# Patient Record
Sex: MALE | Race: WHITE | Employment: OTHER | URBAN - NONMETROPOLITAN AREA
[De-identification: names, ages, dates, MRNs, and addresses within clinical notes are randomized per-mention and may not be internally consistent; named-entity substitution may affect disease eponyms.]

---

## 2022-02-21 LAB
AVERAGE GLUCOSE: 114
HBA1C MFR BLD: 5.6 %

## 2022-05-02 ENCOUNTER — HOSPITAL ENCOUNTER (OUTPATIENT)
Age: 72
Setting detail: SPECIMEN
Discharge: HOME | End: 2022-05-02
Payer: MEDICARE

## 2022-05-02 LAB
-: ABNORMAL
ABSOLUTE EOS #: 0.03 K/UL (ref 0–0.44)
ABSOLUTE IMMATURE GRANULOCYTE: <0.03 K/UL (ref 0–0.3)
ABSOLUTE LYMPH #: 1.06 K/UL (ref 1.1–3.7)
ABSOLUTE MONO #: 0.42 K/UL (ref 0.1–1.2)
ANION GAP SERPL CALCULATED.3IONS-SCNC: 13 MMOL/L (ref 9–17)
BACTERIA: ABNORMAL
BASOPHILS # BLD: 1 % (ref 0–2)
BASOPHILS ABSOLUTE: 0.04 K/UL (ref 0–0.2)
BILIRUBIN URINE: ABNORMAL
BUN BLDV-MCNC: 23 MG/DL (ref 8–23)
BUN/CREAT BLD: 17 (ref 9–20)
CALCIUM SERPL-MCNC: 9.8 MG/DL (ref 8.6–10.4)
CHLORIDE BLD-SCNC: 97 MMOL/L (ref 98–107)
CO2: 24 MMOL/L (ref 20–31)
COLOR: YELLOW
CREAT SERPL-MCNC: 1.39 MG/DL (ref 0.7–1.2)
CREATININE URINE: 246 MG/DL (ref 39–259)
EOSINOPHILS RELATIVE PERCENT: 1 % (ref 1–4)
EPITHELIAL CELLS UA: ABNORMAL /HPF (ref 0–5)
GFR AFRICAN AMERICAN: >60 ML/MIN
GFR NON-AFRICAN AMERICAN: 50 ML/MIN
GFR SERPL CREATININE-BSD FRML MDRD: ABNORMAL ML/MIN/{1.73_M2}
GFR SERPL CREATININE-BSD FRML MDRD: ABNORMAL ML/MIN/{1.73_M2}
GLUCOSE BLD-MCNC: 94 MG/DL (ref 70–99)
GLUCOSE URINE: NEGATIVE
HCT VFR BLD CALC: 43.8 % (ref 40.7–50.3)
HEMOGLOBIN: 14.7 G/DL (ref 13–17)
IMMATURE GRANULOCYTES: 0 %
KETONES, URINE: ABNORMAL
LEUKOCYTE ESTERASE, URINE: NEGATIVE
LYMPHOCYTES # BLD: 21 % (ref 24–43)
MAGNESIUM: 1.3 MG/DL (ref 1.6–2.6)
MCH RBC QN AUTO: 34 PG (ref 25.2–33.5)
MCHC RBC AUTO-ENTMCNC: 33.6 G/DL (ref 28.4–34.8)
MCV RBC AUTO: 101.4 FL (ref 82.6–102.9)
MONOCYTES # BLD: 8 % (ref 3–12)
MUCUS: ABNORMAL
NITRITE, URINE: NEGATIVE
NRBC AUTOMATED: 0 PER 100 WBC
PDW BLD-RTO: 14.9 % (ref 11.8–14.4)
PH UA: 6 (ref 5–9)
PHOSPHORUS: 4.3 MG/DL (ref 2.5–4.5)
PLATELET # BLD: 113 K/UL (ref 138–453)
PMV BLD AUTO: 12.2 FL (ref 8.1–13.5)
POTASSIUM SERPL-SCNC: 5.1 MMOL/L (ref 3.7–5.3)
PROTEIN UA: ABNORMAL
PTH INTACT: 63.82 PG/ML (ref 15–65)
RBC # BLD: 4.32 M/UL (ref 4.21–5.77)
RBC UA: ABNORMAL /HPF (ref 0–2)
SEG NEUTROPHILS: 69 % (ref 36–65)
SEGMENTED NEUTROPHILS ABSOLUTE COUNT: 3.48 K/UL (ref 1.5–8.1)
SODIUM BLD-SCNC: 134 MMOL/L (ref 135–144)
SPECIFIC GRAVITY UA: 1.02 (ref 1.01–1.02)
TOTAL PROTEIN, URINE: 33 MG/DL
TURBIDITY: CLEAR
URIC ACID: 6.3 MG/DL (ref 3.4–7)
URINE HGB: NEGATIVE
URINE TOTAL PROTEIN CREATININE RATIO: 0.13 (ref 0–0.2)
UROBILINOGEN, URINE: NORMAL
WBC # BLD: 5.1 K/UL (ref 3.5–11.3)
WBC UA: ABNORMAL /HPF (ref 0–5)

## 2022-05-02 PROCEDURE — 83735 ASSAY OF MAGNESIUM: CPT

## 2022-05-02 PROCEDURE — 84550 ASSAY OF BLOOD/URIC ACID: CPT

## 2022-05-02 PROCEDURE — 83970 ASSAY OF PARATHORMONE: CPT

## 2022-05-02 PROCEDURE — 85025 COMPLETE CBC W/AUTO DIFF WBC: CPT

## 2022-05-02 PROCEDURE — 82570 ASSAY OF URINE CREATININE: CPT

## 2022-05-02 PROCEDURE — 84100 ASSAY OF PHOSPHORUS: CPT

## 2022-05-02 PROCEDURE — 80048 BASIC METABOLIC PNL TOTAL CA: CPT

## 2022-05-02 PROCEDURE — 81001 URINALYSIS AUTO W/SCOPE: CPT

## 2022-05-02 PROCEDURE — 84156 ASSAY OF PROTEIN URINE: CPT

## 2022-05-11 ENCOUNTER — HOSPITAL ENCOUNTER (OUTPATIENT)
Age: 72
Setting detail: SPECIMEN
Discharge: HOME | End: 2022-05-11
Payer: MEDICARE

## 2022-05-11 LAB
ANION GAP SERPL CALCULATED.3IONS-SCNC: 11 MMOL/L (ref 9–17)
AST SERPL-CCNC: 31 U/L
BILIRUB SERPL-MCNC: 0.91 MG/DL (ref 0.3–1.2)
BUN BLDV-MCNC: 22 MG/DL (ref 8–23)
BUN/CREAT BLD: 18 (ref 9–20)
CALCIUM SERPL-MCNC: 9.3 MG/DL (ref 8.6–10.4)
CHLORIDE BLD-SCNC: 93 MMOL/L (ref 98–107)
CO2: 23 MMOL/L (ref 20–31)
CREAT SERPL-MCNC: 1.21 MG/DL (ref 0.7–1.2)
GFR AFRICAN AMERICAN: >60 ML/MIN
GFR NON-AFRICAN AMERICAN: 59 ML/MIN
GFR SERPL CREATININE-BSD FRML MDRD: ABNORMAL ML/MIN/{1.73_M2}
GFR SERPL CREATININE-BSD FRML MDRD: ABNORMAL ML/MIN/{1.73_M2}
GLUCOSE BLD-MCNC: 78 MG/DL (ref 70–99)
HCT VFR BLD CALC: 41.6 % (ref 40.7–50.3)
HEMOGLOBIN: 14.3 G/DL (ref 13–17)
MAGNESIUM: 1.4 MG/DL (ref 1.6–2.6)
POTASSIUM SERPL-SCNC: 4.6 MMOL/L (ref 3.7–5.3)
SODIUM BLD-SCNC: 127 MMOL/L (ref 135–144)
THYROXINE, FREE: 1.57 NG/DL (ref 0.93–1.7)
TSH SERPL DL<=0.05 MIU/L-ACNC: 4.52 UIU/ML (ref 0.3–5)

## 2022-05-11 PROCEDURE — 82247 BILIRUBIN TOTAL: CPT

## 2022-05-11 PROCEDURE — 80048 BASIC METABOLIC PNL TOTAL CA: CPT

## 2022-05-11 PROCEDURE — 84439 ASSAY OF FREE THYROXINE: CPT

## 2022-05-11 PROCEDURE — 85014 HEMATOCRIT: CPT

## 2022-05-11 PROCEDURE — 84443 ASSAY THYROID STIM HORMONE: CPT

## 2022-05-11 PROCEDURE — 84450 TRANSFERASE (AST) (SGOT): CPT

## 2022-05-11 PROCEDURE — 85018 HEMOGLOBIN: CPT

## 2022-05-11 PROCEDURE — 83735 ASSAY OF MAGNESIUM: CPT

## 2022-05-18 ENCOUNTER — APPOINTMENT (OUTPATIENT)
Dept: GENERAL RADIOLOGY | Age: 72
DRG: 286 | End: 2022-05-18
Payer: MEDICARE

## 2022-05-18 ENCOUNTER — HOSPITAL ENCOUNTER (INPATIENT)
Age: 72
LOS: 6 days | Discharge: HOME | DRG: 286 | End: 2022-05-24
Attending: EMERGENCY MEDICINE | Admitting: INTERNAL MEDICINE
Payer: MEDICARE

## 2022-05-18 DIAGNOSIS — R06.02 SHORTNESS OF BREATH: ICD-10-CM

## 2022-05-18 DIAGNOSIS — I48.91 ATRIAL FIBRILLATION WITH RVR (HCC): Primary | ICD-10-CM

## 2022-05-18 DIAGNOSIS — I50.31 ACUTE DIASTOLIC CONGESTIVE HEART FAILURE (HCC): ICD-10-CM

## 2022-05-18 DIAGNOSIS — I25.5 ISCHEMIC CARDIOMYOPATHY: ICD-10-CM

## 2022-05-18 LAB
ABSOLUTE EOS #: <0.03 K/UL (ref 0–0.44)
ABSOLUTE IMMATURE GRANULOCYTE: <0.03 K/UL (ref 0–0.3)
ABSOLUTE LYMPH #: 0.78 K/UL (ref 1.1–3.7)
ABSOLUTE MONO #: 0.36 K/UL (ref 0.1–1.2)
ALBUMIN SERPL-MCNC: 4.1 G/DL (ref 3.5–5.2)
ALBUMIN/GLOBULIN RATIO: 2 (ref 1–2.5)
ALP BLD-CCNC: 99 U/L (ref 40–129)
ALT SERPL-CCNC: 20 U/L (ref 5–41)
ANION GAP SERPL CALCULATED.3IONS-SCNC: 15 MMOL/L (ref 9–17)
AST SERPL-CCNC: 31 U/L
BASOPHILS # BLD: 1 % (ref 0–2)
BASOPHILS ABSOLUTE: <0.03 K/UL (ref 0–0.2)
BILIRUB SERPL-MCNC: 1.66 MG/DL (ref 0.3–1.2)
BILIRUBIN DIRECT: 0.55 MG/DL
BILIRUBIN, INDIRECT: 1.11 MG/DL (ref 0–1)
BUN BLDV-MCNC: 20 MG/DL (ref 8–23)
BUN/CREAT BLD: 19 (ref 9–20)
CALCIUM SERPL-MCNC: 9.3 MG/DL (ref 8.6–10.4)
CHLORIDE BLD-SCNC: 88 MMOL/L (ref 98–107)
CO2: 21 MMOL/L (ref 20–31)
CREAT SERPL-MCNC: 1.06 MG/DL (ref 0.7–1.2)
EOSINOPHILS RELATIVE PERCENT: 1 % (ref 1–4)
GFR AFRICAN AMERICAN: >60 ML/MIN
GFR NON-AFRICAN AMERICAN: >60 ML/MIN
GFR SERPL CREATININE-BSD FRML MDRD: ABNORMAL ML/MIN/{1.73_M2}
GFR SERPL CREATININE-BSD FRML MDRD: ABNORMAL ML/MIN/{1.73_M2}
GLUCOSE BLD-MCNC: 85 MG/DL (ref 70–99)
HCT VFR BLD CALC: 42.2 % (ref 40.7–50.3)
HEMOGLOBIN: 14.5 G/DL (ref 13–17)
IMMATURE GRANULOCYTES: 0 %
INR BLD: 1.9
LACTIC ACID: 1.9 MMOL/L (ref 0.5–2.2)
LYMPHOCYTES # BLD: 18 % (ref 24–43)
MCH RBC QN AUTO: 34.4 PG (ref 25.2–33.5)
MCHC RBC AUTO-ENTMCNC: 34.4 G/DL (ref 28.4–34.8)
MCV RBC AUTO: 100 FL (ref 82.6–102.9)
MONOCYTES # BLD: 8 % (ref 3–12)
NRBC AUTOMATED: 0 PER 100 WBC
PARTIAL THROMBOPLASTIN TIME: 34.5 SEC (ref 26.8–34.8)
PDW BLD-RTO: 14.1 % (ref 11.8–14.4)
PLATELET # BLD: ABNORMAL K/UL (ref 138–453)
PLATELET, FLUORESCENCE: 113 K/UL (ref 138–453)
PLATELET, IMMATURE FRACTION: 11.7 % (ref 1.1–10.3)
POTASSIUM SERPL-SCNC: 4.7 MMOL/L (ref 3.7–5.3)
PRO-BNP: ABNORMAL PG/ML
PROTHROMBIN TIME: 21.2 SEC (ref 11.5–14.2)
RBC # BLD: 4.22 M/UL (ref 4.21–5.77)
SEG NEUTROPHILS: 73 % (ref 36–65)
SEGMENTED NEUTROPHILS ABSOLUTE COUNT: 3.15 K/UL (ref 1.5–8.1)
SODIUM BLD-SCNC: 124 MMOL/L (ref 135–144)
TOTAL PROTEIN: 6.2 G/DL (ref 6.4–8.3)
TROPONIN, HIGH SENSITIVITY: 27 NG/L (ref 0–22)
WBC # BLD: 4.3 K/UL (ref 3.5–11.3)

## 2022-05-18 PROCEDURE — 2580000003 HC RX 258: Performed by: INTERNAL MEDICINE

## 2022-05-18 PROCEDURE — 6360000002 HC RX W HCPCS

## 2022-05-18 PROCEDURE — 94760 N-INVAS EAR/PLS OXIMETRY 1: CPT

## 2022-05-18 PROCEDURE — 71045 X-RAY EXAM CHEST 1 VIEW: CPT

## 2022-05-18 PROCEDURE — 85025 COMPLETE CBC W/AUTO DIFF WBC: CPT

## 2022-05-18 PROCEDURE — 85730 THROMBOPLASTIN TIME PARTIAL: CPT

## 2022-05-18 PROCEDURE — 36415 COLL VENOUS BLD VENIPUNCTURE: CPT

## 2022-05-18 PROCEDURE — 80048 BASIC METABOLIC PNL TOTAL CA: CPT

## 2022-05-18 PROCEDURE — 85610 PROTHROMBIN TIME: CPT

## 2022-05-18 PROCEDURE — 99285 EMERGENCY DEPT VISIT HI MDM: CPT

## 2022-05-18 PROCEDURE — 93005 ELECTROCARDIOGRAM TRACING: CPT | Performed by: EMERGENCY MEDICINE

## 2022-05-18 PROCEDURE — 2500000003 HC RX 250 WO HCPCS

## 2022-05-18 PROCEDURE — 83605 ASSAY OF LACTIC ACID: CPT

## 2022-05-18 PROCEDURE — 6370000000 HC RX 637 (ALT 250 FOR IP): Performed by: INTERNAL MEDICINE

## 2022-05-18 PROCEDURE — 80076 HEPATIC FUNCTION PANEL: CPT

## 2022-05-18 PROCEDURE — 84484 ASSAY OF TROPONIN QUANT: CPT

## 2022-05-18 PROCEDURE — 83880 ASSAY OF NATRIURETIC PEPTIDE: CPT

## 2022-05-18 PROCEDURE — 2500000003 HC RX 250 WO HCPCS: Performed by: EMERGENCY MEDICINE

## 2022-05-18 PROCEDURE — 96375 TX/PRO/DX INJ NEW DRUG ADDON: CPT

## 2022-05-18 PROCEDURE — 6360000002 HC RX W HCPCS: Performed by: EMERGENCY MEDICINE

## 2022-05-18 PROCEDURE — 1200000000 HC SEMI PRIVATE

## 2022-05-18 PROCEDURE — 87040 BLOOD CULTURE FOR BACTERIA: CPT

## 2022-05-18 PROCEDURE — 96374 THER/PROPH/DIAG INJ IV PUSH: CPT

## 2022-05-18 RX ORDER — SODIUM CHLORIDE 9 MG/ML
INJECTION, SOLUTION INTRAVENOUS PRN
Status: DISCONTINUED | OUTPATIENT
Start: 2022-05-18 | End: 2022-05-20 | Stop reason: SDUPTHER

## 2022-05-18 RX ORDER — ESCITALOPRAM OXALATE 20 MG/1
5 TABLET ORAL NIGHTLY
COMMUNITY
Start: 2022-03-16 | End: 2022-10-18 | Stop reason: ALTCHOICE

## 2022-05-18 RX ORDER — POLYETHYLENE GLYCOL 3350 17 G/17G
17 POWDER, FOR SOLUTION ORAL DAILY PRN
Status: DISCONTINUED | OUTPATIENT
Start: 2022-05-18 | End: 2022-05-24 | Stop reason: HOSPADM

## 2022-05-18 RX ORDER — FUROSEMIDE 10 MG/ML
40 INJECTION INTRAMUSCULAR; INTRAVENOUS 2 TIMES DAILY
Status: DISCONTINUED | OUTPATIENT
Start: 2022-05-18 | End: 2022-05-23

## 2022-05-18 RX ORDER — METOPROLOL SUCCINATE 25 MG/1
25 TABLET, EXTENDED RELEASE ORAL DAILY
Status: DISCONTINUED | OUTPATIENT
Start: 2022-05-18 | End: 2022-05-24 | Stop reason: HOSPADM

## 2022-05-18 RX ORDER — MELATONIN 10 MG
10 CAPSULE ORAL NIGHTLY
Status: ON HOLD | COMMUNITY
End: 2022-06-11 | Stop reason: HOSPADM

## 2022-05-18 RX ORDER — METOPROLOL SUCCINATE 25 MG/1
25 TABLET, EXTENDED RELEASE ORAL DAILY
Status: ON HOLD | COMMUNITY
Start: 2022-04-26 | End: 2022-06-11 | Stop reason: HOSPADM

## 2022-05-18 RX ORDER — OMEPRAZOLE 20 MG/1
20 CAPSULE, DELAYED RELEASE ORAL DAILY
Status: ON HOLD | COMMUNITY
Start: 2022-03-30 | End: 2022-06-11 | Stop reason: HOSPADM

## 2022-05-18 RX ORDER — ACETAMINOPHEN 650 MG/1
650 SUPPOSITORY RECTAL EVERY 6 HOURS PRN
Status: DISCONTINUED | OUTPATIENT
Start: 2022-05-18 | End: 2022-05-24 | Stop reason: HOSPADM

## 2022-05-18 RX ORDER — LANOLIN ALCOHOL/MO/W.PET/CERES
400 CREAM (GRAM) TOPICAL 2 TIMES DAILY
Status: ON HOLD | COMMUNITY
Start: 2022-05-04 | End: 2022-05-24 | Stop reason: HOSPADM

## 2022-05-18 RX ORDER — ATORVASTATIN CALCIUM 40 MG/1
80 TABLET, FILM COATED ORAL NIGHTLY
Status: DISCONTINUED | OUTPATIENT
Start: 2022-05-18 | End: 2022-05-24 | Stop reason: HOSPADM

## 2022-05-18 RX ORDER — OMEPRAZOLE 20 MG/1
20 CAPSULE, DELAYED RELEASE ORAL DAILY
Status: DISCONTINUED | OUTPATIENT
Start: 2022-05-18 | End: 2022-05-19 | Stop reason: CLARIF

## 2022-05-18 RX ORDER — ONDANSETRON 2 MG/ML
4 INJECTION INTRAMUSCULAR; INTRAVENOUS EVERY 6 HOURS PRN
Status: DISCONTINUED | OUTPATIENT
Start: 2022-05-18 | End: 2022-05-24 | Stop reason: HOSPADM

## 2022-05-18 RX ORDER — SWAB
2 SWAB, NON-MEDICATED MISCELLANEOUS DAILY
COMMUNITY

## 2022-05-18 RX ORDER — CLOPIDOGREL BISULFATE 75 MG/1
75 TABLET ORAL DAILY
COMMUNITY
Start: 2022-03-16 | End: 2022-10-18 | Stop reason: SDUPTHER

## 2022-05-18 RX ORDER — ROPINIROLE 0.5 MG/1
0.5 TABLET, FILM COATED ORAL NIGHTLY
COMMUNITY
Start: 2022-04-11 | End: 2022-09-08 | Stop reason: SDUPTHER

## 2022-05-18 RX ORDER — ESCITALOPRAM OXALATE 5 MG/1
20 TABLET ORAL NIGHTLY
Status: DISCONTINUED | OUTPATIENT
Start: 2022-05-18 | End: 2022-05-24 | Stop reason: HOSPADM

## 2022-05-18 RX ORDER — ACETAMINOPHEN 325 MG/1
650 TABLET ORAL EVERY 6 HOURS PRN
Status: DISCONTINUED | OUTPATIENT
Start: 2022-05-18 | End: 2022-05-24 | Stop reason: HOSPADM

## 2022-05-18 RX ORDER — SODIUM CHLORIDE 0.9 % (FLUSH) 0.9 %
5-40 SYRINGE (ML) INJECTION EVERY 12 HOURS SCHEDULED
Status: DISCONTINUED | OUTPATIENT
Start: 2022-05-18 | End: 2022-05-20 | Stop reason: SDUPTHER

## 2022-05-18 RX ORDER — ATORVASTATIN CALCIUM 80 MG/1
80 TABLET, FILM COATED ORAL DAILY
COMMUNITY
Start: 2022-03-16

## 2022-05-18 RX ORDER — CHOLECALCIFEROL (VITAMIN D3) 125 MCG
10 CAPSULE ORAL NIGHTLY
Status: DISCONTINUED | OUTPATIENT
Start: 2022-05-18 | End: 2022-05-24 | Stop reason: HOSPADM

## 2022-05-18 RX ORDER — DILTIAZEM HYDROCHLORIDE 5 MG/ML
10 INJECTION INTRAVENOUS ONCE
Status: COMPLETED | OUTPATIENT
Start: 2022-05-18 | End: 2022-05-18

## 2022-05-18 RX ORDER — CLOPIDOGREL BISULFATE 75 MG/1
75 TABLET ORAL DAILY
Status: DISCONTINUED | OUTPATIENT
Start: 2022-05-18 | End: 2022-05-19

## 2022-05-18 RX ORDER — AMIODARONE HYDROCHLORIDE 200 MG/1
200 TABLET ORAL 2 TIMES DAILY
Status: DISCONTINUED | OUTPATIENT
Start: 2022-05-18 | End: 2022-05-24 | Stop reason: HOSPADM

## 2022-05-18 RX ORDER — SPIRONOLACTONE 25 MG/1
12.5 TABLET ORAL DAILY
Status: ON HOLD | COMMUNITY
Start: 2022-03-16 | End: 2022-05-24 | Stop reason: HOSPADM

## 2022-05-18 RX ORDER — AMIODARONE HYDROCHLORIDE 200 MG/1
200 TABLET ORAL 2 TIMES DAILY
COMMUNITY
Start: 2022-04-26 | End: 2022-09-12

## 2022-05-18 RX ORDER — ONDANSETRON 4 MG/1
4 TABLET, ORALLY DISINTEGRATING ORAL EVERY 8 HOURS PRN
Status: DISCONTINUED | OUTPATIENT
Start: 2022-05-18 | End: 2022-05-24 | Stop reason: HOSPADM

## 2022-05-18 RX ORDER — SODIUM CHLORIDE 0.9 % (FLUSH) 0.9 %
10 SYRINGE (ML) INJECTION PRN
Status: DISCONTINUED | OUTPATIENT
Start: 2022-05-18 | End: 2022-05-24 | Stop reason: HOSPADM

## 2022-05-18 RX ORDER — FUROSEMIDE 10 MG/ML
60 INJECTION INTRAMUSCULAR; INTRAVENOUS ONCE
Status: COMPLETED | OUTPATIENT
Start: 2022-05-18 | End: 2022-05-18

## 2022-05-18 RX ORDER — LANOLIN ALCOHOL/MO/W.PET/CERES
400 CREAM (GRAM) TOPICAL 2 TIMES DAILY
Status: DISCONTINUED | OUTPATIENT
Start: 2022-05-18 | End: 2022-05-24 | Stop reason: HOSPADM

## 2022-05-18 RX ORDER — BUSPIRONE HYDROCHLORIDE 5 MG/1
15 TABLET ORAL 2 TIMES DAILY
Status: DISCONTINUED | OUTPATIENT
Start: 2022-05-18 | End: 2022-05-24 | Stop reason: HOSPADM

## 2022-05-18 RX ORDER — BUSPIRONE HYDROCHLORIDE 10 MG/1
15 TABLET ORAL 2 TIMES DAILY
COMMUNITY
Start: 2022-05-10 | End: 2022-08-22 | Stop reason: ALTCHOICE

## 2022-05-18 RX ORDER — ROPINIROLE 0.25 MG/1
0.5 TABLET, FILM COATED ORAL NIGHTLY
Status: DISCONTINUED | OUTPATIENT
Start: 2022-05-18 | End: 2022-05-24 | Stop reason: HOSPADM

## 2022-05-18 RX ADMIN — ROPINIROLE HYDROCHLORIDE 0.5 MG: 0.25 TABLET, FILM COATED ORAL at 21:30

## 2022-05-18 RX ADMIN — APIXABAN 5 MG: 5 TABLET, FILM COATED ORAL at 21:30

## 2022-05-18 RX ADMIN — BUSPIRONE HYDROCHLORIDE 15 MG: 5 TABLET ORAL at 21:30

## 2022-05-18 RX ADMIN — AMIODARONE HYDROCHLORIDE 200 MG: 200 TABLET ORAL at 21:30

## 2022-05-18 RX ADMIN — Medication 400 MG: at 21:30

## 2022-05-18 RX ADMIN — ATORVASTATIN CALCIUM 80 MG: 40 TABLET, FILM COATED ORAL at 21:31

## 2022-05-18 RX ADMIN — SACUBITRIL AND VALSARTAN 1 TABLET: 24; 26 TABLET, FILM COATED ORAL at 21:30

## 2022-05-18 RX ADMIN — DILTIAZEM HYDROCHLORIDE 10 MG: 5 INJECTION INTRAVENOUS at 16:55

## 2022-05-18 RX ADMIN — FUROSEMIDE 60 MG: 10 INJECTION, SOLUTION INTRAMUSCULAR; INTRAVENOUS at 17:56

## 2022-05-18 RX ADMIN — Medication 10 MG: at 21:30

## 2022-05-18 RX ADMIN — ESCITALOPRAM 20 MG: 5 TABLET, FILM COATED ORAL at 21:30

## 2022-05-18 RX ADMIN — SODIUM CHLORIDE, PRESERVATIVE FREE 10 ML: 5 INJECTION INTRAVENOUS at 21:30

## 2022-05-18 ASSESSMENT — PAIN DESCRIPTION - LOCATION: LOCATION: LEG

## 2022-05-18 ASSESSMENT — PAIN SCALES - GENERAL
PAINLEVEL_OUTOF10: 0
PAINLEVEL_OUTOF10: 0
PAINLEVEL_OUTOF10: 5

## 2022-05-18 ASSESSMENT — PAIN - FUNCTIONAL ASSESSMENT: PAIN_FUNCTIONAL_ASSESSMENT: 0-10

## 2022-05-18 ASSESSMENT — PAIN DESCRIPTION - FREQUENCY: FREQUENCY: CONTINUOUS

## 2022-05-18 ASSESSMENT — PAIN DESCRIPTION - DESCRIPTORS: DESCRIPTORS: SORE

## 2022-05-18 ASSESSMENT — PAIN DESCRIPTION - PAIN TYPE: TYPE: ACUTE PAIN;CHRONIC PAIN

## 2022-05-18 ASSESSMENT — PAIN DESCRIPTION - ORIENTATION: ORIENTATION: LEFT;RIGHT

## 2022-05-18 NOTE — ED PROVIDER NOTES
HPI:  5/18/22, Time: 4:32 PM EDT         Joshua Jesus is a 70 y.o. male presenting to the ED for weakness and shortness of breath, beginning 2 weeks ago. The complaint has been persistent, moderate in severity, and worsened by light exertion. No fever no chills no productive cough. He recently had his spironolactone decreased in half he has had no chest pain and no angina. He was scheduled to have an elective cardioversion and [place] which was canceled and could not be rescheduled till June he takes Eliquis and Plavix and has had increased bruising. ROS:   Pertinent positives and negatives are stated within HPI, all other systems reviewed and are negative.  --------------------------------------------- PAST HISTORY ---------------------------------------------  Past Medical History:  has a past medical history of Acute kidney injury ([facility] [address] Atrial fibrillation ([facility] [address] CHF (congestive heart failure) ([facility] [address] Coronary artery disease, Hyperlipidemia, and Hypertension. Past Surgical History:  has a past surgical history that includes Rotator cuff repair (Right) and Carotid stent placement (Bilateral). Social History:  reports that he has been smoking cigarettes. He has been smoking about 1.00 pack per day. He has never used smokeless tobacco.    Family History: family history is not on file. The patients home medications have been reviewed. Allergies: Patient has no known allergies.     ---------------------------------------------------PHYSICAL EXAM--------------------------------------     Constitutional/General: Alert and oriented x3, well appearing, non toxic in NAD  Head: Normocephalic and atraumatic  Eyes: PERRL, EOMI  Mouth: Oropharynx clear, handling secretions, no trismus  Neck: Supple, full ROM, non tender to palpation in the midline, no stridor, no crepitus, no meningeal signs  Pulmonary: Lungs show diminished breath sounds bilaterally with Rales throughout no accessory muscle use no respiratory distress no wheezing no rhonchi  Cardiovascular: Tachycardic irregular rate and rhythm. No murmurs, gallops, or rubs. 2+ distal pulses  Chest: no chest wall tenderness  Abdomen: Soft. Non tender. Non distended. +BS. No rebound, guarding, or rigidity. No pulsatile masses appreciated. Musculoskeletal: Moves all extremities x 4. Warm and well perfused, no clubbing, cyanosis, or edema. Capillary refill <3 seconds  Skin: warm and dry. There are bruises on both hands and on both of the lower extremities. Neurologic: GCS 15, CN 2-12 grossly intact, no focal deficits, symmetric strength 5/5 in the upper and lower extremities bilaterally  Psych: Normal Affect    -------------------------------------------------- RESULTS -------------------------------------------------  I have personally reviewed all laboratory and imaging results for this patient. Results are listed below.      LABS:  Results for orders placed or performed during the hospital encounter of 80/75/98   Basic Metabolic Panel   Result Value Ref Range    Glucose 85 70 - 99 mg/dL    BUN 20 8 - 23 mg/dL    CREATININE 1.06 0.70 - 1.20 mg/dL    Bun/Cre Ratio 19 9 - 20    Calcium 9.3 8.6 - 10.4 mg/dL    Sodium 124 (L) 135 - 144 mmol/L    Potassium 4.7 3.7 - 5.3 mmol/L    Chloride 88 (L) 98 - 107 mmol/L    CO2 21 20 - 31 mmol/L    Anion Gap 15 9 - 17 mmol/L    GFR Non-African American >60 >60 mL/min    GFR African American >60 >60 mL/min    GFR Comment          GFR Staging         Brain Natriuretic Peptide   Result Value Ref Range    Pro-BNP 11,715 (H) <300 pg/mL   CBC with Auto Differential   Result Value Ref Range    WBC 4.3 3.5 - 11.3 k/uL    RBC 4.22 4.21 - 5.77 m/uL    Hemoglobin 14.5 13.0 - 17.0 g/dL    Hematocrit 42.2 40.7 - 50.3 %    .0 82.6 - 102.9 fL    MCH 34.4 (H) 25.2 - 33.5 pg    MCHC 34.4 28.4 - 34.8 g/dL    RDW 14.1 11.8 - 14.4 %    Platelets See Reflexed IPF Result 138 - 453 k/uL    NRBC Automated 0.0 0.0 per 100 WBC    Seg Neutrophils 73 (H) 36 - 65 %    Lymphocytes 18 (L) 24 - 43 %    Monocytes 8 3 - 12 %    Eosinophils % 1 1 - 4 %    Basophils 1 0 - 2 %    Immature Granulocytes 0 0 %    Segs Absolute 3.15 1.50 - 8.10 k/uL    Absolute Lymph # 0.78 (L) 1.10 - 3.70 k/uL    Absolute Mono # 0.36 0.10 - 1.20 k/uL    Absolute Eos # <0.03 0.00 - 0.44 k/uL    Basophils Absolute <0.03 0.00 - 0.20 k/uL    Absolute Immature Granulocyte <0.03 0.00 - 0.30 k/uL   Troponin   Result Value Ref Range    Troponin, High Sensitivity 27 (H) 0 - 22 ng/L   Hepatic Function Panel   Result Value Ref Range    Albumin 4.1 3.5 - 5.2 g/dL    Alkaline Phosphatase 99 40 - 129 U/L    ALT 20 5 - 41 U/L    AST 31 <40 U/L    Total Bilirubin 1.66 (H) 0.3 - 1.2 mg/dL    Bilirubin, Direct 0.55 (H) <0.31 mg/dL    Bilirubin, Indirect 1.11 (H) 0.00 - 1.00 mg/dL    Total Protein 6.2 (L) 6.4 - 8.3 g/dL    Albumin/Globulin Ratio 2.0 1.0 - 2.5   Lactic Acid   Result Value Ref Range    Lactic Acid 1.9 0.5 - 2.2 mmol/L   Protime-INR   Result Value Ref Range    Protime 21.2 (H) 11.5 - 14.2 sec    INR 1.9    APTT   Result Value Ref Range    PTT 34.5 26.8 - 34.8 sec   Immature Platelet Fraction   Result Value Ref Range    Platelet, Immature Fraction 11.7 (H) 1.1 - 10.3 %    Platelet, Fluorescence 113 (L) 138 - 453 k/uL   EKG 12 Lead   Result Value Ref Range    Ventricular Rate 117 BPM    Atrial Rate 105 BPM    QRS Duration 90 ms    Q-T Interval 300 ms    QTc Calculation (Bazett) 418 ms    R Axis 64 degrees    T Axis -141 degrees       RADIOLOGY:  Interpreted by Radiologist.  XR CHEST PORTABLE   Final Result   Trace bilateral pleural effusions with mild bibasilar airspace disease which   could represent atelectasis or pneumonia in the appropriate clinical setting.                EKG Interpretation  Interpreted by emergency department physician    Rhythm: atrial fibrillation - controlled  Rate: normal  Axis: normal  Conduction: normal  ST Segments: no acute change  T Waves: no acute change    Clinical Impression: atrial fibrillation (chronic)  Comparison to prior EKG: stable as compared to patient's most recent EKG      ------------------------- NURSING NOTES AND VITALS REVIEWED ---------------------------   The nursing notes within the ED encounter and vital signs as below have been reviewed by myself. BP (!) 118/91   Pulse 97   Temp 96.6 °F (35.9 °C) (Tympanic)   Resp 20   Wt 208 lb (94.3 kg)   SpO2 96%   BMI 33.57 kg/m²   Oxygen Saturation Interpretation: Normal    The patients available past medical records and past encounters were reviewed. ------------------------------ ED COURSE/MEDICAL DECISION MAKING----------------------  Medications   dilTIAZem injection 10 mg (10 mg IntraVENous Given 5/18/22 1655)   furosemide (LASIX) injection 60 mg (60 mg IntraVENous Given 5/18/22 1756)             Medical Decision Making:    EG shows A. fib with RVR on the cardiac monitor his rate was 128-150 I have ordered a Cardizem bolus intravenously. We will check lab work and get a chest x-ray  Rate is controlled with Cardizem and is down to 85 he was given Lasix 60 mg IV  Re-Evaluations:             Re-evaluation. Patients symptoms are improving      Consultations:             's was discussed with the hospitalist who will admit the patient          This patient's ED course included: re-evaluation prior to disposition, IV medications, cardiac monitoring, continuous pulse oximetry and a personal history and physicial eaxmination    This patient has remained hemodynamically stable, improved and been closely monitored during their ED course. Counseling: The emergency provider has spoken with the patient and discussed todays results, in addition to providing specific details for the plan of care and counseling regarding the diagnosis and prognosis.   Questions are answered at this time and they are agreeable with the plan.       --------------------------------- IMPRESSION AND

## 2022-05-19 ENCOUNTER — APPOINTMENT (OUTPATIENT)
Dept: NON INVASIVE DIAGNOSTICS | Age: 72
DRG: 286 | End: 2022-05-19
Payer: MEDICARE

## 2022-05-19 ENCOUNTER — APPOINTMENT (OUTPATIENT)
Dept: GENERAL RADIOLOGY | Age: 72
DRG: 286 | End: 2022-05-19
Payer: MEDICARE

## 2022-05-19 ENCOUNTER — APPOINTMENT (OUTPATIENT)
Dept: CT IMAGING | Age: 72
DRG: 286 | End: 2022-05-19
Payer: MEDICARE

## 2022-05-19 PROBLEM — K74.60 CIRRHOSIS OF LIVER WITH ASCITES (HCC): Status: ACTIVE | Noted: 2022-05-19

## 2022-05-19 PROBLEM — R18.8 CIRRHOSIS OF LIVER WITH ASCITES (HCC): Status: ACTIVE | Noted: 2022-05-19

## 2022-05-19 LAB
ABSOLUTE EOS #: 0.03 K/UL (ref 0–0.44)
ABSOLUTE IMMATURE GRANULOCYTE: 0 K/UL (ref 0–0.3)
ABSOLUTE LYMPH #: 0.54 K/UL (ref 1.1–3.7)
ABSOLUTE MONO #: 0.24 K/UL (ref 0.1–1.2)
ALBUMIN SERPL-MCNC: 3.2 G/DL (ref 3.5–5.2)
ALBUMIN/GLOBULIN RATIO: 1.7 (ref 1–2.5)
ALP BLD-CCNC: 82 U/L (ref 40–129)
ALT SERPL-CCNC: 17 U/L (ref 5–41)
ANION GAP SERPL CALCULATED.3IONS-SCNC: 9 MMOL/L (ref 9–17)
AST SERPL-CCNC: 25 U/L
BASOPHILS # BLD: 0 % (ref 0–2)
BASOPHILS ABSOLUTE: 0 K/UL (ref 0–0.2)
BILIRUB SERPL-MCNC: 1.53 MG/DL (ref 0.3–1.2)
BUN BLDV-MCNC: 20 MG/DL (ref 8–23)
BUN/CREAT BLD: 17 (ref 9–20)
CALCIUM SERPL-MCNC: 8.6 MG/DL (ref 8.6–10.4)
CHLORIDE BLD-SCNC: 89 MMOL/L (ref 98–107)
CO2: 25 MMOL/L (ref 20–31)
CREAT SERPL-MCNC: 1.18 MG/DL (ref 0.7–1.2)
EOSINOPHILS RELATIVE PERCENT: 1 % (ref 1–4)
GFR AFRICAN AMERICAN: >60 ML/MIN
GFR NON-AFRICAN AMERICAN: >60 ML/MIN
GFR SERPL CREATININE-BSD FRML MDRD: ABNORMAL ML/MIN/{1.73_M2}
GFR SERPL CREATININE-BSD FRML MDRD: ABNORMAL ML/MIN/{1.73_M2}
GLUCOSE BLD-MCNC: 83 MG/DL (ref 70–99)
HAV IGM SER IA-ACNC: NONREACTIVE
HCT VFR BLD CALC: 35.8 % (ref 40.7–50.3)
HEMOGLOBIN: 12.4 G/DL (ref 13–17)
HEPATITIS B CORE IGM ANTIBODY: NONREACTIVE
HEPATITIS B SURFACE ANTIGEN: NONREACTIVE
HEPATITIS C ANTIBODY: NONREACTIVE
IMMATURE GRANULOCYTES: 0 %
LV EF: 18 %
LVEF MODALITY: NORMAL
LYMPHOCYTES # BLD: 20 % (ref 24–43)
MAGNESIUM: 1.4 MG/DL (ref 1.6–2.6)
MCH RBC QN AUTO: 34 PG (ref 25.2–33.5)
MCHC RBC AUTO-ENTMCNC: 34.6 G/DL (ref 28.4–34.8)
MCV RBC AUTO: 98.1 FL (ref 82.6–102.9)
MONOCYTES # BLD: 9 % (ref 3–12)
MORPHOLOGY: ABNORMAL
NRBC AUTOMATED: 0 PER 100 WBC
PDW BLD-RTO: 13.7 % (ref 11.8–14.4)
PLATELET # BLD: ABNORMAL K/UL (ref 138–453)
PLATELET, FLUORESCENCE: 79 K/UL (ref 138–453)
PLATELET, IMMATURE FRACTION: 9.2 % (ref 1.1–10.3)
POTASSIUM SERPL-SCNC: 4 MMOL/L (ref 3.7–5.3)
RBC # BLD: 3.65 M/UL (ref 4.21–5.77)
SEG NEUTROPHILS: 70 % (ref 36–65)
SEGMENTED NEUTROPHILS ABSOLUTE COUNT: 1.89 K/UL (ref 1.5–8.1)
SODIUM BLD-SCNC: 123 MMOL/L (ref 135–144)
TOTAL PROTEIN: 5.1 G/DL (ref 6.4–8.3)
WBC # BLD: 2.7 K/UL (ref 3.5–11.3)

## 2022-05-19 PROCEDURE — 85025 COMPLETE CBC W/AUTO DIFF WBC: CPT

## 2022-05-19 PROCEDURE — 71046 X-RAY EXAM CHEST 2 VIEWS: CPT

## 2022-05-19 PROCEDURE — 6370000000 HC RX 637 (ALT 250 FOR IP): Performed by: NURSE PRACTITIONER

## 2022-05-19 PROCEDURE — 94761 N-INVAS EAR/PLS OXIMETRY MLT: CPT

## 2022-05-19 PROCEDURE — 83735 ASSAY OF MAGNESIUM: CPT

## 2022-05-19 PROCEDURE — 1200000000 HC SEMI PRIVATE

## 2022-05-19 PROCEDURE — 71250 CT THORAX DX C-: CPT

## 2022-05-19 PROCEDURE — 99222 1ST HOSP IP/OBS MODERATE 55: CPT | Performed by: FAMILY MEDICINE

## 2022-05-19 PROCEDURE — 80074 ACUTE HEPATITIS PANEL: CPT

## 2022-05-19 PROCEDURE — 93306 TTE W/DOPPLER COMPLETE: CPT

## 2022-05-19 PROCEDURE — 2580000003 HC RX 258: Performed by: INTERNAL MEDICINE

## 2022-05-19 PROCEDURE — 6370000000 HC RX 637 (ALT 250 FOR IP): Performed by: INTERNAL MEDICINE

## 2022-05-19 PROCEDURE — 80053 COMPREHEN METABOLIC PANEL: CPT

## 2022-05-19 PROCEDURE — 36415 COLL VENOUS BLD VENIPUNCTURE: CPT

## 2022-05-19 PROCEDURE — 85055 RETICULATED PLATELET ASSAY: CPT

## 2022-05-19 PROCEDURE — 6360000002 HC RX W HCPCS: Performed by: INTERNAL MEDICINE

## 2022-05-19 RX ORDER — CLOPIDOGREL BISULFATE 75 MG/1
75 TABLET ORAL EVERY OTHER DAY
Status: DISCONTINUED | OUTPATIENT
Start: 2022-05-19 | End: 2022-05-24 | Stop reason: HOSPADM

## 2022-05-19 RX ORDER — PANTOPRAZOLE SODIUM 40 MG/1
40 TABLET, DELAYED RELEASE ORAL
Status: DISCONTINUED | OUTPATIENT
Start: 2022-05-19 | End: 2022-05-24 | Stop reason: HOSPADM

## 2022-05-19 RX ADMIN — CLOPIDOGREL BISULFATE 75 MG: 75 TABLET ORAL at 10:10

## 2022-05-19 RX ADMIN — FUROSEMIDE 40 MG: 10 INJECTION, SOLUTION INTRAMUSCULAR; INTRAVENOUS at 10:10

## 2022-05-19 RX ADMIN — AMIODARONE HYDROCHLORIDE 200 MG: 200 TABLET ORAL at 20:47

## 2022-05-19 RX ADMIN — ATORVASTATIN CALCIUM 80 MG: 40 TABLET, FILM COATED ORAL at 20:47

## 2022-05-19 RX ADMIN — Medication 10 MG: at 20:47

## 2022-05-19 RX ADMIN — Medication 400 MG: at 20:47

## 2022-05-19 RX ADMIN — PANTOPRAZOLE SODIUM 40 MG: 40 TABLET, DELAYED RELEASE ORAL at 07:31

## 2022-05-19 RX ADMIN — Medication 400 MG: at 10:10

## 2022-05-19 RX ADMIN — SACUBITRIL AND VALSARTAN 1 TABLET: 24; 26 TABLET, FILM COATED ORAL at 10:10

## 2022-05-19 RX ADMIN — BUSPIRONE HYDROCHLORIDE 15 MG: 5 TABLET ORAL at 10:10

## 2022-05-19 RX ADMIN — APIXABAN 5 MG: 5 TABLET, FILM COATED ORAL at 10:09

## 2022-05-19 RX ADMIN — SACUBITRIL AND VALSARTAN 1 TABLET: 24; 26 TABLET, FILM COATED ORAL at 20:47

## 2022-05-19 RX ADMIN — AMIODARONE HYDROCHLORIDE 200 MG: 200 TABLET ORAL at 10:10

## 2022-05-19 RX ADMIN — ESCITALOPRAM 20 MG: 5 TABLET, FILM COATED ORAL at 20:46

## 2022-05-19 RX ADMIN — SODIUM CHLORIDE, PRESERVATIVE FREE 10 ML: 5 INJECTION INTRAVENOUS at 20:45

## 2022-05-19 RX ADMIN — FUROSEMIDE 40 MG: 10 INJECTION, SOLUTION INTRAMUSCULAR; INTRAVENOUS at 17:26

## 2022-05-19 RX ADMIN — METOPROLOL SUCCINATE 25 MG: 25 TABLET, EXTENDED RELEASE ORAL at 10:10

## 2022-05-19 RX ADMIN — SODIUM CHLORIDE, PRESERVATIVE FREE 10 ML: 5 INJECTION INTRAVENOUS at 10:10

## 2022-05-19 RX ADMIN — ROPINIROLE HYDROCHLORIDE 0.5 MG: 0.25 TABLET, FILM COATED ORAL at 20:47

## 2022-05-19 RX ADMIN — BUSPIRONE HYDROCHLORIDE 15 MG: 5 TABLET ORAL at 20:47

## 2022-05-19 ASSESSMENT — PAIN SCALES - GENERAL: PAINLEVEL_OUTOF10: 0

## 2022-05-19 NOTE — PROGRESS NOTES
Pt setting off bed alarm at this time. When writer entered the room pt was at the end of the bed and shut the bed alarm off himself. Writer again educated pt that he needs to use the call light and that he cannot shut the bed alarm off for his safety.

## 2022-05-19 NOTE — PROGRESS NOTES
Pt has set off the bed alarm multiple times since shift change. Writer educated pt on the need to use the call light. Will continue to monitor.

## 2022-05-19 NOTE — PLAN OF CARE
Problem: Pain  Goal: Verbalizes/displays adequate comfort level or baseline comfort level  Flowsheets (Taken 5/19/2022 0122)  Verbalizes/displays adequate comfort level or baseline comfort level: Assess pain using appropriate pain scale     Problem: Safety - Adult  Goal: Free from fall injury  Note: ID band correct and in place. Bed locked and in lowest position. Call light within reach. Patient free from injury. Will continue to monitor.

## 2022-05-19 NOTE — PROGRESS NOTES
SW met with pt to complete assessment. Pt is alert and oriented and pleasant. Pt is a 70year old male admitted for atrial fib with RVR. Pt lives alone in his home in [place]. Pt reports that he has a cane, walker, shower chair and grab bars to use at home. Pt was not using any community services at home. Pt reports that he was driving himself up until February and now his family has been assisting him in getting to appointments and running errands. Pt is a full code and follows with Crow Connor CNP as PCP. Pt is uncertain if he has advance directives but states that his sister Rudolph Johnson makes all his medical decisions for him and manages his medications. Pt reports that his medications are affordable. Pt plans to return home at discharge. Pt identifies no discharge needs or concerns at this time. SW will follow and remain available.  Nicole WATKINS LSW 5/19/2022

## 2022-05-19 NOTE — H&P
History and Physical    Patient:  Gaston Thomas  MRN: [id number]    Chief Complaint: Shortness of breath    History Obtained From:  patient, electronic medical record    PCP: [facility]    History of Present Illness: The patient is a 70 y.o. male who presented to the emergency room with complaints of weakness and shortness of breath. Onset of symptoms 2 weeks. He stated that his shortness of breath worsened with light exertion. He denied fever or chills. He denied productive cough. He reported recently that his spironolactone was decreased in half. He denied chest pain but admitted to palpitations. He reported that he was scheduled for an elective cardioversion in [place] however it was canceled and was not able to be scheduled until June. He reports that he takes Eliquis and Plavix and has noticed increase in bruising. During his evaluation patient was found to be hyponatremic with sodium level of 124. Hemoglobin was 14.5 however his platelet count was 40.2. Hepatic function showed elevated total bilirubin, direct bilirubin and indirect bilirubin. Patient denied abdominal pain. Chest x-ray showed trace bilateral pleural effusions with mild bibasilar airspace disease. EKG showed atrial fibrillation with no ST or T wave changes. Patient's initial heart rate ranged between 128-150 and was given a Cardizem bolus IV which reduced his heart rate to 85. Patient was given Lasix 60 mg IV. Past Medical History:        Diagnosis Date    Acute kidney injury ([address]     Atrial fibrillation ([address]     CHF (congestive heart failure) (HCC)     Coronary artery disease     Hyperlipidemia     Hypertension     Hyponatremia     Thrombocytopenia (HCC)        Past Surgical History:        Procedure Laterality Date    CAROTID STENT PLACEMENT Bilateral     ROTATOR CUFF REPAIR Right        Medications Prior to Admission:    Prior to Admission medications    Medication Sig Start Date End Date Taking? Authorizing Provider   amiodarone (CORDARONE) 200 MG tablet Take 200 mg by mouth 2 times daily 4/26/22  Yes Historical Provider, MD   apixaban (ELIQUIS) 5 MG TABS tablet Take 5 mg by mouth 2 times daily 3/16/22  Yes Historical Provider, MD   sacubitril-valsartan (ENTRESTO) 24-26 MG per tablet Take 1 tablet by mouth 2 times daily 3/16/22  Yes Historical Provider, MD   busPIRone (BUSPAR) 10 MG tablet Take 15 mg by mouth 2 times daily  5/10/22  Yes Historical Provider, MD   magnesium oxide (MAG-OX) 400 (240 Mg) MG tablet Take 400 mg by mouth 2 times daily 5/4/22  Yes Historical Provider, MD   clopidogrel (PLAVIX) 75 MG tablet Take 75 mg by mouth daily 3/16/22  Yes Historical Provider, MD   spironolactone (ALDACTONE) 25 MG tablet Take 12.5 mg by mouth daily  3/16/22  Yes Historical Provider, MD   omeprazole (PRILOSEC) 20 MG delayed release capsule Take 20 mg by mouth daily 3/30/22  Yes Historical Provider, MD   metoprolol succinate (TOPROL XL) 25 MG extended release tablet Take 25 mg by mouth daily 4/26/22  Yes Historical Provider, MD   escitalopram (LEXAPRO) 20 MG tablet Take 20 mg by mouth nightly 3/16/22  Yes Historical Provider, MD   atorvastatin (LIPITOR) 80 MG tablet Take 80 mg by mouth daily 3/16/22  Yes Historical Provider, MD   rOPINIRole (REQUIP) 0.5 MG tablet Take 0.5 mg by mouth nightly 4/11/22 7/10/22 Yes Historical Provider, MD   melatonin 10 MG CAPS capsule Take 10 mg by mouth nightly   Yes Historical Provider, MD   Omega-3 Fatty Acids (FISH OIL CONCENTRATE) 1000 MG CAPS Take 2 caplets by mouth daily   Yes Historical Provider, MD       Allergies:  Patient has no known allergies. Social History:   TOBACCO:   reports that he has been smoking cigarettes. He has been smoking about 1.00 pack per day. He has never used smokeless tobacco.  ETOH:   reports previous alcohol use. Family History:   History reviewed. No pertinent family history. Allergies:  Patient has no known allergies.     Medications Prior to Admission:    Prior to Admission medications    Medication Sig Start Date End Date Taking? Authorizing Provider   amiodarone (CORDARONE) 200 MG tablet Take 200 mg by mouth 2 times daily 4/26/22  Yes Historical Provider, MD   apixaban (ELIQUIS) 5 MG TABS tablet Take 5 mg by mouth 2 times daily 3/16/22  Yes Historical Provider, MD   sacubitril-valsartan (ENTRESTO) 24-26 MG per tablet Take 1 tablet by mouth 2 times daily 3/16/22  Yes Historical Provider, MD   busPIRone (BUSPAR) 10 MG tablet Take 15 mg by mouth 2 times daily  5/10/22  Yes Historical Provider, MD   magnesium oxide (MAG-OX) 400 (240 Mg) MG tablet Take 400 mg by mouth 2 times daily 5/4/22  Yes Historical Provider, MD   clopidogrel (PLAVIX) 75 MG tablet Take 75 mg by mouth daily 3/16/22  Yes Historical Provider, MD   spironolactone (ALDACTONE) 25 MG tablet Take 12.5 mg by mouth daily  3/16/22  Yes Historical Provider, MD   omeprazole (PRILOSEC) 20 MG delayed release capsule Take 20 mg by mouth daily 3/30/22  Yes Historical Provider, MD   metoprolol succinate (TOPROL XL) 25 MG extended release tablet Take 25 mg by mouth daily 4/26/22  Yes Historical Provider, MD   escitalopram (LEXAPRO) 20 MG tablet Take 20 mg by mouth nightly 3/16/22  Yes Historical Provider, MD   atorvastatin (LIPITOR) 80 MG tablet Take 80 mg by mouth daily 3/16/22  Yes Historical Provider, MD   rOPINIRole (REQUIP) 0.5 MG tablet Take 0.5 mg by mouth nightly 4/11/22 7/10/22 Yes Historical Provider, MD   melatonin 10 MG CAPS capsule Take 10 mg by mouth nightly   Yes Historical Provider, MD   Omega-3 Fatty Acids (FISH OIL CONCENTRATE) 1000 MG CAPS Take 2 caplets by mouth daily   Yes Historical Provider, MD       Review of Systems:  Constitutional:negative  for fevers, and negative for chills.   Eyes: negative for visual disturbance   ENT: negative for sore throat, negative nasal congestion, and negative for earache  Respiratory: positive for shortness of breath, positive for cough, and negative for wheezing  Cardiovascular: negative for chest pain, positive for palpitations, and negative for syncope  Gastrointestinal: negative for abdominal pain, negative for nausea,negative for vomiting, negative for diarrhea, negative for constipation, and negative for hematochezia or melena  Genitourinary: negative for dysuria, negative for urinary urgency, negative for urinary frequency, and negative for hematuria  Skin: negative for skin rash, and negative for skin lesions positive ecchymosis  Neurological: negative for unilateral weakness, numbness or tingling. Physical Exam:    Vitals:   Temp: 97.5 °F (36.4 °C)  BP: 114/70  Resp: 18  Pulse: 70  SpO2: 94 %  24HR INTAKE/OUTPUT:      Intake/Output Summary (Last 24 hours) at 5/19/2022 1102  Last data filed at 5/19/2022 0908  Gross per 24 hour   Intake 420 ml   Output 2050 ml   Net -1630 ml       Weight    Body mass index is 31.81 kg/m². Exam:  GEN:    Awake, alert and oriented x3. EYES:  EOMI, pupils equal   NECK: Supple. No lymphadenopathy. No carotid bruit  CVS:    irregularly irregular, no audible murmur  PULM:  CTA, no wheezes, rales or rhonchi, no acute respiratory distress  ABD:    Bowels sounds normal.  Abdomen is soft. No distention. no tenderness to palpation. EXT:   2+ edema bilaterally . No calf tenderness. NEURO: Moves all extremities. Motor and sensory are grossly intact  SKIN:  No rashes. No skin lesions.   Significant ecchymosis to Syd LE and UE.    -----------------------------------------------------------------  Diagnostic Data:     DATA:    CBC:   Lab Results   Component Value Date    WBC 2.7 (L) 05/19/2022    RBC 3.65 (L) 05/19/2022    HGB 12.4 (L) 05/19/2022    HCT 35.8 (L) 05/19/2022    MCV 98.1 05/19/2022    PLT See Reflexed IPF Result 05/19/2022        CMP:   Lab Results   Component Value Date    GLUCOSE 83 05/19/2022    BUN 20 05/19/2022    CREATININE 1.18 05/19/2022     (L) 05/19/2022    K 4.0 05/19/2022 CALCIUM 8.6 05/19/2022    CL 89 (L) 05/19/2022    CO2 25 05/19/2022    PROT 5.1 (L) 05/19/2022    LABALBU 3.2 (L) 05/19/2022    BILITOT 1.53 (H) 05/19/2022    ALKPHOS 82 05/19/2022    ALT 17 05/19/2022    AST 25 05/19/2022       UA:   Lab Results   Component Value Date    COLORU Yellow 05/02/2022    SPECGRAV 1.025 (H) 05/02/2022    WBCUA 0 TO 2 05/02/2022    RBCUA 0 TO 2 05/02/2022    EPITHUA 0 TO 2 05/02/2022    LEUKOCYTESUR NEGATIVE 05/02/2022    GLUCOSEU NEGATIVE 05/02/2022    KETUA TRACE (A) 05/02/2022    PROTEINU TRACE (A) 05/02/2022    HGBUR NEGATIVE 05/02/2022    BACTERIA 1+ (A) 05/02/2022       Lactic Acid:   Lab Results   Component Value Date    LACTA 1.9 05/18/2022       D-Dimer:  No results found for: DDIMER    PT/INR:  Lab Results   Component Value Date    PROTIME 21.2 05/18/2022    INR 1.9 05/18/2022       High Sensitivity Troponin:  Recent Labs     05/18/22  1635   TROPHS 27*       ABGs:   No results found for: PHART, PH, ORD3TKD, PCO2, PO2ART, PO2, BQM9JYH, HCO3, BEART, BE, THGBART, THB, DSY4GFC, B5SDNQWG, O2SAT, FIO2        XR CHEST (2 VW)   Final Result   Cardiomegaly with mild pulmonary vascular congestion bilateral pleural   effusions which may represent CHF. Basilar atelectasis and or infiltrate   cannot be excluded. XR CHEST PORTABLE   Final Result   Trace bilateral pleural effusions with mild bibasilar airspace disease which   could represent atelectasis or pneumonia in the appropriate clinical setting. CT CHEST ABDOMEN PELVIS WO CONTRAST    (Results Pending)         EKG reviewed    Assessment:    Principal Problem:    Atrial fibrillation with RVR (HCC)  Active Problems:    Acute on chronic diastolic CHF (congestive heart failure), NYHA class 3 (HCC)    Coronary artery disease    Hypertension    Thrombocytopenia (HCC)    Hyponatremia    Hyperlipidemia  Resolved Problems:    * No resolved hospital problems.  *      Patient Active Problem List    Diagnosis Date Noted    Acute on chronic diastolic CHF (congestive heart failure), NYHA class 3 (HCC)     Coronary artery disease     Hypertension     Thrombocytopenia (HCC)     Hyponatremia     Hyperlipidemia     Atrial fibrillation with RVR ([facility] [address] 05/18/2022       Plan:     · This patient requires inpatient admission because of atrial fibrillation with RVR  · Factors affecting the medical complexity of this patient include acute on chronic diastolic CHF, CAD, hypertension, hyponatremia, thrombocytopenia  · Estimated length of stay is 3 days  · Atrial Fibrillation with RVR  · Appreciate cardiology  · Continue Eliquis  · Continue amiodarone  · Continue Toprol-XL  · Acute on chronic diastolic CHF stage III  · Appreciate cardiology  · IV Lasix  · Continue Entresto, Toprol-XL  · CAD  · Change Plavix to every other day due to thrombocytopenia  · Defer Eliquis to cardiology  · Hypertension  · Continue Toprol-XL  · Hyponatremia  · 1800 mL fluid restriction  · Trend labs  · Thrombocytopenia  · Uncertain etiology  · Elevated bilirubin  · Hepatitis series  · Change Plavix to every other day  · Caution with Eliquis  · CT chest abdomen and pelvis- assess pulmonary due to CHF versus neoplastic process involving liver or lung  · DVT prophylaxis: already on chronic anticoagulation  · Peptic ulcer prophylaxis: Pepcid  · High risk medications: none  · Social Service and Case Management consults for DC planning  · Dietician consult initiated    CORE MEASURES  DVT prophylaxis: already on chronic anticoagulation  Decubitus ulcer present on admission: No  CODE STATUS: FULL CODE  Nutrition Status: fair   Physical therapy: No   Old Charts reviewed: Yes  EKG Reviewed:  Yes  Advance Directive Addressed: Yes    SEVEN Garcia CNP, SEVEN, NP-C  5/19/2022, 11:02 AM

## 2022-05-19 NOTE — PROGRESS NOTES
[facility] Pt was able to ambulate from cart to bed. Pt unsteady. Vitals and assessment completed at this time. Pt educated on plan of care and calling out when needing to get out of bed. Bed alarm on at this time. Pt denies any pain or discomfort. Call light is within reach. Will continue to monitor.

## 2022-05-19 NOTE — PROGRESS NOTES
Comprehensive Nutrition Assessment    Type and Reason for Visit:  Initial    Nutrition Recommendations/Plan:   1. Continue current diet. 2. Attached low sodium diet information to d/c instructions. Malnutrition Assessment:  Malnutrition Status:  Insufficient data (05/19/22 2434)    Context:  Acute Illness     Findings of the 6 clinical characteristics of malnutrition:  Energy Intake:  Unable to assess  Weight Loss:  No significant weight loss     Body Fat Loss:  No significant body fat loss     Muscle Mass Loss:  No significant muscle mass loss    Fluid Accumulation:  Moderate to Severe Extremities (+ 2 RLE, + 2 pitting LLE)   Strength:  Not Performed    Nutrition Assessment:    Intended weight loss r/t cardiac dysfunction aeb 6.3% weight loss since admission, expected fluid losses with diuresis, Dx CHF. Pt is asleep at this time. Attached low sodium diet information to d/c instructions. Nutrition Related Findings:    appears well nourished Wound Type: None       Current Nutrition Intake & Therapies:    Average Meal Intake: Unable to assess  Average Supplements Intake: None Ordered  ADULT DIET; Regular; Low Sodium (2 gm); 1800 ml    Anthropometric Measures:  Height: 5' 7\" (170.2 cm)  Ideal Body Weight (IBW): 148 lbs (67 kg)    Admission Body Weight: 209 lb 5 oz (94.9 kg)  Current Body Weight: 203 lb 1.6 oz (92.1 kg), 137.2 % IBW. Weight Source: Bed Scale  Current BMI (kg/m2): 31.8  Usual Body Weight: 181 lb (82.1 kg)  % Weight Change (Calculated): 12.2  Weight Adjustment For: No Adjustment                 BMI Categories: Obese Class 1 (BMI 30.0-34. 9)    Estimated Daily Nutrient Needs:  Energy Requirements Based On: Kcal/kg  Weight Used for Energy Requirements: Current  Energy (kcal/day): 1613-9203 (15-18/kg)  Weight Used for Protein Requirements: Ideal  Protein (g/day): 81-94g (1.2-1.4g/kg)  Method Used for Fluid Requirements: ml/Kg  Fluid (ml/day): 1840 ml (20/kg)    Nutrition Diagnosis:   · Other (Comment) (Predicted weight loss) related to cardiac dysfunction as evidenced by other (comment) (2 gm sodium diet, CHF, diuresis)      Nutrition Interventions:   Food and/or Nutrient Delivery: Continue Current Diet  Nutrition Education/Counseling: Education needed  Coordination of Nutrition Care: Continue to monitor while inpatient       Goals:     Goals: PO intake 75% or greater     Recent Labs     05/18/22  1635 05/19/22  0520   * 123*   K 4.7 4.0   CL 88* 89*   CO2 21 25   BUN 20 20   CREATININE 1.06 1.18   GLUCOSE 85 83   ALT 20 17   ALKPHOS 99 82   GFR                            Lab Results   Component Value Date    LABALBU 3.2 05/19/2022      Nutrition Monitoring and Evaluation:   Behavioral-Environmental Outcomes: None Identified  Food/Nutrient Intake Outcomes: Food and Nutrient Intake  Physical Signs/Symptoms Outcomes: Biochemical Data,Weight,Fluid Status or Edema    Discharge Planning:    Continue current diet     Zane Morton RD, LD  Contact: [phone]

## 2022-05-20 PROBLEM — N18.9 CKD (CHRONIC KIDNEY DISEASE): Status: ACTIVE | Noted: 2022-05-20

## 2022-05-20 PROBLEM — R18.8 ASCITES: Status: ACTIVE | Noted: 2022-05-20

## 2022-05-20 PROBLEM — E83.42 HYPOMAGNESEMIA: Status: ACTIVE | Noted: 2022-05-20

## 2022-05-20 LAB
ABSOLUTE EOS #: 0.03 K/UL (ref 0–0.44)
ABSOLUTE IMMATURE GRANULOCYTE: 0 K/UL (ref 0–0.3)
ABSOLUTE LYMPH #: 0.64 K/UL (ref 1.1–3.7)
ABSOLUTE MONO #: 0.31 K/UL (ref 0.1–1.2)
ALBUMIN SERPL-MCNC: 3.5 G/DL (ref 3.5–5.2)
ALBUMIN/GLOBULIN RATIO: 1.8 (ref 1–2.5)
ALP BLD-CCNC: 84 U/L (ref 40–129)
ALT SERPL-CCNC: 18 U/L (ref 5–41)
ANION GAP SERPL CALCULATED.3IONS-SCNC: 5 MMOL/L (ref 9–17)
ANION GAP SERPL CALCULATED.3IONS-SCNC: 8 MMOL/L (ref 9–17)
AST SERPL-CCNC: 27 U/L
BASOPHILS # BLD: 0 % (ref 0–2)
BASOPHILS ABSOLUTE: 0 K/UL (ref 0–0.2)
BILIRUB SERPL-MCNC: 1.22 MG/DL (ref 0.3–1.2)
BUN BLDV-MCNC: 16 MG/DL (ref 8–23)
BUN BLDV-MCNC: 20 MG/DL (ref 8–23)
BUN/CREAT BLD: 16 (ref 9–20)
BUN/CREAT BLD: 18 (ref 9–20)
CALCIUM SERPL-MCNC: 8.7 MG/DL (ref 8.6–10.4)
CALCIUM SERPL-MCNC: 8.8 MG/DL (ref 8.6–10.4)
CHLORIDE BLD-SCNC: 92 MMOL/L (ref 98–107)
CHLORIDE BLD-SCNC: 94 MMOL/L (ref 98–107)
CO2: 29 MMOL/L (ref 20–31)
CO2: 31 MMOL/L (ref 20–31)
CREAT SERPL-MCNC: 1.02 MG/DL (ref 0.7–1.2)
CREAT SERPL-MCNC: 1.14 MG/DL (ref 0.7–1.2)
EKG ATRIAL RATE: 105 BPM
EKG Q-T INTERVAL: 300 MS
EKG QRS DURATION: 90 MS
EKG QTC CALCULATION (BAZETT): 418 MS
EKG R AXIS: 64 DEGREES
EKG T AXIS: -141 DEGREES
EKG VENTRICULAR RATE: 117 BPM
EOSINOPHILS RELATIVE PERCENT: 1 % (ref 1–4)
GFR AFRICAN AMERICAN: >60 ML/MIN
GFR AFRICAN AMERICAN: >60 ML/MIN
GFR NON-AFRICAN AMERICAN: >60 ML/MIN
GFR NON-AFRICAN AMERICAN: >60 ML/MIN
GFR SERPL CREATININE-BSD FRML MDRD: ABNORMAL ML/MIN/{1.73_M2}
GLUCOSE BLD-MCNC: 129 MG/DL (ref 70–99)
GLUCOSE BLD-MCNC: 89 MG/DL (ref 70–99)
HCT VFR BLD CALC: 36.2 % (ref 40.7–50.3)
HCT VFR BLD CALC: 38.1 % (ref 40.7–50.3)
HEMOGLOBIN: 12.6 G/DL (ref 13–17)
HEMOGLOBIN: 13.2 G/DL (ref 13–17)
IMMATURE GRANULOCYTES: 0 %
LYMPHOCYTES # BLD: 23 % (ref 24–43)
MAGNESIUM: 1.3 MG/DL (ref 1.6–2.6)
MCH RBC QN AUTO: 34.3 PG (ref 25.2–33.5)
MCH RBC QN AUTO: 34.3 PG (ref 25.2–33.5)
MCHC RBC AUTO-ENTMCNC: 34.6 G/DL (ref 28.4–34.8)
MCHC RBC AUTO-ENTMCNC: 34.8 G/DL (ref 28.4–34.8)
MCV RBC AUTO: 98.6 FL (ref 82.6–102.9)
MCV RBC AUTO: 99 FL (ref 82.6–102.9)
MONOCYTES # BLD: 11 % (ref 3–12)
MORPHOLOGY: NORMAL
NRBC AUTOMATED: 0 PER 100 WBC
NRBC AUTOMATED: 0 PER 100 WBC
PDW BLD-RTO: 13.9 % (ref 11.8–14.4)
PDW BLD-RTO: 14.3 % (ref 11.8–14.4)
PLATELET # BLD: ABNORMAL K/UL (ref 138–453)
PLATELET # BLD: ABNORMAL K/UL (ref 138–453)
PLATELET, FLUORESCENCE: 81 K/UL (ref 138–453)
PLATELET, FLUORESCENCE: 87 K/UL (ref 138–453)
PLATELET, IMMATURE FRACTION: 8.1 % (ref 1.1–10.3)
PLATELET, IMMATURE FRACTION: 8.7 % (ref 1.1–10.3)
POTASSIUM SERPL-SCNC: 3.9 MMOL/L (ref 3.7–5.3)
POTASSIUM SERPL-SCNC: 3.9 MMOL/L (ref 3.7–5.3)
RBC # BLD: 3.67 M/UL (ref 4.21–5.77)
RBC # BLD: 3.85 M/UL (ref 4.21–5.77)
SEG NEUTROPHILS: 65 % (ref 36–65)
SEGMENTED NEUTROPHILS ABSOLUTE COUNT: 1.82 K/UL (ref 1.5–8.1)
SODIUM BLD-SCNC: 128 MMOL/L (ref 135–144)
SODIUM BLD-SCNC: 131 MMOL/L (ref 135–144)
TOTAL PROTEIN: 5.4 G/DL (ref 6.4–8.3)
WBC # BLD: 2.8 K/UL (ref 3.5–11.3)
WBC # BLD: 2.8 K/UL (ref 3.5–11.3)

## 2022-05-20 PROCEDURE — 80053 COMPREHEN METABOLIC PANEL: CPT

## 2022-05-20 PROCEDURE — 6360000004 HC RX CONTRAST MEDICATION: Performed by: INTERNAL MEDICINE

## 2022-05-20 PROCEDURE — 2709999900 HC NON-CHARGEABLE SUPPLY

## 2022-05-20 PROCEDURE — 85025 COMPLETE CBC W/AUTO DIFF WBC: CPT

## 2022-05-20 PROCEDURE — 36415 COLL VENOUS BLD VENIPUNCTURE: CPT

## 2022-05-20 PROCEDURE — B2111ZZ FLUOROSCOPY OF MULTIPLE CORONARY ARTERIES USING LOW OSMOLAR CONTRAST: ICD-10-PCS | Performed by: FAMILY MEDICINE

## 2022-05-20 PROCEDURE — 93458 L HRT ARTERY/VENTRICLE ANGIO: CPT

## 2022-05-20 PROCEDURE — C1894 INTRO/SHEATH, NON-LASER: HCPCS

## 2022-05-20 PROCEDURE — 99232 SBSQ HOSP IP/OBS MODERATE 35: CPT | Performed by: PHYSICIAN ASSISTANT

## 2022-05-20 PROCEDURE — 83735 ASSAY OF MAGNESIUM: CPT

## 2022-05-20 PROCEDURE — 94761 N-INVAS EAR/PLS OXIMETRY MLT: CPT

## 2022-05-20 PROCEDURE — 93458 L HRT ARTERY/VENTRICLE ANGIO: CPT | Performed by: FAMILY MEDICINE

## 2022-05-20 PROCEDURE — 2580000003 HC RX 258: Performed by: FAMILY MEDICINE

## 2022-05-20 PROCEDURE — 6370000000 HC RX 637 (ALT 250 FOR IP): Performed by: INTERNAL MEDICINE

## 2022-05-20 PROCEDURE — 2580000003 HC RX 258: Performed by: INTERNAL MEDICINE

## 2022-05-20 PROCEDURE — C1887 CATHETER, GUIDING: HCPCS

## 2022-05-20 PROCEDURE — 93010 ELECTROCARDIOGRAM REPORT: CPT | Performed by: FAMILY MEDICINE

## 2022-05-20 PROCEDURE — 85055 RETICULATED PLATELET ASSAY: CPT

## 2022-05-20 PROCEDURE — 6360000002 HC RX W HCPCS: Performed by: INTERNAL MEDICINE

## 2022-05-20 PROCEDURE — C1769 GUIDE WIRE: HCPCS

## 2022-05-20 PROCEDURE — 80048 BASIC METABOLIC PNL TOTAL CA: CPT

## 2022-05-20 PROCEDURE — 85027 COMPLETE CBC AUTOMATED: CPT

## 2022-05-20 PROCEDURE — 6360000002 HC RX W HCPCS: Performed by: NURSE PRACTITIONER

## 2022-05-20 PROCEDURE — 1200000000 HC SEMI PRIVATE

## 2022-05-20 RX ORDER — SODIUM CHLORIDE 9 MG/ML
INJECTION, SOLUTION INTRAVENOUS PRN
Status: DISCONTINUED | OUTPATIENT
Start: 2022-05-20 | End: 2022-05-24 | Stop reason: HOSPADM

## 2022-05-20 RX ORDER — VERAPAMIL HYDROCHLORIDE 2.5 MG/ML
INJECTION, SOLUTION INTRAVENOUS
Status: DISPENSED
Start: 2022-05-20 | End: 2022-05-20

## 2022-05-20 RX ORDER — SODIUM CHLORIDE 0.9 % (FLUSH) 0.9 %
5-40 SYRINGE (ML) INJECTION EVERY 12 HOURS SCHEDULED
Status: DISCONTINUED | OUTPATIENT
Start: 2022-05-20 | End: 2022-05-24 | Stop reason: HOSPADM

## 2022-05-20 RX ORDER — ACETAMINOPHEN 325 MG/1
650 TABLET ORAL EVERY 4 HOURS PRN
Status: DISCONTINUED | OUTPATIENT
Start: 2022-05-20 | End: 2022-05-20 | Stop reason: SDUPTHER

## 2022-05-20 RX ORDER — SODIUM CHLORIDE 0.9 % (FLUSH) 0.9 %
5-40 SYRINGE (ML) INJECTION EVERY 12 HOURS SCHEDULED
Status: DISCONTINUED | OUTPATIENT
Start: 2022-05-20 | End: 2022-05-20 | Stop reason: SDUPTHER

## 2022-05-20 RX ORDER — SODIUM CHLORIDE 0.9 % (FLUSH) 0.9 %
5-40 SYRINGE (ML) INJECTION PRN
Status: DISCONTINUED | OUTPATIENT
Start: 2022-05-20 | End: 2022-05-20 | Stop reason: SDUPTHER

## 2022-05-20 RX ORDER — FENTANYL CITRATE 50 UG/ML
INJECTION, SOLUTION INTRAMUSCULAR; INTRAVENOUS
Status: DISPENSED
Start: 2022-05-20 | End: 2022-05-20

## 2022-05-20 RX ORDER — DIPHENHYDRAMINE HCL 25 MG
50 CAPSULE ORAL ONCE
Status: DISCONTINUED | OUTPATIENT
Start: 2022-05-20 | End: 2022-05-24 | Stop reason: HOSPADM

## 2022-05-20 RX ORDER — MAGNESIUM SULFATE IN WATER 40 MG/ML
2000 INJECTION, SOLUTION INTRAVENOUS ONCE
Status: COMPLETED | OUTPATIENT
Start: 2022-05-20 | End: 2022-05-20

## 2022-05-20 RX ORDER — NITROGLYCERIN 0.4 MG/1
0.4 TABLET SUBLINGUAL EVERY 5 MIN PRN
Status: DISCONTINUED | OUTPATIENT
Start: 2022-05-20 | End: 2022-05-24 | Stop reason: HOSPADM

## 2022-05-20 RX ORDER — SODIUM CHLORIDE 0.9 % (FLUSH) 0.9 %
5-40 SYRINGE (ML) INJECTION PRN
Status: DISCONTINUED | OUTPATIENT
Start: 2022-05-20 | End: 2022-05-24 | Stop reason: HOSPADM

## 2022-05-20 RX ORDER — SODIUM CHLORIDE 9 MG/ML
INJECTION, SOLUTION INTRAVENOUS PRN
Status: DISCONTINUED | OUTPATIENT
Start: 2022-05-20 | End: 2022-05-20 | Stop reason: SDUPTHER

## 2022-05-20 RX ORDER — SODIUM CHLORIDE 9 MG/ML
INJECTION, SOLUTION INTRAVENOUS CONTINUOUS
Status: DISCONTINUED | OUTPATIENT
Start: 2022-05-20 | End: 2022-05-24

## 2022-05-20 RX ADMIN — BUSPIRONE HYDROCHLORIDE 15 MG: 5 TABLET ORAL at 21:01

## 2022-05-20 RX ADMIN — FUROSEMIDE 40 MG: 10 INJECTION, SOLUTION INTRAMUSCULAR; INTRAVENOUS at 19:13

## 2022-05-20 RX ADMIN — APIXABAN 5 MG: 5 TABLET, FILM COATED ORAL at 21:02

## 2022-05-20 RX ADMIN — ATORVASTATIN CALCIUM 80 MG: 40 TABLET, FILM COATED ORAL at 21:01

## 2022-05-20 RX ADMIN — SODIUM CHLORIDE, PRESERVATIVE FREE 10 ML: 5 INJECTION INTRAVENOUS at 21:02

## 2022-05-20 RX ADMIN — FUROSEMIDE 40 MG: 10 INJECTION, SOLUTION INTRAMUSCULAR; INTRAVENOUS at 08:16

## 2022-05-20 RX ADMIN — Medication 10 MG: at 21:01

## 2022-05-20 RX ADMIN — MAGNESIUM SULFATE HEPTAHYDRATE 2000 MG: 40 INJECTION, SOLUTION INTRAVENOUS at 08:09

## 2022-05-20 RX ADMIN — BUSPIRONE HYDROCHLORIDE 15 MG: 5 TABLET ORAL at 08:11

## 2022-05-20 RX ADMIN — AMIODARONE HYDROCHLORIDE 200 MG: 200 TABLET ORAL at 21:01

## 2022-05-20 RX ADMIN — ROPINIROLE HYDROCHLORIDE 0.5 MG: 0.25 TABLET, FILM COATED ORAL at 21:01

## 2022-05-20 RX ADMIN — Medication 400 MG: at 21:02

## 2022-05-20 RX ADMIN — Medication 400 MG: at 08:11

## 2022-05-20 RX ADMIN — PANTOPRAZOLE SODIUM 40 MG: 40 TABLET, DELAYED RELEASE ORAL at 08:11

## 2022-05-20 RX ADMIN — AMIODARONE HYDROCHLORIDE 200 MG: 200 TABLET ORAL at 08:11

## 2022-05-20 RX ADMIN — IOPAMIDOL 60 ML: 755 INJECTION, SOLUTION INTRAVENOUS at 13:24

## 2022-05-20 RX ADMIN — METOPROLOL SUCCINATE 25 MG: 25 TABLET, EXTENDED RELEASE ORAL at 08:11

## 2022-05-20 RX ADMIN — SACUBITRIL AND VALSARTAN 1 TABLET: 24; 26 TABLET, FILM COATED ORAL at 21:01

## 2022-05-20 RX ADMIN — SODIUM CHLORIDE, PRESERVATIVE FREE 10 ML: 5 INJECTION INTRAVENOUS at 08:15

## 2022-05-20 RX ADMIN — ESCITALOPRAM 20 MG: 5 TABLET, FILM COATED ORAL at 21:01

## 2022-05-20 RX ADMIN — SACUBITRIL AND VALSARTAN 1 TABLET: 24; 26 TABLET, FILM COATED ORAL at 08:11

## 2022-05-20 ASSESSMENT — PAIN DESCRIPTION - LOCATION: LOCATION: WRIST

## 2022-05-20 ASSESSMENT — PAIN SCALES - GENERAL
PAINLEVEL_OUTOF10: 0

## 2022-05-20 ASSESSMENT — PAIN DESCRIPTION - ORIENTATION: ORIENTATION: RIGHT

## 2022-05-20 ASSESSMENT — PAIN DESCRIPTION - DESCRIPTORS: DESCRIPTORS: ACHING

## 2022-05-20 NOTE — PROGRESS NOTES
Writer entered patients room, patient attempting to get up unassisted at this time. Writer redirected patient and educated him on risk of fall and education on call light. Patient urinated and laid back down. Vitals and assessment as charted. Bed alarm on, bed locked, call light within reach, and writer will continue to monitor this shift.

## 2022-05-20 NOTE — CONSULTS
Patient: Jonathon Mayers  :   Date of Admission: 2022  Primary Care Physician: Layton Hawk  Today's Date: 2022    REASON FOR CONSULTATION: Shortness of Breath (sob ongoing for weeks, hx of afib is scheduled for cardioversion, family reports increased bruising)      HPI: Mr. Maxine Peacock is a 70 y.o. male who was admitted to the hospital with a history of progressive increased shortness of breath over the past two weeks. He does follow up with Cardiology in [facility] and was planning on having a cardioversion done in  to put him back into normal sinus rhythm due to his history of new onset atrial fibrillation. He reports a history of a heart attack at age 46 and needed stents placed at that time but denies any cardiac cath since that time. He also has carotid artery stenosis and abdominal aortic aneurysm. He did report having surgery on one side of his neck, however he is not sure which side or how long ago it was. He has had hypertension and hyperlipidemia for years as well. He says that he has been seriously thinking about switching his cardiac care here to [place] since he lives here. He is a current every day smoker and he smoked for about 55 years and smokes about a pack a day. Family history consists of a lot of people in his family with significant cardiac history, however he wanted us to talk to his sister about the family history as she knows the details. Mr. Maxine Peacock presented to the ER yesterday for worsening shortness of breath over the past 2 weeks. He states today he is feeling better and breathing better. He denied any chest pain, pressure or tightness. He can feel his heart racing and skipping beats at times. No lightheaded/ dizziness. He can get some nausea and pain in his stomach at times. He denied any recent weight gain or weight loss.  He also denied any abdominal pain, bleeding problems, problems with his medications or any other concerns at this time. Bleeding Risks: Mr. Halima Pearl denies any current or recent bleeding problems including a history of a GI bleed, ulcers, recent or upcoming surgeries, blood in his stool or black tarry stools or blood in his urine. Past Medical History:   Diagnosis Date    [facility] ([facility]     Atrial fibrillation ([facility]     CHF (congestive heart failure) (HCC)     Cirrhosis of liver with ascites ([facility] 5/19/2022    Coronary artery disease     Hyperlipidemia     Hypertension     Hyponatremia     Thrombocytopenia (HCC)        CURRENT ALLERGIES: Patient has no known allergies. REVIEW OF SYSTEMS: 14 systems were reviewed. Pertinent positives and negatives as above, all else negative.      Past Surgical History:   Procedure Laterality Date    CAROTID STENT PLACEMENT Bilateral     ROTATOR CUFF REPAIR Right     Social History:  Social History     Tobacco Use    Smoking status: Current Every Day Smoker     Packs/day: 1.00     Types: Cigarettes    Smokeless tobacco: Never Used   Substance Use Topics    Alcohol use: Not Currently    Drug use: Not on file        CURRENT MEDICATIONS:  Outpatient Medications Marked as Taking for the 5/18/22 encounter [facility] Encounter)   Medication Sig Dispense Refill    amiodarone (CORDARONE) 200 MG tablet Take 200 mg by mouth 2 times daily      apixaban (ELIQUIS) 5 MG TABS tablet Take 5 mg by mouth 2 times daily      sacubitril-valsartan (ENTRESTO) 24-26 MG per tablet Take 1 tablet by mouth 2 times daily      busPIRone (BUSPAR) 10 MG tablet Take 15 mg by mouth 2 times daily       magnesium oxide (MAG-OX) 400 (240 Mg) MG tablet Take 400 mg by mouth 2 times daily      clopidogrel (PLAVIX) 75 MG tablet Take 75 mg by mouth daily      spironolactone (ALDACTONE) 25 MG tablet Take 12.5 mg by mouth daily       omeprazole (PRILOSEC) 20 MG delayed release capsule Take 20 mg by mouth daily      metoprolol succinate (TOPROL XL) 25 MG extended release tablet Take 25 mg by mouth daily      escitalopram (LEXAPRO) 20 MG tablet Take 20 mg by mouth nightly      atorvastatin (LIPITOR) 80 MG tablet Take 80 mg by mouth daily      rOPINIRole (REQUIP) 0.5 MG tablet Take 0.5 mg by mouth nightly      melatonin 10 MG CAPS capsule Take 10 mg by mouth nightly      Omega-3 Fatty Acids (FISH OIL CONCENTRATE) 1000 MG CAPS Take 2 caplets by mouth daily       clopidogrel (PLAVIX) tablet 75 mg, Every Other Day  pantoprazole (PROTONIX) tablet 40 mg, QAM AC  amiodarone (CORDARONE) tablet 200 mg, BID  [Held by provider] apixaban (ELIQUIS) tablet 5 mg, BID  atorvastatin (LIPITOR) tablet 80 mg, Nightly  busPIRone (BUSPAR) tablet 15 mg, BID  escitalopram (LEXAPRO) tablet 20 mg, Nightly  magnesium oxide (MAG-OX) tablet 400 mg, BID  melatonin tablet 10 mg, Nightly  metoprolol succinate (TOPROL XL) extended release tablet 25 mg, Daily  rOPINIRole (REQUIP) tablet 0.5 mg, Nightly  sacubitril-valsartan (ENTRESTO) 24-26 MG per tablet 1 tablet, BID  sodium chloride flush 0.9 % injection 5-40 mL, 2 times per day  sodium chloride flush 0.9 % injection 10 mL, PRN  0.9 % sodium chloride infusion, PRN  ondansetron (ZOFRAN-ODT) disintegrating tablet 4 mg, Q8H PRN   Or  ondansetron (ZOFRAN) injection 4 mg, Q6H PRN  polyethylene glycol (GLYCOLAX) packet 17 g, Daily PRN  acetaminophen (TYLENOL) tablet 650 mg, Q6H PRN   Or  acetaminophen (TYLENOL) suppository 650 mg, Q6H PRN  furosemide (LASIX) injection 40 mg, BID     FAMILY HISTORY: Reviewed but non-contributory      PHYSICAL EXAM:   /72   Pulse 89   Temp 98.3 °F (36.8 °C) (Temporal)   Resp 16   Ht 5' 7\" (1.702 m)   Wt 203 lb 1.6 oz (92.1 kg)   SpO2 93%   BMI 31.81 kg/m²  Body mass index is 31.81 kg/m². Constitutional: He is oriented to person, place, and time. He appears well-developed and well-nourished. In no acute distress. HEENT: Normocephalic and atraumatic. No JVD present. Carotid bruit is not present. No mass and no thyromegaly present. No lymphadenopathy present. Cardiovascular: Normal rate, irregularly irregular rhythm, normal heart sounds. Exam reveals no gallop and no friction rubs. No murmur was heard. Pulmonary/Chest: Effort normal and breath sounds normal. No respiratory distress. He has no rhonchi or rales. Expiatory wheeze heard bilaterally. Decrease lung sounds on left side. Abdominal: Soft, non-tender. Bowel sounds and aorta are normal. He exhibits no organomegaly, mass or bruit. Extremities: 1+ up to the knees bilaterally. No cyanosis or clubbing. 2+ radial and carotid pulses. Distal extremity pulses: 2+ bilaterally. . Compression stockings in place. Neurological: He is alert and oriented to person, place, and time. No evidence of gross cranial nerve deficit. Coordination appeared normal.   Skin: Skin is warm and dry. There is no rash or diaphoresis. Psychiatric: He has a normal mood and affect. His speech is normal and behavior is normal.      MOST RECENT LABS ON RECORD:   Lab Results   Component Value Date    WBC 2.7 (L) 05/19/2022    HGB 12.4 (L) 05/19/2022    HCT 35.8 (L) 05/19/2022    PLT See Reflexed IPF Result 05/19/2022    ALT 17 05/19/2022    AST 25 05/19/2022     (L) 05/19/2022    K 4.0 05/19/2022    CL 89 (L) 05/19/2022    CREATININE 1.18 05/19/2022    BUN 20 05/19/2022    CO2 25 05/19/2022    TSH 4.52 05/11/2022    INR 1.9 05/18/2022      ASSESSMENT:  Patient Active Problem List    Diagnosis Date Noted    [facility]ites [facility] 05/19/2022    Acute on chronic diastolic CHF (congestive heart failure), NYHA class 3 (HCC)     Coronary artery disease     Hypertension     Thrombocytopenia (HCC)     Hyponatremia     Hyperlipidemia     Atrial fibrillation with RVR ([facility] 05/18/2022      PLAN:   Acute on chronic combined heart failure: New York Heart Association Class: IV (Severe limitations, symptoms even at rest)   Beta Blocker: Continue Metoprolol succinate (Toprol XL) 25 mg daily.     ACE Inibitor/ARB: Continue sacubitril/valsartan (Entresto) 24/26 mg twice daily.  Diuretics: Continue furosemide (Lasix) 40 mg 2 times daily.  Heart failure counseling: I told them to start wearing lower extremity compression stockings and I advised them to try and keep their legs up whenever possible and to limit salt in their diet.  Echo showed EF of about 20% with wall motion abnormalities. · Paroxysmal Atrial Fibrillation: Rhythm Control Symptomatic   Beta Blocker: Continue Metoprolol succinate (Toprol XL) 25 mg daily.  Anti-Arrhythmic: amiodarone (Pacerone) 200 mg BID. Monitoring: Since they will being maintained on Amiodarone, I told them that we will need to closely monitor them for potential side effects. These include monitoring LFTs and TSH at least every 6 months as well as chest x-rays, pulmonary function tests, and eye exams at least yearly. [id number] Score for Atrial Fibrillation Stroke Risk   Risk   Factors  Component Value   C CHF Yes 1   H HTN Yes 1   A2 Age >= 76 No,  (75 y.o.) 0   D DM No 0   S2 Prior Stroke/TIA No 0   V Vascular Disease No 0   A Age 74-69 Yes,  (75 y.o.) 1   Sc Sex male 0    [id number]  Score  3   Score last updated 6/70/77 3:54 AM EDT  Click here for a link to the UpToDate guideline \"Atrial Fibrillation: Anticoagulation therapy to prevent embolization    Disclaimer: Risk Score calculation is dependent on accuracy of patient problem list and past encounter diagnosis.  Stroke Risk: CHADS2-VASc Score: 3/9 (3.2% stroke risk)   Anticoagulation: Continue Apixaban (Eliquis) 5 mg every 12 hours. Because of his atrial fibrillation, I also would also recommend that he continue with anticoagulation to decrease his risk of stoke but also reminded him to watch for signs of blood in his stool or black tarry stools and stop the medication immediately if this develops as this could be life threatening.        Atherosclerotic Heart Disease with history of stents at 46 years old but I suspect new coronary artery disease based on severely reduced EF.  Antiplatelet Agent: Continue clopidogrel (Plavix): Plavix 75 mg daily.  Beta Blocker: Continue Metoprolol succinate (Toprol XL) 25 mg daily.  Cholesterol Reduction Therapy: Continue Atorvastatin (Lipitor) 80 mg daily.  Additional counseling: I advised them to call our office or go to the emergency room if they developed worsening or persistent chest pain or increased shortness of breath as this could be life threatening.  For these reasons, I recommended that the patient consider undergoing a cardiac catheterization with coronary angiography to assess their coronary anatomy and facilitate better treatment recommendations. I discussed the risks, benefits, and alternatives to the procedure including the risk of bleeding, contrast induced allergy and/or kidney damage, arrythmia, stroke, heart attack, death, or the need for further procedures. I also discussed the fact that although treatment with simple medical management is a potential treatment option in place of cardiac catheterization, I expressed my opinion that cardiac catheterization in order to define their coronary anatomy and rule out severe 3 vessel or left main coronary artery disease would significant help guide the most appropriate treatment strategy ranging from no treatment, to medications, stents, to even coronary bypass surgery. The patient verbalized understanding of the risks benefits and alternatives and stated that they would like to undergo the procedure. We will plan to schedule the procedure as soon as tomorrow but may delay based on her low platelet count. · Carotid artery stenosis with history of surgical correction   · Cholesterol Reduction Therapy: Continue Atorvastatin (Lipitor) 80 mg daily. Antiplatelet Agent: Continue clopidogrel (Plavix): Plavix 75 mg daily. Once again, thank you for allowing me to participate in this patients care. Please do not hesitate to contact me if I could be of any further assistance. Sincerely,  Claudia Zarco MD, MS, F.A.C.C. [facility] Cardiology Specialist    [address] [place] [address] [place], [place], [address] [place] [address]  Phone: [phone], Fax: [phone]     I believe that the risk of significant morbidity and mortality related to the patient's current medical conditions are: intermediate-high. The documentation recorded by the scribe, accurately and completely reflects the services I personally performed and the decisions made by me.  Fady Wasserman PA-C May 19, 2022

## 2022-05-20 NOTE — PLAN OF CARE
RN  Assessed pt and brought him to cath lab. RN and Rosa Zaman RN received verbal consent from pt's sister Milderd Man. Pulse checked all present. Pt offers no complaints of pain or SOB.  Multiple bruises all over pt's arms and chest wall bilateral.

## 2022-05-20 NOTE — PROGRESS NOTES
Patient was sitting up in bed alert and oriented x4. Vitals and assessment are complete at this time. Writer walked patient through the medications that would be administered tonight and encouraged patient to ask questions about the medications and therapies. Patient denies questions. Call light is within reach and bed alarm set. Patient has the urinal close if needed and knows to use the call light and told writer he would for sure be using the call light tonight. Patient denies needs at this time. Will continue to monitor and assess.

## 2022-05-20 NOTE — PROGRESS NOTES
I, Galileo Dunaway am scribing for and in the presence of Amadeo Alvarado PA-C. Patient: Jonathon Mayers  :   Date of Admission: 2022  Primary Care Physician: Layton Hawk  Today's Date: 2022    REASON FOR CONSULTATION: Shortness of Breath (sob ongoing for weeks, hx of afib is scheduled for cardioversion, family reports increased bruising)      HPI: Mr. Maxine Peacock is a 70 y.o. male who was admitted to the hospital with a history of progressive increased shortness of breath over the past two weeks. He does follow up with Cardiology in [facility] and was planning on having a cardioversion done in  to put him back into normal sinus rhythm due to his history of new onset atrial fibrillation. He reports a history of a heart attack at age 46 and needed stents placed at that time but denies any cardiac cath since that time. He also has carotid artery stenosis and abdominal aortic aneurysm. He did report having surgery on one side of his neck, however he is not sure which side or how long ago it was. He has had hypertension and hyperlipidemia for years as well. He says that he has been seriously thinking about switching his cardiac care here to [place] since he lives here. He is a current every day smoker and he smoked for about 55 years and smokes about a pack a day. Family history consists of a lot of people in his family with significant cardiac history, however he wanted us to talk to his sister about the family history as she knows the details. Mr. Maxine Peacock presented to the ER yesterday for worsening shortness of breath over the past 2 weeks. He states today he is feeling better and breathing better. He denied any chest pain, pressure or tightness. He can feel his heart racing and skipping beats at times. No lightheaded/ dizziness. He can get some nausea and pain in his stomach at times. He denied any recent weight gain or weight loss.  He also denied any abdominal pain, bleeding problems, problems with his medications or any other concerns at this time. Echo done on 5/18/2022- EF 15-20% The left ventricular cavity size is within normal limits and the left ventricular wall thickness is within normal limits. The left atrium is severely dilated (>40) with a left atrial volume index of 54 ml/m2. Normal mitral valve structure with mild to moderate mitral regurgitation. Mild to moderate pulmonary hypertension with an estimated right ventricular systolic pressure of 40 mmHg. Mild to moderate tricuspid regurgitation. Normal pulmonic valve structure with mild to moderate pulmonic Regurgitation. Large bilateral pleural effusion. Evidence of moderate diastolic dysfunction is seen. IVC Increased diameter and impaired or no inspiratory variation indicating elevated RA filling pressure (i.e. CVP) . No previous studies were available for comparison. Mr. Helga Wright states he is sleepy today denies any pain. He denies any chest pain or pressure. Denies lightheaded/dizziness. He does have shortness of breath. He is down 11 pounds. Bleeding Risks: Mr. Helga Wright denies any current or recent bleeding problems including a history of a GI bleed, ulcers, recent or upcoming surgeries, blood in his stool or black tarry stools or blood in his urine.     -[id number]  Mag 1.3    Past Medical History:   Diagnosis Date    Acute kidney injury ([address]     Ascites 5/20/2022    Atrial fibrillation ([address]     CHF (congestive heart failure) (HCC)     Cirrhosis of liver with ascites ([address] 5/19/2022    CKD (chronic kidney disease) 5/20/2022    Coronary artery disease     Hyperlipidemia     Hypertension     Hypomagnesemia 5/20/2022    Hyponatremia     Thrombocytopenia (HCC)        CURRENT ALLERGIES: Patient has no known allergies. REVIEW OF SYSTEMS: 14 systems were reviewed. Pertinent positives and negatives as above, all else negative.      Past Surgical History:   Procedure Laterality Date  CAROTID STENT PLACEMENT Bilateral     ROTATOR CUFF REPAIR Right     Social History:  Social History     Tobacco Use    Smoking status: Current Every Day Smoker     Packs/day: 1.00     Types: Cigarettes    Smokeless tobacco: Never Used   Substance Use Topics    Alcohol use: Yes     Alcohol/week: 2.0 standard drinks     Types: 2 Shots of liquor per week     Comment: Drinks about 2 [employer] with mix nightly.   Reported history of heavy alcohol use for years with both beer and liquior several years ago    Drug use: Not on file        CURRENT MEDICATIONS:  Outpatient Medications Marked as Taking for the 5/18/22 encounter [facility] Encounter)   Medication Sig Dispense Refill    amiodarone (CORDARONE) 200 MG tablet Take 200 mg by mouth 2 times daily      apixaban (ELIQUIS) 5 MG TABS tablet Take 5 mg by mouth 2 times daily      sacubitril-valsartan (ENTRESTO) 24-26 MG per tablet Take 1 tablet by mouth 2 times daily      busPIRone (BUSPAR) 10 MG tablet Take 15 mg by mouth 2 times daily       magnesium oxide (MAG-OX) 400 (240 Mg) MG tablet Take 400 mg by mouth 2 times daily      clopidogrel (PLAVIX) 75 MG tablet Take 75 mg by mouth daily      spironolactone (ALDACTONE) 25 MG tablet Take 12.5 mg by mouth daily       omeprazole (PRILOSEC) 20 MG delayed release capsule Take 20 mg by mouth daily      metoprolol succinate (TOPROL XL) 25 MG extended release tablet Take 25 mg by mouth daily      escitalopram (LEXAPRO) 20 MG tablet Take 20 mg by mouth nightly      atorvastatin (LIPITOR) 80 MG tablet Take 80 mg by mouth daily      rOPINIRole (REQUIP) 0.5 MG tablet Take 0.5 mg by mouth nightly      melatonin 10 MG CAPS capsule Take 10 mg by mouth nightly      Omega-3 Fatty Acids (FISH OIL CONCENTRATE) 1000 MG CAPS Take 2 caplets by mouth daily       verapamil (ISOPTIN) 2.5 MG/ML injection,   fentaNYL (SUBLIMAZE) 100 MCG/2ML injection,   clopidogrel (PLAVIX) tablet 75 mg, Every Other Day  pantoprazole (PROTONIX) tablet 40 mg, QAM AC  amiodarone (CORDARONE) tablet 200 mg, BID  [Held by provider] apixaban (ELIQUIS) tablet 5 mg, BID  atorvastatin (LIPITOR) tablet 80 mg, Nightly  busPIRone (BUSPAR) tablet 15 mg, BID  escitalopram (LEXAPRO) tablet 20 mg, Nightly  magnesium oxide (MAG-OX) tablet 400 mg, BID  melatonin tablet 10 mg, Nightly  metoprolol succinate (TOPROL XL) extended release tablet 25 mg, Daily  rOPINIRole (REQUIP) tablet 0.5 mg, Nightly  sacubitril-valsartan (ENTRESTO) 24-26 MG per tablet 1 tablet, BID  sodium chloride flush 0.9 % injection 5-40 mL, 2 times per day  sodium chloride flush 0.9 % injection 10 mL, PRN  0.9 % sodium chloride infusion, PRN  ondansetron (ZOFRAN-ODT) disintegrating tablet 4 mg, Q8H PRN   Or  ondansetron (ZOFRAN) injection 4 mg, Q6H PRN  polyethylene glycol (GLYCOLAX) packet 17 g, Daily PRN  acetaminophen (TYLENOL) tablet 650 mg, Q6H PRN   Or  acetaminophen (TYLENOL) suppository 650 mg, Q6H PRN  furosemide (LASIX) injection 40 mg, BID     FAMILY HISTORY: Reviewed but non-contributory      PHYSICAL EXAM:   BP 95/76   Pulse 78   Temp 97.8 °F (36.6 °C) (Temporal)   Resp 18   Ht 5' 7\" (1.702 m)   Wt 198 lb 6.4 oz (90 kg)   SpO2 94%   BMI 31.07 kg/m²  Body mass index is 31.07 kg/m². Constitutional: He is oriented to person, place, and time. He appears well-developed and well-nourished. In no acute distress. HEENT: Normocephalic and atraumatic. No JVD present. Carotid bruit is not present. No mass and no thyromegaly present. No lymphadenopathy present. Cardiovascular: Normal rate, irregularly irregular rhythm, normal heart sounds. Exam reveals no gallop and no friction rubs. No murmur was heard. Pulmonary/Chest: Effort normal and breath sounds normal. No respiratory distress. He has no rhonchi or rales. Expiatory wheeze heard bilaterally. Decrease lung sounds on left side. Abdominal: Soft, non-tender. Bowel sounds and aorta are normal. He exhibits no organomegaly, mass or bruit. Extremities: 1+ up to the knees bilaterally. No cyanosis or clubbing. 2+ radial and carotid pulses. Distal extremity pulses: 2+ bilaterally. Compression stockings in place. Neurological: He is alert and oriented to person, place, and time. No evidence of gross cranial nerve deficit. Coordination appeared normal.   Skin: Skin is warm and dry. There is no rash or diaphoresis. Psychiatric: He has a normal mood and affect. His speech is normal and behavior is normal.      MOST RECENT LABS ON RECORD:   Lab Results   Component Value Date    WBC 2.8 (L) 05/20/2022    HGB 12.6 (L) 05/20/2022    HCT 36.2 (L) 05/20/2022    PLT See Reflexed IPF Result 05/20/2022    ALT 18 05/20/2022    AST 27 05/20/2022     (L) 05/20/2022    K 3.9 05/20/2022    CL 94 (L) 05/20/2022    CREATININE 1.14 05/20/2022    BUN 20 05/20/2022    CO2 29 05/20/2022    TSH 4.52 05/11/2022    INR 1.9 05/18/2022      ASSESSMENT:  Patient Active Problem List    Diagnosis Date Noted    Hypomagnesemia 05/20/2022    Ascites 05/20/2022    CKD (chronic kidney disease) 05/20/2022    Cirrhosis of liver with ascites ([facility] 05/19/2022    Acute on chronic combined systolic and diastolic CHF, NYHA class 4 (HCC)     Coronary artery disease     Hypertension     Thrombocytopenia (HCC)     Hyponatremia     Hyperlipidemia     Atrial fibrillation with RVR ([facility] 05/18/2022      PLAN:   Acute on chronic combined heart failure: New York Heart Association Class: IV (Severe limitations, symptoms even at rest)   Beta Blocker: Continue Metoprolol succinate (Toprol XL) 25 mg daily.  ACE Inibitor/ARB: Continue sacubitril/valsartan (Entresto) 24/26 mg twice daily.  Diuretics: Continue furosemide (Lasix) 40 mg IV 2 times daily.  Heart failure counseling: I told them to continue wearing lower extremity compression stockings and I advised them to try and keep their legs up whenever possible and to limit salt in their diet.    Echo showed EF of about 20% with wall motion abnormalities. · Paroxysmal Atrial Fibrillation: Rhythm Control Symptomatic   Beta Blocker: Continue Metoprolol succinate (Toprol XL) 25 mg daily.  Anti-Arrhythmic: amiodarone (Pacerone) 200 mg BID. Monitoring: Since they will being maintained on Amiodarone, I told them that we will need to closely monitor them for potential side effects. These include monitoring LFTs and TSH at least every 6 months as well as chest x-rays, pulmonary function tests, and eye exams at least yearly. [id number] Score for Atrial Fibrillation Stroke Risk   Risk   Factors  Component Value   C CHF Yes 1   H HTN Yes 1   A2 Age >= 76 No,  (75 y.o.) 0   D DM No 0   S2 Prior Stroke/TIA No 0   V Vascular Disease No 0   A Age 74-69 Yes,  (75 y.o.) 1   Sc Sex male 0    [id number]  Score  3   Score last updated 9/98/29 4:17 AM EDT  Click here for a link to the UpToDate guideline \"Atrial Fibrillation: Anticoagulation therapy to prevent embolization    Disclaimer: Risk Score calculation is dependent on accuracy of patient problem list and past encounter diagnosis.  Stroke Risk: CHADS2-VASc Score: 3/9 (3.2% stroke risk)   Anticoagulation: Continue Apixaban (Eliquis) 5 mg every 12 hours. Because of his atrial fibrillation, I also would also recommend that he continue with anticoagulation to decrease his risk of stoke but also reminded him to watch for signs of blood in his stool or black tarry stools and stop the medication immediately if this develops as this could be life threatening.  Atherosclerotic Heart Disease with history of stents at 46years old but I suspect new coronary artery disease based on severely reduced EF.  Antiplatelet Agent: Continue clopidogrel (Plavix): Plavix 75 mg daily.  Beta Blocker: Continue Metoprolol succinate (Toprol XL) 25 mg daily.  Cholesterol Reduction Therapy: Continue Atorvastatin (Lipitor) 80 mg daily.      Additional counseling: I advised them to call our office or go to the emergency room if they developed worsening or persistent chest pain or increased shortness of breath as this could be life threatening.  For these reasons, I recommended that the patient consider undergoing a cardiac catheterization with coronary angiography to assess their coronary anatomy and facilitate better treatment recommendations. I discussed the risks, benefits, and alternatives to the procedure including the risk of bleeding, contrast induced allergy and/or kidney damage, arrythmia, stroke, heart attack, death, or the need for further procedures. I also discussed the fact that although treatment with simple medical management is a potential treatment option in place of cardiac catheterization, I expressed my opinion that cardiac catheterization in order to define their coronary anatomy and rule out severe 3 vessel or left main coronary artery disease would significant help guide the most appropriate treatment strategy ranging from no treatment, to medications, stents, to even coronary bypass surgery. The patient verbalized understanding of the risks benefits and alternatives and stated that they would like to undergo the procedure.  Pending his heart catheterization results he will be considered for a life vest at discharge. · Carotid artery stenosis with history of surgical correction   · Cholesterol Reduction Therapy: Continue Atorvastatin (Lipitor) 80 mg daily. Antiplatelet Agent: Continue clopidogrel (Plavix): Plavix 75 mg daily. Once again, thank you for allowing me to participate in this patients care. Please do not hesitate to contact me if I could be of any further assistance.      Sincerely,  Juanjose Corcoran PA-C  [facility] Cardiology Specialist    [address], [address]  Phone: [phone], Fax: [phone]     I believe that the risk of significant morbidity and mortality related to the patient's current medical conditions are: intermediate-high. The documentation recorded by the scribe, accurately and completely reflects the services I personally performed and the decisions made by me.  Ellie Conte PA-C              May 20, 2022

## 2022-05-20 NOTE — PROGRESS NOTES
Attempted to talk with patient concerning DNR-CCA order from 2/2022. Pt is unsure at this time, and becomes confused with questions concerning it. No DPOAHC on file at this time.     [address] [place] Nurse Coordinator  5/20/2022 3:42 PM

## 2022-05-20 NOTE — PLAN OF CARE
Problem: Pain  Goal: Verbalizes/displays adequate comfort level or baseline comfort level  Outcome: Progressing  Note: Patient denies pain at this time. Will continue to monitor this shift.

## 2022-05-20 NOTE — PROGRESS NOTES
Verbal consent for left heart cath obtained over phone from patient's sister, Crow Johnson. Patient also agreeable for procedure being completed today. Significant bruising noted on patient's arms and trunk. Right arm is noted to be bruised (dark purple/red) from hand to elbow around entire arm. Pulses are strong. Patient denies pain.

## 2022-05-20 NOTE — PLAN OF CARE
Problem: Pain  Goal: Verbalizes/displays adequate comfort level or baseline comfort level  Outcome: Progressing  Note: Patient denies pain at this time. Will continue to monitor this shift. Problem: Safety - Adult  Goal: Free from fall injury  5/19/2022 2237 by Mahad Woodson RN  Note: Bed alarm on/Bed locked, call light within reach, side rails up, gripper socks on, will continue to monitor this shift.   5/19/2022 2237 by Mahad Woodson RN  Outcome: Progressing

## 2022-05-20 NOTE — PROGRESS NOTES
Patient in bed resting comfortably at this time. Vitals and assessment as charted. Writer assisted patient to the bathroom and emptied urinal at this time. Bed alarm on, call light within reach, patient denies any other needs at this time, writer also educated patient to call for help. Will continue to monitor at this time.

## 2022-05-20 NOTE — OP NOTE
-  Coronary Angiography Brief Post Operative Note:    Severe single vessel coronary artery disease involving a mid 100% stenosis in the right coronary artery. Moderate to severe single vessel disease involving a 60% mid LAD stenosis. Normal left ventricular end diastolic pressure. Proceed with a trial of guideline directed maximal medical management. However, if the patients symptoms persist, reconsidering angioplasty and/or stenting at that time may be reasonable.      Electronically signed by Leslie Acevedo MD on 5/20/2022 at 1:19 PM

## 2022-05-20 NOTE — PLAN OF CARE
Pt continues to ooze from around IV site, tip stop dressing applied to insertion site, IV site dressed with bulky dressings

## 2022-05-20 NOTE — PROGRESS NOTES
Progress Note    SUBJECTIVE:    Patient seen for f/u of Atrial fibrillation with RVR ([address] He resting in bed alert and oriented in no distress. No complaints this morning I did call and speak with his sister, Deliliah Councilman, per his request and Dr. Suresh Baeza. She voiced her understanding to his test results, treatments and plan going forward. She would like to establish new PCP, Cardiology and will keep Dr. Jaimie Ha. ROS:   Constitutional: negative  for fevers, and negative for chills. Respiratory: positive for shortness of breath, negative for cough, and negative for wheezing  Cardiovascular: negative for chest pain, and negative for palpitations  Gastrointestinal: negative for abdominal pain, negative for nausea,negative for vomiting, negative for diarrhea, and negative for constipation     All other systems were reviewed with the patient and are negative unless otherwise stated in HPI      OBJECTIVE:      Vitals:   Vitals:    05/19/22 2320   BP: 95/76   Pulse: 78   Resp: 18   Temp: 97.8 °F (36.6 °C)   SpO2: 94%     Weight: 198 lb 6.4 oz (90 kg)   Height: 5' 7\" (170.2 cm)     Weight  Wt Readings from Last 3 Encounters:   05/20/22 198 lb 6.4 oz (90 kg)   03/02/22 191 lb (86.6 kg)     Body mass index is 31.07 kg/m². 24HR INTAKE/OUTPUT:      Intake/Output Summary (Last 24 hours) at 5/20/2022 1012  Last data filed at 5/20/2022 0956  Gross per 24 hour   Intake 630 ml   Output 3125 ml   Net -2495 ml     -----------------------------------------------------------------  Exam:    GEN:    Awake, alert and oriented x3. EYES:  EOMI, pupils equal   NECK: Supple. No lymphadenopathy. No carotid bruit  CVS:    irregularly irregular, no audible murmur  PULM:  CTA, no wheezes, rales or rhonchi, no acute respiratory distress  ABD:    Bowels sounds normal.  Abdomen is soft. No distention. no tenderness to palpation. EXT:   1-2+ edema bilaterally . No calf tenderness. NEURO: Moves all extremities.   Motor and sensory are grossly intact  SKIN:  No rashes. No skin lesions.    -----------------------------------------------------------------    Diagnostic Data:      Complete Blood Count:   Recent Labs     05/18/22 1635 05/19/22  0520 05/20/22  0555   WBC 4.3 2.7* 2.8*   RBC 4.22 3.65* 3.67*   HGB 14.5 12.4* 12.6*   HCT 42.2 35.8* 36.2*   .0 98.1 98.6   MCH 34.4* 34.0* 34.3*   MCHC 34.4 34.6 34.8   RDW 14.1 13.7 13.9   PLT See Reflexed IPF Result See Reflexed IPF Result See Reflexed IPF Result        Last 3 Blood Glucose:   Recent Labs     05/18/22 1635 05/19/22  0520 05/20/22  0555   GLUCOSE 85 83 89        Comprehensive Metabolic Profile:   Recent Labs     05/18/22 1635 05/19/22  0520 05/20/22  0555   * 123* 131*   K 4.7 4.0 3.9   CL 88* 89* 94*   CO2 21 25 29   BUN 20 20 20   CREATININE 1.06 1.18 1.14   GLUCOSE 85 83 89   CALCIUM 9.3 8.6 8.8   PROT 6.2* 5.1* 5.4*   LABALBU 4.1 3.2* 3.5   BILITOT 1.66* 1.53* 1.22*   ALKPHOS 99 82 84   AST 31 25 27   ALT 20 17 18        Urinalysis:   Lab Results   Component Value Date    NITRU NEGATIVE 05/02/2022    COLORU Yellow 05/02/2022    PHUR 6.0 05/02/2022    WBCUA 0 TO 2 05/02/2022    RBCUA 0 TO 2 05/02/2022    MUCUS 2+ 05/02/2022    BACTERIA 1+ 05/02/2022    SPECGRAV 1.025 05/02/2022    LEUKOCYTESUR NEGATIVE 05/02/2022    UROBILINOGEN Normal 05/02/2022    BILIRUBINUR SMALL 05/02/2022    GLUCOSEU NEGATIVE 05/02/2022    KETUA TRACE 05/02/2022       HgBA1c:  No results found for: LABA1C    Lactic Acid:   Lab Results   Component Value Date    LACTA 1.9 05/18/2022        Troponin: No results for input(s): TROPONINI in the last 72 hours. CRP:  No results for input(s): CRP in the last 72 hours. Radiology/Imaging:  CT CHEST ABDOMEN PELVIS WO CONTRAST   Final Result   1. Moderate bilateral pleural effusions with gravity dependent lower lobe   atelectasis. No acute pulmonary infiltrate. 2. Cardiomegaly with coronary vascular calcification.   Moderately severe   aortoiliac atherosclerotic disease. Fusiform aneurysm of the infrarenal   abdominal aorta at 3.0 cm with imaging follow-up recommended every 3 years. 3. Hepatic cirrhosis. Moderate ascites with third spacing including   edematous changes throughout the abdomen and anasarca, possibly related to   portal hypertension or cardiogenic. 4. No acute bowel pathology. Colonic diverticulosis. Previous appendectomy. 5. Healing fractures of the left lateral 7th through 10th ribs. Spondylolysis with spondylolisthesis at L5-S1. Multilevel lumbar   degenerative disc disease. RECOMMENDATIONS:   3 cm infrarenal abdominal aortic aneurysm. Recommend follow-up every 3 years. Reference: J Am Kristyn Radiol 5222;[id number]. XR CHEST (2 VW)   Final Result   Cardiomegaly with mild pulmonary vascular congestion bilateral pleural   effusions which may represent CHF. Basilar atelectasis and or infiltrate   cannot be excluded. XR CHEST PORTABLE   Final Result   Trace bilateral pleural effusions with mild bibasilar airspace disease which   could represent atelectasis or pneumonia in the appropriate clinical setting. ASSESSMENT / PLAN:  Atrial fibrillation with RVR (HCC)  · Continue current therapy   ? Appreciate cardiology  ? Continue Eliquis  ? Continue amiodarone  ? Continue Toprol-XL  · Acute on chronic diastolic CHF stage III  ? Appreciate cardiology  ? IV Lasix  ? Continue Entresto, Toprol-XL  ? Teds  ? SCDs  ? Fluid restriction  · CAD  ? Continue Plavix to every other day due to thrombocytopenia  ? Defer Eliquis to cardiology  · Hypertension  ? Continue Toprol-XL  · Hyponatremia  ? 1800 mL fluid restriction  ? Trend labs  · Thrombocytopenia  ? Uncertain etiology  ? Elevated bilirubin  § Hepatitis series  ? Change Plavix to every other day  ? Caution with Eliquis  ?  CT chest abdomen and pelvis- assess pulmonary due to CHF versus neoplastic process involving liver or lung  · Hepatic cirrhosis   · Causes: Alcohol abuse, CHF with severe cardiomyopathy  · Currently drinks to [employer] with mix nightly. History of heavy drinking beer and liquor for years.   · Hypomagnesemia  · Magnesium 2 g IV today  · Trend labs  · CKD, Stage 3  · Labs stable  · Follow with Dr. Mega Hawkins  · Nutrition status:   · obesity, non-morbid  · Dietician consult initiated  · Hospital Prophylaxis:   · DVT: Eliquis   · Stress Ulcer: PPI   · High risk medications: none   · Disposition:    · Discharge plan is home   · Follow up with Dr Kang Dumont  · Follow up with Dr. Araceli Loving, APRN - CNP , APRN, NP-C  Hospitalist Medicine        5/20/2022, 10:12 AM

## 2022-05-21 LAB
ABSOLUTE EOS #: 0.05 K/UL (ref 0–0.44)
ABSOLUTE IMMATURE GRANULOCYTE: 0 K/UL (ref 0–0.3)
ABSOLUTE LYMPH #: 0.78 K/UL (ref 1.1–3.7)
ABSOLUTE MONO #: 0.24 K/UL (ref 0.1–1.2)
ALBUMIN SERPL-MCNC: 3.4 G/DL (ref 3.5–5.2)
ALBUMIN/GLOBULIN RATIO: 1.8 (ref 1–2.5)
ALP BLD-CCNC: 87 U/L (ref 40–129)
ALT SERPL-CCNC: 20 U/L (ref 5–41)
ANION GAP SERPL CALCULATED.3IONS-SCNC: 7 MMOL/L (ref 9–17)
AST SERPL-CCNC: 28 U/L
BASOPHILS # BLD: 1 % (ref 0–2)
BASOPHILS ABSOLUTE: 0.03 K/UL (ref 0–0.2)
BILIRUB SERPL-MCNC: 0.98 MG/DL (ref 0.3–1.2)
BUN BLDV-MCNC: 16 MG/DL (ref 8–23)
BUN/CREAT BLD: 14 (ref 9–20)
CALCIUM SERPL-MCNC: 8.8 MG/DL (ref 8.6–10.4)
CHLORIDE BLD-SCNC: 95 MMOL/L (ref 98–107)
CO2: 31 MMOL/L (ref 20–31)
CREAT SERPL-MCNC: 1.15 MG/DL (ref 0.7–1.2)
EOSINOPHILS RELATIVE PERCENT: 2 % (ref 1–4)
GFR AFRICAN AMERICAN: >60 ML/MIN
GFR NON-AFRICAN AMERICAN: >60 ML/MIN
GFR SERPL CREATININE-BSD FRML MDRD: ABNORMAL ML/MIN/{1.73_M2}
GFR SERPL CREATININE-BSD FRML MDRD: ABNORMAL ML/MIN/{1.73_M2}
GLUCOSE BLD-MCNC: 87 MG/DL (ref 70–99)
HCT VFR BLD CALC: 36.7 % (ref 40.7–50.3)
HEMOGLOBIN: 12.7 G/DL (ref 13–17)
IMMATURE GRANULOCYTES: 0 %
LYMPHOCYTES # BLD: 29 % (ref 24–43)
MAGNESIUM: 1.6 MG/DL (ref 1.6–2.6)
MCH RBC QN AUTO: 34.2 PG (ref 25.2–33.5)
MCHC RBC AUTO-ENTMCNC: 34.6 G/DL (ref 28.4–34.8)
MCV RBC AUTO: 98.9 FL (ref 82.6–102.9)
MONOCYTES # BLD: 9 % (ref 3–12)
MORPHOLOGY: ABNORMAL
NRBC AUTOMATED: 0 PER 100 WBC
PDW BLD-RTO: 14.5 % (ref 11.8–14.4)
PLATELET # BLD: ABNORMAL K/UL (ref 138–453)
PLATELET, FLUORESCENCE: 83 K/UL (ref 138–453)
PLATELET, IMMATURE FRACTION: 8.7 % (ref 1.1–10.3)
POTASSIUM SERPL-SCNC: 3.8 MMOL/L (ref 3.7–5.3)
RBC # BLD: 3.71 M/UL (ref 4.21–5.77)
SEG NEUTROPHILS: 59 % (ref 36–65)
SEGMENTED NEUTROPHILS ABSOLUTE COUNT: 1.6 K/UL (ref 1.5–8.1)
SODIUM BLD-SCNC: 133 MMOL/L (ref 135–144)
TOTAL PROTEIN: 5.3 G/DL (ref 6.4–8.3)
WBC # BLD: 2.7 K/UL (ref 3.5–11.3)

## 2022-05-21 PROCEDURE — 6360000002 HC RX W HCPCS: Performed by: INTERNAL MEDICINE

## 2022-05-21 PROCEDURE — 85025 COMPLETE CBC W/AUTO DIFF WBC: CPT

## 2022-05-21 PROCEDURE — 80053 COMPREHEN METABOLIC PANEL: CPT

## 2022-05-21 PROCEDURE — 2580000003 HC RX 258: Performed by: FAMILY MEDICINE

## 2022-05-21 PROCEDURE — 85055 RETICULATED PLATELET ASSAY: CPT

## 2022-05-21 PROCEDURE — 36415 COLL VENOUS BLD VENIPUNCTURE: CPT

## 2022-05-21 PROCEDURE — 6370000000 HC RX 637 (ALT 250 FOR IP): Performed by: INTERNAL MEDICINE

## 2022-05-21 PROCEDURE — 1200000000 HC SEMI PRIVATE

## 2022-05-21 PROCEDURE — 6370000000 HC RX 637 (ALT 250 FOR IP): Performed by: NURSE PRACTITIONER

## 2022-05-21 PROCEDURE — 83735 ASSAY OF MAGNESIUM: CPT

## 2022-05-21 PROCEDURE — 94761 N-INVAS EAR/PLS OXIMETRY MLT: CPT

## 2022-05-21 RX ADMIN — FUROSEMIDE 40 MG: 10 INJECTION, SOLUTION INTRAMUSCULAR; INTRAVENOUS at 08:23

## 2022-05-21 RX ADMIN — METOPROLOL SUCCINATE 25 MG: 25 TABLET, EXTENDED RELEASE ORAL at 08:21

## 2022-05-21 RX ADMIN — ATORVASTATIN CALCIUM 80 MG: 40 TABLET, FILM COATED ORAL at 21:54

## 2022-05-21 RX ADMIN — Medication 400 MG: at 08:22

## 2022-05-21 RX ADMIN — ROPINIROLE HYDROCHLORIDE 0.5 MG: 0.25 TABLET, FILM COATED ORAL at 21:54

## 2022-05-21 RX ADMIN — SACUBITRIL AND VALSARTAN 1 TABLET: 24; 26 TABLET, FILM COATED ORAL at 08:21

## 2022-05-21 RX ADMIN — SACUBITRIL AND VALSARTAN 1 TABLET: 24; 26 TABLET, FILM COATED ORAL at 21:54

## 2022-05-21 RX ADMIN — FUROSEMIDE 40 MG: 10 INJECTION, SOLUTION INTRAMUSCULAR; INTRAVENOUS at 17:29

## 2022-05-21 RX ADMIN — CLOPIDOGREL BISULFATE 75 MG: 75 TABLET ORAL at 08:22

## 2022-05-21 RX ADMIN — SODIUM CHLORIDE, PRESERVATIVE FREE 10 ML: 5 INJECTION INTRAVENOUS at 21:55

## 2022-05-21 RX ADMIN — Medication 10 MG: at 21:54

## 2022-05-21 RX ADMIN — SODIUM CHLORIDE, PRESERVATIVE FREE 10 ML: 5 INJECTION INTRAVENOUS at 08:23

## 2022-05-21 RX ADMIN — PANTOPRAZOLE SODIUM 40 MG: 40 TABLET, DELAYED RELEASE ORAL at 07:16

## 2022-05-21 RX ADMIN — ESCITALOPRAM 20 MG: 5 TABLET, FILM COATED ORAL at 21:55

## 2022-05-21 RX ADMIN — APIXABAN 5 MG: 5 TABLET, FILM COATED ORAL at 21:55

## 2022-05-21 RX ADMIN — BUSPIRONE HYDROCHLORIDE 15 MG: 5 TABLET ORAL at 08:21

## 2022-05-21 RX ADMIN — BUSPIRONE HYDROCHLORIDE 15 MG: 5 TABLET ORAL at 21:54

## 2022-05-21 RX ADMIN — Medication 400 MG: at 21:54

## 2022-05-21 RX ADMIN — AMIODARONE HYDROCHLORIDE 200 MG: 200 TABLET ORAL at 08:21

## 2022-05-21 RX ADMIN — AMIODARONE HYDROCHLORIDE 200 MG: 200 TABLET ORAL at 21:54

## 2022-05-21 RX ADMIN — APIXABAN 5 MG: 5 TABLET, FILM COATED ORAL at 08:21

## 2022-05-21 ASSESSMENT — PAIN SCALES - GENERAL: PAINLEVEL_OUTOF10: 0

## 2022-05-21 NOTE — PROGRESS NOTES
extremities. Motor and sensory are grossly intact  SKIN:  No rashes. No skin lesions.    -----------------------------------------------------------------  DATA:  Complete Blood Count:   Recent Labs     05/20/22  0555 05/20/22  1552 05/21/22  0550   WBC 2.8* 2.8* 2.7*   RBC 3.67* 3.85* 3.71*   HGB 12.6* 13.2 12.7*   HCT 36.2* 38.1* 36.7*   MCV 98.6 99.0 98.9   MCH 34.3* 34.3* 34.2*   MCHC 34.8 34.6 34.6   RDW 13.9 14.3 14.5*   PLT 81* 87* 83*        Last 3 Blood Glucose:   Recent Labs     05/18/22  1635 05/19/22  0520 05/20/22  0555 05/20/22  1552 05/21/22  0550   GLUCOSE 85 83 89 129* 87        Comprehensive Metabolic Profile:   Recent Labs     05/19/22  0520 05/19/22  0520 05/20/22  0555 05/20/22  1552 05/21/22  0550   *   < > 131* 128* 133*   K 4.0   < > 3.9 3.9 3.8   CL 89*   < > 94* 92* 95*   CO2 25   < > 29 31 31   BUN 20   < > 20 16 16   CREATININE 1.18   < > 1.14 1.02 1.15   GLUCOSE 83   < > 89 129* 87   CALCIUM 8.6   < > 8.8 8.7 8.8   PROT 5.1*  --  5.4*  --  5.3*   LABALBU 3.2*  --  3.5  --  3.4*   BILITOT 1.53*  --  1.22*  --  0.98   ALKPHOS 82  --  84  --  87   AST 25  --  27  --  28   ALT 17  --  18  --  20    < > = values in this interval not displayed. Urinalysis:   Lab Results   Component Value Date    NITRU NEGATIVE 05/02/2022    COLORU Yellow 05/02/2022    PHUR 6.0 05/02/2022    WBCUA 0 TO 2 05/02/2022    RBCUA 0 TO 2 05/02/2022    MUCUS 2+ 05/02/2022    BACTERIA 1+ 05/02/2022    SPECGRAV 1.025 05/02/2022    LEUKOCYTESUR NEGATIVE 05/02/2022    UROBILINOGEN Normal 05/02/2022    BILIRUBINUR SMALL 05/02/2022    GLUCOSEU NEGATIVE 05/02/2022    KETUA TRACE 05/02/2022     Lactic Acid:   Lab Results   Component Value Date    LACTA 1.9 05/18/2022        High Sensitivity Troponin:  Recent Labs     05/18/22  1635   TROPHS 27*       Microbiology:  Blood cultures 5/18/22: negative x 3 days     Ref.  Range 5/19/2022 13:17   Hep A IgM Latest Ref Range: NONREACTIVE  NONREACTIVE   Hepatitis B Surface Ag Latest Ref Range: NONREACTIVE  NONREACTIVE   Hepatitis C Ab Latest Ref Range: NONREACTIVE  NONREACTIVE   Hep B Core Ab, IgM Latest Ref Range: NONREACTIVE  NONREACTIVE           Radiology/Imaging:  CT CHEST ABDOMEN PELVIS WO CONTRAST   Final Result   1. Moderate bilateral pleural effusions with gravity dependent lower lobe   atelectasis. No acute pulmonary infiltrate. 2. Cardiomegaly with coronary vascular calcification. Moderately severe   aortoiliac atherosclerotic disease. Fusiform aneurysm of the infrarenal   abdominal aorta at 3.0 cm with imaging follow-up recommended every 3 years. 3. Hepatic cirrhosis. Moderate ascites with third spacing including   edematous changes throughout the abdomen and anasarca, possibly related to   portal hypertension or cardiogenic. 4. No acute bowel pathology. Colonic diverticulosis. Previous appendectomy. 5. Healing fractures of the left lateral 7th through 10th ribs. Spondylolysis with spondylolisthesis at L5-S1. Multilevel lumbar   degenerative disc disease. RECOMMENDATIONS:   3 cm infrarenal abdominal aortic aneurysm. Recommend follow-up every 3 years. Reference: J Am Kristyn Radiol 2338;05:157-651. XR CHEST (2 VW)   Final Result   Cardiomegaly with mild pulmonary vascular congestion bilateral pleural   effusions which may represent CHF. Basilar atelectasis and or infiltrate   cannot be excluded. XR CHEST PORTABLE   Final Result   Trace bilateral pleural effusions with mild bibasilar airspace disease which   could represent atelectasis or pneumonia in the appropriate clinical setting. Coronary Angiography Brief Post Operative Note:     Severe single vessel coronary artery disease involving a mid 100% stenosis in the right coronary artery. Moderate to severe single vessel disease involving a 60% mid LAD stenosis. Normal left ventricular end diastolic pressure.  Proceed with a trial of guideline directed maximal medical management. However, if the patients symptoms persist, reconsidering angioplasty and/or stenting at that time may be reasonable.      Electronically signed by Ronan Watson MD on 5/20/2022 at 1:19 PM      ASSESSMENT / PLAN:  · Atrial fibrillation with RVR ([address]  ? Continue current therapy   ? Appreciate cardiology  ? Continue Eliquis  ? Continue amiodarone  ? Continue Toprol-XL  · Acute on chronic diastolic CHF stage III  ? Appreciate cardiology  ? IV Lasix  ? Continue Entresto, Toprol-XL  ? Teds  ? SCDs  ? Fluid restriction  · CAD  ? Continue Plavix to every other day due to thrombocytopenia  ? Defer Eliquis to cardiology  · Hypertension  ? Continue Toprol-XL  · Hyponatremia  ? 1800 mL fluid restriction  ? Trend labs  ·  Pancytopenia / thrombocytopenia  ? Uncertain etiology  ? Hepatitis series = negative  ? Change Plavix to every other day  ? Caution with Eliquis  ? CT chest abdomen and pelvis = hepatic cirrhosis, moderate ascites, no acute bowel pathology  · Hepatic cirrhosis   ? Causes: Alcohol abuse, CHF with severe cardiomyopathy  ? History of heavy drinking beer and liquor for years  ? No signs or symptoms of EtOH withdrawal  · Hypomagnesemia  ? Replace per protocol  ? Trend labs  · CKD, Stage 3  ? Labs stable  ?  Follows with Dr. Romero Oppenheim  · Nutrition status:   · obesity, non-morbid  · Dietician consult initiated  · Hospital Prophylaxis:   · DVT: Eliquis   · Stress Ulcer: H2 Blocker   · High risk medications: none   · Disposition:    · Discharge plan is pending    Noemi Shone, MD , M.D.  5/21/2022  11:02 AM

## 2022-05-21 NOTE — PROGRESS NOTES
Dr. Spencer Danielle notified that pt. Is hypotensive at this time. Per Dr. Spencer Danielle if pressure is still low tonight hold IV lasix.

## 2022-05-22 LAB
ABSOLUTE EOS #: 0.04 K/UL (ref 0–0.44)
ABSOLUTE IMMATURE GRANULOCYTE: <0.03 K/UL (ref 0–0.3)
ABSOLUTE LYMPH #: 0.92 K/UL (ref 1.1–3.7)
ABSOLUTE MONO #: 0.24 K/UL (ref 0.1–1.2)
ALBUMIN SERPL-MCNC: 3.6 G/DL (ref 3.5–5.2)
ALBUMIN/GLOBULIN RATIO: 1.9 (ref 1–2.5)
ALP BLD-CCNC: 80 U/L (ref 40–129)
ALT SERPL-CCNC: 20 U/L (ref 5–41)
ANION GAP SERPL CALCULATED.3IONS-SCNC: 4 MMOL/L (ref 9–17)
AST SERPL-CCNC: 25 U/L
BASOPHILS # BLD: 1 % (ref 0–2)
BASOPHILS ABSOLUTE: 0.03 K/UL (ref 0–0.2)
BILIRUB SERPL-MCNC: 0.96 MG/DL (ref 0.3–1.2)
BUN BLDV-MCNC: 16 MG/DL (ref 8–23)
BUN/CREAT BLD: 14 (ref 9–20)
CALCIUM SERPL-MCNC: 9.2 MG/DL (ref 8.6–10.4)
CHLORIDE BLD-SCNC: 95 MMOL/L (ref 98–107)
CO2: 35 MMOL/L (ref 20–31)
CREAT SERPL-MCNC: 1.15 MG/DL (ref 0.7–1.2)
EOSINOPHILS RELATIVE PERCENT: 1 % (ref 1–4)
GFR AFRICAN AMERICAN: >60 ML/MIN
GFR NON-AFRICAN AMERICAN: >60 ML/MIN
GFR SERPL CREATININE-BSD FRML MDRD: ABNORMAL ML/MIN/{1.73_M2}
GFR SERPL CREATININE-BSD FRML MDRD: ABNORMAL ML/MIN/{1.73_M2}
GLUCOSE BLD-MCNC: 87 MG/DL (ref 70–99)
HCT VFR BLD CALC: 37.6 % (ref 40.7–50.3)
HEMOGLOBIN: 12.8 G/DL (ref 13–17)
IMMATURE GRANULOCYTES: 0 %
LYMPHOCYTES # BLD: 32 % (ref 24–43)
MAGNESIUM: 1.4 MG/DL (ref 1.6–2.6)
MCH RBC QN AUTO: 34.1 PG (ref 25.2–33.5)
MCHC RBC AUTO-ENTMCNC: 34 G/DL (ref 28.4–34.8)
MCV RBC AUTO: 100.3 FL (ref 82.6–102.9)
MONOCYTES # BLD: 8 % (ref 3–12)
NRBC AUTOMATED: 0 PER 100 WBC
PDW BLD-RTO: 14.6 % (ref 11.8–14.4)
PLATELET # BLD: ABNORMAL K/UL (ref 138–453)
PLATELET, FLUORESCENCE: 82 K/UL (ref 138–453)
PLATELET, IMMATURE FRACTION: 6.5 % (ref 1.1–10.3)
POTASSIUM SERPL-SCNC: 3.4 MMOL/L (ref 3.7–5.3)
RBC # BLD: 3.75 M/UL (ref 4.21–5.77)
SEG NEUTROPHILS: 58 % (ref 36–65)
SEGMENTED NEUTROPHILS ABSOLUTE COUNT: 1.68 K/UL (ref 1.5–8.1)
SODIUM BLD-SCNC: 134 MMOL/L (ref 135–144)
TOTAL PROTEIN: 5.5 G/DL (ref 6.4–8.3)
WBC # BLD: 2.9 K/UL (ref 3.5–11.3)

## 2022-05-22 PROCEDURE — 94761 N-INVAS EAR/PLS OXIMETRY MLT: CPT

## 2022-05-22 PROCEDURE — 2580000003 HC RX 258: Performed by: FAMILY MEDICINE

## 2022-05-22 PROCEDURE — 80053 COMPREHEN METABOLIC PANEL: CPT

## 2022-05-22 PROCEDURE — 85055 RETICULATED PLATELET ASSAY: CPT

## 2022-05-22 PROCEDURE — 6360000002 HC RX W HCPCS: Performed by: INTERNAL MEDICINE

## 2022-05-22 PROCEDURE — 36415 COLL VENOUS BLD VENIPUNCTURE: CPT

## 2022-05-22 PROCEDURE — 85025 COMPLETE CBC W/AUTO DIFF WBC: CPT

## 2022-05-22 PROCEDURE — 83735 ASSAY OF MAGNESIUM: CPT

## 2022-05-22 PROCEDURE — 6370000000 HC RX 637 (ALT 250 FOR IP): Performed by: INTERNAL MEDICINE

## 2022-05-22 PROCEDURE — 1200000000 HC SEMI PRIVATE

## 2022-05-22 RX ADMIN — BUSPIRONE HYDROCHLORIDE 15 MG: 5 TABLET ORAL at 20:19

## 2022-05-22 RX ADMIN — Medication 10 MG: at 20:20

## 2022-05-22 RX ADMIN — Medication 400 MG: at 08:49

## 2022-05-22 RX ADMIN — SACUBITRIL AND VALSARTAN 1 TABLET: 24; 26 TABLET, FILM COATED ORAL at 20:20

## 2022-05-22 RX ADMIN — Medication 400 MG: at 20:20

## 2022-05-22 RX ADMIN — AMIODARONE HYDROCHLORIDE 200 MG: 200 TABLET ORAL at 20:20

## 2022-05-22 RX ADMIN — ESCITALOPRAM 20 MG: 5 TABLET, FILM COATED ORAL at 20:20

## 2022-05-22 RX ADMIN — FUROSEMIDE 40 MG: 10 INJECTION, SOLUTION INTRAMUSCULAR; INTRAVENOUS at 08:52

## 2022-05-22 RX ADMIN — FUROSEMIDE 40 MG: 10 INJECTION, SOLUTION INTRAMUSCULAR; INTRAVENOUS at 17:12

## 2022-05-22 RX ADMIN — AMIODARONE HYDROCHLORIDE 200 MG: 200 TABLET ORAL at 08:50

## 2022-05-22 RX ADMIN — APIXABAN 5 MG: 5 TABLET, FILM COATED ORAL at 20:20

## 2022-05-22 RX ADMIN — PANTOPRAZOLE SODIUM 40 MG: 40 TABLET, DELAYED RELEASE ORAL at 07:04

## 2022-05-22 RX ADMIN — ROPINIROLE HYDROCHLORIDE 0.5 MG: 0.25 TABLET, FILM COATED ORAL at 20:20

## 2022-05-22 RX ADMIN — BUSPIRONE HYDROCHLORIDE 15 MG: 5 TABLET ORAL at 08:49

## 2022-05-22 RX ADMIN — SODIUM CHLORIDE, PRESERVATIVE FREE 10 ML: 5 INJECTION INTRAVENOUS at 08:52

## 2022-05-22 RX ADMIN — APIXABAN 5 MG: 5 TABLET, FILM COATED ORAL at 08:50

## 2022-05-22 RX ADMIN — SODIUM CHLORIDE, PRESERVATIVE FREE 10 ML: 5 INJECTION INTRAVENOUS at 20:20

## 2022-05-22 RX ADMIN — METOPROLOL SUCCINATE 25 MG: 25 TABLET, EXTENDED RELEASE ORAL at 08:50

## 2022-05-22 RX ADMIN — ATORVASTATIN CALCIUM 80 MG: 40 TABLET, FILM COATED ORAL at 20:20

## 2022-05-22 RX ADMIN — SACUBITRIL AND VALSARTAN 1 TABLET: 24; 26 TABLET, FILM COATED ORAL at 08:50

## 2022-05-22 NOTE — PROGRESS NOTES
Ian Winslow M.D. Internal Medicine Progress Note    Patient: Radha Mao  Date of Admission: 5/18/2022  4:04 PM  Hospital Day # 4  Date of Evaluation: 5/22/2022      SUBJECTIVE:    Patient seen for f/u of Atrial fibrillation with RVR ([address] He underwent heart cath 5/20/22 which showed 100% stenosis in the right coronary artery and 60% mid LAD stenosis. He states he feels better again today. He denies chest pain or palpitations. He denies any SOB today. He states his LE swelling is doing much better. ROS:   Constitutional: negative  for fevers, and negative for chills. Respiratory: negative for shortness of breath, negative for cough, and negative for wheezing  Cardiovascular: negative for chest pain, and negative for palpitations  Gastrointestinal: negative for abdominal pain, negative for nausea,negative for vomiting, negative for diarrhea, and negative for constipation     All other systems were reviewed with the patient and are negative unless otherwise stated in HPI    -----------------------------------------------------------------  OBJECTIVE:  Vitals:   Temp: 97.2 °F (36.2 °C)  BP: (!) 108/94  Resp: 16  Pulse: 71  SpO2: 90 % on room air    Weight  Wt Readings from Last 3 Encounters:   05/22/22 194 lb (88 kg)   03/02/22 191 lb (86.6 kg)     Body mass index is 30.38 kg/m². 24HR INTAKE/OUTPUT:      Intake/Output Summary (Last 24 hours) at 5/22/2022 1203  Last data filed at 5/22/2022 0852  Gross per 24 hour   Intake 10 ml   Output 1750 ml   Net -1740 ml       Exam:  GEN:    Awake, alert and oriented x3. EYES:  EOMI, pupils equal   NECK: Supple. No lymphadenopathy. No carotid bruit  CVS:    irregularly irregular, no audible murmur  PULM:  CTA, no wheezes, rales or rhonchi, no acute respiratory distress  ABD:    Bowels sounds normal.  Abdomen is soft. No distention. no tenderness to palpation. EXT:   trace edema bilaterally . No calf tenderness. NEURO: Moves all extremities. Motor and sensory are grossly intact  SKIN:  No rashes. No skin lesions.    -----------------------------------------------------------------  DATA:  Complete Blood Count:   Recent Labs     05/20/22  0555 05/20/22  1552 05/21/22  0550   WBC 2.8* 2.8* 2.7*   RBC 3.67* 3.85* 3.71*   HGB 12.6* 13.2 12.7*   HCT 36.2* 38.1* 36.7*   MCV 98.6 99.0 98.9   MCH 34.3* 34.3* 34.2*   MCHC 34.8 34.6 34.6   RDW 13.9 14.3 14.5*   PLT 81* 87* 83*        Last 3 Blood Glucose:   Recent Labs     05/20/22  0555 05/20/22  1552 05/21/22  0550 05/22/22  0600   GLUCOSE 89 129* 87 87        Comprehensive Metabolic Profile:   Recent Labs     05/20/22  0555 05/20/22  0555 05/20/22  1552 05/21/22  0550 05/22/22  0600   *   < > 128* 133* 134*   K 3.9   < > 3.9 3.8 3.4*   CL 94*   < > 92* 95* 95*   CO2 29   < > 31 31 35*   BUN 20   < > 16 16 16   CREATININE 1.14   < > 1.02 1.15 1.15   GLUCOSE 89   < > 129* 87 87   CALCIUM 8.8   < > 8.7 8.8 9.2   PROT 5.4*  --   --  5.3* 5.5*   LABALBU 3.5  --   --  3.4* 3.6   BILITOT 1.22*  --   --  0.98 0.96   ALKPHOS 84  --   --  87 80   AST 27  --   --  28 25   ALT 18  --   --  20 20    < > = values in this interval not displayed. Urinalysis:   Lab Results   Component Value Date    NITRU NEGATIVE 05/02/2022    COLORU Yellow 05/02/2022    PHUR 6.0 05/02/2022    WBCUA 0 TO 2 05/02/2022    RBCUA 0 TO 2 05/02/2022    MUCUS 2+ 05/02/2022    BACTERIA 1+ 05/02/2022    SPECGRAV 1.025 05/02/2022    LEUKOCYTESUR NEGATIVE 05/02/2022    UROBILINOGEN Normal 05/02/2022    BILIRUBINUR SMALL 05/02/2022    GLUCOSEU NEGATIVE 05/02/2022    KETUA TRACE 05/02/2022     Lactic Acid:   Lab Results   Component Value Date    LACTA 1.9 05/18/2022        Microbiology:  Blood cultures 5/18/22: negative x 3 days     Ref.  Range 5/19/2022 13:17   Hep A IgM Latest Ref Range: NONREACTIVE  NONREACTIVE   Hepatitis B Surface Ag Latest Ref Range: NONREACTIVE  NONREACTIVE   Hepatitis C Ab Latest Ref Range: NONREACTIVE  NONREACTIVE Hep B Core Ab, IgM Latest Ref Range: NONREACTIVE  NONREACTIVE           Radiology/Imaging:  CT CHEST ABDOMEN PELVIS WO CONTRAST   Final Result   1. Moderate bilateral pleural effusions with gravity dependent lower lobe   atelectasis. No acute pulmonary infiltrate. 2. Cardiomegaly with coronary vascular calcification. Moderately severe   aortoiliac atherosclerotic disease. Fusiform aneurysm of the infrarenal   abdominal aorta at 3.0 cm with imaging follow-up recommended every 3 years. 3. Hepatic cirrhosis. Moderate ascites with third spacing including   edematous changes throughout the abdomen and anasarca, possibly related to   portal hypertension or cardiogenic. 4. No acute bowel pathology. Colonic diverticulosis. Previous appendectomy. 5. Healing fractures of the left lateral 7th through 10th ribs. Spondylolysis with spondylolisthesis at L5-S1. Multilevel lumbar   degenerative disc disease. RECOMMENDATIONS:   3 cm infrarenal abdominal aortic aneurysm. Recommend follow-up every 3 years. Reference: J Am Kristyn Radiol 3714;35:[id number]. XR CHEST (2 VW)   Final Result   Cardiomegaly with mild pulmonary vascular congestion bilateral pleural   effusions which may represent CHF. Basilar atelectasis and or infiltrate   cannot be excluded. XR CHEST PORTABLE   Final Result   Trace bilateral pleural effusions with mild bibasilar airspace disease which   could represent atelectasis or pneumonia in the appropriate clinical setting. Coronary Angiography Brief Post Operative Note:     Severe single vessel coronary artery disease involving a mid 100% stenosis in the right coronary artery. Moderate to severe single vessel disease involving a 60% mid LAD stenosis. Normal left ventricular end diastolic pressure. Proceed with a trial of guideline directed maximal medical management.  However, if the patients symptoms persist, reconsidering angioplasty and/or stenting at that time may be reasonable.      Electronically signed by Srikanth Shepherd MD on 5/20/2022 at 1:19 PM      ASSESSMENT / PLAN:  · Atrial fibrillation with RVR ([address]  ? Continue current therapy   ? Appreciate cardiology  ? Continue Eliquis  ? Continue amiodarone  ? Continue Toprol-XL  · Acute on chronic diastolic CHF stage III  ? Appreciate cardiology  ? IV Lasix  ? Continue Entresto, Toprol-XL  ? Teds  ? SCDs  ? Fluid restriction  · CAD  ? Continue Plavix to every other day due to thrombocytopenia  ? Defer Eliquis to cardiology  · Hypertension  ? Continue Toprol-XL  · Hyponatremia  ? 1800 mL fluid restriction  ? Trend labs  ·  Pancytopenia / thrombocytopenia  ? Uncertain etiology  ? Hepatitis series = negative  ? Change Plavix to every other day  ? Caution with Eliquis  ? CT chest abdomen and pelvis = hepatic cirrhosis, moderate ascites, no acute bowel pathology  · Hepatic cirrhosis   ? Causes: Alcohol abuse, CHF with severe cardiomyopathy  ? History of heavy drinking beer and liquor for years  ? No signs or symptoms of EtOH withdrawal  · Hypomagnesemia  ? Replace per protocol  ? Trend labs  · CKD, Stage 3  ? Labs stable  ?  Follows with Dr. Maria De Jesus Ruggiero  · Nutrition status:   · obesity, non-morbid  · Dietician consult initiated  · Hospital Prophylaxis:   · DVT: Eliquis   · Stress Ulcer: H2 Blocker   · High risk medications: none   · Disposition:    · Discharge plan is pending    Star Mason MD, M.D.  5/22/2022  12:03 PM

## 2022-05-22 NOTE — PROGRESS NOTES
Vitals and assessment are complete at this time. Writer walked patient through the medications that would be administered tonight and encouraged patient to ask questions about the medications and therapies. Patient denies questions. Writer emptied patient's urinal at this time. Call light is within reach and bed alarm set. Patient denies needs at this time. Will continue to monitor and assess.

## 2022-05-22 NOTE — PROGRESS NOTES
Patient is resting in bed watching television. Vitals and assessment are complete at this time. Writer walked patient through the medications that would be administered tonight and encouraged patient to ask questions about the medications and therapies. Patient denies questions. Blood pressure is slightly lower but patient denies symptoms. Call light is within reach and bed alarm set. Patient denies needs at this time. Will continue to monitor and assess.

## 2022-05-22 NOTE — PROGRESS NOTES
Pt. Is resting at time of assessment. Pt. Denies any pain. Pt. Vitals are assessed, vitals are WDL. Pt. Has call light in reach and denies any further needs at this time.

## 2022-05-22 NOTE — PLAN OF CARE
Problem: Discharge Planning  Goal: Discharge to home or other facility with appropriate resources  Outcome: Progressing     Problem: Pain  Goal: Verbalizes/displays adequate comfort level or baseline comfort level  Outcome: Progressing     Problem: Safety - Adult  Goal: Free from fall injury  Outcome: Progressing     Problem: Nutrition Deficit:  Goal: Optimize nutritional status  Outcome: Progressing     Problem: Chronic Conditions and Co-morbidities  Goal: Patient's chronic conditions and co-morbidity symptoms are monitored and maintained or improved  Outcome: Progressing

## 2022-05-22 NOTE — PROGRESS NOTES
Vitals and reassessment complete at this time. Patient's blood pressure is stable and within normal limits. Patient denies needs and is resting in bed. Call light and bedside table remain within reach. Will continue to monitor and assess.

## 2022-05-23 LAB
ABSOLUTE EOS #: 0.05 K/UL (ref 0–0.44)
ABSOLUTE IMMATURE GRANULOCYTE: 0 K/UL (ref 0–0.3)
ABSOLUTE LYMPH #: 0.86 K/UL (ref 1.1–3.7)
ABSOLUTE MONO #: 0.26 K/UL (ref 0.1–1.2)
ALBUMIN SERPL-MCNC: 3.5 G/DL (ref 3.5–5.2)
ALBUMIN/GLOBULIN RATIO: 1.9 (ref 1–2.5)
ALP BLD-CCNC: 76 U/L (ref 40–129)
ALT SERPL-CCNC: 18 U/L (ref 5–41)
ANION GAP SERPL CALCULATED.3IONS-SCNC: 7 MMOL/L (ref 9–17)
AST SERPL-CCNC: 23 U/L
BASOPHILS # BLD: 1 % (ref 0–2)
BASOPHILS ABSOLUTE: 0.03 K/UL (ref 0–0.2)
BILIRUB SERPL-MCNC: 0.9 MG/DL (ref 0.3–1.2)
BUN BLDV-MCNC: 17 MG/DL (ref 8–23)
BUN/CREAT BLD: 16 (ref 9–20)
CALCIUM SERPL-MCNC: 8.8 MG/DL (ref 8.6–10.4)
CHLORIDE BLD-SCNC: 95 MMOL/L (ref 98–107)
CO2: 35 MMOL/L (ref 20–31)
CREAT SERPL-MCNC: 1.07 MG/DL (ref 0.7–1.2)
CULTURE: NORMAL
CULTURE: NORMAL
EOSINOPHILS RELATIVE PERCENT: 2 % (ref 1–4)
GFR AFRICAN AMERICAN: >60 ML/MIN
GFR NON-AFRICAN AMERICAN: >60 ML/MIN
GFR SERPL CREATININE-BSD FRML MDRD: ABNORMAL ML/MIN/{1.73_M2}
GFR SERPL CREATININE-BSD FRML MDRD: ABNORMAL ML/MIN/{1.73_M2}
GLUCOSE BLD-MCNC: 83 MG/DL (ref 70–99)
HCT VFR BLD CALC: 37.5 % (ref 40.7–50.3)
HEMOGLOBIN: 12.5 G/DL (ref 13–17)
IMMATURE GRANULOCYTES: 0 %
LYMPHOCYTES # BLD: 33 % (ref 24–43)
Lab: NORMAL
Lab: NORMAL
MAGNESIUM: 1.2 MG/DL (ref 1.6–2.6)
MAGNESIUM: 1.7 MG/DL (ref 1.6–2.6)
MCH RBC QN AUTO: 33.6 PG (ref 25.2–33.5)
MCHC RBC AUTO-ENTMCNC: 33.3 G/DL (ref 28.4–34.8)
MCV RBC AUTO: 100.8 FL (ref 82.6–102.9)
MONOCYTES # BLD: 10 % (ref 3–12)
MORPHOLOGY: ABNORMAL
NRBC AUTOMATED: 0 PER 100 WBC
PDW BLD-RTO: 14.7 % (ref 11.8–14.4)
PLATELET # BLD: ABNORMAL K/UL (ref 138–453)
PLATELET, FLUORESCENCE: 81 K/UL (ref 138–453)
PLATELET, IMMATURE FRACTION: 7.2 % (ref 1.1–10.3)
POTASSIUM SERPL-SCNC: 3.4 MMOL/L (ref 3.7–5.3)
RBC # BLD: 3.72 M/UL (ref 4.21–5.77)
SEG NEUTROPHILS: 54 % (ref 36–65)
SEGMENTED NEUTROPHILS ABSOLUTE COUNT: 1.4 K/UL (ref 1.5–8.1)
SODIUM BLD-SCNC: 137 MMOL/L (ref 135–144)
SPECIMEN DESCRIPTION: NORMAL
SPECIMEN DESCRIPTION: NORMAL
TOTAL PROTEIN: 5.3 G/DL (ref 6.4–8.3)
WBC # BLD: 2.6 K/UL (ref 3.5–11.3)

## 2022-05-23 PROCEDURE — 1200000000 HC SEMI PRIVATE

## 2022-05-23 PROCEDURE — 6360000002 HC RX W HCPCS: Performed by: NURSE PRACTITIONER

## 2022-05-23 PROCEDURE — 6370000000 HC RX 637 (ALT 250 FOR IP): Performed by: INTERNAL MEDICINE

## 2022-05-23 PROCEDURE — 2580000003 HC RX 258: Performed by: FAMILY MEDICINE

## 2022-05-23 PROCEDURE — 80053 COMPREHEN METABOLIC PANEL: CPT

## 2022-05-23 PROCEDURE — 6370000000 HC RX 637 (ALT 250 FOR IP): Performed by: NURSE PRACTITIONER

## 2022-05-23 PROCEDURE — 85025 COMPLETE CBC W/AUTO DIFF WBC: CPT

## 2022-05-23 PROCEDURE — 83735 ASSAY OF MAGNESIUM: CPT

## 2022-05-23 PROCEDURE — 97161 PT EVAL LOW COMPLEX 20 MIN: CPT

## 2022-05-23 PROCEDURE — 94761 N-INVAS EAR/PLS OXIMETRY MLT: CPT

## 2022-05-23 PROCEDURE — 97165 OT EVAL LOW COMPLEX 30 MIN: CPT

## 2022-05-23 PROCEDURE — 85055 RETICULATED PLATELET ASSAY: CPT

## 2022-05-23 PROCEDURE — 99232 SBSQ HOSP IP/OBS MODERATE 35: CPT | Performed by: FAMILY MEDICINE

## 2022-05-23 PROCEDURE — 36415 COLL VENOUS BLD VENIPUNCTURE: CPT

## 2022-05-23 RX ORDER — BUMETANIDE 1 MG/1
2 TABLET ORAL DAILY
Status: DISCONTINUED | OUTPATIENT
Start: 2022-05-24 | End: 2022-05-24 | Stop reason: HOSPADM

## 2022-05-23 RX ORDER — MAGNESIUM SULFATE IN WATER 40 MG/ML
2000 INJECTION, SOLUTION INTRAVENOUS ONCE
Status: COMPLETED | OUTPATIENT
Start: 2022-05-23 | End: 2022-05-23

## 2022-05-23 RX ORDER — POTASSIUM CHLORIDE 20 MEQ/1
20 TABLET, EXTENDED RELEASE ORAL 2 TIMES DAILY WITH MEALS
Status: DISCONTINUED | OUTPATIENT
Start: 2022-05-23 | End: 2022-05-24 | Stop reason: HOSPADM

## 2022-05-23 RX ADMIN — SODIUM CHLORIDE, PRESERVATIVE FREE 10 ML: 5 INJECTION INTRAVENOUS at 21:53

## 2022-05-23 RX ADMIN — POTASSIUM CHLORIDE 20 MEQ: 1500 TABLET, EXTENDED RELEASE ORAL at 07:49

## 2022-05-23 RX ADMIN — ATORVASTATIN CALCIUM 80 MG: 40 TABLET, FILM COATED ORAL at 21:49

## 2022-05-23 RX ADMIN — POTASSIUM CHLORIDE 20 MEQ: 1500 TABLET, EXTENDED RELEASE ORAL at 17:02

## 2022-05-23 RX ADMIN — Medication 400 MG: at 09:40

## 2022-05-23 RX ADMIN — AMIODARONE HYDROCHLORIDE 200 MG: 200 TABLET ORAL at 21:49

## 2022-05-23 RX ADMIN — BUSPIRONE HYDROCHLORIDE 15 MG: 5 TABLET ORAL at 21:57

## 2022-05-23 RX ADMIN — MAGNESIUM SULFATE HEPTAHYDRATE 2000 MG: 40 INJECTION, SOLUTION INTRAVENOUS at 07:41

## 2022-05-23 RX ADMIN — ROPINIROLE HYDROCHLORIDE 0.5 MG: 0.25 TABLET, FILM COATED ORAL at 21:50

## 2022-05-23 RX ADMIN — PANTOPRAZOLE SODIUM 40 MG: 40 TABLET, DELAYED RELEASE ORAL at 07:50

## 2022-05-23 RX ADMIN — Medication 400 MG: at 21:49

## 2022-05-23 RX ADMIN — BUSPIRONE HYDROCHLORIDE 15 MG: 5 TABLET ORAL at 09:40

## 2022-05-23 RX ADMIN — APIXABAN 5 MG: 5 TABLET, FILM COATED ORAL at 21:49

## 2022-05-23 RX ADMIN — ESCITALOPRAM 20 MG: 5 TABLET, FILM COATED ORAL at 21:50

## 2022-05-23 RX ADMIN — Medication 10 MG: at 21:49

## 2022-05-23 RX ADMIN — APIXABAN 5 MG: 5 TABLET, FILM COATED ORAL at 09:40

## 2022-05-23 RX ADMIN — METOPROLOL SUCCINATE 25 MG: 25 TABLET, EXTENDED RELEASE ORAL at 09:28

## 2022-05-23 RX ADMIN — CLOPIDOGREL BISULFATE 75 MG: 75 TABLET ORAL at 07:50

## 2022-05-23 RX ADMIN — AMIODARONE HYDROCHLORIDE 200 MG: 200 TABLET ORAL at 09:40

## 2022-05-23 RX ADMIN — SODIUM CHLORIDE, PRESERVATIVE FREE 10 ML: 5 INJECTION INTRAVENOUS at 09:40

## 2022-05-23 ASSESSMENT — PAIN SCALES - GENERAL: PAINLEVEL_OUTOF10: 0

## 2022-05-23 NOTE — PROGRESS NOTES
Occupational Therapy  Facility/Department: [facility] MED SURG  Occupational Therapy Initial Assessment    Name: Felicita Last  :   MRN: [id number]  Date of Service: 2022    Discharge Recommendations:  Continue to assess pending progress,Home with Home health [address] with OT       Patient Diagnosis(es): The primary encounter diagnosis was Atrial fibrillation with RVR ([address] Diagnoses of Shortness of breath and Acute diastolic congestive heart failure ([address] were also pertinent to this visit. Past Medical History:  has a past medical history of Acute kidney injury ([address] Ascites, Atrial fibrillation ([address] CHF (congestive heart failure) ([address] Cirrhosis of liver with ascites ([address] CKD (chronic kidney disease), Coronary artery disease, Hyperlipidemia, Hypertension, Hypomagnesemia, Hyponatremia, and Thrombocytopenia ([address] Past Surgical History:  has a past surgical history that includes Rotator cuff repair (Right) and Carotid stent placement (Bilateral). Treatment Diagnosis: Generalized weakness      Assessment   Performance deficits / Impairments: Decreased functional mobility ; Decreased endurance;Decreased ADL status; Decreased balance;Decreased strength;Decreased high-level IADLs  Assessment: Pt is a 69 yo male with a-fib with RVR. Pt presents with decreased endurance and balance, affecting his ability to safely perform ADL tasks. Pt would benefit from skilled OT services to address these deficits and return to PLOF.   Treatment Diagnosis: Generalized weakness  Prognosis: Good  Decision Making: Low Complexity  REQUIRES OT FOLLOW-UP: Yes  Activity Tolerance  Activity Tolerance: Patient Tolerated treatment well        Plan   Plan  Times per Week: 7  Times per Day: Daily  Current Treatment Recommendations: Strengthening,Balance training,Functional mobility training,Safety education & training,Self-Care / ADL,Patient/Caregiver education & training,Endurance training,Neuromuscular re-education     Subjective   General  Chart Reviewed: Yes  Patient assessed for rehabilitation services?: Yes  Family / Caregiver Present: No  Referring Practitioner: SEVEN Garcia CNP  Diagnosis: A-fib with RVR  Subjective  Subjective: Pt rec'd sitting EOB, pleasant and cooperative for OT. Pt is a 71 yo male with a-fib. General Comment  Comments: Pt denies pain this date. Pt's primary complaint this date is decrease endurance and balance. Pt also reports having difficulty with LB dressing. Social/Functional History  Social/Functional History  Lives With: Alone  Type of Home: Mobile home  Home Layout: One level  Home Access: Level entry  Bathroom Shower/Tub: Walk-in shower,Shower chair with back  Bathroom Toilet: Standard  Bathroom Equipment: Grab bars in shower,Grab bars around toilet  Bathroom Accessibility: Accessible  Home Equipment: Cane,Walker, rolling  Has the patient had two or more falls in the past year or any fall with injury in the past year?: Yes  ADL Assistance: Needs assistance  Bath: Minimal assistance  Dressing: Minimal assistance  Ambulation Assistance: Independent  Transfer Assistance: Independent  Active : Yes     Objective   Pulse: 95  Heart Rate Source: Monitor; Apical  BP: (!) 126/113  BP Location: Right upper arm  Patient Position: Semi fowlers  MAP (Calculated): 117.33  Resp: 16  SpO2: 91 %  O2 Device: None (Room air)  Vision Exceptions: Wears glasses for reading  Hearing: Within functional limits    Safety Devices  Type of Devices: All fall risk precautions in place; Left in chair;Call light within reach  Transfer Training  Transfer Training: Yes  Overall Level of Assistance: Contact-guard assistance (Pt tolerated functional mobility in hallway x200 feet with no AD and CGA. Pt reports being limited d/t fatigue.)  AROM: Within functional limits  Strength:  Within functional limits  ADL  Feeding: Independent  Grooming: Modified independent   UE Bathing: Stand by assistance  LE Bathing: Stand by assistance  UE Dressing: Setup  LE Dressing: Modified independent   LE Dressing Skilled Clinical Factors: Pt able to reach down to doff/rina B socks at initial evaluation with increased time, however patient reports that LB dressing can be challenging at times. Pt would benefit from education for a/e for LB dressing. Toileting: Stand by assistance  Transfers  Stand Step Transfers: Contact guard assistance  Sit to stand: Stand by assistance;Supervision  Stand to sit: Supervision;Stand by assistance  Cognition  Overall Cognitive Status: WFL  Education Given To: Patient  Education Provided: Role of Therapy;Plan of Care  Education Method: Verbal  Barriers to Learning: None  Education Outcome: Verbalized understanding    AM-PAC Score  AM-PAC Inpatient Daily Activity Raw Score: 22 (05/23/22 1601)  AM-PAC Inpatient ADL T-Scale Score : 47.1 (05/23/22 1601)  ADL Inpatient CMS 0-100% Score: 25.8 (05/23/22 1601)  ADL Inpatient CMS G-Code Modifier : [place] (05/23/22 1601)    Goals  Short Term Goals  Time Frame for Short term goals: 20 visits  Short Term Goal 1: Patient will tolerate 15 mins of BUE ther ex/act to increase overall strength/activity tolerance for functional tasks. Short Term Goal 2: Patient to engage in 5' of dynamic standing task during functional task of choice s LOB to improve safety, balance and indep c ADLs. Short Term Goal 3: Patient to complete ADL routine c Mod I c AE/DME and implementation of EC/WS techniques as needed to ensure safe return home. Short Term Goal 4: Patient to be educated on d/c folder, AE/DME and EC/WS techniques to ensure safe and indep return home.        Therapy Time   Individual Concurrent Group Co-treatment   Time In 7259         Time Out 1532         Minutes 17         Timed Code Treatment Minutes: 0 Minutes       Kim Dona Ana, OTD, OTR/L

## 2022-05-23 NOTE — PROGRESS NOTES
Writer rechecked patient's blood pressure and it was within normal limits. Patient reports a restless night that he feels was due to sleeping too much throughout the previous day. Patient's weight is down 2lbs. Patient denies further needs. Call light and bedside table remain within reach. Will continue to monitor and assess.

## 2022-05-23 NOTE — PROGRESS NOTES
Physician Progress Note      PATIENT:               Pb Stout  [facility] #:                  [id number]  :                       1950  ADMIT DATE:       2022 4:04 PM  [address] [place] DATE:  RESPONDING  PROVIDER #:        Lizeth Deleon MD          QUERY TEXT:    Pt admitted with acute on chronic diastolic CHF. Noted documentation of acute   on chronic combined systolic and diastolic CHF in  Cardiology consult   note. If possible, please document in progress notes and discharge summary:    The medical record reflects the following:  Risk Factors: 70 y.o. male with PMH of CHF, CAD, HTN, A-fib, and   hyperlipidemia presented for evaluation of progressively worsening SOB. Clinical Indicators: Cardio consult : Acute on chronic combined heart   failure: New York Heart Association Class: IV (Severe limitations, symptoms   even at rest) -   Echo showed EF of about 20% with wall motion abnormalities. 2D Echo : Global left ventricular systolic function appears severely   reduced with an estimated ejection fraction of 15-20%. The left ventricular   cavity size is within normal limits and the left ventricular wall thickness is   within normal limits. The left atrium is severely dilated (>40) with a left   atrial volume index of  54 ml/m2. Pro-BNP 11,715 (). Treatment: Lasix 60 mg IV x 1 dose, then 40 mg IV twice daily  Options provided:  -- Acute on chronic combined systolic and diastolic CHF confirmed present on   admission  -- Defer to Cardiology consultant documentation regarding acute on chronic   combined systolic and diastolic CHF  -- Other - I will add my own diagnosis  -- Disagree - Not applicable / Not valid  -- Disagree - Clinically unable to determine / Unknown  -- Refer to Clinical Documentation Reviewer    PROVIDER RESPONSE TEXT:    The diagnosis of acute on chronic combined systolic and diastolic CHF was   confirmed as present on admission.     Query created by: Waleska Catherine on 5/20/2022 9:12 AM      Electronically signed by:  Ruth Hernandez MD 5/23/2022 10:04 AM

## 2022-05-23 NOTE — PROGRESS NOTES
Charles Gray am scribing for and in the presence of Naima Suarez MD, MS, F.A.C.C..      Patient: Cleo Portillo  :   Date of Admission: 2022  Primary Care Physician: Yasemin Somers  Today's Date: 2022    REASON FOR CONSULTATION: Shortness of Breath (sob ongoing for weeks, hx of afib is scheduled for cardioversion, family reports increased bruising)      HPI: Mr. Sohail Aguirre is a 70 y.o. male who was admitted to the hospital with a history of progressive increased shortness of breath over the past two weeks. He does follow up with Cardiology in [facility] and was planning on having a cardioversion done in  to put him back into normal sinus rhythm due to his history of new onset atrial fibrillation. He reports a history of a heart attack at age 46 and needed stents placed at that time but denies any cardiac cath since that time. He also has carotid artery stenosis and abdominal aortic aneurysm. He did report having surgery on one side of his neck, however he is not sure which side or how long ago it was. He has had hypertension and hyperlipidemia for years as well. He is a current every day smoker and he smoked for about 55 years and smokes about a pack a day. Family history consists of a lot of people in his family with significant cardiac history, however he wanted us to talk to his sister about the family history as she knows the details. Mr. Sohail Aguirre is doing okay today with no chest pain or pressure. He is down 17 lbs since admission. He denied any abdominal pain, bleeding problems, problems with his medications or any other concerns at this time. Bleeding Risks: Mr. Sohail Aguirre denies any current or recent bleeding problems including a history of a GI bleed, ulcers, recent or upcoming surgeries, blood in his stool or black tarry stools or blood in his urine.      Past Medical History:   Diagnosis Date    Acute kidney injury ([address]     Ascites 2022  Atrial fibrillation (HCC)     CHF (congestive heart failure) (HCC)     Cirrhosis of liver with ascites ([facility] 5/19/2022    CKD (chronic kidney disease) 5/20/2022    Coronary artery disease     Hyperlipidemia     Hypertension     Hypomagnesemia 5/20/2022    Hyponatremia     Thrombocytopenia (HCC)        CURRENT ALLERGIES: Patient has no known allergies. REVIEW OF SYSTEMS: 14 systems were reviewed. Pertinent positives and negatives as above, all else negative. Past Surgical History:   Procedure Laterality Date    CAROTID STENT PLACEMENT Bilateral     ROTATOR CUFF REPAIR Right     Social History:  Social History     Tobacco Use    Smoking status: Current Every Day Smoker     Packs/day: 1.00     Types: Cigarettes    Smokeless tobacco: Never Used   Substance Use Topics    Alcohol use: Yes     Alcohol/week: 2.0 standard drinks     Types: 2 Shots of liquor per week     Comment: Drinks about 2 Kesslers with mix nightly.   Reported history of heavy alcohol use for years with both beer and liquior several years ago    Drug use: Not on file        CURRENT MEDICATIONS:  Outpatient Medications Marked as Taking for the 5/18/22 encounter [facility] Encounter)   Medication Sig Dispense Refill    amiodarone (CORDARONE) 200 MG tablet Take 200 mg by mouth 2 times daily      apixaban (ELIQUIS) 5 MG TABS tablet Take 5 mg by mouth 2 times daily      sacubitril-valsartan (ENTRESTO) 24-26 MG per tablet Take 1 tablet by mouth 2 times daily      busPIRone (BUSPAR) 10 MG tablet Take 15 mg by mouth 2 times daily       magnesium oxide (MAG-OX) 400 (240 Mg) MG tablet Take 400 mg by mouth 2 times daily      clopidogrel (PLAVIX) 75 MG tablet Take 75 mg by mouth daily      spironolactone (ALDACTONE) 25 MG tablet Take 12.5 mg by mouth daily       omeprazole (PRILOSEC) 20 MG delayed release capsule Take 20 mg by mouth daily      metoprolol succinate (TOPROL XL) 25 MG extended release tablet Take 25 mg by mouth daily      escitalopram (LEXAPRO) 20 MG tablet Take 20 mg by mouth nightly      atorvastatin (LIPITOR) 80 MG tablet Take 80 mg by mouth daily      rOPINIRole (REQUIP) 0.5 MG tablet Take 0.5 mg by mouth nightly      melatonin 10 MG CAPS capsule Take 10 mg by mouth nightly      Omega-3 Fatty Acids (FISH OIL CONCENTRATE) 1000 MG CAPS Take 2 caplets by mouth daily       potassium chloride (KLOR-CON M) extended release tablet 20 mEq, BID WC  sodium chloride flush 0.9 % injection 5-40 mL, 2 times per day  sodium chloride flush 0.9 % injection 5-40 mL, PRN  0.9 % sodium chloride infusion, PRN  diphenhydrAMINE (BENADRYL) capsule 50 mg, Once  nitroGLYCERIN (NITROSTAT) SL tablet 0.4 mg, Q5 Min PRN  0.9 % sodium chloride infusion, Continuous  clopidogrel (PLAVIX) tablet 75 mg, Every Other Day  pantoprazole (PROTONIX) tablet 40 mg, QAM AC  amiodarone (CORDARONE) tablet 200 mg, BID  apixaban (ELIQUIS) tablet 5 mg, BID  atorvastatin (LIPITOR) tablet 80 mg, Nightly  busPIRone (BUSPAR) tablet 15 mg, BID  escitalopram (LEXAPRO) tablet 20 mg, Nightly  magnesium oxide (MAG-OX) tablet 400 mg, BID  melatonin tablet 10 mg, Nightly  metoprolol succinate (TOPROL XL) extended release tablet 25 mg, Daily  rOPINIRole (REQUIP) tablet 0.5 mg, Nightly  sacubitril-valsartan (ENTRESTO) 24-26 MG per tablet 1 tablet, BID  sodium chloride flush 0.9 % injection 10 mL, PRN  ondansetron (ZOFRAN-ODT) disintegrating tablet 4 mg, Q8H PRN   Or  ondansetron (ZOFRAN) injection 4 mg, Q6H PRN  polyethylene glycol (GLYCOLAX) packet 17 g, Daily PRN  acetaminophen (TYLENOL) tablet 650 mg, Q6H PRN   Or  acetaminophen (TYLENOL) suppository 650 mg, Q6H PRN  furosemide (LASIX) injection 40 mg, BID     FAMILY HISTORY: Reviewed but non-contributory      PHYSICAL EXAM:   BP (!) 126/113   Pulse 95   Temp 97.8 °F (36.6 °C) (Temporal)   Resp 16   Ht 5' 7\" (1.702 m)   Wt 192 lb (87.1 kg)   SpO2 91%   BMI 30.07 kg/m²  Body mass index is 30.07 kg/m².     Constitutional: He is oriented to person, place, and time. He appears well-developed and well-nourished. In no acute distress. HEENT: Normocephalic and atraumatic. No JVD present. Carotid bruit is not present. No mass and no thyromegaly present. No lymphadenopathy present. Cardiovascular: Normal rate, irregularly irregular rhythm, normal heart sounds. Exam reveals no gallop and no friction rubs. No murmur was heard. Pulmonary/Chest: Effort normal and breath sounds normal. No respiratory distress. He has no rhonchi or rales. Abdominal: Soft, non-tender. Bowel sounds and aorta are normal. He exhibits no organomegaly, mass or bruit. Extremities: Trace-1+ 1/2 up to the knees left worse than right. No cyanosis or clubbing. 2+ radial and carotid pulses. Distal extremity pulses: 2+ bilaterally. . Compression stockings in place. Neurological: He is alert and oriented to person, place, and time. No evidence of gross cranial nerve deficit. Coordination appeared normal.   Skin: Skin is warm and dry. There is no rash or diaphoresis. Psychiatric: He has a normal mood and affect.  His speech is normal and behavior is normal.      MOST RECENT LABS ON RECORD:   Lab Results   Component Value Date    WBC 2.6 (L) 05/23/2022    HGB 12.5 (L) 05/23/2022    HCT 37.5 (L) 05/23/2022    PLT See Reflexed IPF Result 05/23/2022    ALT 18 05/23/2022    AST 23 05/23/2022     05/23/2022    K 3.4 (L) 05/23/2022    CL 95 (L) 05/23/2022    CREATININE 1.07 05/23/2022    BUN 17 05/23/2022    CO2 35 (H) 05/23/2022    TSH 4.52 05/11/2022    INR 1.9 05/18/2022      ASSESSMENT:  Patient Active Problem List    Diagnosis Date Noted    History of acute inferior wall MI     Ischemic cardiomyopathy     Hypomagnesemia 05/20/2022    Ascites 05/20/2022    CKD (chronic kidney disease) 05/20/2022    Cirrhosis of liver with ascites ([facility] 05/19/2022    Acute on chronic combined systolic and diastolic CHF, NYHA class 4 ([facility]     Coronary artery disease     Hypertension     Thrombocytopenia (HCC)     Hyponatremia     Hyperlipidemia     Atrial fibrillation with RVR ([facility] 05/18/2022      PLAN:   Acute on chronic combined heart failure: New York Heart Association Class: IV (Severe limitations, symptoms even at rest)   Beta Blocker: Continue Metoprolol succinate (Toprol XL) 25 mg daily.  ACE Inibitor/ARB: Continue sacubitril/valsartan (Entresto) 24/26 mg twice daily.  Diuretics: STOP furosemide (Lasix) and START bumetinide (Bumex) 2 mg every morning starting tomorrow. I also discussed the potential side effects of this medication including lightheadedness and dizziness and instructed them to stop the medication of this occurs and call our office if this occurs.  Heart failure counseling: I told them to continue wearing lower extremity compression stockings and I advised them to try and keep their legs up whenever possible and to limit salt in their diet.  Echo showed EF of about 20% with wall motion abnormalities. · Hypomagnesemia: unclear etiology but possibly related to lasix less likely due to poor nutrition  · Discussed with Alex Jackson and will start Mag 600 bid. · Recheck tomorrow or at least 2-3 days. · Paroxysmal Atrial Fibrillation: Rhythm Control Symptomatic   Beta Blocker: Continue Metoprolol succinate (Toprol XL) 25 mg daily.  Anti-Arrhythmic: amiodarone (Pacerone) 200 mg BID. Monitoring: Since they will being maintained on Amiodarone, I told them that we will need to closely monitor them for potential side effects. These include monitoring LFTs and TSH at least every 6 months as well as chest x-rays, pulmonary function tests, and eye exams at least yearly.   [id number] Score for Atrial Fibrillation Stroke Risk   Risk   Factors  Component Value   C CHF Yes 1   H HTN Yes 1   A2 Age >= 75 No,  (75 y.o.) 0   D DM No 0   S2 Prior Stroke/TIA No 0   V Vascular Disease No 0   A Age 74-69 Yes,  (75 y.o.) 1   Sc Sex male 0    [id number]  Score 3   Score last updated 1/63/93 2:89 AM EDT  Click here for a link to the UpToDate guideline \"Atrial Fibrillation: Anticoagulation therapy to prevent embolization    Disclaimer: Risk Score calculation is dependent on accuracy of patient problem list and past encounter diagnosis.  Stroke Risk: CHADS2-VASc Score: 3/9 (3.2% stroke risk)   Anticoagulation: Continue Apixaban (Eliquis) 5 mg every 12 hours. Because of his atrial fibrillation, I also would also recommend that he continue with anticoagulation to decrease his risk of stoke but also reminded him to watch for signs of blood in his stool or black tarry stools and stop the medication immediately if this develops as this could be life threatening.  Atherosclerotic Heart Disease with history of stents at 46years old but I suspect new coronary artery disease based on severely reduced EF. Cardiac cath on 5/1/2022 Severe single vessel coronary artery disease involving a mid 100% stenosis in the right coronary artery. Moderate to severe single vessel disease involving a 60% mid LAD stenosis   Antiplatelet Agent: Continue clopidogrel (Plavix): Plavix 75 mg daily.  Beta Blocker: Continue Metoprolol succinate (Toprol XL) 25 mg daily.  Cholesterol Reduction Therapy: Continue Atorvastatin (Lipitor) 80 mg daily.  Additional counseling: I advised them to call our office or go to the emergency room if they developed worsening or persistent chest pain or increased shortness of breath as this could be life threatening. · Carotid artery stenosis with history of surgical correction   · Cholesterol Reduction Therapy: Continue Atorvastatin (Lipitor) 80 mg daily. Antiplatelet Agent: Continue clopidogrel (Plavix): Plavix 75 mg daily. Once again, thank you for allowing me to participate in this patients care. Please do not hesitate to contact me if I could be of any further assistance. Sincerely,  Shania Phan MD, MS, F.A.C.C.   [facility]

## 2022-05-23 NOTE — PROGRESS NOTES
Patient is resting in bed. Vitals and reassessment complete. Patient is hypotensive but systolic remains >68. Patient denies pain or needs. Due to sleeping throughout the day patient has had a more difficult falling asleep tonight. He denies needs from writer and remains in bed. Call light and bedside table remain within reach. Will continue to monitor and assess.

## 2022-05-23 NOTE — PROGRESS NOTES
Physical Therapy  Facility/Department: [facility] MED SURG  Physical Therapy Initial Assessment    Name: Ashley Trejo  : [id number]  MRN: [id number]  Date of Service: 2022    Discharge Recommendations:  Continue to assess pending progress,Home with Home health PT,Subacute/Skilled Nursing Facility   PT Equipment Recommendations  Equipment Needed: No      Patient Diagnosis(es): The primary encounter diagnosis was Atrial fibrillation with RVR ([address] Diagnoses of Shortness of breath and Acute diastolic congestive heart failure ([address] were also pertinent to this visit. Past Medical History:  has a past medical history of Acute kidney injury ([address] Ascites, Atrial fibrillation ([address] CHF (congestive heart failure) ([address] Cirrhosis of liver with ascites ([address] CKD (chronic kidney disease), Coronary artery disease, Hyperlipidemia, Hypertension, Hypomagnesemia, Hyponatremia, and Thrombocytopenia ([address] Past Surgical History:  has a past surgical history that includes Rotator cuff repair (Right) and Carotid stent placement (Bilateral). Assessment   Body Structures, Functions, Activity Limitations Requiring Skilled Therapeutic Intervention: Decreased functional mobility ; Decreased ADL status; Decreased tolerance to work activity; Decreased strength;Decreased endurance;Decreased high-level IADLs;Decreased balance  Assessment: The patient is a 70 y.o. male who was admitted due to SOB. He demonstrates mild LE weakness, a little difficulty with balance with narrow base of support, and reported fatigue with ambulation of about 200'. He would benefit from skilled PT to address his deficits to improve functional mobility.   Treatment Diagnosis: Decreased activity endurance, impaired dynamic standing balance  Therapy Prognosis: Good  Decision Making: Low Complexity  Requires PT Follow-Up: Yes  Activity Tolerance  Activity Tolerance: Patient tolerated evaluation without incident     Plan   Plan  Plan: 2 times a day 7 days a week (1x/day on weekends)  Current Treatment Recommendations: Strengthening,ROM,Functional mobility training,Transfer training,ADL/Self-care training,IADL training,Endurance training,Gait training,Stair training,Neuromuscular re-education,Manual Therapy - Soft Tissue Mobilization,Home exercise program,Safety education & training,Patient/Caregiver education & training,Therapeutic activities  Safety Devices  Type of Devices: All fall risk precautions in place,Left in chair,Call light within reach     Restrictions  Restrictions/Precautions  Restrictions/Precautions: General Precautions     Subjective   General  Chart Reviewed: Yes  Patient assessed for rehabilitation services?: Yes  Family / Caregiver Present: No  Referring Practitioner: SEVEN [place] CNP  Referral Date : 05/23/22  Diagnosis: Shortness of breath, R06.02  Follows Commands: Within Functional Limits  Subjective  Subjective: Patient denies pain at this time. He reports he is feeling better overall.          Social/Functional History  Social/Functional History  Lives With: Alone  Type of Home: Trailer  Home Layout: One level  Home Access: Level entry  Bathroom Shower/Tub: Walk-in shower,Shower chair with back  Bathroom Toilet: Standard  Bathroom Equipment: Grab bars in shower,Grab bars around toilet  Bathroom Accessibility: Accessible  Home Equipment: Cane,Walker, rolling  Has the patient had two or more falls in the past year or any fall with injury in the past year?: Yes  ADL Assistance: Needs assistance  Bath: Minimal assistance  Dressing: Minimal assistance  Ambulation Assistance: Independent  Transfer Assistance: Independent  Active : Yes  Vision/Hearing  Vision Exceptions: Wears glasses for reading  Hearing: Within functional limits    Cognition   Orientation  Overall Orientation Status: Within Functional Limits  Orientation Level: Oriented X4  Cognition  Overall Cognitive Status: WFL     Objective   Pulse: 85  Heart Rate Source: Monitor  BP: 96/81  BP Location: Right upper arm  Patient Position: Sitting  MAP (Calculated): 86  Resp: 16  SpO2: 92 %  O2 Device: None (Room air)    AROM RLE (degrees)  RLE AROM: WFL  AROM LLE (degrees)  LLE AROM : WFL  Strength RLE  Comment: Grossly 4+/5  Strength LLE  Comment: Grossly 4+/5        Transfer Training  Transfer Training: Yes  Overall Level of Assistance: Contact-guard assistance (Pt tolerated functional mobility in hallway x200 feet with no AD and CGA. Pt reports being limited d/t fatigue.)  Bed mobility  Rolling to Left: Independent  Rolling to Right: Independent  Supine to Sit: Independent  Sit to Supine: Independent  Scooting: Independent  Transfers  Sit to Stand: Independent  Stand to sit:  Independent  Ambulation  WB Status: FWB  Ambulation  Surface: level tile  Device: No Device  Assistance: Contact guard assistance  Gait Deviations: Decreased step length  Distance: 200'     Balance  Posture: Fair  Sitting - Static: Good  Sitting - Dynamic: Good  Standing - Static: Good  Standing - Dynamic: Good;-  Tandem Stance R Le  Tandem Stance L Le    AM-PAC Score  AM-PAC Inpatient Mobility without Stair Climbing Raw Score : 17 (22)  AM-PAC Inpatient without Stair Climbing T-Scale Score : 48.47 (22)  Mobility Inpatient CMS 0-100% Score: 32.72 (22)  Mobility Inpatient without Stair CMS G-Code Modifier : CJ (22)    Goals  Short Term Goals  Time Frame for Short term goals: 10 days  Short term goal 1: The patient will ambulate 400' with supervision, without LOB  Short term goal 2: Patient will perform bed mobility and transfers with MI  Short term goal 3: Patient will tolerate 20-30 minutes of therex/act to improve endurance for ADLs  Patient Goals   Patient goals : Improve strength, endurance, and balance    Education  Patient Education  Education Given To: Patient  Education Provided: Role of Therapy;Plan of Care;Home Exercise Program  Education Method: Verbal  Barriers to Learning: None  Education Outcome: Verbalized understanding      Therapy Time   Individual Concurrent Group Co-treatment   Time In 1531         Time Out 1544         Minutes 13         Timed Code Treatment Minutes: [address] PT, DPT

## 2022-05-23 NOTE — PROGRESS NOTES
Progress Note    SUBJECTIVE:    Patient seen for f/u of Atrial fibrillation with RVR ([address] He resting in bed alert and oriented in no distress. No complaints. ROS:   Constitutional: negative  for fevers, and negative for chills. Respiratory: negative for shortness of breath, negative for cough, and negative for wheezing  Cardiovascular: negative for chest pain, and negative for palpitations  Gastrointestinal: negative for abdominal pain, negative for nausea,negative for vomiting, negative for diarrhea, and negative for constipation     All other systems were reviewed with the patient and are negative unless otherwise stated in HPI      OBJECTIVE:      Vitals:   Vitals:    05/23/22 0530   BP: 100/71   Pulse:    Resp:    Temp:    SpO2:      Weight: 192 lb (87.1 kg)   Height: 5' 7\" (170.2 cm)     Weight  Wt Readings from Last 3 Encounters:   05/23/22 192 lb (87.1 kg)   03/02/22 191 lb (86.6 kg)     Body mass index is 30.07 kg/m². 24HR INTAKE/OUTPUT:      Intake/Output Summary (Last 24 hours) at 5/23/2022 0729  Last data filed at 5/22/2022 1700  Gross per 24 hour   Intake 10 ml   Output 600 ml   Net -590 ml     -----------------------------------------------------------------  Exam:    GEN:    Awake, alert and oriented x3. EYES:  EOMI, pupils equal   NECK: Supple. No lymphadenopathy. No carotid bruit  CVS:    irregularly irregular, no audible murmur  PULM:  CTA, no wheezes, rales or rhonchi, no acute respiratory distress  ABD:    Bowels sounds normal.  Abdomen is soft. No distention. no tenderness to palpation. EXT:   1+ edema bilaterally . No calf tenderness. NEURO: Moves all extremities. Motor and sensory are grossly intact  SKIN:  No rashes.   No skin lesions.    -----------------------------------------------------------------    Diagnostic Data:      Complete Blood Count:   Recent Labs     05/21/22  0550 05/22/22  0600 05/23/22  0535   WBC 2.7* 2.9* 2.6*   RBC 3.71* 3.75* 3.72*   HGB 12.7* 12.8* 12.5*   HCT 36.7* 37.6* 37.5*   MCV 98.9 100.3 100.8   MCH 34.2* 34.1* 33.6*   MCHC 34.6 34.0 33.3   RDW 14.5* 14.6* 14.7*   PLT See Reflexed IPF Result See Reflexed IPF Result See Reflexed IPF Result        Last 3 Blood Glucose:   Recent Labs     05/20/22  1552 05/21/22  0550 05/22/22  0600 05/23/22  0535   GLUCOSE 129* 87 87 83        Comprehensive Metabolic Profile:   Recent Labs     05/21/22  0550 05/22/22  0600 05/23/22  0535   * 134* 137   K 3.8 3.4* 3.4*   CL 95* 95* 95*   CO2 31 35* 35*   BUN 16 16 17   CREATININE 1.15 1.15 1.07   GLUCOSE 87 87 83   CALCIUM 8.8 9.2 8.8   PROT 5.3* 5.5* 5.3*   LABALBU 3.4* 3.6 3.5   BILITOT 0.98 0.96 0.90   ALKPHOS 87 80 76   AST 28 25 23   ALT 20 20 18        Urinalysis:   Lab Results   Component Value Date    NITRU NEGATIVE 05/02/2022    COLORU Yellow 05/02/2022    PHUR 6.0 05/02/2022    WBCUA 0 TO 2 05/02/2022    RBCUA 0 TO 2 05/02/2022    MUCUS 2+ 05/02/2022    BACTERIA 1+ 05/02/2022    SPECGRAV 1.025 05/02/2022    LEUKOCYTESUR NEGATIVE 05/02/2022    UROBILINOGEN Normal 05/02/2022    BILIRUBINUR SMALL 05/02/2022    GLUCOSEU NEGATIVE 05/02/2022    KETUA TRACE 05/02/2022       HgBA1c:  No results found for: LABA1C    Lactic Acid:   Lab Results   Component Value Date    LACTA 1.9 05/18/2022        Troponin: No results for input(s): TROPONINI in the last 72 hours. CRP:  No results for input(s): CRP in the last 72 hours. Radiology/Imaging:  CT CHEST ABDOMEN PELVIS WO CONTRAST   Final Result   1. Moderate bilateral pleural effusions with gravity dependent lower lobe   atelectasis. No acute pulmonary infiltrate. 2. Cardiomegaly with coronary vascular calcification. Moderately severe   aortoiliac atherosclerotic disease. Fusiform aneurysm of the infrarenal   abdominal aorta at 3.0 cm with imaging follow-up recommended every 3 years. 3. Hepatic cirrhosis.   Moderate ascites with third spacing including   edematous changes throughout the abdomen and anasarca, possibly related to   portal hypertension or cardiogenic. 4. No acute bowel pathology. Colonic diverticulosis. Previous appendectomy. 5. Healing fractures of the left lateral 7th through 10th ribs. Spondylolysis with spondylolisthesis at L5-S1. Multilevel lumbar   degenerative disc disease. RECOMMENDATIONS:   3 cm infrarenal abdominal aortic aneurysm. Recommend follow-up every 3 years. Reference: J Am Kristyn Radiol [id number]:[id number]. XR CHEST (2 VW)   Final Result   Cardiomegaly with mild pulmonary vascular congestion bilateral pleural   effusions which may represent CHF. Basilar atelectasis and or infiltrate   cannot be excluded. XR CHEST PORTABLE   Final Result   Trace bilateral pleural effusions with mild bibasilar airspace disease which   could represent atelectasis or pneumonia in the appropriate clinical setting. ASSESSMENT / PLAN:  Atrial fibrillation with RVR (HCC)  · Continue current therapy   ? Appreciate cardiology  ? Continue Eliquis  ? Continue amiodarone  ? Continue Toprol-XL  · Acute on chronic diastolic CHF stage III  ? Appreciate cardiology  ? IV Lasix  ? Continue Entresto, Toprol-XL  ? Teds  ? SCDs  ? Fluid restriction  · CAD  ? Continue Plavix to every other day due to thrombocytopenia  ? Defer Eliquis to cardiology  · Hypertension  ? Continue Toprol-XL  · Hyponatremia  ? 1800 mL fluid restriction  ? Trend labs  · Thrombocytopenia  ? Uncertain etiology  ? Elevated bilirubin  § Hepatitis series-negative  ? Continue Plavix to every other day  ? Caution with Eliquis  ? CT chest abdomen and pelvis- assess pulmonary due to CHF versus neoplastic process involving liver or lung  · Hepatic cirrhosis   · Causes: Alcohol abuse, CHF with severe cardiomyopathy  · Currently drinks to [employer] with mix nightly. History of heavy drinking beer and liquor for years.   · Hypomagnesemia  · Magnesium 2 g IV today  · Continue Mag OX  · Trend labs  · CKD, Stage 3  · Labs stable  · Follow with Dr. Katina Mckeon  · Nutrition status:   · obesity, non-morbid  · Dietician consult initiated  · Hospital Prophylaxis:   · DVT: Eliquis   · Stress Ulcer: PPI   · High risk medications: none   · Disposition:    · Discharge plan is home today  · Follow up with Dr Denver Pu  · Follow up with Dr. Sydnee Fraser, APRN - CNP , APRN, NP-C  Hospitalist Medicine        5/23/2022, 7:29 AM

## 2022-05-24 ENCOUNTER — TELEPHONE (OUTPATIENT)
Dept: CARDIOLOGY | Age: 72
End: 2022-05-24

## 2022-05-24 VITALS
RESPIRATION RATE: 18 BRPM | WEIGHT: 195.1 LBS | TEMPERATURE: 96.8 F | SYSTOLIC BLOOD PRESSURE: 104 MMHG | BODY MASS INDEX: 30.62 KG/M2 | DIASTOLIC BLOOD PRESSURE: 83 MMHG | OXYGEN SATURATION: 94 % | HEART RATE: 82 BPM | HEIGHT: 67 IN

## 2022-05-24 DIAGNOSIS — I25.5 ISCHEMIC CARDIOMYOPATHY: Primary | ICD-10-CM

## 2022-05-24 LAB
ABSOLUTE EOS #: 0.04 K/UL (ref 0–0.44)
ABSOLUTE IMMATURE GRANULOCYTE: <0.03 K/UL (ref 0–0.3)
ABSOLUTE LYMPH #: 0.86 K/UL (ref 1.1–3.7)
ABSOLUTE MONO #: 0.24 K/UL (ref 0.1–1.2)
ALBUMIN SERPL-MCNC: 3.6 G/DL (ref 3.5–5.2)
ALBUMIN/GLOBULIN RATIO: 1.8 (ref 1–2.5)
ALP BLD-CCNC: 78 U/L (ref 40–129)
ALT SERPL-CCNC: 19 U/L (ref 5–41)
ANION GAP SERPL CALCULATED.3IONS-SCNC: 4 MMOL/L (ref 9–17)
AST SERPL-CCNC: 23 U/L
BASOPHILS # BLD: 1 % (ref 0–2)
BASOPHILS ABSOLUTE: <0.03 K/UL (ref 0–0.2)
BILIRUB SERPL-MCNC: 0.88 MG/DL (ref 0.3–1.2)
BUN BLDV-MCNC: 22 MG/DL (ref 8–23)
BUN/CREAT BLD: 22 (ref 9–20)
CALCIUM SERPL-MCNC: 9.2 MG/DL (ref 8.6–10.4)
CHLORIDE BLD-SCNC: 96 MMOL/L (ref 98–107)
CO2: 35 MMOL/L (ref 20–31)
CREAT SERPL-MCNC: 1 MG/DL (ref 0.7–1.2)
EOSINOPHILS RELATIVE PERCENT: 1 % (ref 1–4)
GFR AFRICAN AMERICAN: >60 ML/MIN
GFR NON-AFRICAN AMERICAN: >60 ML/MIN
GFR SERPL CREATININE-BSD FRML MDRD: ABNORMAL ML/MIN/{1.73_M2}
GFR SERPL CREATININE-BSD FRML MDRD: ABNORMAL ML/MIN/{1.73_M2}
GLUCOSE BLD-MCNC: 89 MG/DL (ref 70–99)
HCT VFR BLD CALC: 38 % (ref 40.7–50.3)
HEMOGLOBIN: 12.7 G/DL (ref 13–17)
IMMATURE GRANULOCYTES: 0 %
LYMPHOCYTES # BLD: 31 % (ref 24–43)
MAGNESIUM: 1.6 MG/DL (ref 1.6–2.6)
MCH RBC QN AUTO: 34.2 PG (ref 25.2–33.5)
MCHC RBC AUTO-ENTMCNC: 33.4 G/DL (ref 28.4–34.8)
MCV RBC AUTO: 102.4 FL (ref 82.6–102.9)
MONOCYTES # BLD: 9 % (ref 3–12)
NRBC AUTOMATED: 0 PER 100 WBC
PDW BLD-RTO: 14.6 % (ref 11.8–14.4)
PLATELET # BLD: ABNORMAL K/UL (ref 138–453)
PLATELET, FLUORESCENCE: 87 K/UL (ref 138–453)
PLATELET, IMMATURE FRACTION: 7.5 % (ref 1.1–10.3)
POTASSIUM SERPL-SCNC: 3.8 MMOL/L (ref 3.7–5.3)
RBC # BLD: 3.71 M/UL (ref 4.21–5.77)
SEG NEUTROPHILS: 58 % (ref 36–65)
SEGMENTED NEUTROPHILS ABSOLUTE COUNT: 1.64 K/UL (ref 1.5–8.1)
SODIUM BLD-SCNC: 135 MMOL/L (ref 135–144)
TOTAL PROTEIN: 5.6 G/DL (ref 6.4–8.3)
WBC # BLD: 2.8 K/UL (ref 3.5–11.3)

## 2022-05-24 PROCEDURE — 85055 RETICULATED PLATELET ASSAY: CPT

## 2022-05-24 PROCEDURE — 2580000003 HC RX 258: Performed by: FAMILY MEDICINE

## 2022-05-24 PROCEDURE — 6370000000 HC RX 637 (ALT 250 FOR IP): Performed by: INTERNAL MEDICINE

## 2022-05-24 PROCEDURE — 6370000000 HC RX 637 (ALT 250 FOR IP): Performed by: NURSE PRACTITIONER

## 2022-05-24 PROCEDURE — 6370000000 HC RX 637 (ALT 250 FOR IP): Performed by: FAMILY MEDICINE

## 2022-05-24 PROCEDURE — 97535 SELF CARE MNGMENT TRAINING: CPT

## 2022-05-24 PROCEDURE — 97110 THERAPEUTIC EXERCISES: CPT

## 2022-05-24 PROCEDURE — 83735 ASSAY OF MAGNESIUM: CPT

## 2022-05-24 PROCEDURE — 36415 COLL VENOUS BLD VENIPUNCTURE: CPT

## 2022-05-24 PROCEDURE — 94761 N-INVAS EAR/PLS OXIMETRY MLT: CPT

## 2022-05-24 PROCEDURE — 97116 GAIT TRAINING THERAPY: CPT

## 2022-05-24 PROCEDURE — 85025 COMPLETE CBC W/AUTO DIFF WBC: CPT

## 2022-05-24 PROCEDURE — 80053 COMPREHEN METABOLIC PANEL: CPT

## 2022-05-24 RX ORDER — MIDODRINE HYDROCHLORIDE 5 MG/1
5 TABLET ORAL
Status: DISCONTINUED | OUTPATIENT
Start: 2022-05-24 | End: 2022-05-24 | Stop reason: HOSPADM

## 2022-05-24 RX ORDER — MIDODRINE HYDROCHLORIDE 5 MG/1
5 TABLET ORAL
Qty: 90 TABLET | Refills: 3 | Status: ON HOLD | OUTPATIENT
Start: 2022-05-24 | End: 2022-06-11 | Stop reason: HOSPADM

## 2022-05-24 RX ORDER — POTASSIUM CHLORIDE 20 MEQ/1
20 TABLET, EXTENDED RELEASE ORAL 2 TIMES DAILY WITH MEALS
Qty: 60 TABLET | Refills: 3 | Status: ON HOLD | OUTPATIENT
Start: 2022-05-24 | End: 2022-06-11 | Stop reason: HOSPADM

## 2022-05-24 RX ORDER — LANOLIN ALCOHOL/MO/W.PET/CERES
600 CREAM (GRAM) TOPICAL 2 TIMES DAILY
Qty: 90 TABLET | Refills: 2 | Status: SHIPPED | OUTPATIENT
Start: 2022-05-24

## 2022-05-24 RX ORDER — BUMETANIDE 2 MG/1
2 TABLET ORAL DAILY
Qty: 30 TABLET | Refills: 3 | Status: ON HOLD | OUTPATIENT
Start: 2022-05-25 | End: 2022-06-11 | Stop reason: HOSPADM

## 2022-05-24 RX ORDER — MIDODRINE HYDROCHLORIDE 5 MG/1
10 TABLET ORAL
Status: DISCONTINUED | OUTPATIENT
Start: 2022-05-24 | End: 2022-05-24

## 2022-05-24 RX ADMIN — AMIODARONE HYDROCHLORIDE 200 MG: 200 TABLET ORAL at 08:49

## 2022-05-24 RX ADMIN — SODIUM CHLORIDE, PRESERVATIVE FREE 10 ML: 5 INJECTION INTRAVENOUS at 08:47

## 2022-05-24 RX ADMIN — Medication 400 MG: at 08:49

## 2022-05-24 RX ADMIN — BUMETANIDE 2 MG: 1 TABLET ORAL at 08:49

## 2022-05-24 RX ADMIN — POTASSIUM CHLORIDE 20 MEQ: 1500 TABLET, EXTENDED RELEASE ORAL at 07:08

## 2022-05-24 RX ADMIN — APIXABAN 5 MG: 5 TABLET, FILM COATED ORAL at 08:48

## 2022-05-24 RX ADMIN — BUSPIRONE HYDROCHLORIDE 15 MG: 5 TABLET ORAL at 08:47

## 2022-05-24 RX ADMIN — PANTOPRAZOLE SODIUM 40 MG: 40 TABLET, DELAYED RELEASE ORAL at 07:08

## 2022-05-24 RX ADMIN — MIDODRINE HYDROCHLORIDE 5 MG: 5 TABLET ORAL at 13:54

## 2022-05-24 ASSESSMENT — PAIN SCALES - GENERAL: PAINLEVEL_OUTOF10: 0

## 2022-05-24 NOTE — PROGRESS NOTES
Dr. Kang Dumont,    Pt's BP continues to be on lower side. BPs this am are as follows: At 0658: 90/65, HR 92  At 0845: 85/59, HR 82    Pt remains to be A-Fib on monitor    Do you want following medications given:   Toprol XL 25mg  Entresto 24-26mg    Please adviseDAVID RN

## 2022-05-24 NOTE — PROGRESS NOTES
Pt awake in bed, states \"I didn't sleep a wink last night. \" Pt informed RNs in for bedside report at approx 0615 and pt was sound asleep, pt states \"I don't remember, I must have dozed off for a few minutes. \" Pt is A&Ox4, calm and cooperative. Assmt and VS completed as charted, see flow sheet. Pt resting in bed, encouraged to get up to chair for breakfast, pt agreed but would like to wait until close to 0800. Call light within reach, denies pain, denies further needs. Will continue to monitor.

## 2022-05-24 NOTE — PROGRESS NOTES
Discharge instructions given to pt and family. No questions, pt verbalized understanding all instructions. Pt aware of follow up appointments as listed on AVS. Pt d/c'd off unit at this time, via wheelchair, with sister,to home. Belongings in hand. No issues noted.

## 2022-05-24 NOTE — PROGRESS NOTES
Physical Therapy  Facility/Department: [facility] AT THE [facility] MED SURG  Daily Treatment Note  NAME: Yany Clinton  : [birth year]  MRN: [id number]    Date of Service: 2022    Discharge Recommendations:  Continue to assess pending progress,Home with Home health PT,Subacute/Skilled [address] [place] [address]      Patient Diagnosis(es): The primary encounter diagnosis was Atrial fibrillation with RVR ([facility] [address] Diagnoses of Shortness of breath and Acute diastolic congestive heart failure ([facility] [address] were also pertinent to this visit. Assessment   Assessment: Pt amb 300' this session using RW. No dizzy feeling/ LOB noted with amb. Will continue. Activity Tolerance: Patient tolerated treatment well     Plan    Plan  Plan: 2 times a day 7 days a week     Restrictions  Restrictions/Precautions  Restrictions/Precautions: General Precautions     Subjective    Subjective  Subjective: Pt denies current pain. Pt states hes a little anxious d/t not knowing where he will be going. Orientation  Overall Orientation Status: Within Functional Limits     Objective   Vitals     Transfer Training  Overall Level of Assistance: Contact-guard assistance  Gait Training  Gait Training: Yes  Gait  Distance (ft): 300 Feet  Assistive Device: Walker     PT Exercises  Exercise Treatment: standing sink ex completed this session (toe raise, march, squat, hip ABD, Add) 15x ea     Safety Devices  Type of Devices: All fall risk precautions in place; Left in chair;Call light within reach (no alarm present, not needed per nurse as Pt is independent in room.)     Goals  Short Term Goals  Time Frame for Short term goals: 10 days  Short term goal 1: The patient will ambulate 400' with supervision, without LOB  Short term goal 2: Patient will perform bed mobility and transfers with MI  Short term goal 3: Patient will tolerate 20-30 minutes of therex/act to improve endurance for ADLs  Patient Goals   Patient goals : Improve strength, endurance, and balance    Education  Patient Education  Education Given To: Patient  Education Provided: Role of Therapy  Education Outcome: Verbalized understanding    Therapy Time   Individual Concurrent Group Co-treatment   Time In 0938         Time Out 1003         Minutes 25         Timed Code Treatment Minutes: [address], [place]

## 2022-05-24 NOTE — PROGRESS NOTES
Progress Note    SUBJECTIVE:    Patient seen for f/u of Atrial fibrillation with RVR ([address] He resting in bed alert and oriented in no distress. No complaints. Ambulated in the halls and tolerated well without difficulty    ROS:   Constitutional: negative  for fevers, and negative for chills. Respiratory: negative for shortness of breath, negative for cough, and negative for wheezing  Cardiovascular: negative for chest pain, and negative for palpitations  Gastrointestinal: negative for abdominal pain, negative for nausea,negative for vomiting, negative for diarrhea, and negative for constipation     All other systems were reviewed with the patient and are negative unless otherwise stated in HPI      OBJECTIVE:      Vitals:   Vitals:    05/24/22 0845   BP: (!) 85/59   Pulse: 82   Resp: 18   Temp:    SpO2:      Weight: 195 lb 1.6 oz (88.5 kg)   Height: 5' 7\" (170.2 cm)     Weight  Wt Readings from Last 3 Encounters:   05/24/22 195 lb 1.6 oz (88.5 kg)   03/02/22 191 lb (86.6 kg)     Body mass index is 30.56 kg/m². 24HR INTAKE/OUTPUT:    No intake or output data in the 24 hours ending 05/24/22 1207  -----------------------------------------------------------------  Exam:    GEN:    Awake, alert and oriented x3. EYES:  EOMI, pupils equal   NECK: Supple. No lymphadenopathy. No carotid bruit  CVS:    irregularly irregular, no audible murmur  PULM:  CTA, no wheezes, rales or rhonchi, no acute respiratory distress  ABD:    Bowels sounds normal.  Abdomen is soft. No distention. no tenderness to palpation. EXT:   1+ edema bilaterally . No calf tenderness. NEURO: Moves all extremities. Motor and sensory are grossly intact  SKIN:  No rashes.   No skin lesions.    -----------------------------------------------------------------    Diagnostic Data:      Complete Blood Count:   Recent Labs     05/22/22  0600 05/23/22  0535 05/24/22  0625   WBC 2.9* 2.6* 2.8*   RBC 3.75* 3.72* 3.71*   HGB 12.8* 12.5* 12.7*   HCT 37.6* 37.5* 38.0*   .3 100.8 102.4   MCH 34.1* 33.6* 34.2*   MCHC 34.0 33.3 33.4   RDW 14.6* 14.7* 14.6*   PLT See Reflexed IPF Result See Reflexed IPF Result See Reflexed IPF Result        Last 3 Blood Glucose:   Recent Labs     05/22/22  0600 05/23/22  0535 05/24/22  0625   GLUCOSE 87 83 89        Comprehensive Metabolic Profile:   Recent Labs     05/22/22  0600 05/23/22  0535 05/24/22  0625   * 137 135   K 3.4* 3.4* 3.8   CL 95* 95* 96*   CO2 35* 35* 35*   BUN 16 17 22   CREATININE 1.15 1.07 1.00   GLUCOSE 87 83 89   CALCIUM 9.2 8.8 9.2   PROT 5.5* 5.3* 5.6*   LABALBU 3.6 3.5 3.6   BILITOT 0.96 0.90 0.88   ALKPHOS 80 76 78   AST 25 23 23   ALT 20 18 19        Urinalysis:   Lab Results   Component Value Date    NITRU NEGATIVE 05/02/2022    COLORU Yellow 05/02/2022    PHUR 6.0 05/02/2022    WBCUA 0 TO 2 05/02/2022    RBCUA 0 TO 2 05/02/2022    MUCUS 2+ 05/02/2022    BACTERIA 1+ 05/02/2022    SPECGRAV 1.025 05/02/2022    LEUKOCYTESUR NEGATIVE 05/02/2022    UROBILINOGEN Normal 05/02/2022    BILIRUBINUR SMALL 05/02/2022    GLUCOSEU NEGATIVE 05/02/2022    KETUA TRACE 05/02/2022       HgBA1c:  No results found for: LABA1C    Lactic Acid:   Lab Results   Component Value Date    LACTA 1.9 05/18/2022        Troponin: No results for input(s): TROPONINI in the last 72 hours. CRP:  No results for input(s): CRP in the last 72 hours. Radiology/Imaging:  CT CHEST ABDOMEN PELVIS WO CONTRAST   Final Result   1. Moderate bilateral pleural effusions with gravity dependent lower lobe   atelectasis. No acute pulmonary infiltrate. 2. Cardiomegaly with coronary vascular calcification. Moderately severe   aortoiliac atherosclerotic disease. Fusiform aneurysm of the infrarenal   abdominal aorta at 3.0 cm with imaging follow-up recommended every 3 years. 3. Hepatic cirrhosis.   Moderate ascites with third spacing including   edematous changes throughout the abdomen and anasarca, possibly related to   portal hypertension or cardiogenic. 4. No acute bowel pathology. Colonic diverticulosis. Previous appendectomy. 5. Healing fractures of the left lateral 7th through 10th ribs. Spondylolysis with spondylolisthesis at L5-S1. Multilevel lumbar   degenerative disc disease. RECOMMENDATIONS:   3 cm infrarenal abdominal aortic aneurysm. Recommend follow-up every 3 years. Reference: J Am Kristyn Radiol 2408;23:[id number]. XR CHEST (2 VW)   Final Result   Cardiomegaly with mild pulmonary vascular congestion bilateral pleural   effusions which may represent CHF. Basilar atelectasis and or infiltrate   cannot be excluded. XR CHEST PORTABLE   Final Result   Trace bilateral pleural effusions with mild bibasilar airspace disease which   could represent atelectasis or pneumonia in the appropriate clinical setting. ASSESSMENT / PLAN:  Atrial fibrillation with RVR (HCC)  · Continue current therapy   ? Appreciate cardiology  ? Continue Eliquis  ? Continue amiodarone  ? Continue Toprol-XL  · Acute on chronic diastolic CHF stage III  ? Appreciate cardiology  ? IV Lasix  ? Continue Entresto, Toprol-XL  ? Teds  ? SCDs  ? Fluid restriction  ? Start Midodrine due to continued hypotension with medication adjustments  · CAD  ? Continue Plavix to every other day due to thrombocytopenia  ? Defer Eliquis to cardiology  · Hypertension  ? Continue Toprol-XL  · Hyponatremia  ? 1800 mL fluid restriction  ? Trend labs  · Thrombocytopenia  ? Uncertain etiology  ? Elevated bilirubin  § Hepatitis series-negative  ? Continue Plavix to every other day  ? Caution with Eliquis  ? CT chest abdomen and pelvis- assess pulmonary due to CHF versus neoplastic process involving liver or lung  · Hepatic cirrhosis   · Causes: Alcohol abuse, CHF with severe cardiomyopathy  · Currently drinks to [employer] with mix nightly. History of heavy drinking beer and liquor for years.   · Hypomagnesemia  · Magnesium 2 g IV today  · Continue Mag OX  · Trend labs  · CKD, Stage 3  · Labs stable  · Follow with Dr. Garcia Seen  · Nutrition status:   · obesity, non-morbid  · Dietician consult initiated  · Hospital Prophylaxis:   · DVT: Eliquis   · Stress Ulcer: PPI   · High risk medications: none   · Disposition:    · Discharge plan is home today  · Follow up with Dr Sherita Camarena  · Follow up with Dr. Deja Cuellar, APRN - CNP , APRN, NP-C  Hospitalist Medicine        5/24/2022, 12:07 PM

## 2022-05-24 NOTE — PROGRESS NOTES
Occupational Therapy  [facility] MED SURG  Rehabilitation Occupational Therapy Daily Treatment Note    Date: 22  Patient Name: Kassidy Bright       Room: 0315/0315-01  MRN: [id number]  Account: [de-identified]   : 1950  (75 y.o.) Gender: male                    Past Medical History:  has a past medical history of Acute kidney injury ([facility] [address] Ascites, Atrial fibrillation ([facility] [address] CHF (congestive heart failure) ([facility] [address] Cirrhosis of liver with ascites ([facility] [address] CKD (chronic kidney disease), Coronary artery disease, Hyperlipidemia, Hypertension, Hypomagnesemia, Hyponatremia, and Thrombocytopenia ([facility] [address] Past Surgical History:   has a past surgical history that includes Rotator cuff repair (Right) and Carotid stent placement (Bilateral). Restrictions       Subjective  Subjective: Pt stated that he has been up to use the bathroom a lot and had not slept very very well over night. DEnied ADL agreeable to ther ex. Objective               ADL  Toileting  Assistance Level: Modified independent  Toilet Transfers  Technique:  (ambulating)  Equipment: Standard toilet  Assistance Level: Modified independent          Functional Mobility  Device:  (no device)  Activity: To/From bathroom  Assistance Level: Modified independent         Assessment  Assessment  Activity Tolerance: Patient tolerated treatment well       Patient Education  Education  Education Given To: Patient  Education Provided: Role of Therapy; Mobility Training; Safety; Fall Prevention Strategies; Energy Conservation  Education Method: Verbal  Barriers to Learning: None  Education Outcome: Verbalized understanding    Plan       Goals  Short Term Goals  Time Frame for Short term goals: 20 visits  Short Term Goal 1: Patient will tolerate 15 mins of BUE ther ex/act to increase overall strength/activity tolerance for functional tasks.   Short Term Goal 2: Patient to engage in 5' of dynamic standing task during functional task of choice s LOB to improve safety, balance and indep c ADLs. Short Term Goal 3: Patient to complete ADL routine c Mod I c AE/DME and implementation of EC/WS techniques as needed to ensure safe return home. Short Term Goal 4: Patient to be educated on d/c folder, AE/DME and EC/WS techniques to ensure safe and indep return home.     Therapy Time   Individual Concurrent Group Co-treatment   Time In [address]         Time Out 1123         Minutes [address] [place] [address], [address] [place] [address]

## 2022-05-24 NOTE — PROGRESS NOTES
Patient reassessed complete and documented. Patient reports no signs of discomfort or reports of pain. Writer assisted patient to restroom and back to bed. Patient tolerates ambulation well. Bed in lowest position  Call light within reach. Will continue to monitor.

## 2022-05-24 NOTE — PLAN OF CARE
Problem: Discharge Planning  Goal: Discharge to home or other facility with appropriate resources  Outcome: Progressing  Flowsheets (Taken 5/24/2022 0252)  Discharge to home or other facility with appropriate resources:   Identify barriers to discharge with patient and caregiver   Arrange for needed discharge resources and transportation as appropriate   Identify discharge learning needs (meds, wound care, etc)   Arrange for interpreters to assist at discharge as needed   Refer to discharge planning if patient needs post-hospital services based on physician order or complex needs related to functional status, cognitive ability or social support system     Problem: Pain  Goal: Verbalizes/displays adequate comfort level or baseline comfort level  Outcome: Progressing  Flowsheets (Taken 5/24/2022 0252)  Verbalizes/displays adequate comfort level or baseline comfort level:   Encourage patient to monitor pain and request assistance   Assess pain using appropriate pain scale   Administer analgesics based on type and severity of pain and evaluate response   Implement non-pharmacological measures as appropriate and evaluate response   Consider cultural and social influences on pain and pain management   Notify Licensed Independent Practitioner if interventions unsuccessful or patient reports new pain     Problem: Safety - Adult  Goal: Free from fall injury  Outcome: Progressing  Flowsheets (Taken 5/24/2022 0252)  Free From Fall Injury:   Based on caregiver fall risk screen, instruct family/caregiver to ask for assistance with transferring infant if caregiver noted to have fall risk factors   Instruct family/caregiver on patient safety  Note: Call light in reach at all times, frequent checks, bed in lowest position, wheels of bed and chair locked, non skid footwear on, appropriate transfer techniques, personal items within reach, walkways free of obstructions, fall risk armband and sign displayed, Agustin fall risk score per protocol. No falls this shift, will continue to monitor. Needs assessed hourly, pt alert and oriented able to express needs or meet needs independently.        Problem: Nutrition Deficit:  Goal: Optimize nutritional status  Outcome: Progressing  Flowsheets (Taken 5/24/2022 0252)  Nutrient intake appropriate for improving, restoring, or maintaining nutritional needs:   Assess nutritional status and recommend course of action   Recommend appropriate diets, oral nutritional supplements, and vitamin/mineral supplements   Monitor oral intake, labs, and treatment plans   Order, calculate, and assess calorie counts as needed   Provide specific nutrition education to patient or family as appropriate     Problem: Chronic Conditions and Co-morbidities  Goal: Patient's chronic conditions and co-morbidity symptoms are monitored and maintained or improved  Outcome: Progressing  Flowsheets (Taken 5/24/2022 0252)  Care Plan - Patient's Chronic Conditions and Co-Morbidity Symptoms are Monitored and Maintained or Improved:   Monitor and assess patient's chronic conditions and comorbid symptoms for stability, deterioration, or improvement   Collaborate with multidisciplinary team to address chronic and comorbid conditions and prevent exacerbation or deterioration   Update acute care plan with appropriate goals if chronic or comorbid symptoms are exacerbated and prevent overall improvement and discharge

## 2022-05-24 NOTE — DISCHARGE SUMMARY
Discharge Summary    Stewart Shields  :    MRN:  [id number]    Admit date:  2022      Discharge date: 2022     Admitting Physician:  Paul Grey MD    Discharge Diagnoses:    Principal Problem:    Atrial fibrillation with RVR ([facility]  Active Problems:    History of acute inferior wall MI    Ischemic cardiomyopathy    Acute on chronic combined systolic and diastolic CHF, NYHA class 4 (HCC)    Coronary artery disease    Hypertension    Thrombocytopenia (HCC)    Hyponatremia    Hyperlipidemia    Cirrhosis of liver with ascites ([facility]    Hypomagnesemia    Ascites    CKD (chronic kidney disease)  Resolved Problems:    * No resolved hospital problems. [facility] Course: Stewart Shields is a 70 y.o. male admitted with fibrillation with RVR. He presented to the emergency room with complaints of weakness and shortness of breath. Onset of symptoms 2 weeks. He stated that his shortness of breath worsened with light exertion. He denied fever or chills. He denied productive cough. He reported recently that his spironolactone was decreased in half. He denied chest pain but admitted to palpitations. He reported that he was scheduled for an elective cardioversion in [facility] however it was canceled and was not able to be scheduled until . He reports that he takes Eliquis and Plavix and has noticed increase in bruising. During his evaluation patient was found to be hyponatremic with sodium level of 124. Hemoglobin was 14.5 however his platelet count was 41.5. Hepatic function showed elevated total bilirubin, direct bilirubin and indirect bilirubin. Patient denied abdominal pain. Chest x-ray showed trace bilateral pleural effusions with mild bibasilar airspace disease. EKG showed atrial fibrillation with no ST or T wave changes. Patient's initial heart rate ranged between 128-150 and was given a Cardizem bolus IV which reduced his heart rate to 85.   Patient was given Lasix 60 mg IV.  During patient's admission cardiology was consulted labs were trended and medications were adjusted. Patient's electrolytes were replaced however he did struggle with maintaining magnesium and required infusions. Patient currently is on Mag-Ox 400 mg twice daily at home we will increase that to 600 mg twice daily. Patient tolerated IV Lasix however due to renal function that was stopped and patient was started on oral Bumex. Patient will continue with Toprol-XL 25 mg daily, amiodarone 200 mg twice daily, he will cut his Entresto dose in half twice a day. Patient will continue with Eliquis 5 mg twice daily. Patient was started on midodrine 5 mg 3 times daily due to hypotension. Patient does have history of alcoholism and was found to have thrombocytopenia with elevated bilirubin. Hepatitis series was negative Plavix was changed to every other day. CT abdomen and pelvis showed abdominal ascites and pleural effusions. Patient was also found to have hepatic cirrhosis which he stated he was not known to have. Patient admitted to drinking [employer] nightly with a history of heavy drinking of beer and liquor for years. Patient was also found to have severe cardiomyopathy with an EF of 15 to 20% and his left atrium was severely dilated with mild to moderate pulmonary hypertension with mild to moderate tricuspid regurgitation and large bilateral pleural effusions with moderate diastolic dysfunction. Heart cath was also completed during his stay which showed severe single-vessel CAD involving mid 100% stenosis in the right coronary artery. Also showed moderate to severe single-vessel disease involving 60% mid LAD stenosis. He did have normal left ventricular end-diastolic pressures. Conversations took place with both the patient as well as his sister who helps manage his health care. Patient will establish care with Dr. Dave Giron for cardiology and Dr. Steffanie Guerin for primary.   They will continue with  Irene Crawford for nephrology. He will be discharged home today he will follow-up with both Dr. Ale Wesley and Dr. Marta Cole. I will have him get a BMP and a magnesium level in 1 week. Consultants:  Dr. Marta Cole, cardiology    Procedures: heart cath: See above    Complications: none    Discharge Condition: fair    Exam:  GEN:    Awake, alert and oriented x3. EYES:   EOMI, pupils equal   NECK: Supple. No lymphadenopathy. No carotid bruit  CVS:     irregularly irregular, no audible murmur  PULM:  CTA, no wheezes, rales or rhonchi, no acute respiratory distress  ABD:     Bowels sounds normal.  Abdomen is soft. No distention. no tenderness to palpation. EXT:     1+ edema bilaterally . No calf tenderness. NEURO: Moves all extremities. Motor and sensory are grossly intact  SKIN:    No rashes. No skin lesions.       Significant Diagnostic Studies:   Lab Results   Component Value Date    WBC 2.8 (L) 05/24/2022    HGB 12.7 (L) 05/24/2022    PLT See Reflexed IPF Result 05/24/2022       Lab Results   Component Value Date    BUN 22 05/24/2022    CREATININE 1.00 05/24/2022     05/24/2022    K 3.8 05/24/2022    CALCIUM 9.2 05/24/2022    CL 96 (L) 05/24/2022    CO2 35 (H) 05/24/2022    LABGLOM >60 05/24/2022       Lab Results   Component Value Date    WBCUA 0 TO 2 05/02/2022    RBCUA 0 TO 2 05/02/2022    EPITHUA 0 TO 2 05/02/2022    LEUKOCYTESUR NEGATIVE 05/02/2022    SPECGRAV 1.025 (H) 05/02/2022    GLUCOSEU NEGATIVE 05/02/2022    KETUA TRACE (A) 05/02/2022    PROTEINU TRACE (A) 05/02/2022    HGBUR NEGATIVE 05/02/2022    BACTERIA 1+ (A) 05/02/2022       ECHO-2D-M-MODE COMPLETE    Result Date: 5/19/2022  [facility] Transthoracic Echocardiography Report (TTE)  Patient Name Margaret Medeiros   Date of Study               05/19/2022               Merline Doing A   Date of      1950  Gender                      Male  Birth   Age          70 year(s)  Race                           Room Number  0315        Height: 67 inch, 170.18 cm   Corporate ID [id number]    Weight:                     209 pounds, 94.8 kg  #   Patient Acct [de-identified]   BSA:          2.06 m^2      BMI:      32.73  #                                                              kg/m^2   MR #         [id number]      Sonographer                 Elsa Richardson   Accession #  [id number]  Interpreting Physician      Joe [place]   Fellow                   Referring Nurse                           Practitioner   Interpreting             Referring Physician         Sushil Cobos  Fellow  Type of Study   TTE procedure:2D Echocardiogram, M-Mode, Doppler, Color Doppler. Procedure Date Date: 05/19/2022 Start: 08:51 AM Study Location: [facility] Indications:Congestive heart failure. History / Tech. Comments: Dx: CHF, dyspnea, atrial fibrillation Hx: atrial fibrillation, CHF, CAD, HTN, hyperlipidemia Patient Status: Inpatient Height: 67 inches Weight: 209 pounds BSA: 2.06 m^2 BMI: 32.73 kg/m^2 BP: 114/70 mmHg CONCLUSIONS Summary Global left ventricular systolic function appears severely reduced with an estimated ejection fraction of 15-20%. The left ventricular cavity size is within normal limits and the left ventricular wall thickness is within normal limits. The left atrium is severely dilated (>40) with a left atrial volume index of 54 ml/m2. Normal mitral valve structure with mild to moderate mitral regurgitation. Mild to moderate pulmonary hypertension with an estimated right ventricular systolic pressure of 40 mmHg. Mild to moderate tricuspid regurgitation. Normal pulmonic valve structure with mild to moderate pulmonic regurgitation. Large bilateral pleural effusion. Evidence of moderate diastolic dysfunction is seen. IVC Increased diameter and impaired or no inspiratory variation indicating elevated RA filling pressure (i.e. CVP) . No previous studies were available for comparison.  If the patients reduced ejection fraction is a new finding, consider additional ischemic cardiac testing if clinically indicated. Signature ----------------------------------------------------------------------------  Electronically signed by Elsa Richardson(Sonographer) on 05/19/2022 12:05 PM ---------------------------------------------------------------------------- ----------------------------------------------------------------------------  Electronically signed by Tejas Mccoy(Interpreting physician) on  05/19/2022 01:28 PM ---------------------------------------------------------------------------- FINDINGS Left Atrium The left atrium is severely dilated (>40) with a left atrial volume index of 54 ml/m2. Left Ventricle Global left ventricular systolic function appears severely reduced with an estimated ejection fraction of 15-20%. The left ventricular cavity size is within normal limits and the left ventricular wall thickness is within normal limits. Right Atrium The right atrium appears severely dilated. Right Ventricle Right ventricular dilatation with reduced systolic function. Mitral Valve Normal mitral valve structure with mild to moderate mitral regurgitation. Aortic Valve Normal aortic valve structure and function without stenosis or regurgitation. Tricuspid Valve Mild to moderate pulmonary hypertension with an estimated right ventricular systolic pressure of 40 mmHg. Mild to moderate tricuspid regurgitation. Pulmonic Valve Normal pulmonic valve structure with mild to moderate pulmonic regurgitation. Pericardial Effusion No significant pericardial effusion is seen. Pleural Effusion Large bilateral pleural effusion. Miscellaneous Evidence of moderate diastolic dysfunction is seen. Normal aortic root dimension. IVC Increased diameter and impaired or no inspiratory variation indicating elevated RA filling pressure (i.e. CVP) .  M-mode / 2D Measurements & Calculations:   LVIDd:5.53 cm(3.7 - 5.6 cm)     Diastolic CSOHIR:251.5249 ml  LVIDs:5.12 cm(2.2 - 4.0 cm)     Systolic Volume:106.6 ml  IVSd:0.79 cm(0.6 - 1.1 cm)      Aortic Root:3.36 cm(2.0 - 3.7 cm)  LVPWd:0.95 cm(0.6 - 1.1 cm)     LA Dimension: 4.04 cm(1.9 - 4.0 cm)  Fractional Shortenin.41 %    LA volume/Index: 110.7 ml /54m^2  Calculated LVEF (%): 19.88 %    AV Cusp Separation: 1.68 cm   Mitral:                                 Aortic   Valve Area (P1/2-Time): 4.14 cm^2       Peak Velocity: 0.95 m/s  Peak E-Wave: 1.22 m/s                   Mean Velocity: 0.75 m/s                                          Peak Gradient: 3.57 mmHg  Peak Gradient: 5.98 mmHg                Mean Gradient: 2.37 mmHg   P1/2t: 53.09 msec                       Acceleration Time: 77.35 msec                                           AV VTI: 16.59 cm   Tricuspid:                              Pulmonic:   Estimated RVSP: 39.87 mmHg  Peak TR Velocity: 2.49 m/s  Peak TR Gradient: 24.82152 mmHg  Estimated RA Pressure: 15 mmHg                                          Estimated PASP: 39.87 mmHg  Diastology / Tissue Doppler Lateral Wall E' velocity:0.12 m/s Lateral Wall E/E':10.2    XR CHEST (2 VW)    Result Date: 2022  EXAMINATION: TWO XRAY VIEWS OF THE CHEST 2022 8:43 am COMPARISON: May 16, 2022 HISTORY: ORDERING SYSTEM PROVIDED HISTORY: chf TECHNOLOGIST PROVIDED HISTORY: chf FINDINGS: There is no evidence of pneumothorax. There is increased opacity in the left base which may represent atelectasis, infiltrate with suspected small bilateral pleural effusions. Cardiac silhouette is prominent. There is slightly increased central pulmonary vascular congestion when compared to previous. Trachea is midline. Visualized bony thorax shows no acute abnormality. Cardiomegaly with mild pulmonary vascular congestion bilateral pleural effusions which may represent CHF. Basilar atelectasis and or infiltrate cannot be excluded. XR CHEST PORTABLE    Result Date: 2022  EXAMINATION: ONE XRAY VIEW OF THE CHEST 2022 4:22 pm COMPARISON: None. HISTORY: ORDERING SYSTEM PROVIDED HISTORY: dyspnea TECHNOLOGIST PROVIDED HISTORY: dyspnea FINDINGS: No cardiomegaly. No pneumothorax. Trace bilateral pleural effusions with mild bibasilar airspace disease. Trace bilateral pleural effusions with mild bibasilar airspace disease which could represent atelectasis or pneumonia in the appropriate clinical setting. CT CHEST ABDOMEN PELVIS WO CONTRAST    Result Date: 5/19/2022  EXAMINATION: CT OF THE CHEST, ABDOMEN, AND PELVIS WITHOUT CONTRAST 5/19/2022 10:45 am TECHNIQUE: CT of the chest, abdomen and pelvis was performed without the administration of intravenous contrast. Multiplanar reformatted images are provided for review. Automated exposure control, iterative reconstruction, and/or weight based adjustment of the mA/kV was utilized to reduce the radiation dose to as low as reasonably achievable. COMPARISON: None HISTORY: ORDERING SYSTEM PROVIDED HISTORY:  Thrombocytopenia, elevated bili, hyponatremia, CHF. TECHNOLOGIST PROVIDED HISTORY: Thrombocytopenia, elevated bili, hyponatremia, CHF. FINDINGS: Chest: Mediastinum: The ascending aorta is at the upper limits of normal in size at 3.9 cm. The remainder of the thoracic aorta is normal in caliber with diffuse atherosclerotic plaque. Cardiomegaly with coronary vascular calcification and no pericardial effusion. The esophagus is unremarkable. Multiple mediastinal nodes are not enlarged by size criteria. The largest precarinal/right paratracheal node has a short axis of 9 mm. The esophagus is unremarkable. Lungs/pleura: Moderate bilateral pleural effusions which are simple in attenuation. There is gravity dependent lower lobe atelectasis bilaterally. The remainder of the lungs are clear. No infiltrate or significant pulmonary edema. Mild pulmonary emphysema. The central airways are patent. Soft Tissues/Bones: No acute osseous or soft tissue abnormality.   There are healing fractures of the left lateral 7th through 10th ribs. Abdomen/Pelvis: Organs: Nodular hepatic contour consistent with cirrhosis. Evaluation of the parenchyma is limited by the lack of intravenous contrast.  Heterogeneity is noted, most prevalent in the lateral segment of the left lobe of the liver. No acute biliary findings. The spleen is not enlarged. The pancreas and adrenal glands are unremarkable. There is no evidence of obstructive uropathy. Symmetric infiltration of the perinephric fat. GI/Bowel: Colonic diverticulosis with no acute features. Suspected previous appendectomy. No small bowel distension. The stomach and duodenal sweep are unremarkable. Increased attenuation in the proximal stomach may be related to ingested medication or food material. Pelvis: Moderate pelvic ascites. The prostate is not enlarged. Partial distention of the urinary bladder. Peritoneum/Retroperitoneum: Moderate upper abdominal ascites. Edematous changes throughout the abdominal and pelvic fat. Fusiform aneurysm of the infrarenal aorta measuring maximally 3.0 cm in AP and transverse diameter. Advanced calcified plaque throughout the aorta and iliac circulation as well as in the visceral vessels. No pathologic adenopathy. Bones/Soft Tissues: Mild subcutaneous edema throughout the abdominal and pelvic soft tissues. No focal subcutaneous collection. No acute osseous findings. Grade 1 anterolisthesis of L5 on S1 with bilateral pars defect. Multilevel degenerative disc disease in the lumbar spine. 1. Moderate bilateral pleural effusions with gravity dependent lower lobe atelectasis. No acute pulmonary infiltrate. 2. Cardiomegaly with coronary vascular calcification. Moderately severe aortoiliac atherosclerotic disease. Fusiform aneurysm of the infrarenal abdominal aorta at 3.0 cm with imaging follow-up recommended every 3 years. 3. Hepatic cirrhosis.   Moderate ascites with third spacing including edematous changes throughout the abdomen and anasarca, possibly related to portal hypertension or cardiogenic. 4. No acute bowel pathology. Colonic diverticulosis. Previous appendectomy. 5. Healing fractures of the left lateral 7th through 10th ribs. Spondylolysis with spondylolisthesis at L5-S1. Multilevel lumbar degenerative disc disease. RECOMMENDATIONS: 3 cm infrarenal abdominal aortic aneurysm. Recommend follow-up every 3 years. Reference: J Am Kristyn Radiol 6578;[id number].        Assessment and Plan:  Patient Active Problem List    Diagnosis Date Noted    History of acute inferior wall MI     Ischemic cardiomyopathy     Hypomagnesemia 05/20/2022    Ascites 05/20/2022    CKD (chronic kidney disease) 05/20/2022    Cirrhosis of liver with ascites ([facility] 05/19/2022    Acute on chronic combined systolic and diastolic CHF, NYHA class 4 ([facility]     Coronary artery disease     Hypertension     Thrombocytopenia (HCC)     Hyponatremia     Hyperlipidemia     Atrial fibrillation with RVR ([facility] 05/18/2022        Discharge Medications:         Medication List      START taking these medications    bumetanide 2 MG tablet  Commonly known as: BUMEX  Take 1 tablet by mouth daily  Start taking on: May 25, 2022     midodrine 5 MG tablet  Commonly known as: PROAMATINE  Take 1 tablet by mouth 3 times daily (with meals)     potassium chloride 20 MEQ extended release tablet  Commonly known as: KLOR-CON M  Take 1 tablet by mouth 2 times daily (with meals)        CHANGE how you take these medications    magnesium oxide 400 (240 Mg) MG tablet  Commonly known as: MagOx 400  Take 1.5 tablets by mouth 2 times daily  What changed: how much to take        CONTINUE taking these medications    amiodarone 200 MG tablet  Commonly known as: CORDARONE     apixaban 5 MG Tabs tablet  Commonly known as: ELIQUIS     atorvastatin 80 MG tablet  Commonly known as: LIPITOR     busPIRone 10 MG tablet  Commonly known as: BUSPAR     clopidogrel 75 MG tablet  Commonly known as: PLAVIX escitalopram 20 MG tablet  Commonly known as: LEXAPRO     Fish Oil Concentrate 1000 MG Caps     melatonin 10 MG Caps capsule     metoprolol succinate 25 MG extended release tablet  Commonly known as: TOPROL XL     omeprazole 20 MG delayed release capsule  Commonly known as: PRILOSEC     rOPINIRole 0.5 MG tablet  Commonly known as: REQUIP        STOP taking these medications    spironolactone 25 MG tablet  Commonly known as: ALDACTONE        ASK your doctor about these medications    * sacubitril-valsartan 24-26 MG per tablet  Commonly known as: ENTRESTO  Take 0.5 tablets by mouth 2 times daily  Ask about: Which instructions should I use? * sacubitril-valsartan 24-26 MG per tablet  Commonly known as: ENTRESTO  Take 1 tablet by mouth 2 times daily Lot #: [id number]  Exp: July 2024  Ask about: Which instructions should I use? * This list has 2 medication(s) that are the same as other medications prescribed for you. Read the directions carefully, and ask your doctor or other care provider to review them with you. Where to Get Your Medications      These medications were sent to [facility]/pharmacy #[id number]- [place], [place] - [address]    Phone: [phone]   · bumetanide 2 MG tablet  · magnesium oxide 400 (240 Mg) MG tablet  · midodrine 5 MG tablet  · potassium chloride 20 MEQ extended release tablet  · sacubitril-valsartan 24-26 MG per tablet     Information about where to get these medications is not yet available    Ask your nurse or doctor about these medications  · sacubitril-valsartan 24-26 MG per tablet         Patient Instructions:    Activity: activity as tolerated  Diet: cardiac diet  Wound Care: none needed  Other: BMP and magnesium level in 1 week    Disposition:   Discharge to Home    Follow up:  Patient will be followed by Janneth Holguin MD in 1-2 weeks; Dr. Eamon Capone 2 weeks    CORE MEASURES on Discharge (if applicable)  ACE/ARB in CHF: Yes  Statin in MI: NA  ASA in MI: NA  Statin in CVA: NA  Antiplatelet in CVA: NA    Total time spent on discharge services: 45 minutes    Including the following activities:  Evaluation and Management of patient  Discussion with patient and/or surrogate about current care plan  Coordination with Case Management and/or   Coordination of care with Consultants (if applicable)   Coordination of care with Receiving Facility Physician (if applicable)  Completion of DME forms (if applicable)  Preparation of Discharge Summary  Preparation of Medication Reconciliation  Preparation of Discharge Prescriptions    Signed:  SEVEN Swenson - CNP, APRWINIFRED, NP-C  5/24/2022, 2:49 PM

## 2022-05-24 NOTE — PROGRESS NOTES
Physical Therapy  Facility/Department: [facility] AT THE [facility] MED SURG  Daily Treatment Note  NAME: Ben Uribe  : [id number]  MRN: [id number]  Date of Service: 2022  Discharge Recommendations:  Continue to assess pending progress,Home with Home health PT,Subacute/Skilled [address] [place] [address]   Patient Diagnosis(es): The primary encounter diagnosis was Atrial fibrillation with RVR ([facility] Diagnoses of Shortness of breath and Acute diastolic congestive heart failure ([facility] were also pertinent to this visit. Assessment   Assessment: Pt amb 300' this session using RW. No dizzy feeling/ LOB noted with amb. Will continue. Activity Tolerance: Patient tolerated treatment well   Plan    Plan  Plan: 2 times a day 7 days a week  Current Treatment Recommendations: Strengthening;ROM; Functional mobility training;Transfer training;ADL/Self-care training;IADL training; Endurance training;Gait training;Stair training;Neuromuscular re-education;Manual Therapy - Soft Tissue Mobilization;Home exercise program;Safety education & training;Patient/Caregiver education & training; Therapeutic activities   Restrictions  Restrictions/Precautions  Restrictions/Precautions: General Precautions   Subjective    Subjective  Subjective: Pt. in room walking with no AD upon arrival, agreeable to therapy at this time but stated he thought he was going home.   Pain: Denies  Orientation  Overall Orientation Status: Within Functional Limits   Objective   Bed Mobility Training  Bed Mobility Training: No  Transfer Training  Transfer Training: Yes  Overall Level of Assistance: Contact-guard assistance  Sit to Stand: Stand-by assistance  Stand to Sit: Stand-by assistance  Gait Training  Gait Training: Yes  Gait  Overall Level of Assistance: Stand-by assistance  Distance (ft): 300 Feet  Assistive Device: Walker, rolling  PT Exercises  Exercise Treatment: Seated and standing exercises B LE x15  Other Specialty Interventions  Other Treatments/Modalities: commode use  Safety Devices  Type of Devices: All fall risk precautions in place; Left in chair;Call light within reach   Goals  Short Term Goals  Time Frame for Short term goals: 10 days  Short term goal 1: The patient will ambulate 400' with supervision, without LOB  Short term goal 2: Patient will perform bed mobility and transfers with MI  Short term goal 3: Patient will tolerate 20-30 minutes of therex/act to improve endurance for ADLs  Patient Goals   Patient goals : Improve strength, endurance, and balance  Education  Patient Education  Education Given To: Patient  Education Provided: Home Exercise Program  Education Method: Verbal  Barriers to Learning: None  Education Outcome: Verbalized understanding  Therapy Time   Individual Concurrent Group Co-treatment   Time In 1332         Time Out 1400         Minutes [address] [place] [address]

## 2022-05-24 NOTE — DISCHARGE INSTR - DIET
Good nutrition is important when healing from an illness, injury, or surgery. Follow any nutrition recommendations given to you during your hospital stay. If you were given an oral nutrition supplement while in the hospital, continue to take this supplement at home. You can take it with meals, in-between meals, and/or before bedtime. These supplements can be purchased at most local grocery stores, pharmacies, and chain [employer]-stores. If you have any questions about your diet or nutrition, call the hospital and ask for the dietitian.     Follow a low sodium diet

## 2022-05-31 ENCOUNTER — HOSPITAL ENCOUNTER (OUTPATIENT)
Age: 72
Discharge: HOME | End: 2022-05-31
Payer: MEDICARE

## 2022-05-31 DIAGNOSIS — I48.91 ATRIAL FIBRILLATION WITH RVR (HCC): ICD-10-CM

## 2022-05-31 LAB
ANION GAP SERPL CALCULATED.3IONS-SCNC: 9 MMOL/L (ref 9–17)
BUN BLDV-MCNC: 28 MG/DL (ref 8–23)
BUN/CREAT BLD: 18 (ref 9–20)
CALCIUM SERPL-MCNC: 9.7 MG/DL (ref 8.6–10.4)
CHLORIDE BLD-SCNC: 93 MMOL/L (ref 98–107)
CO2: 30 MMOL/L (ref 20–31)
CREAT SERPL-MCNC: 1.57 MG/DL (ref 0.7–1.2)
GFR AFRICAN AMERICAN: 53 ML/MIN
GFR NON-AFRICAN AMERICAN: 44 ML/MIN
GFR SERPL CREATININE-BSD FRML MDRD: ABNORMAL ML/MIN/{1.73_M2}
GFR SERPL CREATININE-BSD FRML MDRD: ABNORMAL ML/MIN/{1.73_M2}
GLUCOSE BLD-MCNC: 85 MG/DL (ref 70–99)
MAGNESIUM: 1.5 MG/DL (ref 1.6–2.6)
POTASSIUM SERPL-SCNC: 4.6 MMOL/L (ref 3.7–5.3)
SODIUM BLD-SCNC: 132 MMOL/L (ref 135–144)

## 2022-05-31 PROCEDURE — 83735 ASSAY OF MAGNESIUM: CPT

## 2022-05-31 PROCEDURE — 80048 BASIC METABOLIC PNL TOTAL CA: CPT

## 2022-05-31 PROCEDURE — 36415 COLL VENOUS BLD VENIPUNCTURE: CPT

## 2022-06-06 ENCOUNTER — OFFICE VISIT (OUTPATIENT)
Dept: PRIMARY CARE CLINIC | Age: 72
DRG: 308 | End: 2022-06-06
Payer: MEDICARE

## 2022-06-06 ENCOUNTER — HOSPITAL ENCOUNTER (INPATIENT)
Age: 72
LOS: 5 days | Discharge: NURSING, REHAB OR LONG-TERM CARE | DRG: 308 | End: 2022-06-11
Attending: STUDENT IN AN ORGANIZED HEALTH CARE EDUCATION/TRAINING PROGRAM | Admitting: STUDENT IN AN ORGANIZED HEALTH CARE EDUCATION/TRAINING PROGRAM
Payer: MEDICARE

## 2022-06-06 ENCOUNTER — APPOINTMENT (OUTPATIENT)
Dept: GENERAL RADIOLOGY | Age: 72
DRG: 308 | End: 2022-06-06
Attending: STUDENT IN AN ORGANIZED HEALTH CARE EDUCATION/TRAINING PROGRAM
Payer: MEDICARE

## 2022-06-06 VITALS
DIASTOLIC BLOOD PRESSURE: 56 MMHG | HEIGHT: 67 IN | HEART RATE: 145 BPM | OXYGEN SATURATION: 95 % | WEIGHT: 185 LBS | BODY MASS INDEX: 29.03 KG/M2 | SYSTOLIC BLOOD PRESSURE: 122 MMHG

## 2022-06-06 DIAGNOSIS — I48.91 ATRIAL FIBRILLATION WITH RVR (HCC): Primary | ICD-10-CM

## 2022-06-06 DIAGNOSIS — K74.60 CIRRHOSIS OF LIVER WITH ASCITES, UNSPECIFIED HEPATIC CIRRHOSIS TYPE (HCC): ICD-10-CM

## 2022-06-06 DIAGNOSIS — I10 HYPERTENSION, UNSPECIFIED TYPE: ICD-10-CM

## 2022-06-06 DIAGNOSIS — I25.5 ISCHEMIC CARDIOMYOPATHY: ICD-10-CM

## 2022-06-06 DIAGNOSIS — E78.5 HYPERLIPIDEMIA, UNSPECIFIED HYPERLIPIDEMIA TYPE: ICD-10-CM

## 2022-06-06 DIAGNOSIS — E83.42 HYPOMAGNESEMIA: ICD-10-CM

## 2022-06-06 DIAGNOSIS — R18.8 CIRRHOSIS OF LIVER WITH ASCITES, UNSPECIFIED HEPATIC CIRRHOSIS TYPE (HCC): ICD-10-CM

## 2022-06-06 DIAGNOSIS — N18.31 STAGE 3A CHRONIC KIDNEY DISEASE (HCC): ICD-10-CM

## 2022-06-06 LAB
-: NORMAL
ABSOLUTE EOS #: 0.03 K/UL (ref 0–0.44)
ABSOLUTE IMMATURE GRANULOCYTE: <0.03 K/UL (ref 0–0.3)
ABSOLUTE LYMPH #: 0.71 K/UL (ref 1.1–3.7)
ABSOLUTE MONO #: 0.27 K/UL (ref 0.1–1.2)
ALBUMIN SERPL-MCNC: 4.5 G/DL (ref 3.5–5.2)
ALBUMIN/GLOBULIN RATIO: 2 (ref 1–2.5)
ALP BLD-CCNC: 112 U/L (ref 40–129)
ALT SERPL-CCNC: 26 U/L (ref 5–41)
ANION GAP SERPL CALCULATED.3IONS-SCNC: 14 MMOL/L (ref 9–17)
AST SERPL-CCNC: 32 U/L
BASOPHILS # BLD: 1 % (ref 0–2)
BASOPHILS ABSOLUTE: <0.03 K/UL (ref 0–0.2)
BILIRUB SERPL-MCNC: 1.35 MG/DL (ref 0.3–1.2)
BILIRUBIN URINE: NEGATIVE
BUN BLDV-MCNC: 28 MG/DL (ref 8–23)
BUN/CREAT BLD: 18 (ref 9–20)
CALCIUM SERPL-MCNC: 9.7 MG/DL (ref 8.6–10.4)
CHLORIDE BLD-SCNC: 89 MMOL/L (ref 98–107)
CO2: 26 MMOL/L (ref 20–31)
COLOR: YELLOW
CREAT SERPL-MCNC: 1.52 MG/DL (ref 0.7–1.2)
EKG ATRIAL RATE: 96 BPM
EKG Q-T INTERVAL: 344 MS
EKG QRS DURATION: 86 MS
EKG QTC CALCULATION (BAZETT): 516 MS
EKG R AXIS: 58 DEGREES
EKG T AXIS: 115 DEGREES
EKG VENTRICULAR RATE: 135 BPM
EOSINOPHILS RELATIVE PERCENT: 1 % (ref 1–4)
EPITHELIAL CELLS UA: NORMAL /HPF (ref 0–5)
GFR AFRICAN AMERICAN: 55 ML/MIN
GFR NON-AFRICAN AMERICAN: 45 ML/MIN
GFR SERPL CREATININE-BSD FRML MDRD: ABNORMAL ML/MIN/{1.73_M2}
GFR SERPL CREATININE-BSD FRML MDRD: ABNORMAL ML/MIN/{1.73_M2}
GLUCOSE BLD-MCNC: 88 MG/DL (ref 70–99)
GLUCOSE URINE: NEGATIVE
HCT VFR BLD CALC: 39.8 % (ref 40.7–50.3)
HEMOGLOBIN: 13.8 G/DL (ref 13–17)
IMMATURE GRANULOCYTES: 0 %
KETONES, URINE: NEGATIVE
LEUKOCYTE ESTERASE, URINE: NEGATIVE
LYMPHOCYTES # BLD: 19 % (ref 24–43)
MAGNESIUM: 1.5 MG/DL (ref 1.6–2.6)
MAGNESIUM: 1.8 MG/DL (ref 1.6–2.6)
MCH RBC QN AUTO: 34.6 PG (ref 25.2–33.5)
MCHC RBC AUTO-ENTMCNC: 34.7 G/DL (ref 28.4–34.8)
MCV RBC AUTO: 99.7 FL (ref 82.6–102.9)
MONOCYTES # BLD: 7 % (ref 3–12)
NITRITE, URINE: NEGATIVE
NRBC AUTOMATED: 0 PER 100 WBC
PARTIAL THROMBOPLASTIN TIME: 33.8 SEC (ref 26.8–34.8)
PDW BLD-RTO: 14.2 % (ref 11.8–14.4)
PH UA: 6 (ref 5–9)
PLATELET # BLD: 113 K/UL (ref 138–453)
PMV BLD AUTO: 11.5 FL (ref 8.1–13.5)
POTASSIUM SERPL-SCNC: 4.3 MMOL/L (ref 3.7–5.3)
PRO-BNP: ABNORMAL PG/ML
PROTEIN UA: NEGATIVE
RBC # BLD: 3.99 M/UL (ref 4.21–5.77)
RBC UA: NORMAL /HPF (ref 0–2)
SEG NEUTROPHILS: 73 % (ref 36–65)
SEGMENTED NEUTROPHILS ABSOLUTE COUNT: 2.77 K/UL (ref 1.5–8.1)
SODIUM BLD-SCNC: 129 MMOL/L (ref 135–144)
SPECIFIC GRAVITY UA: <1.005 (ref 1.01–1.02)
THYROXINE, FREE: 1.73 NG/DL (ref 0.93–1.7)
TOTAL PROTEIN: 6.8 G/DL (ref 6.4–8.3)
TROPONIN, HIGH SENSITIVITY: 28 NG/L (ref 0–22)
TROPONIN, HIGH SENSITIVITY: 33 NG/L (ref 0–22)
TSH SERPL DL<=0.05 MIU/L-ACNC: 3.69 UIU/ML (ref 0.3–5)
TURBIDITY: CLEAR
URINE HGB: ABNORMAL
UROBILINOGEN, URINE: NORMAL
WBC # BLD: 3.8 K/UL (ref 3.5–11.3)
WBC UA: NORMAL /HPF (ref 0–5)

## 2022-06-06 PROCEDURE — 71045 X-RAY EXAM CHEST 1 VIEW: CPT

## 2022-06-06 PROCEDURE — 6370000000 HC RX 637 (ALT 250 FOR IP): Performed by: FAMILY MEDICINE

## 2022-06-06 PROCEDURE — 2580000003 HC RX 258: Performed by: PHYSICIAN ASSISTANT

## 2022-06-06 PROCEDURE — 1123F ACP DISCUSS/DSCN MKR DOCD: CPT | Performed by: STUDENT IN AN ORGANIZED HEALTH CARE EDUCATION/TRAINING PROGRAM

## 2022-06-06 PROCEDURE — 1111F DSCHRG MED/CURRENT MED MERGE: CPT | Performed by: STUDENT IN AN ORGANIZED HEALTH CARE EDUCATION/TRAINING PROGRAM

## 2022-06-06 PROCEDURE — 85730 THROMBOPLASTIN TIME PARTIAL: CPT

## 2022-06-06 PROCEDURE — 3017F COLORECTAL CA SCREEN DOC REV: CPT | Performed by: STUDENT IN AN ORGANIZED HEALTH CARE EDUCATION/TRAINING PROGRAM

## 2022-06-06 PROCEDURE — 80053 COMPREHEN METABOLIC PANEL: CPT

## 2022-06-06 PROCEDURE — 51702 INSERT TEMP BLADDER CATH: CPT

## 2022-06-06 PROCEDURE — 6360000002 HC RX W HCPCS: Performed by: PHYSICIAN ASSISTANT

## 2022-06-06 PROCEDURE — 6370000000 HC RX 637 (ALT 250 FOR IP): Performed by: STUDENT IN AN ORGANIZED HEALTH CARE EDUCATION/TRAINING PROGRAM

## 2022-06-06 PROCEDURE — 6370000000 HC RX 637 (ALT 250 FOR IP): Performed by: PHYSICIAN ASSISTANT

## 2022-06-06 PROCEDURE — G8427 DOCREV CUR MEDS BY ELIG CLIN: HCPCS | Performed by: STUDENT IN AN ORGANIZED HEALTH CARE EDUCATION/TRAINING PROGRAM

## 2022-06-06 PROCEDURE — 85025 COMPLETE CBC W/AUTO DIFF WBC: CPT

## 2022-06-06 PROCEDURE — 93005 ELECTROCARDIOGRAM TRACING: CPT | Performed by: PHYSICIAN ASSISTANT

## 2022-06-06 PROCEDURE — 82746 ASSAY OF FOLIC ACID SERUM: CPT

## 2022-06-06 PROCEDURE — 94761 N-INVAS EAR/PLS OXIMETRY MLT: CPT

## 2022-06-06 PROCEDURE — 83735 ASSAY OF MAGNESIUM: CPT

## 2022-06-06 PROCEDURE — 93010 ELECTROCARDIOGRAM REPORT: CPT | Performed by: FAMILY MEDICINE

## 2022-06-06 PROCEDURE — 84439 ASSAY OF FREE THYROXINE: CPT

## 2022-06-06 PROCEDURE — G8417 CALC BMI ABV UP PARAM F/U: HCPCS | Performed by: STUDENT IN AN ORGANIZED HEALTH CARE EDUCATION/TRAINING PROGRAM

## 2022-06-06 PROCEDURE — 99205 OFFICE O/P NEW HI 60 MIN: CPT | Performed by: STUDENT IN AN ORGANIZED HEALTH CARE EDUCATION/TRAINING PROGRAM

## 2022-06-06 PROCEDURE — 2000000000 HC ICU R&B

## 2022-06-06 PROCEDURE — 93010 ELECTROCARDIOGRAM REPORT: CPT | Performed by: STUDENT IN AN ORGANIZED HEALTH CARE EDUCATION/TRAINING PROGRAM

## 2022-06-06 PROCEDURE — 84484 ASSAY OF TROPONIN QUANT: CPT

## 2022-06-06 PROCEDURE — 81001 URINALYSIS AUTO W/SCOPE: CPT

## 2022-06-06 PROCEDURE — 82607 VITAMIN B-12: CPT

## 2022-06-06 PROCEDURE — 87086 URINE CULTURE/COLONY COUNT: CPT

## 2022-06-06 PROCEDURE — 83880 ASSAY OF NATRIURETIC PEPTIDE: CPT

## 2022-06-06 PROCEDURE — 93005 ELECTROCARDIOGRAM TRACING: CPT | Performed by: STUDENT IN AN ORGANIZED HEALTH CARE EDUCATION/TRAINING PROGRAM

## 2022-06-06 PROCEDURE — 4004F PT TOBACCO SCREEN RCVD TLK: CPT | Performed by: STUDENT IN AN ORGANIZED HEALTH CARE EDUCATION/TRAINING PROGRAM

## 2022-06-06 PROCEDURE — 99223 1ST HOSP IP/OBS HIGH 75: CPT | Performed by: FAMILY MEDICINE

## 2022-06-06 PROCEDURE — 2580000003 HC RX 258: Performed by: FAMILY MEDICINE

## 2022-06-06 PROCEDURE — 84443 ASSAY THYROID STIM HORMONE: CPT

## 2022-06-06 RX ORDER — LORAZEPAM 2 MG/ML
1 INJECTION INTRAMUSCULAR
Status: DISCONTINUED | OUTPATIENT
Start: 2022-06-06 | End: 2022-06-11 | Stop reason: HOSPADM

## 2022-06-06 RX ORDER — LORAZEPAM 2 MG/ML
4 INJECTION INTRAMUSCULAR
Status: DISCONTINUED | OUTPATIENT
Start: 2022-06-06 | End: 2022-06-11 | Stop reason: HOSPADM

## 2022-06-06 RX ORDER — BUMETANIDE 1 MG/1
1 TABLET ORAL
Status: DISCONTINUED | OUTPATIENT
Start: 2022-06-07 | End: 2022-06-11 | Stop reason: HOSPADM

## 2022-06-06 RX ORDER — NICOTINE 21 MG/24HR
1 PATCH, TRANSDERMAL 24 HOURS TRANSDERMAL DAILY
Qty: 42 PATCH | Refills: 0 | Status: CANCELLED | OUTPATIENT
Start: 2022-06-06 | End: 2022-07-18

## 2022-06-06 RX ORDER — POLYETHYLENE GLYCOL 3350 17 G/17G
17 POWDER, FOR SOLUTION ORAL DAILY PRN
Status: DISCONTINUED | OUTPATIENT
Start: 2022-06-06 | End: 2022-06-11 | Stop reason: HOSPADM

## 2022-06-06 RX ORDER — MIDODRINE HYDROCHLORIDE 5 MG/1
5 TABLET ORAL
Status: DISCONTINUED | OUTPATIENT
Start: 2022-06-06 | End: 2022-06-10

## 2022-06-06 RX ORDER — TAMSULOSIN HYDROCHLORIDE 0.4 MG/1
0.4 CAPSULE ORAL DAILY
Status: DISCONTINUED | OUTPATIENT
Start: 2022-06-06 | End: 2022-06-11 | Stop reason: HOSPADM

## 2022-06-06 RX ORDER — BUMETANIDE 1 MG/1
2 TABLET ORAL
Status: DISCONTINUED | OUTPATIENT
Start: 2022-06-08 | End: 2022-06-11 | Stop reason: HOSPADM

## 2022-06-06 RX ORDER — ACETAMINOPHEN 325 MG/1
650 TABLET ORAL EVERY 6 HOURS PRN
Status: DISCONTINUED | OUTPATIENT
Start: 2022-06-06 | End: 2022-06-11 | Stop reason: HOSPADM

## 2022-06-06 RX ORDER — ATORVASTATIN CALCIUM 40 MG/1
80 TABLET, FILM COATED ORAL DAILY
Status: DISCONTINUED | OUTPATIENT
Start: 2022-06-07 | End: 2022-06-11 | Stop reason: HOSPADM

## 2022-06-06 RX ORDER — ATROPA BELLADONNA AND OPIUM 16.2; 6 MG/1; MG/1
60 SUPPOSITORY RECTAL EVERY 6 HOURS PRN
Status: DISCONTINUED | OUTPATIENT
Start: 2022-06-06 | End: 2022-06-11 | Stop reason: HOSPADM

## 2022-06-06 RX ORDER — LORAZEPAM 1 MG/1
2 TABLET ORAL
Status: DISCONTINUED | OUTPATIENT
Start: 2022-06-06 | End: 2022-06-11 | Stop reason: HOSPADM

## 2022-06-06 RX ORDER — SODIUM CHLORIDE 0.9 % (FLUSH) 0.9 %
5-40 SYRINGE (ML) INJECTION EVERY 12 HOURS SCHEDULED
Status: DISCONTINUED | OUTPATIENT
Start: 2022-06-06 | End: 2022-06-11 | Stop reason: HOSPADM

## 2022-06-06 RX ORDER — NICOTINE 21 MG/24HR
1 PATCH, TRANSDERMAL 24 HOURS TRANSDERMAL DAILY
Status: DISCONTINUED | OUTPATIENT
Start: 2022-06-07 | End: 2022-06-11 | Stop reason: HOSPADM

## 2022-06-06 RX ORDER — LORAZEPAM 1 MG/1
4 TABLET ORAL
Status: DISCONTINUED | OUTPATIENT
Start: 2022-06-06 | End: 2022-06-11 | Stop reason: HOSPADM

## 2022-06-06 RX ORDER — LORAZEPAM 2 MG/ML
2 INJECTION INTRAMUSCULAR
Status: DISCONTINUED | OUTPATIENT
Start: 2022-06-06 | End: 2022-06-11 | Stop reason: HOSPADM

## 2022-06-06 RX ORDER — 0.9 % SODIUM CHLORIDE 0.9 %
500 INTRAVENOUS SOLUTION INTRAVENOUS ONCE
Status: COMPLETED | OUTPATIENT
Start: 2022-06-06 | End: 2022-06-06

## 2022-06-06 RX ORDER — PANTOPRAZOLE SODIUM 40 MG/1
40 TABLET, DELAYED RELEASE ORAL
Status: DISCONTINUED | OUTPATIENT
Start: 2022-06-07 | End: 2022-06-11 | Stop reason: HOSPADM

## 2022-06-06 RX ORDER — FAMOTIDINE 20 MG/1
20 TABLET, FILM COATED ORAL 2 TIMES DAILY
Status: DISCONTINUED | OUTPATIENT
Start: 2022-06-06 | End: 2022-06-06

## 2022-06-06 RX ORDER — CHOLECALCIFEROL (VITAMIN D3) 125 MCG
10 CAPSULE ORAL NIGHTLY
Status: DISCONTINUED | OUTPATIENT
Start: 2022-06-06 | End: 2022-06-11 | Stop reason: HOSPADM

## 2022-06-06 RX ORDER — METOPROLOL TARTRATE 50 MG/1
50 TABLET, FILM COATED ORAL 2 TIMES DAILY
Status: DISCONTINUED | OUTPATIENT
Start: 2022-06-06 | End: 2022-06-07

## 2022-06-06 RX ORDER — MAGNESIUM SULFATE IN WATER 40 MG/ML
2000 INJECTION, SOLUTION INTRAVENOUS ONCE
Status: COMPLETED | OUTPATIENT
Start: 2022-06-06 | End: 2022-06-06

## 2022-06-06 RX ORDER — METOPROLOL SUCCINATE 25 MG/1
25 TABLET, EXTENDED RELEASE ORAL DAILY
Status: DISCONTINUED | OUTPATIENT
Start: 2022-06-07 | End: 2022-06-06

## 2022-06-06 RX ORDER — ROPINIROLE 0.25 MG/1
0.5 TABLET, FILM COATED ORAL NIGHTLY
Status: DISCONTINUED | OUTPATIENT
Start: 2022-06-06 | End: 2022-06-11 | Stop reason: HOSPADM

## 2022-06-06 RX ORDER — LANOLIN ALCOHOL/MO/W.PET/CERES
600 CREAM (GRAM) TOPICAL 2 TIMES DAILY
Status: DISCONTINUED | OUTPATIENT
Start: 2022-06-07 | End: 2022-06-11 | Stop reason: HOSPADM

## 2022-06-06 RX ORDER — ONDANSETRON 4 MG/1
4 TABLET, ORALLY DISINTEGRATING ORAL EVERY 8 HOURS PRN
Status: DISCONTINUED | OUTPATIENT
Start: 2022-06-06 | End: 2022-06-11 | Stop reason: HOSPADM

## 2022-06-06 RX ORDER — LORAZEPAM 1 MG/1
3 TABLET ORAL
Status: DISCONTINUED | OUTPATIENT
Start: 2022-06-06 | End: 2022-06-11 | Stop reason: HOSPADM

## 2022-06-06 RX ORDER — GAUZE BANDAGE 2" X 2"
100 BANDAGE TOPICAL DAILY
Status: DISCONTINUED | OUTPATIENT
Start: 2022-06-07 | End: 2022-06-11 | Stop reason: HOSPADM

## 2022-06-06 RX ORDER — POTASSIUM CHLORIDE 20 MEQ/1
20 TABLET, EXTENDED RELEASE ORAL 2 TIMES DAILY WITH MEALS
Status: DISCONTINUED | OUTPATIENT
Start: 2022-06-06 | End: 2022-06-11 | Stop reason: HOSPADM

## 2022-06-06 RX ORDER — MAGNESIUM SULFATE 1 G/100ML
1000 INJECTION INTRAVENOUS PRN
Status: DISCONTINUED | OUTPATIENT
Start: 2022-06-06 | End: 2022-06-11 | Stop reason: HOSPADM

## 2022-06-06 RX ORDER — SODIUM CHLORIDE 0.9 % (FLUSH) 0.9 %
5-40 SYRINGE (ML) INJECTION PRN
Status: DISCONTINUED | OUTPATIENT
Start: 2022-06-06 | End: 2022-06-11 | Stop reason: HOSPADM

## 2022-06-06 RX ORDER — BUMETANIDE 1 MG/1
2 TABLET ORAL DAILY
Status: DISCONTINUED | OUTPATIENT
Start: 2022-06-07 | End: 2022-06-06

## 2022-06-06 RX ORDER — LORAZEPAM 2 MG/ML
3 INJECTION INTRAMUSCULAR
Status: DISCONTINUED | OUTPATIENT
Start: 2022-06-06 | End: 2022-06-11 | Stop reason: HOSPADM

## 2022-06-06 RX ORDER — ACETAMINOPHEN 650 MG/1
650 SUPPOSITORY RECTAL EVERY 6 HOURS PRN
Status: DISCONTINUED | OUTPATIENT
Start: 2022-06-06 | End: 2022-06-11 | Stop reason: HOSPADM

## 2022-06-06 RX ORDER — ESCITALOPRAM OXALATE 5 MG/1
20 TABLET ORAL NIGHTLY
Status: DISCONTINUED | OUTPATIENT
Start: 2022-06-06 | End: 2022-06-11 | Stop reason: HOSPADM

## 2022-06-06 RX ORDER — SODIUM CHLORIDE 9 MG/ML
25 INJECTION, SOLUTION INTRAVENOUS PRN
Status: DISCONTINUED | OUTPATIENT
Start: 2022-06-06 | End: 2022-06-11 | Stop reason: HOSPADM

## 2022-06-06 RX ORDER — ONDANSETRON 2 MG/ML
4 INJECTION INTRAMUSCULAR; INTRAVENOUS EVERY 6 HOURS PRN
Status: DISCONTINUED | OUTPATIENT
Start: 2022-06-06 | End: 2022-06-11 | Stop reason: HOSPADM

## 2022-06-06 RX ORDER — LORAZEPAM 1 MG/1
1 TABLET ORAL
Status: DISCONTINUED | OUTPATIENT
Start: 2022-06-06 | End: 2022-06-11 | Stop reason: HOSPADM

## 2022-06-06 RX ORDER — FAMOTIDINE 20 MG/1
20 TABLET, FILM COATED ORAL DAILY
Status: DISCONTINUED | OUTPATIENT
Start: 2022-06-06 | End: 2022-06-06

## 2022-06-06 RX ORDER — MULTIVITAMIN WITH IRON
1 TABLET ORAL DAILY
Status: DISCONTINUED | OUTPATIENT
Start: 2022-06-07 | End: 2022-06-11 | Stop reason: HOSPADM

## 2022-06-06 RX ORDER — AMIODARONE HYDROCHLORIDE 200 MG/1
100 TABLET ORAL 2 TIMES DAILY
Status: DISCONTINUED | OUTPATIENT
Start: 2022-06-06 | End: 2022-06-08

## 2022-06-06 RX ORDER — BUSPIRONE HYDROCHLORIDE 5 MG/1
15 TABLET ORAL 2 TIMES DAILY
Status: DISCONTINUED | OUTPATIENT
Start: 2022-06-06 | End: 2022-06-11 | Stop reason: HOSPADM

## 2022-06-06 RX ORDER — AMIODARONE HYDROCHLORIDE 200 MG/1
200 TABLET ORAL 2 TIMES DAILY
Status: DISCONTINUED | OUTPATIENT
Start: 2022-06-06 | End: 2022-06-06

## 2022-06-06 RX ORDER — SWAB
2 SWAB, NON-MEDICATED MISCELLANEOUS DAILY
Status: DISCONTINUED | OUTPATIENT
Start: 2022-06-06 | End: 2022-06-06

## 2022-06-06 RX ORDER — CLOPIDOGREL BISULFATE 75 MG/1
75 TABLET ORAL DAILY
Status: DISCONTINUED | OUTPATIENT
Start: 2022-06-07 | End: 2022-06-11 | Stop reason: HOSPADM

## 2022-06-06 RX ADMIN — POTASSIUM CHLORIDE 20 MEQ: 1500 TABLET, EXTENDED RELEASE ORAL at 17:31

## 2022-06-06 RX ADMIN — MUPIROCIN: 20 OINTMENT TOPICAL at 23:03

## 2022-06-06 RX ADMIN — ROPINIROLE HYDROCHLORIDE 0.5 MG: 0.25 TABLET, FILM COATED ORAL at 20:35

## 2022-06-06 RX ADMIN — AMIODARONE HYDROCHLORIDE 100 MG: 200 TABLET ORAL at 20:34

## 2022-06-06 RX ADMIN — APIXABAN 5 MG: 5 TABLET, FILM COATED ORAL at 20:35

## 2022-06-06 RX ADMIN — ATROPA BELLADONNA AND OPIUM 60 MG: 16.2; 6 SUPPOSITORY RECTAL at 23:12

## 2022-06-06 RX ADMIN — Medication 10 MG: at 20:35

## 2022-06-06 RX ADMIN — ESCITALOPRAM 20 MG: 5 TABLET, FILM COATED ORAL at 20:34

## 2022-06-06 RX ADMIN — FAMOTIDINE 20 MG: 20 TABLET ORAL at 15:12

## 2022-06-06 RX ADMIN — METOPROLOL TARTRATE 50 MG: 50 TABLET, FILM COATED ORAL at 20:35

## 2022-06-06 RX ADMIN — SODIUM CHLORIDE, PRESERVATIVE FREE 10 ML: 5 INJECTION INTRAVENOUS at 20:35

## 2022-06-06 RX ADMIN — BUSPIRONE HYDROCHLORIDE 15 MG: 5 TABLET ORAL at 20:35

## 2022-06-06 RX ADMIN — TAMSULOSIN HYDROCHLORIDE 0.4 MG: 0.4 CAPSULE ORAL at 15:34

## 2022-06-06 RX ADMIN — LORAZEPAM 2 MG: 1 TABLET ORAL at 21:13

## 2022-06-06 RX ADMIN — SODIUM CHLORIDE 500 ML: 9 INJECTION, SOLUTION INTRAVENOUS at 20:08

## 2022-06-06 RX ADMIN — MIDODRINE HYDROCHLORIDE 5 MG: 5 TABLET ORAL at 15:12

## 2022-06-06 RX ADMIN — MIDODRINE HYDROCHLORIDE 5 MG: 5 TABLET ORAL at 17:30

## 2022-06-06 RX ADMIN — MAGNESIUM SULFATE HEPTAHYDRATE 2000 MG: 40 INJECTION, SOLUTION INTRAVENOUS at 15:52

## 2022-06-06 ASSESSMENT — ENCOUNTER SYMPTOMS
RHINORRHEA: 0
COUGH: 0
BACK PAIN: 0
ABDOMINAL PAIN: 0
EYE REDNESS: 0
NAUSEA: 0
DIARRHEA: 0
CONSTIPATION: 0
EYE DISCHARGE: 0
BLOOD IN STOOL: 0
WHEEZING: 0
SHORTNESS OF BREATH: 1
CHEST TIGHTNESS: 0
VOMITING: 0
SORE THROAT: 0

## 2022-06-06 ASSESSMENT — PAIN DESCRIPTION - LOCATION
LOCATION: OTHER (COMMENT);PENIS
LOCATION: GROIN;PENIS

## 2022-06-06 ASSESSMENT — PAIN - FUNCTIONAL ASSESSMENT: PAIN_FUNCTIONAL_ASSESSMENT: ACTIVITIES ARE NOT PREVENTED

## 2022-06-06 ASSESSMENT — PATIENT HEALTH QUESTIONNAIRE - PHQ9
DEPRESSION UNABLE TO ASSESS: PT REFUSES
1. LITTLE INTEREST OR PLEASURE IN DOING THINGS: 0
SUM OF ALL RESPONSES TO PHQ QUESTIONS 1-9: 0
2. FEELING DOWN, DEPRESSED OR HOPELESS: 0
SUM OF ALL RESPONSES TO PHQ QUESTIONS 1-9: 0
SUM OF ALL RESPONSES TO PHQ9 QUESTIONS 1 & 2: 0

## 2022-06-06 ASSESSMENT — PAIN DESCRIPTION - PAIN TYPE
TYPE: ACUTE PAIN
TYPE: ACUTE PAIN;OTHER (COMMENT)

## 2022-06-06 ASSESSMENT — PAIN DESCRIPTION - ONSET
ONSET: SUDDEN
ONSET: SUDDEN

## 2022-06-06 ASSESSMENT — PAIN DESCRIPTION - FREQUENCY
FREQUENCY: INTERMITTENT
FREQUENCY: INTERMITTENT

## 2022-06-06 ASSESSMENT — PAIN SCALES - GENERAL
PAINLEVEL_OUTOF10: 0
PAINLEVEL_OUTOF10: 5
PAINLEVEL_OUTOF10: 5

## 2022-06-06 ASSESSMENT — PAIN DESCRIPTION - DESCRIPTORS
DESCRIPTORS: BURNING
DESCRIPTORS: SHARP;BURNING

## 2022-06-06 NOTE — PLAN OF CARE
Problem: Safety - Adult  Goal: Free from fall injury  Outcome: Progressing  Flowsheets (Taken 6/6/2022 1541)  Free From Fall Injury: Instruct family/caregiver on patient safety  Note: Patient remains free of falls while ambulating with RN     Problem: Skin/Tissue Integrity  Goal: Absence of new skin breakdown  Description: 1. Monitor for areas of redness and/or skin breakdown  2. Assess vascular access sites hourly  3. Every 4-6 hours minimum:  Change oxygen saturation probe site  4. Every 4-6 hours:  If on nasal continuous positive airway pressure, respiratory therapy assess nares and determine need for appliance change or resting period. Outcome: Progressing  Note: Will remind patient to continue to adjust positions while lying in bed.  Patient able to turn self     Problem: Pain  Goal: Verbalizes/displays adequate comfort level or baseline comfort level  Outcome: Progressing  Flowsheets (Taken 6/6/2022 1541)  Verbalizes/displays adequate comfort level or baseline comfort level:   Encourage patient to monitor pain and request assistance   Assess pain using appropriate pain scale  Note: Patient is able to communicate his pain scale appropriately

## 2022-06-06 NOTE — CONSULTS
Department of Internal Medicine  Nephrology Attending Consult Note      Reason for Consult:  CKD stage 3 with hyponatremia  Requesting Physician:  Dr. Mira [place]:  Shortness of breath and tachycardia    History Obtained From:  patient, electronic medical record    HISTORY OF PRESENT ILLNESS:  The patient is a 70 y.o. male who presented to his primary care office earlier today for routine follow-up after hospital admission found to be in atrial fibrillation with rapid ventricular response and thus was direct admitted to the ICU. He reports over the past several days post discharge from this hospital that he has been feeling better but he feels like he is not urinating well. He states that he feels the urge to go but is not having good output. In addition he states that he is very short of breath and winded with any ambulation. He is unsure of any weight gain. Reports he is still drinking daily. He denies any nausea vomiting diarrhea. Denies any fevers or chills.   He states he has been on the medications as prescribed to him at discharge from this hospital.  He otherwise has no other complaints at this time today.       Past Medical History:        Diagnosis Date    Acute kidney injury ([address]     Ascites 5/20/2022    Atrial fibrillation ([address]     CHF (congestive heart failure) ([address]     Cirrhosis of liver with ascites ([address] 5/19/2022    CKD (chronic kidney disease) 5/20/2022    Coronary artery disease     Hyperlipidemia     Hypertension     Hypomagnesemia 5/20/2022    Hyponatremia     Thrombocytopenia (HCC)        Past Surgical History:        Procedure Laterality Date    CAROTID STENT PLACEMENT Bilateral     ROTATOR CUFF REPAIR Right        Current Medications:    Current Facility-Administered Medications: sodium chloride flush 0.9 % injection 5-40 mL, 5-40 mL, IntraVENous, 2 times per day  sodium chloride flush 0.9 % injection 5-40 mL, 5-40 mL, IntraVENous, PRN  0.9 % sodium chloride infusion, 25 mL, IntraVENous, PRN  ondansetron (ZOFRAN-ODT) disintegrating tablet 4 mg, 4 mg, Oral, Q8H PRN **OR** ondansetron (ZOFRAN) injection 4 mg, 4 mg, IntraVENous, Q6H PRN  polyethylene glycol (GLYCOLAX) packet 17 g, 17 g, Oral, Daily PRN  acetaminophen (TYLENOL) tablet 650 mg, 650 mg, Oral, Q6H PRN **OR** acetaminophen (TYLENOL) suppository 650 mg, 650 mg, Rectal, Q6H PRN  amiodarone (CORDARONE) tablet 200 mg, 200 mg, Oral, BID  apixaban (ELIQUIS) tablet 5 mg, 5 mg, Oral, BID  [START ON 6/7/2022] atorvastatin (LIPITOR) tablet 80 mg, 80 mg, Oral, Daily  [START ON 6/7/2022] bumetanide (BUMEX) tablet 2 mg, 2 mg, Oral, Daily  busPIRone (BUSPAR) tablet 15 mg, 15 mg, Oral, BID  [START ON 6/7/2022] clopidogrel (PLAVIX) tablet 75 mg, 75 mg, Oral, Daily  escitalopram (LEXAPRO) tablet 20 mg, 20 mg, Oral, Nightly  melatonin tablet 10 mg, 10 mg, Oral, Nightly  [START ON 6/7/2022] metoprolol succinate (TOPROL XL) extended release tablet 25 mg, 25 mg, Oral, Daily  midodrine (PROAMATINE) tablet 5 mg, 5 mg, Oral, TID WC  [START ON 6/7/2022] pantoprazole (PROTONIX) tablet 40 mg, 40 mg, Oral, QAM AC  potassium chloride (KLOR-CON M) extended release tablet 20 mEq, 20 mEq, Oral, BID WC  rOPINIRole (REQUIP) tablet 0.5 mg, 0.5 mg, Oral, Nightly  sacubitril-valsartan (ENTRESTO) 24-26 MG per tablet 0.5 tablet, 0.5 tablet, Oral, BID  famotidine (PEPCID) tablet 20 mg, 20 mg, Oral, Daily  magnesium sulfate 2000 mg in 50 mL IVPB premix, 2,000 mg, IntraVENous, Once  tamsulosin (FLOMAX) capsule 0.4 mg, 0.4 mg, Oral, Daily    Allergies:  Patient has no known allergies. Social History:    TOBACCO:   reports that he has been smoking cigarettes. He has been smoking about 1.00 pack per day. He has never used smokeless tobacco.  ETOH:   reports current alcohol use of about 2.0 standard drinks of alcohol per week. DRUGS:   has no history on file for drug use. Family History:   No family history on file.     REVIEW OF SYSTEMS: CONSTITUTIONAL:  positive for  fevers, chills and sweats  HEENT:  negative for  Normocephalic, EOMI, PERRLA, mucous membrane is dry  RESPIRATORY:  positive for  cough, cough with sputum, dyspnea, wheezing, hemoptysis,   CARDIOVASCULAR:  positive for  chest pain, dyspnea, palpitations, orthopnea, PND  GASTROINTESTINAL:  positive for nausea, vomiting, diarrhea, constipation, jaundice  GENITOURINARY:  positive for frequency, dysuria, nocturia, urinary incontinence and hematuria  HEMATOLOGIC/LYMPHATIC:  positive for lymphadenopathy, petechiae   MUSCULOSKELETAL:  positive for  myalgias, arthralgias, pain, and edema  NEUROLOGICAL:  positive for headaches, dizziness, memory problems, weakness and pain    PHYSICAL EXAM:    Vitals:    CURRENT TEMPERATURE:  Temp: 97 °F (36.1 °C)  CURRENT RESPIRATORY RATE:  Resp: 19  CURRENT PULSE:  Heart Rate: (!) 143  CURRENT BLOOD PRESSURE:  BP: (!) 120/95  8HR BLOOD PRESSURE RANGE:  BP: ()/(56-96)   CURRENT PULSE OXIMETRY:  SpO2: 98 %  24HR INTAKE/OUTPUT:      Intake/Output Summary (Last 24 hours) at 6/6/2022 1700  Last data filed at 6/6/2022 1500  Gross per 24 hour   Intake 140 ml   Output 762 ml   Net -622 ml     VENT SETTINGS:       Additional Respiratory Assessments  Heart Rate: (!) 143  Resp: 19  SpO2: 98 %      Constitutional:  Patient reports no clinical complaints   HEENT:  Normocephalic and no jugular venous distention, with moist mucous membranes  Cardiovascular/Edema:  Rate regular with regular rhythm with S1 and S2  Respiratory:  Lungs clear to auscultation bilaterally  Gastrointestinal:  Soft, none tender to palpation with bowel sounds   Lower extremities:  +1/4 LE edema  Other:      DATA:    CBC with Differential:    Lab Results   Component Value Date    WBC 3.8 06/06/2022    RBC 3.99 06/06/2022    HGB 13.8 06/06/2022    HCT 39.8 06/06/2022     06/06/2022    MCV 99.7 06/06/2022    MCH 34.6 06/06/2022    MCHC 34.7 06/06/2022    RDW 14.2 06/06/2022    LYMPHOPCT 19 06/06/2022    MONOPCT 7 06/06/2022    BASOPCT 1 06/06/2022    MONOSABS 0.27 06/06/2022    LYMPHSABS 0.71 06/06/2022    EOSABS 0.03 06/06/2022    BASOSABS <0.03 06/06/2022     CMP:    Lab Results   Component Value Date     06/06/2022    K 4.3 06/06/2022    CL 89 06/06/2022    CO2 26 06/06/2022    BUN 28 06/06/2022    CREATININE 1.52 06/06/2022    GFRAA 55 06/06/2022    LABGLOM 45 06/06/2022    GLUCOSE 88 06/06/2022    PROT 6.8 06/06/2022    LABALBU 4.5 06/06/2022    CALCIUM 9.7 06/06/2022    BILITOT 1.35 06/06/2022    ALKPHOS 112 06/06/2022    AST 32 06/06/2022    ALT 26 06/06/2022     Magnesium:    Lab Results   Component Value Date    MG 1.5 06/06/2022     Phosphorus:    Lab Results   Component Value Date    PHOS 4.3 05/02/2022     U/A:    Lab Results   Component Value Date    NITRU NEGATIVE 06/06/2022    COLORU Yellow 06/06/2022    PHUR 6.0 06/06/2022    WBCUA 0 TO 2 06/06/2022    RBCUA 2 TO 5 06/06/2022    MUCUS 2+ 05/02/2022    BACTERIA 1+ 05/02/2022    SPECGRAV <1.005 06/06/2022    UROBILINOGEN Normal 06/06/2022    BILIRUBINUR NEGATIVE 06/06/2022    GLUCOSEU NEGATIVE 06/06/2022    KETUA NEGATIVE 06/06/2022     Urine Lytes: No results found for: KASSIDY, KUR, CLUR  HgBA1c:  No results found for: LABA1C  Microalbumen/Creatinine ratio:  No components found for: RUCREAT  TSH:    Lab Results   Component Value Date    TSH 3.69 06/06/2022         IMPRESSION/RECOMMENDATIONS:      1)   MADIHA:   acute kidney injury secondary to pre-renal azotemia with 11lbs weight loss on Bumex 2mg and Entresto medication. Plan to reduce his Bumex medication dosage and re-eval afterwards. 2) Hypotension:  Continue treatment with midodrine 5mg TID and re-eval  Monitor home blood pressure readings on discharge; follow a low sodium diet. 3) Hypovolemia: Medication reconsideration, and plan to reduce his Bumex to 1mg on TTSS and Bumex 2mg MWF  along with fluid restriction to <2L a day.     4) Hypomagnesemia: plan for treatment with magnesium sulphate 2gms IV x1.     5) Hyponatremia: Etiology from his diuresis and elevated vasopressin release. Check urine osmolarity and increase dietary intake of proteins and solutes.

## 2022-06-06 NOTE — PROGRESS NOTES
[address], [address] , [facility], [address]  1950 is a 70 y.o. male, New patient, here for evaluation of the following chief complaint(s):    Follow-Up from Hospital       ASSESSMENT/PLAN:  1. Atrial fibrillation with RVR (HCC)  -     Comprehensive Metabolic Panel with Bilirubin; Future  -     CBC with Auto Differential; Future  -     Magnesium; Future  -     EKG 12 lead  2. Cirrhosis of liver with ascites, unspecified hepatic cirrhosis type ([facility]  3. Hypomagnesemia  4. Stage 3a chronic kidney disease ([facility]  5. Hypertension, unspecified type  6. Hyperlipidemia, unspecified hyperlipidemia type  -     Lipid Panel; Future  7. Ischemic cardiomyopathy     Patient presented in the office today with Afib w/ RVR (based on EKG) and an approximately 11 lb weight loss due to various factors including decreased PO intake since discharge from the hospital. Recent labs were reviewed which were notable for worsening renal function and moderate hypomagnesemia. Given the findings/clinical status today, I discussed close OP f/u vs. Re-admission to the hospital for acute management as there was concern for Afib w/ RVR progression/severe with a possible side effect of his current medications/treatment (e.g. Amiodarone, Entresto). Risks/benefits/alternatives were discussed and both the patient and his caregiver/family member were agreeable to the plan today. Patient was directly admitted for immediate work-up. The case was also discussed with the patient's Cardiologist - Hilda Gonzalez today (I.e. discussed Amiodarone, Bumex, BB use)    There are no discontinued medications. Return in about 1 week (around 6/13/2022) for F/U Meds. Radha Alcazar received counseling on the following healthy behaviors: nutrition, exercise and medication adherence.  I encouraged and discussed lifestyle modifications including diet and exercise and the patient was agreeable to making positive/beneficial changes to both to help improve their overall health. Discussed use, benefit, and side effects of prescribed medications. Barriers to medication compliance addressed. Patient given educational materials: see patient instructions. HM - HM items completed today as per orders. Outstanding HM items though not limited to immunizations were discussed with the patient today, including risks, benefits and alternatives. The patient will discuss these during the next appointment per their preference. If there are any worsening or concerning signs or symptoms, patient will report to the ED and/or contact [phone] for immediate evaluation. Teach back method was used. All patient questions answered. Pt voiced understanding. Subjective    SUBJECTIVE/OBJECTIVE:    HPI   Patient is here to establish care. He is here with his sister which is currently providing him help and support. The patient's hospitalization and records were reviewed closely as well as his recent lab work. The patient has a history of Afib w/ RVR and his rate/rhythm was controlled prior to hospital d/c. The patient/sister notes that when they got home that his heart rate went into the 130s+ and has been at that level for about 2 weeks. This worsens with exertion but the heart rate has never been below 100 even with rest. He states the heart rate is worse with smoking. He has been having ongoing leg swelling of both lower extremities with redness and skin breakdown that is new. He had multiple medications on d/c which he states he has been compliant with. The list was reviewed with the patient and the sister. They also have a print out which was also reviewed closely. The patient has been having some increased confusion and brain fog but he is alert and oriented to person, place and time. He did had poor/decreased PO intake over the past 2 weeks since discharge.  He also continues to smoke 1PPD and also 1-2 glasses of whiskey nightly which is unchanged since prior. He has been monitoring his BP at home and WNL. Patient denies chest pain, chest pressure/discomfort, near-syncope, orthopnea, paroxysmal nocturnal dyspnea, syncope and tachypnea. Cardiac symptoms dyspnea, exertional chest pressure/discomfort, irregular heart beat, lower extremity edema and palpitations. He uses a walker to ambulate at times. He notes that he has been using the restroom very often since the hospital discharge. He has appointments with Cardiology and Nephrology scheduled. Hyperlipidemia:  No new myalgias or GI upset on atorvastatin (Lipitor) 80mg. Medication compliance: compliant all of the time. Patient is  following a low fat, low cholesterol diet. He is not exercising regularly. No results found for: CHOL, TRIG, HDL, LDLCALC, LDLDIRECT  Lab Results   Component Value Date    ALT 26 06/06/2022    AST 32 06/06/2022        The patient was noted to have liver cirrhosis and he does have a history of daily alcohol use. He is willing to cut back on his alcohol use but not currently. He has not been evaluated by GI in the past but is willing to see them in the future for additional management recommendations. History reviewed. No pertinent family history. Health Maintenance   Alcohol/Substance use History - None/Minimal  Smoking History    Tobacco Use      Smoking status: Current Every Day Smoker        Packs/day: 1.00        Types: Cigarettes      Smokeless tobacco: Never Used  Advised smoking cessation. Smoking lowers your ability to fight infections, increasing healing time and significantly increases your risk for heart disease, lung disease and cancer.   1-800-QUIT-NOW ([phone])       Health Maintenance   Topic Date Due    Pneumococcal 65+ years Vaccine (1 - PCV) Never done    Lipids  Never done    Colorectal Cancer Screen  Never done    Shingles vaccine (1 of 2) Never done    Hepatitis A vaccine (1 of 2 - Risk 2-dose series) [birth year]06/2023 (Originally 8/15/1951)    Hepatitis B vaccine (1 of 3 - Risk 3-dose series) 06/06/2023 (Originally 8/15/1969)    DTaP/Tdap/Td vaccine (1 - Tdap) 06/06/2023 (Originally 8/15/1969)    COVID-19 Vaccine (1) 06/06/2032 (Originally 8/15/1955)    Flu vaccine (Season Ended) 09/01/2022    Depression Screen  06/06/2023    AAA screen  Completed    Hepatitis C screen  Completed    Hib vaccine  Aged Out    Meningococcal (ACWY) vaccine  Aged Out      Depression Screening  PHQ Scores 6/6/2022   PHQ2 Score 0   PHQ9 Score 0     Interpretation of Total Score Depression Severity: 1-4 = Minimal depression, 5-9 = Mild depression, 10-14 = Moderate depression, 15-19 = Moderately severe depression, 20-27 = Severe depression      Review of Systems   Constitutional: Negative for activity change, appetite change decrease in appetite, only eats one meal a day and stays hydrated with water - denies following low sodium diet lots of fast food (decreased over the past 1-2 weeks), chills, diaphoresis, fatigue, fever and unexpected weight change. HENT: Negative for sinus pressure, sinus pain, sore throat and trouble swallowing. Respiratory: Negative for cough, admits shortness of breath easily \"making and wheezing. Cardiovascular: Negative for chest pain, palpitations and leg swelling. Gastrointestinal: admits for abdominal pain,admits diarrhea (intermittently), nausea and vomiting. Endocrine: Negative for cold intolerance, polydipsia, polyphagia and polyuria. Genitourinary: Negative for difficulty urinating, flank pain and frequency. Admits frequency in urination   Musculoskeletal: Negative for gait problem and joint swelling. Negative for back pain, neck pain and neck stiffness. Admits swelling of both lower legs with erythema/skin changes and ulcerations. Skin: Negative for color change and wound. Negative for pallor and rash.    Allergic/Immunologic: Negative for environmental allergies and food allergies. Neurological: Negative for light-headedness, numbness and headaches. Psychiatric/Behavioral: admits for sleep disturbance. Negative for confusion and suicidal ideas. Objective    Physical Exam   Constitutional: Patient is oriented to person, place, and time. Patient appears well-developed and well-nourished. No distress. HENT: Head: Normocephalic and atraumatic. Eyes: Pupils are equal, round, and reactive to light. Conjunctivae are normal. Right eye exhibits no discharge. Left eye exhibits no discharge. Cardiovascular: Normal rate, regular rhythm and normal heart sounds. Pulmonary/Chest: Effort normal and breath sounds normal. No respiratory distress. Patient has slight crackles to posterior lung field bilaterally. Abdominal: Soft. Bowel sounds are normal. Patient exhibits some distension in the abdomen. There is no tenderness. Musculoskeletal:  Patient exhibits edema 1+ with chronic erythema and some skin changes (ulcerations/excoriations from scratching) and minimal tenderness to the affected area below the knee. Patient exhibits no deformity. Neurological: Patient is alert and oriented to person, place, and time. Skin: Skin is warm and dry. Patient is not diaphoretic. Psychiatric: Patient's speech is normal and behavior is normal. Thought content normal.   Vitals reviewed.     BP (!) 122/56   Pulse (!) 145   Ht 5' 7\" (1.702 m)   Wt 185 lb (83.9 kg)   SpO2 95%   BMI 28.98 kg/m²   BP Readings from Last 3 Encounters:   06/06/22 91/70   06/06/22 (!) 122/56   05/24/22 104/83     Lab Results   Component Value Date    WBC 3.8 06/06/2022    HGB 13.8 06/06/2022    HCT 39.8 (L) 06/06/2022     (L) 06/06/2022    ALT 26 06/06/2022    AST 32 06/06/2022     (L) 06/06/2022    K 4.3 06/06/2022    CL 89 (L) 06/06/2022    CREATININE 1.52 (H) 06/06/2022    BUN 28 (H) 06/06/2022    CO2 26 06/06/2022    TSH 3.69 06/06/2022    INR 1.9 05/18/2022     Lab Results   Component Value Date CALCIUM 9.7 06/06/2022    PHOS 4.3 05/02/2022     No results found for: LDLCALC, LDLCHOLESTEROL, LDLDIRECT    CURRENT MEDS W/ ASSOC DIAG         Start Date End Date     amiodarone (CORDARONE) 200 MG tablet  04/26/22  --     Associated Diagnoses:  --     apixaban (ELIQUIS) 5 MG TABS tablet  03/16/22  --     Associated Diagnoses:  --     atorvastatin (LIPITOR) 80 MG tablet  03/16/22  --     Associated Diagnoses:  --     bumetanide (BUMEX) 2 MG tablet  05/25/22  --     Take 1 tablet by mouth daily     Associated Diagnoses:  --     busPIRone (BUSPAR) 10 MG tablet  05/10/22  --     Associated Diagnoses:  --     clopidogrel (PLAVIX) 75 MG tablet  03/16/22  --     Associated Diagnoses:  --     escitalopram (LEXAPRO) 20 MG tablet  03/16/22  --     Associated Diagnoses:  --     magnesium oxide (MAGOX 400) 400 (240 Mg) MG tablet  05/24/22  --     Take 1.5 tablets by mouth 2 times daily     Associated Diagnoses:  --     melatonin 10 MG CAPS capsule  --  --     Associated Diagnoses:  --     metoprolol succinate (TOPROL XL) 25 MG extended release tablet  04/26/22  --     Associated Diagnoses:  --     midodrine (PROAMATINE) 5 MG tablet  05/24/22  --     Take 1 tablet by mouth 3 times daily (with meals)     Associated Diagnoses:  --     Omega-3 Fatty Acids (FISH OIL CONCENTRATE) 1000 MG CAPS  --  --     Associated Diagnoses:  --     omeprazole (PRILOSEC) 20 MG delayed release capsule  03/30/22  --     Associated Diagnoses:  --     potassium chloride (KLOR-CON M) 20 MEQ extended release tablet  05/24/22  --     Take 1 tablet by mouth 2 times daily (with meals)     Associated Diagnoses:  --     rOPINIRole (REQUIP) 0.5 MG tablet  04/11/22  07/10/22     Associated Diagnoses:  --     sacubitril-valsartan (ENTRESTO) 24-26 MG per tablet  05/24/22  --     Take 0.5 tablets by mouth 2 times daily Lot #: [id number]  Exp: July 2024     Associated Diagnoses:  Ischemic cardiomyopathy            Please note that this chart was generated using voice recognition Dragon dictation software. Although every effort was made to ensure the accuracy of this automated transcription, some errors in transcription may have occurred.     Electronically signed by Bettie Lara MD on 6/6/2022 at 8:57 PM

## 2022-06-06 NOTE — PROGRESS NOTES
[address]                                               Herbal Suspension    To avoid potential drug interactions with herbal and nutritional supplements, it is the policy of [address] [place] [address] to suspend all orders for these products at the time of admission.     Per hospital policy the following herbal/nutritional supplements were suspended during the hospital stay:    Leandro Taveras    Thank you,  Garry Rollins, PharmD, 6/6/2022 1:34 PM

## 2022-06-06 NOTE — PROGRESS NOTES
[facility]          Department of Pharmacy   Pharmacy Renal Adjustment Note    Bryn Boast is a 70 y.o. male. Pharmacist assessment of renally cleared medications. No results for input(s): CREATININE in the last 72 hours. Estimated Creatinine Clearance: 44 mL/min (A) (based on SCr of 1.57 mg/dL (H)).     Height:   Ht Readings from Last 1 Encounters:   06/06/22 5' 7\" (1.702 m)     Weight:  Wt Readings from Last 1 Encounters:   06/06/22 181 lb (82.1 kg)       The following medication(s) have been adjusted based upon renal function:             Pepcid 20 mg BID changed to pepcid 20 mg daily    Thank you,  Kelin Tena, PharmD, 6/6/2022 1:37 PM

## 2022-06-06 NOTE — H&P
[address] [facility]            HISTORY AND PHYSICAL     Pt Name: Ben Uribe  MRN: [id number]  [place] 1950  Date of evaluation: 6/6/2022  Provider: Zhang Bustamante PA-C    CHIEF COMPLAINT     Tachycardia        HISTORY OF PRESENT ILLNESS      History Obtained From:  patient  PCP: Robert Nguyen MD    History of Present Illness: The patient is a 70 y.o. male who presented to his primary care office earlier today for routine follow-up after hospital admission found to be in atrial fibrillation with rapid ventricular response and thus was direct admitted to the ICU. He reports over the past several days post discharge from this hospital that he has been feeling better but he feels like he is not urinating well. He states that he feels the urge to go but is not having good output. In addition he states that he is very short of breath and winded with any ambulation. He is unsure of any weight gain. Reports he is still drinking daily. He denies any nausea vomiting diarrhea. Denies any fevers or chills. He states he has been on the medications as prescribed to him at discharge from this hospital.  He otherwise has no other complaints at this time today. Past Medical History:        Diagnosis Date    Acute kidney injury ([facility] [address] [facility]     Ascites 5/20/2022    Atrial fibrillation (HCC)     CHF (congestive heart failure) (HCC)     Cirrhosis of liver with ascites ([facility] [address] [facility] 5/19/2022    CKD (chronic kidney disease) 5/20/2022    Coronary artery disease     Hyperlipidemia     Hypertension     Hypomagnesemia 5/20/2022    Hyponatremia     Thrombocytopenia (HCC)        Past Surgical History:        Procedure Laterality Date    CAROTID STENT PLACEMENT Bilateral     ROTATOR CUFF REPAIR Right        Medications Prior to Admission:    Prior to Admission medications    Medication Sig Start Date End Date Taking?  Authorizing Provider   bumetanide (BUMEX) 2 MG tablet Take 1 tablet by mouth daily 5/25/22   Margaret Rush APRN - CNP   potassium chloride (KLOR-CON M) 20 MEQ extended release tablet Take 1 tablet by mouth 2 times daily (with meals) 5/24/22   Margaret Rush APRN - CNP   midodrine (PROAMATINE) 5 MG tablet Take 1 tablet by mouth 3 times daily (with meals) 5/24/22   Margaret Rush APRN - CNP   magnesium oxide (MAGOX 400) 400 (240 Mg) MG tablet Take 1.5 tablets by mouth 2 times daily 5/24/22   Margaret Rush APRN - DILLON   sacubitril-valsartan (ENTRESTO) 24-26 MG per tablet Take 0.5 tablets by mouth 2 times daily Lot #: [id number]  Exp: July 2024 5/24/22   Irene Brenner MD   amiodarone (CORDARONE) 200 MG tablet Take 200 mg by mouth 2 times daily 4/26/22   Historical Provider, MD   apixaban (ELIQUIS) 5 MG TABS tablet Take 5 mg by mouth 2 times daily 3/16/22   Historical Provider, MD   busPIRone (BUSPAR) 10 MG tablet Take 15 mg by mouth 2 times daily  5/10/22   Historical Provider, MD   clopidogrel (PLAVIX) 75 MG tablet Take 75 mg by mouth daily 3/16/22   Historical Provider, MD   omeprazole (PRILOSEC) 20 MG delayed release capsule Take 20 mg by mouth daily 3/30/22   Historical Provider, MD   metoprolol succinate (TOPROL XL) 25 MG extended release tablet Take 25 mg by mouth daily 4/26/22   Historical Provider, MD   escitalopram (LEXAPRO) 20 MG tablet Take 20 mg by mouth nightly 3/16/22   Historical Provider, MD   atorvastatin (LIPITOR) 80 MG tablet Take 80 mg by mouth daily 3/16/22   Historical Provider, MD   rOPINIRole (REQUIP) 0.5 MG tablet Take 0.5 mg by mouth nightly 4/11/22 7/10/22  Historical Provider, MD   melatonin 10 MG CAPS capsule Take 10 mg by mouth nightly    Historical Provider, MD   Omega-3 Fatty Acids (FISH OIL CONCENTRATE) 1000 MG CAPS Take 2 caplets by mouth daily    Historical Provider, MD       Allergies:  Patient has no known allergies. Social History:   TOBACCO:   reports that he has been smoking cigarettes. He has been smoking about 1.00 pack per day.  He has never used smokeless tobacco.  ETOH:   reports current alcohol use of about 2.0 standard drinks of alcohol per week. Family History:   No family history on file. Review of Systems:  Review of Systems   Constitutional: Negative for chills, diaphoresis and fever. HENT: Negative for congestion, ear pain, rhinorrhea and sore throat. Eyes: Negative for discharge, redness and visual disturbance. Respiratory: Positive for shortness of breath. Negative for cough, chest tightness and wheezing. Cardiovascular: Negative for chest pain and palpitations. Gastrointestinal: Negative for abdominal pain, blood in stool, constipation, diarrhea, nausea and vomiting. Endocrine: Negative for polydipsia, polyphagia and polyuria. Genitourinary: Negative for decreased urine volume, difficulty urinating, dysuria, frequency and hematuria. Musculoskeletal: Negative for arthralgias, back pain and myalgias. Skin: Negative for pallor and rash. Allergic/Immunologic: Negative for food allergies and immunocompromised state. Neurological: Negative for dizziness, syncope, weakness and light-headedness. Hematological: Negative for adenopathy. Does not bruise/bleed easily. Psychiatric/Behavioral: Negative for behavioral problems and suicidal ideas. The patient is not nervous/anxious. All other systems were reviewed with the patient and are negative except as stated    Objective:    Vitals:   Temp: 96.8 °F (36 °C)  BP: 91/72  Resp: 13  Heart Rate: (!) 141  SpO2: 98 %  -----------------------------------------------------------------  Exam:  GEN: Well-developed well-nourished no obvious distress  HEENT: Pupils are equal round and reactive to light. Mucous membranes are moist.  Uvula is midline. No oral pharyngeal swelling or edema. NECK: Supple, no tenderness  CVS: Irregularly irregular, tachycardia murmur noted   PULM: Clear to auscultation bilaterally.   No respiratory distress  ABD: Soft and nontender, nondistended  EXT: Moves all 4 extremities with ease. No significant swelling. No tenderness. NEURO: Patient is awake alert and oriented to person place time and situation. No focal neurologic deficit. SKIN: No rashes. No skin lesions.    -----------------------------------------------------------------  Diagnostic Data:   · All diagnostic data was reviewed  Lab Results   Component Value Date    WBC 2.8 (L) 05/24/2022    HGB 12.7 (L) 05/24/2022    .4 05/24/2022    PLT See Reflexed IPF Result 05/24/2022      Lab Results   Component Value Date    GLUCOSE 85 05/31/2022    BUN 28 (H) 05/31/2022    CREATININE 1.57 (H) 05/31/2022     (L) 05/31/2022    K 4.6 05/31/2022    CALCIUM 9.7 05/31/2022    CL 93 (L) 05/31/2022    CO2 30 05/31/2022     Lab Results   Component Value Date    WBCUA 0 TO 2 05/02/2022    RBCUA 0 TO 2 05/02/2022    EPITHUA 0 TO 2 05/02/2022    LEUKOCYTESUR NEGATIVE 05/02/2022    SPECGRAV 1.025 (H) 05/02/2022    GLUCOSEU NEGATIVE 05/02/2022    KETUA TRACE (A) 05/02/2022    PROTEINU TRACE (A) 05/02/2022    HGBUR NEGATIVE 05/02/2022    BACTERIA 1+ (A) 05/02/2022       XR CHEST PORTABLE    (Results Pending)       EKG: Atrial fibrillation with rapid ventricular response    Assessment/ Plan:   80-year-old male with history of atrial fibrillation presented to his primary care's office found to be in atrial fibrillation with rapid ventricular response. He was also noted to be slightly hypotensive and thus was made a direct admit to the ICU. On my examination, patient has rate between the 130s to upper 140s. Blood pressure is 90 systolic. His map is 68. Is found to have significant urinary retention holding over 300. I think in combination with the atrial fibrillation with RVR he does have multiple acute problems occurring at this time.   His rate and rhythm could be secondary to significant hypomagnesia him as he is supposed be taking oral replacement has still had magnesium is well below 2 in the outpatient setting. He was being seen at a follow-up appointment after discharge from this hospital just about 10 days ago. Will admit to the ICU for further their management    1. Atrial fibrillation with rapid ventricular response  -Patient is admitted to ICU placed on telemetry monitoring. We will consult his cardiologist, Dion Duran. In the outpatient setting he has been taking amiodarone 200 mg twice daily as well as Toprol-XL. He has also continued on Eliquis 5 mg twice daily. Likely will need a another loading dose of amiodarone. However will need to be careful controlling his rate given the hypotension. 2.  Combined congestive heart failure  -Patient with exertional dyspnea. He is currently not hypoxic. No significant peripheral edema. Does have some minimal ascites. We will continue his Bumex 2mg every morning. His EF was noted to be 20% with wall motion abnormalities. We have continued him on Entresto as well as Toprol-XL. And again we will consult his cardiologist.    3.  Acute urinary retention  -Patient had reported increased urinary frequency. He was only able to give a very scant amount of urine for us to check here. We BladderScan him and noted he is holding over 300. Montes catheter has been placed. Will monitor ins and outs. We will consult his nephrologist, Suzi Taylor, appreciate his input. 4.  Acute on chronic renal insufficiency  -Again likely worsened due to acute urinary retention. Montes catheter has been placed. We will be monitoring ins and outs. Will check daily labs and consult nephrology. 5.  Hypomagnesia  -Concern at this time that deficiency could be precursor and because of the atrial fibrillation with RVR. We will check his magnesium and then replenish to ensure magnesium greater than 2.     DVT prophylaxis:  Patient on Eliquis    GI prophylaxis:  H2 blocker    Nutrition status:  Good    CODE STATUS:  Full    The patient is a 70 y.o. male admitted for atrial fibrillation with rapid ventricular response. This patient requires an inpatient admission as expected length of stay is greater than 3 midnights. Reviewed old charts and EKG. Consult made to cardiology and nephrology. Advance directive discussed. Discussed all this with the patient who is in agreements.

## 2022-06-06 NOTE — PROGRESS NOTES
Perfect serve sent and [facility] office called for Dr. Irene Crawford. Will be over after clinics.

## 2022-06-06 NOTE — PROGRESS NOTES
Patient continues to be in pain, intermittently, with what seems to be bladder spasms. Dr. Horacio Helton educated patient about th need for the garner and the possible complications if it was taken out prematurely. Writer asked again if he wanted me to contact the doctor and he says he'd rather keep it in. Patient just started on flomax so he will give it a chance to start working.

## 2022-06-06 NOTE — PROGRESS NOTES
Pt sitting up on the edge of the bed with his wife when writer entered the room. Pt is A&O x4. Vitals and assessment as charted. Labs drawn from IV site and sent down to lab. Writer educated patient and wife on the Flomax patient was given. All questions answered. Call light within reach.

## 2022-06-06 NOTE — CONSULTS
Alma Glez am scribing for and in the presence of Ming Nicole MD, MS, F.A.C.C..    Patient: Leda Nicolas  : [id number]  Date of Admission: 2022  Primary Care Physician: Abdoulaye Nolan  Today's Date: 2022    REASON FOR CONSULTATION: atrial fibrillation with RVR    HPI:  Mr. Cedric Leon is a 70 y.o. male who was admitted to the hospital for tachycardia. In the ER he was found to be in atrial fibrillation with RVR leading to this consultation. In the past he reports seeing a Cardiologist in [place] and was planning on having a cardioversion done in  to put him back into normal sinus rhythm due to his history of new onset atrial fibrillation.      He reports a history of a heart attack at age 46 and needed stents placed at that time but denies any cardiac cath since that time. He also has carotid artery stenosis and abdominal aortic aneurysm. He did report having surgery on one side of his neck, however he is not sure which side or how long ago it was. He has had hypertension and hyperlipidemia for years as well.      He is a current every day smoker and he smoked for about 55 years and smokes about a pack a day.      Family history consists of a lot of people in his family with significant cardiac history, however he wanted us to talk to his sister about the family history as she knows the details. Mr. Cedric Leon came to the ED for tachycardia. Since he was last seen in our office he has lost 11 pounds. He does have bladder spasms and now has a cath in that he wants out. He has had no fever or chills. He does have a cough and its productive sometimes. His wife does not think he has been eating and drinking okay the last week. He has no appetite. He doesn't have any nausea or vomiting. He denied any current or recent chest pain, abdominal pain, bleeding problems, problems with his medications or any other concerns at this time.      Past Medical History:   Diagnosis Date    Acute [facility] ([facility] [address]     Ascites 5/20/2022    Atrial fibrillation (HCC)     CHF (congestive heart failure) (HCC)     Cirrhosis of liver with ascites ([facility] [address] 5/19/2022    CKD (chronic kidney disease) 5/20/2022    Coronary artery disease     Hyperlipidemia     Hypertension     Hypomagnesemia 5/20/2022    Hyponatremia     Thrombocytopenia (HCC)        CURRENT ALLERGIES: Patient has no known allergies. REVIEW OF SYSTEMS: 14 systems were reviewed. Pertinent positives and negatives as above, all else negative. Past Surgical History:   Procedure Laterality Date    CAROTID STENT PLACEMENT Bilateral     ROTATOR CUFF REPAIR Right     Social History:  Social History     Tobacco Use    Smoking status: Current Every Day Smoker     Packs/day: 1.00     Types: Cigarettes    Smokeless tobacco: Never Used   Substance Use Topics    Alcohol use: Yes     Alcohol/week: 2.0 standard drinks     Types: 2 Shots of liquor per week     Comment: Drinks about 2 Kesslers with mix nightly. Reported history of heavy alcohol use for years with both beer and liquior several years ago    Drug use: Not on file        CURRENT MEDICATIONS:  No outpatient medications have been marked as taking for the 6/6/22 encounter [facility] Encounter). FAMILY HISTORY: family history is not on file. PHYSICAL EXAM:   BP (!) 120/95   Pulse (!) 143   Temp 97 °F (36.1 °C) (Temporal)   Resp 19   Ht 5' 7\" (1.702 m)   Wt 181 lb (82.1 kg)   SpO2 98%   BMI 28.35 kg/m²  Body mass index is 28.35 kg/m². Constitutional: He is oriented to person, place, and time. He appears well-developed and well-nourished. In no acute distress. HEENT: Normocephalic and atraumatic. No JVD present. Carotid bruit is not present. No mass and no thyromegaly present. No lymphadenopathy present. Cardiovascular: Tachycardic rate, irregularly irregular rhythm, normal heart sounds. Exam reveals no gallop and no friction rubs. No murmur was heard. .   Pulmonary/Chest: Effort normal and breath sounds normal. No respiratory distress. He has no wheezes, rhonchi or rales. Abdominal: Soft, non-tender. Bowel sounds and aorta are normal. He exhibits no organomegaly, mass or bruit. Extremities: Trace. No cyanosis or clubbing. 2+ radial and carotid pulses. Distal extremity pulses: 2+ bilaterally. Neurological: He is alert and oriented to person, place, and time. No evidence of gross cranial nerve deficit. Coordination appeared normal.   Skin: Skin is warm and dry. There is no rash or diaphoresis. Psychiatric: He has a normal mood and affect. His speech is normal and behavior is normal.      MOST RECENT LABS ON RECORD:   Lab Results   Component Value Date    WBC 3.8 06/06/2022    HGB 13.8 06/06/2022    HCT 39.8 (L) 06/06/2022     (L) 06/06/2022    ALT 26 06/06/2022    AST 32 06/06/2022     (L) 06/06/2022    K 4.3 06/06/2022    CL 89 (L) 06/06/2022    CREATININE 1.52 (H) 06/06/2022    BUN 28 (H) 06/06/2022    CO2 26 06/06/2022    TSH 3.69 06/06/2022    INR 1.9 05/18/2022         ASSESSMENT:  Patient Active Problem List    Diagnosis Date Noted    History of acute inferior wall MI     Ischemic cardiomyopathy     Hypomagnesemia 05/20/2022    Ascites 05/20/2022    CKD (chronic kidney disease) 05/20/2022    Cirrhosis of liver with ascites ([facility] [address] 05/19/2022    Acute on chronic combined systolic and diastolic CHF, NYHA class 4 (HCC)     Coronary artery disease     Hypertension     Thrombocytopenia (HCC)     Hyponatremia     Hyperlipidemia     Atrial fibrillation with RVR ([facility] [address] 05/18/2022       PLAN:  · Paroxysmal Atrial Fibrillation: Rate Control Symptomatic and appears to not be working well. May need to convert to rhythm control strategy.  Beta Blocker: INCREASE to Metoprolol tartrate to 50 mg twice daily.  I also discussed the potential side effects of this medication including lightheadedness and dizziness and instructed them to stop the medication of this occurs and call our office if this occurs.  Anti-Arrhythmic: DECREASE amiodarone (Pacerone) 100 mg twice daily. Monitoring: Since they will being maintained on Amiodarone, I told them that we will need to closely monitor them for potential side effects. These include monitoring LFTs and TSH at least every 6 months as well as chest x-rays, pulmonary function tests, and eye exams at least yearly. [id number] Score for Atrial Fibrillation Stroke Risk   Risk   Factors  Component Value   C CHF Yes 1   H HTN Yes 1   A2 Age >= 76 No,  (75 y.o.) 0   D DM No 0   S2 Prior Stroke/TIA No 0   V Vascular Disease No 0   A Age 74-69 Yes,  (75 y.o.) 1   Sc Sex male 0    [id number]  Score  3   Score last updated 6/6/22 4:84 PM EDT    Click here for a link to the UpToDate guideline \"Atrial Fibrillation: Anticoagulation therapy to prevent embolization    Disclaimer: Risk Score calculation is dependent on accuracy of patient problem list and past encounter diagnosis.  Stroke Risk: CHADS2-VASc Score: 3/9 (3.2% stroke risk). Because of his atrial fibrillation, I also would also recommend that he continue with anticoagulation to decrease his risk of stoke but also reminded him to watch for signs of blood in his stool or black tarry stools and stop the medication immediately if this develops as this could be life threatening.  Anticoagulation: Continue Apixaban (Eliquis) 5 mg every 12 hours.  Additional Testing List: None      Chronic combined heart failure: I do believe he may be on the dry side with 11 lb weight loss since hospital discharge   Beta Blocker: INCREASE to Metoprolol tartrate to 50 mg twice daily. I also discussed the potential side effects of this medication including lightheadedness and dizziness and instructed them to stop the medication of this occurs and call our office if this occurs.  ACE Inibitor/ARB: Continue sacubitril/valsartan (Entresto) 24/26 mg twice daily.    Diuretics: DECREASE to bumetinide (Bumex) 1 mg every morning.  Heart failure counseling: I advised them to try and keep their legs up whenever possible and to limit salt in their diet.  Additional Testing List: None    · Possible Medication side effect with Amiodarone causing loss of appetite:     Finally, I would like to have his BMP and magnesium levels repeated daily. Once again, thank you for allowing me to participate in this patients care. Please do not hesitate to contact me could I be of further assistance. Sincerely,  Niranjan Pisano MD, MS, F.A.C.C. [facility] Cardiology Specialist    [address] [place] [address], [address] [place] [address]  Phone: [phone], Fax: [phone]     I believe that the risk of significant morbidity and mortality related to the patient's current medical conditions are: intermediate-high. The documentation recorded by the scribe, accurately and completely reflects the services I personally performed and the decisions made by me. Niranjan Pisano MD, MS, F.A.C.C.  June 6, 2022

## 2022-06-07 PROBLEM — E43 SEVERE MALNUTRITION (HCC): Status: ACTIVE | Noted: 2022-06-07

## 2022-06-07 LAB
ABSOLUTE EOS #: 0.04 K/UL (ref 0–0.44)
ABSOLUTE IMMATURE GRANULOCYTE: 0 K/UL (ref 0–0.3)
ABSOLUTE LYMPH #: 0.35 K/UL (ref 1.1–3.7)
ABSOLUTE MONO #: 0.07 K/UL (ref 0.1–1.2)
ALLEN TEST: ABNORMAL
ANION GAP SERPL CALCULATED.3IONS-SCNC: 12 MMOL/L (ref 9–17)
BASOPHILS # BLD: 0 % (ref 0–2)
BASOPHILS ABSOLUTE: 0 K/UL (ref 0–0.2)
BUN BLDV-MCNC: 29 MG/DL (ref 8–23)
BUN/CREAT BLD: 17 (ref 9–20)
CALCIUM SERPL-MCNC: 9.2 MG/DL (ref 8.6–10.4)
CHLORIDE BLD-SCNC: 95 MMOL/L (ref 98–107)
CO2: 23 MMOL/L (ref 20–31)
CREAT SERPL-MCNC: 1.69 MG/DL (ref 0.7–1.2)
EOSINOPHILS RELATIVE PERCENT: 1 % (ref 1–4)
FIO2: 21
FOLATE: 8.2 NG/ML
GFR AFRICAN AMERICAN: 49 ML/MIN
GFR NON-AFRICAN AMERICAN: 40 ML/MIN
GFR SERPL CREATININE-BSD FRML MDRD: ABNORMAL ML/MIN/{1.73_M2}
GFR SERPL CREATININE-BSD FRML MDRD: ABNORMAL ML/MIN/{1.73_M2}
GLUCOSE BLD-MCNC: 111 MG/DL (ref 70–99)
GLUCOSE BLD-MCNC: 138 MG/DL (ref 74–100)
HCO3 VENOUS: 21 MMOL/L (ref 24–30)
HCT VFR BLD CALC: 34.3 % (ref 40.7–50.3)
HEMOGLOBIN: 11.8 G/DL (ref 13–17)
IMMATURE GRANULOCYTES: 0 %
INR BLD: 1.7
LYMPHOCYTES # BLD: 10 % (ref 24–43)
MAGNESIUM: 1.8 MG/DL (ref 1.6–2.6)
MCH RBC QN AUTO: 34.6 PG (ref 25.2–33.5)
MCHC RBC AUTO-ENTMCNC: 34.4 G/DL (ref 28.4–34.8)
MCV RBC AUTO: 100.6 FL (ref 82.6–102.9)
MONOCYTES # BLD: 2 % (ref 3–12)
MORPHOLOGY: ABNORMAL
NEGATIVE BASE EXCESS, VEN: 3.1 MMOL/L (ref 0–2)
NRBC AUTOMATED: 0 PER 100 WBC
O2 DEVICE/FLOW/%: ABNORMAL
O2 SAT, VEN: 95.2 % (ref 60–85)
PATIENT TEMP: 37
PCO2, VEN: 34.9 (ref 39–55)
PDW BLD-RTO: 14.1 % (ref 11.8–14.4)
PH VENOUS: 7.4 (ref 7.32–7.42)
PLATELET # BLD: ABNORMAL K/UL (ref 138–453)
PLATELET, FLUORESCENCE: 93 K/UL (ref 138–453)
PLATELET, IMMATURE FRACTION: 8.6 % (ref 1.1–10.3)
PO2, VEN: 75.3 (ref 30–50)
POTASSIUM SERPL-SCNC: 4 MMOL/L (ref 3.7–5.3)
PROTHROMBIN TIME: 19.7 SEC (ref 11.5–14.2)
PT. POSITION: ABNORMAL
RBC # BLD: 3.41 M/UL (ref 4.21–5.77)
SAMPLE SITE: ABNORMAL
SEG NEUTROPHILS: 87 % (ref 36–65)
SEGMENTED NEUTROPHILS ABSOLUTE COUNT: 3.04 K/UL (ref 1.5–8.1)
SODIUM BLD-SCNC: 130 MMOL/L (ref 135–144)
VITAMIN B-12: 416 PG/ML (ref 232–1245)
VITAMIN D 25-HYDROXY: 9.8 NG/ML
WBC # BLD: 3.5 K/UL (ref 3.5–11.3)

## 2022-06-07 PROCEDURE — 82947 ASSAY GLUCOSE BLOOD QUANT: CPT

## 2022-06-07 PROCEDURE — 6370000000 HC RX 637 (ALT 250 FOR IP): Performed by: PHYSICIAN ASSISTANT

## 2022-06-07 PROCEDURE — 6360000002 HC RX W HCPCS: Performed by: STUDENT IN AN ORGANIZED HEALTH CARE EDUCATION/TRAINING PROGRAM

## 2022-06-07 PROCEDURE — 99233 SBSQ HOSP IP/OBS HIGH 50: CPT | Performed by: FAMILY MEDICINE

## 2022-06-07 PROCEDURE — 85055 RETICULATED PLATELET ASSAY: CPT

## 2022-06-07 PROCEDURE — 51702 INSERT TEMP BLADDER CATH: CPT

## 2022-06-07 PROCEDURE — 94761 N-INVAS EAR/PLS OXIMETRY MLT: CPT

## 2022-06-07 PROCEDURE — 82306 VITAMIN D 25 HYDROXY: CPT

## 2022-06-07 PROCEDURE — 36415 COLL VENOUS BLD VENIPUNCTURE: CPT

## 2022-06-07 PROCEDURE — 2000000000 HC ICU R&B

## 2022-06-07 PROCEDURE — 2580000003 HC RX 258: Performed by: FAMILY MEDICINE

## 2022-06-07 PROCEDURE — 82805 BLOOD GASES W/O2 SATURATION: CPT

## 2022-06-07 PROCEDURE — 80048 BASIC METABOLIC PNL TOTAL CA: CPT

## 2022-06-07 PROCEDURE — 85025 COMPLETE CBC W/AUTO DIFF WBC: CPT

## 2022-06-07 PROCEDURE — 83735 ASSAY OF MAGNESIUM: CPT

## 2022-06-07 PROCEDURE — 6370000000 HC RX 637 (ALT 250 FOR IP): Performed by: STUDENT IN AN ORGANIZED HEALTH CARE EDUCATION/TRAINING PROGRAM

## 2022-06-07 PROCEDURE — 2580000003 HC RX 258: Performed by: PHYSICIAN ASSISTANT

## 2022-06-07 PROCEDURE — 85610 PROTHROMBIN TIME: CPT

## 2022-06-07 PROCEDURE — 6370000000 HC RX 637 (ALT 250 FOR IP): Performed by: SPECIALIST

## 2022-06-07 PROCEDURE — 6370000000 HC RX 637 (ALT 250 FOR IP): Performed by: FAMILY MEDICINE

## 2022-06-07 RX ORDER — 0.9 % SODIUM CHLORIDE 0.9 %
500 INTRAVENOUS SOLUTION INTRAVENOUS ONCE
Status: COMPLETED | OUTPATIENT
Start: 2022-06-07 | End: 2022-06-07

## 2022-06-07 RX ORDER — METOPROLOL TARTRATE 100 MG/1
100 TABLET ORAL 2 TIMES DAILY
Status: DISCONTINUED | OUTPATIENT
Start: 2022-06-07 | End: 2022-06-10

## 2022-06-07 RX ADMIN — TAMSULOSIN HYDROCHLORIDE 0.4 MG: 0.4 CAPSULE ORAL at 16:46

## 2022-06-07 RX ADMIN — MIDODRINE HYDROCHLORIDE 5 MG: 5 TABLET ORAL at 16:34

## 2022-06-07 RX ADMIN — Medication 10 MG: at 20:07

## 2022-06-07 RX ADMIN — LORAZEPAM 3 MG: 2 INJECTION INTRAMUSCULAR; INTRAVENOUS at 18:29

## 2022-06-07 RX ADMIN — SACUBITRIL AND VALSARTAN 0.5 TABLET: 24; 26 TABLET, FILM COATED ORAL at 20:08

## 2022-06-07 RX ADMIN — ESCITALOPRAM 20 MG: 5 TABLET, FILM COATED ORAL at 20:07

## 2022-06-07 RX ADMIN — MUPIROCIN: 20 OINTMENT TOPICAL at 08:23

## 2022-06-07 RX ADMIN — BUSPIRONE HYDROCHLORIDE 15 MG: 5 TABLET ORAL at 20:08

## 2022-06-07 RX ADMIN — LORAZEPAM 3 MG: 2 INJECTION INTRAMUSCULAR; INTRAVENOUS at 03:18

## 2022-06-07 RX ADMIN — POTASSIUM CHLORIDE 20 MEQ: 1500 TABLET, EXTENDED RELEASE ORAL at 16:34

## 2022-06-07 RX ADMIN — ROPINIROLE HYDROCHLORIDE 0.5 MG: 0.25 TABLET, FILM COATED ORAL at 20:07

## 2022-06-07 RX ADMIN — LORAZEPAM 1 MG: 1 TABLET ORAL at 16:34

## 2022-06-07 RX ADMIN — SODIUM CHLORIDE 500 ML: 9 INJECTION, SOLUTION INTRAVENOUS at 00:22

## 2022-06-07 RX ADMIN — Medication 600 MG: at 20:08

## 2022-06-07 RX ADMIN — APIXABAN 5 MG: 5 TABLET, FILM COATED ORAL at 16:47

## 2022-06-07 RX ADMIN — AMIODARONE HYDROCHLORIDE 100 MG: 200 TABLET ORAL at 20:09

## 2022-06-07 RX ADMIN — SODIUM CHLORIDE, PRESERVATIVE FREE 5 ML: 5 INJECTION INTRAVENOUS at 08:21

## 2022-06-07 RX ADMIN — LORAZEPAM 1 MG: 1 TABLET ORAL at 20:08

## 2022-06-07 RX ADMIN — METOPROLOL TARTRATE 100 MG: 100 TABLET, FILM COATED ORAL at 20:07

## 2022-06-07 RX ADMIN — LORAZEPAM 3 MG: 2 INJECTION INTRAMUSCULAR; INTRAVENOUS at 01:35

## 2022-06-07 RX ADMIN — METOPROLOL TARTRATE 50 MG: 50 TABLET, FILM COATED ORAL at 12:30

## 2022-06-07 RX ADMIN — APIXABAN 5 MG: 5 TABLET, FILM COATED ORAL at 20:07

## 2022-06-07 RX ADMIN — MUPIROCIN: 20 OINTMENT TOPICAL at 20:09

## 2022-06-07 RX ADMIN — BUMETANIDE 1 MG: 1 TABLET ORAL at 16:46

## 2022-06-07 RX ADMIN — SODIUM CHLORIDE, PRESERVATIVE FREE 10 ML: 5 INJECTION INTRAVENOUS at 20:20

## 2022-06-07 ASSESSMENT — PAIN SCALES - GENERAL
PAINLEVEL_OUTOF10: 0
PAINLEVEL_OUTOF10: 0

## 2022-06-07 NOTE — PROGRESS NOTES
Writer at bedside trying to arouse patient awake to take his morning medications. Patient was very hard to arouse and very legarthic. Patient would open his eyes and respond to voice but couldn't answer any orientation questions or stay awake enough to hold a conversation or take his oral medications. At this time Damián Coto was at bedside and witnessed the assessment. New orders inputted. Will continue to monitor patient very closely.

## 2022-06-07 NOTE — CONSULTS
UROLOGY CONSULT  Patient:  Radha Alcazar  MRN: [id number]    Chief Complaint: urinary frequency, c/o inability to empty    HISTORY OF PRESENT ILLNESS:   The patient is a 70 y.o. male who was admitted for A. fib with RVR. Patient also had an admission 5/18/2022 through 5/24/2022 for A. fib with RVR. After admission patient was having urgency/frequecy every 15 minutes. In ICU bladder scan performed for 350ml. Garner catheter was placed. Flomax started by hospitalist team. Overnight patient with bladder spasms. B&O suppository given. Patient seen by nephrology for acute kidney injury. Patient is being followed by cardiology. Patient does have issues with EtOH use and was agitated and was pulling at garner catheter resulting in some hematuria. Patient is currently on the CIWA protocol secondary to alcohol withdrawal.  He is currently an unreliable historian. Patient's wife is at his bedside. She is also not fully aware of his complete medical history. Apparently patient had seen a urologist approximately 10 years ago but is uncertain what this was for. She states that he is no longer living in the same house as her. This has been going on for the last 6 months. Apparently patient's drinking has interfered with his relationship with wife's daughter. Since then he has been living alone in his sister's trailer. Prior to this patient's wife does state that he was getting up at least 4-5 times at night to urinate. She does report that he was having a daily bowel movement.     Past Medical History:    Past Medical History:   Diagnosis Date    Acute kidney injury ([address]     Ascites 5/20/2022    Atrial fibrillation (HCC)     CHF (congestive heart failure) (HCC)     Cirrhosis of liver with ascites ([address] 5/19/2022    CKD (chronic kidney disease) 5/20/2022    Coronary artery disease     Hyperlipidemia     Hypertension     Hypomagnesemia 5/20/2022    Hyponatremia     Thrombocytopenia (HCC)        Past Surgical History:    Past Surgical History:   Procedure Laterality Date    CAROTID STENT PLACEMENT Bilateral     ROTATOR CUFF REPAIR Right         Medications:    Scheduled Meds:   sodium chloride flush  5-40 mL IntraVENous 2 times per day    apixaban  5 mg Oral BID    atorvastatin  80 mg Oral Daily    busPIRone  15 mg Oral BID    clopidogrel  75 mg Oral Daily    escitalopram  20 mg Oral Nightly    melatonin  10 mg Oral Nightly    midodrine  5 mg Oral TID WC    pantoprazole  40 mg Oral QAM AC    potassium chloride  20 mEq Oral BID WC    rOPINIRole  0.5 mg Oral Nightly    sacubitril-valsartan  0.5 tablet Oral BID    tamsulosin  0.4 mg Oral Daily    amiodarone  100 mg Oral BID    metoprolol tartrate  50 mg Oral BID    bumetanide  1 mg Oral Once per day on Sun Tue Thu Sat    [START ON 6/8/2022] bumetanide  2 mg Oral Q MWF    thiamine  100 mg Oral Daily    multivitamin  1 tablet Oral Daily    nicotine  1 patch TransDERmal Daily    magnesium oxide  600 mg Oral BID    mupirocin   Topical BID     Continuous Infusions:   sodium chloride       PRN Meds:.sodium chloride flush, sodium chloride, ondansetron **OR** ondansetron, polyethylene glycol, acetaminophen **OR** acetaminophen, opium-belladonna, LORazepam **OR** LORazepam **OR** LORazepam **OR** LORazepam **OR** LORazepam **OR** LORazepam **OR** LORazepam **OR** LORazepam, nicotine polacrilex, magnesium sulfate    Allergies:    Patient has no known allergies. Social History:    Social History     Socioeconomic History    Marital status:      Spouse name: Not on file    Number of children: Not on file    Years of education: Not on file    Highest education level: Not on file   Occupational History    Not on file   Tobacco Use    Smoking status: Current Every Day Smoker     Packs/day: 1.00     Types: Cigarettes    Smokeless tobacco: Never Used   Substance and Sexual Activity    Alcohol use:  Yes     Alcohol/week: 2.0 standard drinks Types: 2 Shots of liquor per week     Comment: Drinks about 2 Kesslers with mix nightly. Reported history of heavy alcohol use for years with both beer and liquior several years ago    Drug use: Not on file    Sexual activity: Not on file   Other Topics Concern    Not on file   Social History Narrative    Not on file     Social Determinants of Health     Financial Resource Strain:     Difficulty of Paying Living Expenses: Not on file   Food Insecurity:     Worried About Running Out of Food in the Last Year: Not on file    Aaron of Food in the Last Year: Not on file   Transportation Needs:     Lack of Transportation (Medical): Not on file    Lack of Transportation (Non-Medical): Not on file   Physical Activity:     Days of Exercise per Week: Not on file    Minutes of Exercise per Session: Not on file   Stress:     Feeling of Stress : Not on file   Social Connections:     Frequency of Communication with Friends and Family: Not on file    Frequency of Social Gatherings with Friends and Family: Not on file    Attends Druze Services: Not on file    Active Member of [address] [place] [address] or Organizations: Not on file    Attends Club or Organization Meetings: Not on file    Marital Status: Not on file   Intimate Partner Violence:     Fear of Current or Ex-Partner: Not on file    Emotionally Abused: Not on file    Physically Abused: Not on file    Sexually Abused: Not on file   Housing Stability:     Unable to Pay for Housing in the Last Year: Not on file    Number of [place] in the Last Year: Not on file    Unstable Housing in the Last Year: Not on file       Family History:    No family history on file.     REVIEW OF SYSTEMS:  Unable to obtain review of systems from patient secondary to confusion/alcohol withdrawal    PHYSICAL EXAM:  Patient Vitals for the past 24 hrs:   BP Temp Temp src Pulse Resp SpO2 Height Weight   06/07/22 1100 104/76 96.9 °F (36.1 °C) Temporal (!) 107 18 97 % -- --   06/07/22 1030 102/67 -- -- (!) 108 -- -- -- --   06/07/22 1000 89/68 -- -- 100 -- -- -- --   06/07/22 0930 106/72 -- -- (!) 133 -- -- -- --   06/07/22 0900 96/72 -- -- (!) 105 -- -- -- --   06/07/22 0830 97/73 -- -- (!) 132 -- -- -- --   06/07/22 0820 -- -- -- -- -- 100 % -- --   06/07/22 0805 -- -- -- -- -- (!) 86 % -- --   06/07/22 0803 90/67 -- -- (!) 140 -- -- -- --   06/07/22 0754 (!) 122/99 -- -- -- -- -- -- --   06/07/22 0730 (!) 122/99 -- -- (!) 144 -- -- -- --   06/07/22 0703 -- -- -- -- -- -- 5' 7\" (1.702 m) --   06/07/22 0700 94/72 96.8 °F (36 °C) Temporal (!) 123 17 92 % -- --   06/07/22 0630 (!) 93/52 -- -- (!) 130 -- -- -- --   06/07/22 0600 (!) 88/56 -- -- (!) 133 -- -- -- 182 lb 6.4 oz (82.7 kg)   06/07/22 0530 90/65 -- -- (!) 114 -- -- -- --   06/07/22 0500 99/61 -- -- (!) 117 -- -- -- --   06/07/22 0400 84/62 99 °F (37.2 °C) Temporal (!) 122 18 95 % -- --   06/07/22 0300 (!) 165/136 -- -- (!) 118 -- -- -- --   06/07/22 0230 94/69 -- -- (!) 128 16 -- -- --   06/07/22 0200 (!) 73/53 -- -- (!) 123 17 -- -- --   06/07/22 0130 (!) 100/48 -- -- (!) 133 15 -- -- --   06/07/22 0100 (!) 94/52 -- -- (!) 120 24 -- -- --   06/07/22 0030 86/60 -- -- (!) 115 18 -- -- --   06/07/22 0000 (!) 89/59 -- -- (!) 113 27 -- -- --   06/06/22 2330 81/65 -- -- (!) 119 18 -- -- --   06/06/22 2300 84/61 97.7 °F (36.5 °C) Temporal (!) 127 19 93 % -- --   06/06/22 2230 96/74 -- -- (!) 115 20 -- -- --   06/06/22 2200 100/75 -- -- (!) 147 18 -- -- --   06/06/22 2130 90/71 -- -- (!) 122 15 -- -- --   06/06/22 2100 109/73 -- -- (!) 124 25 -- -- --   06/06/22 2030 91/70 -- -- (!) 121 21 -- -- --   06/06/22 2000 (!) 81/49 -- -- (!) 126 17 -- -- --   06/06/22 1930 (!) 79/58 -- -- (!) 122 17 -- -- --   06/06/22 1900 91/63 97.8 °F (36.6 °C) Temporal (!) 137 20 95 % -- --   06/06/22 1830 (!) 90/44 -- -- (!) 143 23 -- -- --   06/06/22 1823 85/65 -- -- (!) 133 22 -- -- --   06/06/22 1800 -- -- -- (!) 134 16 -- -- --   06/06/22 1700 97/83 -- -- Katelyn Woodall 143 18 -- -- --   06/06/22 1600 (!) 120/95 97 °F (36.1 °C) Temporal (!) 143 19 -- -- --   06/06/22 1530 (!) 150/89 -- -- (!) 143 26 -- -- --   06/06/22 1500 (!) 128/96 -- -- (!) 130 19 -- -- --   06/06/22 1400 117/86 -- -- (!) 123 14 -- -- --   06/06/22 1300 91/72 -- -- (!) 141 13 98 % 5' 7\" (1.702 m) 181 lb (82.1 kg)   06/06/22 1251 -- 96.8 °F (36 °C) Temporal -- 17 -- -- --     Constitutional: Patient is restless/somnolent  Neuro: Patient is confused and not oriented/somnolent  Psych: Mood and affect normal.  HEENT: normocephalic, atraumatic  Lungs: Respiratory effort normal, unlabored  Abdomen: Soft, non-tender, moderately distended with mild ascites  : No CVA tenderness bilat. Bladder non-tender and not distended.   Montes in place  Extremities: 1+ edema bilateral lower extremities    LABS:  Recent Labs     06/06/22  1225 06/07/22  0515   WBC 3.8 3.5   HGB 13.8 11.8*   HCT 39.8* 34.3*   MCV 99.7 100.6   * See Reflexed IPF Result     Recent Labs     06/06/22  1225 06/07/22  0515   * 130*   K 4.3 4.0   CL 89* 95*   CO2 26 23   BUN 28* 29*   CREATININE 1.52* 1.69*     No results found for: PSA    Urinalysis:   Recent Labs     06/06/22  1440   COLORU Yellow   PHUR 6.0   WBCUA 0 TO 2   RBCUA 2 TO 5   SPECGRAV <1.005*   LEUKOCYTESUR NEGATIVE   UROBILINOGEN Normal   BILIRUBINUR NEGATIVE      ASSESSMENT AND PLAN:  Impression:    Patient Active Problem List   Diagnosis    Atrial fibrillation with RVR (HCC)    Acute on chronic combined systolic and diastolic CHF, NYHA class 4 (HCC)    Coronary artery disease    Hypertension    Thrombocytopenia (HCC)    Hyponatremia    Hyperlipidemia    Cirrhosis of liver with ascites (HCC)    Hypomagnesemia    Ascites    CKD (chronic kidney disease)    History of acute inferior wall MI    Ischemic cardiomyopathy    Chronic combined systolic (congestive) and diastolic (congestive) heart failure (HCC)    Dehydration, moderate    Medication side effect, initial encounter    Severe malnutrition ([facility]       Plan:   Urine culture    Continue flomax    Maintain garner for now    Likely remove garner when moved to regular room    We will check on him again tomorrow. ------------------------------------------------  Thank you for the consultation.   I have discussed the care of this patient including pertinent history and exam findings, lab and imaging results, assessment, orders and plan as documented above with Ashley Guillermo MD.    Electronically signed by Daniel Mancuso PA-C on 6/7/2022 at 11:24 AM

## 2022-06-07 NOTE — PROGRESS NOTES
[facility]            PROGRESS NOTE     Pt Name: Anderson Good  MRN: [id number]  [place] 1950  Date of evaluation: 6/7/2022  Provider: Starr Sullivan PA-C    SUBJECTIVE   77-year-old male with history of chronic alcohol use, atrial fibrillation, seen during morning rounds in the ICU. He is awake and alert however appears minimally disoriented. He is on CIWA scale. Overnight apparently began to potentially start some withdrawals. He is still in atrial fibrillation. His blood pressure is maintained. Seen by cardiology. Wife reports he was just very distressed overnight. Did not eat. No other acute complaints. Review of Systems:  Review of Systems   Unable to perform ROS: Acuity of condition     All other systems were reviewed with the patient and are negative except as stated    OBJECTIVE     History Obtained From:  spouse, reason patient could not give history:  altered mental status    Vitals:   Temp: 96.8 °F (36 °C)  BP: 96/72  Resp: 17  Heart Rate: (!) 105  SpO2: 100 %  -----------------------------------------------------------------  Exam:  GEN: Well-developed well-nourished no obvious distress  HEENT: Pupils are equal round and reactive to light. Mucous membranes are moist.  Uvula is midline. No oral pharyngeal swelling or edema. NECK: Supple, no tenderness  CVS: Grossly irregular with tachycardia present   PULM: Clear to auscultation bilaterally. No respiratory distress  ABD: Soft and nontender, nondistended  EXT: Moves all 4 extremities with ease. No significant swelling. No tenderness. NEURO: Patient is awake alert and oriented to self not situation  No focal neurologic deficit. SKIN: No rashes.   No skin lesions.    -----------------------------------------------------------------  Diagnostic Data:   Complete Blood Count:   Recent Labs     06/06/22  1225 06/07/22  0515   WBC 3.8 3.5   RBC 3.99* 3.41*   HGB 13.8 11.8*   HCT 39.8* 34.3*   MCV 99.7 100.6 MCH 34.6* 34.6*   MCHC 34.7 34.4   RDW 14.2 14.1   * See Reflexed IPF Result   MPV 11.5  --         Last 3 Blood Glucose:   Recent Labs     06/06/22  1225 06/07/22  0515   GLUCOSE 88 111*        Comprehensive Metabolic Profile:   Recent Labs     06/06/22  1225 06/07/22  0515   * 130*   K 4.3 4.0   CL 89* 95*   CO2 26 23   BUN 28* 29*   CREATININE 1.52* 1.69*   GLUCOSE 88 111*   CALCIUM 9.7 9.2   PROT 6.8  --    LABALBU 4.5  --    BILITOT 1.35*  --    ALKPHOS 112  --    AST 32  --    ALT 26  --         Urinalysis:   Lab Results   Component Value Date    NITRU NEGATIVE 06/06/2022    COLORU Yellow 06/06/2022    PHUR 6.0 06/06/2022    WBCUA 0 TO 2 06/06/2022    RBCUA 2 TO 5 06/06/2022    MUCUS 2+ 05/02/2022    BACTERIA 1+ 05/02/2022    SPECGRAV <1.005 06/06/2022    LEUKOCYTESUR NEGATIVE 06/06/2022    UROBILINOGEN Normal 06/06/2022    BILIRUBINUR NEGATIVE 06/06/2022    GLUCOSEU NEGATIVE 06/06/2022    KETUA NEGATIVE 06/06/2022       HgBA1c:  No results found for: LABA1C    Lactic Acid:   Lab Results   Component Value Date    LACTA 1.9 05/18/2022        High Sensitivity Troponin:  Recent Labs     06/06/22  1225 06/06/22  1845   TROPHS 33* 28*         Radiology/Imaging:  XR CHEST PORTABLE   Final Result   Minimal left basilar atelectasis. Otherwise, clear lungs. Cardiomegaly. Small left pleural effusion. Assessment/ Plan:   22-year-old male with history of atrial fibrillation currently with RVR who is admitted in the ICU seen during rounds. He was very agitated this morning after he woke up. Concerned that he could be starting to develop some alcohol withdrawal symptoms. He is on CIWA protocol. Seen by cardiology, recommendations might be to do cardioversion. They have increase his metoprolol and decreased his amiodarone. Patient was noted to have acute urinary retention and Montes catheter was placed. He has had difficulty with some bladder spasms. Urology had been consulted.     1. Atrial fibrillation with rapid ventricular response  -Patient continues with rates in the 120s to 140s. Blood pressure is stabilized. Cardiology did see him. And we are increasing his metoprolol and decrease his and his amiodarone. There is a question on whether or not he will need cardioversion. 2.  Combined congestive heart failure  -Cardiology is concerned that he could be a little dry. He has had 11 pound weight loss since he was last discharged from the hospital.  However we are going to increase the metoprolol to 50 mg twice a day. He will continue Entresto. Decreasing his Bumex to 1 mg every morning. 3.  Chronic alcohol use  -Patient on CIWA protocol. Did receive Ativan overnight. Appears to be slightly tremulous this morning. Minimally altered when he first woke up. I am going to obtain a blood gas this morning glucose. He has no focal deficits. 4.  Acute on chronic renal insufficiency  -Patient did have an acute urinary retention. Urology was consulted. He has a worsening creatinine today. We have consulted his nephrology. We have decreased his Bumex. 5.  Hypomagnesia  -Patient was given IV magnesium 1 day ago. Magnesium is only 1.8 but it is improved. Will likely continue replacement. Principal Problem:    Atrial fibrillation with RVR (HCC)  Active Problems:    Chronic combined systolic (congestive) and diastolic (congestive) heart failure (HCC)    Dehydration, moderate    Medication side effect, initial encounter    Severe malnutrition ([address]  Resolved Problems:    * No resolved hospital problems.  *      DVT prophylaxis:  Patient on Eliquis    GI prophylaxis:  H2 blocker    Nutrition status:  Fair    CODE STATUS:  Full

## 2022-06-07 NOTE — PROGRESS NOTES
Pt attempting to get up out of bed without help. Bed alarm going off. Wife at bedside when writer entered the room. Pt has garner catheter stretched out and it is pulling, writer has pt lay back down into bed to fix the garner catheter. Writer then assisted pt into the bathroom and then back into the bed. Urine in the garner bag is noted to be red at this time. Will continue to monitor.

## 2022-06-07 NOTE — PROGRESS NOTES
Pt assisted into the bathroom and then back into the bed. Montes catheter still noted to have red urine in it. Vitals and assessment as charted. Pt still having bladder spasms, B&O suppository given at this time. Will continue to monitor. Wife remains at bedside. Call light within reach. Bed alarm on.

## 2022-06-07 NOTE — PROGRESS NOTES
Pt very restless and agitation in bed. Wife remains at bedside. CIWA scale is a 17, Ativan 3mg IV given. Will continue to monitor.

## 2022-06-07 NOTE — PROGRESS NOTES
Writer talked with patient and wife about how much the patient drinks a day. Pt stated he use to be an alcoholic but not anymore. Wife stated he drinks a couple of shots a day. Writer completed CIWA scale and got a score of 13. Ativan 2mg PO given at this time. Seizure pads added to the top side rails but unable to add to the lower rails at this time due to patient sitting on the side of the bed. Wife at bedside to help the patient with anxiety. Will continue to monitor.

## 2022-06-07 NOTE — PROGRESS NOTES
Write at bedside explaining the plan of care with patients wife and sister. Summarized all the events that happened in the past 24 hours. Very receptive to all information given.

## 2022-06-07 NOTE — PROGRESS NOTES
Writer at bedside, second assessment and vitals completed. Patient was able to hold eye contact a little bit longer and stay awake for a longer period of time but is still very drowsy.

## 2022-06-07 NOTE — CONSULTS
Palliative Care Inpatient Consult    NAME:  Gisela Srivastava [address] RECORD NUMBER:  [id number]  AGE: 70 y.o. GENDER: male  : 1950  TODAY'S DATE:  2022    Reason for Consult: chronic disease support, acp    History of Present Illness     The patient is a 70 y.o. Non- / non  male who presents with No chief complaint on file. Referred to Palliative Care by  [] Physician   [x] Nursing  [] Family Request   [] Other:      He was admitted to the hospitalist service for A-fib [facility]) [I48.91]  Atrial fibrillation with RVR ([facility] [address] [I48.91]. The patient has a complicated medical history and has been hospitalized since 2022 12:39 PM.    Active Hospital Problems    Diagnosis Date Noted    Chronic combined systolic (congestive) and diastolic (congestive) heart failure ([address] [I50.42]      Priority: High    Dehydration, moderate [E86.0]      Priority: High    Medication side effect, initial encounter [T50.905A]      Priority: High    Severe malnutrition ([address] [E43] 2022     Priority: Medium    Atrial fibrillation with RVR ([facility] [I48.91] 2022     Priority: Medium       Data         /71   Pulse (!) 127   Temp 96.9 °F (36.1 °C) (Temporal)   Resp 18   Ht 5' 7\" (1.702 m)   Wt 182 lb 6.4 oz (82.7 kg)   SpO2 97%   BMI 28.57 kg/m²     Wt Readings from Last 3 Encounters:   22 182 lb 6.4 oz (82.7 kg)   22 185 lb (83.9 kg)   22 195 lb 1.6 oz (88.5 kg)        Code Status: Full Code     ADVANCED CARE PLANNING:  Patient has capacity for medical decisions: no  Health Care Power of : no  Living Will: no     Personal, Social, and Family History  Marital Status:   Living situation:alone  Psychological Distress: RANDOLPH  Does patient understand diagnosis/treatment? not asked  Does caregiver understand diagnosis/treatment? yes    Assessment        Symptom management/ pain control   Pain Assessment:  Pain is controlled without any medications.   Anxiety: RANDOLPH  Dyspnea:  none  Fatigue:  RANDOLPH  Other:    Palliative Performance Scale:     ___100% Full ambulation; normal activity and work; no evidence of disease; able to do own self care; normal intake; fully conscious  ___90% Full ambulation; normal activity and work; some evidence of disease; able to do own self care; normal intake; fully conscious  ___80% Full ambulation; normal activity with effort; some evidence of disease; able to do own self care; normal or reduced intake; fully conscious  ___70% Ambulation reduced; unable to perform normal job/work; significant disease; able to do own self care; normal or reduced intake; fully conscious  _x__60%  Ambulation reduced; cannot do hobbies/housework; significant disease; occasional assist; intake normal or reduced; fully conscious/some confusion  ___50%  Mainly sit/lie; can't do any work; extensive disease; considerable assist; intake normal or reduced; fully conscious/some confusion  ___40%  Mainly in bed; extensive disease; mainly assist; intake normal or reduced; fully conscious/ some confusion   ___30%  Bed bound; extensive disease; total care; intake reduced; fully conscious/some confusion  ___20%  Bed bound; extensive disease; total care; intake minimal; drowsy/coma  ___10%  Bed bound; extensive disease; total care; mouth care only; drowsy/coma  ___0       Death     Readmission Risk Score: 17%    Plan      Palliative Interaction: Cole Díaz is resting in bed. He is restless but does not awake when name is called. Discussion held with his wife Suzette Rosales. States that Cole Díaz lives at home alone. Suzette Rosales assists with his care but is unable to live with him due to his alcoholism. He has a walker, cane, built in bench in the shower, and grab bars in the bathroom. His sister Raymundo Nath fills a pill planner for him and assist with his medical needs. Suzette Rosales assists with ADL's, transportation and household chores.      Suzette Rosales states that she feels Cole Díaz struggles with anxiety and depression but his PCP is assisting with management of this. Delisa Hagen wife and sister would like him to be discharged to the [facility] for therapy and possibly long term placement. Both feel that he is unable to care for himself at home but believe that he will be reluctant to agree to long term care. Support provided. Discussed with . We also discuss advanced directives. Asha Blanco does not have a living will or [address]. He has a previous DNR--CCA on file. Griselda Canton states that his wishes are to remain a DNR-CCA. Discussed with CNP and order placed at this time. Copies of DNR document provided to spouse and sister. Discussed OP PC support if Asha Blanco is unwilling to agree to long term placement, brochures provided. Education/support to patient  Discharge planning/helping to coordinate care  Managing depression/anxiety  Substance abuse issues  Code status clarified: [facility]  Validating patient/family distress    Principle Problem/Diagnosis:  Atrial fibrillation with RVR (HCC)    Goals of care evaluation   The patient goals of care are improve or maintain function/quality of life, provide comfort care/support/palliation/relieve suffering, preserve independence/autonomy/control and support for family/caregiver   Goals of care discussed with:    [] Patient independently    [] Patient and Family    [x] Family or Healthcare DPOA independently    [] Unable to discuss with patient, family/DPOA not present    Code Status  Full Code     Palliative Care will continue to follow Mr. Angie Robertson care as needed. Thank you for allowing Palliative Care to participate in the care of Mr. Mike London .      Electronically signed by   Maryanne Jones RN  Palliative Care Team  on 6/7/2022 at 12:27 PM    Palliative care office: [phone]

## 2022-06-07 NOTE — ACP (ADVANCE CARE PLANNING)
Advance Care Planning     Advance Care Planning Activator (Inpatient)  Conversation Note      Date of ACP Conversation: 6/7/2022     Conversation Conducted with:  Healthcare Decision Maker: Next of Kin by law (only applies in absence of above) (name) SpouseSarah    ACP Activator: Michelle Bella RN    Health Care Decision Maker:     Current Designated Health Care Decision Maker:     Primary Decision Maker: Charles Gerber - Spouse - [phone]    Secondary Decision Maker: [address] - [phone]    Today we documented Decision Maker(s) consistent with Legal Next of Kin hierarchy. Care Preferences    Ventilation: \"If you were in your present state of health and suddenly became very ill and were unable to breathe on your own, what would your preference be about the use of a ventilator (breathing machine) if it were available to you? \"      Would the patient desire the use of ventilator (breathing machine)?: no    \"If your health worsens and it becomes clear that your chance of recovery is unlikely, what would your preference be about the use of a ventilator (breathing machine) if it were available to you? \"     Would the patient desire the use of ventilator (breathing machine)?: No      Resuscitation  \"CPR works best to restart the heart when there is a sudden event, like a heart attack, in someone who is otherwise healthy. Unfortunately, CPR does not typically restart the heart for people who have serious health conditions or who are very sick. \"    \"In the event your heart stopped as a result of an underlying serious health condition, would you want attempts to be made to restart your heart (answer \"yes\" for attempt to resuscitate) or would you prefer a natural death (answer \"no\" for do not attempt to resuscitate)? \" no       [x] Yes   [] No   Educated Patient / Gwen Proud regarding differences between Advance Directives and portable DNR orders.     Length of ACP Conversation in minutes:      Conversation Outcomes:  [x] ACP discussion completed  [] Existing advance directive reviewed with patient; no changes to patient's previously recorded wishes  [] New Advance Directive completed  [] Portable Do Not Rescitate prepared for Provider review and signature  [] POLST/POST/MOLST/MOST prepared for Provider review and signature      Follow-up plan:    [] Schedule follow-up conversation to continue planning  [] Referred individual to Provider for additional questions/concerns   [] Advised patient/agent/surrogate to review completed ACP document and update if needed with changes in condition, patient preferences or care setting    [] This note routed to one or more involved healthcare providers

## 2022-06-07 NOTE — PROGRESS NOTES
Writer called Dr. Eamon Capone for an update on patients blood pressures running low. Order received.

## 2022-06-07 NOTE — PROGRESS NOTES
Met with Patient, wife and Patient sister this p.m. to discuss discharge planning. Patient is a 70year old , white male, admitted with a diagnosis of Atrial Fibrillation. Patient also noted to have a history of alcohol abuse. He is participating in conversation very minimally as he is restless and moving around in the bed continuously without purpose. Family is requesting that Patient be sent to the [facility] for rehab. Explained that he would need a physical therapy evaluation completed here to see what his needs are. Also discussed possible alcohol rehab placement but family explains they doubt if Patient would be receptive to that type of a placement. Patient lives alone at [facility] in [facility]. Sister states that he rents trailer from a nephew. Patient is retired. He drinks daily and family state that he tells them it is 2 a day but family not sure how much that Patient is really drinking. Patient has a walker, cane, shower chair and grab bars but he himself reports that he \"does not use them. \"  Patient drives himself \"short distances\" according to family but sister states that she provides transportation to all of Patient doctor appointments, etc.  Wife assists with personal care and housekeeping, etc.    PCP is Dr. Karla Gutiérrez. He is new to this PCP. Patient has medical insurance to assist with the cost of his prescription medications. No further financial assistance requested with his medicine at this time. [facility]. Patient has a '[facility]' ordered. Advanced Directive documents are not currently on file. LSW to assist with discharge placement as appropriate and follow for further needs as they present.     Nahed. Avery Escamilla[address]  6/7/2022

## 2022-06-07 NOTE — PROGRESS NOTES
Comprehensive Nutrition Assessment    Type and Reason for Visit:  Initial    Nutrition Recommendations/Plan:   1. Chocolate Ensure tid  2. Resume low sodium in diet   3. Continue B1 and mvi w/minerals     Malnutrition Assessment:  Malnutrition Status:  Severe malnutrition (06/07/22 0715)    Context:  Acute Illness     Findings of the 6 clinical characteristics of malnutrition:  Energy Intake:  50% or less of estimated energy requirements for 5 or more days  Weight Loss:  Greater than 5% over 1 month     Body Fat Loss:  No significant body fat loss     Muscle Mass Loss:  No significant muscle mass loss    Fluid Accumulation:  Mild     Strength:  Not Performed    Nutrition Assessment:    Severe acute malnutrition r/t inadequate nutrient intakes, AEB significant weight declines in last month and very poor PO in last 2 weeks. Wife reports ~200# ubw prior to any weight gains of CHF with last admission. Is on B1 and mvi w/mins r/t ETOH use. Asleep upon visit with spouse providing PO history. Noted mildly low Na+ (may be r/t ETOH), improved Mg++. Will add Chocolate Ensure    Nutrition Related Findings:    no physical malnturition indices noted. trace BLE edema Wound Type: None       Current Nutrition Intake & Therapies:    Average Meal Intake: Unable to assess (no PO records)  Average Supplements Intake: None Ordered  ADULT DIET; Regular; Low Sodium (2 gm)  ADULT ORAL NUTRITION SUPPLEMENT; Breakfast, Lunch, Dinner; Standard High Calorie/High Protein Oral Supplement    Anthropometric Measures:  Height: 5' 7\" (170.2 cm)  Ideal Body Weight (IBW): 148 lbs (67 kg)    Admission Body Weight: 181 lb (82.1 kg)  Current Body Weight: 181 lb (82.1 kg), 122.3 % IBW. Weight Source: Bed Scale  Current BMI (kg/m2): 28.3  Usual Body Weight: 200 lb (90.7 kg) (per spouse)  % Weight Change (Calculated): -9.5  Weight Adjustment For: No Adjustment                 BMI Categories: Overweight (BMI 25.0-29. 9)    Estimated Daily Nutrient Needs:  Energy Requirements Based On: Kcal/kg  Weight Used for Energy Requirements: Current  Energy (kcal/day): 5306-2167 (18-22)  Weight Used for Protein Requirements: Ideal  Protein (g/day): 81-94 (1.2-1.4)  Method Used for Fluid Requirements: 1 ml/kcal  Fluid (ml/day): 1800    Nutrition Diagnosis:   · Severe malnutrition,In context of acute illness or injury related to inadequate protein-energy intake as evidenced by weight loss greater than or equal to 5% in 1 month,poor intake prior to admission,localized or generalized fluid accumulation    Lab Results   Component Value Date     (L) 06/07/2022    K 4.0 06/07/2022    CL 95 (L) 06/07/2022    CO2 23 06/07/2022    BUN 29 (H) 06/07/2022    CREATININE 1.69 (H) 06/07/2022    GLUCOSE 111 (H) 06/07/2022    CALCIUM 9.2 06/07/2022    PROT 6.8 06/06/2022    LABALBU 4.5 06/06/2022    BILITOT 1.35 (H) 06/06/2022    ALKPHOS 112 06/06/2022    AST 32 06/06/2022    ALT 26 06/06/2022    LABGLOM 40 (L) 06/07/2022    GFRAA 49 (L) 06/07/2022     No results found for: LABA1C  No results found for: EAG  No results found for: VITD25    Nutrition Interventions:   Food and/or Nutrient Delivery: Continue Current Diet,Start Oral Nutrition Supplement  Nutrition Education/Counseling: No recommendation at this time  Coordination of Nutrition Care: Continue to monitor while inpatient  Plan of Care discussed with: spouse    Goals:     Goals: Meet at least 75% of estimated needs     Nutrition Monitoring and Evaluation:   Behavioral-Environmental Outcomes: Readiness for Change  Food/Nutrient Intake Outcomes: Food and Nutrient Intake,Supplement Intake  Physical Signs/Symptoms Outcomes: Biochemical Data,Weight,Fluid Status or Edema    Discharge Planning:    Continue Oral Nutrition Supplement     Malgorzata Mancia, [address],   Contact: [phone]

## 2022-06-07 NOTE — PROGRESS NOTES
Morning assessment and vitals take at this time, physical assessment done while patient is resting. Unable to arouse long enough to answer orientation questions.

## 2022-06-07 NOTE — PROGRESS NOTES
Writer at bedside, family saying he was getting a little more restless, CIWA screening done (9), given appropriate PRN dose of ativan

## 2022-06-07 NOTE — PROGRESS NOTES
Physician Progress Note      PATIENT:               Monster Miramontes  CSN #:                  [id number]  :                       1950  ADMIT DATE:       2022 12:39 PM  [address] [place] DATE:  RESPONDING  PROVIDER #:        Christine Tai MD          QUERY TEXT:    Pt admitted with atrial fib with RVR. Pt noted to have admission serum sodium   129    If possible, please document in the progress notes and discharge summary if   you are evaluating and / or treating any of the following: The medical record reflects the following:  Risk Factors: chronic CHF/diuretics, per dietitian: severe malnutrition  Clinical Indicators: admission serum sodium 129 -> 130 on 22  Treatment: NS bolus and infusion, lab monitoring    Binh Zuleta, [facility], [address] [place] [address]   [phone]  . Xenia Ansari Options provided:  -- Hyponatremia  -- Clinically insignificant low serum sodium  -- Other - I will add my own diagnosis  -- Disagree - Not applicable / Not valid  -- Disagree - Clinically unable to determine / Unknown  -- Refer to Clinical Documentation Reviewer    PROVIDER RESPONSE TEXT:    This patient has hyponatremia.     Query created by: Jewel Guidry on 2022 7:58 AM      Electronically signed by:  Christine Tai MD 2022 8:04 AM

## 2022-06-07 NOTE — PROGRESS NOTES
Writer is at bedside. Vital signs taken. Assessment done. Patient is alert, oriented to person, disoriented to time, situation and place. Patient is not complaining of pain at this moment. Patient has 2l oxygen via nasal cannula. Patient is not complaining of shortness of breath and chest pain. Patient has garner in place. Patient is sleepy and restless. Patient's wife is at bedside. Writer provided a blanket and put a diaper and underpad. Patient verbalized that he is going to have a bowel movement, writer checks the diaper and patient is clean. Call light within reach. Bed alarm in place.

## 2022-06-07 NOTE — PLAN OF CARE
Problem: Safety - Adult  Goal: Free from fall injury  6/7/2022 0416 by Judith Mejias RN  Outcome: Progressing  Note: Pt will remain free from injury. Wheels locked on bed and call light within reach. Will continue to monitor. Problem: Pain  Goal: Verbalizes/displays adequate comfort level or baseline comfort level  6/7/2022 0416 by Judith Mejias RN  Outcome: Progressing  Note: Pt is able to verbalize when in pain. Pt denies pain at this time. Will continue to monitor.

## 2022-06-07 NOTE — PROGRESS NOTES
Patient still continuing to have bladder spasms. Writer called [facility]"[employer] Furnish and orders received.

## 2022-06-07 NOTE — PROGRESS NOTES
Atiya Calabrese am scribing for and in the presence of Wendi Le MD, MS, F.A.C.C..    Patient: Flossie Hamman  : [id number]  Date of Admission: 2022  Primary Care Physician: Nadir Smith  Today's Date: 2022    REASON FOR CONSULTATION: atrial fibrillation with RVR    HPI:  Mr. Lay Wilkins is a 70 y.o. male who was admitted to the hospital for tachycardia. In the ER he was found to be in atrial fibrillation with RVR leading to this consultation. In the past he reports seeing a Cardiologist in [place] and was planning on having a cardioversion done in  to put him back into normal sinus rhythm due to his history of new onset atrial fibrillation.      He reports a history of a heart attack at age 46 and needed stents placed at that time but denies any cardiac cath since that time. He also has carotid artery stenosis and abdominal aortic aneurysm. He did report having surgery on one side of his neck, however he is not sure which side or how long ago it was. He has had hypertension and hyperlipidemia for years as well.      He is a current every day smoker and he smoked for about 55 years and smokes about a pack a day.      Family history consists of a lot of people in his family with significant cardiac history, however he wanted us to talk to his sister about the family history as she knows the details. Mr. Lay Wilkins came to the ED for tachycardia. Since he was last seen in our office he has lost 11 pounds. He does have bladder spasms and now has a cath in that he wants out. He has had no fever or chills. He does have a cough and its productive sometimes. His wife does not think he has been eating and drinking okay the last week. He has no appetite. He doesn't have any nausea or vomiting. Echo done on 2022- EF 15-20%    Today he is still very sleepy and restless. He had a garner catheter placed that has been very uncomfortable for him.    He denied any current or recent chest pain, abdominal pain, bleeding problems, problems with his medications or any other concerns at this time. Past Medical History:   Diagnosis Date    Acute kidney injury ([facility]     Ascites 5/20/2022    Atrial fibrillation (HCC)     CHF (congestive heart failure) (HCC)     Cirrhosis of liver with ascites ([facility] 5/19/2022    CKD (chronic kidney disease) 5/20/2022    Coronary artery disease     Hyperlipidemia     Hypertension     Hypomagnesemia 5/20/2022    Hyponatremia     Thrombocytopenia (HCC)        CURRENT ALLERGIES: Patient has no known allergies. REVIEW OF SYSTEMS: 14 systems were reviewed. Pertinent positives and negatives as above, all else negative. Past Surgical History:   Procedure Laterality Date    CAROTID STENT PLACEMENT Bilateral     ROTATOR CUFF REPAIR Right     Social History:  Social History     Tobacco Use    Smoking status: Current Every Day Smoker     Packs/day: 1.00     Types: Cigarettes    Smokeless tobacco: Never Used   Substance Use Topics    Alcohol use: Yes     Alcohol/week: 2.0 standard drinks     Types: 2 Shots of liquor per week     Comment: Drinks about 2 Kesslers with mix nightly. Reported history of heavy alcohol use for years with both beer and liquior several years ago    Drug use: Not on file        CURRENT MEDICATIONS:  No outpatient medications have been marked as taking for the 6/6/22 encounter [facility] Encounter). FAMILY HISTORY: Reviewed but non-contributory     PHYSICAL EXAM:   /85   Pulse (!) 129   Temp 96.9 °F (36.1 °C) (Temporal)   Resp 18   Ht 5' 7\" (1.702 m)   Wt 182 lb 6.4 oz (82.7 kg)   SpO2 97%   BMI 28.57 kg/m²  Body mass index is 28.57 kg/m². Constitutional: He is oriented to person, place, and time. He appears well-developed and well-nourished. In no acute distress. HEENT: Normocephalic and atraumatic. No JVD present. Carotid bruit is not present. No mass and no thyromegaly present.  No lymphadenopathy RVR ([facility] 05/18/2022       PLAN:  · Paroxysmal Atrial Fibrillation: Rate Control Symptomatic and appears to not be working well. May need to convert to rhythm control strategy.  Beta Blocker: INCREASE to Metoprolol tartrate to 100 mg twice daily. I also discussed the potential side effects of this medication including lightheadedness and dizziness and instructed them to stop the medication of this occurs and call our office if this occurs.  Anti-Arrhythmic: Continue amiodarone (Pacerone) 100 mg twice daily. Monitoring: Since they will being maintained on Amiodarone, I told them that we will need to closely monitor them for potential side effects. These include monitoring LFTs and TSH at least every 6 months as well as chest x-rays, pulmonary function tests, and eye exams at least yearly. [id number] Score for Atrial Fibrillation Stroke Risk   Risk   Factors  Component Value   C CHF Yes 1   H HTN Yes 1   A2 Age >= 76 No,  (75 y.o.) 0   D DM No 0   S2 Prior Stroke/TIA No 0   V Vascular Disease No 0   A Age 74-69 Yes,  (75 y.o.) 1   Sc Sex male 0    [id number]  Score  3   Score last updated 6/6/22 9:79 PM EDT    Click here for a link to the UpToDate guideline \"Atrial Fibrillation: Anticoagulation therapy to prevent embolization    Disclaimer: Risk Score calculation is dependent on accuracy of patient problem list and past encounter diagnosis.  Stroke Risk: CHADS2-VASc Score: 3/9 (3.2% stroke risk). Because of his atrial fibrillation, I also would also recommend that he continue with anticoagulation to decrease his risk of stoke but also reminded him to watch for signs of blood in his stool or black tarry stools and stop the medication immediately if this develops as this could be life threatening.  Anticoagulation: Continue Apixaban (Eliquis) 5 mg every 12 hours.    Additional Testing List: None      Chronic combined heart failure: I do believe he may be on the dry side with 11 lb weight loss since hospital discharge   Beta Blocker: INCREASE to Metoprolol tartrate to 100 mg twice daily. I also discussed the potential side effects of this medication including lightheadedness and dizziness and instructed them to stop the medication of this occurs and call our office if this occurs.  ACE Inibitor/ARB: Continue sacubitril/valsartan (Entresto) 24/26 mg twice daily.  Diuretics: Continue bumetinide (Bumex) 1 mg every morning.  Heart failure counseling: I advised them to try and keep their legs up whenever possible and to limit salt in their diet.  Additional Testing List: None    · Possible Medication side effect with Amiodarone causing loss of appetite:     Once again, thank you for allowing me to participate in this patients care. Please do not hesitate to contact me could I be of further assistance. Sincerely,  Viktor Duarte MD, MS, F.A.C.C. [facility] Cardiology Specialist    [address] [place] [address]  Phone: [phone], Fax: [phone]     I believe that the risk of significant morbidity and mortality related to the patient's current medical conditions are: intermediate-high. The documentation recorded by the scribe, accurately and completely reflects the services I personally performed and the decisions made by me. Viktor Duarte MD, MS, F.A.C.C.  June 7, 2022

## 2022-06-08 ENCOUNTER — APPOINTMENT (OUTPATIENT)
Dept: GENERAL RADIOLOGY | Age: 72
DRG: 308 | End: 2022-06-08
Attending: STUDENT IN AN ORGANIZED HEALTH CARE EDUCATION/TRAINING PROGRAM
Payer: MEDICARE

## 2022-06-08 LAB
ABSOLUTE EOS #: 0 K/UL (ref 0–0.44)
ABSOLUTE IMMATURE GRANULOCYTE: 0 K/UL (ref 0–0.3)
ABSOLUTE LYMPH #: 0.68 K/UL (ref 1.1–3.7)
ABSOLUTE MONO #: 0.2 K/UL (ref 0.1–1.2)
ABSOLUTE RETIC #: 0.06 M/UL (ref 0.03–0.08)
ANION GAP SERPL CALCULATED.3IONS-SCNC: 9 MMOL/L (ref 9–17)
BASOPHILS # BLD: 0 % (ref 0–2)
BASOPHILS ABSOLUTE: 0 K/UL (ref 0–0.2)
BUN BLDV-MCNC: 24 MG/DL (ref 8–23)
BUN/CREAT BLD: 20 (ref 9–20)
CALCIUM SERPL-MCNC: 9.4 MG/DL (ref 8.6–10.4)
CHLORIDE BLD-SCNC: 94 MMOL/L (ref 98–107)
CO2: 28 MMOL/L (ref 20–31)
CREAT SERPL-MCNC: 1.19 MG/DL (ref 0.7–1.2)
CULTURE: NO GROWTH
EOSINOPHILS RELATIVE PERCENT: 0 % (ref 1–4)
FERRITIN: 192 NG/ML (ref 30–400)
GFR AFRICAN AMERICAN: >60 ML/MIN
GFR NON-AFRICAN AMERICAN: >60 ML/MIN
GFR SERPL CREATININE-BSD FRML MDRD: ABNORMAL ML/MIN/{1.73_M2}
GFR SERPL CREATININE-BSD FRML MDRD: ABNORMAL ML/MIN/{1.73_M2}
GLUCOSE BLD-MCNC: 90 MG/DL (ref 70–99)
HCT VFR BLD CALC: 35.3 % (ref 40.7–50.3)
HEMOGLOBIN: 11.9 G/DL (ref 13–17)
IMMATURE GRANULOCYTES: 0 %
IMMATURE RETIC FRACT: 18.7 % (ref 2.7–18.3)
IRON SATURATION: 15 % (ref 20–55)
IRON: 42 UG/DL (ref 59–158)
LYMPHOCYTES # BLD: 20 % (ref 24–43)
MAGNESIUM: 1.6 MG/DL (ref 1.6–2.6)
MCH RBC QN AUTO: 34.3 PG (ref 25.2–33.5)
MCHC RBC AUTO-ENTMCNC: 33.7 G/DL (ref 28.4–34.8)
MCV RBC AUTO: 101.7 FL (ref 82.6–102.9)
MONOCYTES # BLD: 6 % (ref 3–12)
MORPHOLOGY: ABNORMAL
NRBC AUTOMATED: 0 PER 100 WBC
PDW BLD-RTO: 14.4 % (ref 11.8–14.4)
PLATELET # BLD: ABNORMAL K/UL (ref 138–453)
PLATELET, FLUORESCENCE: 89 K/UL (ref 138–453)
PLATELET, IMMATURE FRACTION: 8.7 % (ref 1.1–10.3)
POTASSIUM SERPL-SCNC: 3.5 MMOL/L (ref 3.7–5.3)
RBC # BLD: 3.47 M/UL (ref 4.21–5.77)
RETIC %: 1.8 % (ref 0.5–1.9)
RETIC HEMOGLOBIN: 39.8 PG (ref 28.2–35.7)
SEG NEUTROPHILS: 74 % (ref 36–65)
SEGMENTED NEUTROPHILS ABSOLUTE COUNT: 2.52 K/UL (ref 1.5–8.1)
SODIUM BLD-SCNC: 131 MMOL/L (ref 135–144)
SPECIMEN DESCRIPTION: NORMAL
TOTAL IRON BINDING CAPACITY: 272 UG/DL (ref 250–450)
UNSATURATED IRON BINDING CAPACITY: 230 UG/DL (ref 112–347)
WBC # BLD: 3.4 K/UL (ref 3.5–11.3)

## 2022-06-08 PROCEDURE — 80048 BASIC METABOLIC PNL TOTAL CA: CPT

## 2022-06-08 PROCEDURE — 6370000000 HC RX 637 (ALT 250 FOR IP): Performed by: PHYSICIAN ASSISTANT

## 2022-06-08 PROCEDURE — 99232 SBSQ HOSP IP/OBS MODERATE 35: CPT | Performed by: FAMILY MEDICINE

## 2022-06-08 PROCEDURE — 85055 RETICULATED PLATELET ASSAY: CPT

## 2022-06-08 PROCEDURE — 83735 ASSAY OF MAGNESIUM: CPT

## 2022-06-08 PROCEDURE — 85045 AUTOMATED RETICULOCYTE COUNT: CPT

## 2022-06-08 PROCEDURE — 2580000003 HC RX 258: Performed by: PHYSICIAN ASSISTANT

## 2022-06-08 PROCEDURE — 6370000000 HC RX 637 (ALT 250 FOR IP): Performed by: SPECIALIST

## 2022-06-08 PROCEDURE — 6370000000 HC RX 637 (ALT 250 FOR IP): Performed by: NURSE PRACTITIONER

## 2022-06-08 PROCEDURE — 6360000002 HC RX W HCPCS: Performed by: STUDENT IN AN ORGANIZED HEALTH CARE EDUCATION/TRAINING PROGRAM

## 2022-06-08 PROCEDURE — 51702 INSERT TEMP BLADDER CATH: CPT

## 2022-06-08 PROCEDURE — 83550 IRON BINDING TEST: CPT

## 2022-06-08 PROCEDURE — 83540 ASSAY OF IRON: CPT

## 2022-06-08 PROCEDURE — 85025 COMPLETE CBC W/AUTO DIFF WBC: CPT

## 2022-06-08 PROCEDURE — 82728 ASSAY OF FERRITIN: CPT

## 2022-06-08 PROCEDURE — 36415 COLL VENOUS BLD VENIPUNCTURE: CPT

## 2022-06-08 PROCEDURE — 2000000000 HC ICU R&B

## 2022-06-08 PROCEDURE — 71045 X-RAY EXAM CHEST 1 VIEW: CPT

## 2022-06-08 PROCEDURE — 6370000000 HC RX 637 (ALT 250 FOR IP): Performed by: FAMILY MEDICINE

## 2022-06-08 PROCEDURE — 94761 N-INVAS EAR/PLS OXIMETRY MLT: CPT

## 2022-06-08 PROCEDURE — 6370000000 HC RX 637 (ALT 250 FOR IP): Performed by: STUDENT IN AN ORGANIZED HEALTH CARE EDUCATION/TRAINING PROGRAM

## 2022-06-08 PROCEDURE — 2700000000 HC OXYGEN THERAPY PER DAY

## 2022-06-08 RX ORDER — MAGNESIUM SULFATE IN WATER 40 MG/ML
2000 INJECTION, SOLUTION INTRAVENOUS ONCE
Status: COMPLETED | OUTPATIENT
Start: 2022-06-08 | End: 2022-06-08

## 2022-06-08 RX ORDER — FOLIC ACID 1 MG/1
1 TABLET ORAL DAILY
Status: DISCONTINUED | OUTPATIENT
Start: 2022-06-08 | End: 2022-06-11 | Stop reason: HOSPADM

## 2022-06-08 RX ADMIN — BUMETANIDE 2 MG: 1 TABLET ORAL at 08:17

## 2022-06-08 RX ADMIN — METOPROLOL TARTRATE 100 MG: 100 TABLET, FILM COATED ORAL at 20:57

## 2022-06-08 RX ADMIN — TAMSULOSIN HYDROCHLORIDE 0.4 MG: 0.4 CAPSULE ORAL at 08:14

## 2022-06-08 RX ADMIN — FOLIC ACID 1 MG: 1 TABLET ORAL at 20:58

## 2022-06-08 RX ADMIN — BUSPIRONE HYDROCHLORIDE 15 MG: 5 TABLET ORAL at 20:58

## 2022-06-08 RX ADMIN — MUPIROCIN: 20 OINTMENT TOPICAL at 08:26

## 2022-06-08 RX ADMIN — MIDODRINE HYDROCHLORIDE 5 MG: 5 TABLET ORAL at 16:47

## 2022-06-08 RX ADMIN — MAGNESIUM SULFATE HEPTAHYDRATE 2000 MG: 40 INJECTION, SOLUTION INTRAVENOUS at 09:51

## 2022-06-08 RX ADMIN — SACUBITRIL AND VALSARTAN 0.5 TABLET: 24; 26 TABLET, FILM COATED ORAL at 20:57

## 2022-06-08 RX ADMIN — MIDODRINE HYDROCHLORIDE 5 MG: 5 TABLET ORAL at 11:29

## 2022-06-08 RX ADMIN — METOPROLOL TARTRATE 100 MG: 100 TABLET, FILM COATED ORAL at 08:14

## 2022-06-08 RX ADMIN — Medication 600 MG: at 20:58

## 2022-06-08 RX ADMIN — SODIUM CHLORIDE, PRESERVATIVE FREE 10 ML: 5 INJECTION INTRAVENOUS at 20:57

## 2022-06-08 RX ADMIN — Medication 600 MG: at 08:15

## 2022-06-08 RX ADMIN — Medication 10 MG: at 20:58

## 2022-06-08 RX ADMIN — APIXABAN 5 MG: 5 TABLET, FILM COATED ORAL at 08:17

## 2022-06-08 RX ADMIN — APIXABAN 5 MG: 5 TABLET, FILM COATED ORAL at 20:57

## 2022-06-08 RX ADMIN — POTASSIUM CHLORIDE 20 MEQ: 1500 TABLET, EXTENDED RELEASE ORAL at 16:47

## 2022-06-08 RX ADMIN — MIDODRINE HYDROCHLORIDE 5 MG: 5 TABLET ORAL at 08:15

## 2022-06-08 RX ADMIN — ESCITALOPRAM 20 MG: 5 TABLET, FILM COATED ORAL at 20:58

## 2022-06-08 RX ADMIN — SACUBITRIL AND VALSARTAN 0.5 TABLET: 24; 26 TABLET, FILM COATED ORAL at 08:14

## 2022-06-08 RX ADMIN — BUSPIRONE HYDROCHLORIDE 15 MG: 5 TABLET ORAL at 08:15

## 2022-06-08 RX ADMIN — ATORVASTATIN CALCIUM 80 MG: 40 TABLET, FILM COATED ORAL at 08:16

## 2022-06-08 RX ADMIN — Medication 100 MG: at 08:14

## 2022-06-08 RX ADMIN — POTASSIUM CHLORIDE 20 MEQ: 1500 TABLET, EXTENDED RELEASE ORAL at 08:15

## 2022-06-08 RX ADMIN — MUPIROCIN: 20 OINTMENT TOPICAL at 21:02

## 2022-06-08 RX ADMIN — SODIUM CHLORIDE, PRESERVATIVE FREE 10 ML: 5 INJECTION INTRAVENOUS at 08:17

## 2022-06-08 RX ADMIN — ROPINIROLE HYDROCHLORIDE 0.5 MG: 0.25 TABLET, FILM COATED ORAL at 20:58

## 2022-06-08 RX ADMIN — AMIODARONE HYDROCHLORIDE 100 MG: 200 TABLET ORAL at 08:15

## 2022-06-08 RX ADMIN — MULTIVITAMIN TABLET 1 TABLET: TABLET at 08:15

## 2022-06-08 RX ADMIN — CLOPIDOGREL BISULFATE 75 MG: 75 TABLET ORAL at 08:15

## 2022-06-08 RX ADMIN — PANTOPRAZOLE SODIUM 40 MG: 40 TABLET, DELAYED RELEASE ORAL at 07:08

## 2022-06-08 ASSESSMENT — PAIN SCALES - GENERAL
PAINLEVEL_OUTOF10: 0
PAINLEVEL_OUTOF10: 0

## 2022-06-08 NOTE — PROGRESS NOTES
Urology Progress Note    Subjective: Follow-up for urinary retention and BPH    Objective:  Patient's mental status is slowly improving. Kidney function is improving as well. At bedside patient's sister who is his caregiver. She states that at baseline patient urinates very frequently both day and night. She states that he is complained of hesitancy in the past.  Patient does have heavy alcohol use. Per nursing Montes was not draining appropriately, it was repositioned in approximately 1500 mL of clear urine drained. It is now draining appropriately.     REVIEW OF SYSTEMS:  Patient is unreliable historian at this time    PHYSICAL EXAM:  Constitutional: Somnolent  Neuro: Somnolent, oriented to person only  Psych: Somnolent but arousable  HEENT: normocephalic, atraumatic  Lungs: Respiratory effort normal, unlabored  Cardiovascular irregularly irregular rhythm  Abdomen: obese, soft, non-tender, mild distended  : No CVA tenderness   Extremities: Calves are non-tender, edema bilateral feet and ankles    Patient Vitals for the past 24 hrs:   BP Temp Temp src Pulse Resp SpO2 Weight   06/08/22 1200 119/86 -- -- (!) 108 19 95 % --   06/08/22 1100 111/76 (!) 96.4 °F (35.8 °C) Temporal (!) 114 18 92 % --   06/08/22 1000 107/67 -- -- (!) 108 19 93 % --   06/08/22 0900 102/67 -- -- 85 15 95 % --   06/08/22 0800 108/65 -- -- 89 15 95 % --   06/08/22 0700 111/77 -- -- 94 23 92 % --   06/08/22 0650 112/83 96.8 °F (36 °C) Temporal 99 18 95 % --   06/08/22 0600 110/85 -- -- 91 -- 94 % --   06/08/22 0500 -- -- -- -- -- -- 183 lb (83 kg)   06/07/22 1930 106/73 -- -- -- -- -- --   06/07/22 1900 -- 97 °F (36.1 °C) Temporal -- 18 -- --   06/07/22 1700 95/76 -- -- (!) 116 -- -- --   06/07/22 1622 102/85 -- -- (!) 129 -- -- --   06/07/22 1600 99/78 -- -- (!) 113 -- -- --   06/07/22 1500 97/76 97.4 °F (36.3 °C) Temporal (!) 118 16 96 % --   06/07/22 1404 100/70 -- -- (!) 124 -- -- --   06/07/22 1400 89/71 -- -- (!) 125 -- -- -- 06/07/22 1330 99/78 -- -- (!) 135 -- -- --       Intake/Output Summary (Last 24 hours) at 6/8/2022 1306  Last data filed at 6/8/2022 1230  Gross per 24 hour   Intake 210 ml   Output 3040 ml   Net -2830 ml       Recent Labs     06/06/22  1225 06/07/22  0515 06/08/22  0525   WBC 3.8 3.5 3.4*   HGB 13.8 11.8* 11.9*   HCT 39.8* 34.3* 35.3*   MCV 99.7 100.6 101.7   * See Reflexed IPF Result See Reflexed IPF Result     Recent Labs     06/06/22  1225 06/07/22  0515 06/08/22  0525   * 130* 131*   K 4.3 4.0 3.5*   CL 89* 95* 94*   CO2 26 23 28   BUN 28* 29* 24*   CREATININE 1.52* 1.69* 1.19       Recent Labs     06/06/22  1440   COLORU Yellow   PHUR 6.0   WBCUA 0 TO 2   RBCUA 2 TO 5   SPECGRAV <1.005*   LEUKOCYTESUR NEGATIVE   UROBILINOGEN Normal   BILIRUBINUR NEGATIVE       Assessment:    Patient Active Problem List   Diagnosis    Atrial fibrillation with RVR (HCC)    Acute on chronic combined systolic and diastolic CHF, NYHA class 4 (HCC)    Coronary artery disease    Hypertension    Thrombocytopenia (HCC)    Hyponatremia    Hyperlipidemia    Cirrhosis of liver with ascites (HCC)    Hypomagnesemia    Ascites    CKD (chronic kidney disease)    History of acute inferior wall MI    Ischemic cardiomyopathy    Chronic combined systolic (congestive) and diastolic (congestive) heart failure (HCC)    Dehydration, moderate    Medication side effect, initial encounter    Severe malnutrition (HCC)       Plan:   Follow-up urine culture    Maintain Montes catheter    Once patient is transferred to the [facility] unit Montes catheter can be removed then we will order patient to be prompted to void every 2 hours while awake with PVR's    Continue Flomax    Patient will need to follow-up in the office 2 to 3 weeks after discharge.       I have discussed the care of this patient including pertinent history and exam findings, lab and imaging results, assessment, orders and plan as documented above with Brandy Chapman Kasey Hoover MD.    ------------------------------------------------  Shala Diaz PA-C  1:06 PM 6/8/2022

## 2022-06-08 NOTE — PLAN OF CARE
Problem: Discharge Planning  Goal: Discharge to home or other facility with appropriate resources  6/8/2022 0933 by Juan Diego Isabel RN  Outcome: Progressing  Flowsheets (Taken 6/8/2022 6383)  Discharge to home or other facility with appropriate resources: Refer to discharge planning if patient needs post-hospital services based on physician order or complex needs related to functional status, cognitive ability or social support system  6/8/2022 0213 by Ap Bullock RN  Outcome: Progressing     Problem: Safety - Adult  Goal: Free from fall injury  6/8/2022 0933 by Juan Diego Isabel RN  Outcome: Progressing  Flowsheets (Taken 6/6/2022 1541 by Ravinder Winslow RN)  Free From Fall Injury: Instruct family/caregiver on patient safety  6/8/2022 0213 by Ap Bullock RN  Outcome: Progressing  Flowsheets (Taken 6/6/2022 1541 by Ravinder Winslow RN)  Free From Fall Injury: Instruct family/caregiver on patient safety     Problem: Skin/Tissue Integrity  Goal: Absence of new skin breakdown  Description: 1. Monitor for areas of redness and/or skin breakdown  2. Assess vascular access sites hourly  3. Every 4-6 hours minimum:  Change oxygen saturation probe site  4. Every 4-6 hours:  If on nasal continuous positive airway pressure, respiratory therapy assess nares and determine need for appliance change or resting period. 6/8/2022 0933 by Juan Diego Isabel RN  Outcome: Progressing  Note: Patient remains without new area of skin breakdown. Patient turns and repositions self in bed.   6/8/2022 0213 by Ap Bullock RN  Outcome: Progressing     Problem: Pain  Goal: Verbalizes/displays adequate comfort level or baseline comfort level  6/8/2022 0933 by Juan Diego Isabel RN  Outcome: Progressing  Flowsheets (Taken 6/8/2022 0787)  Verbalizes/displays adequate comfort level or baseline comfort level: Encourage patient to monitor pain and request assistance  Note: Patient denied pain during assessment this AM.  Will continue to

## 2022-06-08 NOTE — PROGRESS NOTES
Writer updated Senia Harris NP of patient running low blood pressures. HR controlled. Patient denies lightheadedness or dizziness.

## 2022-06-08 NOTE — DISCHARGE INSTR - COC
Continuity of Care Form    Patient Name: Mala Sagastume   :    MRN:  [id number]    Admit date:  2022  Discharge date:  22      Code Status Order: DNR-CCA   Advance Directives:      Admitting Physician:  Eduardo Dias MD  PCP: Eduardo Dias MD    Discharging Nurse: [facility] Unit/Room#: I305/I305-01  Discharging Unit Phone Number: [phone]    Emergency Contact:   Extended Emergency Contact Information  Primary Emergency Contact: Sahra Alvarado  Home Phone: [phone]  Relation: Healthcare Decision Maker   needed? No  Secondary Emergency Contact: Jorge Smith  Home Phone: [phone]  Relation: Spouse   needed? No    Past Surgical History:  Past Surgical History:   Procedure Laterality Date    CAROTID STENT PLACEMENT Bilateral     ROTATOR CUFF REPAIR Right        Immunization History: There is no immunization history on file for this patient.     Active Problems:  Patient Active Problem List   Diagnosis Code    Atrial fibrillation with RVR (HCC) I48.91    Acute on chronic combined systolic and diastolic CHF, NYHA class 4 (HCC) I50.43    Coronary artery disease I25.10    Hypertension I10    Thrombocytopenia (HCC) D69.6    Hyponatremia E87.1    Hyperlipidemia E78.5    Cirrhosis of liver with ascites (HCC) K74.60, R18.8    Hypomagnesemia E83.42    Ascites R18.8    CKD (chronic kidney disease) N18.9    History of acute inferior wall MI I25.2    Ischemic cardiomyopathy I25.5    Chronic combined systolic (congestive) and diastolic (congestive) heart failure (HCC) I50.42    Dehydration, moderate E86.0    Medication side effect, initial encounter T50.905A    Severe malnutrition (HCC) E43       Isolation/Infection:   Isolation            No Isolation          Patient Infection Status       None to display            Nurse Assessment:  Last Vital Signs: BP (!) 76/58   Pulse (!) 104   Temp 97.1 °F (36.2 °C) (Temporal)   Resp 20   Ht 5' 7\" (1.702 m)   Wt 186 lb 12.8 oz (84.7 kg)   SpO2 92%   BMI 29.26 kg/m²     Last documented pain score (0-10 scale): Pain Level: 0  Last Weight:   Wt Readings from Last 1 Encounters:   06/10/22 186 lb 12.8 oz (84.7 kg)     Mental Status:  oriented, alert, and intermit confusion    IV Access:  - None    Nursing Mobility/ADLs:  Walking   Assisted  Transfer  Assisted  Bathing  Assisted  Dressing  Assisted  Toileting  Assisted  Feeding  Independent  Med Admin  Assisted  Med Delivery   whole    Wound Care Documentation and Therapy:        Elimination:  Continence: Bowel: Yes  Bladder: at times  Urinary Catheter: Removal Date 6/9/22    Colostomy/Ileostomy/Ileal Conduit: No       Date of Last BM: 6/10/22    Intake/Output Summary (Last 24 hours) at 6/10/2022 1308  Last data filed at 6/10/2022 1240  Gross per 24 hour   Intake 560 ml   Output 716 ml   Net -156 ml     I/O last 3 completed shifts: In: 1000 [P.O.:990; I.V.:10]  Out: 1245 [Urine:1245]    Safety Concerns: At Risk for Falls and etoh w/d    Impairments/Disabilities:      Vision    Nutrition Therapy:  Current Nutrition Therapy:   - Oral Diet:  General    Routes of Feeding: Oral  Liquids: No Restrictions  Daily Fluid Restriction: no  Last Modified Barium Swallow with Video (Video Swallowing Test): not done    Treatments at the Time of Hospital Discharge:   Respiratory Treatments: prn nebs  Oxygen Therapy:  is on oxygen at 5 L/min per nasal cannula.   Ventilator:    - No ventilator support    Rehab Therapies: Physical Therapy and Occupational Therapy  Weight Bearing Status/Restrictions: No weight bearing restrictions  Other Medical Equipment (for information only, NOT a DME order):  walker  Other Treatments: none    Patient's personal belongings (please select all that are sent with patient):  Glasses, Dentures partial    RN SIGNATURE:  Electronically signed by Cami Chavarria RN on 6/11/22 at 2:05 PM EDT    CASE MANAGEMENT/SOCIAL WORK SECTION    Inpatient Status Date:

## 2022-06-08 NOTE — PLAN OF CARE
Problem: Safety - Adult  Goal: Free from fall injury  Outcome: Progressing  Flowsheets (Taken 6/6/2022 1541 by Mary Up RN)  Free From Fall Injury: Instruct family/caregiver on patient safety     Problem: Pain  Goal: Verbalizes/displays adequate comfort level or baseline comfort level  Outcome: Progressing  Flowsheets (Taken 6/6/2022 1541 by Mary Up RN)  Verbalizes/displays adequate comfort level or baseline comfort level:   Encourage patient to monitor pain and request assistance   Assess pain using appropriate pain scale

## 2022-06-08 NOTE — PROGRESS NOTES
Patient up to chair with one assist and use of walker. Patient tolerated transfer from bed to chair fairly well. Chair alarm in place. Wife @ patient's side. Will continue to monitor.

## 2022-06-08 NOTE — PROGRESS NOTES
Patient assessed this morning. Patient open eyes when spoken to. Patient is alert to person, disoriented to place, time, and situation. Patient with mild tremor. CIWA scale completed. Patient scored: 5. Patient denies pain. Monitor shows atrial fibrillation. Lungs with wheezing and fine crackles. Wife @ bedside.

## 2022-06-08 NOTE — PROGRESS NOTES
[address] [place] [address]            PROGRESS NOTE     Pt Name: Laureen Medellin  MRN: [id number]  [place] 1950  Date of evaluation: 6/8/2022  Provider: SEVEN Cruz CNP    SUBJECTIVE   27-year-old male with history of chronic alcohol use, atrial fibrillation, seen during morning rounds in the ICU. Pt is confused this morning. He pulled at garner overnight and has hematuria. Last dose of ativan around 2 am       Review of Systems:  Review of Systems   Unable to perform ROS: Acuity of condition   Psychiatric/Behavioral: Positive for confusion. All other systems were reviewed with the patient and are negative except as stated    OBJECTIVE     History Obtained From:  spouse, reason patient could not give history:  altered mental status    Vitals:   Temp: 97.2 °F (36.2 °C)  BP: 95/66  Resp: 19  Heart Rate: 84  SpO2: 95 %  -----------------------------------------------------------------  Exam:  GEN: Well-developed well-nourished no obvious distress  HEENT: Pupils are equal round and reactive to light. Mucous membranes are moist.  Uvula is midline. No oral pharyngeal swelling or edema. NECK: Supple, no tenderness  CVS: Grossly irregular with tachycardia present   PULM: Clear to auscultation bilaterally. No respiratory distress  ABD: Soft and nontender, nondistended  EXT: Moves all 4 extremities with ease. No significant swelling. No tenderness. NEURO: Patient is awake alert and oriented to self not situation  No focal neurologic deficit. Follows commands   SKIN: No rashes.   No skin lesions.    -----------------------------------------------------------------  Diagnostic Data:   Complete Blood Count:   Recent Labs     06/06/22  1225 06/07/22  0515 06/08/22  0525   WBC 3.8 3.5 3.4*   RBC 3.99* 3.41* 3.47*   HGB 13.8 11.8* 11.9*   HCT 39.8* 34.3* 35.3*   MCV 99.7 100.6 101.7   MCH 34.6* 34.6* 34.3*   MCHC 34.7 34.4 33.7   RDW 14.2 14.1 14.4   * See Reflexed IPF Result See Reflexed IPF Result   MPV 11.5  --   --         Last 3 Blood Glucose:   Recent Labs     06/06/22  1225 06/07/22  0515 06/08/22  0525   GLUCOSE 88 111* 90        Comprehensive Metabolic Profile:   Recent Labs     06/06/22  1225 06/07/22  0515 06/08/22  0525   * 130* 131*   K 4.3 4.0 3.5*   CL 89* 95* 94*   CO2 26 23 28   BUN 28* 29* 24*   CREATININE 1.52* 1.69* 1.19   GLUCOSE 88 111* 90   CALCIUM 9.7 9.2 9.4   PROT 6.8  --   --    LABALBU 4.5  --   --    BILITOT 1.35*  --   --    ALKPHOS 112  --   --    AST 32  --   --    ALT 26  --   --         Urinalysis:   Lab Results   Component Value Date    NITRU NEGATIVE 06/06/2022    COLORU Yellow 06/06/2022    PHUR 6.0 06/06/2022    WBCUA 0 TO 2 06/06/2022    RBCUA 2 TO 5 06/06/2022    MUCUS 2+ 05/02/2022    BACTERIA 1+ 05/02/2022    SPECGRAV <1.005 06/06/2022    LEUKOCYTESUR NEGATIVE 06/06/2022    UROBILINOGEN Normal 06/06/2022    BILIRUBINUR NEGATIVE 06/06/2022    GLUCOSEU NEGATIVE 06/06/2022    KETUA NEGATIVE 06/06/2022       HgBA1c:  No results found for: LABA1C    Lactic Acid:   Lab Results   Component Value Date    LACTA 1.9 05/18/2022        High Sensitivity Troponin:  Recent Labs     06/06/22  1225 06/06/22  1845   TROPHS 33* 28*         Radiology/Imaging:  XR CHEST PORTABLE   Preliminary Result   Stable chest x-ray, demonstrating cardiomegaly, small left pleural effusion,   and left basilar atelectasis. XR CHEST PORTABLE   Final Result   Minimal left basilar atelectasis. Otherwise, clear lungs. Cardiomegaly. Small left pleural effusion. Assessment/ Plan:   70-year-old male with history of atrial fibrillation currently with RVR, ETOH abuse, and MADIHA on CKD. Hospital course has been complicated by ETOH withdrawal.   Seen by cardiology, recommendations might be to do cardioversion. They have increase his metoprolol and decreased his amiodarone. Patient was noted to have acute urinary retention and Montes catheter was placed.   He has had difficulty with some bladder spasms. Urology had been consulted. Atrial fibrillation with rapid ventricular response  -Patient continues with rates in the 120s to 140s. - continue metoprolol BID. amio stopped by cardiology due to encephalopathy with poor po intake  - CHADSVASC: 3  - continue Eliquis   - cardiology following     Combined congestive heart failure  -Cardiology is concerned that he could be a little dry. He has had 11 pound weight loss since he was last discharged from the hospital.    - continue BB, continue entresto, bumex  - appreciate cards recs      Chronic alcohol use  -Patient on CIWA protocol. Did receive Ativan overnight  - continue CIWA. Last ativan dose at 2am  - continue thiamine and folic acid, multivitamin     MADIHA   - pre renal azotemia vs obstructive from urinary retention  - garner placed 6/6  - repeat chemistry in am     Hypomagnesia  -Patient was given IV magnesium 1 day ago. Magnesium is only 1.8 but it is improved. Will likely continue replacement. Urinary retention  - garner placed 6/6  - continue flomax  - pt pulled on garner and now with hematuria  - urology following       Principal Problem:    Atrial fibrillation with RVR (HCC)  Active Problems:    Chronic combined systolic (congestive) and diastolic (congestive) heart failure (HCC)    Dehydration, moderate    Medication side effect, initial encounter    Severe malnutrition ([address]  Resolved Problems:    * No resolved hospital problems.  *      DVT prophylaxis:  Patient on Eliquis    GI prophylaxis:  H2 blocker    Nutrition status:  Fair    CODE STATUS:  Full

## 2022-06-08 NOTE — PROGRESS NOTES
Michael Richmond am scribing for and in the presence of Niranjan Pisano MD, MS, F.A.C.C..    Patient: Bryn Boast  : [birth year]  Date of Admission: 2022  Primary Care Physician: Diana De Paz  Today's Date: 2022    REASON FOR CONSULTATION: atrial fibrillation with RVR    HPI:  Mr. Marion Rios is a 70 y.o. male who was admitted to the hospital for tachycardia. In the ER he was found to be in atrial fibrillation with RVR leading to this consultation. In the past he reports seeing a Cardiologist in [place] and was planning on having a cardioversion done in  to put him back into normal sinus rhythm due to his history of new onset atrial fibrillation.      He reports a history of a heart attack at age 46 and needed stents placed at that time but denies any cardiac cath since that time. He also has carotid artery stenosis and abdominal aortic aneurysm. He did report having surgery on one side of his neck, however he is not sure which side or how long ago it was. He has had hypertension and hyperlipidemia for years as well.      He is a current every day smoker and he smoked for about 55 years and smokes about a pack a day.      Family history consists of a lot of people in his family with significant cardiac history, however he wanted us to talk to his sister about the family history as she knows the details. Mr. Marion Rios came to the ED for tachycardia. Since he was last seen in our office he has lost 11 pounds. He does have bladder spasms and now has a cath in that he wants out. He has had no fever or chills. He does have a cough and its productive sometimes. His wife does not think he has been eating and drinking okay the last week. He has no appetite. He doesn't have any nausea or vomiting. Echo done on 2022- EF 15-20%    Today he is still sleepy. He does still have a poor appetite at this time. He has not had any Ativan today.  He denied any current or recent chest pain, abdominal pain, bleeding problems, problems with his medications or any other concerns at this time. Past Medical History:   Diagnosis Date    Acute kidney injury ([facility] [address]     Ascites 5/20/2022    Atrial fibrillation (HCC)     CHF (congestive heart failure) (HCC)     Cirrhosis of liver with ascites ([facility] [address] 5/19/2022    CKD (chronic kidney disease) 5/20/2022    Coronary artery disease     Hyperlipidemia     Hypertension     Hypomagnesemia 5/20/2022    Hyponatremia     Thrombocytopenia (HCC)        CURRENT ALLERGIES: Patient has no known allergies. REVIEW OF SYSTEMS: 14 systems were reviewed. Pertinent positives and negatives as above, all else negative. Past Surgical History:   Procedure Laterality Date    CAROTID STENT PLACEMENT Bilateral     ROTATOR CUFF REPAIR Right     Social History:  Social History     Tobacco Use    Smoking status: Current Every Day Smoker     Packs/day: 1.00     Types: Cigarettes    Smokeless tobacco: Never Used   Substance Use Topics    Alcohol use: Yes     Alcohol/week: 2.0 standard drinks     Types: 2 Shots of liquor per week     Comment: Drinks about 2 Kesslers with mix nightly. Reported history of heavy alcohol use for years with both beer and liquior several years ago    Drug use: Not on file        CURRENT MEDICATIONS:  No outpatient medications have been marked as taking for the 6/6/22 encounter [facility] Encounter). FAMILY HISTORY: Reviewed but non-contributory     PHYSICAL EXAM:   BP (!) 83/59   Pulse 93   Temp 97.2 °F (36.2 °C) (Temporal)   Resp 16   Ht 5' 7\" (1.702 m)   Wt 183 lb (83 kg)   SpO2 95%   BMI 28.66 kg/m²  Body mass index is 28.66 kg/m². Constitutional: He is oriented to person, place, and time. He appears well-developed and well-nourished. In no acute distress. HEENT: Normocephalic and atraumatic. No JVD present. Carotid bruit is not present. No mass and no thyromegaly present. No lymphadenopathy present.   Cardiovascular: Normal rate, irregularly irregular rhythm, normal heart sounds. Exam reveals no gallop and no friction rubs. No murmur was heard. .   Pulmonary/Chest: Effort normal and breath sounds normal. No respiratory distress. He has no wheezes, rhonchi or rales. Abdominal: Soft, non-tender. Bowel sounds and aorta are normal. He exhibits no organomegaly, mass or bruit. Extremities: Trace. No cyanosis or clubbing. 2+ radial and carotid pulses. Distal extremity pulses: 2+ bilaterally. Neurological: He is alert and oriented to person, place, and time. No evidence of gross cranial nerve deficit. Coordination appeared normal.   Skin: Skin is warm and dry. There is no rash or diaphoresis. Psychiatric: He has a normal mood and affect.  His speech is normal and behavior is normal.      MOST RECENT LABS ON RECORD:   Lab Results   Component Value Date    WBC 3.4 (L) 06/08/2022    HGB 11.9 (L) 06/08/2022    HCT 35.3 (L) 06/08/2022    PLT See Reflexed IPF Result 06/08/2022    ALT 26 06/06/2022    AST 32 06/06/2022     (L) 06/08/2022    K 3.5 (L) 06/08/2022    CL 94 (L) 06/08/2022    CREATININE 1.19 06/08/2022    BUN 24 (H) 06/08/2022    CO2 28 06/08/2022    TSH 3.69 06/06/2022    INR 1.7 06/07/2022     ASSESSMENT:  Patient Active Problem List    Diagnosis Date Noted    Chronic combined systolic (congestive) and diastolic (congestive) heart failure (HCC)     Dehydration, moderate     Medication side effect, initial encounter     History of acute inferior wall MI     Ischemic cardiomyopathy     Severe malnutrition ([facility] [address] 06/07/2022    Hypomagnesemia 05/20/2022    Ascites 05/20/2022    CKD (chronic kidney disease) 05/20/2022    Cirrhosis of liver with ascites ([facility] [address] 05/19/2022    Acute on chronic combined systolic and diastolic CHF, NYHA class 4 (HCC)     Coronary artery disease     Hypertension     Thrombocytopenia (HCC)     Hyponatremia     Hyperlipidemia     Atrial fibrillation with RVR ([facility] [address] 05/18/2022 PLAN:  · Paroxysmal Atrial Fibrillation: Rate Control Symptomatic and appears to not be working well. May need to convert to rhythm control strategy.  Beta Blocker: Continue Metoprolol tartrate to 100 mg twice daily. I also discussed the potential side effects of this medication including lightheadedness and dizziness and instructed them to stop the medication of this occurs and call our office if this occurs.  Anti-Arrhythmic: STOP amiodarone (Pacerone) due to him being so sleepy and not eating or drinking well as this cause be causing his . His HR is better today we will control this with the Metoprolol. [id number] Score for Atrial Fibrillation Stroke Risk   Risk   Factors  Component Value   C CHF Yes 1   H HTN Yes 1   A2 Age >= 76 No,  (75 y.o.) 0   D DM No 0   S2 Prior Stroke/TIA No 0   V Vascular Disease No 0   A Age 74-69 Yes,  (75 y.o.) 1   Sc Sex male 0    [id number]  Score  3   Score last updated 6/8/48 5:70 PM EDT  Click here for a link to the UpToDate guideline \"Atrial Fibrillation: Anticoagulation therapy to prevent embolization  Disclaimer: Risk Score calculation is dependent on accuracy of patient problem list and past encounter diagnosis.  Stroke Risk: CHADS2-VASc Score: 3/9 (3.2% stroke risk). Because of his atrial fibrillation, I also would also recommend that he continue with anticoagulation to decrease his risk of stoke but also reminded him to watch for signs of blood in his stool or black tarry stools and stop the medication immediately if this develops as this could be life threatening.  Anticoagulation: Continue Apixaban (Eliquis) 5 mg every 12 hours.  Chronic combined heart failure: I do believe he may be on the dry side with 11 lb weight loss since hospital discharge   Beta Blocker: Continue Metoprolol tartrate to 100 mg twice daily.  I also discussed the potential side effects of this medication including lightheadedness and dizziness and instructed them to stop the medication of this occurs and call our office if this occurs.  ACE Inibitor/ARB: Continue sacubitril/valsartan (Entresto) 24/26 mg twice daily.  Diuretics: Continue bumetinide (Bumex) 1 mg every morning.  Heart failure counseling: I advised them to try and keep their legs up whenever possible and to limit salt in their diet. Once again, thank you for allowing me to participate in this patients care. Please do not hesitate to contact me could I be of further assistance. Sincerely,  Aneudy Kim MD, MS, F.A.C.C. [facility] Specialist    [address] [facility] [place] [place] [address], [address] [facility]  Phone: [phone], Fax: [phone]     I believe that the risk of significant morbidity and mortality related to the patient's current medical conditions are: intermediate-high. The documentation recorded by the scribe, accurately and completely reflects the services I personally performed and the decisions made by me. Aneudy Kim MD, MS, F.A.C.C.  June 8, 2022

## 2022-06-09 LAB
ABSOLUTE EOS #: 0.03 K/UL (ref 0–0.44)
ABSOLUTE IMMATURE GRANULOCYTE: 0 K/UL (ref 0–0.3)
ABSOLUTE LYMPH #: 0.57 K/UL (ref 1.1–3.7)
ABSOLUTE MONO #: 0.24 K/UL (ref 0.1–1.2)
ANION GAP SERPL CALCULATED.3IONS-SCNC: 7 MMOL/L (ref 9–17)
BASOPHILS # BLD: 0 % (ref 0–2)
BASOPHILS ABSOLUTE: 0 K/UL (ref 0–0.2)
BUN BLDV-MCNC: 21 MG/DL (ref 8–23)
BUN/CREAT BLD: 21 (ref 9–20)
CALCIUM SERPL-MCNC: 9.3 MG/DL (ref 8.6–10.4)
CHLORIDE BLD-SCNC: 95 MMOL/L (ref 98–107)
CO2: 32 MMOL/L (ref 20–31)
CREAT SERPL-MCNC: 1 MG/DL (ref 0.7–1.2)
EOSINOPHILS RELATIVE PERCENT: 1 % (ref 1–4)
GFR AFRICAN AMERICAN: >60 ML/MIN
GFR NON-AFRICAN AMERICAN: >60 ML/MIN
GFR SERPL CREATININE-BSD FRML MDRD: ABNORMAL ML/MIN/{1.73_M2}
GFR SERPL CREATININE-BSD FRML MDRD: ABNORMAL ML/MIN/{1.73_M2}
GLUCOSE BLD-MCNC: 94 MG/DL (ref 70–99)
HCT VFR BLD CALC: 36.4 % (ref 40.7–50.3)
HEMOGLOBIN: 12.1 G/DL (ref 13–17)
IMMATURE GRANULOCYTES: 0 %
LYMPHOCYTES # BLD: 19 % (ref 24–43)
MAGNESIUM: 1.6 MG/DL (ref 1.6–2.6)
MCH RBC QN AUTO: 33.7 PG (ref 25.2–33.5)
MCHC RBC AUTO-ENTMCNC: 33.2 G/DL (ref 28.4–34.8)
MCV RBC AUTO: 101.4 FL (ref 82.6–102.9)
MONOCYTES # BLD: 8 % (ref 3–12)
MORPHOLOGY: ABNORMAL
NRBC AUTOMATED: 0 PER 100 WBC
PDW BLD-RTO: 14.3 % (ref 11.8–14.4)
PLATELET # BLD: ABNORMAL K/UL (ref 138–453)
PLATELET, FLUORESCENCE: 91 K/UL (ref 138–453)
PLATELET, IMMATURE FRACTION: 8.5 % (ref 1.1–10.3)
POTASSIUM SERPL-SCNC: 3.4 MMOL/L (ref 3.7–5.3)
RBC # BLD: 3.59 M/UL (ref 4.21–5.77)
SEG NEUTROPHILS: 72 % (ref 36–65)
SEGMENTED NEUTROPHILS ABSOLUTE COUNT: 2.16 K/UL (ref 1.5–8.1)
SODIUM BLD-SCNC: 134 MMOL/L (ref 135–144)
SURGICAL PATHOLOGY REPORT: NORMAL
WBC # BLD: 3 K/UL (ref 3.5–11.3)

## 2022-06-09 PROCEDURE — 83735 ASSAY OF MAGNESIUM: CPT

## 2022-06-09 PROCEDURE — 6370000000 HC RX 637 (ALT 250 FOR IP): Performed by: STUDENT IN AN ORGANIZED HEALTH CARE EDUCATION/TRAINING PROGRAM

## 2022-06-09 PROCEDURE — 2000000000 HC ICU R&B

## 2022-06-09 PROCEDURE — 6370000000 HC RX 637 (ALT 250 FOR IP): Performed by: PHYSICIAN ASSISTANT

## 2022-06-09 PROCEDURE — 2580000003 HC RX 258: Performed by: PHYSICIAN ASSISTANT

## 2022-06-09 PROCEDURE — 85055 RETICULATED PLATELET ASSAY: CPT

## 2022-06-09 PROCEDURE — 99232 SBSQ HOSP IP/OBS MODERATE 35: CPT | Performed by: PHYSICIAN ASSISTANT

## 2022-06-09 PROCEDURE — 6370000000 HC RX 637 (ALT 250 FOR IP): Performed by: FAMILY MEDICINE

## 2022-06-09 PROCEDURE — 2580000003 HC RX 258: Performed by: NURSE PRACTITIONER

## 2022-06-09 PROCEDURE — 97166 OT EVAL MOD COMPLEX 45 MIN: CPT

## 2022-06-09 PROCEDURE — 6370000000 HC RX 637 (ALT 250 FOR IP): Performed by: SPECIALIST

## 2022-06-09 PROCEDURE — 94761 N-INVAS EAR/PLS OXIMETRY MLT: CPT

## 2022-06-09 PROCEDURE — 6360000002 HC RX W HCPCS: Performed by: NURSE PRACTITIONER

## 2022-06-09 PROCEDURE — 6370000000 HC RX 637 (ALT 250 FOR IP): Performed by: NURSE PRACTITIONER

## 2022-06-09 PROCEDURE — 36415 COLL VENOUS BLD VENIPUNCTURE: CPT

## 2022-06-09 PROCEDURE — 80048 BASIC METABOLIC PNL TOTAL CA: CPT

## 2022-06-09 PROCEDURE — 85025 COMPLETE CBC W/AUTO DIFF WBC: CPT

## 2022-06-09 RX ORDER — MAGNESIUM SULFATE IN WATER 40 MG/ML
2000 INJECTION, SOLUTION INTRAVENOUS ONCE
Status: COMPLETED | OUTPATIENT
Start: 2022-06-09 | End: 2022-06-09

## 2022-06-09 RX ORDER — FLUDROCORTISONE ACETATE 0.1 MG/1
0.1 TABLET ORAL DAILY
Status: DISCONTINUED | OUTPATIENT
Start: 2022-06-09 | End: 2022-06-10

## 2022-06-09 RX ORDER — 0.9 % SODIUM CHLORIDE 0.9 %
500 INTRAVENOUS SOLUTION INTRAVENOUS ONCE
Status: COMPLETED | OUTPATIENT
Start: 2022-06-09 | End: 2022-06-09

## 2022-06-09 RX ADMIN — SACUBITRIL AND VALSARTAN 0.5 TABLET: 24; 26 TABLET, FILM COATED ORAL at 08:20

## 2022-06-09 RX ADMIN — BUMETANIDE 1 MG: 1 TABLET ORAL at 08:20

## 2022-06-09 RX ADMIN — ROPINIROLE HYDROCHLORIDE 0.5 MG: 0.25 TABLET, FILM COATED ORAL at 20:21

## 2022-06-09 RX ADMIN — MUPIROCIN: 20 OINTMENT TOPICAL at 08:22

## 2022-06-09 RX ADMIN — MIDODRINE HYDROCHLORIDE 5 MG: 5 TABLET ORAL at 08:20

## 2022-06-09 RX ADMIN — POTASSIUM CHLORIDE 20 MEQ: 1500 TABLET, EXTENDED RELEASE ORAL at 17:02

## 2022-06-09 RX ADMIN — ATORVASTATIN CALCIUM 80 MG: 40 TABLET, FILM COATED ORAL at 08:20

## 2022-06-09 RX ADMIN — Medication 600 MG: at 20:21

## 2022-06-09 RX ADMIN — MULTIVITAMIN TABLET 1 TABLET: TABLET at 08:21

## 2022-06-09 RX ADMIN — METOPROLOL TARTRATE 100 MG: 100 TABLET, FILM COATED ORAL at 08:20

## 2022-06-09 RX ADMIN — SODIUM CHLORIDE, PRESERVATIVE FREE 5 ML: 5 INJECTION INTRAVENOUS at 20:23

## 2022-06-09 RX ADMIN — MUPIROCIN: 20 OINTMENT TOPICAL at 20:46

## 2022-06-09 RX ADMIN — BUSPIRONE HYDROCHLORIDE 15 MG: 5 TABLET ORAL at 20:21

## 2022-06-09 RX ADMIN — SACUBITRIL AND VALSARTAN 0.5 TABLET: 24; 26 TABLET, FILM COATED ORAL at 20:33

## 2022-06-09 RX ADMIN — TAMSULOSIN HYDROCHLORIDE 0.4 MG: 0.4 CAPSULE ORAL at 08:21

## 2022-06-09 RX ADMIN — Medication 100 MG: at 08:21

## 2022-06-09 RX ADMIN — FOLIC ACID 1 MG: 1 TABLET ORAL at 08:20

## 2022-06-09 RX ADMIN — SODIUM CHLORIDE, PRESERVATIVE FREE 10 ML: 5 INJECTION INTRAVENOUS at 08:19

## 2022-06-09 RX ADMIN — APIXABAN 5 MG: 5 TABLET, FILM COATED ORAL at 08:21

## 2022-06-09 RX ADMIN — Medication 600 MG: at 08:20

## 2022-06-09 RX ADMIN — MAGNESIUM SULFATE HEPTAHYDRATE 2000 MG: 40 INJECTION, SOLUTION INTRAVENOUS at 10:08

## 2022-06-09 RX ADMIN — BUSPIRONE HYDROCHLORIDE 15 MG: 5 TABLET ORAL at 08:21

## 2022-06-09 RX ADMIN — POTASSIUM CHLORIDE 20 MEQ: 1500 TABLET, EXTENDED RELEASE ORAL at 08:20

## 2022-06-09 RX ADMIN — METOPROLOL TARTRATE 100 MG: 100 TABLET, FILM COATED ORAL at 20:33

## 2022-06-09 RX ADMIN — PANTOPRAZOLE SODIUM 40 MG: 40 TABLET, DELAYED RELEASE ORAL at 07:32

## 2022-06-09 RX ADMIN — MIDODRINE HYDROCHLORIDE 5 MG: 5 TABLET ORAL at 11:48

## 2022-06-09 RX ADMIN — CLOPIDOGREL BISULFATE 75 MG: 75 TABLET ORAL at 08:21

## 2022-06-09 RX ADMIN — APIXABAN 5 MG: 5 TABLET, FILM COATED ORAL at 20:33

## 2022-06-09 RX ADMIN — Medication 10 MG: at 20:21

## 2022-06-09 RX ADMIN — SODIUM CHLORIDE 500 ML: 9 INJECTION, SOLUTION INTRAVENOUS at 10:52

## 2022-06-09 RX ADMIN — FLUDROCORTISONE ACETATE 0.1 MG: 0.1 TABLET ORAL at 17:42

## 2022-06-09 RX ADMIN — ESCITALOPRAM 20 MG: 5 TABLET, FILM COATED ORAL at 20:21

## 2022-06-09 RX ADMIN — MIDODRINE HYDROCHLORIDE 5 MG: 5 TABLET ORAL at 17:02

## 2022-06-09 ASSESSMENT — ENCOUNTER SYMPTOMS
RESPIRATORY NEGATIVE: 1
GASTROINTESTINAL NEGATIVE: 1
EYES NEGATIVE: 1
ALLERGIC/IMMUNOLOGIC NEGATIVE: 1

## 2022-06-09 ASSESSMENT — PAIN SCALES - GENERAL: PAINLEVEL_OUTOF10: 0

## 2022-06-09 NOTE — PROGRESS NOTES
Pt resting in bed with wife at bedside. Assessment and vital signs as charted. Pt is A & O x 4. Cardiac monitor shows A-fib. Nasal canula on at 2.5L. Montes catheter intact and draining. Pt denies any other needs at this time. Seizure pads on. Call light in reach. Bed alarm on. Will continue to monitor.

## 2022-06-09 NOTE — PROGRESS NOTES
Pt resting in bed with eyes closed. Respirations even and unlabored. No distress noted. Wife sleeping on couch. Assessment and vital signs as charted. Pt denies pain at this time. Pt is A &  O x 4. Cardiac monitor continues to show A-Fib. Nasal canula remains in use at 2.5L. Montes catheter continues to drain. Pt denies any other needs. Bed alarm on. Call light in reach. Seizure pads on. Will continue to monitor.

## 2022-06-09 NOTE — PROGRESS NOTES
Pt resting in bed quietly with eyes closed. Respirations even and unlabored. No distress noted. Assessment and vital signs as charted. Pt denies pain and is A & O x 4. Cardiac monitor continues to show A-Fib. Montes catheter remains intact and draining. Bed alarm on. Call light in reach. Seizure pads on. Will continue to monitor.

## 2022-06-09 NOTE — PLAN OF CARE
Problem: Safety - Adult  Goal: Free from fall injury  6/8/2022 2034 by Richard Zacarias RN  Outcome: Progressing  Flowsheets (Taken 6/6/2022 1541 by Sage Alonso RN)  Free From Fall Injury: Instruct family/caregiver on patient safety  Note: Pt is at high risk for falls. Non skid socks on. Bed in low position and wheels locked. Fall sign posted and bracelet on. Seizure pads on. Bed alarm on. Call light within reach. Will continue to assess. Problem: Pain  Goal: Verbalizes/displays adequate comfort level or baseline comfort level  6/8/2022 2034 by Richard Zacarias RN  Outcome: Progressing  Flowsheets (Taken 6/8/2022 2034)  Verbalizes/displays adequate comfort level or baseline comfort level:   Assess pain using appropriate pain scale   Encourage patient to monitor pain and request assistance  Note: Pt denies pain at this time. Pain meds given as needed & as ordered. Will continue to assess.

## 2022-06-09 NOTE — PROGRESS NOTES
Physical Therapy  Facility/Department: Critical access hospital AT THE [facility]  Physical Therapy Initial Assessment    Name: Lilly Hearn  :   MRN: [id number]  Date of Service: 2022    Discharge Recommendations:  Continue to assess pending progress,Home with Home health PT,Outpatient PT   PT Equipment Recommendations  Equipment Needed: No      Patient Diagnosis(es): There were no encounter diagnoses. Past Medical History:  has a past medical history of Acute kidney injury ([address] Ascites, Atrial fibrillation ([address] CHF (congestive heart failure) ([address] Cirrhosis of liver with ascites ([address] CKD (chronic kidney disease), Coronary artery disease, Hyperlipidemia, Hypertension, Hypomagnesemia, Hyponatremia, and Thrombocytopenia ([address] Past Surgical History:  has a past surgical history that includes Rotator cuff repair (Right) and Carotid stent placement (Bilateral). Assessment   Body Structures, Functions, Activity Limitations Requiring Skilled Therapeutic Intervention: Decreased functional mobility ; Decreased ADL status; Decreased body mechanics; Decreased strength;Decreased cognition;Decreased endurance;Decreased balance;Decreased high-level IADLs  Assessment: Pt is a pleasant 79yo man who presents with A-Fib. Pt presents with [facility] RoM and Strength, but demonstrates limited static and dynamic standing balance. Pt requires multiple cues to complete simple movement tasks, although the pt has minimal difficulty once oriented to the task. Pt is currently SUP/CGA with all transfers and mobility d/t safety and stability concerns. Pt would benefit from skilled therapy to address these functional defecits and help increase pt's functional independence.   Treatment Diagnosis: Generalized weakness, gait abnormalities  Therapy Prognosis: Good  Decision Making: Medium Complexity  Requires PT Follow-Up: Yes  Activity Tolerance  Activity Tolerance: Patient tolerated evaluation without incident     Plan   Plan  Plan: 2 times a day 7 days a week (1x daily on weekends)  Plan weeks: 20 days  Current Treatment Recommendations: Balance training,Functional mobility training,Transfer training,IADL training,Endurance training,Gait training,Stair training,Neuromuscular re-education,Return to work related activity,Home exercise program,Safety education & training,Modalities,Therapeutic activities  Safety Devices  Type of Devices: All fall risk precautions in place,Call light within reach,Chair alarm in place,Left in chair     Restrictions  Restrictions/Precautions  Restrictions/Precautions: Fall Risk,General Precautions  Required Braces or Orthoses?: No     Subjective   General  Chart Reviewed: Yes  Patient assessed for rehabilitation services?: Yes  Referring Practitioner: Tracie  Referral Date : 06/09/22  Diagnosis: A-Fib  Follows Commands: Within Functional Limits  Subjective  Subjective: Pt is very pleasant and agreeable to therapy at this time. Social/Functional History  Social/Functional History  Lives With: Alone  Type of Home: Trailer  Home Layout: One level  Home Access: Ramped entrance  Bathroom Shower/Tub: Walk-in shower,Shower chair with back  Bathroom Toilet: Handicap height  Bathroom Equipment: Grab bars in shower  Home Equipment: [place] mobile, rolling  Has the patient had two or more falls in the past year or any fall with injury in the past year?: Yes  ADL Assistance: Independent  Homemaking Assistance: Needs assistance  Ambulation Assistance: Independent  Transfer Assistance: Independent  Active : Yes  Vision/Hearing  Vision  Vision: Impaired  Vision Exceptions: Wears glasses for reading  Hearing  Hearing: Within functional limits    Cognition   Orientation  Overall Orientation Status: Within Functional Limits  Cognition  Overall Cognitive Status: Exceptions  Arousal/Alertness: Appropriate responses to stimuli  Following Commands: Follows one step commands with increased time; Follows one step commands with repetition  Memory: Decreased recall of recent events;Decreased short term memory;Decreased recall of precautions  Safety Judgement: Decreased awareness of need for assistance  Insights: Decreased awareness of deficits  Initiation: Requires cues for some  Sequencing: Requires cues for some     Objective   Heart Rate: (!) 108  Heart Rate Source: Monitor; Apical  BP: (!) 80/56  BP Location: Left upper arm  BP Method: Automatic  Patient Position: Sitting;Up in chair  MAP (Calculated): 64  Resp: 13  SpO2: (!) 71 %  O2 Device: None (Room air)     Observation/Palpation  Posture: Good        AROM RLE (degrees)  RLE AROM: WFL  AROM LLE (degrees)  LLE AROM : WFL  Strength RLE  Strength RLE: WFL  Strength LLE  Strength LLE: WFL        Balance  Sitting: Impaired  Sitting - Static: Good (unsupported)  Sitting - Dynamic: Occasional  Standing: Impaired  Standing - Static: Occasional  Standing - Dynamic: Occasional  Gait  Overall Level of Assistance: Contact-guard assistance (cueing for safety)  Bed mobility  Supine to Sit: Stand by assistance  Scooting: Stand by assistance  Transfers  Sit to Stand: Stand by assistance  Stand to sit: Stand by assistance  Stand Pivot Transfers: Stand by assistance  Ambulation  WB Status: FWB  Ambulation  Surface: level tile  Device: No Device  Assistance: Stand by assistance;Contact guard assistance  Gait Deviations: Increased AMEE  Distance: 5'  Comments: Pt had difficulty with both static and dynamic balance     Balance  Posture: Good  Sitting - Static: Good  Sitting - Dynamic: Good  Standing - Static: Fair  Standing - Dynamic: Fair  Exercise Treatment: transfers, gait, stanind balance, activity tolerance        AM-PAC Score     AM-PAC Inpatient Mobility without Stair Climbing Raw Score : 14 (06/09/22 1819)  AM-PAC Inpatient without Stair Climbing T-Scale Score : 40.85 (06/09/22 1819)  Mobility Inpatient CMS 0-100% Score: 53.33 (06/09/22 1819)  Mobility Inpatient without Stair CMS G-Code Modifier : CK (06/09/22 1819) Goals  Short Term Goals  Time Frame for Short term goals: 20 days  Short term goal 1: Pt to be able to ambulate 50ft IND to be able to return home safetly. Short term goal 2: Pt to be IND with all transfers to improve functional mobility and increase safety with ADLs  Short term goal 3: Pt to be able to tolerate 20-30min therex/act to improve overall functional capactiy for ADL's. Patient Goals   Patient goals : to return home       Education  Patient Education  Education Given To: Patient; Family  Education Provided: Role of Therapy;Plan of Care  Education Method: Verbal  Barriers to Learning: Cognition  Education Outcome: Verbalized understanding      Therapy Time   Individual Concurrent Group Co-treatment   Time In 1420         Time Out 1440         Minutes 20         Timed Code Treatment Minutes: Jaqueline 64, PT

## 2022-06-09 NOTE — PROGRESS NOTES
Occupational Therapy  Facility/Department: [facility] AT THE [facility]  Occupational Therapy Initial Assessment    Name: Joshua Jesus  : [id number]  MRN: [id number]  Date of Service: 2022    Discharge Recommendations:  Continue to assess pending progress,Subacute/Skilled Nursing Facility          Patient Diagnosis(es): There were no encounter diagnoses. Past Medical History:  has a past medical history of Acute kidney injury ([facility] Ascites, Atrial fibrillation ([facility] CHF (congestive heart failure) ([facility] Cirrhosis of liver with ascites ([facility] CKD (chronic kidney disease), Coronary artery disease, Hyperlipidemia, Hypertension, Hypomagnesemia, Hyponatremia, and Thrombocytopenia ([facility] Past Surgical History:  has a past surgical history that includes Rotator cuff repair (Right) and Carotid stent placement (Bilateral). Treatment Diagnosis: Weakness      Assessment   Performance deficits / Impairments: Decreased functional mobility ; Decreased ADL status; Decreased strength;Decreased safe awareness;Decreased cognition;Decreased balance;Decreased endurance  Assessment: 71 yo M admitted to T ICU for a-fib. Patient presents wtih increased confusion c decreased ability to follow directions and generalized weakness, requiring increased need for assist during ADL. OT to address to ensure safe return home.   Treatment Diagnosis: Weakness  Prognosis: Fair  Decision Making: Medium Complexity  REQUIRES OT FOLLOW-UP: Yes        Plan   Plan  Times per Week: 7  Times per Day: Daily  Current Treatment Recommendations: Strengthening,Balance training,Functional mobility training,Endurance training,Cognitive reorientation,Safety education & training,Patient/Caregiver education & training,Equipment evaluation, education, & procurement,Self-Care / ADL     Restrictions  Restrictions/Precautions  Restrictions/Precautions: Fall Risk,General Precautions    Subjective   Subjective  Subjective: Patient supine in bed upon arrival, agreeable to OT evaluation. General Comment  Comments: Patient c no c/o pain. Social/Functional History  Social/Functional History  Lives With: Alone  Type of Home: Trailer  Home Layout: One level  Home Access: Ramped entrance  Bathroom Shower/Tub: Walk-in shower,Shower chair with back  Bathroom Toilet: Handicap height  Bathroom Equipment: Grab bars in shower  Home Equipment: [place], rolling  Has the patient had two or more falls in the past year or any fall with injury in the past year?: Yes  ADL Assistance: Independent  Homemaking Assistance: Needs assistance  Ambulation Assistance: Independent  Transfer Assistance: Independent  Active : Yes       Objective   Heart Rate: (!) 107  Heart Rate Source: Apical;Monitor  BP: (!) 80/59  BP Location: Left upper arm  BP Method: Automatic  Patient Position: Up in chair  MAP (Calculated): 66  Resp: 19  SpO2: 92 %  O2 Device: None (Room air)  Vision Exceptions: Wears glasses for reading  Hearing: Within functional limits          Safety Devices  Type of Devices: All fall risk precautions in place;Call light within reach; Chair alarm in place; Left in chair  Balance  Sitting: Impaired  Sitting - Static: Good (unsupported)  Sitting - Dynamic: Occasional  Standing: Impaired  Standing - Static: Occasional  Standing - Dynamic: Occasional  Gait  Overall Level of Assistance: Contact-guard assistance (cueing for safety)     AROM: Within functional limits  PROM: Within functional limits  Strength: Generally decreased, functional  Coordination: Generally decreased, functional  Tone: Normal  Sensation: Intact  ADL  Feeding: Independent;Setup  Grooming: Minimal assistance  UE Bathing: Minimal assistance  LE Bathing: Minimal assistance  UE Dressing: Minimal assistance  LE Dressing: Minimal assistance  Toileting: Minimal assistance  Additional Comments: patient c cueing for safety and proper use of ADL needs           Transfers  Stand Step Transfers: Contact guard assistance  Sit to stand: Contact guard assistance  Stand to sit: Contact guard assistance     Cognition  Overall Cognitive Status: Exceptions  Arousal/Alertness: Appropriate responses to stimuli  Following Commands: Follows one step commands with increased time; Follows one step commands with repetition  Memory: Decreased recall of recent events;Decreased short term memory;Decreased recall of precautions  Safety Judgement: Decreased awareness of need for assistance  Insights: Decreased awareness of deficits  Initiation: Requires cues for some  Sequencing: Requires cues for some                  Education Given To: Patient  Education Provided: Role of Therapy;Plan of Care;Transfer Training; Fall Prevention Strategies  Education Method: Verbal  Barriers to Learning: Cognition  Education Outcome: Continued education needed                        AM-PAC Score        AM-[facility] Inpatient Daily Activity Raw Score: 19 (06/09/22 1504)  AM-PAC Inpatient ADL T-Scale Score : 40.22 (06/09/22 1504)  ADL Inpatient CMS 0-100% Score: 42.8 (06/09/22 1504)  ADL Inpatient CMS G-Code Modifier : CK (06/09/22 1504)    Goals  Short Term Goals  Time Frame for Short term goals: 21 visits  Short Term Goal 1: Patient to be educated on d/c folder, AE/DME and EC/WS techniques to ensure safe and indep return home. Short Term Goal 2: Patient to complete ADL routine c Mod I c improved safety, sequecing and completion to ensure safe return home. Short Term Goal 3: Patient will tolerate 15 mins of BUE ther ex/act to increase overall strength/activity tolerance for functional tasks. Short Term Goal 4: Patient to engage in 5' of dynamic standing task during functional task of choice s LOB to improve safety, balance and indep c ADL.        Therapy Time   Individual Concurrent Group Co-treatment   Time In 1435         Time Out 1451         Minutes [address], OTR/L

## 2022-06-09 NOTE — PROGRESS NOTES
I, Marian Holden am scribing for and in the presence of Nathaniel Saunders PA-C. Patient: Cristian Galarza  :   Date of Admission: 2022  Primary Care Physician: Bg Roach  Today's Date: 2022    REASON FOR CONSULTATION: atrial fibrillation with RVR    HPI:  Mr. Zoran Nieves is a 70 y.o. male who was admitted to the hospital for tachycardia. In the ER he was found to be in atrial fibrillation with RVR leading to this consultation. In the past he reports seeing a Cardiologist in [place] and was planning on having a cardioversion done in  to put him back into normal sinus rhythm due to his history of new onset atrial fibrillation.      He reports a history of a heart attack at age 46 and needed stents placed at that time but denies any cardiac cath since that time. He also has carotid artery stenosis and abdominal aortic aneurysm. He did report having surgery on one side of his neck, however he is not sure which side or how long ago it was. He has had hypertension and hyperlipidemia for years as well.      He is a current every day smoker and he smoked for about 55 years and smokes about a pack a day.      Family history consists of a lot of people in his family with significant cardiac history, however he wanted us to talk to his sister about the family history as she knows the details. Mr. Zoran Nieves came to the ED for tachycardia. Since he was last seen in our office he has lost 11 pounds. He does have bladder spasms and now has a cath in that he wants out. He has had no fever or chills. He does have a cough and its productive sometimes. His wife does not think he has been eating and drinking okay the last week. He has no appetite. He doesn't have any nausea or vomiting. Echo done on 2022- EF 15-20%    Mr. Zoran Nieves is feeling better today he reports he is restless. He is eating and drinking well. He was weak when he got up today although not unusual for him.  He was given an IV bolus which helped with his dizziness. Denies shortness of breath. Denies any lightheaded/dizziness after he received fluids. He denied any current or recent chest pain, abdominal pain, bleeding problems, problems with his medications or any other concerns at this time. Past Medical History:   Diagnosis Date    Acute [facility] ([facility]     Ascites 5/20/2022    Atrial fibrillation (HCC)     CHF (congestive heart failure) (HCC)     Cirrhosis of liver with ascites ([facility] 5/19/2022    CKD (chronic kidney disease) 5/20/2022    Coronary artery disease     Hyperlipidemia     Hypertension     Hypomagnesemia 5/20/2022    Hyponatremia     Thrombocytopenia (HCC)        CURRENT ALLERGIES: Patient has no known allergies. REVIEW OF SYSTEMS: 14 systems were reviewed. Pertinent positives and negatives as above, all else negative. Past Surgical History:   Procedure Laterality Date    CAROTID STENT PLACEMENT Bilateral     ROTATOR CUFF REPAIR Right     Social History:  Social History     Tobacco Use    Smoking status: Current Every Day Smoker     Packs/day: 1.00     Types: Cigarettes    Smokeless tobacco: Never Used   Substance Use Topics    Alcohol use: Yes     Alcohol/week: 2.0 standard drinks     Types: 2 Shots of liquor per week     Comment: Drinks about 2 Kesslers with mix nightly. Reported history of heavy alcohol use for years with both beer and liquior several years ago    Drug use: Not on file        CURRENT MEDICATIONS:  No outpatient medications have been marked as taking for the 6/6/22 encounter [facility] HOSPITAL Encounter). FAMILY HISTORY: Reviewed but non-contributory     PHYSICAL EXAM:   BP 82/64   Pulse 90   Temp 97.5 °F (36.4 °C) (Temporal)   Resp 18   Ht 5' 7\" (1.702 m)   Wt 182 lb 12.8 oz (82.9 kg)   SpO2 100%   BMI 28.63 kg/m²  Body mass index is 28.63 kg/m². Constitutional: He is oriented to person, place, and time. He appears well-developed and well-nourished.  In no 4 ([facility]     Coronary artery disease     Hypertension     Thrombocytopenia (HCC)     Hyponatremia     Hyperlipidemia     Atrial fibrillation with RVR ([facility] 05/18/2022       PLAN:  · Paroxysmal Atrial Fibrillation: Rate Control Symptomatic and appears to not be working well. May need to convert to rhythm control strategy.  Beta Blocker: Continue Metoprolol tartrate to 100 mg twice daily. I also discussed the potential side effects of this medication including lightheadedness and dizziness and instructed them to stop the medication of this occurs and call our office if this occurs.  Anti-Arrhythmic: Today the amiodarone (Pacerone) was stopped due to him being so sleepy and not eating or drinking well as this cause be causing his . His HR is better today we will control this with the Metoprolol. [id number] Score for Atrial Fibrillation Stroke Risk   Risk   Factors  Component Value   C CHF Yes 1   H HTN Yes 1   A2 Age >= 76 No,  (75 y.o.) 0   D DM No 0   S2 Prior Stroke/TIA No 0   V Vascular Disease No 0   A Age 74-69 Yes,  (75 y.o.) 1   Sc Sex male 0    [id number]  Score  3   Score last updated 3/4/09 2:00 PM EDT  Click here for a link to the UpToDate guideline \"Atrial Fibrillation: Anticoagulation therapy to prevent embolization  Disclaimer: Risk Score calculation is dependent on accuracy of patient problem list and past encounter diagnosis.  Stroke Risk: CHADS2-VASc Score: 3/9 (3.2% stroke risk). Because of his atrial fibrillation, I also would also recommend that he continue with anticoagulation to decrease his risk of stoke but also reminded him to watch for signs of blood in his stool or black tarry stools and stop the medication immediately if this develops as this could be life threatening.  Anticoagulation: Continue Apixaban (Eliquis) 5 mg every 12 hours.  Chronic combined heart failure: Does not appear to be in active heart failure, -[id number].    Beta Blocker: Continue Metoprolol tartrate to 100 mg twice daily. I also discussed the potential side effects of this medication including lightheadedness and dizziness and instructed them to stop the medication of this occurs and call our office if this occurs.  ACE Inibitor/ARB: Continue sacubitril/valsartan (Entresto) 24/26 mg twice daily.  Diuretics: Continue bumetinide (Bumex) 1 mg every morning.  Heart failure counseling: I advised them to try and keep their legs up whenever possible and to limit salt in their diet. · Lightheadedness/dizziness:   · START Florinef (fludrocortisone) 0.1 mg daily. I explained that this can cause some increased lower extremity edema but usually this is fairly mild. · Continue midodrine 5 mg TID. · Nonpharmacologic counseling: Because of his condition, I reminded him to try and keep himself well-hydrated and to take extra time when moving from laying to sitting, sitting to standing and standing to walking. I also explained to him to help improve his symptoms he should include 3 g sodium diet, 1 or 2 L of sports drinks daily, knee-high compressions stockings. · Additional Testing List: None    Once again, thank you for allowing me to participate in this patients care. Please do not hesitate to contact me could I be of further assistance. Sincerely,  Nathaniel Saunders PA-C  [facility] Cardiology Specialist    [address]  Phone: [phone], Fax: [phone]     I believe that the risk of significant morbidity and mortality related to the patient's current medical conditions are: intermediate-high. The documentation recorded by the scribe, accurately and completely reflects the services I personally performed and the decisions made by me.  Nathaniel Saunders PA-C June 9, 2022

## 2022-06-09 NOTE — PROGRESS NOTES
Spoke with [facility] ICU nurse. Patient has kidney function greatly improved. Patient's mental status improved. Overnight patient was pulling at Montes catheter. Urine culture was negative for UTI. Patient continues to take Flomax. Patient just had a very large bowel movement. Plan:  Remove Montes catheter    Prompt patient to void every 2 hours while awake    Record PVRs after voids    Continue Flomax    If patient develops suprapubic pressure or if PVR is greater than 350ml contact urology    We will call the nurse later on today to see how patient is doing.

## 2022-06-09 NOTE — PROGRESS NOTES
[address], [facility] [address] [facility]    Patient: Cristian Galarza  Date of Admission: 6/6/2022 12:39 PM  Hospital Day # 3  Date of Evaluation: 6/9/2022    I personally evaluated and examined the patient face-to-face in conjunction with the PA/NP and agree with the management and dispostition of the patient. Please see the PA/NP's note for full details. My key findings are:     SUBJECTIVE:    Patient seen for follow up of Atrial fibrillation with RVR ([address] Patient seen and examined at the bed side , no new acute events overnight, no new complains except that he would like to have the urinary garner/catheter removed. He feels better this morning, alert and oriented to person/place/time. Notes from nursing staff and Consults had been reviewed, and the overnight progress had been checked with the nursing staff as well. OBJECTIVE:    Vitals:   Temp: 97.7 °F (36.5 °C)  BP: 105/65  Resp: 20  Heart Rate: (!) 107  SpO2: 94 %     Weight  Wt Readings from Last 3 Encounters:   06/09/22 182 lb 12.8 oz (82.9 kg)   06/06/22 185 lb (83.9 kg)   05/24/22 195 lb 1.6 oz (88.5 kg)     Body mass index is 28.63 kg/m². 24HR INTAKE/OUTPUT:      Intake/Output Summary (Last 24 hours) at 6/9/2022 2032  Last data filed at 6/9/2022 1255  Gross per 24 hour   Intake 800 ml   Output 1095 ml   Net -295 ml     -----------------------------------------------------------------  Exam:  GEN:    Awake, alert and oriented x3. EYES:  EOMI, pupils equal   NECK: Supple. No lymphadenopathy. No carotid bruit  CVS:    irregular rate and rhythm, tachycardia, no audible murmur  PULM:  CTA, no wheezes, rales or rhonchi, no acute respiratory distress  ABD:    Bowels sounds normal.  Abdomen is soft. No distention. no tenderness to palpation. EXT:   no edema bilaterally . No calf tenderness. NEURO: Moves all extremities. Motor and sensory are grossly intact  SKIN:  No rashes.   No skin lesions. DATA:  Complete Blood Count:   Recent Labs     06/07/22  0515 06/08/22  0525 06/09/22  0535   WBC 3.5 3.4* 3.0*   RBC 3.41* 3.47* 3.59*   HGB 11.8* 11.9* 12.1*   HCT 34.3* 35.3* 36.4*   .6 101.7 101.4   RDW 14.1 14.4 14.3   PLT See Reflexed IPF Result See Reflexed IPF Result See Reflexed IPF Result      Recent Labs     06/07/22  0515 06/08/22  0525 06/09/22  0535   SEGS 87* 74* 72*   NEUTROABS 3.04 2.52 2.16   LYMPHOPCT 10* 20* 19*   LYMPHSABS 0.35* 0.68* 0.57*   MONOPCT 2* 6 8   EOSRELPCT 1 0* 1   BASOPCT 0 0 0   IMMGRAN 0 0 0     CMP:   Lab Results   Component Value Date    GLUCOSE 94 06/09/2022    BUN 21 06/09/2022    CREATININE 1.00 06/09/2022     (L) 06/09/2022    K 3.4 (L) 06/09/2022    CALCIUM 9.3 06/09/2022    CL 95 (L) 06/09/2022    CO2 32 (H) 06/09/2022    PROT 6.8 06/06/2022    LABALBU 4.5 06/06/2022    BILITOT 1.35 (H) 06/06/2022    ALKPHOS 112 06/06/2022    ALT 26 06/06/2022    AST 32 06/06/2022     UA:   Lab Results   Component Value Date    COLORU Yellow 06/06/2022    SPECGRAV <1.005 (L) 06/06/2022    WBCUA 0 TO 2 06/06/2022    RBCUA 2 TO 5 06/06/2022    EPITHUA 0 TO 2 06/06/2022    LEUKOCYTESUR NEGATIVE 06/06/2022    GLUCOSEU NEGATIVE 06/06/2022    KETUA NEGATIVE 06/06/2022    PROTEINU NEGATIVE 06/06/2022    HGBUR 2+ (A) 06/06/2022    BACTERIA 1+ (A) 05/02/2022       Lactic Acid:   Lab Results   Component Value Date    LACTA 1.9 05/18/2022        High Sensitivity Troponin:  No results for input(s): TROPHS in the last 72 hours. Radiology/Imaging:  XR CHEST PORTABLE   Final Result   Stable chest x-ray, demonstrating cardiomegaly, small left pleural effusion,   and left basilar atelectasis. XR CHEST PORTABLE   Final Result   Minimal left basilar atelectasis. Otherwise, clear lungs. Cardiomegaly. Small left pleural effusion.                 ASSESSMENT:    Principal Problem:    Atrial fibrillation with RVR (HCC)  Active Problems:    Chronic combined systolic (congestive) and diastolic (congestive) heart failure (HCC)    Dehydration, moderate    Medication side effect, initial encounter    Severe malnutrition ([address]  Resolved Problems:    * No resolved hospital problems. *      PLAN:  · I agree with the plan as outlined in the NP/PA's note  · Disposition:    · Discharge plan is pending    Please note that this chart was generated using voice recognition Dragon dictation software. Although every effort was made to ensure the accuracy of this automated transcription, some errors in transcription may have occurred. Bg Roach MD  6/9/2022 8:32 PM         [address] [place] [address]            PROGRESS NOTE     Pt Name: Cristian Galarza  MRN: [id number]  [place] 1950  Date of evaluation: 6/9/2022  Provider: Darin Favre, APRN - CNP    SUBJECTIVE   77-year-old male with history of chronic alcohol use, atrial fibrillation, seen during morning rounds in the ICU. Pt is confused this morning. He pulled at garner overnight and has hematuria. Last dose of ativan around 2 am       Review of Systems:  Review of Systems   Constitutional: Negative. HENT: Negative. Eyes: Negative. Respiratory: Negative. Cardiovascular: Negative. Gastrointestinal: Negative. Endocrine: Negative. Genitourinary: Positive for hematuria. Musculoskeletal: Negative. Skin: Negative. Allergic/Immunologic: Negative. Neurological: Negative. Psychiatric/Behavioral: Positive for confusion.      All other systems were reviewed with the patient and are negative except as stated    OBJECTIVE     History Obtained From:  spouse, reason patient could not give history:  altered mental status    Vitals:   Temp: 97.3 °F (36.3 °C)  BP: (!) 139/108  Resp: 30  Heart Rate: (!) 105  SpO2: (!) 89 %  -----------------------------------------------------------------  Exam:  GEN: Well-developed well-nourished no obvious distress  HEENT: Pupils are equal round and reactive to light.  Mucous membranes are moist.  Uvula is midline. No oral pharyngeal swelling or edema. NECK: Supple, no tenderness  CVS: Grossly irregular with tachycardia present   PULM: Clear to auscultation bilaterally. No respiratory distress  ABD: Soft and nontender, nondistended  EXT: Moves all 4 extremities with ease. No significant swelling. No tenderness. NEURO: Pt A&O x 3 but with intermittent confusion. CAM positive. No focal neurologic deficit. Follows commands   SKIN: BLE with red/scabbed area- vascular changes   : garner with red urine   -----------------------------------------------------------------  Diagnostic Data:   Complete Blood Count:   Recent Labs     06/06/22  1225 06/06/22  1225 06/07/22  0515 06/08/22  0525 06/09/22  0535   WBC 3.8   < > 3.5 3.4* 3.0*   RBC 3.99*   < > 3.41* 3.47* 3.59*   HGB 13.8   < > 11.8* 11.9* 12.1*   HCT 39.8*   < > 34.3* 35.3* 36.4*   MCV 99.7   < > 100.6 101.7 101.4   MCH 34.6*   < > 34.6* 34.3* 33.7*   MCHC 34.7   < > 34.4 33.7 33.2   RDW 14.2   < > 14.1 14.4 14.3   *   < > See Reflexed IPF Result See Reflexed IPF Result See Reflexed IPF Result   MPV 11.5  --   --   --   --     < > = values in this interval not displayed. Last 3 Blood Glucose:   Recent Labs     06/06/22  1225 06/07/22  0515 06/08/22  0525 06/09/22  0535   GLUCOSE 88 111* 90 94        Comprehensive Metabolic Profile:   Recent Labs     06/06/22  1225 06/06/22  1225 06/07/22  0515 06/08/22  0525 06/09/22  0535   *   < > 130* 131* 134*   K 4.3   < > 4.0 3.5* 3.4*   CL 89*   < > 95* 94* 95*   CO2 26   < > 23 28 32*   BUN 28*   < > 29* 24* 21   CREATININE 1.52*   < > 1.69* 1.19 1.00   GLUCOSE 88   < > 111* 90 94   CALCIUM 9.7   < > 9.2 9.4 9.3   PROT 6.8  --   --   --   --    LABALBU 4.5  --   --   --   --    BILITOT 1.35*  --   --   --   --    ALKPHOS 112  --   --   --   --    AST 32  --   --   --   --    ALT 26  --   --   --   --     < > = values in this interval not displayed. Urinalysis:   Lab Results   Component Value Date    NITRU NEGATIVE 06/06/2022    COLORU Yellow 06/06/2022    PHUR 6.0 06/06/2022    WBCUA 0 TO 2 06/06/2022    RBCUA 2 TO 5 06/06/2022    MUCUS 2+ 05/02/2022    BACTERIA 1+ 05/02/2022    SPECGRAV <1.005 06/06/2022    LEUKOCYTESUR NEGATIVE 06/06/2022    UROBILINOGEN Normal 06/06/2022    BILIRUBINUR NEGATIVE 06/06/2022    GLUCOSEU NEGATIVE 06/06/2022    KETUA NEGATIVE 06/06/2022       HgBA1c:  No results found for: LABA1C    Lactic Acid:   Lab Results   Component Value Date    LACTA 1.9 05/18/2022        High Sensitivity Troponin:  Recent Labs     06/06/22  1225 06/06/22  1845   TROPHS 33* 28*         Radiology/Imaging:  XR CHEST PORTABLE   Final Result   Stable chest x-ray, demonstrating cardiomegaly, small left pleural effusion,   and left basilar atelectasis. XR CHEST PORTABLE   Final Result   Minimal left basilar atelectasis. Otherwise, clear lungs. Cardiomegaly. Small left pleural effusion. Assessment/ Plan:   61-year-old male with history of atrial fibrillation currently with RVR, ETOH abuse, and MADIHA on CKD. Hospital course has been complicated by ETOH withdrawal.   Seen by cardiology, recommendations might be to do cardioversion. They have increase his metoprolol and decreased his amiodarone. Patient was noted to have acute urinary retention and Montes catheter was placed. He has had difficulty with some bladder spasms. Urology had been consulted. Atrial fibrillation with rapid ventricular response  - now with better rate contro 's  -  metoprolol BID. amio stopped by cardiology due to encephalopathy with poor po intake  - CHADSVASC: 3  - continue Eliquis   - cardiology following     Combined congestive heart failure  -Cardiology is concerned that he could be a little dry.   He has had 11 pound weight loss since he was last discharged from the hospital.    - continue BB, continue entresto, bumex  - appreciate cards recs

## 2022-06-09 NOTE — PROGRESS NOTES
Patient tried to get out of bed without assistance, urinated on floor. Patient cleaned and assisted back to bed, bed alarm on. Assessment completed and recorded. VS stable with afib rhythm observed.

## 2022-06-09 NOTE — PLAN OF CARE
Problem: Discharge Planning  Goal: Discharge to home or other facility with appropriate resources  Outcome: Progressing     Problem: Safety - Adult  Goal: Free from fall injury  Outcome: Progressing     Problem: Skin/Tissue Integrity  Goal: Absence of new skin breakdown  Description: 1. Monitor for areas of redness and/or skin breakdown  2. Assess vascular access sites hourly  3. Every 4-6 hours minimum:  Change oxygen saturation probe site  4. Every 4-6 hours:  If on nasal continuous positive airway pressure, respiratory therapy assess nares and determine need for appliance change or resting period.   Outcome: Progressing     Problem: Pain  Goal: Verbalizes/displays adequate comfort level or baseline comfort level  Outcome: Progressing     Problem: Nutrition Deficit:  Goal: Optimize nutritional status  Outcome: Progressing     Problem: Chronic Conditions and Co-morbidities  Goal: Patient's chronic conditions and co-morbidity symptoms are monitored and maintained or improved  Outcome: Progressing

## 2022-06-10 LAB
ABSOLUTE EOS #: 0.03 K/UL (ref 0–0.44)
ABSOLUTE IMMATURE GRANULOCYTE: 0 K/UL (ref 0–0.3)
ABSOLUTE LYMPH #: 0.65 K/UL (ref 1.1–3.7)
ABSOLUTE MONO #: 0.23 K/UL (ref 0.1–1.2)
ANION GAP SERPL CALCULATED.3IONS-SCNC: 7 MMOL/L (ref 9–17)
BASOPHILS # BLD: 0 % (ref 0–2)
BASOPHILS ABSOLUTE: 0 K/UL (ref 0–0.2)
BUN BLDV-MCNC: 21 MG/DL (ref 8–23)
BUN/CREAT BLD: 21 (ref 9–20)
CALCIUM SERPL-MCNC: 9.1 MG/DL (ref 8.6–10.4)
CHLORIDE BLD-SCNC: 98 MMOL/L (ref 98–107)
CO2: 30 MMOL/L (ref 20–31)
CREAT SERPL-MCNC: 0.98 MG/DL (ref 0.7–1.2)
EOSINOPHILS RELATIVE PERCENT: 1 % (ref 1–4)
GFR AFRICAN AMERICAN: >60 ML/MIN
GFR NON-AFRICAN AMERICAN: >60 ML/MIN
GFR SERPL CREATININE-BSD FRML MDRD: ABNORMAL ML/MIN/{1.73_M2}
GFR SERPL CREATININE-BSD FRML MDRD: ABNORMAL ML/MIN/{1.73_M2}
GLUCOSE BLD-MCNC: 102 MG/DL (ref 70–99)
HCT VFR BLD CALC: 35.9 % (ref 40.7–50.3)
HEMOGLOBIN: 12.1 G/DL (ref 13–17)
IMMATURE GRANULOCYTES: 0 %
LYMPHOCYTES # BLD: 26 % (ref 24–43)
MAGNESIUM: 1.7 MG/DL (ref 1.6–2.6)
MCH RBC QN AUTO: 34.5 PG (ref 25.2–33.5)
MCHC RBC AUTO-ENTMCNC: 33.7 G/DL (ref 28.4–34.8)
MCV RBC AUTO: 102.3 FL (ref 82.6–102.9)
MONOCYTES # BLD: 9 % (ref 3–12)
MORPHOLOGY: ABNORMAL
NRBC AUTOMATED: 0 PER 100 WBC
PATHOLOGIST REVIEW: NORMAL
PDW BLD-RTO: 14.6 % (ref 11.8–14.4)
PLATELET # BLD: ABNORMAL K/UL (ref 138–453)
PLATELET, FLUORESCENCE: 92 K/UL (ref 138–453)
PLATELET, IMMATURE FRACTION: 8.5 % (ref 1.1–10.3)
POTASSIUM SERPL-SCNC: 4 MMOL/L (ref 3.7–5.3)
RBC # BLD: 3.51 M/UL (ref 4.21–5.77)
SEG NEUTROPHILS: 64 % (ref 36–65)
SEGMENTED NEUTROPHILS ABSOLUTE COUNT: 1.59 K/UL (ref 1.5–8.1)
SODIUM BLD-SCNC: 135 MMOL/L (ref 135–144)
WBC # BLD: 2.5 K/UL (ref 3.5–11.3)

## 2022-06-10 PROCEDURE — 97535 SELF CARE MNGMENT TRAINING: CPT

## 2022-06-10 PROCEDURE — 2580000003 HC RX 258: Performed by: PHYSICIAN ASSISTANT

## 2022-06-10 PROCEDURE — 97116 GAIT TRAINING THERAPY: CPT

## 2022-06-10 PROCEDURE — 97110 THERAPEUTIC EXERCISES: CPT

## 2022-06-10 PROCEDURE — 6370000000 HC RX 637 (ALT 250 FOR IP): Performed by: PHYSICIAN ASSISTANT

## 2022-06-10 PROCEDURE — 6370000000 HC RX 637 (ALT 250 FOR IP): Performed by: SPECIALIST

## 2022-06-10 PROCEDURE — 6370000000 HC RX 637 (ALT 250 FOR IP): Performed by: STUDENT IN AN ORGANIZED HEALTH CARE EDUCATION/TRAINING PROGRAM

## 2022-06-10 PROCEDURE — 6370000000 HC RX 637 (ALT 250 FOR IP): Performed by: NURSE PRACTITIONER

## 2022-06-10 PROCEDURE — 85055 RETICULATED PLATELET ASSAY: CPT

## 2022-06-10 PROCEDURE — 83735 ASSAY OF MAGNESIUM: CPT

## 2022-06-10 PROCEDURE — 99232 SBSQ HOSP IP/OBS MODERATE 35: CPT | Performed by: FAMILY MEDICINE

## 2022-06-10 PROCEDURE — 2000000000 HC ICU R&B

## 2022-06-10 PROCEDURE — 6370000000 HC RX 637 (ALT 250 FOR IP): Performed by: FAMILY MEDICINE

## 2022-06-10 PROCEDURE — 80048 BASIC METABOLIC PNL TOTAL CA: CPT

## 2022-06-10 PROCEDURE — 85025 COMPLETE CBC W/AUTO DIFF WBC: CPT

## 2022-06-10 PROCEDURE — 36415 COLL VENOUS BLD VENIPUNCTURE: CPT

## 2022-06-10 PROCEDURE — 51798 US URINE CAPACITY MEASURE: CPT

## 2022-06-10 PROCEDURE — 94761 N-INVAS EAR/PLS OXIMETRY MLT: CPT

## 2022-06-10 RX ORDER — FLUDROCORTISONE ACETATE 0.1 MG/1
0.1 TABLET ORAL ONCE
Status: COMPLETED | OUTPATIENT
Start: 2022-06-10 | End: 2022-06-10

## 2022-06-10 RX ORDER — FLUDROCORTISONE ACETATE 0.1 MG/1
0.2 TABLET ORAL DAILY
Status: DISCONTINUED | OUTPATIENT
Start: 2022-06-11 | End: 2022-06-11 | Stop reason: HOSPADM

## 2022-06-10 RX ORDER — MIDODRINE HYDROCHLORIDE 5 MG/1
10 TABLET ORAL
Status: DISCONTINUED | OUTPATIENT
Start: 2022-06-10 | End: 2022-06-11 | Stop reason: HOSPADM

## 2022-06-10 RX ADMIN — POTASSIUM CHLORIDE 20 MEQ: 1500 TABLET, EXTENDED RELEASE ORAL at 17:13

## 2022-06-10 RX ADMIN — MIDODRINE HYDROCHLORIDE 10 MG: 5 TABLET ORAL at 17:13

## 2022-06-10 RX ADMIN — BUSPIRONE HYDROCHLORIDE 15 MG: 5 TABLET ORAL at 20:40

## 2022-06-10 RX ADMIN — CLOPIDOGREL BISULFATE 75 MG: 75 TABLET ORAL at 08:12

## 2022-06-10 RX ADMIN — SACUBITRIL AND VALSARTAN 0.5 TABLET: 24; 26 TABLET, FILM COATED ORAL at 20:38

## 2022-06-10 RX ADMIN — APIXABAN 5 MG: 5 TABLET, FILM COATED ORAL at 20:40

## 2022-06-10 RX ADMIN — BUMETANIDE 2 MG: 1 TABLET ORAL at 08:12

## 2022-06-10 RX ADMIN — PANTOPRAZOLE SODIUM 40 MG: 40 TABLET, DELAYED RELEASE ORAL at 07:30

## 2022-06-10 RX ADMIN — SODIUM CHLORIDE, PRESERVATIVE FREE 5 ML: 5 INJECTION INTRAVENOUS at 20:41

## 2022-06-10 RX ADMIN — ATORVASTATIN CALCIUM 80 MG: 40 TABLET, FILM COATED ORAL at 08:12

## 2022-06-10 RX ADMIN — MUPIROCIN: 20 OINTMENT TOPICAL at 21:37

## 2022-06-10 RX ADMIN — BUSPIRONE HYDROCHLORIDE 15 MG: 5 TABLET ORAL at 08:12

## 2022-06-10 RX ADMIN — MULTIVITAMIN TABLET 1 TABLET: TABLET at 08:13

## 2022-06-10 RX ADMIN — ESCITALOPRAM 20 MG: 5 TABLET, FILM COATED ORAL at 20:41

## 2022-06-10 RX ADMIN — Medication 10 MG: at 20:41

## 2022-06-10 RX ADMIN — FOLIC ACID 1 MG: 1 TABLET ORAL at 08:12

## 2022-06-10 RX ADMIN — Medication 600 MG: at 20:40

## 2022-06-10 RX ADMIN — MIDODRINE HYDROCHLORIDE 10 MG: 5 TABLET ORAL at 11:10

## 2022-06-10 RX ADMIN — METOPROLOL TARTRATE 75 MG: 50 TABLET, FILM COATED ORAL at 08:13

## 2022-06-10 RX ADMIN — APIXABAN 5 MG: 5 TABLET, FILM COATED ORAL at 08:13

## 2022-06-10 RX ADMIN — Medication 600 MG: at 08:13

## 2022-06-10 RX ADMIN — FLUDROCORTISONE ACETATE 0.1 MG: 0.1 TABLET ORAL at 12:47

## 2022-06-10 RX ADMIN — METOPROLOL TARTRATE 75 MG: 50 TABLET, FILM COATED ORAL at 20:39

## 2022-06-10 RX ADMIN — TAMSULOSIN HYDROCHLORIDE 0.4 MG: 0.4 CAPSULE ORAL at 08:13

## 2022-06-10 RX ADMIN — Medication 100 MG: at 08:12

## 2022-06-10 RX ADMIN — FLUDROCORTISONE ACETATE 0.1 MG: 0.1 TABLET ORAL at 08:13

## 2022-06-10 RX ADMIN — SACUBITRIL AND VALSARTAN 0.5 TABLET: 24; 26 TABLET, FILM COATED ORAL at 08:12

## 2022-06-10 RX ADMIN — ROPINIROLE HYDROCHLORIDE 0.5 MG: 0.25 TABLET, FILM COATED ORAL at 20:41

## 2022-06-10 RX ADMIN — POTASSIUM CHLORIDE 20 MEQ: 1500 TABLET, EXTENDED RELEASE ORAL at 07:30

## 2022-06-10 RX ADMIN — MIDODRINE HYDROCHLORIDE 10 MG: 5 TABLET ORAL at 07:30

## 2022-06-10 RX ADMIN — SODIUM CHLORIDE, PRESERVATIVE FREE 10 ML: 5 INJECTION INTRAVENOUS at 08:14

## 2022-06-10 RX ADMIN — MUPIROCIN: 20 OINTMENT TOPICAL at 08:14

## 2022-06-10 ASSESSMENT — PAIN SCALES - GENERAL: PAINLEVEL_OUTOF10: 0

## 2022-06-10 NOTE — PROGRESS NOTES
Patient sounding chair alarm, attempting to go to bathroom to void. Writer stood with patient for couple minutes. Patient unable to void. Patient sat back onto chair. Physical therapy @ bedside to work with patient.

## 2022-06-10 NOTE — PROGRESS NOTES
Physical Therapy  Facility/Department: [facility] AT THE [facility]  Daily Treatment Note  NAME: Leda Ellington  : 89  MRN: [id number]    Date of Service: 6/10/2022    Discharge Recommendations:  Continue to assess pending progress,Home with Home health PT,Outpatient PT        Patient Diagnosis(es): There were no encounter diagnoses. Assessment   Activity Tolerance: Patient tolerated evaluation without incident     Plan    Plan  Plan: 2 times a day 7 days a week  Plan weeks: 20 days  Current Treatment Recommendations: Balance training;Functional mobility training;Transfer training;IADL training; Endurance training;Gait training;Stair training;Neuromuscular re-education; Return to work related activity;Home exercise program;Safety education & training;Modalities; Therapeutic activities     Restrictions  Restrictions/Precautions  Restrictions/Precautions: Fall Risk,General Precautions  Required Braces or Orthoses?: No     Subjective    Subjective  Subjective: Pt denies pain today. Pt states he is feeling ok this PM.  Orientation  Overall Orientation Status: Within Functional Limits  Cognition  Overall Cognitive Status: Exceptions  Arousal/Alertness: Appropriate responses to stimuli  Following Commands: Follows one step commands with increased time; Follows one step commands with repetition  Safety Judgement: Decreased awareness of need for assistance  Problem Solving: Decreased awareness of errors  Insights: Decreased awareness of deficits  Initiation: Requires cues for some  Sequencing: Requires cues for some  Cognition Comment: repeated VCs and Min A during ADLs for proper completion of tasks.      Objective   Vitals     Bed Mobility Training  Bed Mobility Training: No  Balance  Sitting: Intact  Transfer Training  Transfer Training: Yes  Overall Level of Assistance: Contact-guard assistance;Stand-by assistance  Interventions: Verbal cues (Cues for safety with fair understanding.)  Sit to Stand: Stand-by assistance;Contact-guard assistance  Stand to Sit: Stand-by assistance;Contact-guard assistance  Gait Training  Gait Training: Yes  Gait  Overall Level of Assistance: Contact-guard assistance;Stand-by assistance  Interventions: Verbal cues (VCs for safety with AD.)  Speed/Andreina: Slow  Distance (ft): 40 Feet (40 ft x2. SPO2 79-85% throughout ambulation on RA, increasing to 91-92% after seated RB, nursing notified.)  Assistive Device: Walker, rolling     PT Exercises  Exercise Treatment: Reclined and seated BLE ther ex x20. Safety Devices  Type of Devices: All fall risk precautions in place;Call light within reach; Chair alarm in place; Left in chair;Nurse notified; Patient at risk for falls       Goals  Short Term Goals  Time Frame for Short term goals: 20 days  Short term goal 1: Pt to be able to ambulate 50ft IND to be able to return home safetly. Short term goal 2: Pt to be IND with all transfers to improve functional mobility and increase safety with ADLs  Short term goal 3: Pt to be able to tolerate 20-30min therex/act to improve overall functional capactiy for ADL's. Patient Goals   Patient goals : to return home    Education  Patient Education  Education Given To: Patient; Family  Education Provided: Role of Therapy;Plan of Care;Transfer Training  Education Method: Verbal  Barriers to Learning: Cognition  Education Outcome: Verbalized understanding    Therapy Time   Individual Concurrent Group Co-treatment   Time In 1559         Time Out 1627         Minutes [address] [place] [address]

## 2022-06-10 NOTE — PROGRESS NOTES
Patient in bed resting with eyes closed. Arouses easily for reassessment. Denies any pain or discomfort at this time. VS stable, will continue to assess and monitor.

## 2022-06-10 NOTE — PROGRESS NOTES
Patient resting with eyes closed. Arouses easily for assessment. Patient denies any complaints of pain or discomfort at this time. Assessment completed and charted. VS stable. Bed alarm on.

## 2022-06-10 NOTE — PROGRESS NOTES
Occupational Therapy  Facility/Department: [facility] AT THE [facility]  Daily Treatment Note  NAME: Kassidy Bright  :   MRN: [id number]    Date of Service: 6/10/2022    Discharge Recommendations:  Continue to assess pending progress,Subacute/Skilled Nursing Facility         Patient Diagnosis(es): There were no encounter diagnoses. Assessment           Plan         Restrictions       Subjective   Subjective  Subjective: Pt pleasantly confused and agreeable to toileting and oral hygiene and hair care at sink. Pain: pt denied pain  Cognition  Overall Cognitive Status: Exceptions  Arousal/Alertness: Appropriate responses to stimuli  Following Commands: Follows one step commands with increased time; Follows one step commands with repetition  Safety Judgement: Decreased awareness of need for assistance  Problem Solving: Decreased awareness of errors  Insights: Decreased awareness of deficits  Initiation: Requires cues for some  Sequencing: Requires cues for some  Cognition Comment: repeated VCs and Min A during ADLs for proper completion of tasks. Objective    Vitals     Bed Mobility Training  Bed Mobility Training: No  Balance  Sitting: Intact  Standing: With support (pt stood at sink ~ 6 min for oral hygiene and hair care supported by FAs on sink and unsupported at times.  0 LOB noted, decreased safety awareness)  Transfer Training  Transfer Training: Yes  Overall Level of Assistance: Contact-guard assistance  Interventions: Verbal cues (for safety, repeated 2-3xs)  Sit to Stand: Stand-by assistance;Contact-guard assistance (d/t decreased safety)  Stand to Sit: Stand-by assistance;Contact-guard assistance  Toilet Transfer: Stand-by assistance;Contact-guard assistance (use of R grab bar, decreased safety d/t decreased cognition)  Gait  Overall Level of Assistance: Contact-guard assistance (use of ALTHEA support (RW))  Interventions: Verbal cues (VCs for safety)     ADL  Grooming: Stand by assistance  Grooming Skilled Clinical Factors: pt stood un/supported at sink at times for grroming with 0 LOB, pt demo'd no need for physical A with opening or using products, several repeated VCs for initiationa nd completion d/t decreased cognition  UE Bathing: Setup;Supervision (seated)  LE Bathing Skilled Clinical Factors: DNA  Toileting: Contact guard assistance;Stand by assistance        Safety Devices  Type of Devices: All fall risk precautions in place;Call light within reach; Chair alarm in place; Left in chair;Nurse notified     Patient Education  Education Given To: Patient  Education Provided: Role of Therapy;Plan of Care;Transfer Training; Fall Prevention Strategies; ADL Adaptive Strategies  Education Method: Verbal;Demonstration  Barriers to Learning: Cognition  Education Outcome: Continued education needed    Goals  Short Term Goals  Time Frame for Short term goals: 21 visits  Short Term Goal 1: Patient to be educated on d/c folder, AE/DME and EC/WS techniques to ensure safe and indep return home. Short Term Goal 2: Patient to complete ADL routine c Mod I c improved safety, sequecing and completion to ensure safe return home. Short Term Goal 3: Patient will tolerate 15 mins of BUE ther ex/act to increase overall strength/activity tolerance for functional tasks. Short Term Goal 4: Patient to engage in 5' of dynamic standing task during functional task of choice s LOB to improve safety, balance and indep c ADL.        Therapy Time   Individual Concurrent Group Co-treatment   Time In 0925         Time Out 0954         Minutes MALIK Jones

## 2022-06-10 NOTE — PROGRESS NOTES
Comprehensive Nutrition Assessment    Type and Reason for Visit:  Reassess    Nutrition Recommendations/Plan:   1. Continue to encourage meal and supplement intakes     Malnutrition Assessment:  Malnutrition Status:  Severe malnutrition (06/07/22 0715)    Context:  Acute Illness     Findings of the 6 clinical characteristics of malnutrition:  Energy Intake:  50% or less of estimated energy requirements for 5 or more days  Weight Loss:  Greater than 5% over 1 month     Body Fat Loss:  No significant body fat loss     Muscle Mass Loss:  No significant muscle mass loss    Fluid Accumulation:  Mild     Strength:  Not Performed    Nutrition Assessment:    Improving malnutrition and nutrient intakes with PO increasing for both meals and nutrition supplements. Weight increasing towards former values, but remains >6% below usual reported. Up in chair up visit. Denies ingestion issues. Much more alert and responsive than initial visit 6/7. Encouraged continued oral intakes. Nutrition Related Findings:    no physical malnturition indices noted. trace BLE edema Wound Type:  (scabs and redness in LE)       Current Nutrition Intake & Therapies:    Average Meal Intake: %  Average Supplements Intake: %  ADULT DIET; Regular; Low Sodium (2 gm)  ADULT ORAL NUTRITION SUPPLEMENT; Breakfast, Lunch, Dinner; Standard High Calorie/High Protein Oral Supplement    Anthropometric Measures:  Height: 5' 7\" (170.2 cm)  Ideal Body Weight (IBW): 148 lbs (67 kg)    Admission Body Weight: 181 lb (82.1 kg)  Current Body Weight: 186 lb 12.8 oz (84.7 kg), 122.3 % IBW. Weight Source: Bed Scale  Current BMI (kg/m2): 29.3  Usual Body Weight: 200 lb (90.7 kg)  % Weight Change (Calculated): -6.6  Weight Adjustment For: No Adjustment                 BMI Categories: Overweight (BMI 25.0-29. 9)    Estimated Daily Nutrient Needs:  Energy Requirements Based On: Kcal/kg  Weight Used for Energy Requirements: Current  Energy (kcal/day): 8400-5080 (18-22)  Weight Used for Protein Requirements: Ideal  Protein (g/day): 81-94 (1.2-1.4)  Method Used for Fluid Requirements: 1 ml/kcal  Fluid (ml/day): 1800    Nutrition Diagnosis:   · Severe malnutrition,In context of acute illness or injury related to inadequate protein-energy intake as evidenced by weight loss greater than or equal to 5% in 1 month,poor intake prior to admission,localized or generalized fluid accumulation    Lab Results   Component Value Date     06/10/2022    K 4.0 06/10/2022    CL 98 06/10/2022    CO2 30 06/10/2022    BUN 21 06/10/2022    CREATININE 0.98 06/10/2022    GLUCOSE 102 (H) 06/10/2022    CALCIUM 9.1 06/10/2022    PROT 6.8 06/06/2022    LABALBU 4.5 06/06/2022    BILITOT 1.35 (H) 06/06/2022    ALKPHOS 112 06/06/2022    AST 32 06/06/2022    ALT 26 06/06/2022    LABGLOM >60 06/10/2022    GFRAA >60 06/10/2022     Nutrition Interventions:   Food and/or Nutrient Delivery: Continue Current Diet,Continue Oral Nutrition Supplement  Nutrition Education/Counseling: No recommendation at this time  Coordination of Nutrition Care: Continue to monitor while inpatient  Plan of Care discussed with: patient    Goals:  Previous Goal Met: Progressing toward Goal(s)  Goals: Meet at least 75% of estimated needs     Nutrition Monitoring and Evaluation:   Behavioral-Environmental Outcomes: None Identified  Food/Nutrient Intake Outcomes: Food and Nutrient Intake,Supplement Intake  Physical Signs/Symptoms Outcomes: Biochemical Data,Weight,Fluid Status or Edema    Discharge Planning:    No discharge needs at this time     Philip Booth, [address] [place] [address]: [phone]

## 2022-06-10 NOTE — PLAN OF CARE
Problem: Discharge Planning  Goal: Discharge to home or other facility with appropriate resources  6/10/2022 1957 by Kathryn Buckley RN  Outcome: Progressing  Flowsheets (Taken 6/10/2022 0730 by Delila Alpers, RN)  Discharge to home or other facility with appropriate resources: Refer to discharge planning if patient needs post-hospital services based on physician order or complex needs related to functional status, cognitive ability or social support system  6/10/2022 0914 by Delila Alpers, RN  Outcome: Progressing  Flowsheets (Taken 6/10/2022 0730)  Discharge to home or other facility with appropriate resources: Refer to discharge planning if patient needs post-hospital services based on physician order or complex needs related to functional status, cognitive ability or social support system     Problem: Safety - Adult  Goal: Free from fall injury  6/10/2022 1957 by Kathryn Buckley RN  Outcome: Progressing  Flowsheets (Taken 6/6/2022 1541 by Vida Chahal RN)  Free From Fall Injury: Instruct family/caregiver on patient safety  6/10/2022 0914 by Delila Alpers, RN  Outcome: Progressing  Flowsheets (Taken 6/6/2022 1541 by Vida Chahal RN)  Free From Fall Injury: Instruct family/caregiver on patient safety     Problem: Skin/Tissue Integrity  Goal: Absence of new skin breakdown  Description: 1. Monitor for areas of redness and/or skin breakdown  2. Assess vascular access sites hourly  3. Every 4-6 hours minimum:  Change oxygen saturation probe site  4. Every 4-6 hours:  If on nasal continuous positive airway pressure, respiratory therapy assess nares and determine need for appliance change or resting period. 6/10/2022 1957 by Kathryn Buckley RN  Outcome: Progressing  Note: No new skin issues observed. 6/10/2022 0914 by Delila Alpers, RN  Outcome: Progressing  Note: Patient remains free from new skin breakdown. Turns and repositions self in bed and in chair.      Problem: Pain  Goal: Verbalizes/displays adequate comfort level or baseline comfort level  6/10/2022 1957 by Kali Day RN  Outcome: Progressing  Flowsheets (Taken 6/8/2022 2034 by Nicole Pena RN)  Verbalizes/displays adequate comfort level or baseline comfort level:   Assess pain using appropriate pain scale   Encourage patient to monitor pain and request assistance  6/10/2022 0914 by Dea Haq RN  Outcome: Progressing  Flowsheets (Taken 6/8/2022 2034 by Nicole Pena RN)  Verbalizes/displays adequate comfort level or baseline comfort level:   Assess pain using appropriate pain scale   Encourage patient to monitor pain and request assistance     Problem: Nutrition Deficit:  Goal: Optimize nutritional status  6/10/2022 1957 by Kali Day RN  Outcome: Progressing  Flowsheets (Taken 6/10/2022 0729 by Go Gallardo RD, LD)  Nutrient intake appropriate for improving, restoring, or maintaining nutritional needs: Monitor oral intake, labs, and treatment plans  6/10/2022 0914 by Dea Haq RN  Outcome: Progressing  Flowsheets (Taken 6/10/2022 0729 by Go Gallardo RD, LD)  Nutrient intake appropriate for improving, restoring, or maintaining nutritional needs: Monitor oral intake, labs, and treatment plans  6/10/2022 0729 by Go Gallardo RD, LD  Flowsheets  Taken 6/10/2022 6289  Nutrient intake appropriate for improving, restoring, or maintaining nutritional needs: Monitor oral intake, labs, and treatment plans  Taken 6/10/2022 0719  Nutrient intake appropriate for improving, restoring, or maintaining nutritional needs:   Monitor oral intake, labs, and treatment plans   Recommend appropriate diets, oral nutritional supplements, and vitamin/mineral supplements  Note: Nutrition Problem #1: Severe malnutrition,In context of acute illness or injury  Intervention: Food and/or Nutrient Delivery: Continue Current Diet,Continue Oral Nutrition Supplement         Problem: Chronic Conditions and Co-morbidities  Goal: Patient's chronic conditions and co-morbidity symptoms are monitored and maintained or improved  6/10/2022 1957 by Marissa Valverde, RN  Outcome: Progressing  Flowsheets (Taken 6/10/2022 0730 by Uriel Hong RN)  Care Plan - Patient's Chronic Conditions and Co-Morbidity Symptoms are Monitored and Maintained or Improved:   Monitor and assess patient's chronic conditions and comorbid symptoms for stability, deterioration, or improvement   Collaborate with multidisciplinary team to address chronic and comorbid conditions and prevent exacerbation or deterioration  6/10/2022 0914 by Uriel Hong RN  Outcome: Progressing  Flowsheets (Taken 6/10/2022 0730)  Care Plan - Patient's Chronic Conditions and Co-Morbidity Symptoms are Monitored and Maintained or Improved:   Monitor and assess patient's chronic conditions and comorbid symptoms for stability, deterioration, or improvement   Collaborate with multidisciplinary team to address chronic and comorbid conditions and prevent exacerbation or deterioration

## 2022-06-10 NOTE — PROGRESS NOTES
[address] [place] [address]            PROGRESS NOTE     Pt Name: Shirley Menjivar  MRN: [id number]  [place] 1950  Date of evaluation: 6/10/2022  Provider: SEVEN Amos CNP    SUBJECTIVE   59-year-old male with history of chronic alcohol use, atrial fibrillation, seen during morning rounds in the ICU. Pt is confused this morning. He pulled at garner overnight and has hematuria. Last dose of ativan around 2 am       Review of Systems:  Review of Systems   Unable to perform ROS: Acuity of condition   Psychiatric/Behavioral: Positive for confusion. All other systems were reviewed with the patient and are negative except as stated    OBJECTIVE     History Obtained From:  spouse, reason patient could not give history:  altered mental status    Vitals:   Temp: 97.1 °F (36.2 °C)  BP: (!) 76/58  Resp: 20  Heart Rate: (!) 104  SpO2: 92 %  -----------------------------------------------------------------  Exam:  GEN: Well-developed well-nourished no obvious distress  HEENT: Pupils are equal round and reactive to light. Mucous membranes are moist.  Uvula is midline. No oral pharyngeal swelling or edema. NECK: Supple, no tenderness  CVS: Grossly irregular with tachycardia present   PULM: Clear to auscultation bilaterally. No respiratory distress  ABD: Soft and nontender, nondistended  EXT: Moves all 4 extremities with ease. No significant swelling. No tenderness. NEURO: Patient is awake alert and oriented to self not situation  No focal neurologic deficit. Follows commands   SKIN: No rashes.   No skin lesions.    -----------------------------------------------------------------  Diagnostic Data:   Complete Blood Count:   Recent Labs     06/08/22  0525 06/09/22  0535 06/10/22  0525   WBC 3.4* 3.0* 2.5*   RBC 3.47* 3.59* 3.51*   HGB 11.9* 12.1* 12.1*   HCT 35.3* 36.4* 35.9*   .7 101.4 102.3   MCH 34.3* 33.7* 34.5*   MCHC 33.7 33.2 33.7   RDW 14.4 14.3 14.6*   PLT See Reflexed IPF Result See Reflexed IPF Result See Reflexed IPF Result        Last 3 Blood Glucose:   Recent Labs     06/08/22  0525 06/09/22  0535 06/10/22  0525   GLUCOSE 90 94 102*        Comprehensive Metabolic Profile:   Recent Labs     06/08/22  0525 06/09/22  0535 06/10/22  0525   * 134* 135   K 3.5* 3.4* 4.0   CL 94* 95* 98   CO2 28 32* 30   BUN 24* 21 21   CREATININE 1.19 1.00 0.98   GLUCOSE 90 94 102*   CALCIUM 9.4 9.3 9.1        Urinalysis:   Lab Results   Component Value Date    NITRU NEGATIVE 06/06/2022    COLORU Yellow 06/06/2022    PHUR 6.0 06/06/2022    WBCUA 0 TO 2 06/06/2022    RBCUA 2 TO 5 06/06/2022    MUCUS 2+ 05/02/2022    BACTERIA 1+ 05/02/2022    SPECGRAV <1.005 06/06/2022    LEUKOCYTESUR NEGATIVE 06/06/2022    UROBILINOGEN Normal 06/06/2022    BILIRUBINUR NEGATIVE 06/06/2022    GLUCOSEU NEGATIVE 06/06/2022    KETUA NEGATIVE 06/06/2022       HgBA1c:  No results found for: LABA1C    Lactic Acid:   Lab Results   Component Value Date    LACTA 1.9 05/18/2022        High Sensitivity Troponin:  No results for input(s): TROPHS in the last 72 hours. Radiology/Imaging:  XR CHEST PORTABLE   Final Result   Stable chest x-ray, demonstrating cardiomegaly, small left pleural effusion,   and left basilar atelectasis. XR CHEST PORTABLE   Final Result   Minimal left basilar atelectasis. Otherwise, clear lungs. Cardiomegaly. Small left pleural effusion. Assessment/ Plan:   79-year-old male with history of atrial fibrillation currently with RVR, ETOH abuse, and MADIHA on CKD. Hospital course has been complicated by ETOH withdrawal.   Seen by cardiology, recommendations might be to do cardioversion. They have increase his metoprolol and decreased his amiodarone. Patient was noted to have acute urinary retention and Montes catheter was placed. He has had difficulty with some bladder spasms. Urology had been consulted.     Atrial fibrillation with rapid ventricular response  - now with better rate contro 's  -  metoprolol BID. amio stopped by cardiology due to encephalopathy with poor po intake  - CHADSVASC: 3  - continue Eliquis   - cardiology following      Combined congestive heart failure  - ischemic cardiomyopathy- s/p MI 18 years ago with history of stents. - echo 5/18/22 with EF 15-20%   - currently on  BB, continue entresto, bumex  - pt remains hypotensive in the 70-90's, florinef and midodrine added by cardiology. -  Would hold BB for now, can reintroduce at lower dose when BP improved but will ultimately defer to cardiology       Chronic alcohol use  -Patient on CIWA protocol.   - Encephalopathy improving  - continue thiamine and folic acid, multivitamin      MADIHA- resolved   - pre renal azotemia vs obstructive from urinary retention  - garner placed 6/6  - Cr 1.69 on admission, now 1  - resolved      Hypomagnesia  - replace as needed  - 2 grams Mag given this morning      Urinary retention  - garner placed 6/6. Garner removed 6/9  - continue flomax  -Hematuria resolved          Principal Problem:    Atrial fibrillation with RVR (HCC)  Active Problems:    Chronic combined systolic (congestive) and diastolic (congestive) heart failure (HCC)    Dehydration, moderate    Medication side effect, initial encounter    Severe malnutrition ([address]  Resolved Problems:    * No resolved hospital problems.  *      DVT prophylaxis:  Patient on Eliquis    GI prophylaxis:  H2 blocker    Nutrition status:  Fair    CODE STATUS:  Full

## 2022-06-10 NOTE — PROGRESS NOTES
SHAMA Paul called and updated on post void residual.  No new orders. Appointment made for follow up after discharge.

## 2022-06-10 NOTE — PROGRESS NOTES
I, Ravin Jay am scribing for and in the presence of Saadia Bucio PA-C. Patient: Fransisca Beltran  : [id number]  Date of Admission: 2022  Primary Care Physician: Meera Slaughter  Today's Date: 6/10/2022    REASON FOR CONSULTATION: atrial fibrillation with RVR    HPI:  Mr. Ronny Krishnan is a 70 y.o. male who was admitted to the hospital for tachycardia. In the ER he was found to be in atrial fibrillation with RVR leading to this consultation. In the past he reports seeing a Cardiologist in [place] and was planning on having a cardioversion done in  to put him back into normal sinus rhythm due to his history of new onset atrial fibrillation.      He reports a history of a heart attack at age 46 and needed stents placed at that time but denies any cardiac cath since that time. He also has carotid artery stenosis and abdominal aortic aneurysm. He did report having surgery on one side of his neck, however he is not sure which side or how long ago it was. He has had hypertension and hyperlipidemia for years as well.      He is a current every day smoker and he smoked for about 55 years and smokes about a pack a day.      Family history consists of a lot of people in his family with significant cardiac history, however he wanted us to talk to his sister about the family history as she knows the details. Mr. Ronny Krishnan came to the ED for tachycardia. Since he was last seen in our office he has lost 11 pounds. He does have bladder spasms and now has a cath in that he wants out. He has had no fever or chills. He does have a cough and its productive sometimes. His wife does not think he has been eating and drinking okay the last week. He has no appetite. He doesn't have any nausea or vomiting. Echo done on 2022- EF 15-20%    Mr. Ronyn Krishnan is feeling overall well today. He just finished working with therapy and states he walked back and forth in his room 4 times without any issues.  He is eating about half of his meals and drinking well. His blood pressure continues to run on the lower side. Denies shortness of breath. Denies any lightheaded/dizziness after he received fluids. He denied any current or recent chest pain, abdominal pain, bleeding problems, problems with his medications or any other concerns at this time. Past Medical History:   Diagnosis Date    Acute kidney [facility] ([facility]     Ascites 5/20/2022    Atrial fibrillation (HCC)     CHF (congestive heart failure) (HCC)     Cirrhosis of liver with ascites ([facility] 5/19/2022    CKD (chronic kidney disease) 5/20/2022    Coronary artery disease     Hyperlipidemia     Hypertension     Hypomagnesemia 5/20/2022    Hyponatremia     Thrombocytopenia (HCC)        CURRENT ALLERGIES: Patient has no known allergies. REVIEW OF SYSTEMS: 14 systems were reviewed. Pertinent positives and negatives as above, all else negative. Past Surgical History:   Procedure Laterality Date    CAROTID STENT PLACEMENT Bilateral     ROTATOR CUFF REPAIR Right     Social History:  Social History     Tobacco Use    Smoking status: Current Every Day Smoker     Packs/day: 1.00     Types: Cigarettes    Smokeless tobacco: Never Used   Substance Use Topics    Alcohol use: Yes     Alcohol/week: 2.0 standard drinks     Types: 2 Shots of liquor per week     Comment: Drinks about 2 Kesslers with mix nightly. Reported history of heavy alcohol use for years with both beer and liquior several years ago    Drug use: Not on file        CURRENT MEDICATIONS:  No outpatient medications have been marked as taking for the 6/6/22 encounter [facility] Encounter). FAMILY HISTORY: Reviewed but non-contributory     PHYSICAL EXAM:   BP 99/61   Pulse (!) 106   Temp 97.1 °F (36.2 °C) (Temporal)   Resp 20   Ht 5' 7\" (1.702 m)   Wt 186 lb 12.8 oz (84.7 kg)   SpO2 92%   BMI 29.26 kg/m²  Body mass index is 29.26 kg/m².     Constitutional: He is oriented to person, place, and time. He appears well-developed and well-nourished. In no acute distress. HEENT: Normocephalic and atraumatic. No JVD present. Carotid bruit is not present. No mass and no thyromegaly present. No lymphadenopathy present. Cardiovascular: Normal rate, regularly irregular rhythm, normal heart sounds. Exam reveals no gallop and no friction rubs. No murmur was heard. Pulmonary/Chest: Effort normal and breath sounds normal. No respiratory distress. He has no wheezes, rhonchi or rales. Abdominal: Soft, non-tender. Bowel sounds and aorta are normal. He exhibits no organomegaly, mass or bruit. Extremities: Trace. No cyanosis or clubbing. 2+ radial and carotid pulses. Distal extremity pulses: 2+ bilaterally. Neurological: He is alert and oriented to person, place, and time. No evidence of gross cranial nerve deficit. Coordination appeared normal.   Skin: Skin is warm and dry. There is no rash or diaphoresis. Psychiatric: He has a normal mood and affect.  His speech is normal and behavior is normal.      MOST RECENT LABS ON RECORD:   Lab Results   Component Value Date    WBC 2.5 (L) 06/10/2022    HGB 12.1 (L) 06/10/2022    HCT 35.9 (L) 06/10/2022    PLT See Reflexed IPF Result 06/10/2022    ALT 26 06/06/2022    AST 32 06/06/2022     06/10/2022    K 4.0 06/10/2022    CL 98 06/10/2022    CREATININE 0.98 06/10/2022    BUN 21 06/10/2022    CO2 30 06/10/2022    TSH 3.69 06/06/2022    INR 1.7 06/07/2022     ASSESSMENT:  Patient Active Problem List    Diagnosis Date Noted    Chronic combined systolic (congestive) and diastolic (congestive) heart failure (HCC)     Dehydration, moderate     Medication side effect, initial encounter     History of acute inferior wall MI     Ischemic cardiomyopathy     Severe malnutrition ([facility] 06/07/2022    Hypomagnesemia 05/20/2022    Ascites 05/20/2022    CKD (chronic kidney disease) 05/20/2022    Cirrhosis of liver with ascites ([facility] 05/19/2022    Acute on chronic combined systolic and diastolic CHF, NYHA class 4 (HCC)     Coronary artery disease     Hypertension     Thrombocytopenia (HCC)     Hyponatremia     Hyperlipidemia     Atrial fibrillation with RVR ([facility] 05/18/2022       PLAN:  · Paroxysmal Atrial Fibrillation: Rate Control Symptomatic and appears to not be working well. May need to convert to rhythm control strategy.  Beta Blocker: Continue Metoprolol tartrate to 100 mg twice daily. I also discussed the potential side effects of this medication including lightheadedness and dizziness and instructed them to stop the medication of this occurs and call our office if this occurs.  Anti-Arrhythmic: Today the amiodarone (Pacerone) was stopped due to him being so sleepy and not eating or drinking well as this cause be causing his . His HR is better today we will control this with the Metoprolol. [id number] Score for Atrial Fibrillation Stroke Risk   Risk   Factors  Component Value   C CHF Yes 1   H HTN Yes 1   A2 Age >= 76 No,  (75 y.o.) 0   D DM No 0   S2 Prior Stroke/TIA No 0   V Vascular Disease No 0   A Age 74-69 Yes,  (75 y.o.) 1   Sc Sex male 0    [id number]  Score  3   Score last updated 3/9/83 6:71 PM EDT  Click here for a link to the UpToDate guideline \"Atrial Fibrillation: Anticoagulation therapy to prevent embolization  Disclaimer: Risk Score calculation is dependent on accuracy of patient problem list and past encounter diagnosis.  Stroke Risk: CHADS2-VASc Score: 3/9 (3.2% stroke risk). Because of his atrial fibrillation, I also would also recommend that he continue with anticoagulation to decrease his risk of stoke but also reminded him to watch for signs of blood in his stool or black tarry stools and stop the medication immediately if this develops as this could be life threatening.  Anticoagulation: Continue Apixaban (Eliquis) 5 mg every 12 hours.      Chronic combined heart failure: Does not appear to be in active heart failure, - 6839 mL.   Beta Blocker: Continue Metoprolol tartrate to 100 mg twice daily. I also discussed the potential side effects of this medication including lightheadedness and dizziness and instructed them to stop the medication of this occurs and call our office if this occurs.  ACE Inibitor/ARB: Continue sacubitril/valsartan (Entresto) 24/26 mg twice daily.  Diuretics: Continue bumetinide (Bumex) 1 mg every morning.  Heart failure counseling: I advised them to try and keep their legs up whenever possible and to limit salt in their diet. · Lightheadedness/dizziness: Improved  · INCREASE to Florinef (fludrocortisone) 0.2 mg daily. I explained that this can cause some increased lower extremity edema but usually this is fairly mild. · Continue midodrine 10 mg TID. · Nonpharmacologic counseling: Because of his condition, I reminded him to try and keep himself well-hydrated and to take extra time when moving from laying to sitting, sitting to standing and standing to walking. I also explained to him to help improve his symptoms he should include 3 g sodium diet, 1 or 2 L of sports drinks daily, knee-high compressions stockings. · Additional Testing List: None    Once again, thank you for allowing me to participate in this patients care. Please do not hesitate to contact me could I be of further assistance. Sincerely,  Memo Stafford PA-C  [facility] Cardiology Specialist    [address] [facility], [address] [place] [address]  Phone: [phone], Fax: [phone]     I believe that the risk of significant morbidity and mortality related to the patient's current medical conditions are: intermediate-high. Shruthi 10, 2022     ATTESTATION:     I have discussed the case, including pertinent history and exam findings with the Elva SESAY. I have evaluated the  History, physical findings and pictures of the patient and the key elements of the encounter have been performed by me.  I have reviewed the laboratory data, other diagnostic studies and discussed them with Omar SESAY. I have updated the medical record where necessary. I agree with the assessment, plan and orders as documented by Noy SESAY. I also spoke extensively with . Celina Back as well as nursing and his sister. Agree with plans to have him go to the [facility] for Rehab. Once again, thank you for allowing me to participate in this patients care. Please do not hesitate to contact me if I could be of any further assistance. Sincerely,  Guilherme Blood. Nadine SERRANO, MS, F.A.C.C.   [facility] Cardiology Specialist    [address] [facility], [address] [place] [address]  Phone: [phone], Fax: [phone]

## 2022-06-10 NOTE — PLAN OF CARE
Problem: Discharge Planning  Goal: Discharge to home or other facility with appropriate resources  6/10/2022 0914 by Travis Alex RN  Outcome: Progressing  Flowsheets (Taken 6/10/2022 0730)  Discharge to home or other facility with appropriate resources: Refer to discharge planning if patient needs post-hospital services based on physician order or complex needs related to functional status, cognitive ability or social support system  6/9/2022 2341 by Blair Harvey RN  Outcome: Progressing  Flowsheets (Taken 6/8/2022 0650 by Travis Alex RN)  Discharge to home or other facility with appropriate resources: Refer to discharge planning if patient needs post-hospital services based on physician order or complex needs related to functional status, cognitive ability or social support system     Problem: Safety - Adult  Goal: Free from fall injury  6/10/2022 0914 by Travis Alex RN  Outcome: Progressing  Flowsheets (Taken 6/6/2022 1541 by Avani Moss RN)  Free From Fall Injury: Instruct family/caregiver on patient safety  6/9/2022 2341 by Blair Harvey RN  Outcome: Progressing  Flowsheets (Taken 6/6/2022 1541 by Avani Moss RN)  Free From Fall Injury: Instruct family/caregiver on patient safety     Problem: Skin/Tissue Integrity  Goal: Absence of new skin breakdown  Description: 1. Monitor for areas of redness and/or skin breakdown  2. Assess vascular access sites hourly  3. Every 4-6 hours minimum:  Change oxygen saturation probe site  4. Every 4-6 hours:  If on nasal continuous positive airway pressure, respiratory therapy assess nares and determine need for appliance change or resting period. 6/10/2022 0914 by Travis Alex RN  Outcome: Progressing  Note: Patient remains free from new skin breakdown. Turns and repositions self in bed and in chair.   6/9/2022 2341 by Blair Harvey RN  Outcome: Progressing  Note: No new skin injuries observed     Problem: Pain  Goal: Verbalizes/displays adequate comfort level or baseline comfort level  6/10/2022 0914 by Cresencio Engle RN  Outcome: Progressing  Flowsheets (Taken 6/8/2022 2034 by Gilbert Gordon RN)  Verbalizes/displays adequate comfort level or baseline comfort level:   Assess pain using appropriate pain scale   Encourage patient to monitor pain and request assistance  6/9/2022 2341 by Etelvina Roberson RN  Outcome: Progressing  Flowsheets (Taken 6/8/2022 2034 by Gilbert Gordon RN)  Verbalizes/displays adequate comfort level or baseline comfort level:   Assess pain using appropriate pain scale   Encourage patient to monitor pain and request assistance     Problem: Nutrition Deficit:  Goal: Optimize nutritional status  6/10/2022 0914 by Cresencio Engle RN  Outcome: Progressing  Flowsheets (Taken 6/10/2022 0729 by Ruddy Hollingsworth RD, AGRY)  Nutrient intake appropriate for improving, restoring, or maintaining nutritional needs: Monitor oral intake, labs, and treatment plans  6/10/2022 0729 by Ruddy Hollingsworth RD, GARY  Flowsheets  Taken 6/10/2022 4961  Nutrient intake appropriate for improving, restoring, or maintaining nutritional needs: Monitor oral intake, labs, and treatment plans  Taken 6/10/2022 0719  Nutrient intake appropriate for improving, restoring, or maintaining nutritional needs:   Monitor oral intake, labs, and treatment plans   Recommend appropriate diets, oral nutritional supplements, and vitamin/mineral supplements  Note: Nutrition Problem #1: Severe malnutrition,In context of acute illness or injury  Intervention: Food and/or Nutrient Delivery: Continue Current Diet,Continue Oral Nutrition Supplement      6/9/2022 2341 by Etelvina Roberson RN  Outcome: Progressing  Flowsheets (Taken 6/7/2022 0725 by Ruddy Hollingsworth RD, GARY)  Nutrient intake appropriate for improving, restoring, or maintaining nutritional needs:   Monitor oral intake, labs, and treatment plans   Recommend appropriate diets, oral nutritional supplements, and vitamin/mineral supplements     Problem: Chronic Conditions and Co-morbidities  Goal: Patient's chronic conditions and co-morbidity symptoms are monitored and maintained or improved  6/10/2022 0914 by Hany Burch RN  Outcome: Progressing  Flowsheets (Taken 6/10/2022 0730)  Care Plan - Patient's Chronic Conditions and Co-Morbidity Symptoms are Monitored and Maintained or Improved:   Monitor and assess patient's chronic conditions and comorbid symptoms for stability, deterioration, or improvement   Collaborate with multidisciplinary team to address chronic and comorbid conditions and prevent exacerbation or deterioration  6/9/2022 2341 by Willis Cheema RN  Outcome: Progressing  Flowsheets (Taken 6/9/2022 2341)  Care Plan - Patient's Chronic Conditions and Co-Morbidity Symptoms are Monitored and Maintained or Improved:   Monitor and assess patient's chronic conditions and comorbid symptoms for stability, deterioration, or improvement   Collaborate with multidisciplinary team to address chronic and comorbid conditions and prevent exacerbation or deterioration

## 2022-06-10 NOTE — PROGRESS NOTES
Physical Therapy  Facility/Department: [facility] AT THE [facility]  Daily Treatment Note  NAME: Lilly Hearn  :   MRN: [id number]    Date of Service: 6/10/2022    Discharge Recommendations:  Continue to assess pending progress,Home with Home health PT,Outpatient PT        Patient Diagnosis(es): There were no encounter diagnoses. Assessment   Activity Tolerance: Patient tolerated evaluation without incident     Plan    Plan  Plan: 2 times a day 7 days a week (1x daily on weekends.)  Plan weeks: 20 days  Current Treatment Recommendations: Balance training;Functional mobility training;Transfer training;IADL training; Endurance training;Gait training;Stair training;Neuromuscular re-education; Return to work related activity;Home exercise program;Safety education & training;Modalities; Therapeutic activities     Restrictions  Restrictions/Precautions  Restrictions/Precautions: Fall Risk,General Precautions  Required Braces or Orthoses?: No     Subjective    Subjective  Subjective: Pt standing upon arrival with chair alarm going off. Pt denies any pain and willing to participate. Orientation  Overall Orientation Status: Within Functional Limits  Cognition  Overall Cognitive Status: Exceptions  Arousal/Alertness: Appropriate responses to stimuli  Following Commands: Follows one step commands with increased time; Follows one step commands with repetition  Safety Judgement: Decreased awareness of need for assistance  Problem Solving: Decreased awareness of errors  Insights: Decreased awareness of deficits  Initiation: Requires cues for some  Sequencing: Requires cues for some  Cognition Comment: repeated VCs and Min A during ADLs for proper completion of tasks. Objective   Vitals     Bed Mobility Training  Bed Mobility Training: No  Balance  Sitting: Intact  Standing: With support (pt stood at sink ~ 6 min for oral hygiene and hair care supported by FAs on sink and unsupported at times.  0 LOB noted, decreased safety awareness)  Transfer Training  Transfer Training: Yes  Overall Level of Assistance: Contact-guard assistance  Interventions: Verbal cues (Cues for safety with fair understanding.)  Sit to Stand: Stand-by assistance;Contact-guard assistance  Stand to Sit: Stand-by assistance;Contact-guard assistance  Toilet Transfer: Stand-by assistance;Contact-guard assistance (use of R grab bar, decreased safety d/t decreased cognition)  Gait Training  Gait Training: Yes  Gait  Overall Level of Assistance: Contact-guard assistance  Interventions: Verbal cues (Cues for posture and safety with AD.)  Distance (ft):  (40 feet x 1, 15 feet x 2)  Assistive Device: Walker, rolling     PT Exercises  Exercise Treatment: Seated ther ex x 20 of: heel/toe raises, marching, hip abd, and LAQ. Safety Devices  Type of Devices: All fall risk precautions in place;Call light within reach; Chair alarm in place; Left in chair;Nurse notified       Goals  Short Term Goals  Time Frame for Short term goals: 20 days  Short term goal 1: Pt to be able to ambulate 50ft IND to be able to return home safetly. Short term goal 2: Pt to be IND with all transfers to improve functional mobility and increase safety with ADLs  Short term goal 3: Pt to be able to tolerate 20-30min therex/act to improve overall functional capactiy for ADL's. Patient Goals   Patient goals : to return home    Education  Patient Education  Education Given To: Patient; Family  Education Provided: Role of Therapy;Plan of Care;Transfer Training (Gait training also.)  Education Method: Verbal  Barriers to Learning: Cognition  Education Outcome: Verbalized understanding    Therapy Time   Individual Concurrent Group Co-treatment   Time In 1014         Time Out [address], [place]

## 2022-06-10 NOTE — PROGRESS NOTES
Patient had crawled to end of bed, attempting to get out of bed. Alarm sounding. Writer assisted patient with urinal.  Patient noted to be incontinent a small amount and voided brownish color urine. Patient assisted to chair. Alert and oriented x4. AM bath completed at this time. Patient denies pain or SOB. BP running low. Dr. Zbigniew Dinh @ bedside. Aware of BP's. New order received.

## 2022-06-10 NOTE — PROGRESS NOTES
Patient sitting in recliner, asked to go to the bathroom. Patient 1 assisted to the bathroom where he urinated in the toilet. Patient pleasant and cooperative with assessment. VS stable, chair alarm on.

## 2022-06-10 NOTE — PLAN OF CARE
Problem: Discharge Planning  Goal: Discharge to home or other facility with appropriate resources  6/9/2022 2341 by Willis Cheema RN  Outcome: Progressing  Flowsheets (Taken 6/8/2022 0650 by Hany Burch, MENDEZ)  Discharge to home or other facility with appropriate resources: Refer to discharge planning if patient needs post-hospital services based on physician order or complex needs related to functional status, cognitive ability or social support system  6/9/2022 1526 by Delbert Frazier RN  Outcome: Progressing     Problem: Safety - Adult  Goal: Free from fall injury  6/9/2022 2341 by Willis Cheema RN  Outcome: Progressing  Flowsheets (Taken 6/6/2022 1541 by Mayte Pino RN)  Free From Fall Injury: Instruct family/caregiver on patient safety  6/9/2022 1526 by Delbert Frazier RN  Outcome: Progressing     Problem: Skin/Tissue Integrity  Goal: Absence of new skin breakdown  Description: 1. Monitor for areas of redness and/or skin breakdown  2. Assess vascular access sites hourly  3. Every 4-6 hours minimum:  Change oxygen saturation probe site  4. Every 4-6 hours:  If on nasal continuous positive airway pressure, respiratory therapy assess nares and determine need for appliance change or resting period.   6/9/2022 2341 by Willis Cheema RN  Outcome: Progressing  Note: No new skin injuries observed  6/9/2022 1526 by Delbert Frazier RN  Outcome: Progressing     Problem: Pain  Goal: Verbalizes/displays adequate comfort level or baseline comfort level  6/9/2022 2341 by Willis Cheema RN  Outcome: Progressing  [address] (Taken 6/8/2022 2034 by Rubin De La Torre RN)  Verbalizes/displays adequate comfort level or baseline comfort level:   Assess pain using appropriate pain scale   Encourage patient to monitor pain and request assistance  6/9/2022 1526 by Delbert Frazier RN  Outcome: Progressing     Problem: Nutrition Deficit:  Goal: Optimize nutritional status  6/9/2022 2341 by Willis Cheema RN  Outcome: Progressing  Flowsheets (Taken 6/7/2022 0725 by Fe English, RD, LD)  Nutrient intake appropriate for improving, restoring, or maintaining nutritional needs:   Monitor oral intake, labs, and treatment plans   Recommend appropriate diets, oral nutritional supplements, and vitamin/mineral supplements  6/9/2022 1526 by Rupa Albarran RN  Outcome: Progressing     Problem: Chronic Conditions and Co-morbidities  Goal: Patient's chronic conditions and co-morbidity symptoms are monitored and maintained or improved  6/9/2022 2341 by Tanner Roche RN  Outcome: Progressing  Flowsheets (Taken 6/9/2022 2341)  Care Plan - Patient's Chronic Conditions and Co-Morbidity Symptoms are Monitored and Maintained or Improved:   Monitor and assess patient's chronic conditions and comorbid symptoms for stability, deterioration, or improvement   Collaborate with multidisciplinary team to address chronic and comorbid conditions and prevent exacerbation or deterioration  6/9/2022 1526 by Rupa Albarran RN  Outcome: Progressing

## 2022-06-11 VITALS
TEMPERATURE: 97.7 F | HEART RATE: 93 BPM | WEIGHT: 186.3 LBS | SYSTOLIC BLOOD PRESSURE: 81 MMHG | BODY MASS INDEX: 29.24 KG/M2 | OXYGEN SATURATION: 93 % | RESPIRATION RATE: 15 BRPM | HEIGHT: 67 IN | DIASTOLIC BLOOD PRESSURE: 56 MMHG

## 2022-06-11 LAB
ABSOLUTE EOS #: 0.03 K/UL (ref 0–0.44)
ABSOLUTE IMMATURE GRANULOCYTE: 0 K/UL (ref 0–0.3)
ABSOLUTE LYMPH #: 0.81 K/UL (ref 1.1–3.7)
ABSOLUTE MONO #: 0.28 K/UL (ref 0.1–1.2)
ANION GAP SERPL CALCULATED.3IONS-SCNC: 8 MMOL/L (ref 9–17)
BASOPHILS # BLD: 0 % (ref 0–2)
BASOPHILS ABSOLUTE: 0 K/UL (ref 0–0.2)
BUN BLDV-MCNC: 22 MG/DL (ref 8–23)
BUN/CREAT BLD: 25 (ref 9–20)
CALCIUM SERPL-MCNC: 9.1 MG/DL (ref 8.6–10.4)
CHLORIDE BLD-SCNC: 100 MMOL/L (ref 98–107)
CO2: 31 MMOL/L (ref 20–31)
CREAT SERPL-MCNC: 0.87 MG/DL (ref 0.7–1.2)
EOSINOPHILS RELATIVE PERCENT: 1 % (ref 1–4)
GFR AFRICAN AMERICAN: >60 ML/MIN
GFR NON-AFRICAN AMERICAN: >60 ML/MIN
GFR SERPL CREATININE-BSD FRML MDRD: ABNORMAL ML/MIN/{1.73_M2}
GFR SERPL CREATININE-BSD FRML MDRD: ABNORMAL ML/MIN/{1.73_M2}
GLUCOSE BLD-MCNC: 87 MG/DL (ref 70–99)
HCT VFR BLD CALC: 36 % (ref 40.7–50.3)
HEMOGLOBIN: 11.9 G/DL (ref 13–17)
IMMATURE GRANULOCYTES: 0 %
LYMPHOCYTES # BLD: 29 % (ref 24–43)
MAGNESIUM: 1.5 MG/DL (ref 1.6–2.6)
MCH RBC QN AUTO: 33.9 PG (ref 25.2–33.5)
MCHC RBC AUTO-ENTMCNC: 33.1 G/DL (ref 28.4–34.8)
MCV RBC AUTO: 102.6 FL (ref 82.6–102.9)
MONOCYTES # BLD: 10 % (ref 3–12)
MORPHOLOGY: ABNORMAL
NRBC AUTOMATED: 0 PER 100 WBC
PDW BLD-RTO: 14.7 % (ref 11.8–14.4)
PLATELET # BLD: ABNORMAL K/UL (ref 138–453)
PLATELET, FLUORESCENCE: 90 K/UL (ref 138–453)
PLATELET, IMMATURE FRACTION: 8.2 % (ref 1.1–10.3)
POTASSIUM SERPL-SCNC: 3.6 MMOL/L (ref 3.7–5.3)
RBC # BLD: 3.51 M/UL (ref 4.21–5.77)
SARS-COV-2, RAPID: NOT DETECTED
SEG NEUTROPHILS: 60 % (ref 36–65)
SEGMENTED NEUTROPHILS ABSOLUTE COUNT: 1.68 K/UL (ref 1.5–8.1)
SODIUM BLD-SCNC: 139 MMOL/L (ref 135–144)
SPECIMEN DESCRIPTION: NORMAL
WBC # BLD: 2.8 K/UL (ref 3.5–11.3)

## 2022-06-11 PROCEDURE — 97110 THERAPEUTIC EXERCISES: CPT

## 2022-06-11 PROCEDURE — 6370000000 HC RX 637 (ALT 250 FOR IP): Performed by: PHYSICIAN ASSISTANT

## 2022-06-11 PROCEDURE — 80048 BASIC METABOLIC PNL TOTAL CA: CPT

## 2022-06-11 PROCEDURE — 6370000000 HC RX 637 (ALT 250 FOR IP): Performed by: STUDENT IN AN ORGANIZED HEALTH CARE EDUCATION/TRAINING PROGRAM

## 2022-06-11 PROCEDURE — 85025 COMPLETE CBC W/AUTO DIFF WBC: CPT

## 2022-06-11 PROCEDURE — 94761 N-INVAS EAR/PLS OXIMETRY MLT: CPT

## 2022-06-11 PROCEDURE — 83735 ASSAY OF MAGNESIUM: CPT

## 2022-06-11 PROCEDURE — C9803 HOPD COVID-19 SPEC COLLECT: HCPCS

## 2022-06-11 PROCEDURE — 2580000003 HC RX 258: Performed by: PHYSICIAN ASSISTANT

## 2022-06-11 PROCEDURE — 97530 THERAPEUTIC ACTIVITIES: CPT

## 2022-06-11 PROCEDURE — 6370000000 HC RX 637 (ALT 250 FOR IP): Performed by: NURSE PRACTITIONER

## 2022-06-11 PROCEDURE — 85055 RETICULATED PLATELET ASSAY: CPT

## 2022-06-11 PROCEDURE — 6370000000 HC RX 637 (ALT 250 FOR IP): Performed by: SPECIALIST

## 2022-06-11 PROCEDURE — 87635 SARS-COV-2 COVID-19 AMP PRB: CPT

## 2022-06-11 PROCEDURE — 2700000000 HC OXYGEN THERAPY PER DAY

## 2022-06-11 PROCEDURE — 97535 SELF CARE MNGMENT TRAINING: CPT

## 2022-06-11 PROCEDURE — 36415 COLL VENOUS BLD VENIPUNCTURE: CPT

## 2022-06-11 RX ORDER — TAMSULOSIN HYDROCHLORIDE 0.4 MG/1
0.4 CAPSULE ORAL DAILY
Qty: 30 CAPSULE | Refills: 3 | DISCHARGE
Start: 2022-06-12 | End: 2022-07-21 | Stop reason: SDUPTHER

## 2022-06-11 RX ORDER — NICOTINE 21 MG/24HR
1 PATCH, TRANSDERMAL 24 HOURS TRANSDERMAL DAILY
Qty: 30 PATCH | Refills: 3 | DISCHARGE
Start: 2022-06-12 | End: 2022-07-19

## 2022-06-11 RX ORDER — THIAMINE MONONITRATE (VIT B1) 100 MG
100 TABLET ORAL DAILY
Refills: 0 | DISCHARGE
Start: 2022-06-12 | End: 2022-07-19 | Stop reason: SDUPTHER

## 2022-06-11 RX ORDER — BUMETANIDE 2 MG/1
2 TABLET ORAL
Qty: 30 TABLET | Refills: 3 | DISCHARGE
Start: 2022-06-13 | End: 2022-06-24 | Stop reason: ALTCHOICE

## 2022-06-11 RX ORDER — ONDANSETRON 4 MG/1
4 TABLET, ORALLY DISINTEGRATING ORAL EVERY 8 HOURS PRN
DISCHARGE
Start: 2022-06-11 | End: 2022-07-19 | Stop reason: CLARIF

## 2022-06-11 RX ORDER — POLYETHYLENE GLYCOL 3350 17 G/17G
17 POWDER, FOR SOLUTION ORAL DAILY PRN
Qty: 527 G | Refills: 1 | DISCHARGE
Start: 2022-06-11 | End: 2022-07-11

## 2022-06-11 RX ORDER — PANTOPRAZOLE SODIUM 40 MG/1
40 TABLET, DELAYED RELEASE ORAL
Qty: 30 TABLET | Refills: 3 | DISCHARGE
Start: 2022-06-12 | End: 2022-07-19 | Stop reason: SDUPTHER

## 2022-06-11 RX ORDER — BUMETANIDE 1 MG/1
1 TABLET ORAL DAILY
Qty: 30 TABLET | Refills: 3 | DISCHARGE
Start: 2022-06-11 | End: 2022-08-15 | Stop reason: SDUPTHER

## 2022-06-11 RX ORDER — FOLIC ACID 1 MG/1
1 TABLET ORAL DAILY
Qty: 30 TABLET | Refills: 3 | DISCHARGE
Start: 2022-06-12 | End: 2022-07-12 | Stop reason: SDUPTHER

## 2022-06-11 RX ORDER — FLUDROCORTISONE ACETATE 0.1 MG/1
0.2 TABLET ORAL DAILY
Refills: 3 | DISCHARGE
Start: 2022-06-12 | End: 2022-06-24

## 2022-06-11 RX ORDER — CHOLECALCIFEROL (VITAMIN D3) 125 MCG
10 CAPSULE ORAL NIGHTLY
Refills: 0 | DISCHARGE
Start: 2022-06-11

## 2022-06-11 RX ORDER — MIDODRINE HYDROCHLORIDE 10 MG/1
10 TABLET ORAL
Qty: 90 TABLET | Refills: 3 | DISCHARGE
Start: 2022-06-11 | End: 2022-07-21 | Stop reason: SDUPTHER

## 2022-06-11 RX ORDER — ACETAMINOPHEN 325 MG/1
650 TABLET ORAL EVERY 6 HOURS PRN
Qty: 120 TABLET | Refills: 3 | DISCHARGE
Start: 2022-06-11 | End: 2022-07-19 | Stop reason: CLARIF

## 2022-06-11 RX ORDER — METOPROLOL TARTRATE 75 MG/1
75 TABLET, FILM COATED ORAL 2 TIMES DAILY
Qty: 60 TABLET | Refills: 3 | DISCHARGE
Start: 2022-06-11 | End: 2022-06-24 | Stop reason: DRUGHIGH

## 2022-06-11 RX ORDER — MULTIVITAMIN WITH IRON
1 TABLET ORAL DAILY
Refills: 0 | DISCHARGE
Start: 2022-06-12 | End: 2022-07-19 | Stop reason: SDUPTHER

## 2022-06-11 RX ORDER — POTASSIUM CHLORIDE 20 MEQ/1
20 TABLET, EXTENDED RELEASE ORAL 2 TIMES DAILY WITH MEALS
Qty: 60 TABLET | Refills: 3 | DISCHARGE
Start: 2022-06-11 | End: 2022-07-19 | Stop reason: SDUPTHER

## 2022-06-11 RX ADMIN — PANTOPRAZOLE SODIUM 40 MG: 40 TABLET, DELAYED RELEASE ORAL at 08:53

## 2022-06-11 RX ADMIN — MUPIROCIN: 20 OINTMENT TOPICAL at 08:51

## 2022-06-11 RX ADMIN — APIXABAN 5 MG: 5 TABLET, FILM COATED ORAL at 08:53

## 2022-06-11 RX ADMIN — SACUBITRIL AND VALSARTAN 0.5 TABLET: 24; 26 TABLET, FILM COATED ORAL at 08:53

## 2022-06-11 RX ADMIN — MIDODRINE HYDROCHLORIDE 10 MG: 5 TABLET ORAL at 08:53

## 2022-06-11 RX ADMIN — MIDODRINE HYDROCHLORIDE 10 MG: 5 TABLET ORAL at 11:40

## 2022-06-11 RX ADMIN — FLUDROCORTISONE ACETATE 0.2 MG: 0.1 TABLET ORAL at 08:52

## 2022-06-11 RX ADMIN — POTASSIUM CHLORIDE 20 MEQ: 1500 TABLET, EXTENDED RELEASE ORAL at 08:57

## 2022-06-11 RX ADMIN — ATORVASTATIN CALCIUM 80 MG: 40 TABLET, FILM COATED ORAL at 08:52

## 2022-06-11 RX ADMIN — BUMETANIDE 1 MG: 1 TABLET ORAL at 08:58

## 2022-06-11 RX ADMIN — BUSPIRONE HYDROCHLORIDE 15 MG: 5 TABLET ORAL at 08:52

## 2022-06-11 RX ADMIN — CLOPIDOGREL BISULFATE 75 MG: 75 TABLET ORAL at 08:52

## 2022-06-11 RX ADMIN — SODIUM CHLORIDE, PRESERVATIVE FREE 10 ML: 5 INJECTION INTRAVENOUS at 08:54

## 2022-06-11 RX ADMIN — TAMSULOSIN HYDROCHLORIDE 0.4 MG: 0.4 CAPSULE ORAL at 08:53

## 2022-06-11 RX ADMIN — MULTIVITAMIN TABLET 1 TABLET: TABLET at 08:53

## 2022-06-11 RX ADMIN — Medication 600 MG: at 08:52

## 2022-06-11 RX ADMIN — METOPROLOL TARTRATE 75 MG: 50 TABLET, FILM COATED ORAL at 08:52

## 2022-06-11 RX ADMIN — Medication 100 MG: at 08:53

## 2022-06-11 RX ADMIN — FOLIC ACID 1 MG: 1 TABLET ORAL at 08:53

## 2022-06-11 NOTE — DISCHARGE SUMMARY
Physician Discharge Summary  Valerie Mendez MD       Patient ID:  Anders Anderson  [id number]  1950    Admission date: 6/6/2022    Discharge date: 6/11/2022     Admitting Physician: Mauro Sullivan MD     Primary Care Physician: Mauro Sullivan MD     Primary Discharge Diagnoses:   Patient Active Problem List    Diagnosis Date Noted    Chronic combined systolic (congestive) and diastolic (congestive) heart failure (HCC)     Dehydration, moderate     Medication side effect, initial encounter     History of acute inferior wall MI     Ischemic cardiomyopathy     Severe malnutrition ([address] 06/07/2022    Hypomagnesemia 05/20/2022    Ascites 05/20/2022    CKD (chronic kidney disease) 05/20/2022    Cirrhosis of liver with ascites ([address] 05/19/2022    Acute on chronic combined systolic and diastolic CHF, NYHA class 4 ([address]     Coronary artery disease     Hypertension     Thrombocytopenia (HCC)     Hyponatremia     Hyperlipidemia     Atrial fibrillation with RVR ([address] 05/18/2022       Additional Diagnoses:       Diagnosis Date    Acute kidney injury ([address]     Ascites 5/20/2022    Atrial fibrillation (HCC)     CHF (congestive heart failure) (HCC)     Cirrhosis of liver with ascites ([address] 5/19/2022    CKD (chronic kidney disease) 5/20/2022    Coronary artery disease     Hyperlipidemia     Hypertension     Hypomagnesemia 5/20/2022    Hyponatremia     Thrombocytopenia (HCC)          Review of Systems:  Constitutional: negative for fevers or chills  Eyes: negative for visual disturbance   ENT: negative for sore throat or nasal congestion  Respiratory: negative for shortness of breath or cough  Cardiovascular: negative for chest pain ,palpitations,pnd,syncope  Gastrointestinal: negative for abd pain, nausea, vomiting, diarrhea , constipation,hemetemesis,karrie,blood in stool  Genitourinary: negative for dysuria, urgency ,frequency,hematuria  Integument/breast: negative for skin rash or lesions  Neurological: negative for unilateral weakness, numbness or tingling. Skeletal Muscular: no joint pain,jont swelling,back pain              Physical exam:      -----------------------------------------------------------------  Exam:  GEN:   A & O x3, no apparent distress  EYES: No gross abnormalities. NECK: normal, supple, no lymphadenopathy,  no carotid bruits  PULM: clear to auscultation bilaterally- no wheezes, rales or rhonchi, normal air movement, no respiratory distress  COR: regular rate & rhythm, no murmurs and no gallops  ABD:  soft, non-tender, non-distended, normal bowel sounds, no masses or organomegaly  EXT:   no cyanosis, clubbing or edema present    NEURO: negative  SKIN:  no rashes or significant lesions  -----------------------------------------------------------------          Hospital Course: The patient was admitted for the above. He was treated with diuretics,started on entresto and  and improved over the course of his hospitalization. He was monitored for alcohol withdrawal and improved,did not need ativan for last 3 days. He is diacharged to [facility] for rehab .     Consultants:    · cardiology    Procedures:    · none    Complications:   · none    Significant Diagnostic Studies:   ECHO-2D-M-MODE COMPLETE    Result Date: 5/19/2022  [address] [place] [address] Transthoracic Echocardiography Report (TTE)  Patient Name Patricia Bryson   Date of Study               05/19/2022               Tony Morfin RAE   Date of      1950  Gender                      Male  Birth   Age          70 year(s)  Race                           Room Number  0315        Height:                     67 inch, 170.18 cm   Corporate ID [id number]    Weight:                     209 pounds, 94.8 kg  #   Patient Acct [de-identified]   BSA:          2.06 m^2      BMI:      32.73  #                                                              kg/m^2   MR #         [id number]      Sonographer                 [employer]   Accession # [id number]  Interpreting Physician      Lay Mariano   Fellow                   Referring Nurse                           Practitioner   Interpreting             Referring Physician         Karissa Arreola  Type of Study   TTE procedure:2D Echocardiogram, M-Mode, Doppler, Color Doppler. Procedure Date Date: 05/19/2022 Start: 08:51 AM Study Location: [facility] Indications:Congestive heart failure. History / Tech. Comments: Dx: CHF, dyspnea, atrial fibrillation Hx: atrial fibrillation, CHF, CAD, HTN, hyperlipidemia Patient Status: Inpatient Height: 67 inches Weight: 209 pounds BSA: 2.06 m^2 BMI: 32.73 kg/m^2 BP: 114/70 mmHg CONCLUSIONS Summary Global left ventricular systolic function appears severely reduced with an estimated ejection fraction of 15-20%. The left ventricular cavity size is within normal limits and the left ventricular wall thickness is within normal limits. The left atrium is severely dilated (>40) with a left atrial volume index of 54 ml/m2. Normal mitral valve structure with mild to moderate mitral regurgitation. Mild to moderate pulmonary hypertension with an estimated right ventricular systolic pressure of 40 mmHg. Mild to moderate tricuspid regurgitation. Normal pulmonic valve structure with mild to moderate pulmonic regurgitation. Large bilateral pleural effusion. Evidence of moderate diastolic dysfunction is seen. IVC Increased diameter and impaired or no inspiratory variation indicating elevated RA filling pressure (i.e. CVP) . No previous studies were available for comparison. If the patients reduced ejection fraction is a new finding, consider additional ischemic cardiac testing if clinically indicated.  Signature ----------------------------------------------------------------------------  Electronically signed by Matias RichardsonSonographer) on 05/19/2022 12:05 PM ---------------------------------------------------------------------------- ----------------------------------------------------------------------------  Electronically signed by Staci MccoyInterpreting physician) on  2022 01:28 PM ---------------------------------------------------------------------------- FINDINGS Left Atrium The left atrium is severely dilated (>40) with a left atrial volume index of 54 ml/m2. Left Ventricle Global left ventricular systolic function appears severely reduced with an estimated ejection fraction of 15-20%. The left ventricular cavity size is within normal limits and the left ventricular wall thickness is within normal limits. Right Atrium The right atrium appears severely dilated. Right Ventricle Right ventricular dilatation with reduced systolic function. Mitral Valve Normal mitral valve structure with mild to moderate mitral regurgitation. Aortic Valve Normal aortic valve structure and function without stenosis or regurgitation. Tricuspid Valve Mild to moderate pulmonary hypertension with an estimated right ventricular systolic pressure of 40 mmHg. Mild to moderate tricuspid regurgitation. Pulmonic Valve Normal pulmonic valve structure with mild to moderate pulmonic regurgitation. Pericardial Effusion No significant pericardial effusion is seen. Pleural Effusion Large bilateral pleural effusion. Miscellaneous Evidence of moderate diastolic dysfunction is seen. Normal aortic root dimension. IVC Increased diameter and impaired or no inspiratory variation indicating elevated RA filling pressure (i.e. CVP) .  M-mode / 2D Measurements & Calculations:   LVIDd:5.53 cm(3.7 - 5.6 cm)     Diastolic AHBHQB:814.9316 ml  LVIDs:5.12 cm(2.2 - 4.0 cm)     Systolic CQSDVD:668.5 ml  IVSd:0.79 cm(0.6 - 1.1 cm)      Aortic Root:3.36 cm(2.0 - 3.7 cm)  LVPWd:0.95 cm(0.6 - 1.1 cm)     LA Dimension: 4.04 cm(1.9 - 4.0 cm)  Fractional Shortenin.41 %    LA volume/Index: 110.7 ml /54m^2  Calculated LVEF (%): 19.88 %    AV Cusp Separation: 1.68 cm   Mitral: Aortic   Valve Area (P1/2-Time): 4.14 cm^2       Peak Velocity: 0.95 m/s  Peak E-Wave: 1.22 m/s                   Mean Velocity: 0.75 m/s                                          Peak Gradient: 3.57 mmHg  Peak Gradient: 5.98 mmHg                Mean Gradient: 2.37 mmHg   P1/2t: 53.09 msec                       Acceleration Time: 77.35 msec                                           AV VTI: 16.59 cm   Tricuspid:                              Pulmonic:   Estimated RVSP: 39.87 mmHg  Peak TR Velocity: 2.49 m/s  Peak TR Gradient: 24.66768 mmHg  Estimated RA Pressure: 15 mmHg                                          Estimated PASP: 39.87 mmHg  Diastology / Tissue Doppler Lateral Wall E' velocity:0.12 m/s Lateral Wall E/E':10.2    XR CHEST (2 VW)    Result Date: 5/19/2022  EXAMINATION: TWO XRAY VIEWS OF THE CHEST 5/19/2022 8:43 am COMPARISON: May 16, 2022 HISTORY: ORDERING SYSTEM PROVIDED HISTORY: chf TECHNOLOGIST PROVIDED HISTORY: chf FINDINGS: There is no evidence of pneumothorax. There is increased opacity in the left base which may represent atelectasis, infiltrate with suspected small bilateral pleural effusions. Cardiac silhouette is prominent. There is slightly increased central pulmonary vascular congestion when compared to previous. Trachea is midline. Visualized bony thorax shows no acute abnormality. Cardiomegaly with mild pulmonary vascular congestion bilateral pleural effusions which may represent CHF. Basilar atelectasis and or infiltrate cannot be excluded. XR CHEST PORTABLE    Result Date: 6/8/2022  EXAMINATION: ONE XRAY VIEW OF THE CHEST 6/8/2022 1:49 pm COMPARISON: June 6, 2022, May 19, 2022 HISTORY: ORDERING SYSTEM PROVIDED HISTORY: Shortness of breath TECHNOLOGIST PROVIDED HISTORY: Shortness of breath FINDINGS: The cardiomediastinal silhouette is moderately enlarged, stable. Small left pleural effusion persists, with associated left basilar atelectasis. Right lung is clear. No pneumothorax is identified. Overall, there has been no significant interval change. There are several, subacute, healing left-sided rib fractures which are grossly stable. Stable chest x-ray, demonstrating cardiomegaly, small left pleural effusion, and left basilar atelectasis. XR CHEST PORTABLE    Result Date: 6/6/2022  EXAMINATION: ONE XRAY VIEW OF THE CHEST 6/6/2022 12:39 pm COMPARISON: May 19, 2022. HISTORY: ORDERING SYSTEM PROVIDED HISTORY: a fib rvr TECHNOLOGIST PROVIDED HISTORY: a fib rvr FINDINGS: A portable upright frontal view chest radiograph was obtained. The heart is enlarged. Small left pleural effusion. The mediastinal contours and pleural spaces are otherwise within normal limits. Minimal atelectasis is present at the left lung base. The lungs are otherwise clear. There is no focal consolidation or pneumothorax. The pulmonary vascular pattern is within normal limits. No significant thoracic osseous abnormality. Minimal left basilar atelectasis. Otherwise, clear lungs. Cardiomegaly. Small left pleural effusion. XR CHEST PORTABLE    Result Date: 5/18/2022  EXAMINATION: ONE XRAY VIEW OF THE CHEST 5/18/2022 4:22 pm COMPARISON: None. HISTORY: ORDERING SYSTEM PROVIDED HISTORY: dyspnea TECHNOLOGIST PROVIDED HISTORY: dyspnea FINDINGS: No cardiomegaly. No pneumothorax. Trace bilateral pleural effusions with mild bibasilar airspace disease. Trace bilateral pleural effusions with mild bibasilar airspace disease which could represent atelectasis or pneumonia in the appropriate clinical setting. CT CHEST ABDOMEN PELVIS WO CONTRAST    Result Date: 5/19/2022  EXAMINATION: CT OF THE CHEST, ABDOMEN, AND PELVIS WITHOUT CONTRAST 5/19/2022 10:45 am TECHNIQUE: CT of the chest, abdomen and pelvis was performed without the administration of intravenous contrast. Multiplanar reformatted images are provided for review.  Automated exposure control, iterative reconstruction, and/or weight Shelter based adjustment of the mA/kV was utilized to reduce the radiation dose to as low as reasonably achievable. COMPARISON: None HISTORY: ORDERING SYSTEM PROVIDED HISTORY:  Thrombocytopenia, elevated bili, hyponatremia, CHF. TECHNOLOGIST PROVIDED HISTORY: Thrombocytopenia, elevated bili, hyponatremia, CHF. FINDINGS: Chest: Mediastinum: The ascending aorta is at the upper limits of normal in size at 3.9 cm. The remainder of the thoracic aorta is normal in caliber with diffuse atherosclerotic plaque. Cardiomegaly with coronary vascular calcification and no pericardial effusion. The esophagus is unremarkable. Multiple mediastinal nodes are not enlarged by size criteria. The largest precarinal/right paratracheal node has a short axis of 9 mm. The esophagus is unremarkable. Lungs/pleura: Moderate bilateral pleural effusions which are simple in attenuation. There is gravity dependent lower lobe atelectasis bilaterally. The remainder of the lungs are clear. No infiltrate or significant pulmonary edema. Mild pulmonary emphysema. The central airways are patent. Soft Tissues/Bones: No acute osseous or soft tissue abnormality. There are healing fractures of the left lateral 7th through 10th ribs. Abdomen/Pelvis: Organs: Nodular hepatic contour consistent with cirrhosis. Evaluation of the parenchyma is limited by the lack of intravenous contrast.  Heterogeneity is noted, most prevalent in the lateral segment of the left lobe of the liver. No acute biliary findings. The spleen is not enlarged. The pancreas and adrenal glands are unremarkable. There is no evidence of obstructive uropathy. Symmetric infiltration of the perinephric fat. GI/Bowel: Colonic diverticulosis with no acute features. Suspected previous appendectomy. No small bowel distension. The stomach and duodenal sweep are unremarkable.   Increased attenuation in the proximal stomach may be related to ingested medication or food material. Pelvis: Moderate pelvic ascites. The prostate is not enlarged. Partial distention of the urinary bladder. Peritoneum/Retroperitoneum: Moderate upper abdominal ascites. Edematous changes throughout the abdominal and pelvic fat. Fusiform aneurysm of the infrarenal aorta measuring maximally 3.0 cm in AP and transverse diameter. Advanced calcified plaque throughout the aorta and iliac circulation as well as in the visceral vessels. No pathologic adenopathy. Bones/Soft Tissues: Mild subcutaneous edema throughout the abdominal and pelvic soft tissues. No focal subcutaneous collection. No acute osseous findings. Grade 1 anterolisthesis of L5 on S1 with bilateral pars defect. Multilevel degenerative disc disease in the lumbar spine. 1. Moderate bilateral pleural effusions with gravity dependent lower lobe atelectasis. No acute pulmonary infiltrate. 2. Cardiomegaly with coronary vascular calcification. Moderately severe aortoiliac atherosclerotic disease. Fusiform aneurysm of the infrarenal abdominal aorta at 3.0 cm with imaging follow-up recommended every 3 years. 3. Hepatic cirrhosis. Moderate ascites with third spacing including edematous changes throughout the abdomen and anasarca, possibly related to portal hypertension or cardiogenic. 4. No acute bowel pathology. Colonic diverticulosis. Previous appendectomy. 5. Healing fractures of the left lateral 7th through 10th ribs. Spondylolysis with spondylolisthesis at L5-S1. Multilevel lumbar degenerative disc disease. RECOMMENDATIONS: 3 cm infrarenal abdominal aortic aneurysm. Recommend follow-up every 3 years. Reference: J Am Kristyn Radiol 4928;76:716-293.      Recent Results (from the past 96 hour(s))   Basic Metabolic Panel    Collection Time: 06/08/22  5:25 AM   Result Value Ref Range    Glucose 90 70 - 99 mg/dL    BUN 24 (H) 8 - 23 mg/dL    CREATININE 1.19 0.70 - 1.20 mg/dL    Bun/Cre Ratio 20 9 - 20    Calcium 9.4 8.6 - 10.4 mg/dL    Sodium 131 (L) 135 - 144 mmol/L Potassium 3.5 (L) 3.7 - 5.3 mmol/L    Chloride 94 (L) 98 - 107 mmol/L    CO2 28 20 - 31 mmol/L    Anion Gap 9 9 - 17 mmol/L    GFR Non-African American >60 >60 mL/min    GFR African American >60 >60 mL/min    GFR Comment          GFR Staging         Magnesium    Collection Time: 06/08/22  5:25 AM   Result Value Ref Range    Magnesium 1.6 1.6 - 2.6 mg/dL   CBC auto differential    Collection Time: 06/08/22  5:25 AM   Result Value Ref Range    WBC 3.4 (L) 3.5 - 11.3 k/uL    RBC 3.47 (L) 4.21 - 5.77 m/uL    Hemoglobin 11.9 (L) 13.0 - 17.0 g/dL    Hematocrit 35.3 (L) 40.7 - 50.3 %    .7 82.6 - 102.9 fL    MCH 34.3 (H) 25.2 - 33.5 pg    MCHC 33.7 28.4 - 34.8 g/dL    RDW 14.4 11.8 - 14.4 %    Platelets See Reflexed IPF Result 138 - 453 k/uL    NRBC Automated 0.0 0.0 per 100 WBC    Seg Neutrophils 74 (H) 36 - 65 %    Lymphocytes 20 (L) 24 - 43 %    Monocytes 6 3 - 12 %    Eosinophils % 0 (L) 1 - 4 %    Immature Granulocytes 0 0 %    Basophils 0 0 - 2 %    Segs Absolute 2.52 1.50 - 8.10 k/uL    Absolute Lymph # 0.68 (L) 1.10 - 3.70 k/uL    Absolute Mono # 0.20 0.10 - 1.20 k/uL    Absolute Eos # 0.00 0.00 - 0.44 k/uL    Absolute Immature Granulocyte 0.00 0.00 - 0.30 k/uL    Basophils Absolute 0.00 0.0 - 0.2 k/uL    Morphology Platelet clumps present, count appears adequate.     Immature Platelet Fraction    Collection Time: 06/08/22  5:25 AM   Result Value Ref Range    Platelet, Immature Fraction 8.7 1.1 - 10.3 %    Platelet, Fluorescence 89 (L) 138 - 453 k/uL   Path Review, Smear    Collection Time: 06/08/22  5:25 AM   Result Value Ref Range    Pathologist Review SEE REPORT    Reticulocytes    Collection Time: 06/08/22  5:25 AM   Result Value Ref Range    Retic % 1.8 0.5 - 1.9 %    Absolute Retic # 0.060 0.030 - 0.080 M/uL    Immature Retic Fract 18.700 (H) 2.7 - 18.3 %    Retic Hemoglobin 39.8 (H) 28.2 - 35.7 pg   Iron and TIBC    Collection Time: 06/08/22  5:25 AM   Result Value Ref Range    Iron 42 (L) 59 - 158 ug/dL    TIBC 272 250 - 450 ug/dL    Iron Saturation 15 (L) 20 - 55 %    UIBC 230 112 - 347 ug/dL   Ferritin    Collection Time: 06/08/22  5:25 AM   Result Value Ref Range    Ferritin 192 30 - 400 ng/mL   SURGICAL PATHOLOGY REPORT    Collection Time: 06/08/22  5:49 AM   Result Value Ref Range    Surgical Pathology Report       [id number]  [facility]  ANATOMIC PATHOLOGY  [address] [place] [address], [facility] [address]. [facility], [address]  [phone]  Fax: [phone] [address] [facility]    Patient Name: Haylie Infante  MR#: [id number]  Specimen #[id number]    Procedures/Addenda  PERIPHERAL BLOOD REPORT     Date Ordered:     6/9/2022     Status:  Signed Out       Date Complete:     6/9/2022     By:  Donnie Miller M.D. Date Reported:     6/9/2022       INTERPRETATION    Peripheral blood:    -  Normocytic anemia (hemoglobin 11.9 g/DL), hypoproliferative. Iron studies indicate component of anemia of chronic  disease/inflammation.    -  Thrombocytopenia (89 k/microliter). -  Lymphopenia. RESULTS-COMMENTS    PERIPHERAL BLOOD STUDY    CBC: Please see the electronic health record for CBC parameters  ([id number], 06/08/2022, 05:25). PLATELETS: Platelets show normal morphology. LEUKOCYTES: White blood cells show normal morphology. There are no  blasts. ERYTHROCYTES: Red blood cells show normal morphology. There are no  schistocytes. The absolute reticulocyte count is not increased. Note: The electronic health record is reviewed. Ferritin is normal,  serum iron is decreased, and TIBC is normal.  B12 and folate levels  are not decreased. Patient diagnoses include atrial fibrillation,  congestive heart failure, cirrhosis, chronic kidney disease, and  others. Donnie Miller M.D.                    Source:  A: Peripheral Blood     Basic Metabolic Panel    Collection Time: 06/09/22  5:35 AM   Result Value Ref Range Glucose 94 70 - 99 mg/dL    BUN 21 8 - 23 mg/dL    CREATININE 1.00 0.70 - 1.20 mg/dL    Bun/Cre Ratio 21 (H) 9 - 20    Calcium 9.3 8.6 - 10.4 mg/dL    Sodium 134 (L) 135 - 144 mmol/L    Potassium 3.4 (L) 3.7 - 5.3 mmol/L    Chloride 95 (L) 98 - 107 mmol/L    CO2 32 (H) 20 - 31 mmol/L    Anion Gap 7 (L) 9 - 17 mmol/L    GFR Non-African American >60 >60 mL/min    GFR African American >60 >60 mL/min    GFR Comment          GFR Staging         Magnesium    Collection Time: 06/09/22  5:35 AM   Result Value Ref Range    Magnesium 1.6 1.6 - 2.6 mg/dL   CBC auto differential    Collection Time: 06/09/22  5:35 AM   Result Value Ref Range    WBC 3.0 (L) 3.5 - 11.3 k/uL    RBC 3.59 (L) 4.21 - 5.77 m/uL    Hemoglobin 12.1 (L) 13.0 - 17.0 g/dL    Hematocrit 36.4 (L) 40.7 - 50.3 %    .4 82.6 - 102.9 fL    MCH 33.7 (H) 25.2 - 33.5 pg    MCHC 33.2 28.4 - 34.8 g/dL    RDW 14.3 11.8 - 14.4 %    Platelets See Reflexed IPF Result 138 - 453 k/uL    NRBC Automated 0.0 0.0 per 100 WBC    Seg Neutrophils 72 (H) 36 - 65 %    Lymphocytes 19 (L) 24 - 43 %    Monocytes 8 3 - 12 %    Eosinophils % 1 1 - 4 %    Immature Granulocytes 0 0 %    Basophils 0 0 - 2 %    Segs Absolute 2.16 1.50 - 8.10 k/uL    Absolute Lymph # 0.57 (L) 1.10 - 3.70 k/uL    Absolute Mono # 0.24 0.10 - 1.20 k/uL    Absolute Eos # 0.03 0.00 - 0.44 k/uL    Absolute Immature Granulocyte 0.00 0.00 - 0.30 k/uL    Basophils Absolute 0.00 0.0 - 0.2 k/uL    Morphology Platelet scan shows Decreased Platelets    Immature Platelet Fraction    Collection Time: 06/09/22  5:35 AM   Result Value Ref Range    Platelet, Immature Fraction 8.5 1.1 - 10.3 %    Platelet, Fluorescence 91 (L) 138 - 453 k/uL   Basic Metabolic Panel    Collection Time: 06/10/22  5:25 AM   Result Value Ref Range    Glucose 102 (H) 70 - 99 mg/dL    BUN 21 8 - 23 mg/dL    CREATININE 0.98 0.70 - 1.20 mg/dL    Bun/Cre Ratio 21 (H) 9 - 20    Calcium 9.1 8.6 - 10.4 mg/dL    Sodium 135 135 - 144 mmol/L    Potassium 4.0 3.7 - 5.3 mmol/L    Chloride 98 98 - 107 mmol/L    CO2 30 20 - 31 mmol/L    Anion Gap 7 (L) 9 - 17 mmol/L    GFR Non-African American >60 >60 mL/min    GFR African American >60 >60 mL/min    GFR Comment          GFR Staging         Magnesium    Collection Time: 06/10/22  5:25 AM   Result Value Ref Range    Magnesium 1.7 1.6 - 2.6 mg/dL   CBC auto differential    Collection Time: 06/10/22  5:25 AM   Result Value Ref Range    WBC 2.5 (L) 3.5 - 11.3 k/uL    RBC 3.51 (L) 4.21 - 5.77 m/uL    Hemoglobin 12.1 (L) 13.0 - 17.0 g/dL    Hematocrit 35.9 (L) 40.7 - 50.3 %    .3 82.6 - 102.9 fL    MCH 34.5 (H) 25.2 - 33.5 pg    MCHC 33.7 28.4 - 34.8 g/dL    RDW 14.6 (H) 11.8 - 14.4 %    Platelets See Reflexed IPF Result 138 - 453 k/uL    NRBC Automated 0.0 0.0 per 100 WBC    Seg Neutrophils 64 36 - 65 %    Lymphocytes 26 24 - 43 %    Monocytes 9 3 - 12 %    Eosinophils % 1 1 - 4 %    Immature Granulocytes 0 0 %    Basophils 0 0 - 2 %    Segs Absolute 1.59 1.50 - 8.10 k/uL    Absolute Lymph # 0.65 (L) 1.10 - 3.70 k/uL    Absolute Mono # 0.23 0.10 - 1.20 k/uL    Absolute Eos # 0.03 0.00 - 0.44 k/uL    Absolute Immature Granulocyte 0.00 0.00 - 0.30 k/uL    Basophils Absolute 0.00 0.0 - 0.2 k/uL    Morphology Platelet scan shows Decreased Platelets    Immature Platelet Fraction    Collection Time: 06/10/22  5:25 AM   Result Value Ref Range    Platelet, Immature Fraction 8.5 1.1 - 10.3 %    Platelet, Fluorescence 92 (L) 138 - 453 k/uL   Basic Metabolic Panel    Collection Time: 06/11/22  5:20 AM   Result Value Ref Range    Glucose 87 70 - 99 mg/dL    BUN 22 8 - 23 mg/dL    CREATININE 0.87 0.70 - 1.20 mg/dL    Bun/Cre Ratio 25 (H) 9 - 20    Calcium 9.1 8.6 - 10.4 mg/dL    Sodium 139 135 - 144 mmol/L    Potassium 3.6 (L) 3.7 - 5.3 mmol/L    Chloride 100 98 - 107 mmol/L    CO2 31 20 - 31 mmol/L    Anion Gap 8 (L) 9 - 17 mmol/L    GFR Non-African American >60 >60 mL/min    GFR African American >60 >60 mL/min    GFR Comment GFR Staging         Magnesium    Collection Time: 06/11/22  5:20 AM   Result Value Ref Range    Magnesium 1.5 (L) 1.6 - 2.6 mg/dL   CBC auto differential    Collection Time: 06/11/22  5:20 AM   Result Value Ref Range    WBC 2.8 (L) 3.5 - 11.3 k/uL    RBC 3.51 (L) 4.21 - 5.77 m/uL    Hemoglobin 11.9 (L) 13.0 - 17.0 g/dL    Hematocrit 36.0 (L) 40.7 - 50.3 %    .6 82.6 - 102.9 fL    MCH 33.9 (H) 25.2 - 33.5 pg    MCHC 33.1 28.4 - 34.8 g/dL    RDW 14.7 (H) 11.8 - 14.4 %    Platelets See Reflexed IPF Result 138 - 453 k/uL    NRBC Automated 0.0 0.0 per 100 WBC    Seg Neutrophils 60 36 - 65 %    Lymphocytes 29 24 - 43 %    Monocytes 10 3 - 12 %    Eosinophils % 1 1 - 4 %    Immature Granulocytes 0 0 %    Basophils 0 0 - 2 %    Segs Absolute 1.68 1.50 - 8.10 k/uL    Absolute Lymph # 0.81 (L) 1.10 - 3.70 k/uL    Absolute Mono # 0.28 0.10 - 1.20 k/uL    Absolute Eos # 0.03 0.00 - 0.44 k/uL    Absolute Immature Granulocyte 0.00 0.00 - 0.30 k/uL    Basophils Absolute 0.00 0.0 - 0.2 k/uL    Morphology Platelet scan shows Normal Platelets    Immature Platelet Fraction    Collection Time: 06/11/22  5:20 AM   Result Value Ref Range    Platelet, Immature Fraction 8.2 1.1 - 10.3 %    Platelet, Fluorescence 90 (L) 138 - 453 k/uL     ·     Discharge Condition:   · stable    Disposition:   · SNF    Discharge Medications:       Medication List      START taking these medications    acetaminophen 325 mg tablet  Commonly known as: TYLENOL  Take 2 tablets by mouth every 6 hours as needed for Pain or Fever     fludrocortisone 0.1 MG tablet  Commonly known as: FLORINEF  Take 2 tablets by mouth daily  Start taking on: June 12, 2884     folic acid 1 MG tablet  Commonly known as: FOLVITE  Take 1 tablet by mouth daily  Start taking on: June 12, 2022     melatonin 5 mg Tabs tablet  Take 2 tablets by mouth nightly  Replaces: melatonin 10 MG Caps capsule     Metoprolol Tartrate 75 MG Tabs  Take 75 mg by mouth 2 times daily multivitamin Tabs tablet  Take 1 tablet by mouth daily  Start taking on: June 12, 2022     mupirocin 2 % ointment  Commonly known as: BACTROBAN  Apply topically 3 times daily. nicotine 21 MG/24HR  Commonly known as: [address] [facility] 1 patch onto the skin daily  Start taking on: June 12, 2022     ondansetron 4 MG disintegrating tablet  Commonly known as: ZOFRAN-ODT  Take 1 tablet by mouth every 8 hours as needed for Nausea or Vomiting     pantoprazole 40 MG tablet  Commonly known as: PROTONIX  Take 1 tablet by mouth every morning (before breakfast)  Start taking on: June 12, 2022  Replaces: omeprazole 20 MG delayed release capsule     polyethylene glycol 17 g packet  Commonly known as: GLYCOLAX  Take 17 g by mouth daily as needed for Constipation     tamsulosin 0.4 mg capsule  Commonly known as: FLOMAX  Take 1 capsule by mouth daily  Start taking on: June 12, 2022     vitamin B-1 100 MG tablet  Commonly known as: THIAMINE  Take 1 tablet by mouth daily  Start taking on: June 12, 2022        CHANGE how you take these medications    * bumetanide 1 MG tablet  Commonly known as: BUMEX  Take 1 tablet by mouth daily  What changed:   · medication strength  · how much to take     * bumetanide 2 MG tablet  Commonly known as: BUMEX  Take 1 tablet by mouth three times a week  Start taking on: June 13, 2022  What changed: You were already taking a medication with the same name, and this prescription was added. Make sure you understand how and when to take each.     midodrine 10 MG tablet  Commonly known as: PROAMATINE  Take 1 tablet by mouth 3 times daily (with meals)  What changed:   · medication strength  · how much to take         * This list has 2 medication(s) that are the same as other medications prescribed for you. Read the directions carefully, and ask your doctor or other care provider to review them with you.             CONTINUE taking these medications    amiodarone 200 MG tablet  Commonly known as: tolerated  · Diet: cardiac diet  · Wound Care: none needed  · Other:   · Follow up with Dr Joaquina Jauregui  in 1 to 2 wks as directed      Time Spent on discharge services is 30 minutes in the examination, evaluation, counseling and review of medications and discharge plan.     Signed:  Zully Farley MD, M.D.  6/11/2022  1:47 PM

## 2022-06-11 NOTE — PROGRESS NOTES
Occupational Therapy  Facility/Department: [facility] AT THE [facility]  Daily Treatment Note  NAME: Shirley Menjivar  :   MRN: [id number]    Date of Service: 2022    Discharge Recommendations:  Continue to assess pending progress,Subacute/Skilled Nursing Facility         Patient Diagnosis(es): There were no encounter diagnoses. Assessment    Activity Tolerance: Patient limited by fatigue  Discharge Recommendations: Continue to assess pending progress;Subacute/Skilled [address]  Times per Week: 7  Times per Day: Daily  Current Treatment Recommendations: Strengthening;Balance training;Functional mobility training; Endurance training;Cognitive reorientation; Safety education & training;Patient/Caregiver education & training;Equipment evaluation, education, & procurement;Self-Care / ADL     Restrictions       Subjective   Subjective  Subjective: Pt pleasantly confused but agreeable to therapy. denies ADL but request to use bathroom  Pain: pt denied pain  Orientation  Overall Orientation Status: Within Functional Limits  Pain: Denies  Cognition  Overall Cognitive Status: Exceptions  Arousal/Alertness: Appropriate responses to stimuli  Following Commands: Follows one step commands with increased time; Follows one step commands with repetition  Memory: Decreased recall of recent events;Decreased short term memory;Decreased recall of precautions  Safety Judgement: Decreased awareness of need for assistance  Problem Solving: Decreased awareness of errors  Insights: Decreased awareness of deficits  Initiation: Requires cues for some  Sequencing: Requires cues for some  Cognition Comment: Verbal cues during treatment to ensure safety of patient        Objective    Vitals     Bed Mobility Training  Bed Mobility Training: Yes  Overall Level of Assistance: Stand-by assistance  Interventions: Safety awareness training;Verbal cues  Rolling: Stand-by assistance  Supine to Sit: Stand-by assistance  Scooting: Stand-by assistance  Balance  Sitting: Intact  Sitting - Static: Good (unsupported)  Sitting - Dynamic: Good (unsupported)  Standing: With support  Standing - Static: Good  Transfer Training  Transfer Training: Yes  Overall Level of Assistance: Contact-guard assistance  Interventions: Safety awareness training;Verbal cues  Sit to Stand: Contact-guard assistance  Stand to Sit: Contact-guard assistance  Toilet Transfer: Stand-by assistance;Contact-guard assistance  Gait  Interventions: Verbal cues; Safety awareness training  Assistive Device: Walker, rolling     ADL  Toileting: Contact guard assistance;Stand by assistance  Toileting Skilled Clinical Factors: Pt completed hygiene after completing tolieting task  OT Exercises  Exercise Treatment: Pt completed BUE 1 # free weight x 15 reps x 5 planes x 1 set and green digiflex x 20 reps to increase BUE strength and endurance for ease with funtional activities. Safety Devices  Type of Devices: All fall risk precautions in place;Call light within reach; Chair alarm in place; Left in chair;Nurse notified; Patient at risk for falls     Patient Education  Education Given To: Patient  Education Provided: Role of Therapy;Plan of Care;Transfer Training; Fall Prevention Strategies; ADL Adaptive Strategies  Education Method: Verbal;Demonstration  Barriers to Learning: Cognition  Education Outcome: Continued education needed    Goals  Short Term Goals  Time Frame for Short term goals: 21 visits  Short Term Goal 1: Patient to be educated on d/c folder, AE/DME and EC/WS techniques to ensure safe and indep return home. Short Term Goal 2: Patient to complete ADL routine c Mod I c improved safety, sequecing and completion to ensure safe return home. Short Term Goal 3: Patient will tolerate 15 mins of BUE ther ex/act to increase overall strength/activity tolerance for functional tasks.   Short Term Goal 4: Patient to engage in 5' of dynamic standing task during functional task of choice s LOB to improve safety, balance and indep c ADL.        Therapy Time   Individual Concurrent Group Co-treatment   Time In 1010         Time Out 1035         Minutes [address] [facility] [address], [place]

## 2022-06-11 NOTE — PROGRESS NOTES
Physical Therapy  Facility/Department: [facility] AT THE [facility]  Daily Treatment Note  NAME: Olman Fatima  :   MRN: [id number]    Date of Service: 2022    Discharge Recommendations:  Continue to assess pending progress,Home with Home health PT,Outpatient PT        Patient Diagnosis(es): There were no encounter diagnoses. Assessment   Activity Tolerance: Patient limited by fatigue     Plan    Plan  Plan: 2 times a day 7 days a week  Plan weeks: 20 days  Current Treatment Recommendations: Balance training;Functional mobility training;Transfer training;IADL training; Endurance training;Gait training;Stair training;Neuromuscular re-education; Return to work related activity;Home exercise program;Safety education & training;Modalities; Therapeutic activities     Restrictions  Restrictions/Precautions  Restrictions/Precautions: Fall Risk,General Precautions  Required Braces or Orthoses?: No     Subjective    Subjective  Subjective: Pt reporting no pain  Pain: Denies  Orientation  Overall Orientation Status: Within Functional Limits  Cognition  Overall Cognitive Status: Exceptions     Objective   Vitals     Balance  Sitting - Static: Good (unsupported)  Sitting - Dynamic: Good (unsupported)  Standing: With support  Standing - Static: Good  Transfer Training  Transfer Training: Yes  Overall Level of Assistance: Contact-guard assistance  Interventions: Verbal cues  Sit to Stand: Contact-guard assistance  Stand to Sit: Contact-guard assistance     PT Exercises  Exercise Treatment: seated and standing ther ex x20 each     Safety Devices  Type of Devices: All fall risk precautions in place;Call light within reach; Chair alarm in place; Left in chair;Nurse notified; Patient at risk for falls       Goals  Short Term Goals  Time Frame for Short term goals: 20 days  Short term goal 1: Pt to be able to ambulate 50ft IND to be able to return home safetly.   Short term goal 2: Pt to be IND with all transfers to improve functional mobility and increase safety with ADLs  Short term goal 3: Pt to be able to tolerate 20-30min therex/act to improve overall functional capactiy for ADL's.   Patient Goals   Patient goals : to return home    Education  Patient Education  Education Given To: Patient  Education Provided: Energy Conservation;Transfer Training  Education Method: Verbal  Barriers to Learning: Cognition  Education Outcome: Verbalized understanding    Therapy Time   Individual Concurrent Group Co-treatment   Time In 1040         Time Out 1105         Minutes [facility] [place], [place]

## 2022-06-11 NOTE — PROGRESS NOTES
Patient resting in bed with eyes closed. No complaints or concerns voiced at this time. Patient has been compliant and has used his call light appropriately this shift, no attempts to get up unassisted observed. VS stable.

## 2022-06-11 NOTE — PROGRESS NOTES
Progress Note  Valerie Mendez MD    OBJECTIVE:    Patient seen for f/u of Atrial fibrillation with RVR ([address] He is feeling well,awaiting discharge to snf     ROS:   Constitutional: negative  for fevers, and negative for chills. Respiratory: negative for shortness of breath, negative for cough, and negative for wheezing  Cardiovascular: negative for chest pain, and negative for palpitations  Gastrointestinal: negative for abdominal pain, negative for nausea,negative for vomiting, negative for diarrhea, and negative for constipation     All other systems were reviewed with the patient and are negative unless otherwise stated in HPI    OBJECTIVE:  Vitals:   Temp: 97.7 °F (36.5 °C)  BP: (!) 87/44  Resp: 13  Heart Rate: 84  SpO2: 93 %    24HR INTAKE/OUTPUT:      Intake/Output Summary (Last 24 hours) at 6/11/2022 1337  Last data filed at 6/11/2022 0500  Gross per 24 hour   Intake 560 ml   Output 250 ml   Net 310 ml     -----------------------------------------------------------------  Exam:  GEN:    Awake, alert and oriented x3. EYES:  EOMI, pupils equal   NECK: Supple. No lymphadenopathy. No carotid bruit  CVS:    regular rate and rhythm, 2/6 systolic murmur  PULM:  diminished but clear without wheezing, rales or rhonchi, no acute respiratory distress  ABD:    Bowels sounds normal.  Abdomen is soft. No distention. no tenderness to palpation. EXT:   no edema bilaterally . No calf tenderness. NEURO: Moves all extremities. Motor and sensory are grossly intact  SKIN:  No rashes.   No skin lesions.    -----------------------------------------------------------------  Diagnostic Data:    · All available data reviewed  Lab Results   Component Value Date    WBC 2.8 (L) 06/11/2022    HGB 11.9 (L) 06/11/2022    .6 06/11/2022    PLT See Reflexed IPF Result 06/11/2022      Lab Results   Component Value Date    GLUCOSE 87 06/11/2022    BUN 22 06/11/2022    CREATININE 0.87 06/11/2022     06/11/2022    K 3.6 (L) 06/11/2022    CALCIUM 9.1 06/11/2022     06/11/2022    CO2 31 06/11/2022       PROBLEM LIST:  Principal Problem:    Atrial fibrillation with RVR (HCC)  Active Problems:    Chronic combined systolic (congestive) and diastolic (congestive) heart failure (HCC)    Dehydration, moderate    Medication side effect, initial encounter    Severe malnutrition (HCC)  Resolved Problems:    * No resolved hospital problems. *      ASSESSMENT / PLAN:  Atrial fibrillation with RVR (HCC)  · Principal Problem:  ·   Atrial fibrillation with RVR (HCC)  · Active Problems:  ·   Chronic combined systolic (congestive) and diastolic (congestive) heart failure (HCC)  ·   Dehydration, moderate  ·   Medication side effect, initial encounter  ·   Severe malnutrition (HCC)  · Resolved Problems:  ·   * No resolved hospital problems. *  ·       · Nutrition status:  Well developed, well nourished with no malnutrition  · DVT prophylaxis: Eliquis   · High risk medications: none   · Disposition:  Discharge plan is [facility]    Giuseppe Churchill MD , M.D.  6/11/2022  1:37 PM

## 2022-06-11 NOTE — PROGRESS NOTES
Physician Progress Note      PATIENT:               Kyung Camacho  CSN #:                  [id number]  :                       1950  ADMIT DATE:       2022 12:39 PM  [address] [place] DATE:  RESPONDING  PROVIDER #:        Mercer Nageotte MD        QUERY TEXT:    Type of Encephalopathy: Please provide further specificity, if known.     Clinical indicators include: alcohol use, altered mental status, ckd,   encephalopathy  Options provided:  -- Anoxic/hypoxic encephalopathy  -- Metabolic encephalopathy  -- Toxic encephalopathy  -- Hepatic encephalopathy  -- Hypertensive encephalopathy  -- Other - I will add my own diagnosis  -- Disagree - Not applicable / Not valid  -- Disagree - Clinically Unable to determine / Unknown        PROVIDER RESPONSE TEXT:    Alcoholic encephalopathy      Electronically signed by:  Mercer Nageotte MD 6/10/2022 8:32 PM

## 2022-06-11 NOTE — PROGRESS NOTES
Updated [facility] staff at this time. Informed them we are just waiting for a discharge order.

## 2022-06-11 NOTE — PLAN OF CARE
Problem: Discharge Planning  Goal: Discharge to home or other facility with appropriate resources  Outcome: Completed     Problem: Safety - Adult  Goal: Free from fall injury  Outcome: Completed     Problem: Skin/Tissue Integrity  Goal: Absence of new skin breakdown  Description: 1. Monitor for areas of redness and/or skin breakdown  2. Assess vascular access sites hourly  3. Every 4-6 hours minimum:  Change oxygen saturation probe site  4. Every 4-6 hours:  If on nasal continuous positive airway pressure, respiratory therapy assess nares and determine need for appliance change or resting period.   Outcome: Completed     Problem: Pain  Goal: Verbalizes/displays adequate comfort level or baseline comfort level  Outcome: Completed     Problem: Nutrition Deficit:  Goal: Optimize nutritional status  Outcome: Completed     Problem: Chronic Conditions and Co-morbidities  Goal: Patient's chronic conditions and co-morbidity symptoms are monitored and maintained or improved  Outcome: Completed

## 2022-06-24 ENCOUNTER — OFFICE VISIT (OUTPATIENT)
Dept: CARDIOLOGY | Age: 72
End: 2022-06-24
Payer: MEDICARE

## 2022-06-24 VITALS
OXYGEN SATURATION: 93 % | WEIGHT: 183 LBS | BODY MASS INDEX: 28.72 KG/M2 | DIASTOLIC BLOOD PRESSURE: 73 MMHG | HEIGHT: 67 IN | RESPIRATION RATE: 20 BRPM | HEART RATE: 63 BPM | SYSTOLIC BLOOD PRESSURE: 102 MMHG

## 2022-06-24 DIAGNOSIS — I50.42 CHRONIC COMBINED SYSTOLIC AND DIASTOLIC CONGESTIVE HEART FAILURE (HCC): ICD-10-CM

## 2022-06-24 DIAGNOSIS — I48.0 PAF (PAROXYSMAL ATRIAL FIBRILLATION) (HCC): Primary | ICD-10-CM

## 2022-06-24 DIAGNOSIS — Z71.6 TOBACCO ABUSE COUNSELING: ICD-10-CM

## 2022-06-24 DIAGNOSIS — I25.5 ISCHEMIC CARDIOMYOPATHY: ICD-10-CM

## 2022-06-24 DIAGNOSIS — R42 LIGHTHEADED: ICD-10-CM

## 2022-06-24 DIAGNOSIS — I95.9 HYPOTENSION, UNSPECIFIED HYPOTENSION TYPE: ICD-10-CM

## 2022-06-24 PROCEDURE — G8427 DOCREV CUR MEDS BY ELIG CLIN: HCPCS | Performed by: FAMILY MEDICINE

## 2022-06-24 PROCEDURE — 4004F PT TOBACCO SCREEN RCVD TLK: CPT | Performed by: FAMILY MEDICINE

## 2022-06-24 PROCEDURE — 99215 OFFICE O/P EST HI 40 MIN: CPT | Performed by: FAMILY MEDICINE

## 2022-06-24 PROCEDURE — 3017F COLORECTAL CA SCREEN DOC REV: CPT | Performed by: FAMILY MEDICINE

## 2022-06-24 PROCEDURE — 1123F ACP DISCUSS/DSCN MKR DOCD: CPT | Performed by: FAMILY MEDICINE

## 2022-06-24 PROCEDURE — 1111F DSCHRG MED/CURRENT MED MERGE: CPT | Performed by: FAMILY MEDICINE

## 2022-06-24 PROCEDURE — 99211 OFF/OP EST MAY X REQ PHY/QHP: CPT | Performed by: FAMILY MEDICINE

## 2022-06-24 PROCEDURE — G8417 CALC BMI ABV UP PARAM F/U: HCPCS | Performed by: FAMILY MEDICINE

## 2022-06-24 RX ORDER — METOPROLOL SUCCINATE 25 MG/1
25 TABLET, EXTENDED RELEASE ORAL DAILY
Qty: 90 TABLET | Refills: 3 | Status: SHIPPED | OUTPATIENT
Start: 2022-06-24 | End: 2022-09-12

## 2022-06-24 RX ORDER — FLUDROCORTISONE ACETATE 0.1 MG/1
0.3 TABLET ORAL DAILY
Qty: 320 TABLET | Refills: 3 | Status: SHIPPED | OUTPATIENT
Start: 2022-06-24 | End: 2022-07-19 | Stop reason: SDUPTHER

## 2022-06-24 NOTE — PATIENT INSTRUCTIONS
SURVEY:    You may be receiving a survey from [employer] regarding your visit today. Please complete the survey to enable us to provide the highest quality of care to you and your family. If you cannot score us a very good on any question, please call the office to discuss how we could have made your experience a very good one. Thank you.

## 2022-06-24 NOTE — PROGRESS NOTES
Marry Moreno am scribing for and in the presence of Harjinder Mcrae MD, MS, F.A.C.C..    Patient: Cristian Galarza  :   Date of Admission: (Not on file)  Primary Care Physician: Bg Roach  Today's Date: 2022    REASON FOR CONSULTATION: atrial fibrillation with RVR    HPI:  Mr. Zoran Nieves is a 70 y.o. male who was admitted to the hospital on 2022 for weakness and shortness of breath. In the ER he was found to be in atrial fibrillation with RVR leading to a consultation. He was then readmitted due to sever dehydration. In the past he reports seeing a Cardiologist in [place] and was planning on having a cardioversion done in  to put him back into normal sinus rhythm due to his history of new onset atrial fibrillation. He reports a history of a heart attack at age 46 and needed stents placed at that time but denies any cardiac cath since that time. He also has carotid artery stenosis and abdominal aortic aneurysm. He did report having surgery on one side of his neck, however he is not sure which side or how long ago it was. He has had hypertension and hyperlipidemia for years as well. He is a current every day smoker and he smoked for about 55 years and smokes about a pack a day. Family history consists of a lot of people in his family with significant cardiac history, however he wanted us to talk to his sister about the family history as she knows the details. Echo done on 2022 showed an EF of 15-20%. Mr. Zoran Nieves is here today for follow up from his two hospital admission as above. He is currently staying at the [facility]. He reports doing well at this time. He did have on near fall due to lightheaded and dizziness. His nurse also told him that his blood pressure has been low as well. He is doing physical therapy and is feeling like is getting stronger every day. He denied any shortness of breath or heart palpitations.  He also denied any chest pain, pressure or tightness. He also denied any abdominal pain, bleeding problems, problems with his medications or any other concerns at this time. He has no bowel issues at this time. No nausea or vomiting. No fever, cough or chills. Bleeding Risks: Mr. Elizabeth Cochran denies any current or recent bleeding problems including a history of a GI bleed, ulcers, recent or upcoming surgeries, blood in his stool or black tarry stools or blood in his urine. Past Medical History:   Diagnosis Date    Acute kidney injury ([address]     Ascites 5/20/2022    Atrial fibrillation (HCC)     CHF (congestive heart failure) (HCC)     Cirrhosis of liver with ascites ([facility] 5/19/2022    CKD (chronic kidney disease) 5/20/2022    Coronary artery disease     Hyperlipidemia     Hypertension     Hypomagnesemia 5/20/2022    Hyponatremia     Thrombocytopenia (HCC)        CURRENT ALLERGIES: Patient has no known allergies. REVIEW OF SYSTEMS: 14 systems were reviewed. Pertinent positives and negatives as above, all else negative. Past Surgical History:   Procedure Laterality Date    CAROTID STENT PLACEMENT Bilateral     ROTATOR CUFF REPAIR Right     Social History:  Social History     Tobacco Use    Smoking status: Current Every Day Smoker     Packs/day: 1.00     Types: Cigarettes    Smokeless tobacco: Never Used   Substance Use Topics    Alcohol use: Yes     Alcohol/week: 2.0 standard drinks     Types: 2 Shots of liquor per week     Comment: Drinks about 2 Kesslers with mix nightly.   Reported history of heavy alcohol use for years with both beer and liquior several years ago    Drug use: Not on file        CURRENT MEDICATIONS:  Outpatient Medications Marked as Taking for the 6/24/22 encounter (Office Visit) with Juan Jose Dumont MD   Medication Sig Dispense Refill    acetaminophen (TYLENOL) 325 mg tablet Take 2 tablets by mouth every 6 hours as needed for Pain or Fever 120 tablet 3    ondansetron (ZOFRAN-ODT) 4 MG disintegrating tablet Take 1 tablet by mouth every 8 hours as needed for Nausea or Vomiting      metoprolol tartrate 75 MG TABS Take 75 mg by mouth 2 times daily 60 tablet 3    fludrocortisone (FLORINEF) 0.1 MG tablet Take 2 tablets by mouth daily  3    mupirocin (BACTROBAN) 2 % ointment Apply topically 3 times daily.       bumetanide (BUMEX) 1 MG tablet Take 1 tablet by mouth daily 30 tablet 3    bumetanide (BUMEX) 2 MG tablet Take 1 tablet by mouth three times a week 30 tablet 3    folic acid (FOLVITE) 1 MG tablet Take 1 tablet by mouth daily 30 tablet 3    polyethylene glycol (GLYCOLAX) 17 g packet Take 17 g by mouth daily as needed for Constipation 527 g 1    melatonin 5 mg TABS tablet Take 2 tablets by mouth nightly  0    potassium chloride (KLOR-CON M) 20 MEQ extended release tablet Take 1 tablet by mouth 2 times daily (with meals) 60 tablet 3    tamsulosin (FLOMAX) 0.4 mg capsule Take 1 capsule by mouth daily 30 capsule 3    nicotine (NICODERM CQ) 21 MG/24HR Place 1 patch onto the skin daily 30 patch 3    Multiple Vitamin (MULTIVITAMIN) TABS tablet Take 1 tablet by mouth daily  0    midodrine (PROAMATINE) 10 MG tablet Take 1 tablet by mouth 3 times daily (with meals) 90 tablet 3    pantoprazole (PROTONIX) 40 MG tablet Take 1 tablet by mouth every morning (before breakfast) 30 tablet 3    thiamine mononitrate (THIAMINE) 100 MG tablet Take 1 tablet by mouth daily  0    magnesium oxide (MAGOX 400) 400 (240 Mg) MG tablet Take 1.5 tablets by mouth 2 times daily 90 tablet 2    sacubitril-valsartan (ENTRESTO) 24-26 MG per tablet Take 0.5 tablets by mouth 2 times daily Lot #: [id number]  Exp: July 2024 56 tablet 0    amiodarone (CORDARONE) 200 MG tablet Take 200 mg by mouth 2 times daily      apixaban (ELIQUIS) 5 MG TABS tablet Take 5 mg by mouth 2 times daily      busPIRone (BUSPAR) 10 MG tablet Take 15 mg by mouth 2 times daily       clopidogrel (PLAVIX) 75 MG tablet Take 75 mg by mouth daily      escitalopram (LEXAPRO) 20 MG tablet Take 20 mg by mouth nightly      atorvastatin (LIPITOR) 80 MG tablet Take 80 mg by mouth daily      rOPINIRole (REQUIP) 0.5 MG tablet Take 0.5 mg by mouth nightly      Omega-3 Fatty Acids (FISH OIL CONCENTRATE) 1000 MG CAPS Take 2 caplets by mouth daily       FAMILY HISTORY: Reviewed but non-contributory     PHYSICAL EXAM:   /73 (Site: Right Upper Arm, Position: Standing, Cuff Size: Medium Adult)   Pulse 63   Resp 20   Ht 5' 7\" (1.702 m)   Wt 183 lb (83 kg)   SpO2 93%   BMI 28.66 kg/m²  Body mass index is 28.66 kg/m². Constitutional: He is oriented to person, place, and time. He appears well-developed and well-nourished. In no acute distress. HEENT: Normocephalic and atraumatic. No JVD present. Carotid bruit is not present. No mass and no thyromegaly present. No lymphadenopathy present. Cardiovascular: Normal rate, regular rhythm, normal heart sounds. Exam reveals no gallop and no friction rubs. No murmur was heard. Pulmonary/Chest: Effort normal and breath sounds normal. No respiratory distress. He has no wheezes, rhonchi or rales. Abdominal: Soft, non-tender. Bowel sounds and aorta are normal. He exhibits no organomegaly, mass or bruit. Extremities: Trace-1+ at the ankles bilaterally. No cyanosis or clubbing. 2+ radial and carotid pulses. Distal extremity pulses: 2+ bilaterally. Compression stockings in place. Neurological: He is alert and oriented to person, place, and time. No evidence of gross cranial nerve deficit. Coordination appeared normal.   Skin: Skin is warm and dry. There is no rash or diaphoresis. Psychiatric: He has a normal mood and affect.  His speech is normal and behavior is normal.      MOST RECENT LABS ON RECORD:   Lab Results   Component Value Date    WBC 2.8 (L) 06/11/2022    HGB 11.9 (L) 06/11/2022    HCT 36.0 (L) 06/11/2022    PLT See Reflexed IPF Result 06/11/2022    ALT 26 06/06/2022    AST 32 06/06/2022     06/11/2022    K 3.6 (L) 06/11/2022    CL 100 06/11/2022    CREATININE 0.87 06/11/2022    BUN 22 06/11/2022    CO2 31 06/11/2022    TSH 3.69 06/06/2022    INR 1.7 06/07/2022     ASSESSMENT:  Encounter Diagnoses   Name Primary?  PAF [facility]paroxysmal atrial fibrillation) ([facility]) Yes    Hypotension, unspecified hypotension type     Lightheaded     Chronic combined systolic and diastolic congestive heart failure (HCC)     Ischemic cardiomyopathy     Tobacco abuse counseling       PLAN:  · Hypotension/ Lightheadedness/dizziness: One near fall. · INCREASE to Florinef (fludrocortisone) 0.3 mg daily. I explained that this can cause some increased lower extremity edema but usually this is fairly mild. · Beta Blocker: DECREASE to Metoprolol succinate (Toprol XL) 25 mg daily. · Diuretics: DECREASE to bumetinide (Bumex) 1 mg every morning. And stop the additional Bumex three times a week. I advised him to keep an eye on his legs and to call us if he has any issues with breathing. We may need to go back to a higher dose of Bumex. · Nonpharmacologic counseling: Because of his condition, I reminded him to try and keep himself well-hydrated and to take extra time when moving from laying to sitting, sitting to standing and standing to walking. I also explained to him to help improve his symptoms he should include 3 g sodium diet, 1 or 2 L of sports drinks daily, knee-high compressions stockings. · Paroxysmal Atrial Fibrillation: Rhythm Control Asymptomatic    Beta Blocker: DECREASE to Metoprolol succinate (Toprol XL) 25 mg daily.  Anti-Arrhythmic: Continue amiodarone (Pacerone) 200 mg BID. Monitoring: Since they will being maintained on Amiodarone, I told them that we will need to closely monitor them for potential side effects. These include monitoring LFTs and TSH at least every 6 months as well as chest x-rays, pulmonary function tests, and eye exams at least yearly.    [id number] Score for Atrial Fibrillation Stroke Risk   Risk   Factors  Component Value   C CHF Yes 1   H HTN Yes 1   A2 Age >= 76 No,  (75 y.o.) 0   D DM No 0   S2 Prior Stroke/TIA No 0   V Vascular Disease No 0   A Age 74-69 Yes,  (75 y.o.) 1   Sc Sex male 0    [id number]  Score  3   Score last updated 6/0/93 2:60 PM EDT  Click here for a link to the UpToDate guideline \"Atrial Fibrillation: Anticoagulation therapy to prevent embolization  Disclaimer: Risk Score calculation is dependent on accuracy of patient problem list and past encounter diagnosis.  Stroke Risk: CHADS2-VASc Score: 3/9 (3.2% stroke risk). Because of his atrial fibrillation, I also would also recommend that he continue with anticoagulation to decrease his risk of stoke but also reminded him to watch for signs of blood in his stool or black tarry stools and stop the medication immediately if this develops as this could be life threatening.  Anticoagulation: Continue Apixaban (Eliquis) 5 mg every 12 hours. Because of his atrial fibrillation, I also would also recommend that he continue with anticoagulation to decrease his risk of stoke but also reminded him to watch for signs of blood in his stool or black tarry stools and stop the medication immediately if this develops as this could be life threatening.  Chronic combined heart failure: Ischemic Cardiomyopathy, Echo done on 5/18/2022 which showed an EF of 15-20%. Beta Blocker: DECREASE to Metoprolol succinate (Toprol XL) 25 mg daily.  ACE Inibitor/ARB: Continue sacubitril/valsartan (Entresto) 24/26 mg 1/2 a tablet twice daily.  Diuretics: DECREASE to bumetinide (Bumex) 1 mg every morning.  Heart failure counseling: I advised them to try and keep their legs up whenever possible and to limit salt in their diet.     Life Vest: In the meantime, I also explained the potential benefits of a life-vest including the potential for this to be life saving in the event of ventricular fibrillation while we wait to see if his EF improves and/or ultimately requires an ICD. In the end, Mr. Roby Munguia  said he would like to wear a LifeVest while we try and increase his EF and therefore I have gone ahead and tried to arrange this for him. I did let him know that he will most likely wear this for about two months. As below we will go ahead and order a repeat echo to be done in about two to three months to reassess his EF. I did let him know that a representative from [place] [id number] will be contacting him to get this arranged.  Additional Testing: Because of his known history of a cardiomyopathy, as well as his physical exam findings, I took the liberty of ordering a repeat echocardiogram to re-assess his ejection fraction in order to at very least make sure this has not worsened and steve medical management accordingly.  He is currently doing physical therapy at the [facility] and he will be going home next week with PT as well. I did let him know that he may qualify for cardiac rehab however we will reassess this in about a month when I see him back in the office. Tobacco Abuse Counseling: I spent several minutes discussing the dangers of tobacco abuse as well as multiple methods for trying to quit smoking. In the end, Mr. Roby Munguia said that he using e-cigarettes, he would plan to try and cut back in the near future. Although I told him that this method may not be ideal and the safety of e-cigarettes is uncertain, in comparison to the known dangers of tobacco this was likely a far better alternative to continued tobacco abuse. We did discuss ways to continue to not smoke. He is nervous to go home due to the habit. He is going to use gum and also the patches as well. I did let him know that some patient cut up straws and put in there cigarette box that way the when his habit kicks in he will reach for a straw and not an actual cigarette. Once again, thank you for allowing me to participate in this patients care. Please do not hesitate to contact me could I be of further assistance. FOLLOW UP:   I told Mr. Beatrice Campbell to call my office if he had any problems, but otherwise told him to Return in about 5 weeks (around 7/29/2022). However, I would be happy to see him sooner should the need arise. Sincerely,  Cal Zaman MD, MS, F.A.C.C. [facility] Cardiology Specialist    [address] [place] [address] [facility], [address] [place] [address]  Phone: [phone], Fax: [phone]     I believe that the risk of significant morbidity and mortality related to the patient's current medical conditions are: intermediate-high. The documentation recorded by the scribe, accurately and completely reflects the services I personally performed and the decisions made by me. Cal Zaman MD, MS, F.A.C.C.  June 24, 2022

## 2022-06-28 ENCOUNTER — TELEPHONE (OUTPATIENT)
Dept: PRIMARY CARE CLINIC | Age: 72
End: 2022-06-28

## 2022-06-28 NOTE — TELEPHONE ENCOUNTER
Nurse informed, they will fax orders to be signed once he is discharged from [facility].

## 2022-06-28 NOTE — TELEPHONE ENCOUNTER
Patient is being discharged from the [facility], will you follow and sign orders for nursing services,PT and OT? Please call [facility] back at [phone].

## 2022-06-29 ENCOUNTER — OFFICE VISIT (OUTPATIENT)
Dept: UROLOGY | Age: 72
End: 2022-06-29
Payer: MEDICARE

## 2022-06-29 ENCOUNTER — HOSPITAL ENCOUNTER (OUTPATIENT)
Age: 72
Setting detail: SPECIMEN
Discharge: HOME | End: 2022-06-29
Payer: MEDICARE

## 2022-06-29 VITALS
DIASTOLIC BLOOD PRESSURE: 64 MMHG | BODY MASS INDEX: 30.37 KG/M2 | WEIGHT: 189 LBS | SYSTOLIC BLOOD PRESSURE: 118 MMHG | TEMPERATURE: 96.9 F | HEIGHT: 66 IN

## 2022-06-29 DIAGNOSIS — N40.1 BPH WITH OBSTRUCTION/LOWER URINARY TRACT SYMPTOMS: ICD-10-CM

## 2022-06-29 DIAGNOSIS — R31.0 GROSS HEMATURIA: ICD-10-CM

## 2022-06-29 DIAGNOSIS — R33.9 INCOMPLETE BLADDER EMPTYING: ICD-10-CM

## 2022-06-29 DIAGNOSIS — N13.8 BPH WITH OBSTRUCTION/LOWER URINARY TRACT SYMPTOMS: ICD-10-CM

## 2022-06-29 DIAGNOSIS — R33.9 INCOMPLETE BLADDER EMPTYING: Primary | ICD-10-CM

## 2022-06-29 LAB
-: ABNORMAL
BILIRUBIN URINE: NEGATIVE
COLOR: YELLOW
EPITHELIAL CELLS UA: ABNORMAL /HPF (ref 0–5)
GLUCOSE URINE: NEGATIVE
KETONES, URINE: NEGATIVE
LEUKOCYTE ESTERASE, URINE: NEGATIVE
NITRITE, URINE: NEGATIVE
PH UA: 7 (ref 5–9)
PROTEIN UA: NEGATIVE
RBC UA: ABNORMAL /HPF (ref 0–2)
SPECIFIC GRAVITY UA: 1.01 (ref 1.01–1.02)
TURBIDITY: CLEAR
URINE HGB: ABNORMAL
UROBILINOGEN, URINE: NORMAL
WBC UA: ABNORMAL /HPF (ref 0–5)

## 2022-06-29 PROCEDURE — PBSHW PR MEASUREMENT,POST-VOID RESIDUAL VOLUME BY US,NON-IMAGING: Performed by: PHYSICIAN ASSISTANT

## 2022-06-29 PROCEDURE — 51798 US URINE CAPACITY MEASURE: CPT | Performed by: PHYSICIAN ASSISTANT

## 2022-06-29 PROCEDURE — 81001 URINALYSIS AUTO W/SCOPE: CPT

## 2022-06-29 PROCEDURE — G8427 DOCREV CUR MEDS BY ELIG CLIN: HCPCS | Performed by: PHYSICIAN ASSISTANT

## 2022-06-29 PROCEDURE — 3017F COLORECTAL CA SCREEN DOC REV: CPT | Performed by: PHYSICIAN ASSISTANT

## 2022-06-29 PROCEDURE — 87086 URINE CULTURE/COLONY COUNT: CPT

## 2022-06-29 PROCEDURE — 99213 OFFICE O/P EST LOW 20 MIN: CPT | Performed by: PHYSICIAN ASSISTANT

## 2022-06-29 PROCEDURE — 4004F PT TOBACCO SCREEN RCVD TLK: CPT | Performed by: PHYSICIAN ASSISTANT

## 2022-06-29 PROCEDURE — G8417 CALC BMI ABV UP PARAM F/U: HCPCS | Performed by: PHYSICIAN ASSISTANT

## 2022-06-29 PROCEDURE — 1123F ACP DISCUSS/DSCN MKR DOCD: CPT | Performed by: PHYSICIAN ASSISTANT

## 2022-06-29 PROCEDURE — 1111F DSCHRG MED/CURRENT MED MERGE: CPT | Performed by: PHYSICIAN ASSISTANT

## 2022-06-29 ASSESSMENT — ENCOUNTER SYMPTOMS
BACK PAIN: 0
COLOR CHANGE: 0
EYE REDNESS: 0
ABDOMINAL PAIN: 0
VOMITING: 0
COUGH: 0
NAUSEA: 0
SHORTNESS OF BREATH: 0
WHEEZING: 0
CONSTIPATION: 0

## 2022-06-29 NOTE — PROGRESS NOTES
Bladderscan performed in office today:  Patient voided - 90 mL, then 120mL, PVR - 213 mL    He did have an accident in his pants before he made it to his appointment.

## 2022-06-29 NOTE — PROGRESS NOTES
HPI:      Patient is a 70 y.o. male in no acute distress. He is alert and oriented to person, place, and time. 6/7/2022  The patient is a 70 y.o. male who was admitted for A. fib with RVR. Patient also had an admission 5/18/2022 through 5/24/2022 for A. fib with RVR. After admission patient was having urgency/frequecy every 15 minutes. In ICU bladder scan performed for 350ml. Garner catheter was placed. Flomax started by hospitalist team. Overnight patient with bladder spasms. B&O suppository given. Patient seen by nephrology for acute kidney injury. Patient is being followed by cardiology. Patient does have issues with EtOH use and was agitated and was pulling at garner catheter resulting in some hematuria. Patient is currently on the CIWA protocol secondary to alcohol withdrawal.  He is currently an unreliable historian. Patient's wife is at his bedside. She is also not fully aware of his complete medical history. Apparently patient had seen a urologist approximately 10 years ago but is uncertain what this was for. She states that he is no longer living in the same house as her. This has been going on for the last 6 months. Apparently patient's drinking has interfered with his relationship with wife's daughter. Since then he has been living alone in his sister's trailer. Prior to this patient's wife does state that he was getting up at least 4-5 times at night to urinate. She does report that he was having a daily bowel movement. Today:  Patient is here today for follow-up hospitalization. Patient did have urinary retention while he was in the hospital.  Patient ultimately had his Garner catheter removed. Patient is on Bumex. He is on Florinef and midodrine. He is currently taking Flomax. Bladderscan performed in office today: Patient voided - 90 mL, PVR - 213 mL. He did have an accident in his pants before he made it to his appointment.   Patient did have hematuria in the hospital.  We should proceed with cystoscopy. Patient has not had any alcoholic beverages since he was discharged from skilled nursing facility. He is having a daily soft bowel movement. Nocturia x2. Patient does state that he has urgency. Past Medical History:   Diagnosis Date    Acute kidney injury ([address]     Ascites 5/20/2022    Atrial fibrillation (HCC)     CHF (congestive heart failure) (HCC)     Cirrhosis of liver with ascites ([facility] [address] 5/19/2022    CKD (chronic kidney disease) 5/20/2022    Coronary artery disease     Hyperlipidemia     Hypertension     Hypomagnesemia 5/20/2022    Hyponatremia     Thrombocytopenia (HCC)      Past Surgical History:   Procedure Laterality Date    CAROTID STENT PLACEMENT Bilateral     HERNIA REPAIR      ROTATOR CUFF REPAIR Right     TONSILLECTOMY AND ADENOIDECTOMY       Outpatient Encounter Medications as of 6/29/2022   Medication Sig Dispense Refill    metoprolol succinate (TOPROL XL) 25 MG extended release tablet Take 1 tablet by mouth daily 90 tablet 3    fludrocortisone (FLORINEF) 0.1 MG tablet Take 3 tablets by mouth daily 320 tablet 3    acetaminophen (TYLENOL) 325 mg tablet Take 2 tablets by mouth every 6 hours as needed for Pain or Fever 120 tablet 3    ondansetron (ZOFRAN-ODT) 4 MG disintegrating tablet Take 1 tablet by mouth every 8 hours as needed for Nausea or Vomiting      mupirocin (BACTROBAN) 2 % ointment Apply topically 3 times daily.       bumetanide (BUMEX) 1 MG tablet Take 1 tablet by mouth daily 30 tablet 3    folic acid (FOLVITE) 1 MG tablet Take 1 tablet by mouth daily 30 tablet 3    polyethylene glycol (GLYCOLAX) 17 g packet Take 17 g by mouth daily as needed for Constipation 527 g 1    melatonin 5 mg TABS tablet Take 2 tablets by mouth nightly  0    potassium chloride (KLOR-CON M) 20 MEQ extended release tablet Take 1 tablet by mouth 2 times daily (with meals) 60 tablet 3    tamsulosin (FLOMAX) 0.4 mg capsule Take 1 capsule by mouth daily 30 capsule 3    nicotine (NICODERM CQ) 21 MG/24HR Place 1 patch onto the skin daily 30 patch 3    Multiple Vitamin (MULTIVITAMIN) TABS tablet Take 1 tablet by mouth daily  0    midodrine (PROAMATINE) 10 MG tablet Take 1 tablet by mouth 3 times daily (with meals) 90 tablet 3    pantoprazole (PROTONIX) 40 MG tablet Take 1 tablet by mouth every morning (before breakfast) 30 tablet 3    thiamine mononitrate (THIAMINE) 100 MG tablet Take 1 tablet by mouth daily  0    magnesium oxide (MAGOX 400) 400 (240 Mg) MG tablet Take 1.5 tablets by mouth 2 times daily 90 tablet 2    sacubitril-valsartan (ENTRESTO) 24-26 MG per tablet Take 0.5 tablets by mouth 2 times daily Lot #: [id number]  Exp: July 2024 56 tablet 0    amiodarone (CORDARONE) 200 MG tablet Take 200 mg by mouth 2 times daily      apixaban (ELIQUIS) 5 MG TABS tablet Take 5 mg by mouth 2 times daily      busPIRone (BUSPAR) 10 MG tablet Take 15 mg by mouth 2 times daily       clopidogrel (PLAVIX) 75 MG tablet Take 75 mg by mouth daily      escitalopram (LEXAPRO) 20 MG tablet Take 20 mg by mouth nightly      atorvastatin (LIPITOR) 80 MG tablet Take 80 mg by mouth daily      rOPINIRole (REQUIP) 0.5 MG tablet Take 0.5 mg by mouth nightly      Omega-3 Fatty Acids (FISH OIL CONCENTRATE) 1000 MG CAPS Take 2 caplets by mouth daily       No facility-administered encounter medications on file as of 6/29/2022.       Current Outpatient Medications on File Prior to Visit   Medication Sig Dispense Refill    metoprolol succinate (TOPROL XL) 25 MG extended release tablet Take 1 tablet by mouth daily 90 tablet 3    fludrocortisone (FLORINEF) 0.1 MG tablet Take 3 tablets by mouth daily 320 tablet 3    acetaminophen (TYLENOL) 325 mg tablet Take 2 tablets by mouth every 6 hours as needed for Pain or Fever 120 tablet 3    ondansetron (ZOFRAN-ODT) 4 MG disintegrating tablet Take 1 tablet by mouth every 8 hours as needed for Nausea or Vomiting      mupirocin (BACTROBAN) 2 % ointment Apply topically 3 times daily.  bumetanide (BUMEX) 1 MG tablet Take 1 tablet by mouth daily 30 tablet 3    folic acid (FOLVITE) 1 MG tablet Take 1 tablet by mouth daily 30 tablet 3    polyethylene glycol (GLYCOLAX) 17 g packet Take 17 g by mouth daily as needed for Constipation 527 g 1    melatonin 5 mg TABS tablet Take 2 tablets by mouth nightly  0    potassium chloride (KLOR-CON M) 20 MEQ extended release tablet Take 1 tablet by mouth 2 times daily (with meals) 60 tablet 3    tamsulosin (FLOMAX) 0.4 mg capsule Take 1 capsule by mouth daily 30 capsule 3    nicotine (NICODERM CQ) 21 MG/24HR Place 1 patch onto the skin daily 30 patch 3    Multiple Vitamin (MULTIVITAMIN) TABS tablet Take 1 tablet by mouth daily  0    midodrine (PROAMATINE) 10 MG tablet Take 1 tablet by mouth 3 times daily (with meals) 90 tablet 3    pantoprazole (PROTONIX) 40 MG tablet Take 1 tablet by mouth every morning (before breakfast) 30 tablet 3    thiamine mononitrate (THIAMINE) 100 MG tablet Take 1 tablet by mouth daily  0    magnesium oxide (MAGOX 400) 400 (240 Mg) MG tablet Take 1.5 tablets by mouth 2 times daily 90 tablet 2    sacubitril-valsartan (ENTRESTO) 24-26 MG per tablet Take 0.5 tablets by mouth 2 times daily Lot #: [id number]  Exp: July 2024 56 tablet 0    amiodarone (CORDARONE) 200 MG tablet Take 200 mg by mouth 2 times daily      apixaban (ELIQUIS) 5 MG TABS tablet Take 5 mg by mouth 2 times daily      busPIRone (BUSPAR) 10 MG tablet Take 15 mg by mouth 2 times daily       clopidogrel (PLAVIX) 75 MG tablet Take 75 mg by mouth daily      escitalopram (LEXAPRO) 20 MG tablet Take 20 mg by mouth nightly      atorvastatin (LIPITOR) 80 MG tablet Take 80 mg by mouth daily      rOPINIRole (REQUIP) 0.5 MG tablet Take 0.5 mg by mouth nightly      Omega-3 Fatty Acids (FISH OIL CONCENTRATE) 1000 MG CAPS Take 2 caplets by mouth daily       No current facility-administered medications on file prior to visit. Patient has no known allergies. Family History   Problem Relation Age of Onset    Hypertension Father     Heart Disease Father     Heart Disease Mother     Hypertension Mother      Social History     Tobacco Use   Smoking Status Current Every Day Smoker    Packs/day: 1.00    Types: Cigarettes   Smokeless Tobacco Never Used       Social History     Substance and Sexual Activity   Alcohol Use Not Currently    Alcohol/week: 2.0 standard drinks    Types: 2 Shots of liquor per week    Comment: Drinks about 2 Kesslers with mix nightly. Reported history of heavy alcohol use for years with both beer and liquior several years ago       Review of Systems   Constitutional: Negative for appetite change, chills and fever. Eyes: Negative for redness and visual disturbance. Respiratory: Negative for cough, shortness of breath and wheezing. Cardiovascular: Negative for chest pain and leg swelling. Gastrointestinal: Negative for abdominal pain, constipation, nausea and vomiting. Genitourinary: Positive for difficulty urinating. Negative for decreased urine volume, dysuria, enuresis, flank pain, frequency, hematuria, penile discharge, penile pain, scrotal swelling, testicular pain and urgency. Positive for urinary incontinence and nocturia   Musculoskeletal: Negative for back pain, joint swelling and myalgias. Skin: Negative for color change, rash and wound. Neurological: Negative for dizziness, tremors and numbness. Hematological: Negative for adenopathy. Does not bruise/bleed easily. /64 (Site: Right Upper Arm, Position: Sitting, Cuff Size: Medium Adult)   Temp 96.9 °F (36.1 °C) (Temporal)   Ht 5' 6\" (1.676 m)   Wt 189 lb (85.7 kg)   BMI 30.51 kg/m²       PHYSICAL EXAM:  Constitutional: Patient in no acute distress; Neuro: alert and oriented to person place and time.     Psych: Mood and affect normal.  Lungs: Respiratory effort normal  Abdomen: Soft, non-tender, non-distended  Rectal: Deferred      Lab Results   Component Value Date    BUN 22 06/11/2022     Lab Results   Component Value Date    CREATININE 0.87 06/11/2022     No results found for: PSA    ASSESSMENT:   Diagnosis Orders   1. Incomplete bladder emptying  ID MEASUREMENT,POST-VOID RESIDUAL VOLUME BY US,NON-IMAGING    Culture, Urine    Urinalysis with Microscopic   2. Gross hematuria           PLAN:  We will check a urinalysis culture we will call them with results. Continue Flomax, recommended taking in the evening    Patient did have gross hematuria while he was in the hospital.  Patient is over the age of 61 and was smoking up until just recently. We did discuss with him that he already had a CT abdomen pelvis done while he was in the hospital, no further imaging needed at this time.   We do need to directly visualize lower urinary tract with

## 2022-06-29 NOTE — PATIENT INSTRUCTIONS
BLADDER IRRITANTS     There are several changes you can make to your diet to help improve your urinary symptoms. The following have been shown to cause irritation to the bladder and should be AVOIDED if possible:  ~ Coffee (including decaffeinated)   ~ ALL Tea (including green teas and decaffeinated)  ~ Soda/Pop/carbonated beverages/Energy drinks (especially dark dyed tita, root beers, mountain dew, etc)  ~These are MUCH worse if they have caffeine, but can also be irritative to the bladder even without caffeine  ~ Alcoholic beverages  ~ Spicy foods (peppers contain capsaicin, which is very irritating to the bladder)  ~ Acidic foods (for example: tomato based foods, orange juice, etc)    We encourage increased water intake, unless you have been placed on a fluid restriction by another provider.

## 2022-06-30 LAB
CULTURE: NO GROWTH
SPECIMEN DESCRIPTION: NORMAL

## 2022-07-05 ENCOUNTER — TELEPHONE (OUTPATIENT)
Dept: UROLOGY | Age: 72
End: 2022-07-05

## 2022-07-05 NOTE — TELEPHONE ENCOUNTER
----- Message from Harjeet Celestin PA-C sent at 7/5/2022  9:04 AM EDT -----  Please call pt - urine culture reviewed and does not show UTI  Follow-up as planned with cystoscopy

## 2022-07-12 RX ORDER — FOLIC ACID 1 MG/1
1 TABLET ORAL DAILY
Qty: 90 TABLET | Refills: 0 | Status: SHIPPED | OUTPATIENT
Start: 2022-07-12 | End: 2022-10-10 | Stop reason: SDUPTHER

## 2022-07-13 ENCOUNTER — HOSPITAL ENCOUNTER (OUTPATIENT)
Dept: NON INVASIVE DIAGNOSTICS | Age: 72
Discharge: HOME | End: 2022-07-13
Payer: MEDICARE

## 2022-07-13 DIAGNOSIS — Z71.6 TOBACCO ABUSE COUNSELING: ICD-10-CM

## 2022-07-13 DIAGNOSIS — I95.9 HYPOTENSION, UNSPECIFIED HYPOTENSION TYPE: ICD-10-CM

## 2022-07-13 DIAGNOSIS — I25.5 ISCHEMIC CARDIOMYOPATHY: ICD-10-CM

## 2022-07-13 DIAGNOSIS — I50.42 CHRONIC COMBINED SYSTOLIC AND DIASTOLIC CONGESTIVE HEART FAILURE (HCC): ICD-10-CM

## 2022-07-13 DIAGNOSIS — R42 LIGHTHEADED: ICD-10-CM

## 2022-07-13 DIAGNOSIS — I48.0 PAF (PAROXYSMAL ATRIAL FIBRILLATION) (HCC): ICD-10-CM

## 2022-07-13 LAB
LV EF: 23 %
LVEF MODALITY: NORMAL

## 2022-07-13 PROCEDURE — 93306 TTE W/DOPPLER COMPLETE: CPT

## 2022-07-14 ENCOUNTER — TELEPHONE (OUTPATIENT)
Dept: CARDIOLOGY | Age: 72
End: 2022-07-14

## 2022-07-14 NOTE — TELEPHONE ENCOUNTER
----- Message from Marco Yadav MD sent at 7/14/2022 12:11 AM EDT -----  Let Patient know valve leakiness a bit worse but Will discuss at next visit. Thanks.

## 2022-07-19 ENCOUNTER — OFFICE VISIT (OUTPATIENT)
Dept: PRIMARY CARE CLINIC | Age: 72
End: 2022-07-19
Payer: MEDICARE

## 2022-07-19 VITALS
SYSTOLIC BLOOD PRESSURE: 130 MMHG | OXYGEN SATURATION: 94 % | HEART RATE: 52 BPM | WEIGHT: 181 LBS | HEIGHT: 66 IN | BODY MASS INDEX: 29.09 KG/M2 | DIASTOLIC BLOOD PRESSURE: 78 MMHG

## 2022-07-19 DIAGNOSIS — K21.9 GASTROESOPHAGEAL REFLUX DISEASE, UNSPECIFIED WHETHER ESOPHAGITIS PRESENT: ICD-10-CM

## 2022-07-19 DIAGNOSIS — E83.42 HYPOMAGNESEMIA: ICD-10-CM

## 2022-07-19 DIAGNOSIS — I48.0 PAF (PAROXYSMAL ATRIAL FIBRILLATION) (HCC): Primary | ICD-10-CM

## 2022-07-19 DIAGNOSIS — I25.5 ISCHEMIC CARDIOMYOPATHY: ICD-10-CM

## 2022-07-19 DIAGNOSIS — I50.42 CHRONIC COMBINED SYSTOLIC AND DIASTOLIC CONGESTIVE HEART FAILURE (HCC): ICD-10-CM

## 2022-07-19 DIAGNOSIS — E55.9 HYPOVITAMINOSIS D: ICD-10-CM

## 2022-07-19 DIAGNOSIS — R42 LIGHTHEADED: ICD-10-CM

## 2022-07-19 DIAGNOSIS — I95.9 HYPOTENSION, UNSPECIFIED HYPOTENSION TYPE: ICD-10-CM

## 2022-07-19 PROBLEM — Z71.6 TOBACCO ABUSE COUNSELING: Status: ACTIVE | Noted: 2022-07-19

## 2022-07-19 PROBLEM — E87.6 HYPOKALEMIA: Status: ACTIVE | Noted: 2022-07-19

## 2022-07-19 PROCEDURE — G8417 CALC BMI ABV UP PARAM F/U: HCPCS | Performed by: STUDENT IN AN ORGANIZED HEALTH CARE EDUCATION/TRAINING PROGRAM

## 2022-07-19 PROCEDURE — 99211 OFF/OP EST MAY X REQ PHY/QHP: CPT

## 2022-07-19 PROCEDURE — 3017F COLORECTAL CA SCREEN DOC REV: CPT | Performed by: STUDENT IN AN ORGANIZED HEALTH CARE EDUCATION/TRAINING PROGRAM

## 2022-07-19 PROCEDURE — 1123F ACP DISCUSS/DSCN MKR DOCD: CPT | Performed by: STUDENT IN AN ORGANIZED HEALTH CARE EDUCATION/TRAINING PROGRAM

## 2022-07-19 PROCEDURE — G8427 DOCREV CUR MEDS BY ELIG CLIN: HCPCS | Performed by: STUDENT IN AN ORGANIZED HEALTH CARE EDUCATION/TRAINING PROGRAM

## 2022-07-19 PROCEDURE — 4004F PT TOBACCO SCREEN RCVD TLK: CPT | Performed by: STUDENT IN AN ORGANIZED HEALTH CARE EDUCATION/TRAINING PROGRAM

## 2022-07-19 PROCEDURE — 99214 OFFICE O/P EST MOD 30 MIN: CPT | Performed by: STUDENT IN AN ORGANIZED HEALTH CARE EDUCATION/TRAINING PROGRAM

## 2022-07-19 RX ORDER — FLUDROCORTISONE ACETATE 0.1 MG/1
0.1 TABLET ORAL 3 TIMES DAILY
Qty: 270 TABLET | Refills: 0 | Status: SHIPPED | OUTPATIENT
Start: 2022-07-19 | End: 2022-07-28

## 2022-07-19 RX ORDER — PANTOPRAZOLE SODIUM 40 MG/1
40 TABLET, DELAYED RELEASE ORAL
Qty: 90 TABLET | Refills: 1 | Status: SHIPPED | OUTPATIENT
Start: 2022-07-19

## 2022-07-19 RX ORDER — MULTIVITAMIN WITH IRON
1 TABLET ORAL DAILY
Qty: 90 TABLET | Refills: 1 | Status: SHIPPED | OUTPATIENT
Start: 2022-07-19

## 2022-07-19 RX ORDER — THIAMINE MONONITRATE (VIT B1) 100 MG
100 TABLET ORAL DAILY
Qty: 90 TABLET | Refills: 1 | Status: SHIPPED | OUTPATIENT
Start: 2022-07-19

## 2022-07-19 RX ORDER — POTASSIUM CHLORIDE 20 MEQ/1
20 TABLET, EXTENDED RELEASE ORAL 2 TIMES DAILY WITH MEALS
Qty: 60 TABLET | Refills: 3 | Status: SHIPPED | OUTPATIENT
Start: 2022-07-19 | End: 2022-11-04

## 2022-07-19 ASSESSMENT — PATIENT HEALTH QUESTIONNAIRE - PHQ9
SUM OF ALL RESPONSES TO PHQ9 QUESTIONS 1 & 2: 0
SUM OF ALL RESPONSES TO PHQ QUESTIONS 1-9: 0
DEPRESSION UNABLE TO ASSESS: PT REFUSES
SUM OF ALL RESPONSES TO PHQ QUESTIONS 1-9: 0
2. FEELING DOWN, DEPRESSED OR HOPELESS: 0
SUM OF ALL RESPONSES TO PHQ QUESTIONS 1-9: 0
SUM OF ALL RESPONSES TO PHQ QUESTIONS 1-9: 0
1. LITTLE INTEREST OR PLEASURE IN DOING THINGS: 0

## 2022-07-19 NOTE — PROGRESS NOTES
[address], [address] , [facility], [address]  1950 is a 70 y.o. male, here for evaluation of the following chief complaint(s):    Hypertension and Congestive Heart Failure       ASSESSMENT/PLAN:  1. PAF (paroxysmal atrial fibrillation) ([facility] [address]  2. Chronic combined systolic and diastolic congestive heart failure (HCC)  -     Brain Natriuretic Peptide; Future  -     TSH With Reflex Ft4; Future  3. Ischemic cardiomyopathy  4. Lightheaded  5. Hypotension, unspecified hypotension type  -     vitamin B-1 (THIAMINE) 100 MG tablet; Take 1 tablet by mouth in the morning., Disp-90 tablet, R-1Normal  -     Multiple Vitamin (MULTIVITAMIN) TABS tablet; Take 1 tablet by mouth in the morning., Disp-90 tablet, R-1Normal  -     potassium chloride (KLOR-CON M) 20 MEQ extended release tablet; Take 1 tablet by mouth in the morning and 1 tablet in the evening. Take with meals. , Disp-60 tablet, R-3Normal  -     fludrocortisone (FLORINEF) 0.1 MG tablet; Take 1 tablet by mouth in the morning, at noon, and at bedtime, Disp-270 tablet, R-0Normal  -     Comprehensive Metabolic Panel; Future  -     CBC with Auto Differential; Future  6. Gastroesophageal reflux disease, unspecified whether esophagitis present  -     Multiple Vitamin (MULTIVITAMIN) TABS tablet; Take 1 tablet by mouth in the morning., Disp-90 tablet, R-1Normal  -     pantoprazole (PROTONIX) 40 MG tablet; Take 1 tablet by mouth every morning (before breakfast), Disp-90 tablet, R-1Normal  7. Hypovitaminosis D  -     Vitamin D 25 Hydroxy; Future  8. Hypomagnesemia  -     Magnesium; Future     F/U with Cardiology as per prior plans in 1 weeks' time, continue w/ meds at their current doses, BP stable today. Continue monitoring fluids intake/status. Symptoms are well controlled. Repeat labs for monitoring, including Cr/renal function, Magnesium. He is on supplementation for these.  Recheck vitamin D level, consideration for supplementation depending on results. F/U Abdominal CT Abdomen/Pelvis findings/cirrhosis during the next encounter per pt preference. Continue w/ PPI at this time. Continue and encouraged cessation of alcohol and smoking. Medications Discontinued During This Encounter   Medication Reason    acetaminophen (TYLENOL) 325 mg tablet ERROR    ondansetron (ZOFRAN-ODT) 4 MG disintegrating tablet ERROR    mupirocin (BACTROBAN) 2 % ointment LIST CLEANUP    nicotine (NICODERM CQ) 21 MG/24HR LIST CLEANUP    potassium chloride (KLOR-CON M) 20 MEQ extended release tablet REORDER    Multiple Vitamin (MULTIVITAMIN) TABS tablet REORDER    pantoprazole (PROTONIX) 40 MG tablet REORDER    thiamine mononitrate (THIAMINE) 100 MG tablet REORDER    fludrocortisone (FLORINEF) 0.1 MG tablet REORDER        Return in about 1 month (around 8/19/2022) for Book AWV. Samuel Vaca received counseling on the following healthy behaviors: nutrition, exercise and medication adherence. I encouraged and discussed lifestyle modifications including diet and exercise and the patient was agreeable to making positive/beneficial changes to both to help improve their overall health. Discussed use, benefit, and side effects of prescribed medications. Barriers to medication compliance addressed. Patient given educational materials: see patient instructions. HM - HM items completed today as per orders. Outstanding HM items though not limited to immunizations were discussed with the patient today, including risks, benefits and alternatives. The patient will discuss these during the next appointment per their preference. If there are any worsening or concerning signs or symptoms, patient will report to the ED and/or contact [phone] for immediate evaluation. Teach back method was used. All patient questions answered. Pt voiced understanding.      Subjective    SUBJECTIVE/OBJECTIVE:    HPI     Hypertension and Congestive Heart Failure     HYPERTENSION, Hypotension, PAF, CHF/ Lightheadedness (resolved)  He is exercising, for 10-20 hours per week. He is not adherent to a low-salt diet. Blood pressure is being monitored at home, average readings are 140/80. Patient denies chest pain, chest pressure/discomfort, claudication, dyspnea, exertional chest pressure/discomfort, fatigue, irregular heart beat, lower extremity edema, near-syncope, orthopnea, palpitations, paroxysmal nocturnal dyspnea, syncope, and tachypnea. He is taking all of his meds as prescribed, tolerating these well. He is on Bumex, Toprol, Entresto, Eliquis, Florinef, Midodrine. He has follow-up with Cardiology already scheduled. He is wearing the lifevest. Recent ECHO was reviewed from 7/13/2022. Overall the patient feels good at this time. HYPERLIPIDEMIA:     Medication compliance: compliant all of the time. Patient is  following a low fat, low cholesterol diet. He is  exercising regularly. He is on Atorvastatin 80 mg daily at this time. Vitamin D deficiency noted per prior labs. He has been taking MV with vitamin D supplementation. The patient has GERD, on Protonix, tolerating this well without any side effects at this time. His symptoms as well controlled. The patient had a CT scan notable for some cirrhosis. He has a history of drinking alcohol for many years but has quit since his hospitalization. He is also cutting back on his smoking at this time. He would like to discuss this further at his next appointment. He is having regular BM's at this time - and taking a stool softener with good results. He also has a history of hypomagnesemia/hypokalemia taking supplements for this  at this time, 600 mg BID of magnesium as well as Potassium (20mg BID).      Family History   Problem Relation Age of Onset    Hypertension Father     Heart Disease Father     Heart Disease Mother     Hypertension Mother        Health Maintenance   Alcohol/Substance use History - None/Minimal  Smoking History    Tobacco Use      Smoking status: Every Day        Packs/day: 1.00        Types: Cigarettes      Smokeless tobacco: Never      Health Maintenance   Topic Date Due    Annual Wellness Visit (AWV)  Never done    Pneumococcal 65+ years Vaccine (1 - PCV) Never done    Lipids  Never done    Colorectal Cancer Screen  Never done    Shingles vaccine (1 of 2) Never done    Hepatitis A vaccine (1 of 2 - Risk 2-dose series) 06/06/2023 (Originally 8/15/1951)    Hepatitis B vaccine (1 of 3 - Risk 3-dose series) 06/06/2023 (Originally 8/15/1969)    DTaP/Tdap/Td vaccine (1 - Tdap) 06/06/2023 (Originally 8/15/1969)    COVID-19 Vaccine (1) 06/06/2032 (Originally 2/15/1951)    Flu vaccine (1) 09/01/2022    Depression Screen  06/06/2023    AAA screen  Completed    Hepatitis C screen  Completed    Hib vaccine  Aged Out    Meningococcal (ACWY) vaccine  Aged Out      Depression Screening  PHQ Scores 7/19/2022 6/6/2022   PHQ2 Score 0 0   PHQ9 Score 0 0     Interpretation of Total Score Depression Severity: 1-4 = Minimal depression, 5-9 = Mild depression, 10-14 = Moderate depression, 15-19 = Moderately severe depression, 20-27 = Severe depression      Review of Systems   Constitutional: Negative for activity change, appetite change, chills, diaphoresis, fatigue, fever and unexpected weight change. HENT: Negative for sinus pressure, sinus pain, sore throat and trouble swallowing. Respiratory: Negative for cough, shortness of breath and wheezing. Cardiovascular: Negative for chest pain, palpitations and leg swelling. Gastrointestinal: Negative for abdominal pain, diarrhea, nausea and vomiting. Positive for GERD  Endocrine: Negative for cold intolerance, polydipsia, polyphagia and polyuria. Genitourinary: Negative for difficulty urinating, flank pain and frequency. Musculoskeletal: Negative for gait problem and joint swelling. Negative for back pain, neck pain and neck stiffness.    Skin: Negative for color change and wound. Negative for pallor and rash. Allergic/Immunologic: Negative for environmental allergies and food allergies. Neurological: Negative for numbness and headaches. Hx of lightheadedness  Psychiatric/Behavioral: Negative for sleep disturbance. Negative for confusion and suicidal ideas. Objective    Physical Exam   Constitutional: Patient is oriented to person, place, and time. Patient appears well-developed and well-nourished. No distress. HENT: Head: Normocephalic and atraumatic. Eyes: Pupils are equal, round, and reactive to light. Conjunctivae are normal. Right eye exhibits no discharge. Left eye exhibits no discharge. Cardiovascular: Normal rate, regular rhythm and abnormal heart sounds/murmur present. Pulmonary/Chest: Effort normal and breath sounds normal. No respiratory distress. Patient has no wheezes. Abdominal: Soft. Bowel sounds are normal. Patient exhibits no distension. There is no tenderness. Musculoskeletal:  Patient exhibits edema 2+ to the knee bilaterally and no tenderness. Patient exhibits no deformity. Neurological: Patient is alert and oriented to person, place, and time. Skin: Skin is warm and dry. Patient is not diaphoretic. Psychiatric: Patient's speech is normal and behavior is normal. Thought content normal.   Vitals reviewed.     /78   Pulse 52   Ht 5' 6\" (1.676 m)   Wt 181 lb (82.1 kg)   SpO2 94%   BMI 29.21 kg/m²   BP Readings from Last 3 Encounters:   07/19/22 130/78   06/29/22 118/64   06/24/22 109/78     Lab Results   Component Value Date    WBC 2.8 (L) 06/11/2022    HGB 11.9 (L) 06/11/2022    HCT 36.0 (L) 06/11/2022    PLT See Reflexed IPF Result 06/11/2022    ALT 26 06/06/2022    AST 32 06/06/2022     06/11/2022    K 3.6 (L) 06/11/2022     06/11/2022    CREATININE 0.87 06/11/2022    BUN 22 06/11/2022    CO2 31 06/11/2022    TSH 3.69 06/06/2022    INR 1.7 06/07/2022     Lab Results   Component Value Date    CALCIUM 9.1 06/11/2022    PHOS 4.3 05/02/2022     No results found for: LDLCALC, LDLCHOLESTEROL, LDLDIRECT    CURRENT MEDS W/ ASSOC DIAG           Start Date End Date     acetaminophen (TYLENOL) 325 mg tablet  06/11/22  --     Take 2 tablets by mouth every 6 hours as needed for Pain or Fever     Associated Diagnoses:  --     amiodarone (CORDARONE) 200 MG tablet  04/26/22  --     Associated Diagnoses:  --     apixaban (ELIQUIS) 5 MG TABS tablet  03/16/22  --     Associated Diagnoses:  --     atorvastatin (LIPITOR) 80 MG tablet  03/16/22  --     Associated Diagnoses:  --     bumetanide (BUMEX) 1 MG tablet  06/11/22  --     Take 1 tablet by mouth daily     Associated Diagnoses:  --     busPIRone (BUSPAR) 10 MG tablet  05/10/22  --     Associated Diagnoses:  --     clopidogrel (PLAVIX) 75 MG tablet  03/16/22  --     Associated Diagnoses:  --     escitalopram (LEXAPRO) 20 MG tablet  03/16/22  --     Associated Diagnoses:  --     fludrocortisone (FLORINEF) 0.1 MG tablet  06/24/22 08/25/23     Take 3 tablets by mouth daily     Associated Diagnoses:  Lightheaded, PAF (paroxysmal atrial fibrillation) (HCC), Chronic combined systolic and diastolic congestive heart failure ([address] Ischemic cardiomyopathy, Hypotension, unspecified hypotension type, Tobacco abuse counseling     folic acid (FOLVITE) 1 MG tablet  07/12/22  --     Take 1 tablet by mouth daily     Associated Diagnoses:  --     magnesium oxide (MAGOX 400) 400 (240 Mg) MG tablet  05/24/22  --     Take 1.5 tablets by mouth 2 times daily     Associated Diagnoses:  --     melatonin 5 mg TABS tablet  06/11/22  --     Take 2 tablets by mouth nightly     Associated Diagnoses:  --     metoprolol succinate (TOPROL XL) 25 MG extended release tablet  06/24/22  --     Take 1 tablet by mouth daily     Associated Diagnoses:  Lightheaded, PAF (paroxysmal atrial fibrillation) (HCC), Chronic combined systolic and diastolic congestive heart failure ([address] Ischemic cardiomyopathy, Hypotension, unspecified hypotension type, Tobacco abuse counseling     midodrine (PROAMATINE) 10 MG tablet  22  --     Take 1 tablet by mouth 3 times daily (with meals)     Associated Diagnoses:  --     Multiple Vitamin (MULTIVITAMIN) TABS tablet  22  --     Take 1 tablet by mouth daily     Associated Diagnoses:  --     mupirocin (BACTROBAN) 2 % ointment  22  --     Apply topically 3 times daily. Associated Diagnoses:  --     nicotine (NICODERM CQ) 21 MG/24HR  22  --     Place 1 patch onto the skin daily     Associated Diagnoses:  --     Omega-3 Fatty Acids (FISH OIL CONCENTRATE) 1000 MG CAPS  --  --     Associated Diagnoses:  --     ondansetron (ZOFRAN-ODT) 4 MG disintegrating tablet  22  --     Take 1 tablet by mouth every 8 hours as needed for Nausea or Vomiting     Associated Diagnoses:  --     pantoprazole (PROTONIX) 40 MG tablet  22  --     Take 1 tablet by mouth every morning (before breakfast)     Associated Diagnoses:  --     potassium chloride (KLOR-CON M) 20 MEQ extended release tablet  22  --     Take 1 tablet by mouth 2 times daily (with meals)     Associated Diagnoses:  --     rOPINIRole (REQUIP) 0.5 MG tablet ()  04/11/22  07/10/22     Associated Diagnoses:  --     sacubitril-valsartan (ENTRESTO) 24-26 MG per tablet  22  --     Take 0.5 tablets by mouth 2 times daily Lot #: [id number]  Exp: 2024     Associated Diagnoses:  Ischemic cardiomyopathy     tamsulosin (FLOMAX) 0.4 mg capsule  22  --     Take 1 capsule by mouth daily     Associated Diagnoses:  --     thiamine mononitrate (THIAMINE) 100 MG tablet  22  --     Take 1 tablet by mouth daily     Associated Diagnoses:  --              Please note that this chart was generated using voice recognition Dragon dictation software. Although every effort was made to ensure the accuracy of this automated transcription, some errors in transcription may have occurred.   Electronically signed by Cherise Coronel Sean Wahl MD on 7/19/2022 at 5:12 PM

## 2022-07-21 RX ORDER — MIDODRINE HYDROCHLORIDE 10 MG/1
10 TABLET ORAL
Qty: 90 TABLET | Refills: 3 | Status: SHIPPED | OUTPATIENT
Start: 2022-07-21 | End: 2022-08-15

## 2022-07-21 RX ORDER — TAMSULOSIN HYDROCHLORIDE 0.4 MG/1
0.4 CAPSULE ORAL DAILY
Qty: 30 CAPSULE | Refills: 3 | Status: SHIPPED | OUTPATIENT
Start: 2022-07-21 | End: 2022-08-15 | Stop reason: SDUPTHER

## 2022-07-25 ENCOUNTER — HOSPITAL ENCOUNTER (OUTPATIENT)
Age: 72
Discharge: HOME | End: 2022-07-25
Payer: MEDICARE

## 2022-07-25 DIAGNOSIS — E83.42 HYPOMAGNESEMIA: ICD-10-CM

## 2022-07-25 DIAGNOSIS — E55.9 HYPOVITAMINOSIS D: ICD-10-CM

## 2022-07-25 DIAGNOSIS — I95.9 HYPOTENSION, UNSPECIFIED HYPOTENSION TYPE: ICD-10-CM

## 2022-07-25 DIAGNOSIS — I50.42 CHRONIC COMBINED SYSTOLIC AND DIASTOLIC CONGESTIVE HEART FAILURE (HCC): ICD-10-CM

## 2022-07-25 LAB
ABSOLUTE EOS #: 0.03 K/UL (ref 0–0.44)
ABSOLUTE IMMATURE GRANULOCYTE: <0.03 K/UL (ref 0–0.3)
ABSOLUTE LYMPH #: 0.8 K/UL (ref 1.1–3.7)
ABSOLUTE MONO #: 0.34 K/UL (ref 0.1–1.2)
ALBUMIN SERPL-MCNC: 4.1 G/DL (ref 3.5–5.2)
ALBUMIN/GLOBULIN RATIO: 1.6 (ref 1–2.5)
ALP BLD-CCNC: 102 U/L (ref 40–129)
ALT SERPL-CCNC: 26 U/L (ref 5–41)
ANION GAP SERPL CALCULATED.3IONS-SCNC: 13 MMOL/L (ref 9–17)
AST SERPL-CCNC: 24 U/L
BASOPHILS # BLD: 1 % (ref 0–2)
BASOPHILS ABSOLUTE: <0.03 K/UL (ref 0–0.2)
BILIRUB SERPL-MCNC: 0.97 MG/DL (ref 0.3–1.2)
BUN BLDV-MCNC: 18 MG/DL (ref 8–23)
BUN/CREAT BLD: 16 (ref 9–20)
CALCIUM SERPL-MCNC: 9.5 MG/DL (ref 8.6–10.4)
CHLORIDE BLD-SCNC: 98 MMOL/L (ref 98–107)
CO2: 33 MMOL/L (ref 20–31)
CREAT SERPL-MCNC: 1.1 MG/DL (ref 0.7–1.2)
EOSINOPHILS RELATIVE PERCENT: 1 % (ref 1–4)
GFR AFRICAN AMERICAN: >60 ML/MIN
GFR NON-AFRICAN AMERICAN: >60 ML/MIN
GFR SERPL CREATININE-BSD FRML MDRD: ABNORMAL ML/MIN/{1.73_M2}
GFR SERPL CREATININE-BSD FRML MDRD: ABNORMAL ML/MIN/{1.73_M2}
GLUCOSE BLD-MCNC: 91 MG/DL (ref 70–99)
HCT VFR BLD CALC: 41.8 % (ref 40.7–50.3)
HEMOGLOBIN: 13.3 G/DL (ref 13–17)
IMMATURE GRANULOCYTES: 0 %
LYMPHOCYTES # BLD: 25 % (ref 24–43)
MAGNESIUM: 1.5 MG/DL (ref 1.6–2.6)
MCH RBC QN AUTO: 33.1 PG (ref 25.2–33.5)
MCHC RBC AUTO-ENTMCNC: 31.8 G/DL (ref 28.4–34.8)
MCV RBC AUTO: 104 FL (ref 82.6–102.9)
MONOCYTES # BLD: 11 % (ref 3–12)
NRBC AUTOMATED: 0 PER 100 WBC
PDW BLD-RTO: 15.2 % (ref 11.8–14.4)
PLATELET # BLD: ABNORMAL K/UL (ref 138–453)
PLATELET, FLUORESCENCE: 95 K/UL (ref 138–453)
PLATELET, IMMATURE FRACTION: 8.6 % (ref 1.1–10.3)
POTASSIUM SERPL-SCNC: 3.6 MMOL/L (ref 3.7–5.3)
PRO-BNP: 7175 PG/ML
RBC # BLD: 4.02 M/UL (ref 4.21–5.77)
SEG NEUTROPHILS: 63 % (ref 36–65)
SEGMENTED NEUTROPHILS ABSOLUTE COUNT: 1.98 K/UL (ref 1.5–8.1)
SODIUM BLD-SCNC: 144 MMOL/L (ref 135–144)
TOTAL PROTEIN: 6.6 G/DL (ref 6.4–8.3)
TSH SERPL DL<=0.05 MIU/L-ACNC: 5.77 UIU/ML (ref 0.3–5)
WBC # BLD: 3.2 K/UL (ref 3.5–11.3)

## 2022-07-25 PROCEDURE — 83880 ASSAY OF NATRIURETIC PEPTIDE: CPT

## 2022-07-25 PROCEDURE — 82306 VITAMIN D 25 HYDROXY: CPT

## 2022-07-25 PROCEDURE — 85055 RETICULATED PLATELET ASSAY: CPT

## 2022-07-25 PROCEDURE — 83735 ASSAY OF MAGNESIUM: CPT

## 2022-07-25 PROCEDURE — 84443 ASSAY THYROID STIM HORMONE: CPT

## 2022-07-25 PROCEDURE — 84439 ASSAY OF FREE THYROXINE: CPT

## 2022-07-25 PROCEDURE — 85025 COMPLETE CBC W/AUTO DIFF WBC: CPT

## 2022-07-25 PROCEDURE — 80053 COMPREHEN METABOLIC PANEL: CPT

## 2022-07-25 PROCEDURE — 36415 COLL VENOUS BLD VENIPUNCTURE: CPT

## 2022-07-26 LAB
THYROXINE, FREE: 1.52 NG/DL (ref 0.93–1.7)
VITAMIN D 25-HYDROXY: 17.2 NG/ML

## 2022-07-27 ENCOUNTER — HOSPITAL ENCOUNTER (OUTPATIENT)
Age: 72
Setting detail: SPECIMEN
Discharge: HOME | End: 2022-07-27
Payer: MEDICARE

## 2022-07-27 DIAGNOSIS — E55.9 HYPOVITAMINOSIS D: Primary | ICD-10-CM

## 2022-07-27 LAB
-: ABNORMAL
BACTERIA: ABNORMAL
BILIRUBIN URINE: NEGATIVE
COLOR: YELLOW
CRYSTALS, UA: ABNORMAL /HPF
CRYSTALS, UA: ABNORMAL /HPF
EPITHELIAL CELLS UA: ABNORMAL /HPF (ref 0–5)
GLUCOSE URINE: NEGATIVE
KETONES, URINE: NEGATIVE
LEUKOCYTE ESTERASE, URINE: NEGATIVE
MUCUS: ABNORMAL
NITRITE, URINE: NEGATIVE
PH UA: 7.5 (ref 5–9)
PROTEIN UA: ABNORMAL
RBC UA: ABNORMAL /HPF (ref 0–2)
SPECIFIC GRAVITY UA: 1.01 (ref 1.01–1.02)
TURBIDITY: CLEAR
URINE HGB: NEGATIVE
UROBILINOGEN, URINE: NORMAL
WBC UA: ABNORMAL /HPF (ref 0–5)

## 2022-07-27 PROCEDURE — 87086 URINE CULTURE/COLONY COUNT: CPT

## 2022-07-27 PROCEDURE — 81001 URINALYSIS AUTO W/SCOPE: CPT

## 2022-07-27 RX ORDER — ERGOCALCIFEROL 1.25 MG/1
50000 CAPSULE ORAL WEEKLY
Qty: 12 CAPSULE | Refills: 0 | Status: SHIPPED | OUTPATIENT
Start: 2022-07-27 | End: 2022-10-08 | Stop reason: SDUPTHER

## 2022-07-28 ENCOUNTER — OFFICE VISIT (OUTPATIENT)
Dept: CARDIOLOGY | Age: 72
End: 2022-07-28
Payer: MEDICARE

## 2022-07-28 VITALS
WEIGHT: 176 LBS | HEART RATE: 58 BPM | HEIGHT: 66 IN | DIASTOLIC BLOOD PRESSURE: 70 MMHG | BODY MASS INDEX: 28.28 KG/M2 | RESPIRATION RATE: 18 BRPM | OXYGEN SATURATION: 94 % | SYSTOLIC BLOOD PRESSURE: 138 MMHG

## 2022-07-28 DIAGNOSIS — R42 LIGHTHEADED: ICD-10-CM

## 2022-07-28 DIAGNOSIS — I95.9 HYPOTENSION, UNSPECIFIED HYPOTENSION TYPE: ICD-10-CM

## 2022-07-28 DIAGNOSIS — I48.0 PAF (PAROXYSMAL ATRIAL FIBRILLATION) (HCC): ICD-10-CM

## 2022-07-28 DIAGNOSIS — I50.42 CHRONIC COMBINED SYSTOLIC AND DIASTOLIC CONGESTIVE HEART FAILURE (HCC): ICD-10-CM

## 2022-07-28 DIAGNOSIS — I25.5 ISCHEMIC CARDIOMYOPATHY: ICD-10-CM

## 2022-07-28 PROCEDURE — G8417 CALC BMI ABV UP PARAM F/U: HCPCS | Performed by: PHYSICIAN ASSISTANT

## 2022-07-28 PROCEDURE — 3017F COLORECTAL CA SCREEN DOC REV: CPT | Performed by: PHYSICIAN ASSISTANT

## 2022-07-28 PROCEDURE — 1123F ACP DISCUSS/DSCN MKR DOCD: CPT | Performed by: PHYSICIAN ASSISTANT

## 2022-07-28 PROCEDURE — G8427 DOCREV CUR MEDS BY ELIG CLIN: HCPCS | Performed by: PHYSICIAN ASSISTANT

## 2022-07-28 PROCEDURE — 99214 OFFICE O/P EST MOD 30 MIN: CPT | Performed by: PHYSICIAN ASSISTANT

## 2022-07-28 PROCEDURE — 1036F TOBACCO NON-USER: CPT | Performed by: PHYSICIAN ASSISTANT

## 2022-07-28 PROCEDURE — 99211 OFF/OP EST MAY X REQ PHY/QHP: CPT | Performed by: PHYSICIAN ASSISTANT

## 2022-07-28 RX ORDER — FLUDROCORTISONE ACETATE 0.1 MG/1
0.3 TABLET ORAL DAILY
Qty: 270 TABLET | Refills: 0 | Status: ON HOLD | OUTPATIENT
Start: 2022-07-28 | End: 2022-08-13 | Stop reason: HOSPADM

## 2022-07-28 NOTE — PROGRESS NOTES
Brian Schultz am scribing for and in the presence of Roger Marshall PA-C. Patient: Lito Gurrola  : 1950  Date of Admission: (Not on file)  Primary Care Physician: Derick Jovel  Today's Date: 2022    REASON FOR CONSULTATION: atrial fibrillation with RVR    HPI:  Mr. Jose M Felix is a 70 y.o. male who was admitted to the hospital on 2022 for weakness and shortness of breath. In the ER he was found to be in atrial fibrillation with RVR leading to a consultation. He was then readmitted due to sever dehydration. In the past he reports seeing a Cardiologist in [place] and was planning on having a cardioversion done in  to put him back into normal sinus rhythm due to his history of new onset atrial fibrillation. He reports a history of a heart attack at age 46 and needed stents placed at that time but denies any cardiac cath since that time. He also has carotid artery stenosis and abdominal aortic aneurysm. He did report having surgery on one side of his neck, however he is not sure which side or how long ago it was. He has had hypertension and hyperlipidemia for years as well. He is a current every day smoker and he smoked for about 55 years and smokes about a pack a day. Family history consists of a lot of people in his family with significant cardiac history, however he wanted us to talk to his sister about the family history as she knows the details. Echo done on 2022 showed an EF of 15-20%. ECHO done on 22: Dilated all cardiac chambers. Severe biventricular systolic dysfunction. Estimated left ventricular ejection fraction is 20-25% Moderate to severe mitral and moderate tricuspid regurgitation. Moderate pulmonary hypertension with an estimated right ventricular systolic pressure of 61 mmHg. Evidence of moderate to severe diastolic dysfunction is seen. Bilateral pleural effusion noted.  Compared to the previous study of 2022, no significant change in ejection fraction. Functional mitral and tricuspid regurgitation are worse. Bilateral pleural effusion and left elevated left ventricular filling pressure along with dilated IVC suggest worsening fluid overload. Mr. Holly Bryant is here today for a 4 week follow up. He says he is doing well today. He does have shortness of breath still but it seems to be getting better. He did have a fall yesterday because he lost his balance, he did hit his ribs so his ribs are a little bruised and sore. He denies any light headed/dizziness. Denies palpitations. Denies chest pain, pressure, or tightness. Swelling in legs are better since last visit. He can walk at least 200 feet before getting short of breath. He did stop drinking and smoking at the end of June and he says he does feel much better since then. He also denied any abdominal pain, bleeding problems, problems with his medications or any other concerns at this time. He has no bowel issues at this time. No nausea or vomiting. No fever, cough or chills. Bleeding Risks: Mr. Holly Bryant denies any current or recent bleeding problems including a history of a GI bleed, ulcers, recent or upcoming surgeries, blood in his stool or black tarry stools or blood in his urine. Past Medical History:   Diagnosis Date    Acute kidney injury ([facility] [address]     Ascites 5/20/2022    Atrial fibrillation (HCC)     CHF (congestive heart failure) (HCC)     Cirrhosis of liver with ascites ([facility] [address] 5/19/2022    CKD (chronic kidney disease) 5/20/2022    Coronary artery disease     Hyperlipidemia     Hypertension     Hypomagnesemia 5/20/2022    Hyponatremia     Thrombocytopenia (HCC)        CURRENT ALLERGIES: Patient has no known allergies. REVIEW OF SYSTEMS: 14 systems were reviewed. Pertinent positives and negatives as above, all else negative.      Past Surgical History:   Procedure Laterality Date    CAROTID STENT PLACEMENT Bilateral     HERNIA REPAIR      ROTATOR CUFF REPAIR Right     TONSILLECTOMY AND ADENOIDECTOMY      Social History:  Social History     Tobacco Use    Smoking status: Former     Packs/day: 1.00     Years: 56.00     Pack years: 56.00     Types: Cigarettes     Quit date: 2022     Years since quittin.0    Smokeless tobacco: Never   Vaping Use    Vaping Use: Never used   Substance Use Topics    Alcohol use: Not Currently     Alcohol/week: 2.0 standard drinks     Types: 2 Shots of liquor per week     Comment: Drinks about 2 Kesslers with mix nightly. Reported history of heavy alcohol use for years with both beer and liquior several years ago    Drug use: Not Currently        CURRENT MEDICATIONS:  Outpatient Medications Marked as Taking for the 22 encounter (Office Visit) with Leisa Michelle PA-C   Medication Sig Dispense Refill    vitamin D (ERGOCALCIFEROL) 1.25 MG (42646 UT) CAPS capsule Take 1 capsule by mouth once a week 12 capsule 0    tamsulosin (FLOMAX) 0.4 MG capsule Take 1 capsule by mouth in the morning. 30 capsule 3    midodrine (PROAMATINE) 10 MG tablet Take 1 tablet by mouth in the morning and 1 tablet at noon and 1 tablet in the evening. Take with meals. 90 tablet 3    vitamin B-1 (THIAMINE) 100 MG tablet Take 1 tablet by mouth in the morning. 90 tablet 1    Multiple Vitamin (MULTIVITAMIN) TABS tablet Take 1 tablet by mouth in the morning. 90 tablet 1    pantoprazole (PROTONIX) 40 MG tablet Take 1 tablet by mouth every morning (before breakfast) 90 tablet 1    potassium chloride (KLOR-CON M) 20 MEQ extended release tablet Take 1 tablet by mouth in the morning and 1 tablet in the evening. Take with meals.  60 tablet 3    fludrocortisone (FLORINEF) 0.1 MG tablet Take 1 tablet by mouth in the morning, at noon, and at bedtime 835 tablet 0    folic acid (FOLVITE) 1 MG tablet Take 1 tablet by mouth daily 90 tablet 0    metoprolol succinate (TOPROL XL) 25 MG extended release tablet Take 1 tablet by mouth daily 90 tablet 3    bumetanide (BUMEX) 1 MG tablet Take 1 tablet by mouth daily 30 tablet 3    melatonin 5 mg TABS tablet Take 2 tablets by mouth nightly  0    magnesium oxide (MAGOX 400) 400 (240 Mg) MG tablet Take 1.5 tablets by mouth 2 times daily 90 tablet 2    sacubitril-valsartan (ENTRESTO) 24-26 MG per tablet Take 0.5 tablets by mouth 2 times daily Lot #: [id number]  Exp: July 2024 56 tablet 0    amiodarone (CORDARONE) 200 MG tablet Take 200 mg by mouth 2 times daily      apixaban (ELIQUIS) 5 MG TABS tablet Take 5 mg by mouth 2 times daily      busPIRone (BUSPAR) 10 MG tablet Take 15 mg by mouth 2 times daily       clopidogrel (PLAVIX) 75 MG tablet Take 75 mg by mouth daily      escitalopram (LEXAPRO) 20 MG tablet Take 20 mg by mouth nightly      atorvastatin (LIPITOR) 80 MG tablet Take 80 mg by mouth daily      rOPINIRole (REQUIP) 0.5 MG tablet Take 0.5 mg by mouth nightly      Omega-3 Fatty Acids (FISH OIL CONCENTRATE) 1000 MG CAPS Take 2 caplets by mouth daily       FAMILY HISTORY: Reviewed but non-contributory     PHYSICAL EXAM:   /70 (Site: Right Upper Arm, Position: Sitting, Cuff Size: Medium Adult)   Pulse 58   Resp 18   Ht 5' 6\" (1.676 m)   Wt 176 lb (79.8 kg)   SpO2 94%   BMI 28.41 kg/m²  Body mass index is 28.41 kg/m². Constitutional: He is oriented to person, place, and time. He appears well-developed and well-nourished. In no acute distress. HEENT: Normocephalic and atraumatic. No JVD present. Carotid bruit is not present. No mass and no thyromegaly present. No lymphadenopathy present. Cardiovascular: Bradycardic rate, regular rhythm, normal heart sounds. Exam reveals no gallop and no friction rubs. No murmur was heard. Pulmonary/Chest: Effort normal and breath sounds normal. No respiratory distress. He has no wheezes, rhonchi or rales. Abdominal: Soft, non-tender. Bowel sounds and aorta are normal. He exhibits no organomegaly, mass or bruit. Extremities: Trace. No cyanosis or clubbing. 2+ radial and carotid pulses.  Distal extremity pulses: 2+ bilaterally. Neurological: He is alert and oriented to person, place, and time. No evidence of gross cranial nerve deficit. Coordination appeared normal.   Skin: Skin is warm and dry. There is no rash or diaphoresis. Psychiatric: He has a normal mood and affect. His speech is normal and behavior is normal.      MOST RECENT LABS ON RECORD:   Lab Results   Component Value Date    WBC 3.2 (L) 07/25/2022    HGB 13.3 07/25/2022    HCT 41.8 07/25/2022    PLT See Reflexed IPF Result 07/25/2022    ALT 26 07/25/2022    AST 24 07/25/2022     07/25/2022    K 3.6 (L) 07/25/2022    CL 98 07/25/2022    CREATININE 1.10 07/25/2022    BUN 18 07/25/2022    CO2 33 (H) 07/25/2022    TSH 5.77 (H) 07/25/2022    INR 1.7 06/07/2022     ASSESSMENT:  Encounter Diagnoses   Name Primary? Hypotension, unspecified hypotension type Yes    Lightheaded     PAF (paroxysmal atrial fibrillation) (HCC)     Chronic combined systolic and diastolic congestive heart failure (HCC)      PLAN:  Hypotension/ Lightheadedness/dizziness: No further falls due to lightheaded/dizziness. Continue Florinef (fludrocortisone) 0.3 mg daily. I explained that this can cause some increased lower extremity edema but usually this is fairly mild. Beta Blocker: Continue Metoprolol succinate (Toprol XL) 25 mg daily. Diuretics: Continue bumetinide (Bumex) 1 mg every morning. I advised him to keep an eye on his legs and to call us if he has any issues with breathing. Nonpharmacologic counseling: Because of his condition, I reminded him to try and keep himself well-hydrated and to take extra time when moving from laying to sitting, sitting to standing and standing to walking. I also explained to him to help improve his symptoms he should include 3 g sodium diet, 1 or 2 L of sports drinks daily, knee-high compressions stockings.      Paroxysmal Atrial Fibrillation: Rhythm Control Asymptomatic   Beta Blocker: Continue Metoprolol succinate (Toprol XL) 25 mg daily. Anti-Arrhythmic: Continue amiodarone (Pacerone) 200 mg BID. Monitoring: Since they will being maintained on Amiodarone, I told them that we will need to closely monitor them for potential side effects. These include monitoring LFTs and TSH at least every 6 months as well as chest x-rays, pulmonary function tests, and eye exams at least yearly. [id number] Score for Atrial Fibrillation Stroke Risk   Risk   Factors  Component Value   C CHF Yes 1   H HTN Yes 1   A2 Age >= 76 No,  (75 y.o.) 0   D DM No 0   S2 Prior Stroke/TIA No 0   V Vascular Disease No 0   A Age 74-69 Yes,  (75 y.o.) 1   Sc Sex male 0    [id number]  Score  3   Score last updated 0/6/25 5:26 PM EDT  Click here for a link to the UpToDate guideline \"Atrial Fibrillation: Anticoagulation therapy to prevent embolization  Disclaimer: Risk Score calculation is dependent on accuracy of patient problem list and past encounter diagnosis. Stroke Risk: CHADS2-VASc Score: 3/9 (3.2% stroke risk). Because of his atrial fibrillation, I also would also recommend that he continue with anticoagulation to decrease his risk of stoke but also reminded him to watch for signs of blood in his stool or black tarry stools and stop the medication immediately if this develops as this could be life threatening. Anticoagulation: Continue Apixaban (Eliquis) 5 mg every 12 hours. Because of his atrial fibrillation, I also would also recommend that he continue with anticoagulation to decrease his risk of stoke but also reminded him to watch for signs of blood in his stool or black tarry stools and stop the medication immediately if this develops as this could be life threatening. Chronic combined heart failure: Ischemic Cardiomyopathy, Echo done on 5/18/2022 which showed an EF of 15-20%. Beta Blocker: Continue Metoprolol succinate (Toprol XL) 25 mg daily.   ACE Inibitor/ARB: Continue sacubitril/valsartan (Entresto) 24/26 mg 1/2 a tablet twice daily.  Diuretics: Continue bumetinide (Bumex) 1 mg every morning. Heart failure counseling: I advised them to try and keep their legs up whenever possible and to limit salt in their diet. Life Vest: Continue use of the life vest at this time, EF has not improved to greater than 35%. Additional Testing: Because of his known history of a cardiomyopathy, as well as his physical exam findings, I took the liberty of ordering a repeat echocardiogram to re-assess his ejection fraction in 4 weeks in order to at very least make sure this has not worsened and steve medical management accordingly. He is currently performing home health, however I also discussed possible cardiac rehab. We discussed the potential benefits of cardiac rehab to improve both his cardiac condition as well as improve his exercise tolerance and overall quality of life. He was very agreeable with this and therefore I made the referral for Phase II cardiac rehab. Tobacco Abuse Counseling: I spent several minutes discussing the dangers of tobacco abuse as well as multiple methods for trying to quit smoking. In the end, Mr. Cathie Tapia said that he did stop smoking in late June, he says he is feeling much better since. Once again, thank you for allowing me to participate in this patients care. Please do not hesitate to contact me could I be of further assistance. FOLLOW UP:   I told Mr. Cathie Tapia to call my office if he had any problems, but otherwise told him to Return in about 6 weeks (around 9/8/2022). However, I would be happy to see him sooner should the need arise. Sincerely,  Liat To PA-C  [facility] Cardiology Specialist    [address] [facility]  Phone: [phone], Fax: [phone]     I believe that the risk of significant morbidity and mortality related to the patient's current medical conditions are: intermediate-high.  Approximately 35 minutes were spent during prep work, discussion and exam of the patient, and follow up documentation and all of their questions were answered. The documentation recorded by the scribe, accurately and completely reflects the services I personally performed and the decisions made by me.  TIMUR ArvizuC July 28, 2022

## 2022-07-29 LAB
CULTURE: NO GROWTH
SPECIMEN DESCRIPTION: NORMAL

## 2022-08-03 ENCOUNTER — APPOINTMENT (OUTPATIENT)
Dept: GENERAL RADIOLOGY | Age: 72
DRG: 291 | End: 2022-08-03
Payer: MEDICARE

## 2022-08-03 ENCOUNTER — TELEPHONE (OUTPATIENT)
Dept: PRIMARY CARE CLINIC | Age: 72
End: 2022-08-03

## 2022-08-03 ENCOUNTER — HOSPITAL ENCOUNTER (INPATIENT)
Age: 72
LOS: 10 days | Discharge: HOME | DRG: 291 | End: 2022-08-13
Attending: EMERGENCY MEDICINE | Admitting: STUDENT IN AN ORGANIZED HEALTH CARE EDUCATION/TRAINING PROGRAM
Payer: MEDICARE

## 2022-08-03 DIAGNOSIS — J96.01 ACUTE RESPIRATORY FAILURE WITH HYPOXIA (HCC): Primary | ICD-10-CM

## 2022-08-03 DIAGNOSIS — I50.43 ACUTE ON CHRONIC COMBINED SYSTOLIC AND DIASTOLIC CHF (CONGESTIVE HEART FAILURE) (HCC): ICD-10-CM

## 2022-08-03 PROBLEM — R09.02 HYPOXIA: Status: ACTIVE | Noted: 2022-08-03

## 2022-08-03 LAB
ABSOLUTE EOS #: 0.07 K/UL (ref 0–0.44)
ABSOLUTE IMMATURE GRANULOCYTE: <0.03 K/UL (ref 0–0.3)
ABSOLUTE LYMPH #: 0.64 K/UL (ref 1.1–3.7)
ABSOLUTE MONO #: 0.24 K/UL (ref 0.1–1.2)
ALLEN TEST: ABNORMAL
ANION GAP SERPL CALCULATED.3IONS-SCNC: 9 MMOL/L (ref 9–17)
BASOPHILS # BLD: 1 % (ref 0–2)
BASOPHILS ABSOLUTE: <0.03 K/UL (ref 0–0.2)
BUN BLDV-MCNC: 16 MG/DL (ref 8–23)
BUN/CREAT BLD: 15 (ref 9–20)
CALCIUM SERPL-MCNC: 9.8 MG/DL (ref 8.6–10.4)
CARBOXYHEMOGLOBIN: 0.8 % (ref 0–5)
CHLORIDE BLD-SCNC: 102 MMOL/L (ref 98–107)
CO2: 31 MMOL/L (ref 20–31)
CREAT SERPL-MCNC: 1.1 MG/DL (ref 0.7–1.2)
D-DIMER QUANTITATIVE: 2.61 MG/L FEU (ref 0–0.59)
EKG ATRIAL RATE: 58 BPM
EKG P AXIS: 95 DEGREES
EKG P-R INTERVAL: 158 MS
EKG Q-T INTERVAL: 524 MS
EKG QRS DURATION: 82 MS
EKG QTC CALCULATION (BAZETT): 514 MS
EKG R AXIS: 75 DEGREES
EKG T AXIS: 57 DEGREES
EKG VENTRICULAR RATE: 58 BPM
EOSINOPHILS RELATIVE PERCENT: 2 % (ref 1–4)
FIO2: 28
GFR AFRICAN AMERICAN: >60 ML/MIN
GFR NON-AFRICAN AMERICAN: >60 ML/MIN
GFR SERPL CREATININE-BSD FRML MDRD: ABNORMAL ML/MIN/{1.73_M2}
GFR SERPL CREATININE-BSD FRML MDRD: ABNORMAL ML/MIN/{1.73_M2}
GLUCOSE BLD-MCNC: 104 MG/DL (ref 70–99)
HCO3 ARTERIAL: 28.8 MMOL/L (ref 22–26)
HCT VFR BLD CALC: 40.1 % (ref 40.7–50.3)
HEMOGLOBIN: 12.7 G/DL (ref 13–17)
IMMATURE GRANULOCYTES: 0 %
INR BLD: 1.4
LYMPHOCYTES # BLD: 21 % (ref 24–43)
MAGNESIUM: 1.5 MG/DL (ref 1.6–2.6)
MAGNESIUM: 1.5 MG/DL (ref 1.6–2.6)
MCH RBC QN AUTO: 33.2 PG (ref 25.2–33.5)
MCHC RBC AUTO-ENTMCNC: 31.7 G/DL (ref 28.4–34.8)
MCV RBC AUTO: 104.7 FL (ref 82.6–102.9)
METHEMOGLOBIN: 0.5 % (ref 0–1.9)
MONOCYTES # BLD: 8 % (ref 3–12)
NRBC AUTOMATED: 0 PER 100 WBC
O2 DEVICE/FLOW/%: ABNORMAL
O2 SAT, ARTERIAL: 89.2 % (ref 95–98)
PATIENT TEMP: 37
PCO2 ARTERIAL: 42.3 MMHG (ref 35–45)
PDW BLD-RTO: 15.7 % (ref 11.8–14.4)
PH ARTERIAL: 7.45 (ref 7.35–7.45)
PLATELET # BLD: ABNORMAL K/UL (ref 138–453)
PLATELET, FLUORESCENCE: 105 K/UL (ref 138–453)
PLATELET, IMMATURE FRACTION: 6.9 % (ref 1.1–10.3)
PO2 ARTERIAL: 58.4 MMHG (ref 80–100)
POSITIVE BASE EXCESS, ART: 4.4 MMOL/L (ref 0–2)
POTASSIUM SERPL-SCNC: 3.6 MMOL/L (ref 3.7–5.3)
PRO-BNP: 6541 PG/ML
PROTHROMBIN TIME: 16.7 SEC (ref 11.5–14.2)
PT. POSITION: ABNORMAL
RBC # BLD: 3.83 M/UL (ref 4.21–5.77)
SAMPLE SITE: ABNORMAL
SEG NEUTROPHILS: 69 % (ref 36–65)
SEGMENTED NEUTROPHILS ABSOLUTE COUNT: 2.14 K/UL (ref 1.5–8.1)
SODIUM BLD-SCNC: 142 MMOL/L (ref 135–144)
TROPONIN, HIGH SENSITIVITY: 26 NG/L (ref 0–22)
WBC # BLD: 3.1 K/UL (ref 3.5–11.3)

## 2022-08-03 PROCEDURE — 2700000000 HC OXYGEN THERAPY PER DAY

## 2022-08-03 PROCEDURE — 83735 ASSAY OF MAGNESIUM: CPT

## 2022-08-03 PROCEDURE — 36600 WITHDRAWAL OF ARTERIAL BLOOD: CPT

## 2022-08-03 PROCEDURE — 85025 COMPLETE CBC W/AUTO DIFF WBC: CPT

## 2022-08-03 PROCEDURE — 6370000000 HC RX 637 (ALT 250 FOR IP): Performed by: STUDENT IN AN ORGANIZED HEALTH CARE EDUCATION/TRAINING PROGRAM

## 2022-08-03 PROCEDURE — 83880 ASSAY OF NATRIURETIC PEPTIDE: CPT

## 2022-08-03 PROCEDURE — 93010 ELECTROCARDIOGRAM REPORT: CPT | Performed by: INTERNAL MEDICINE

## 2022-08-03 PROCEDURE — 71045 X-RAY EXAM CHEST 1 VIEW: CPT

## 2022-08-03 PROCEDURE — 82805 BLOOD GASES W/O2 SATURATION: CPT

## 2022-08-03 PROCEDURE — 36415 COLL VENOUS BLD VENIPUNCTURE: CPT

## 2022-08-03 PROCEDURE — 85055 RETICULATED PLATELET ASSAY: CPT

## 2022-08-03 PROCEDURE — 6360000002 HC RX W HCPCS: Performed by: EMERGENCY MEDICINE

## 2022-08-03 PROCEDURE — 85610 PROTHROMBIN TIME: CPT

## 2022-08-03 PROCEDURE — 2580000003 HC RX 258: Performed by: STUDENT IN AN ORGANIZED HEALTH CARE EDUCATION/TRAINING PROGRAM

## 2022-08-03 PROCEDURE — 80048 BASIC METABOLIC PNL TOTAL CA: CPT

## 2022-08-03 PROCEDURE — 93005 ELECTROCARDIOGRAM TRACING: CPT | Performed by: EMERGENCY MEDICINE

## 2022-08-03 PROCEDURE — 99285 EMERGENCY DEPT VISIT HI MDM: CPT

## 2022-08-03 PROCEDURE — 1200000000 HC SEMI PRIVATE

## 2022-08-03 PROCEDURE — 96374 THER/PROPH/DIAG INJ IV PUSH: CPT

## 2022-08-03 PROCEDURE — 84484 ASSAY OF TROPONIN QUANT: CPT

## 2022-08-03 PROCEDURE — 85379 FIBRIN DEGRADATION QUANT: CPT

## 2022-08-03 PROCEDURE — 94761 N-INVAS EAR/PLS OXIMETRY MLT: CPT

## 2022-08-03 RX ORDER — SWAB
2 SWAB, NON-MEDICATED MISCELLANEOUS DAILY
Status: DISCONTINUED | OUTPATIENT
Start: 2022-08-03 | End: 2022-08-03 | Stop reason: RX

## 2022-08-03 RX ORDER — FOLIC ACID 1 MG/1
1 TABLET ORAL DAILY
Status: DISCONTINUED | OUTPATIENT
Start: 2022-08-03 | End: 2022-08-13 | Stop reason: HOSPADM

## 2022-08-03 RX ORDER — FUROSEMIDE 10 MG/ML
40 INJECTION INTRAMUSCULAR; INTRAVENOUS 2 TIMES DAILY
Status: DISCONTINUED | OUTPATIENT
Start: 2022-08-04 | End: 2022-08-05

## 2022-08-03 RX ORDER — FLUDROCORTISONE ACETATE 0.1 MG/1
0.3 TABLET ORAL DAILY
Status: DISCONTINUED | OUTPATIENT
Start: 2022-08-03 | End: 2022-08-09

## 2022-08-03 RX ORDER — CLOPIDOGREL BISULFATE 75 MG/1
75 TABLET ORAL DAILY
Status: DISCONTINUED | OUTPATIENT
Start: 2022-08-03 | End: 2022-08-13 | Stop reason: HOSPADM

## 2022-08-03 RX ORDER — ENOXAPARIN SODIUM 100 MG/ML
40 INJECTION SUBCUTANEOUS DAILY
Status: DISCONTINUED | OUTPATIENT
Start: 2022-08-03 | End: 2022-08-03

## 2022-08-03 RX ORDER — ACETAMINOPHEN 325 MG/1
650 TABLET ORAL EVERY 6 HOURS PRN
Status: DISCONTINUED | OUTPATIENT
Start: 2022-08-03 | End: 2022-08-13 | Stop reason: HOSPADM

## 2022-08-03 RX ORDER — METOPROLOL SUCCINATE 25 MG/1
25 TABLET, EXTENDED RELEASE ORAL DAILY
Status: DISCONTINUED | OUTPATIENT
Start: 2022-08-03 | End: 2022-08-13 | Stop reason: HOSPADM

## 2022-08-03 RX ORDER — ROPINIROLE 0.25 MG/1
0.5 TABLET, FILM COATED ORAL NIGHTLY
Status: DISCONTINUED | OUTPATIENT
Start: 2022-08-03 | End: 2022-08-13 | Stop reason: HOSPADM

## 2022-08-03 RX ORDER — POLYETHYLENE GLYCOL 3350 17 G/17G
17 POWDER, FOR SOLUTION ORAL DAILY PRN
Status: DISCONTINUED | OUTPATIENT
Start: 2022-08-03 | End: 2022-08-13 | Stop reason: HOSPADM

## 2022-08-03 RX ORDER — CHOLECALCIFEROL (VITAMIN D3) 125 MCG
10 CAPSULE ORAL NIGHTLY
Status: DISCONTINUED | OUTPATIENT
Start: 2022-08-03 | End: 2022-08-13 | Stop reason: HOSPADM

## 2022-08-03 RX ORDER — MAGNESIUM SULFATE IN WATER 40 MG/ML
2000 INJECTION, SOLUTION INTRAVENOUS PRN
Status: DISCONTINUED | OUTPATIENT
Start: 2022-08-03 | End: 2022-08-13 | Stop reason: HOSPADM

## 2022-08-03 RX ORDER — ONDANSETRON 2 MG/ML
4 INJECTION INTRAMUSCULAR; INTRAVENOUS EVERY 6 HOURS PRN
Status: DISCONTINUED | OUTPATIENT
Start: 2022-08-03 | End: 2022-08-13 | Stop reason: HOSPADM

## 2022-08-03 RX ORDER — SODIUM CHLORIDE 0.9 % (FLUSH) 0.9 %
10 SYRINGE (ML) INJECTION PRN
Status: DISCONTINUED | OUTPATIENT
Start: 2022-08-03 | End: 2022-08-13 | Stop reason: HOSPADM

## 2022-08-03 RX ORDER — FUROSEMIDE 10 MG/ML
40 INJECTION INTRAMUSCULAR; INTRAVENOUS ONCE
Status: COMPLETED | OUTPATIENT
Start: 2022-08-03 | End: 2022-08-03

## 2022-08-03 RX ORDER — POTASSIUM CHLORIDE 7.45 MG/ML
10 INJECTION INTRAVENOUS PRN
Status: DISCONTINUED | OUTPATIENT
Start: 2022-08-03 | End: 2022-08-08

## 2022-08-03 RX ORDER — ACETAMINOPHEN 650 MG/1
650 SUPPOSITORY RECTAL EVERY 6 HOURS PRN
Status: DISCONTINUED | OUTPATIENT
Start: 2022-08-03 | End: 2022-08-13 | Stop reason: HOSPADM

## 2022-08-03 RX ORDER — LANOLIN ALCOHOL/MO/W.PET/CERES
600 CREAM (GRAM) TOPICAL 2 TIMES DAILY
Status: DISCONTINUED | OUTPATIENT
Start: 2022-08-03 | End: 2022-08-13 | Stop reason: HOSPADM

## 2022-08-03 RX ORDER — SODIUM CHLORIDE 0.9 % (FLUSH) 0.9 %
10 SYRINGE (ML) INJECTION EVERY 12 HOURS SCHEDULED
Status: DISCONTINUED | OUTPATIENT
Start: 2022-08-03 | End: 2022-08-13 | Stop reason: HOSPADM

## 2022-08-03 RX ORDER — SODIUM CHLORIDE 9 MG/ML
INJECTION, SOLUTION INTRAVENOUS PRN
Status: DISCONTINUED | OUTPATIENT
Start: 2022-08-03 | End: 2022-08-13 | Stop reason: HOSPADM

## 2022-08-03 RX ORDER — ATORVASTATIN CALCIUM 40 MG/1
80 TABLET, FILM COATED ORAL DAILY
Status: DISCONTINUED | OUTPATIENT
Start: 2022-08-03 | End: 2022-08-13 | Stop reason: HOSPADM

## 2022-08-03 RX ORDER — ESCITALOPRAM OXALATE 5 MG/1
20 TABLET ORAL NIGHTLY
Status: DISCONTINUED | OUTPATIENT
Start: 2022-08-03 | End: 2022-08-13 | Stop reason: HOSPADM

## 2022-08-03 RX ORDER — POTASSIUM CHLORIDE 20 MEQ/1
40 TABLET, EXTENDED RELEASE ORAL PRN
Status: DISCONTINUED | OUTPATIENT
Start: 2022-08-03 | End: 2022-08-08

## 2022-08-03 RX ORDER — BUMETANIDE 1 MG/1
1 TABLET ORAL DAILY
Status: DISCONTINUED | OUTPATIENT
Start: 2022-08-03 | End: 2022-08-09

## 2022-08-03 RX ORDER — ERGOCALCIFEROL 1.25 MG/1
50000 CAPSULE ORAL WEEKLY
Status: DISCONTINUED | OUTPATIENT
Start: 2022-08-03 | End: 2022-08-13 | Stop reason: HOSPADM

## 2022-08-03 RX ORDER — PANTOPRAZOLE SODIUM 40 MG/1
40 TABLET, DELAYED RELEASE ORAL
Status: DISCONTINUED | OUTPATIENT
Start: 2022-08-04 | End: 2022-08-13 | Stop reason: HOSPADM

## 2022-08-03 RX ORDER — ONDANSETRON 4 MG/1
4 TABLET, ORALLY DISINTEGRATING ORAL EVERY 8 HOURS PRN
Status: DISCONTINUED | OUTPATIENT
Start: 2022-08-03 | End: 2022-08-13 | Stop reason: HOSPADM

## 2022-08-03 RX ORDER — AMIODARONE HYDROCHLORIDE 200 MG/1
200 TABLET ORAL 2 TIMES DAILY
Status: DISCONTINUED | OUTPATIENT
Start: 2022-08-03 | End: 2022-08-09

## 2022-08-03 RX ORDER — MAGNESIUM SULFATE 1 G/100ML
1000 INJECTION INTRAVENOUS PRN
Status: DISCONTINUED | OUTPATIENT
Start: 2022-08-03 | End: 2022-08-13 | Stop reason: HOSPADM

## 2022-08-03 RX ORDER — GAUZE BANDAGE 2" X 2"
100 BANDAGE TOPICAL DAILY
Status: DISCONTINUED | OUTPATIENT
Start: 2022-08-03 | End: 2022-08-13 | Stop reason: HOSPADM

## 2022-08-03 RX ORDER — POTASSIUM CHLORIDE 20 MEQ/1
20 TABLET, EXTENDED RELEASE ORAL 2 TIMES DAILY WITH MEALS
Status: DISCONTINUED | OUTPATIENT
Start: 2022-08-03 | End: 2022-08-08

## 2022-08-03 RX ORDER — MIDODRINE HYDROCHLORIDE 5 MG/1
10 TABLET ORAL
Status: DISCONTINUED | OUTPATIENT
Start: 2022-08-04 | End: 2022-08-13 | Stop reason: HOSPADM

## 2022-08-03 RX ORDER — FAMOTIDINE 20 MG/1
20 TABLET, FILM COATED ORAL 2 TIMES DAILY
Status: DISCONTINUED | OUTPATIENT
Start: 2022-08-03 | End: 2022-08-10

## 2022-08-03 RX ORDER — TAMSULOSIN HYDROCHLORIDE 0.4 MG/1
0.4 CAPSULE ORAL DAILY
Status: DISCONTINUED | OUTPATIENT
Start: 2022-08-03 | End: 2022-08-13 | Stop reason: HOSPADM

## 2022-08-03 RX ORDER — MULTIVITAMIN WITH IRON
1 TABLET ORAL DAILY
Status: DISCONTINUED | OUTPATIENT
Start: 2022-08-03 | End: 2022-08-13 | Stop reason: HOSPADM

## 2022-08-03 RX ADMIN — MULTIVITAMIN TABLET 1 TABLET: TABLET at 21:12

## 2022-08-03 RX ADMIN — CLOPIDOGREL BISULFATE 75 MG: 75 TABLET ORAL at 21:13

## 2022-08-03 RX ADMIN — FUROSEMIDE 40 MG: 10 INJECTION, SOLUTION INTRAMUSCULAR; INTRAVENOUS at 12:57

## 2022-08-03 RX ADMIN — ERGOCALCIFEROL 50000 UNITS: 1.25 CAPSULE ORAL at 21:13

## 2022-08-03 RX ADMIN — APIXABAN 5 MG: 5 TABLET, FILM COATED ORAL at 20:58

## 2022-08-03 RX ADMIN — POTASSIUM CHLORIDE 20 MEQ: 1500 TABLET, EXTENDED RELEASE ORAL at 21:12

## 2022-08-03 RX ADMIN — FAMOTIDINE 20 MG: 20 TABLET ORAL at 20:58

## 2022-08-03 RX ADMIN — Medication 10 MG: at 20:58

## 2022-08-03 RX ADMIN — AMIODARONE HYDROCHLORIDE 200 MG: 200 TABLET ORAL at 20:58

## 2022-08-03 RX ADMIN — ROPINIROLE HYDROCHLORIDE 0.5 MG: 0.25 TABLET, FILM COATED ORAL at 20:58

## 2022-08-03 RX ADMIN — ESCITALOPRAM 20 MG: 5 TABLET, FILM COATED ORAL at 20:59

## 2022-08-03 RX ADMIN — BUSPIRONE HYDROCHLORIDE 15 MG: 5 TABLET ORAL at 20:58

## 2022-08-03 RX ADMIN — ATORVASTATIN CALCIUM 80 MG: 40 TABLET, FILM COATED ORAL at 21:13

## 2022-08-03 RX ADMIN — THIAMINE HCL TAB 100 MG 100 MG: 100 TAB at 21:13

## 2022-08-03 RX ADMIN — Medication 600 MG: at 20:58

## 2022-08-03 RX ADMIN — FOLIC ACID 1 MG: 1 TABLET ORAL at 21:13

## 2022-08-03 RX ADMIN — TAMSULOSIN HYDROCHLORIDE 0.4 MG: 0.4 CAPSULE ORAL at 21:13

## 2022-08-03 RX ADMIN — SACUBITRIL AND VALSARTAN 0.5 TABLET: 24; 26 TABLET, FILM COATED ORAL at 21:13

## 2022-08-03 RX ADMIN — FLUDROCORTISONE ACETATE 0.3 MG: 0.1 TABLET ORAL at 21:13

## 2022-08-03 RX ADMIN — METOPROLOL SUCCINATE 25 MG: 25 TABLET, EXTENDED RELEASE ORAL at 21:10

## 2022-08-03 RX ADMIN — SODIUM CHLORIDE, PRESERVATIVE FREE 10 ML: 5 INJECTION INTRAVENOUS at 20:59

## 2022-08-03 ASSESSMENT — ENCOUNTER SYMPTOMS
BACK PAIN: 0
COUGH: 0
SORE THROAT: 0
CHEST TIGHTNESS: 0
ABDOMINAL DISTENTION: 0
SHORTNESS OF BREATH: 1

## 2022-08-03 ASSESSMENT — PAIN SCALES - GENERAL: PAINLEVEL_OUTOF10: 2

## 2022-08-03 ASSESSMENT — PAIN DESCRIPTION - ONSET: ONSET: GRADUAL

## 2022-08-03 ASSESSMENT — PAIN - FUNCTIONAL ASSESSMENT: PAIN_FUNCTIONAL_ASSESSMENT: NONE - DENIES PAIN

## 2022-08-03 ASSESSMENT — PAIN DESCRIPTION - DESCRIPTORS: DESCRIPTORS: ACHING

## 2022-08-03 ASSESSMENT — PAIN DESCRIPTION - FREQUENCY: FREQUENCY: INTERMITTENT

## 2022-08-03 ASSESSMENT — PAIN DESCRIPTION - ORIENTATION: ORIENTATION: LEFT

## 2022-08-03 ASSESSMENT — PAIN DESCRIPTION - LOCATION: LOCATION: RIB CAGE

## 2022-08-03 NOTE — ED PROVIDER NOTES
[facility] ED  EMERGENCY DEPARTMENT ENCOUNTER      Pt Name: Ludivina Burgess  MRN: [id number]  [place] 1950  Date of evaluation: 8/3/2022  Provider: Jeffrey Green, [facility]       Chief Complaint   Patient presents with    Respiratory Distress     Low SpO2    Loss of Consciousness     Blacked out at home walking around           HISTORY OF PRESENT ILLNESS   (Location/Symptom, Timing/Onset, Context/Setting, Quality, Duration, Modifying Factors, Severity)  Note limiting factors. Ludivina Burgess is a 70 y.o. male who presents to the emergency department with complaints of low oxygen levels today. Patient's friend who accompanies him tells me that the patient fell last week and has had some left-sided rib pain since then which the patient currently denies. Yesterday they came back from the grocery store and the patient sat down and pretty much \" blacked out. \"  The friend states that today his home health nurse came and checked him out and his pulse ox was in the 70s so she contacted his cardiologist who suggested that the patient come to the emergency department. Patient currently has a LifeVest on as he was not a good candidate for a defibrillator placement. He denies it shocking him in recently. Patient upon arrival to the emergency department was 83% on room air and is currently on 2 L. Normally does not use oxygen. Patient states that he quit smoking about 2 months ago and was a heavy smoker approximately 1.5 packs/day. His friend had to put his own oxygen on him yesterday. Patient currently denies any shortness of breath or chest pain. He denies any worsening of swelling in his legs. Patient is on diuretics as well. He denies any fever, chills or cough recently. He denies any other complaint at this time. His friend states that the patient has been \"blacking out\" at home several times in the last few days. Patient however denies that.   Patient had echocardiogram done last month which showed a EF of 20 to 25% with severe systolic dysfunction. Patient has a history of ischemic cardiomyopathy, paroxysmal A. fib, congestive heart failure and COPD along with chronic kidney disease. REVIEW OF SYSTEMS    (2-9 systems for level 4, 10 or more for level 5)     Review of Systems   Constitutional:  Negative for fatigue and fever. HENT:  Negative for congestion and sore throat. Eyes:  Negative for visual disturbance. Respiratory:  Positive for shortness of breath. Negative for cough and chest tightness. Cardiovascular:  Negative for chest pain and palpitations. Gastrointestinal:  Negative for abdominal distention. Genitourinary:  Negative for dysuria. Musculoskeletal:  Negative for back pain. Skin:  Negative for rash. Neurological:  Positive for syncope. Psychiatric/Behavioral:  Negative for suicidal ideas. Except as noted above the remainder of the review of systems was reviewed and negative.        PAST MEDICAL HISTORY     Past Medical History:   Diagnosis Date    Acute kidney injury ([facility]     Ascites 5/20/2022    Atrial fibrillation (HCC)     CHF (congestive heart failure) ([facility]     Cirrhosis of liver with ascites ([facility] 5/19/2022    CKD (chronic kidney disease) 5/20/2022    Coronary artery disease     Hyperlipidemia     Hypertension     Hypomagnesemia 5/20/2022    Hyponatremia     Thrombocytopenia (HCC)          SURGICAL HISTORY       Past Surgical History:   Procedure Laterality Date    CAROTID STENT PLACEMENT Bilateral     HERNIA REPAIR      ROTATOR CUFF REPAIR Right     TONSILLECTOMY AND ADENOIDECTOMY           CURRENT MEDICATIONS       Previous Medications    AMIODARONE (CORDARONE) 200 MG TABLET    Take 200 mg by mouth 2 times daily    APIXABAN (ELIQUIS) 5 MG TABS TABLET    Take 5 mg by mouth 2 times daily    ATORVASTATIN (LIPITOR) 80 MG TABLET    Take 80 mg by mouth daily    BUMETANIDE (BUMEX) 1 MG TABLET    Take 1 tablet by mouth daily    BUSPIRONE (BUSPAR) 10 MG TABLET    Take 15 mg by mouth 2 times daily     CLOPIDOGREL (PLAVIX) 75 MG TABLET    Take 75 mg by mouth daily    ESCITALOPRAM (LEXAPRO) 20 MG TABLET    Take 20 mg by mouth nightly    FLUDROCORTISONE (FLORINEF) 0.1 MG TABLET    Take 3 tablets by mouth in the morning. FOLIC ACID (FOLVITE) 1 MG TABLET    Take 1 tablet by mouth daily    MAGNESIUM OXIDE (MAGOX 400) 400 (240 MG) MG TABLET    Take 1.5 tablets by mouth 2 times daily    MELATONIN 5 MG TABS TABLET    Take 2 tablets by mouth nightly    METOPROLOL SUCCINATE (TOPROL XL) 25 MG EXTENDED RELEASE TABLET    Take 1 tablet by mouth daily    MIDODRINE (PROAMATINE) 10 MG TABLET    Take 1 tablet by mouth in the morning and 1 tablet at noon and 1 tablet in the evening. Take with meals. MULTIPLE VITAMIN (MULTIVITAMIN) TABS TABLET    Take 1 tablet by mouth in the morning. OMEGA-3 FATTY ACIDS (FISH OIL CONCENTRATE) 1000 MG CAPS    Take 2 caplets by mouth daily    PANTOPRAZOLE (PROTONIX) 40 MG TABLET    Take 1 tablet by mouth every morning (before breakfast)    POTASSIUM CHLORIDE (KLOR-CON M) 20 MEQ EXTENDED RELEASE TABLET    Take 1 tablet by mouth in the morning and 1 tablet in the evening. Take with meals. ROPINIROLE (REQUIP) 0.5 MG TABLET    Take 0.5 mg by mouth nightly    SACUBITRIL-VALSARTAN (ENTRESTO) 24-26 MG PER TABLET    Take 0.5 tablets by mouth 2 times daily Lot #: [id number]  Exp: July 2024    TAMSULOSIN (FLOMAX) 0.4 MG CAPSULE    Take 1 capsule by mouth in the morning. VITAMIN B-1 (THIAMINE) 100 MG TABLET    Take 1 tablet by mouth in the morning. VITAMIN D (ERGOCALCIFEROL) 1.25 MG (28543 UT) CAPS CAPSULE    Take 1 capsule by mouth once a week       ALLERGIES     Patient has no known allergies.     FAMILY HISTORY       Family History   Problem Relation Age of Onset    Hypertension Father     Heart Disease Father     Heart Disease Mother     Hypertension Mother           SOCIAL HISTORY       Social History     Socioeconomic History Marital status:    Tobacco Use    Smoking status: Former     Packs/day: 1.00     Years: 56.00     Pack years: 56.00     Types: Cigarettes     Quit date: 2022     Years since quittin.0    Smokeless tobacco: Never   Vaping Use    Vaping Use: Never used   Substance and Sexual Activity    Alcohol use: Not Currently     Alcohol/week: 2.0 standard drinks     Types: 2 Shots of liquor per week     Comment: Drinks about 2 Kesslers with mix nightly. Reported history of heavy alcohol use for years with both beer and liquior several years ago    Drug use: Not Currently       SCREENINGS        Wendy Coma Scale  Eye Opening: Spontaneous  Best Verbal Response: Oriented  Best Motor Response: Obeys commands  Wendy Coma Scale Score: 15               PHYSICAL EXAM    (up to 7 for level 4, 8 or more for level 5)     ED Triage Vitals [22 1113]   BP Temp Temp src Heart Rate Resp SpO2 Height Weight   (!) 171/91 -- -- 64 18 (!) 83 % -- --       Physical Exam  Constitutional:       General: He is not in acute distress. Appearance: Normal appearance. He is not toxic-appearing. HENT:      Head: Normocephalic and atraumatic. Mouth/Throat:      Mouth: Mucous membranes are moist.   Eyes:      Extraocular Movements: Extraocular movements intact. Pupils: Pupils are equal, round, and reactive to light. Cardiovascular:      Rate and Rhythm: Normal rate and regular rhythm. Pulses: Normal pulses. Heart sounds: Normal heart sounds. Pulmonary:      Effort: Pulmonary effort is normal.      Breath sounds: Examination of the right-upper field reveals decreased breath sounds. Examination of the left-upper field reveals decreased breath sounds. Examination of the right-middle field reveals decreased breath sounds. Examination of the left-middle field reveals decreased breath sounds. Examination of the right-lower field reveals decreased breath sounds.  Examination of the left-lower field reveals decreased breath sounds. Decreased breath sounds present. No wheezing. Abdominal:      General: Abdomen is flat. Bowel sounds are normal.      Palpations: Abdomen is soft. Musculoskeletal:         General: Normal range of motion. Skin:     General: Skin is warm and dry. Capillary Refill: Capillary refill takes less than 2 seconds. Neurological:      General: No focal deficit present. Mental Status: He is alert and oriented to person, place, and time. Psychiatric:         Mood and Affect: Mood normal.       DIAGNOSTIC RESULTS     EKG: All EKG's are interpreted by the Emergency Department Physician who either signs or Co-signs this chart in the absence of a cardiologist.    Sinus bradycardia with a heart of 58. No acute ST-T wave changes. RADIOLOGY:   Non-plain film images such as CT, Ultrasound and MRI are read by the radiologist. Plain radiographic images are visualized and preliminarily interpreted by the emergency physician with the below findings:      Interpretation per the Radiologist below, if available at the time of this note:    XR CHEST PORTABLE   Final Result   Overall findings most consistent with decompensated CHF with mild pulmonary   edema. Probable bibasilar subsegmental atelectasis and pleural effusions. However,   underlying infiltrates not excluded.                LABS:  Labs Reviewed   CBC WITH AUTO DIFFERENTIAL - Abnormal; Notable for the following components:       Result Value    WBC 3.1 (*)     RBC 3.83 (*)     Hemoglobin 12.7 (*)     Hematocrit 40.1 (*)     .7 (*)     RDW 15.7 (*)     Seg Neutrophils 69 (*)     Lymphocytes 21 (*)     Absolute Lymph # 0.64 (*)     All other components within normal limits   BASIC METABOLIC PANEL - Abnormal; Notable for the following components:    Glucose 104 (*)     Potassium 3.6 (*)     All other components within normal limits   MAGNESIUM - Abnormal; Notable for the following components:    Magnesium 1.5 (*)     All other components within normal limits   PROTIME-INR - Abnormal; Notable for the following components:    Protime 16.7 (*)     All other components within normal limits   TROPONIN - Abnormal; Notable for the following components:    Troponin, High Sensitivity 26 (*)     All other components within normal limits   BLOOD GAS, ARTERIAL - Abnormal; Notable for the following components:    pH, Arterial 7.451 (*)     pO2, Arterial 58.4 (*)     HCO3, Arterial 28.8 (*)     Positive Base Excess, Art 4.4 (*)     O2 Sat, Arterial 89.2 (*)     All other components within normal limits   D-DIMER, QUANTITATIVE - Abnormal; Notable for the following components:    D-Dimer, Quant 2.61 (*)     All other components within normal limits   IMMATURE PLATELET FRACTION - Abnormal; Notable for the following components:    Platelet, Fluorescence 105 (*)     All other components within normal limits   BRAIN NATRIURETIC PEPTIDE       All other labs were within normal range or not returned as of this dictation. EMERGENCY DEPARTMENT COURSE and DIFFERENTIAL DIAGNOSIS/MDM:   Vitals:    Vitals:    08/03/22 1113   BP: (!) 171/91   Pulse: 64   Resp: 18   SpO2: (!) 83%         REASSESSMENT     ED Course as of 08/03/22 1341   Wed Aug 03, 2022   1327 With Dr. Tova Edwards, Hospitalist, who will admit the patient. [JI]      ED Course User Index  [JI] Tamara Spaulding DO     Discussed results with the patient. He is feeling a lot better after the Lasix was started. Patient continues to be in 2 L of oxygen. We will admit her to the hospital for further diuresis. His sister is now in the room and states that the patient has been in the hospital 4 times since February for CHF exacerbation. Patient states that he has been compliant with his medications. FINAL IMPRESSION      1. Acute respiratory failure with hypoxia ([address]    2.  Acute on chronic combined systolic and diastolic CHF (congestive heart failure) ([facility]          DISPOSITION/PLAN   DISPOSITION Decision To Admit 08/03/2022 01:28:45 PM      (Please note that portions of this note were completed with a voice recognition program.  Efforts were made to edit the dictations but occasionally words are mis-transcribed.)    Nithya Herman DO (electronically signed)  Attending Emergency Physician            Nithya Herman DO  08/03/22 1203

## 2022-08-03 NOTE — TELEPHONE ENCOUNTER
HOME HEALTH NURSE CAME IN TODAY STATING PATIENT 02 STAT WAS 72. PATIENT WAS PLACED ON OXYGEN 2 LT AND O2 WENT UP TO 96. KALE THEN SPOKE WITH DR. AGUDELO FOR FURTHER INSTRUCTION TO SEND PATIENT TO THE ED.

## 2022-08-03 NOTE — PROGRESS NOTES
Pt admitted from ER for CHF. Pt is A&Ox4, calm and cooperative. 2LNC administered, pt does not wear O2 at home. Pt reports SOB at rest and with exertion. Admission assmt and VS completed, as charted, see flow sheet. Pt resting in bed, call light within reach. Pt oriented to room, call light system, bathroom, and meal times. Will continue to monitor.

## 2022-08-04 ENCOUNTER — APPOINTMENT (OUTPATIENT)
Dept: GENERAL RADIOLOGY | Age: 72
DRG: 291 | End: 2022-08-04
Payer: MEDICARE

## 2022-08-04 PROBLEM — Z79.01 ENCOUNTER FOR CURRENT LONG-TERM USE OF ANTICOAGULANTS: Status: ACTIVE | Noted: 2022-08-04

## 2022-08-04 PROBLEM — J96.01 ACUTE RESPIRATORY FAILURE WITH HYPOXIA (HCC): Status: ACTIVE | Noted: 2022-08-04

## 2022-08-04 PROBLEM — I42.8 NONISCHEMIC CARDIOMYOPATHY (HCC): Status: ACTIVE | Noted: 2022-08-04

## 2022-08-04 LAB
ABSOLUTE EOS #: 0 K/UL (ref 0–0.44)
ABSOLUTE IMMATURE GRANULOCYTE: 0 K/UL (ref 0–0.3)
ABSOLUTE LYMPH #: 0.42 K/UL (ref 1.1–3.7)
ABSOLUTE MONO #: 0.24 K/UL (ref 0.1–1.2)
ALBUMIN SERPL-MCNC: 3.8 G/DL (ref 3.5–5.2)
ALBUMIN/GLOBULIN RATIO: 1.5 (ref 1–2.5)
ALP BLD-CCNC: 103 U/L (ref 40–129)
ALT SERPL-CCNC: 23 U/L (ref 5–41)
ANION GAP SERPL CALCULATED.3IONS-SCNC: 10 MMOL/L (ref 9–17)
AST SERPL-CCNC: 26 U/L
BASOPHILS # BLD: 1 % (ref 0–2)
BASOPHILS ABSOLUTE: 0.03 K/UL (ref 0–0.2)
BILIRUB SERPL-MCNC: 1.03 MG/DL (ref 0.3–1.2)
BUN BLDV-MCNC: 14 MG/DL (ref 8–23)
BUN/CREAT BLD: 15 (ref 9–20)
CALCIUM SERPL-MCNC: 9.5 MG/DL (ref 8.6–10.4)
CHLORIDE BLD-SCNC: 98 MMOL/L (ref 98–107)
CO2: 34 MMOL/L (ref 20–31)
CREAT SERPL-MCNC: 0.96 MG/DL (ref 0.7–1.2)
EKG ATRIAL RATE: 52 BPM
EKG P AXIS: 64 DEGREES
EKG P-R INTERVAL: 140 MS
EKG Q-T INTERVAL: 622 MS
EKG QRS DURATION: 74 MS
EKG QTC CALCULATION (BAZETT): 578 MS
EKG R AXIS: 47 DEGREES
EKG T AXIS: 114 DEGREES
EKG VENTRICULAR RATE: 52 BPM
EOSINOPHILS RELATIVE PERCENT: 0 % (ref 1–4)
GFR AFRICAN AMERICAN: >60 ML/MIN
GFR NON-AFRICAN AMERICAN: >60 ML/MIN
GFR SERPL CREATININE-BSD FRML MDRD: ABNORMAL ML/MIN/{1.73_M2}
GFR SERPL CREATININE-BSD FRML MDRD: ABNORMAL ML/MIN/{1.73_M2}
GLUCOSE BLD-MCNC: 107 MG/DL (ref 70–99)
HCT VFR BLD CALC: 39.5 % (ref 40.7–50.3)
HEMOGLOBIN: 12.8 G/DL (ref 13–17)
IMMATURE GRANULOCYTES: 0 %
LYMPHOCYTES # BLD: 14 % (ref 24–43)
MAGNESIUM: 1.5 MG/DL (ref 1.6–2.6)
MCH RBC QN AUTO: 33.3 PG (ref 25.2–33.5)
MCHC RBC AUTO-ENTMCNC: 32.4 G/DL (ref 28.4–34.8)
MCV RBC AUTO: 102.9 FL (ref 82.6–102.9)
MONOCYTES # BLD: 8 % (ref 3–12)
MORPHOLOGY: ABNORMAL
NRBC AUTOMATED: 0 PER 100 WBC
PDW BLD-RTO: 15.3 % (ref 11.8–14.4)
PLATELET # BLD: ABNORMAL K/UL (ref 138–453)
PLATELET, FLUORESCENCE: 102 K/UL (ref 138–453)
PLATELET, IMMATURE FRACTION: 7.2 % (ref 1.1–10.3)
POTASSIUM SERPL-SCNC: 4 MMOL/L (ref 3.7–5.3)
RBC # BLD: 3.84 M/UL (ref 4.21–5.77)
SEG NEUTROPHILS: 77 % (ref 36–65)
SEGMENTED NEUTROPHILS ABSOLUTE COUNT: 2.31 K/UL (ref 1.5–8.1)
SODIUM BLD-SCNC: 142 MMOL/L (ref 135–144)
TOTAL PROTEIN: 6.3 G/DL (ref 6.4–8.3)
TROPONIN, HIGH SENSITIVITY: 27 NG/L (ref 0–22)
WBC # BLD: 3 K/UL (ref 3.5–11.3)

## 2022-08-04 PROCEDURE — 85025 COMPLETE CBC W/AUTO DIFF WBC: CPT

## 2022-08-04 PROCEDURE — 97162 PT EVAL MOD COMPLEX 30 MIN: CPT

## 2022-08-04 PROCEDURE — 94761 N-INVAS EAR/PLS OXIMETRY MLT: CPT

## 2022-08-04 PROCEDURE — 36415 COLL VENOUS BLD VENIPUNCTURE: CPT

## 2022-08-04 PROCEDURE — 71046 X-RAY EXAM CHEST 2 VIEWS: CPT

## 2022-08-04 PROCEDURE — 84484 ASSAY OF TROPONIN QUANT: CPT

## 2022-08-04 PROCEDURE — 1200000000 HC SEMI PRIVATE

## 2022-08-04 PROCEDURE — 97110 THERAPEUTIC EXERCISES: CPT

## 2022-08-04 PROCEDURE — 80053 COMPREHEN METABOLIC PANEL: CPT

## 2022-08-04 PROCEDURE — 93010 ELECTROCARDIOGRAM REPORT: CPT | Performed by: INTERNAL MEDICINE

## 2022-08-04 PROCEDURE — 6360000002 HC RX W HCPCS: Performed by: STUDENT IN AN ORGANIZED HEALTH CARE EDUCATION/TRAINING PROGRAM

## 2022-08-04 PROCEDURE — 83735 ASSAY OF MAGNESIUM: CPT

## 2022-08-04 PROCEDURE — 97166 OT EVAL MOD COMPLEX 45 MIN: CPT

## 2022-08-04 PROCEDURE — 97116 GAIT TRAINING THERAPY: CPT

## 2022-08-04 PROCEDURE — 6360000002 HC RX W HCPCS: Performed by: FAMILY MEDICINE

## 2022-08-04 PROCEDURE — 2700000000 HC OXYGEN THERAPY PER DAY

## 2022-08-04 PROCEDURE — 6370000000 HC RX 637 (ALT 250 FOR IP): Performed by: STUDENT IN AN ORGANIZED HEALTH CARE EDUCATION/TRAINING PROGRAM

## 2022-08-04 PROCEDURE — 2580000003 HC RX 258: Performed by: STUDENT IN AN ORGANIZED HEALTH CARE EDUCATION/TRAINING PROGRAM

## 2022-08-04 PROCEDURE — 99222 1ST HOSP IP/OBS MODERATE 55: CPT | Performed by: FAMILY MEDICINE

## 2022-08-04 PROCEDURE — 85055 RETICULATED PLATELET ASSAY: CPT

## 2022-08-04 PROCEDURE — 93005 ELECTROCARDIOGRAM TRACING: CPT | Performed by: STUDENT IN AN ORGANIZED HEALTH CARE EDUCATION/TRAINING PROGRAM

## 2022-08-04 RX ORDER — FUROSEMIDE 10 MG/ML
40 INJECTION INTRAMUSCULAR; INTRAVENOUS ONCE
Status: COMPLETED | OUTPATIENT
Start: 2022-08-04 | End: 2022-08-04

## 2022-08-04 RX ADMIN — METOPROLOL SUCCINATE 25 MG: 25 TABLET, EXTENDED RELEASE ORAL at 09:17

## 2022-08-04 RX ADMIN — ROPINIROLE HYDROCHLORIDE 0.5 MG: 0.25 TABLET, FILM COATED ORAL at 20:57

## 2022-08-04 RX ADMIN — SACUBITRIL AND VALSARTAN 0.5 TABLET: 24; 26 TABLET, FILM COATED ORAL at 09:16

## 2022-08-04 RX ADMIN — POTASSIUM CHLORIDE 20 MEQ: 1500 TABLET, EXTENDED RELEASE ORAL at 09:22

## 2022-08-04 RX ADMIN — BUSPIRONE HYDROCHLORIDE 15 MG: 5 TABLET ORAL at 20:56

## 2022-08-04 RX ADMIN — ATORVASTATIN CALCIUM 80 MG: 40 TABLET, FILM COATED ORAL at 09:16

## 2022-08-04 RX ADMIN — FAMOTIDINE 20 MG: 20 TABLET ORAL at 09:17

## 2022-08-04 RX ADMIN — TAMSULOSIN HYDROCHLORIDE 0.4 MG: 0.4 CAPSULE ORAL at 09:16

## 2022-08-04 RX ADMIN — Medication 600 MG: at 09:16

## 2022-08-04 RX ADMIN — MIDODRINE HYDROCHLORIDE 10 MG: 5 TABLET ORAL at 09:22

## 2022-08-04 RX ADMIN — AMIODARONE HYDROCHLORIDE 200 MG: 200 TABLET ORAL at 20:56

## 2022-08-04 RX ADMIN — THIAMINE HCL TAB 100 MG 100 MG: 100 TAB at 09:16

## 2022-08-04 RX ADMIN — APIXABAN 5 MG: 5 TABLET, FILM COATED ORAL at 20:57

## 2022-08-04 RX ADMIN — APIXABAN 5 MG: 5 TABLET, FILM COATED ORAL at 09:16

## 2022-08-04 RX ADMIN — FLUDROCORTISONE ACETATE 0.3 MG: 0.1 TABLET ORAL at 09:16

## 2022-08-04 RX ADMIN — FUROSEMIDE 40 MG: 10 INJECTION, SOLUTION INTRAMUSCULAR; INTRAVENOUS at 00:54

## 2022-08-04 RX ADMIN — BUSPIRONE HYDROCHLORIDE 15 MG: 5 TABLET ORAL at 09:17

## 2022-08-04 RX ADMIN — FUROSEMIDE 40 MG: 10 INJECTION, SOLUTION INTRAMUSCULAR; INTRAVENOUS at 09:17

## 2022-08-04 RX ADMIN — FUROSEMIDE 40 MG: 10 INJECTION, SOLUTION INTRAMUSCULAR; INTRAVENOUS at 16:50

## 2022-08-04 RX ADMIN — MULTIVITAMIN TABLET 1 TABLET: TABLET at 09:16

## 2022-08-04 RX ADMIN — MIDODRINE HYDROCHLORIDE 10 MG: 5 TABLET ORAL at 16:50

## 2022-08-04 RX ADMIN — SODIUM CHLORIDE, PRESERVATIVE FREE 10 ML: 5 INJECTION INTRAVENOUS at 21:02

## 2022-08-04 RX ADMIN — CLOPIDOGREL BISULFATE 75 MG: 75 TABLET ORAL at 09:16

## 2022-08-04 RX ADMIN — Medication 600 MG: at 20:57

## 2022-08-04 RX ADMIN — PANTOPRAZOLE SODIUM 40 MG: 40 TABLET, DELAYED RELEASE ORAL at 09:17

## 2022-08-04 RX ADMIN — SACUBITRIL AND VALSARTAN 0.5 TABLET: 24; 26 TABLET, FILM COATED ORAL at 20:57

## 2022-08-04 RX ADMIN — ESCITALOPRAM 20 MG: 5 TABLET, FILM COATED ORAL at 20:56

## 2022-08-04 RX ADMIN — SODIUM CHLORIDE, PRESERVATIVE FREE 10 ML: 5 INJECTION INTRAVENOUS at 09:18

## 2022-08-04 RX ADMIN — FOLIC ACID 1 MG: 1 TABLET ORAL at 09:17

## 2022-08-04 RX ADMIN — FAMOTIDINE 20 MG: 20 TABLET ORAL at 20:57

## 2022-08-04 RX ADMIN — POTASSIUM CHLORIDE 20 MEQ: 1500 TABLET, EXTENDED RELEASE ORAL at 16:50

## 2022-08-04 RX ADMIN — SODIUM CHLORIDE, PRESERVATIVE FREE 10 ML: 5 INJECTION INTRAVENOUS at 16:50

## 2022-08-04 RX ADMIN — MIDODRINE HYDROCHLORIDE 10 MG: 5 TABLET ORAL at 12:16

## 2022-08-04 RX ADMIN — AMIODARONE HYDROCHLORIDE 200 MG: 200 TABLET ORAL at 09:15

## 2022-08-04 RX ADMIN — Medication 10 MG: at 20:56

## 2022-08-04 NOTE — PROGRESS NOTES
SW met with pt to complete assessment. Pt is alert and oriented and cooperative with assessment. Pt is a 70year old male admitted for hypoxia. Pt lives with his nephew in their home in [place]. Pt has a lifevest, ramp, grab bars in the bathroom, shower chair, and walker at home. Pt receives [facility] services through he believes is [facility]. Pt reports that his nephew drives and his sister will also take him to appointments. Pt is a DNR CCA and follows with Dr Israel Lamb as PCP. Pt does not have advance directives on file but reports that his sister Uriah Wilder and his wife Nj Lowe keep track of him and would be his decision makers. Pt reports that he manages to pay for his medications. Pt plans to return home with his nephew at discharge. Pt identifies no discharge needs or concerns at this time. SW will remain available as needed.  Katie COLE 8/4/2022

## 2022-08-04 NOTE — PROGRESS NOTES
Physical Therapy  Facility/Department: [facility] MED SURG  Physical Therapy Initial Assessment    Name: Ludivina Burgess  :   MRN: [id number]  Date of Service: 2022    Discharge Recommendations:  Continue to assess pending progress, Home with Home health PT, Outpatient PT   PT Equipment Recommendations  Equipment Needed: No      Patient Diagnosis(es): The primary encounter diagnosis was Acute respiratory failure with hypoxia ([address] A diagnosis of Acute on chronic combined systolic and diastolic CHF (congestive heart failure) (HCC) was also pertinent to this visit. Past Medical History:  has a past medical history of Acute kidney injury ([address] Ascites, Atrial fibrillation ([address] CHF (congestive heart failure) ([address] Cirrhosis of liver with ascites ([address] CKD (chronic kidney disease), Coronary artery disease, Hyperlipidemia, Hypertension, Hypomagnesemia, Hyponatremia, and Thrombocytopenia ([address] Past Surgical History:  has a past surgical history that includes Rotator cuff repair (Right); Carotid stent placement (Bilateral); hernia repair; and Tonsillectomy and adenoidectomy. Assessment   Body Structures, Functions, Activity Limitations Requiring Skilled Therapeutic Intervention: Decreased functional mobility ; Decreased ADL status; Decreased body mechanics; Decreased tolerance to work activity; Decreased strength;Decreased endurance;Decreased balance;Decreased high-level IADLs  Assessment: Pt is a pleasant 71 yo man who presents with hypoxia. Pt currently presents with 4-/5 strength grossly in BLE. Pt demonstrates good to fair dynamic standing balance with no complete LoB. Pt is mildly unsteady with gait at this time, normally uses RW and SPC at home. Pt is currently CGA for transfers and gait, and was able to ambulate 10ft with no AD CGA for stability and safety. Pt would benefit from skilled PT to improve his standing balance in order to allow for a safe return home.   Treatment Diagnosis: Generalized Weakness  Therapy Prognosis: Good  Decision Making: Medium Complexity  Requires PT Follow-Up: Yes  Activity Tolerance  Activity Tolerance: Patient tolerated evaluation without incident     Plan   Plan  Plan: 2 times a day 7 days a week (1x daily on weekends)  Plan weeks: 20 days  Current Treatment Recommendations: Strengthening, Balance training, Functional mobility training, Transfer training, Endurance training, Gait training, Stair training, Neuromuscular re-education, Home exercise program, Safety education & training, Positioning, Therapeutic activities  Safety Devices  Type of Devices: Chair alarm in place, Call light within reach, Left in chair, Nurse notified     Restrictions  Restrictions/Precautions  Restrictions/Precautions: General Precautions     Subjective   General  Chart Reviewed: Yes  Patient assessed for rehabilitation services?: Yes  Referring Practitioner: Tracie  Referral Date : 08/03/22  Diagnosis: Hypoxia  Follows Commands: Within Functional Limits  Subjective  Subjective: Pt is pleasant and agreeable to therapy eval         Social/Functional History  Social/Functional History  Lives With: Family (nephew)  Type of Home: Mobile home  Home Layout: One level  Home Access: Ramped entrance  Bathroom Shower/Tub: Walk-in shower  Bathroom Toilet: Handicap height  Bathroom Equipment: Grab bars in shower, Shower chair  Bathroom Accessibility: Accessible  Home Equipment: Walker, rolling  Has the patient had two or more falls in the past year or any fall with injury in the past year?: No  Receives Help From: Family  ADL Assistance: Independent  Homemaking Assistance: Independent  Homemaking Responsibilities: Yes  Meal Prep Responsibility: No  Ambulation Assistance: Independent  Transfer Assistance: Independent  Active : Yes  Mode of Transportation: Car  Occupation: Retired  Additional Comments: Patient states that his nephew is also living with them currently and helps assist as needed. Vision/Hearing  Vision  Vision Exceptions: Wears glasses for reading    Cognition   Orientation  Overall Orientation Status: Within Functional Limits  Cognition  Overall Cognitive Status: WFL     Objective   Heart Rate: 57  Heart Rate Source: Monitor; Apical  BP: (!) 156/72  BP Location: Left upper arm  BP Method: Automatic  Patient Position: Semi fowlers  MAP (Calculated): 100  Resp: 18  SpO2: 100 %  O2 Device: Nasal cannula     Observation/Palpation  Posture: Good        AROM RLE (degrees)  RLE AROM: WFL  AROM LLE (degrees)  LLE AROM : WFL  Strength RLE  Comment: 4-/5 grossly  Strength LLE  Comment: 4-/5 grossly           Bed mobility  Supine to Sit: Contact guard assistance  Sit to Supine: Contact guard assistance  Scooting: Contact guard assistance  Transfers  Sit to Stand: Contact guard assistance  Stand to sit: Contact guard assistance  Bed to Chair: Contact guard assistance  Ambulation  WB Status: FWB  Ambulation  Surface: level tile  Device: No Device  Assistance: Contact guard assistance  Gait Deviations: Slow Andreina; Increased AMEE  Distance: 10ft     Balance  Posture: Good  Sitting - Static: Good  Sitting - Dynamic: Good  Standing - Static: Good;Fair  Standing - Dynamic: Fair  Exercise Treatment: LE strengthening, transfers, standing balance        AM-PAC Score     AM-PAC Inpatient Mobility without Stair Climbing Raw Score : 18 (08/04/22 1033)  AM-PAC Inpatient without Stair Climbing T-Scale Score : 51.97 (08/04/22 1033)  Mobility Inpatient CMS 0-100% Score: 23.26 (08/04/22 1033)  Mobility Inpatient without Stair CMS G-Code Modifier : CJ (08/04/22 1033)       Goals  Short Term Goals  Time Frame for Short term goals: 20 days  Short term goal 1: Pt to be able to ambulate 50 ft IND to be able to retrun home safely.   Short term goal 2: Pt to be IND with all transfers to improve functional mobility and safety of transfers  Short term goal 3: Pt to be able to tolerate 20-30 mins therex/act to improve functional capacity for ADLs.   Patient Goals   Patient goals : to get back home       Education  Patient Education  Education Given To: Patient  Education Provided: Role of Therapy;Plan of Care  Education Method: Verbal  Barriers to Learning: None  Education Outcome: Verbalized understanding      Therapy Time   Individual Concurrent Group Co-treatment   Time In 0940         Time Out [phone]         Minutes 15         Timed Code Treatment Minutes: [address] [place], PT

## 2022-08-04 NOTE — PROGRESS NOTES
1 - Dr. Jennifer Kenny notified of patients condition. O2 sats remain at 100% with non re breather mask. Lungs contain inspiratory and expiratory wheezes anterior and posterior. Note new order for Lasix 40mg x 1 dose.

## 2022-08-04 NOTE — PROGRESS NOTES
Occupational Therapy  Facility/Department: [facility] AT THE [facility] MED SURG  Occupational Therapy Initial Assessment    Name: Rosemarie Kraus  : [id number]  MRN: [id number]  Date of Service: 2022    Discharge Recommendations:  Continue to assess pending progress          Patient Diagnosis(es): The primary encounter diagnosis was Acute respiratory failure with hypoxia ([address] A diagnosis of Acute on chronic combined systolic and diastolic CHF (congestive heart failure) (HCC) was also pertinent to this visit. Past Medical History:  has a past medical history of Acute kidney injury ([address] Ascites, Atrial fibrillation ([address] CHF (congestive heart failure) ([address] Cirrhosis of liver with ascites ([address] CKD (chronic kidney disease), Coronary artery disease, Hyperlipidemia, Hypertension, Hypomagnesemia, Hyponatremia, and Thrombocytopenia ([address] Past Surgical History:  has a past surgical history that includes Rotator cuff repair (Right); Carotid stent placement (Bilateral); hernia repair; and Tonsillectomy and adenoidectomy. Treatment Diagnosis: Weakness      Assessment   Performance deficits / Impairments: Decreased functional mobility ; Decreased ADL status; Decreased strength;Decreased balance;Decreased endurance  Assessment: 71 y/o M admitted to T for hypoxia and CHF. Patient presents with decresaed activity tolerance/endurance, decreased strength and incresaed shortness of breath during ADL. OT to address and educate on d/c folder, AE/DME, EC/WS techniques and home sfeaty to ensure safe and indep return to [facility].   Treatment Diagnosis: Weakness  Prognosis: Good  Decision Making: Medium Complexity  REQUIRES OT FOLLOW-UP: Yes        Plan   Plan  Times per Week: 7  Times per Day: Daily  Current Treatment Recommendations: Strengthening, Balance training, Functional mobility training, Endurance training, Safety education & training, Patient/Caregiver education & training, Equipment evaluation, education, & procurement, Home management training, Self-Care / ADL     Restrictions  Restrictions/Precautions  Restrictions/Precautions: General Precautions    Subjective   General  Patient assessed for rehabilitation services?: Yes  Subjective  Subjective: Patient c no c/o pain, just states that he feels bad. General Comment  Comments: Patient sitting in chair upon arrival, agreeable to OT evaluation. Social/Functional History  Social/Functional History  Lives With: Family (nephew)  Type of Home: Mobile home  Home Layout: One level  Home Access: Ramped entrance  Bathroom Shower/Tub: Walk-in shower  Bathroom Toilet: Handicap height  Bathroom Equipment: Grab bars in shower, Shower chair  Bathroom Accessibility: Accessible  Home Equipment: Walker, rolling  Has the patient had two or more falls in the past year or any fall with injury in the past year?: No  Receives Help From: Family  ADL Assistance: Independent  Homemaking Assistance: Independent  Homemaking Responsibilities: Yes  Meal Prep Responsibility: No  Ambulation Assistance: Independent  Transfer Assistance: Independent  Active : Yes  Mode of Transportation: Car  Occupation: Retired  Additional Comments: Patient states that his nephew is also living with them currently and helps assist as needed. Objective   Heart Rate: 57  Heart Rate Source: Monitor; Apical  BP: (!) 156/72  BP Location: Left upper arm  BP Method: Automatic  Patient Position: Semi fowlers  MAP (Calculated): 100  Resp: 18  SpO2: 100 %  O2 Device: Nasal cannula          Observation/Palpation  Posture: Good  Safety Devices  Type of Devices: Chair alarm in place;Call light within reach; Left in chair;Nurse notified; All fall risk precautions in place  Balance  Sitting: Intact  Standing: Impaired  Standing - Static: Fair;Good  Standing - Dynamic: Fair  Gait  Overall Level of Assistance: Contact-guard assistance;Stand-by assistance     AROM: Within functional limits  PROM: Within functional limits  Strength: Generally decreased, functional  Coordination: Generally decreased, functional  Tone: Normal  Sensation: Intact  ADL  Feeding: Independent  Grooming: Contact guard assistance;Stand by assistance  UE Bathing: Stand by assistance  LE Bathing: Stand by assistance  UE Dressing: Stand by assistance  LE Dressing: Stand by assistance  Toileting: Contact guard assistance;Stand by assistance     Activity Tolerance  Activity Tolerance: Patient tolerated evaluation without incident     Transfers  Stand Step Transfers: Contact guard assistance;Stand by assistance  Stand to sit: Stand by assistance  Vision  Vision: Within Functional Limits (Simultaneous filing. User may not have seen previous data.)  Vision Exceptions: Wears glasses for reading  Hearing  Hearing: Within functional limits (Simultaneous filing. User may not have seen previous data.)  Cognition  Overall Cognitive Status: WFL  Orientation  Overall Orientation Status: Within Functional Limits                  Education Given To: Patient  Education Provided: Role of Therapy;Plan of Care  Education Method: Verbal  Barriers to Learning: None  Education Outcome: Verbalized understanding                        AM-PAC Score        AM-[facility] Inpatient Daily Activity Raw Score: 19 (08/04/22 1040)  AM-PAC Inpatient ADL T-Scale Score : 40.22 (08/04/22 1040)  ADL Inpatient CMS 0-100% Score: 42.8 (08/04/22 1040)  ADL Inpatient CMS G-Code Modifier : CK (08/04/22 1040)    Tinneti Score       Goals  Short Term Goals  Time Frame for Short term goals: 21 visits  Short Term Goal 1: Patient to be educated on d/c folder, AE/DME and EC/WS techniques to ensure safe and indep return home. Short Term Goal 2: Patient to complete ADL routine c Mod I c AE/DME and implementation of EC/WS techniques as needed to ensure safe return home.   Short Term Goal 3: Patient will tolerate 15 mins of BUE ther ex/act to increase overall strength/activity tolerance for functional tasks       Therapy Time Individual Concurrent Group Co-treatment   Time In 4972         Time Out 1030         Minutes 24 [facility], OTR/L

## 2022-08-04 NOTE — PROGRESS NOTES
Physical Therapy  Facility[facility]Department: [facility] AT THE [facility] MED SURG  Daily Treatment Note  NAME: Saurabh Baeza  :   MRN: [id number]  Date of Service: 2022  Discharge Recommendations:  Continue to assess pending progress, Home with Home health PT, Outpatient PT   Patient Diagnosis(es): The primary encounter diagnosis was Acute respiratory failure with hypoxia ([address] A diagnosis of Acute on chronic combined systolic and diastolic CHF (congestive heart failure) (HCC) was also pertinent to this visit. Assessment   Activity Tolerance: Patient tolerated evaluation without incident  Equipment Needed: No   Plan    Plan  Plan: 2 times a day 7 days a week  Plan weeks: 20 days  Current Treatment Recommendations: Strengthening;Balance training;Functional mobility training;Transfer training; Endurance training;Gait training;Stair training;Neuromuscular re-education;Home exercise program;Safety education & training;Positioning; Therapeutic activities   Restrictions  Restrictions/Precautions  Restrictions/Precautions: General Precautions   Subjective    Subjective  Subjective: Pt. in chair upon arrival agreeable to therapy at this time. Pain: Denies  Orientation  Overall Orientation Status: Within Functional Limits  Cognition  Overall Cognitive Status: WFL   Objective   Bed Mobility Training  Bed Mobility Training: No  Balance  Sitting: Intact  Standing: Impaired  Standing - Static: Fair;Good  Standing - Dynamic: Fair  Transfer Training  Transfer Training: Yes  Overall Level of Assistance: Contact-guard assistance;Stand-by assistance  Interventions: Safety awareness training;Verbal cues  Sit to Stand: Contact-guard assistance;Stand-by assistance  Stand to Sit: Contact-guard assistance;Stand-by assistance  Gait Training  Gait Training: Yes  Gait  Overall Level of Assistance: Contact-guard assistance;Stand-by assistance  Interventions: Verbal cues; Safety awareness training  Speed/Andreina: Slow  Distance (ft): 120 Feet  Assistive Device: Other (comment) (no AD)  PT Exercises  Exercise Treatment: Reclined and seated exercises B LE x20  Other Specialty Interventions  Other Treatments/Modalities: commode use  Safety Devices  Type of Devices: Chair alarm in place;Call light within reach; Left in chair;Nurse notified; All fall risk precautions in place   Goals  Short Term Goals  Time Frame for Short term goals: 20 days  Short term goal 1: Pt to be able to ambulate 50 ft IND to be able to retrun home safely. Short term goal 2: Pt to be IND with all transfers to improve functional mobility and safety of transfers  Short term goal 3: Pt to be able to tolerate 20-30 mins therex/act to improve functional capacity for ADLs.   Patient Goals   Patient goals : to get back home  Education  Patient Education  Education Given To: Patient  Education Provided: Role of Therapy;Plan of Care  Education Method: Verbal  Barriers to Learning: None  Education Outcome: Verbalized understanding  Therapy Time   Individual Concurrent Group Co-treatment   Time In 1136         Time Out 1159         Minutes 611 [place] WELLS, PTA

## 2022-08-04 NOTE — PROGRESS NOTES
2139 - Spoke to sister/Angelina Melgar regarding the patient home medications and what he is currently taking. All home medications updated.

## 2022-08-04 NOTE — PROGRESS NOTES
6188 - Patient called writer to room and stated that he is unable to catch his breath. Oxygen sat obtained and was 84%. O2 via n/c titrated to 4-5 liters with no improvement. Respiratory notified to bring a non re breather mask for patient to increase oxygen levels. Pao2 increased to 100% with non re breather mask. Monitoring patient at bedside.

## 2022-08-04 NOTE — PROGRESS NOTES
9653 - Patient resting quietly at present time. Breathing unlabored and oxygen sats remain %. Patient continues on the mask. Lung sounds diminished in bases and occasional expiratory wheeze noted in upper RT lobe. Patient refuses to wear SCD's to bilateral lower extremities; states that \"he has restless leg syndrome and they bother his legs. \" Patient educated to stay in bed and use call light due to IVP lasix administered. Patient urinating on the floor attempting to get to the bathroom. Patient verbalized understanding. Will continue to monitor. Call light in reach.

## 2022-08-04 NOTE — PROGRESS NOTES
Comprehensive Nutrition Assessment    Type and Reason for Visit:  Initial    Nutrition Recommendations/Plan:   Continue current diet. Support thiamine, folic acid, and MVI with minerals due to Hx ETOH. Malnutrition Assessment:  Malnutrition Status:  Insufficient data (08/04/22 1238)    Context:  Chronic Illness     Findings of the 6 clinical characteristics of malnutrition:  Energy Intake:  Unable to assess  Weight Loss:   (8.1% weight loss x 5 months)     Body Fat Loss:  Unable to assess     Muscle Mass Loss:  Unable to assess    Fluid Accumulation:  Mild Extremities (+ 1 pitting BLE)   Strength:  Not Performed      Nutrition Assessment:    Altered nutrition related labs r/t cardiac dysfunctiona aeb BNP 6541, 2 gm sodium diet. Hx COPD, CHF, and CKD. Magnesium low at 1. 5. Hx ETOH, on MVI w/minerals, folic acid, and thiamine. 2 failed attempts to visit with Pt. Nutrition Related Findings:    unable to assess Wound Type: None       Current Nutrition Intake & Therapies:    Average Meal Intake: %  Average Supplements Intake: None Ordered  ADULT DIET; Regular; Low Sodium (2 gm); 1800 ml    Anthropometric Measures:  Height: 5' 6\" (167.6 cm)  Ideal Body Weight (IBW): 142 lbs (65 kg)    Admission Body Weight: 176 lb 2.4 oz (79.9 kg)  Current Body Weight: 175 lb 4.8 oz (79.5 kg), 123.5 % IBW. Weight Source: Bed Scale  Current BMI (kg/m2): 28.3  Usual Body Weight: 191 lb (86.6 kg)  % Weight Change (Calculated): -8.2  Weight Adjustment For: No Adjustment                 BMI Categories: Overweight (BMI 25.0-29. 9)    Estimated Daily Nutrient Needs:  Energy Requirements Based On: Kcal/kg  Weight Used for Energy Requirements: Current  Energy (kcal/day): 1598-1732 (20-23/kg)  Weight Used for Protein Requirements: Ideal  Protein (g/day): 84-97g (1.3-1.5g/kg)  Method Used for Fluid Requirements: ml/Kg  Fluid (ml/day): 1829 ml (23/kg)    Nutrition Diagnosis:   Altered nutrition-related lab values related to cardiac dysfunction as evidenced by lab values    Nutrition Interventions:   Food and/or Nutrient Delivery: Continue Current Diet  Nutrition Education/Counseling: Education needed  Coordination of Nutrition Care: Continue to monitor while inpatient       Goals:     Goals: PO intake 75% or greater     Recent Labs     08/03/22  1220 08/04/22  0635    142   K 3.6* 4.0    98   CO2 31 34*   BUN 16 14   CREATININE 1.10 0.96   GLUCOSE 104* 107*   ALT  --  23   ALKPHOS  --  103   GFR                            Lab Results   Component Value Date/Time    LABALBU 3.8 08/04/2022 06:35 AM       Nutrition Monitoring and Evaluation:   Behavioral-Environmental Outcomes: None Identified  Food/Nutrient Intake Outcomes: Food and Nutrient Intake  Physical Signs/Symptoms Outcomes: Biochemical Data, Weight, Fluid Status or Edema    Discharge Planning:    Continue current diet     Salena Harry RD, LD  Contact: [phone]

## 2022-08-04 NOTE — ACP (ADVANCE CARE PLANNING)
Advance Care Planning     Advance Care Planning Activator (Inpatient)  Conversation Note      Date of ACP Conversation: 8/4/2022     Conversation Conducted with:  Healthcare Decision Maker: Next of Kin by law (only applies in absence of above) (name) Roby Galindo spouse    ACP Activator: Clyde Moss RN        Health Care Decision Maker:     Current Designated Health Care Decision Maker:     Primary Decision Maker: Jacklyn Ocampo - Spouse - [phone]    Secondary Decision Maker: Allyher Mcclain - Pt [facility] [phone]  Care Preferences    Ventilation: \"If you were in your present state of health and suddenly became very ill and were unable to breathe on your own, what would your preference be about the use of a ventilator (breathing machine) if it were available to you? \"      Would the patient desire the use of ventilator (breathing machine)?: no    \"If your health worsens and it becomes clear that your chance of recovery is unlikely, what would your preference be about the use of a ventilator (breathing machine) if it were available to you? \"     Would the patient desire the use of ventilator (breathing machine)?: No      Resuscitation  \"CPR works best to restart the heart when there is a sudden event, like a heart attack, in someone who is otherwise healthy. Unfortunately, CPR does not typically restart the heart for people who have serious health conditions or who are very sick. \"    \"In the event your heart stopped as a result of an underlying serious health condition, would you want attempts to be made to restart your heart (answer \"yes\" for attempt to resuscitate) or would you prefer a natural death (answer \"no\" for do not attempt to resuscitate)? \" no       [x] Yes   [] No   Educated Patient / Lulu Jose regarding differences between Advance Directives and portable DNR orders.     Length of ACP Conversation in minutes:  20    Conversation Outcomes:  [x] ACP discussion completed  [] Existing advance directive reviewed with patient; no changes to patient's previously recorded wishes  [] New Advance Directive completed  [] Portable Do Not Rescitate prepared for Provider review and signature  [] POLST/POST/MOLST/MOST prepared for Provider review and signature      Follow-up plan:    [] Schedule follow-up conversation to continue planning  [x] Referred individual to Provider for additional questions/concerns   [] Advised patient/agent/surrogate to review completed ACP document and update if needed with changes in condition, patient preferences or care setting    [x] This note routed to one or more involved healthcare providers    [address] [place] [address] and [place] Nurse Coordinator  8/4/2022 11:14 AM

## 2022-08-04 NOTE — H&P
History and Physical    Patient:  Charlene Montemayor  MRN: [id number]    Chief Complaint:  SOB    History Obtained From:  patient, family member - sister, electronic medical record    PCP: Margarita Altman MD    History of Present Illness: The patient is a 70 y.o. male who presented to the emergency room with complaints of shortness of breath. Patient reported that his oxygen levels at home were low. His sister reported that yesterday he had a \"black out\" episode when he sat down and was going through money on the table and \"just went out\". She reported that this has occurred several times in the past few days. Patient has a LifeVest on due to an EF of 15% however denies any shocking of his heart since having it placed. Repeat echocardiogram showed slight improvement of his EF to 20 to 25% however has severe systolic dysfunction. Upon arrival to the emergency room his SPO2 was 83% on 2 L and does not use oxygen at home. Patient was using someone else's oxygen at home until arrival.  Patient was a former smoker quit smoking 2 months ago and had been a heavy smoker of 1-1/2 packs/day for several years. Patient denied any increased swelling in his legs. He states he is compliant with his medications but not as much with his diet. Patient has history of ischemic cardiomyopathy, paroxysmal atrial fibrillation, congestive heart failure and COPD with CKD all new diagnoses as of 5/2022. Since that time patient has quit smoking, quit drinking alcohol and completed skilled care at the [facility]. He denied any recent illness.     Past Medical History:        Diagnosis Date    Acute kidney injury ([address]     Ascites 5/20/2022    Atrial fibrillation (HCC)     CHF (congestive heart failure) ([address]     Cirrhosis of liver with ascites ([address] 5/19/2022    CKD (chronic kidney disease) 5/20/2022    Coronary artery disease     Hyperlipidemia     Hypertension     Hypomagnesemia 5/20/2022    Hyponatremia     Thrombocytopenia ([address] Past Surgical History:        Procedure Laterality Date    CAROTID STENT PLACEMENT Bilateral     HERNIA REPAIR      ROTATOR CUFF REPAIR Right     TONSILLECTOMY AND ADENOIDECTOMY         Medications Prior to Admission:    Prior to Admission medications    Medication Sig Start Date End Date Taking? Authorizing Provider   fludrocortisone (FLORINEF) 0.1 MG tablet Take 3 tablets by mouth in the morning. Patient not taking: Reported on 8/3/2022 7/28/22 10/26/22  Rosamaria George PA-C   vitamin D (ERGOCALCIFEROL) 1.25 MG (43413 UT) CAPS capsule Take 1 capsule by mouth once a week 7/27/22   Akilah Aiken MD   tamsulosin [facility]) 0.4 MG capsule Take 1 capsule by mouth in the morning. 7/21/22   Akilah Aiken MD   midodrine (PROAMATINE) 10 MG tablet Take 1 tablet by mouth in the morning and 1 tablet at noon and 1 tablet in the evening. Take with meals. 7/21/22   Akilah Aiken MD   vitamin B-1 (THIAMINE) 100 MG tablet Take 1 tablet by mouth in the morning. 7/19/22   Akilah Aiken MD   Multiple Vitamin (MULTIVITAMIN) TABS tablet Take 1 tablet by mouth in the morning. 7/19/22   Akilah Aiken MD   pantoprazole (PROTONIX) 40 MG tablet Take 1 tablet by mouth every morning (before breakfast) 7/19/22   Akilah Aiken MD   potassium chloride (KLOR-CON M) 20 MEQ extended release tablet Take 1 tablet by mouth in the morning and 1 tablet in the evening. Take with meals.  7/19/22   Akilah Aiken MD   folic acid (FOLVITE) 1 MG tablet Take 1 tablet by mouth daily 7/12/22   Akilah Aiken MD   metoprolol succinate (TOPROL XL) 25 MG extended release tablet Take 1 tablet by mouth daily 6/24/22   Brad Mcdaniels MD   bumetanide (BUMEX) 1 MG tablet Take 1 tablet by mouth daily 6/11/22   Jessica Barrera MD   melatonin 5 mg TABS tablet Take 2 tablets by mouth nightly 6/11/22   Jessica Barrera MD   magnesium oxide (MAGOX 400) 400 (240 Mg) MG tablet Take 1.5 tablets by mouth 2 times daily 5/24/22   SEVEN Mathews CNP sacubitril-valsartan (ENTRESTO) 24-26 MG per tablet Take 0.5 tablets by mouth 2 times daily Lot #: [id number]  Exp: July 2024 5/24/22   Yehuda Cabrera MD   amiodarone (CORDARONE) 200 MG tablet Take 200 mg by mouth 2 times daily 4/26/22   Historical Provider, MD   apixaban (ELIQUIS) 5 MG TABS tablet Take 5 mg by mouth 2 times daily 3/16/22   Historical Provider, MD   busPIRone (BUSPAR) 10 MG tablet Take 15 mg by mouth 2 times daily  5/10/22   Historical Provider, MD   clopidogrel (PLAVIX) 75 MG tablet Take 75 mg by mouth daily 3/16/22   Historical Provider, MD   escitalopram (LEXAPRO) 20 MG tablet Take 20 mg by mouth nightly 3/16/22   Historical Provider, MD   atorvastatin (LIPITOR) 80 MG tablet Take 80 mg by mouth daily 3/16/22   Historical Provider, MD   rOPINIRole (REQUIP) 0.5 MG tablet Take 0.5 mg by mouth nightly 4/11/22 7/28/22  Historical Provider, MD   Omega-3 Fatty Acids (FISH OIL CONCENTRATE) 1000 MG CAPS Take 2 caplets by mouth daily    Historical Provider, MD       Allergies:  Patient has no known allergies. Social History:   TOBACCO:   reports that he quit smoking about 5 weeks ago. His smoking use included cigarettes. He has a 56.00 pack-year smoking history. He has never used smokeless tobacco.  ETOH:   reports that he does not currently use alcohol after a past usage of about 2.0 standard drinks per week. Family History:       Problem Relation Age of Onset    Hypertension Father     Heart Disease Father     Heart Disease Mother     Hypertension Mother        Allergies:  Patient has no known allergies. Medications Prior to Admission:    Prior to Admission medications    Medication Sig Start Date End Date Taking? Authorizing Provider   fludrocortisone (FLORINEF) 0.1 MG tablet Take 3 tablets by mouth in the morning.   Patient not taking: Reported on 8/3/2022 7/28/22 10/26/22  Morteza Cortez PA-C   vitamin D (ERGOCALCIFEROL) 1.25 MG (01612 UT) CAPS capsule Take 1 capsule by mouth once a week 7/27/22   Jesus Swenson MD   tamsulosin (FLOMAX) 0.4 MG capsule Take 1 capsule by mouth in the morning. 7/21/22   Jesus Swenson MD   midodrine (PROAMATINE) 10 MG tablet Take 1 tablet by mouth in the morning and 1 tablet at noon and 1 tablet in the evening. Take with meals. 7/21/22   Jesus Swenson MD   vitamin B-1 (THIAMINE) 100 MG tablet Take 1 tablet by mouth in the morning. 7/19/22   Jesus Swenson MD   Multiple Vitamin (MULTIVITAMIN) TABS tablet Take 1 tablet by mouth in the morning. 7/19/22   Jesus Swenson MD   pantoprazole (PROTONIX) 40 MG tablet Take 1 tablet by mouth every morning (before breakfast) 7/19/22   Jesus Swenson MD   potassium chloride (KLOR-CON M) 20 MEQ extended release tablet Take 1 tablet by mouth in the morning and 1 tablet in the evening. Take with meals.  7/19/22   Jesus Swenson MD   folic acid (FOLVITE) 1 MG tablet Take 1 tablet by mouth daily 7/12/22   Jesus Swenson MD   metoprolol succinate (TOPROL XL) 25 MG extended release tablet Take 1 tablet by mouth daily 6/24/22   Stefano Blankenship MD   bumetanide (BUMEX) 1 MG tablet Take 1 tablet by mouth daily 6/11/22   Alan Acharya MD   melatonin 5 mg TABS tablet Take 2 tablets by mouth nightly 6/11/22   Alan Acharya MD   magnesium oxide (MAGOX 400) 400 (240 Mg) MG tablet Take 1.5 tablets by mouth 2 times daily 5/24/22   SEVEN Maya - CNP   sacubitril-valsartan (ENTRESTO) 24-26 MG per tablet Take 0.5 tablets by mouth 2 times daily Lot #: [id number]  Exp: July 2024 5/24/22   Stefano Blankenship MD   amiodarone (CORDARONE) 200 MG tablet Take 200 mg by mouth 2 times daily 4/26/22   Historical Provider, MD   apixaban (ELIQUIS) 5 MG TABS tablet Take 5 mg by mouth 2 times daily 3/16/22   Historical Provider, MD   busPIRone (BUSPAR) 10 MG tablet Take 15 mg by mouth 2 times daily  5/10/22   Historical Provider, MD   clopidogrel (PLAVIX) 75 MG tablet Take 75 mg by mouth daily 3/16/22   Historical Provider, MD   escitalopram (LEXAPRO) 20 MG tablet Take 20 mg by mouth nightly 3/16/22   Historical Provider, MD   atorvastatin (LIPITOR) 80 MG tablet Take 80 mg by mouth daily 3/16/22   Historical Provider, MD   rOPINIRole (REQUIP) 0.5 MG tablet Take 0.5 mg by mouth nightly 4/11/22 7/28/22  Historical Provider, MD   Omega-3 Fatty Acids (FISH OIL CONCENTRATE) 1000 MG CAPS Take 2 caplets by mouth daily    Historical Provider, MD       Review of Systems:  Constitutional:negative  for fevers, and negative for chills. Eyes: negative for visual disturbance   ENT: negative for sore throat, negative nasal congestion, and negative for earache  Respiratory: positive for shortness of breath, negative for cough, and negative for wheezing  Cardiovascular: negative for chest pain, negative for palpitations, and negative for syncope  Gastrointestinal: negative for abdominal pain, negative for nausea,negative for vomiting, negative for diarrhea, negative for constipation, and negative for hematochezia or melena  Genitourinary: negative for dysuria, negative for urinary urgency, negative for urinary frequency, and negative for hematuria  Skin: negative for skin rash, and negative for skin lesions  Neurological: negative for unilateral weakness, numbness or tingling. Physical Exam:    Vitals:   Temp: 97.1 °F (36.2 °C)  BP: (!) 125/58  Resp: 20  Heart Rate: 58  SpO2: 95 %  24HR INTAKE/OUTPUT:    Intake/Output Summary (Last 24 hours) at 8/4/2022 1502  Last data filed at 8/4/2022 1405  Gross per 24 hour   Intake 870 ml   Output 5175 ml   Net -4305 ml       Weight    Body mass index is 28.29 kg/m². Exam:  GEN:    Awake, alert and oriented x3. EYES:  EOMI, pupils equal   NECK: Supple. No lymphadenopathy. No carotid bruit  CVS:    regular rate and rhythm, no audible murmur  PULM:   diminished with fine rales left base , no acute respiratory distress  ABD:    Bowels sounds normal.  Abdomen is soft. No distention. no tenderness to palpation. EXT:   no edema bilaterally .   No calf tenderness. NEURO: Moves all extremities. Motor and sensory are grossly intact  SKIN:  No rashes.   No skin lesions.    -----------------------------------------------------------------  Diagnostic Data:     DATA:    CBC:   Lab Results   Component Value Date    WBC 3.0 (L) 08/04/2022    RBC 3.84 (L) 08/04/2022    HGB 12.8 (L) 08/04/2022    HCT 39.5 (L) 08/04/2022    .9 08/04/2022    PLT See Reflexed IPF Result 08/04/2022        CMP:   Lab Results   Component Value Date    GLUCOSE 107 (H) 08/04/2022    BUN 14 08/04/2022    CREATININE 0.96 08/04/2022     08/04/2022    K 4.0 08/04/2022    CALCIUM 9.5 08/04/2022    CL 98 08/04/2022    CO2 34 (H) 08/04/2022    PROT 6.3 (L) 08/04/2022    LABALBU 3.8 08/04/2022    BILITOT 1.03 08/04/2022    ALKPHOS 103 08/04/2022    ALT 23 08/04/2022    AST 26 08/04/2022       UA:   Lab Results   Component Value Date    COLORU Yellow 07/27/2022    SPECGRAV 1.010 07/27/2022    WBCUA 0 TO 2 07/27/2022    RBCUA 0 TO 2 07/27/2022    EPITHUA 0 TO 2 07/27/2022    LEUKOCYTESUR NEGATIVE 07/27/2022    GLUCOSEU NEGATIVE 07/27/2022    KETUA NEGATIVE 07/27/2022    PROTEINU TRACE (A) 07/27/2022    HGBUR NEGATIVE 07/27/2022    CRYSTUA CALCIUM OXALATE (A) 07/27/2022    CRYSTUA 10 TO 20 (A) 07/27/2022    BACTERIA 1+ (A) 07/27/2022       Lactic Acid:   Lab Results   Component Value Date    LACTA 1.9 05/18/2022       D-Dimer:  Lab Results   Component Value Date    DDIMER 2.61 (H) 08/03/2022       PT/INR:  Lab Results   Component Value Date/Time    PROTIME 16.7 08/03/2022 12:20 PM    INR 1.4 08/03/2022 12:20 PM       High Sensitivity Troponin:  Recent Labs     08/03/22  1220 08/04/22  0640   TROPHS 26* 27*       ABGs:   Lab Results   Component Value Date/Time    PHART 7.451 08/03/2022 12:00 PM    DDA6XEC 42.3 08/03/2022 12:00 PM    PO2ART 58.4 08/03/2022 12:00 PM    EHZ8VLS 28.8 08/03/2022 12:00 PM    J5SDJSAA 89.2 08/03/2022 12:00 PM    FIO2 28 08/03/2022 12:00 PM           XR CHEST (2 VW)   Final Result   Interval increasing left pleural effusion. Cardiomegaly and vascular   congestion with probable underlying atelectasis. XR CHEST PORTABLE   Final Result   Overall findings most consistent with decompensated CHF with mild pulmonary   edema. Probable bibasilar subsegmental atelectasis and pleural effusions. However,   underlying infiltrates not excluded. EKG reviewed    Assessment:    Principal Problem:    Hypoxia  Active Problems:    History of acute inferior wall MI    Chronic combined systolic (congestive) and diastolic (congestive) heart failure (HCC)    Coronary artery disease    Hypertension    Hyperlipidemia    Cirrhosis of liver with ascites (HCC)    CKD (chronic kidney disease)    Severe malnutrition (HCC)    PAF (paroxysmal atrial fibrillation) (HCC)    Lightheaded    Gastroesophageal reflux disease    Chronic combined systolic and diastolic congestive heart failure (HCC)    Hypokalemia    Hypovitaminosis D  Resolved Problems:    * No resolved hospital problems.  *      Patient Active Problem List    Diagnosis Date Noted    Chronic combined systolic (congestive) and diastolic (congestive) heart failure (HCC)     Dehydration, moderate     Medication side effect, initial encounter     History of acute inferior wall MI     Ischemic cardiomyopathy     Hypoxia 08/03/2022    PAF (paroxysmal atrial fibrillation) ([address] 07/19/2022    Lightheaded 07/19/2022    Tobacco abuse counseling 07/19/2022    Gastroesophageal reflux disease 07/19/2022    Hypotension 07/19/2022    Chronic combined systolic and diastolic congestive heart failure ([address] 07/19/2022    Hypokalemia 07/19/2022    Hypovitaminosis D 07/19/2022    Severe malnutrition ([address] 06/07/2022    Hypomagnesemia 05/20/2022    Ascites 05/20/2022    CKD (chronic kidney disease) 05/20/2022    Cirrhosis of liver with ascites ([address] 05/19/2022    Acute on chronic combined systolic and diastolic CHF, NYHA class 4 ([address] Coronary artery disease     Hypertension     Thrombocytopenia (HCC)     Hyponatremia     Hyperlipidemia     Atrial fibrillation with RVR ([facility] 05/18/2022       Plan: This patient requires inpatient admission because of acute on chronic combined CHF  Factors affecting the medical complexity of this patient include COPD, acute hypoxia, nonischemic cardiomyopathy, PAF  Estimated length of stay is 3 days  Acute on chronic combined CHF  Appreciate cardiology  IV Lasix   Hold Bumex  Teds  Daily weights  CHF education given by myself information given  Acute respiratory failure with hypoxia  Wean oxygen  Out of bed with meals  COPD  Monitor for need for nebs  CKD  Trend labs  Caution with IV diuretics  Monitor output  Nonischemic cardiomyopathy  Appreciate cardiology  Continue with LifeVest  PAF  Continue amiodarone, Eliquis, Toprol-XL  DVT prophylaxis: already on chronic anticoagulation  Peptic ulcer prophylaxis: Pepcid  High risk medications: none  Social Service and Case Management consults for DC planning  Dietician consult initiated    CORE MEASURES  DVT prophylaxis: already on chronic anticoagulation  Decubitus ulcer present on admission: No  CODE STATUS: FULL CODE  Nutrition Status: good   Physical therapy: No   Old Charts reviewed: Yes  EKG Reviewed:  Yes  Advance Directive Addressed: Yes    SEVEN Martel - CNP, SEVEN, NP-C  8/4/2022, 3:02 PM

## 2022-08-04 NOTE — FLOWSHEET NOTE
Bed alarm going off,. Patient sitting up in bed with legs between the side rails. Confused briefly. Reorients easily. To chair at bedside with chair alarm on.  Continue to monitor

## 2022-08-04 NOTE — PROGRESS NOTES
1900 - Assessment completed. Patient denies needs at this time. Call light within reach. Will continue to monitor.

## 2022-08-04 NOTE — FLOWSHEET NOTE
Resting in bed awake. Night shift nurse just cleaned urine off the floor , patient incontinent of urine on gown. Patient cleaned. Reminded patient to ask for help getting out of bed due to fall risk. Bed alarm on. Pulse ox 100 % on 15 liters non re breather. Call light within reach.  Continue to monitor

## 2022-08-05 PROBLEM — I50.43 ACUTE ON CHRONIC COMBINED SYSTOLIC AND DIASTOLIC CONGESTIVE HEART FAILURE (HCC): Status: ACTIVE | Noted: 2022-07-19

## 2022-08-05 LAB
ABSOLUTE EOS #: 0 K/UL (ref 0–0.44)
ABSOLUTE IMMATURE GRANULOCYTE: 0.06 K/UL (ref 0–0.3)
ABSOLUTE LYMPH #: 0.44 K/UL (ref 1.1–3.7)
ABSOLUTE MONO #: 0.08 K/UL (ref 0.1–1.2)
ALBUMIN SERPL-MCNC: 3.6 G/DL (ref 3.5–5.2)
ALBUMIN/GLOBULIN RATIO: 1.6 (ref 1–2.5)
ALP BLD-CCNC: 95 U/L (ref 40–129)
ALT SERPL-CCNC: 21 U/L (ref 5–41)
ANION GAP SERPL CALCULATED.3IONS-SCNC: 7 MMOL/L (ref 9–17)
AST SERPL-CCNC: 26 U/L
BASOPHILS # BLD: 3 % (ref 0–2)
BASOPHILS ABSOLUTE: 0.06 K/UL (ref 0–0.2)
BILIRUB SERPL-MCNC: 0.78 MG/DL (ref 0.3–1.2)
BUN BLDV-MCNC: 14 MG/DL (ref 8–23)
BUN/CREAT BLD: 16 (ref 9–20)
CALCIUM SERPL-MCNC: 9.1 MG/DL (ref 8.6–10.4)
CHLORIDE BLD-SCNC: 98 MMOL/L (ref 98–107)
CO2: 37 MMOL/L (ref 20–31)
CREAT SERPL-MCNC: 0.89 MG/DL (ref 0.7–1.2)
EOSINOPHILS RELATIVE PERCENT: 0 % (ref 1–4)
GFR AFRICAN AMERICAN: >60 ML/MIN
GFR NON-AFRICAN AMERICAN: >60 ML/MIN
GFR SERPL CREATININE-BSD FRML MDRD: ABNORMAL ML/MIN/{1.73_M2}
GFR SERPL CREATININE-BSD FRML MDRD: ABNORMAL ML/MIN/{1.73_M2}
GLUCOSE BLD-MCNC: 93 MG/DL (ref 70–99)
HCT VFR BLD CALC: 36.9 % (ref 40.7–50.3)
HEMOGLOBIN: 12.1 G/DL (ref 13–17)
IMMATURE GRANULOCYTES: 3 %
LYMPHOCYTES # BLD: 22 % (ref 24–43)
MAGNESIUM: 1.4 MG/DL (ref 1.6–2.6)
MCH RBC QN AUTO: 33.3 PG (ref 25.2–33.5)
MCHC RBC AUTO-ENTMCNC: 32.8 G/DL (ref 28.4–34.8)
MCV RBC AUTO: 101.7 FL (ref 82.6–102.9)
MONOCYTES # BLD: 4 % (ref 3–12)
MORPHOLOGY: ABNORMAL
NRBC AUTOMATED: 0 PER 100 WBC
PDW BLD-RTO: 15.4 % (ref 11.8–14.4)
PLATELET # BLD: ABNORMAL K/UL (ref 138–453)
PLATELET, FLUORESCENCE: 89 K/UL (ref 138–453)
PLATELET, IMMATURE FRACTION: 6.3 % (ref 1.1–10.3)
POTASSIUM SERPL-SCNC: 3.1 MMOL/L (ref 3.7–5.3)
RBC # BLD: 3.63 M/UL (ref 4.21–5.77)
SEG NEUTROPHILS: 68 % (ref 36–65)
SEGMENTED NEUTROPHILS ABSOLUTE COUNT: 1.36 K/UL (ref 1.5–8.1)
SODIUM BLD-SCNC: 142 MMOL/L (ref 135–144)
TOTAL PROTEIN: 5.9 G/DL (ref 6.4–8.3)
WBC # BLD: 2 K/UL (ref 3.5–11.3)

## 2022-08-05 PROCEDURE — 6370000000 HC RX 637 (ALT 250 FOR IP): Performed by: STUDENT IN AN ORGANIZED HEALTH CARE EDUCATION/TRAINING PROGRAM

## 2022-08-05 PROCEDURE — 97110 THERAPEUTIC EXERCISES: CPT

## 2022-08-05 PROCEDURE — 97116 GAIT TRAINING THERAPY: CPT

## 2022-08-05 PROCEDURE — 36415 COLL VENOUS BLD VENIPUNCTURE: CPT

## 2022-08-05 PROCEDURE — 1200000000 HC SEMI PRIVATE

## 2022-08-05 PROCEDURE — 97535 SELF CARE MNGMENT TRAINING: CPT

## 2022-08-05 PROCEDURE — 97530 THERAPEUTIC ACTIVITIES: CPT

## 2022-08-05 PROCEDURE — 2580000003 HC RX 258: Performed by: STUDENT IN AN ORGANIZED HEALTH CARE EDUCATION/TRAINING PROGRAM

## 2022-08-05 PROCEDURE — 99232 SBSQ HOSP IP/OBS MODERATE 35: CPT | Performed by: PHYSICIAN ASSISTANT

## 2022-08-05 PROCEDURE — 6360000002 HC RX W HCPCS: Performed by: STUDENT IN AN ORGANIZED HEALTH CARE EDUCATION/TRAINING PROGRAM

## 2022-08-05 PROCEDURE — 85025 COMPLETE CBC W/AUTO DIFF WBC: CPT

## 2022-08-05 PROCEDURE — 2700000000 HC OXYGEN THERAPY PER DAY

## 2022-08-05 PROCEDURE — 80053 COMPREHEN METABOLIC PANEL: CPT

## 2022-08-05 PROCEDURE — 83735 ASSAY OF MAGNESIUM: CPT

## 2022-08-05 PROCEDURE — 94761 N-INVAS EAR/PLS OXIMETRY MLT: CPT

## 2022-08-05 PROCEDURE — 85055 RETICULATED PLATELET ASSAY: CPT

## 2022-08-05 RX ORDER — FUROSEMIDE 10 MG/ML
40 INJECTION INTRAMUSCULAR; INTRAVENOUS DAILY
Status: DISCONTINUED | OUTPATIENT
Start: 2022-08-06 | End: 2022-08-09

## 2022-08-05 RX ADMIN — THIAMINE HCL TAB 100 MG 100 MG: 100 TAB at 09:06

## 2022-08-05 RX ADMIN — ESCITALOPRAM 20 MG: 5 TABLET, FILM COATED ORAL at 21:24

## 2022-08-05 RX ADMIN — APIXABAN 5 MG: 5 TABLET, FILM COATED ORAL at 21:24

## 2022-08-05 RX ADMIN — AMIODARONE HYDROCHLORIDE 200 MG: 200 TABLET ORAL at 21:24

## 2022-08-05 RX ADMIN — ROPINIROLE HYDROCHLORIDE 0.5 MG: 0.25 TABLET, FILM COATED ORAL at 21:25

## 2022-08-05 RX ADMIN — PANTOPRAZOLE SODIUM 40 MG: 40 TABLET, DELAYED RELEASE ORAL at 06:56

## 2022-08-05 RX ADMIN — Medication 600 MG: at 09:05

## 2022-08-05 RX ADMIN — MULTIVITAMIN TABLET 1 TABLET: TABLET at 09:05

## 2022-08-05 RX ADMIN — ATORVASTATIN CALCIUM 80 MG: 40 TABLET, FILM COATED ORAL at 09:04

## 2022-08-05 RX ADMIN — Medication 600 MG: at 21:25

## 2022-08-05 RX ADMIN — SACUBITRIL AND VALSARTAN 0.5 TABLET: 24; 26 TABLET, FILM COATED ORAL at 21:25

## 2022-08-05 RX ADMIN — SODIUM CHLORIDE, PRESERVATIVE FREE 10 ML: 5 INJECTION INTRAVENOUS at 09:11

## 2022-08-05 RX ADMIN — SACUBITRIL AND VALSARTAN 0.5 TABLET: 24; 26 TABLET, FILM COATED ORAL at 09:05

## 2022-08-05 RX ADMIN — Medication 10 MG: at 21:25

## 2022-08-05 RX ADMIN — AMIODARONE HYDROCHLORIDE 200 MG: 200 TABLET ORAL at 09:06

## 2022-08-05 RX ADMIN — SODIUM CHLORIDE, PRESERVATIVE FREE 10 ML: 5 INJECTION INTRAVENOUS at 21:29

## 2022-08-05 RX ADMIN — FAMOTIDINE 20 MG: 20 TABLET ORAL at 21:25

## 2022-08-05 RX ADMIN — TAMSULOSIN HYDROCHLORIDE 0.4 MG: 0.4 CAPSULE ORAL at 09:06

## 2022-08-05 RX ADMIN — APIXABAN 5 MG: 5 TABLET, FILM COATED ORAL at 09:06

## 2022-08-05 RX ADMIN — FAMOTIDINE 20 MG: 20 TABLET ORAL at 09:06

## 2022-08-05 RX ADMIN — FLUDROCORTISONE ACETATE 0.3 MG: 0.1 TABLET ORAL at 09:06

## 2022-08-05 RX ADMIN — POTASSIUM CHLORIDE 20 MEQ: 1500 TABLET, EXTENDED RELEASE ORAL at 16:57

## 2022-08-05 RX ADMIN — BUSPIRONE HYDROCHLORIDE 15 MG: 5 TABLET ORAL at 21:24

## 2022-08-05 RX ADMIN — MIDODRINE HYDROCHLORIDE 10 MG: 5 TABLET ORAL at 12:27

## 2022-08-05 RX ADMIN — FOLIC ACID 1 MG: 1 TABLET ORAL at 09:06

## 2022-08-05 RX ADMIN — MIDODRINE HYDROCHLORIDE 10 MG: 5 TABLET ORAL at 09:07

## 2022-08-05 RX ADMIN — BUSPIRONE HYDROCHLORIDE 15 MG: 5 TABLET ORAL at 09:06

## 2022-08-05 RX ADMIN — MIDODRINE HYDROCHLORIDE 10 MG: 5 TABLET ORAL at 16:56

## 2022-08-05 RX ADMIN — FUROSEMIDE 40 MG: 10 INJECTION, SOLUTION INTRAMUSCULAR; INTRAVENOUS at 09:10

## 2022-08-05 RX ADMIN — POTASSIUM CHLORIDE 20 MEQ: 1500 TABLET, EXTENDED RELEASE ORAL at 09:07

## 2022-08-05 RX ADMIN — CLOPIDOGREL BISULFATE 75 MG: 75 TABLET ORAL at 09:07

## 2022-08-05 NOTE — PROGRESS NOTES
Occupational Therapy  Facility/Department: [facility] AT THE [facility] MED SURG  Daily Treatment Note  NAME: Matthew Durand  : [birth year]  MRN: [id number]    Date of Service: 2022    Discharge Recommendations:  Continue to assess pending progress         Patient Diagnosis(es): The primary encounter diagnosis was Acute respiratory failure with hypoxia ([address] A diagnosis of Acute on chronic combined systolic and diastolic CHF (congestive heart failure) (HCC) was also pertinent to this visit. Assessment    Activity Tolerance: Patient tolerated treatment well  Discharge Recommendations: Continue to assess pending progress      Plan   Plan  Times per Week: 7  Times per Day: Daily  Current Treatment Recommendations: Strengthening;Balance training;Functional mobility training; Endurance training; Safety education & training;Patient/Caregiver education & training;Equipment evaluation, education, & procurement;Home management training;Self-Care / ADL     Restrictions   none    Subjective   Subjective  Subjective: Pt in chair without 02 in nose and agreeable to ADLs sinkside. \"I dont use o2 at home, i dont know if I have to take it home now. \"  Pain: pt denied pain  Orientation  Overall Orientation Status: Within Functional Limits  Pain: Denies  Cognition  Overall Cognitive Status: WFL        Objective    Vitals     Bed Mobility Training  Bed Mobility Training: No    Balance  Sitting: Intact  Standing: Impaired  Standing - Static: Fair;Good  Standing - Dynamic: Fair;Good (pt completed ~ 5 min dynamic standing unsupported at sink for ADLs with 0 LOB noted)    Transfer Training  Transfer Training: Yes  Overall Level of Assistance: Stand-by assistance (decreased safety awareness noted)  Interventions: Verbal cues  Sit to Stand: Contact-guard assistance;Stand-by assistance  Stand to Sit: Contact-guard assistance;Stand-by assistance  Toilet Transfer: Stand-by assistance;Contact-guard assistance    Gait Training  Gait Training: Yes  Overall Level of Assistance: Contact-guard assistance;Stand-by assistance  Interventions: Verbal cues  Speed/Andreina: Slow  Distance (ft):  (to/from restroom)  Assistive Device: Other (comment) (sans AD, \"I use a RW if I walk outside. \")     ADL  Grooming: Setup;Stand by assistance  UE Bathing: Setup;Stand by assistance  LE Bathing: Setup;Stand by assistance  UE Dressing: Setup;Stand by assistance   LE Dressing: Setup;Stand by assistance (Min cueing for brief management as pt is unfamiliar)  Toileting: Stand by assistance  Additional Comments: Pt stood unsupported at sink for UB ADLs and oral hygiene/hair care and sat on toilet for karla area cleaning and clothing initiation SBA overall with 0 LOB noted        Safety Devices  Type of Devices: All fall risk precautions in place;Call light within reach; Chair alarm in place; Left in chair     Patient Education  Education Given To: Patient  Education Provided: Role of Therapy;Plan of Care; Fall Prevention Strategies;Transfer Training;ADL Adaptive Strategies  Education Method: Demonstration;Verbal  Barriers to Learning: None  Education Outcome: Verbalized understanding;Demonstrated understanding (decreased safety awareness)    Goals  Short Term Goals  Time Frame for Short term goals: 21 visits  Short Term Goal 1: Patient to be educated on d/c folder, AE/DME and EC/WS techniques to ensure safe and indep return home. Short Term Goal 2: Patient to complete ADL routine c Mod I c AE/DME and implementation of EC/WS techniques as needed to ensure safe return home.   Short Term Goal 3: Patient will tolerate 15 mins of BUE ther ex/act to increase overall strength/activity tolerance for functional tasks       Therapy Time   Individual Concurrent Group Co-treatment   Time In 1117         Time Out 1149         Minutes 32                 MALIK Sarkar

## 2022-08-05 NOTE — PROGRESS NOTES
Physical Therapy  Facility/Department: [facility] AT THE [facility] MED SURG  Daily Treatment Note  NAME: Alejandra Kim  :   MRN: [id number]    Date of Service: 2022    Discharge Recommendations:  Continue to assess pending progress, Home with Home health PT, Outpatient PT        Patient Diagnosis(es): The primary encounter diagnosis was Acute respiratory failure with hypoxia ([address] A diagnosis of Acute on chronic combined systolic and diastolic CHF (congestive heart failure) (HCC) was also pertinent to this visit. Assessment   Activity Tolerance: Patient tolerated evaluation without incident     Plan    Plan  Plan: 2 times a day 7 days a week  Plan weeks: 20 days  Current Treatment Recommendations: Strengthening;Balance training;Functional mobility training;Transfer training; Endurance training;Gait training;Stair training;Neuromuscular re-education;Home exercise program;Safety education & training;Positioning; Therapeutic activities     Restrictions  Restrictions/Precautions  Restrictions/Precautions: General Precautions     Subjective    Subjective  Subjective: Pt in bed and agreeable to therapy  Pain: Denies  Orientation  Overall Orientation Status: Within Functional Limits  Cognition  Overall Cognitive Status: WFL     Objective   Vitals     Bed Mobility Training  Bed Mobility Training: Yes  Overall Level of Assistance: Contact-guard assistance;Stand-by assistance  Interventions: Safety awareness training;Verbal cues  Rolling: Stand-by assistance  Supine to Sit: Stand-by assistance  Transfer Training  Transfer Training: Yes  Overall Level of Assistance: Contact-guard assistance;Stand-by assistance  Interventions: Safety awareness training;Verbal cues  Sit to Stand: Contact-guard assistance;Stand-by assistance  Stand to Sit: Contact-guard assistance;Stand-by assistance  Toilet Transfer: Stand-by assistance;Contact-guard assistance  Gait Training  Gait Training: Yes  Gait  Overall Level of Assistance: Contact-guard assistance;Stand-by assistance  Interventions: Verbal cues; Safety awareness training  Speed/Andreina: Slow  Distance (ft): 60 Feet     PT Exercises  Exercise Treatment: Reclined and seated exercises B LE x20     Safety Devices  Type of Devices: Call light within reach; Chair alarm in place; Left in chair       Goals  Short Term Goals  Time Frame for Short term goals: 20 days  Short term goal 1: Pt to be able to ambulate 50 ft IND to be able to retrun home safely. Short term goal 2: Pt to be IND with all transfers to improve functional mobility and safety of transfers  Short term goal 3: Pt to be able to tolerate 20-30 mins therex/act to improve functional capacity for ADLs.   Patient Goals   Patient goals : to get back home    Education  Patient Education  Education Given To: Patient  Education Provided: Role of Therapy;Plan of Care  Education Method: Verbal  Barriers to Learning: None  Education Outcome: Verbalized understanding    Therapy Time   Individual Concurrent Group Co-treatment   Time In 0920         Time Out 0945         Minutes [address] [facility]

## 2022-08-05 NOTE — PROGRESS NOTES
Patient: Mellisa Rush  :   Date of Admission: 8/3/2022  Primary Care Physician: Daniel Mitchell  Today's Date: 2022    REASON FOR CONSULTATION: atrial fibrillation with RVR    HPI:  Mr. Elden Cheadle is a 70 y.o. male who was admitted to the hospital on 2022 for weakness and shortness of breath. In the ER he was found to be in atrial fibrillation with RVR leading to a consultation. He was then readmitted due to severe dehydration. In the past he reports seeing a Cardiologist in [place] and was planning on having a cardioversion done in  to put him back into normal sinus rhythm due to his history of new onset atrial fibrillation. He reports a history of a heart attack at age 46 and needed stents placed at that time but denies any cardiac cath since that time. He also has carotid artery stenosis and abdominal aortic aneurysm. He did report having surgery on one side of his neck, however he is not sure which side or how long ago it was. He has had hypertension and hyperlipidemia for years as well. He is a current every day smoker and he smoked for about 55 years and smokes about a pack a day. Family history consists of a lot of people in his family with significant cardiac history, however he wanted us to talk to his sister about the family history as she knows the details. Echo done on 2022 showed an EF of 15-20%. Echo done on 2022: Compared to the previous study of 2022, no significant change in ejection fraction. Functional mitral and tricuspid regurgitation are worse. Bilateral pleural effusion and left elevated left ventricular filling pressure along with dilated IVC suggest worsening fluid overload. Mr. Elden Cheadle was admitted for what appears to be a heart failure exacerbation as well as increased generalized weakness.  He says that what ultimately led to his admission was a 2 day history of increasing shortness of breath followed by a fall when he was trying to pet his cat. Today Mr. Rebeka Ewing  reports today he is doing well. He slept well last night. He denies any chest pain now or in the recent past. He continues to have rib pain from his fall. Denies any medication changes, diarrhea or constipation. He also denied any abdominal pain, bleeding problems, problems with his medications or any other concerns at this time. He has no bowel issues at this time. No nausea or vomiting. No fever, cough or chills. -5240 mL     Past Medical History:   Diagnosis Date    Acute kidney injury ([address]     Ascites 2022    Atrial fibrillation (HCC)     CHF (congestive heart failure) ([address]     Cirrhosis of liver with ascites ([facility] 2022    CKD (chronic kidney disease) 2022    Coronary artery disease     Hyperlipidemia     Hypertension     Hypomagnesemia 2022    Hyponatremia     Thrombocytopenia (HCC)        CURRENT ALLERGIES: Patient has no known allergies. REVIEW OF SYSTEMS: 14 systems were reviewed. Pertinent positives and negatives as above, all else negative. Past Surgical History:   Procedure Laterality Date    CAROTID STENT PLACEMENT Bilateral     HERNIA REPAIR      ROTATOR CUFF REPAIR Right     TONSILLECTOMY AND ADENOIDECTOMY      Social History:  Social History     Tobacco Use    Smoking status: Former     Packs/day: 1.00     Years: 56.00     Pack years: 56.00     Types: Cigarettes     Quit date: 2022     Years since quittin.1    Smokeless tobacco: Never   Vaping Use    Vaping Use: Never used   Substance Use Topics    Alcohol use: Not Currently     Alcohol/week: 2.0 standard drinks     Types: 2 Shots of liquor per week     Comment: Drinks about 2 Kesslers with mix nightly. Reported history of heavy alcohol use for years with both beer and liquior several years ago    Drug use: Not Currently        CURRENT MEDICATIONS:  No outpatient medications have been marked as taking for the 8/3/22 encounter [facility] Encounter).      FAMILY HISTORY: Reviewed but non-contributory     PHYSICAL EXAM:   /70 (Site: Right Upper Arm, Position: Sitting, Cuff Size: Medium Adult)   Pulse 58   Resp 18   Ht 5' 6\" (1.676 m)   Wt 176 lb (79.8 kg)   SpO2 94%   BMI 28.41 kg/m²  Body mass index is 28.41 kg/m². Constitutional: He is oriented to person, place, and time. He appears well-developed and well-nourished. In no acute distress. HEENT: Normocephalic and atraumatic. No JVD present. Carotid bruit is not present. No mass and no thyromegaly present. No lymphadenopathy present. Cardiovascular: Bradycardic rate, regular rhythm, normal heart sounds. Exam reveals no gallop and no friction rubs. No murmur was heard. Pulmonary/Chest: Effort normal and breath sounds normal. No respiratory distress. He has no wheezes, rhonchi or rales. Abdominal: Soft, non-tender. Bowel sounds and aorta are normal. He exhibits no organomegaly, mass or bruit. Extremities: Trace. No cyanosis or clubbing. 2+ radial and carotid pulses. Distal extremity pulses: 2+ bilaterally. Neurological: He is alert and oriented to person, place, and time. No evidence of gross cranial nerve deficit. Coordination appeared normal.   Skin: Skin is warm and dry. There is no rash or diaphoresis. Psychiatric: He has a normal mood and affect.  His speech is normal and behavior is normal.        MOST RECENT LABS ON RECORD:   Lab Results   Component Value Date    WBC 2.0 (L) 08/05/2022    HGB 12.1 (L) 08/05/2022    HCT 36.9 (L) 08/05/2022    PLT See Reflexed IPF Result 08/05/2022    ALT 21 08/05/2022    AST 26 08/05/2022     08/05/2022    K 3.1 (L) 08/05/2022    CL 98 08/05/2022    CREATININE 0.89 08/05/2022    BUN 14 08/05/2022    CO2 37 (H) 08/05/2022    TSH 5.77 (H) 07/25/2022    INR 1.4 08/03/2022     ASSESSMENT:  Patient Active Problem List    Diagnosis Date Noted    Acute respiratory failure with hypoxia ([address] 08/04/2022    Encounter for current long-term use of anticoagulants 08/04/2022    Chronic combined systolic (congestive) and diastolic (congestive) heart failure (HCC)     Dehydration, moderate     Medication side effect, initial encounter     History of acute inferior wall MI     Ischemic cardiomyopathy     Nonischemic cardiomyopathy ([facility] 08/04/2022    Hypoxia 08/03/2022    PAF (paroxysmal atrial fibrillation) ([facility] 07/19/2022    Lightheaded 07/19/2022    Tobacco abuse counseling 07/19/2022    Gastroesophageal reflux disease 07/19/2022    Hypotension 07/19/2022    Chronic combined systolic and diastolic congestive heart failure ([facility] 07/19/2022    Hypokalemia 07/19/2022    Hypovitaminosis D 07/19/2022    Severe malnutrition ([facility] 06/07/2022    Hypomagnesemia 05/20/2022    Ascites 05/20/2022    CKD (chronic kidney disease) 05/20/2022    Cirrhosis of liver with ascites ([facility] 05/19/2022    Acute on chronic combined systolic and diastolic CHF, NYHA class 4 (HCC)     Coronary artery disease     Hypertension     Thrombocytopenia (HCC)     Hyponatremia     Hyperlipidemia     Atrial fibrillation with RVR ([facility] 05/18/2022      PLAN:    History of Hypotension/ Lightheadedness/dizziness: One recent fall at home but current appears well controlled. Continue to monitor   Continue Florinef (fludrocortisone) 0.3 mg daily. Beta Blocker: Continue Metoprolol succinate (Toprol XL) 25 mg daily. Diuretics: Recently we DECREASE to bumetinide (Bumex) 1 mg every morning. and stopped the additional Bumex three times a week but I suspect that this is what led to his heart failure exacerbation so I expect that we may need to go back at least to this regimine prior to discharge  Decrease to Lasix 40 mg IV once daily, anticipate transition to oral medications tomorrow. Nonpharmacologic counseling: Because of his condition, I reminded him to try and keep himself well-hydrated and to take extra time when moving from laying to sitting, sitting to standing and standing to walking.  I also explained to him to help improve his symptoms he should include 3 g sodium diet, 1 or 2 L of sports drinks daily, knee-high compressions stockings. Paroxysmal Atrial Fibrillation: Rhythm Control Asymptomatic   Beta Blocker: Continue Metoprolol succinate (Toprol XL) 25 mg daily. Anti-Arrhythmic: Continue amiodarone (Pacerone) 200 mg BID. Monitoring: Since they will being maintained on Amiodarone, I told them that we will need to closely monitor them for potential side effects. These include monitoring LFTs and TSH at least every 6 months as well as chest x-rays, pulmonary function tests, and eye exams at least yearly. [id number] Score for Atrial Fibrillation Stroke Risk   Risk   Factors  Component Value   C CHF Yes 1   H HTN Yes 1   A2 Age >= 76 No,  (75 y.o.) 0   D DM No 0   S2 Prior Stroke/TIA No 0   V Vascular Disease No 0   A Age 74-69 Yes,  (75 y.o.) 1   Sc Sex male 0    [id number]  Score  3   Score last updated 7/1/69 3:09 PM EDT  Click here for a link to the UpToDate guideline \"Atrial Fibrillation: Anticoagulation therapy to prevent embolization  Disclaimer: Risk Score calculation is dependent on accuracy of patient problem list and past encounter diagnosis. Stroke Risk: CHADS2-VASc Score: 3/9 (3.2% stroke risk). Because of his atrial fibrillation, I also would also recommend that he continue with anticoagulation to decrease his risk of stoke but also reminded him to watch for signs of blood in his stool or black tarry stools and stop the medication immediately if this develops as this could be life threatening. Anticoagulation: Continue Apixaban (Eliquis) 5 mg every 12 hours. Acute on chronic combined heart failure: Ischemic Cardiomyopathy, Echo done on 5/18/2022 which showed an EF of 15-20%. We recently decreased his Bumex from 2 mg to 1 mg due to his falling and I suspect this is what caused his heart failure exacerbation. Beta Blocker: Continue Metoprolol succinate (Toprol XL) 25 mg daily.   ACE Inibitor/ARB: Continue sacubitril/valsartan (Entresto) 24/26 mg 1/2 a tablet twice daily. Diuretics: DECREASE to furosemide (Lasix) 40 mg IV every morning. Anticipate switching to oral medications tomorrow morning. Recently we DECREASED his bumetinide to (Bumex) 1 mg every morning and stopped the additional Bumex three times a week but I suspect that this is what led to his heart failure exacerbation so I expect that we may need to go back at least to this regimine prior to discharge  Heart failure counseling: I advised them to try and keep their legs up whenever possible and to limit salt in their diet. Life Vest: Although I do not think it is a bad idea for him to wear the Life Vest in the hospital, I am actually ok if he does not wear it as long as he is on Telemetry but will leave this up to Mr. Blayne Hurley . Tobacco Abuse Counseling: Will discuss further prior to discharge home    Once again, thank you for allowing me to participate in this patients care. Please do not hesitate to contact me could I be of further assistance. We will follow up in 1 week outpatient. Sincerely,  Meme Hoover PA-C  [facility] Cardiology Specialist    [address], [address]  Phone: [phone], Fax: [phone]     I believe that the risk of significant morbidity and mortality related to the patient's current medical conditions are: intermediate-high.          August 5, 2022

## 2022-08-05 NOTE — PROGRESS NOTES
Pt. Is alert and oriented at time of assessment. Pt. Denies any pain. Pt. Vitals are WDL. Pt. Has call light in reach and denies any further needs at this time.

## 2022-08-05 NOTE — CONSULTS
Mauricio Gilbert am scribing for and in the presence of Fallon Briseno MD, MS, F.A.C.C..    Patient: Yesy Verdugo  : [id number]  Date of Admission: 8/3/2022  Primary Care Physician: Emely Green  Today's Date: 2022    REASON FOR CONSULTATION: atrial fibrillation with RVR    HPI:  Mr. Debby Rene is a 70 y.o. male who was admitted to the hospital on 2022 for weakness and shortness of breath. In the ER he was found to be in atrial fibrillation with RVR leading to a consultation. He was then readmitted due to severe dehydration. In the past he reports seeing a Cardiologist in [place] and was planning on having a cardioversion done in  to put him back into normal sinus rhythm due to his history of new onset atrial fibrillation. He reports a history of a heart attack at age 46 and needed stents placed at that time but denies any cardiac cath since that time. He also has carotid artery stenosis and abdominal aortic aneurysm. He did report having surgery on one side of his neck, however he is not sure which side or how long ago it was. He has had hypertension and hyperlipidemia for years as well. He is a current every day smoker and he smoked for about 55 years and smokes about a pack a day. Family history consists of a lot of people in his family with significant cardiac history, however he wanted us to talk to his sister about the family history as she knows the details. Echo done on 2022 showed an EF of 15-20%. Mr. Debby Rene was admitted for what appears to be a heart failure exacerbation as well as increased generalized weakness. He says that what ultimately led to his admission was a 2 day history of increasing shortness of breath followed by a fall when he was trying to pet his cat. He denies any chest pain now or in the recent past. Denies any medication changes, diarrhea or constipation.  He also denied any abdominal pain, bleeding problems, problems with his medications or any other concerns at this time. He has no bowel issues at this time. No nausea or vomiting. No fever, cough or chills. Past Medical History:   Diagnosis Date    Acute kidney injury ([facility]     Ascites 2022    Atrial fibrillation (HCC)     CHF (congestive heart failure) (HCC)     Cirrhosis of liver with ascites ([facility] 2022    CKD (chronic kidney disease) 2022    Coronary artery disease     Hyperlipidemia     Hypertension     Hypomagnesemia 2022    Hyponatremia     Thrombocytopenia (HCC)        CURRENT ALLERGIES: Patient has no known allergies. REVIEW OF SYSTEMS: 14 systems were reviewed. Pertinent positives and negatives as above, all else negative. Past Surgical History:   Procedure Laterality Date    CAROTID STENT PLACEMENT Bilateral     HERNIA REPAIR      ROTATOR CUFF REPAIR Right     TONSILLECTOMY AND ADENOIDECTOMY      Social History:  Social History     Tobacco Use    Smoking status: Former     Packs/day: 1.00     Years: 56.00     Pack years: 56.00     Types: Cigarettes     Quit date: 2022     Years since quittin.1    Smokeless tobacco: Never   Vaping Use    Vaping Use: Never used   Substance Use Topics    Alcohol use: Not Currently     Alcohol/week: 2.0 standard drinks     Types: 2 Shots of liquor per week     Comment: Drinks about 2 Kesslers with mix nightly. Reported history of heavy alcohol use for years with both beer and liquior several years ago    Drug use: Not Currently        CURRENT MEDICATIONS:  No outpatient medications have been marked as taking for the 8/3/22 encounter [facility] Encounter). FAMILY HISTORY: Reviewed but non-contributory     PHYSICAL EXAM:   /65   Pulse 52   Temp 98 °F (36.7 °C) (Temporal)   Resp 20   Ht 5' 6\" (1.676 m)   Wt 175 lb 4.8 oz (79.5 kg)   SpO2 99%   BMI 28.29 kg/m²  Body mass index is 28.29 kg/m².     [ INSTRUCTIONS:  \"[x]\" Indicates a positive item  \"[]\" Indicates a negative item   Vital Signs: (As obtained by patient/caregiver or practitioner observation)  No flowsheet data found. Constitutional: [x] Appears well-developed and well-nourished [x] No apparent distress      [] Abnormal-   Mental status  [x] Alert and awake  [x] Oriented to person/place/time [x]Able to follow commands      Eyes:  EOM    [x]  Normal  [] Abnormal-  Sclera  [x]  Normal  [] Abnormal -         Discharge [x]  None visible  [] Abnormal -    HENT:   [x] Normocephalic, atraumatic.   [] Abnormal   [] Mouth/Throat: Mucous membranes are moist.     External Ears [x] Normal  [] Abnormal-     Neck: [x] No visualized mass     Pulmonary/Chest: [x] Respiratory effort normal.  [x] No visualized signs of difficulty breathing or respiratory distress        [] Abnormal-     Neurological:        [x] No Facial Asymmetry (Cranial nerve 7 motor function) (limited exam to video visit)          [] No gaze palsy        [] Abnormal-         Skin:        [x] No significant exanthematous lesions or discoloration noted on facial skin         [] Abnormal-            Psychiatric:       [x] Normal Affect [] No Hallucinations        [] Abnormal-     Other pertinent observable physical exam findings: None       MOST RECENT LABS ON RECORD:   Lab Results   Component Value Date    WBC 3.0 (L) 08/04/2022    HGB 12.8 (L) 08/04/2022    HCT 39.5 (L) 08/04/2022    PLT See Reflexed IPF Result 08/04/2022    ALT 23 08/04/2022    AST 26 08/04/2022     08/04/2022    K 4.0 08/04/2022    CL 98 08/04/2022    CREATININE 0.96 08/04/2022    BUN 14 08/04/2022    CO2 34 (H) 08/04/2022    TSH 5.77 (H) 07/25/2022    INR 1.4 08/03/2022     ASSESSMENT:  Patient Active Problem List    Diagnosis Date Noted    Chronic combined systolic (congestive) and diastolic (congestive) heart failure (HCC)     Dehydration, moderate     Medication side effect, initial encounter     History of acute inferior wall MI     Ischemic cardiomyopathy     Nonischemic cardiomyopathy ([facility] 08/04/2022    Hypoxia 08/03/2022    PAF (paroxysmal atrial fibrillation) ([facility] 07/19/2022    Lightheaded 07/19/2022    Tobacco abuse counseling 07/19/2022    Gastroesophageal reflux disease 07/19/2022    Hypotension 07/19/2022    Chronic combined systolic and diastolic congestive heart failure ([facility] 07/19/2022    Hypokalemia 07/19/2022    Hypovitaminosis D 07/19/2022    Severe malnutrition ([facility] 06/07/2022    Hypomagnesemia 05/20/2022    Ascites 05/20/2022    CKD (chronic kidney disease) 05/20/2022    Cirrhosis of liver with ascites ([facility] 05/19/2022    Acute on chronic combined systolic and diastolic CHF, NYHA class 4 (HCC)     Coronary artery disease     Hypertension     Thrombocytopenia (HCC)     Hyponatremia     Hyperlipidemia     Atrial fibrillation with RVR ([facility] 05/18/2022      PLAN:      History of Hypotension/ Lightheadedness/dizziness: One recent fall at home but current appears well controlled. Continue to monitor   Continue Florinef (fludrocortisone) 0.3 mg daily. Beta Blocker: Continue Metoprolol succinate (Toprol XL) 25 mg daily. Diuretics: Recently we DECREASE to bumetinide (Bumex) 1 mg every morning. and stopped the additional Bumex three times a week but I suspect that this is what led to his heart failure exacerbation so I expect that we may need to go back at least to this regimine prior to discharge  Nonpharmacologic counseling: Because of his condition, I reminded him to try and keep himself well-hydrated and to take extra time when moving from laying to sitting, sitting to standing and standing to walking. I also explained to him to help improve his symptoms he should include 3 g sodium diet, 1 or 2 L of sports drinks daily, knee-high compressions stockings. Paroxysmal Atrial Fibrillation: Rhythm Control Asymptomatic   Beta Blocker: Continue Metoprolol succinate (Toprol XL) 25 mg daily. Anti-Arrhythmic: Continue amiodarone (Pacerone) 200 mg BID.  Monitoring: Since they will being maintained on Amiodarone, I told them that we will need to closely monitor them for potential side effects. These include monitoring LFTs and TSH at least every 6 months as well as chest x-rays, pulmonary function tests, and eye exams at least yearly. [id number] Score for Atrial Fibrillation Stroke Risk   Risk   Factors  Component Value   C CHF Yes 1   H HTN Yes 1   A2 Age >= 76 No,  (75 y.o.) 0   D DM No 0   S2 Prior Stroke/TIA No 0   V Vascular Disease No 0   A Age 74-69 Yes,  (75 y.o.) 1   Sc Sex male 0    [id number]  Score  3   Score last updated 6/7/43 2:55 PM EDT  Click here for a link to the UpToDate guideline \"Atrial Fibrillation: Anticoagulation therapy to prevent embolization  Disclaimer: Risk Score calculation is dependent on accuracy of patient problem list and past encounter diagnosis. Stroke Risk: CHADS2-VASc Score: 3/9 (3.2% stroke risk). Because of his atrial fibrillation, I also would also recommend that he continue with anticoagulation to decrease his risk of stoke but also reminded him to watch for signs of blood in his stool or black tarry stools and stop the medication immediately if this develops as this could be life threatening. Anticoagulation: Continue Apixaban (Eliquis) 5 mg every 12 hours. Acute on chronic combined heart failure: Ischemic Cardiomyopathy, Echo done on 5/18/2022 which showed an EF of 15-20%. We recently decreased his Bumex from 2 mg to 1 mg due to his falling and I suspect this is what caused his heart failure exacerbation. Beta Blocker: Continue Metoprolol succinate (Toprol XL) 25 mg daily. ACE Inibitor/ARB: Continue sacubitril/valsartan (Entresto) 24/26 mg 1/2 a tablet twice daily. Diuretics: Continue furosemide (Lasix) 40 mg IV 2 times daily. However, I suspect that with him being 5 liters negative we may want to cut that back to once daily as soon as tomorrow.  Recently we DECREASED his bumetinide to (Bumex) 1 mg every morning and stopped the additional Bumex three times a week but I suspect that this is what led to his heart failure exacerbation so I expect that we may need to go back at least to this regimine prior to discharge  Heart failure counseling: I advised them to try and keep their legs up whenever possible and to limit salt in their diet. Life Vest: Although I do not think it is a bad idea for him to wear the Life Vest in the hospital, I am actually ok if he does not wear it as long as he is on Telemetry but will leave this up to Mr. Neel Box . Tobacco Abuse Counseling: Will discuss further prior to discharge home    Once again, thank you for allowing me to participate in this patients care. Please do not hesitate to contact me could I be of further assistance. Sincerely,  Angelena Rubinstein MD, MS, F.A.C.C. [facility] Cardiology Specialist    [address], [address] [facility]  Phone: [phone], Fax: [phone]     I believe that the risk of significant morbidity and mortality related to the patient's current medical conditions are: intermediate-high. August 4, 2022      Bhargavi Goncalves is a 70 y.o. male being evaluated by a Virtual Visit (video visit) encounter to address concerns as mentioned above. A caregiver was present when appropriate. Due to this being a TeleHealth encounter (During MRLRX-09 public health emergency), evaluation of the following organ systems was limited: Vitals/Constitutional/EENT/Resp/CV/GI//MS/Neuro/Skin/Heme-Lymph-Imm. Pursuant to the emergency declaration under the [facility], [address] [place] [address] authority and the [employer] Act, this Virtual Visit was conducted with patient's (and/or legal guardian's) consent, to reduce the patient's risk of exposure to COVID-19 and provide necessary medical care.   The patient (and/or legal guardian) has also been advised to contact this office for worsening conditions or problems, and seek emergency medical treatment and/or call 911 if deemed necessary. Services were provided through a video synchronous discussion virtually to substitute for in-person visit. Mr. Skylar Madsen was located in the patients hospital room in [address] [place] [address]  Melonie Yeh MD performed the visit from my home in [facility]    --Melonie Yeh MD on 8/4/2022 at 9:53 PM    An electronic signature was used to authenticate this note.

## 2022-08-05 NOTE — PROGRESS NOTES
Progress Note    SUBJECTIVE:    Patient seen for f/u of Acute on chronic combined systolic and diastolic congestive heart failure ([address] He alert oriented no acute distress. Patient states he feels much better. ROS:   Constitutional: negative  for fevers, and negative for chills. Respiratory: negative for shortness of breath, negative for cough, and negative for wheezing  Cardiovascular: negative for chest pain, and negative for palpitations  Gastrointestinal: negative for abdominal pain, negative for nausea,negative for vomiting, negative for diarrhea, and negative for constipation     All other systems were reviewed with the patient and are negative unless otherwise stated in HPI      OBJECTIVE:      Vitals:   Vitals:    08/05/22 0645   BP: (!) 142/71   Pulse: 52   Resp: 18   Temp: 97.5 °F (36.4 °C)   SpO2: 90%     Weight: 172 lb 11.2 oz (78.3 kg)   Height: 5' 6\" (167.6 cm)     Weight  Wt Readings from Last 3 Encounters:   08/05/22 172 lb 11.2 oz (78.3 kg)   07/28/22 176 lb (79.8 kg)   07/19/22 181 lb (82.1 kg)     Body mass index is 27.87 kg/m². 24HR INTAKE/OUTPUT:      Intake/Output Summary (Last 24 hours) at 8/5/2022 1213  Last data filed at 8/5/2022 4801  Gross per 24 hour   Intake 1350 ml   Output 1075 ml   Net 275 ml     -----------------------------------------------------------------  Exam:    GEN:    Awake, alert and oriented x3. EYES:  EOMI, pupils equal   NECK: Supple. No lymphadenopathy. No carotid bruit  CVS:    regular rate and rhythm, no audible murmur  PULM:  CTA, no wheezes, rales or rhonchi, no acute respiratory distress  ABD:    Bowels sounds normal.  Abdomen is soft. No distention. no tenderness to palpation. EXT:   no edema bilaterally . No calf tenderness. NEURO: Moves all extremities. Motor and sensory are grossly intact  SKIN:  No rashes.   No skin lesions.    -----------------------------------------------------------------    Diagnostic Data:      Complete Blood Count: Recent Labs     08/03/22  1220 08/04/22  0635 08/05/22  0620   WBC 3.1* 3.0* 2.0*   RBC 3.83* 3.84* 3.63*   HGB 12.7* 12.8* 12.1*   HCT 40.1* 39.5* 36.9*   .7* 102.9 101.7   MCH 33.2 33.3 33.3   MCHC 31.7 32.4 32.8   RDW 15.7* 15.3* 15.4*   PLT See Reflexed IPF Result See Reflexed IPF Result See Reflexed IPF Result        Last 3 Blood Glucose:   Recent Labs     08/03/22  1220 08/04/22  0635 08/05/22  0620   GLUCOSE 104* 107* 93        Comprehensive Metabolic Profile:   Recent Labs     08/03/22  1220 08/04/22  0635 08/05/22  0620    142 142   K 3.6* 4.0 3.1*    98 98   CO2 31 34* 37*   BUN 16 14 14   CREATININE 1.10 0.96 0.89   GLUCOSE 104* 107* 93   CALCIUM 9.8 9.5 9.1   PROT  --  6.3* 5.9*   LABALBU  --  3.8 3.6   BILITOT  --  1.03 0.78   ALKPHOS  --  103 95   AST  --  26 26   ALT  --  23 21        Urinalysis:   Lab Results   Component Value Date/Time    NITRU NEGATIVE 07/27/2022 03:15 PM    COLORU Yellow 07/27/2022 03:15 PM    PHUR 7.5 07/27/2022 03:15 PM    WBCUA 0 TO 2 07/27/2022 03:15 PM    RBCUA 0 TO 2 07/27/2022 03:15 PM    MUCUS TRACE 07/27/2022 03:15 PM    BACTERIA 1+ 07/27/2022 03:15 PM    SPECGRAV 1.010 07/27/2022 03:15 PM    LEUKOCYTESUR NEGATIVE 07/27/2022 03:15 PM    UROBILINOGEN Normal 07/27/2022 03:15 PM    BILIRUBINUR NEGATIVE 07/27/2022 03:15 PM    GLUCOSEU NEGATIVE 07/27/2022 03:15 PM    KETUA NEGATIVE 07/27/2022 03:15 PM       HgBA1c:  No results found for: LABA1C    Lactic Acid:   Lab Results   Component Value Date/Time    LACTA 1.9 05/18/2022 02:35 PM        Troponin: No results for input(s): TROPONINI in the last 72 hours. CRP:  No results for input(s): CRP in the last 72 hours. Radiology/Imaging:  XR CHEST (2 VW)   Final Result   Interval increasing left pleural effusion. Cardiomegaly and vascular   congestion with probable underlying atelectasis.          XR CHEST PORTABLE   Final Result   Overall findings most consistent with decompensated CHF with mild pulmonary   edema. Probable bibasilar subsegmental atelectasis and pleural effusions. However,   underlying infiltrates not excluded.                ASSESSMENT / PLAN:  Acute on chronic combined systolic and diastolic congestive heart failure (HCC)  Continue current therapy   Appreciate cardiology  IV Lasix decreased to daily  Hold Bumex  Continue for now  Teds  Daily weights  CHF education given by myself information given  Acute respiratory failure with hypoxia  Wean oxygen  Out of bed with meals  COPD  Monitor for need for nebs  CKD  Trend labs  Caution with IV diuretics  Monitor output  Nonischemic cardiomyopathy  Appreciate cardiology  Continue with LifeVest  PAF  Continue amiodarone, Eliquis, Toprol-XL  Nutrition status:   obesity, non-morbid  Dietician consult initiated  Hospital Prophylaxis:   DVT: Eliquis   Stress Ulcer: H2 Blocker   High risk medications: none   Disposition:    Discharge plan is home [address], APRN - CNP , APRN, NP-C  Hospitalist Medicine        8/5/2022, 12:13 PM

## 2022-08-06 LAB
ABSOLUTE EOS #: 0 K/UL (ref 0–0.44)
ABSOLUTE IMMATURE GRANULOCYTE: 0.02 K/UL (ref 0–0.3)
ABSOLUTE LYMPH #: 0.4 K/UL (ref 1.1–3.7)
ABSOLUTE MONO #: 0.04 K/UL (ref 0.1–1.2)
ALBUMIN SERPL-MCNC: 3.6 G/DL (ref 3.5–5.2)
ALBUMIN/GLOBULIN RATIO: 1.6 (ref 1–2.5)
ALP BLD-CCNC: 96 U/L (ref 40–129)
ALT SERPL-CCNC: 40 U/L (ref 5–41)
ANION GAP SERPL CALCULATED.3IONS-SCNC: 7 MMOL/L (ref 9–17)
AST SERPL-CCNC: 49 U/L
BASOPHILS # BLD: 1 % (ref 0–2)
BASOPHILS ABSOLUTE: 0.02 K/UL (ref 0–0.2)
BILIRUB SERPL-MCNC: 0.65 MG/DL (ref 0.3–1.2)
BUN BLDV-MCNC: 20 MG/DL (ref 8–23)
BUN/CREAT BLD: 22 (ref 9–20)
CALCIUM SERPL-MCNC: 9 MG/DL (ref 8.6–10.4)
CHLORIDE BLD-SCNC: 98 MMOL/L (ref 98–107)
CO2: 35 MMOL/L (ref 20–31)
CREAT SERPL-MCNC: 0.92 MG/DL (ref 0.7–1.2)
EOSINOPHILS RELATIVE PERCENT: 0 % (ref 1–4)
GFR AFRICAN AMERICAN: >60 ML/MIN
GFR NON-AFRICAN AMERICAN: >60 ML/MIN
GFR SERPL CREATININE-BSD FRML MDRD: ABNORMAL ML/MIN/{1.73_M2}
GFR SERPL CREATININE-BSD FRML MDRD: ABNORMAL ML/MIN/{1.73_M2}
GLUCOSE BLD-MCNC: 95 MG/DL (ref 70–99)
HCT VFR BLD CALC: 36.2 % (ref 40.7–50.3)
HEMOGLOBIN: 11.5 G/DL (ref 13–17)
IMMATURE GRANULOCYTES: 1 %
LYMPHOCYTES # BLD: 18 % (ref 24–43)
MAGNESIUM: 1.5 MG/DL (ref 1.6–2.6)
MCH RBC QN AUTO: 32.7 PG (ref 25.2–33.5)
MCHC RBC AUTO-ENTMCNC: 31.8 G/DL (ref 28.4–34.8)
MCV RBC AUTO: 102.8 FL (ref 82.6–102.9)
MONOCYTES # BLD: 2 % (ref 3–12)
MORPHOLOGY: NORMAL
NRBC AUTOMATED: 0 PER 100 WBC
PDW BLD-RTO: 15.5 % (ref 11.8–14.4)
PLATELET # BLD: ABNORMAL K/UL (ref 138–453)
PLATELET, FLUORESCENCE: 77 K/UL (ref 138–453)
PLATELET, IMMATURE FRACTION: 6.2 % (ref 1.1–10.3)
POTASSIUM SERPL-SCNC: 3.3 MMOL/L (ref 3.7–5.3)
RBC # BLD: 3.52 M/UL (ref 4.21–5.77)
SEG NEUTROPHILS: 78 % (ref 36–65)
SEGMENTED NEUTROPHILS ABSOLUTE COUNT: 1.72 K/UL (ref 1.5–8.1)
SODIUM BLD-SCNC: 140 MMOL/L (ref 135–144)
TOTAL PROTEIN: 5.8 G/DL (ref 6.4–8.3)
WBC # BLD: 2.2 K/UL (ref 3.5–11.3)

## 2022-08-06 PROCEDURE — 80053 COMPREHEN METABOLIC PANEL: CPT

## 2022-08-06 PROCEDURE — 6360000002 HC RX W HCPCS: Performed by: PHYSICIAN ASSISTANT

## 2022-08-06 PROCEDURE — 83735 ASSAY OF MAGNESIUM: CPT

## 2022-08-06 PROCEDURE — 85025 COMPLETE CBC W/AUTO DIFF WBC: CPT

## 2022-08-06 PROCEDURE — 97110 THERAPEUTIC EXERCISES: CPT

## 2022-08-06 PROCEDURE — 36415 COLL VENOUS BLD VENIPUNCTURE: CPT

## 2022-08-06 PROCEDURE — 2580000003 HC RX 258: Performed by: STUDENT IN AN ORGANIZED HEALTH CARE EDUCATION/TRAINING PROGRAM

## 2022-08-06 PROCEDURE — 97116 GAIT TRAINING THERAPY: CPT

## 2022-08-06 PROCEDURE — 94761 N-INVAS EAR/PLS OXIMETRY MLT: CPT

## 2022-08-06 PROCEDURE — 6370000000 HC RX 637 (ALT 250 FOR IP): Performed by: STUDENT IN AN ORGANIZED HEALTH CARE EDUCATION/TRAINING PROGRAM

## 2022-08-06 PROCEDURE — 85055 RETICULATED PLATELET ASSAY: CPT

## 2022-08-06 PROCEDURE — 2700000000 HC OXYGEN THERAPY PER DAY

## 2022-08-06 PROCEDURE — 1200000000 HC SEMI PRIVATE

## 2022-08-06 RX ADMIN — ESCITALOPRAM 20 MG: 5 TABLET, FILM COATED ORAL at 23:03

## 2022-08-06 RX ADMIN — APIXABAN 5 MG: 5 TABLET, FILM COATED ORAL at 09:32

## 2022-08-06 RX ADMIN — AMIODARONE HYDROCHLORIDE 200 MG: 200 TABLET ORAL at 09:29

## 2022-08-06 RX ADMIN — BUSPIRONE HYDROCHLORIDE 15 MG: 5 TABLET ORAL at 09:27

## 2022-08-06 RX ADMIN — POTASSIUM CHLORIDE 20 MEQ: 1500 TABLET, EXTENDED RELEASE ORAL at 09:31

## 2022-08-06 RX ADMIN — Medication 10 MG: at 23:03

## 2022-08-06 RX ADMIN — FAMOTIDINE 20 MG: 20 TABLET ORAL at 09:28

## 2022-08-06 RX ADMIN — MIDODRINE HYDROCHLORIDE 10 MG: 5 TABLET ORAL at 09:31

## 2022-08-06 RX ADMIN — MIDODRINE HYDROCHLORIDE 10 MG: 5 TABLET ORAL at 12:34

## 2022-08-06 RX ADMIN — SACUBITRIL AND VALSARTAN 0.5 TABLET: 24; 26 TABLET, FILM COATED ORAL at 23:02

## 2022-08-06 RX ADMIN — MIDODRINE HYDROCHLORIDE 10 MG: 5 TABLET ORAL at 16:52

## 2022-08-06 RX ADMIN — POTASSIUM CHLORIDE 40 MEQ: 1500 TABLET, EXTENDED RELEASE ORAL at 09:28

## 2022-08-06 RX ADMIN — POTASSIUM CHLORIDE 20 MEQ: 1500 TABLET, EXTENDED RELEASE ORAL at 16:52

## 2022-08-06 RX ADMIN — FAMOTIDINE 20 MG: 20 TABLET ORAL at 23:03

## 2022-08-06 RX ADMIN — FLUDROCORTISONE ACETATE 0.3 MG: 0.1 TABLET ORAL at 09:28

## 2022-08-06 RX ADMIN — THIAMINE HCL TAB 100 MG 100 MG: 100 TAB at 09:29

## 2022-08-06 RX ADMIN — SODIUM CHLORIDE, PRESERVATIVE FREE 10 ML: 5 INJECTION INTRAVENOUS at 09:31

## 2022-08-06 RX ADMIN — TAMSULOSIN HYDROCHLORIDE 0.4 MG: 0.4 CAPSULE ORAL at 09:29

## 2022-08-06 RX ADMIN — PANTOPRAZOLE SODIUM 40 MG: 40 TABLET, DELAYED RELEASE ORAL at 09:38

## 2022-08-06 RX ADMIN — SODIUM CHLORIDE, PRESERVATIVE FREE 10 ML: 5 INJECTION INTRAVENOUS at 23:07

## 2022-08-06 RX ADMIN — METOPROLOL SUCCINATE 25 MG: 25 TABLET, EXTENDED RELEASE ORAL at 09:30

## 2022-08-06 RX ADMIN — Medication 600 MG: at 23:04

## 2022-08-06 RX ADMIN — CLOPIDOGREL BISULFATE 75 MG: 75 TABLET ORAL at 09:28

## 2022-08-06 RX ADMIN — ROPINIROLE HYDROCHLORIDE 0.5 MG: 0.25 TABLET, FILM COATED ORAL at 23:02

## 2022-08-06 RX ADMIN — SACUBITRIL AND VALSARTAN 0.5 TABLET: 24; 26 TABLET, FILM COATED ORAL at 09:29

## 2022-08-06 RX ADMIN — FOLIC ACID 1 MG: 1 TABLET ORAL at 09:29

## 2022-08-06 RX ADMIN — Medication 600 MG: at 09:29

## 2022-08-06 RX ADMIN — AMIODARONE HYDROCHLORIDE 200 MG: 200 TABLET ORAL at 23:03

## 2022-08-06 RX ADMIN — BUSPIRONE HYDROCHLORIDE 15 MG: 5 TABLET ORAL at 23:02

## 2022-08-06 RX ADMIN — FUROSEMIDE 40 MG: 10 INJECTION, SOLUTION INTRAMUSCULAR; INTRAVENOUS at 09:27

## 2022-08-06 RX ADMIN — ATORVASTATIN CALCIUM 80 MG: 40 TABLET, FILM COATED ORAL at 09:30

## 2022-08-06 RX ADMIN — MULTIVITAMIN TABLET 1 TABLET: TABLET at 09:27

## 2022-08-06 RX ADMIN — APIXABAN 5 MG: 5 TABLET, FILM COATED ORAL at 23:02

## 2022-08-06 ASSESSMENT — PAIN - FUNCTIONAL ASSESSMENT
PAIN_FUNCTIONAL_ASSESSMENT: ACTIVITIES ARE NOT PREVENTED
PAIN_FUNCTIONAL_ASSESSMENT: ACTIVITIES ARE NOT PREVENTED

## 2022-08-06 ASSESSMENT — PAIN DESCRIPTION - DESCRIPTORS
DESCRIPTORS: ACHING
DESCRIPTORS: ACHING

## 2022-08-06 ASSESSMENT — PAIN SCALES - GENERAL
PAINLEVEL_OUTOF10: 6
PAINLEVEL_OUTOF10: 7

## 2022-08-06 ASSESSMENT — PAIN DESCRIPTION - ORIENTATION
ORIENTATION: LEFT
ORIENTATION: LEFT

## 2022-08-06 ASSESSMENT — PAIN DESCRIPTION - PAIN TYPE
TYPE: ACUTE PAIN
TYPE: ACUTE PAIN

## 2022-08-06 ASSESSMENT — PAIN DESCRIPTION - LOCATION
LOCATION: RIB CAGE
LOCATION: RIB CAGE

## 2022-08-06 ASSESSMENT — PAIN DESCRIPTION - FREQUENCY: FREQUENCY: INTERMITTENT

## 2022-08-06 NOTE — PROGRESS NOTES
Physical Therapy  Facility/Department: [facility] AT THE [facility] MED SURG  Daily Treatment Note  NAME: Butch Jacob  :   MRN: [id number]    Date of Service: 2022    Discharge Recommendations:  Continue to assess pending progress, Home with Home health PT, Outpatient PT        Patient Diagnosis(es): The primary encounter diagnosis was Acute respiratory failure with hypoxia ([address] A diagnosis of Acute on chronic combined systolic and diastolic CHF (congestive heart failure) (HCC) was also pertinent to this visit. Assessment   Activity Tolerance: Patient tolerated treatment well;Patient limited by fatigue     Plan          Restrictions  Restrictions/Precautions  Restrictions/Precautions: General Precautions     Subjective    Subjective  Subjective: Pt stated he is tired today but agreeable to therapy. Pain: L rib cage pain, did not rate. Orientation  Overall Orientation Status: Within Functional Limits  Cognition  Overall Cognitive Status: WFL     Objective   Vitals     Bed Mobility Training  Bed Mobility Training: Yes  Overall Level of Assistance: Stand-by assistance;Supervision  Interventions: Safety awareness training;Verbal cues  Supine to Sit: Supervision  Scooting: Stand-by assistance  Balance  Sitting: Intact  Standing: High guard  Standing - Static: Fair;Good  Standing - Dynamic: Fair;Good  Transfer Training  Transfer Training: Yes  Overall Level of Assistance: Stand-by assistance;Supervision  Interventions: Verbal cues; Safety awareness training  Sit to Stand: Supervision  Stand to Sit: Supervision  Gait Training  Gait Training: Yes  Gait  Overall Level of Assistance: Stand-by assistance;Supervision  Interventions: Verbal cues; Safety awareness training  Distance (ft): 75 Feet     PT Exercises  Exercise Treatment: Seated ther ex x 15 including heel/toe raises, marching, and LAQ. Safety Devices  Type of Devices: Call light within reach; Left in bed       Goals  Short Term Goals  Time Frame for Short term goals: 20 days  Short term goal 1: Pt to be able to ambulate 50 ft IND to be able to retrun home safely. Short term goal 2: Pt to be IND with all transfers to improve functional mobility and safety of transfers  Short term goal 3: Pt to be able to tolerate 20-30 mins therex/act to improve functional capacity for ADLs.   Patient Goals   Patient goals : to get back home    Education  Patient Education  Education Given To: Patient  Education Provided: Role of Therapy;Plan of Care;Home Exercise Program  Education Method: Verbal  Barriers to Learning: None  Education Outcome: Verbalized understanding    Therapy Time   Individual Concurrent Group Co-treatment   Time In 0930         Time Out 0954         Minutes [address] [place], [place]

## 2022-08-06 NOTE — PROGRESS NOTES
Occupational Therapy  Facility/Department: [facility] AT THE [facility] MED SURG  Daily Treatment Note  NAME: Hector Lester  : [id number]  MRN: [id number]    Date of Service: 2022    Discharge Recommendations:  Continue to assess pending progress         Patient Diagnosis(es): The primary encounter diagnosis was Acute respiratory failure with hypoxia ([address] A diagnosis of Acute on chronic combined systolic and diastolic CHF (congestive heart failure) (HCC) was also pertinent to this visit. Assessment    Activity Tolerance: Patient limited by fatigue  Discharge Recommendations: Continue to assess pending progress      Plan   Plan  Times per Week: 7  Times per Day: Daily  Current Treatment Recommendations: Strengthening;Balance training;Functional mobility training; Endurance training; Safety education & training;Patient/Caregiver education & training;Equipment evaluation, education, & procurement;Home management training;Self-Care / ADL     Restrictions  Restrictions/Precautions  Restrictions/Precautions: General Precautions    Subjective   Subjective  Subjective: Pt lying in bed upon arrival. pt agreed to participate in therapy session stating \"I will try, it's been kind of a rough morning\". Pain: Pt reported min chest pain this date stating \"I always have pain\". Objective    Vitals              OT Exercises  Exercise Treatment: Pt tolerated BUE ther ex with 1# dumbbell x 7 planes x 15 reps x 1 set to increase UE strength and endurance in order to ease completion of ADL tasks. Pt required RBs as needed secondary to fatigue. Safety Devices  Type of Devices: Call light within reach; Left in bed     Patient Education  Education Given To: Patient  Education Provided: Role of Therapy;Plan of Care  Education Method: Demonstration;Verbal  Barriers to Learning: None  Education Outcome: Verbalized understanding;Demonstrated understanding    Goals  Short Term Goals  Time Frame for Short term goals: 21 visits  Short Term Goal 1: Patient to be educated on d/c folder, AE/DME and EC/WS techniques to ensure safe and indep return home. Short Term Goal 2: Patient to complete ADL routine c Mod I c AE/DME and implementation of EC/WS techniques as needed to ensure safe return home.   Short Term Goal 3: Patient will tolerate 15 mins of BUE ther ex/act to increase overall strength/activity tolerance for functional tasks       Therapy Time   Individual Concurrent Group Co-treatment   Time In 0735         Time Out 0759         Minutes 24                 GERMAN Guzman/WANDA

## 2022-08-06 NOTE — PROGRESS NOTES
Physical Therapy  Facility/Department: [facility] AT THE [facility] MED SURG  Daily Treatment Note  NAME: Ludivina Burgess  :   MRN: [id number]    Date of Service: 2022    Discharge Recommendations:  Continue to assess pending progress, Home with Home health PT, Outpatient PT        Patient Diagnosis(es): The primary encounter diagnosis was Acute respiratory failure with hypoxia ([address] A diagnosis of Acute on chronic combined systolic and diastolic CHF (congestive heart failure) (HCC) was also pertinent to this visit. Assessment   Activity Tolerance: Patient tolerated treatment well     Plan    Plan  Plan: 2 times a day 7 days a week  Plan weeks: 20 days  Current Treatment Recommendations: Strengthening;Balance training;Functional mobility training;Transfer training; Endurance training;Gait training;Stair training;Neuromuscular re-education;Home exercise program;Safety education & training;Positioning; Therapeutic activities     Restrictions  Restrictions/Precautions  Restrictions/Precautions: General Precautions     Subjective    Subjective  Subjective: Pt reported feeling better. Pain: Pt without any complaints of pain. Orientation  Overall Orientation Status: Within Functional Limits  Cognition  Overall Cognitive Status: WFL     Objective   Vitals     Bed Mobility Training  Bed Mobility Training: No (Pt up in chair upon entry.)  Overall Level of Assistance: Contact-guard assistance;Stand-by assistance  Interventions: Safety awareness training;Verbal cues  Rolling: Stand-by assistance  Supine to Sit: Stand-by assistance  Balance  Sitting: Intact  Standing: Impaired  Standing - Static: Fair;Good  Standing - Dynamic: Fair;Good  Transfer Training  Transfer Training: Yes  Overall Level of Assistance: Stand-by assistance;Contact-guard assistance  Interventions: Verbal cues; Safety awareness training  Sit to Stand: Contact-guard assistance;Stand-by assistance  Stand to Sit: Contact-guard assistance;Stand-by assistance  Toilet Transfer: Stand-by assistance;Contact-guard assistance  Gait Training  Gait Training: Yes  Gait  Overall Level of Assistance: Contact-guard assistance;Stand-by assistance  Interventions: Verbal cues  Speed/Andreina: Slow  Distance (ft):  (25 feet x 1, 70 feet x 1)  Assistive Device: Other (comment) (No AD.)     PT Exercises  Exercise Treatment: Seated ther ex x 20 of: heel/toe raises, marching, hip abd, and LAQ. Safety Devices  Type of Devices: All fall risk precautions in place;Call light within reach; Left in chair (Per Lillie Rodriguez no chair alarm needed.)       Goals  Short Term Goals  Time Frame for Short term goals: 20 days  Short term goal 1: Pt to be able to ambulate 50 ft IND to be able to retrun home safely. Short term goal 2: Pt to be IND with all transfers to improve functional mobility and safety of transfers  Short term goal 3: Pt to be able to tolerate 20-30 mins therex/act to improve functional capacity for ADLs. Patient Goals   Patient goals : to get back home    Education  Patient Education  Education Given To: Patient  Education Provided: Role of Therapy;Plan of Care; Fall Prevention Strategies (Also educated on safety with amb.)  Education Method: Verbal  Barriers to Learning: None  Education Outcome: Verbalized understanding    Therapy Time   Individual Concurrent Group Co-treatment   Time In [address] [facility]         Time Out 1411         Minutes [address]

## 2022-08-06 NOTE — PROGRESS NOTES
Writer at bedside to complete evening assessment. Upon entry to room, pt awake and in bed, respirations normal and unlabored while on 5 L via nasal canula. Vitals obtained and assessment completed, see flow sheet for details. Pt denies needs from writer at this time. Call light in reach. Will continue to monitor.

## 2022-08-06 NOTE — PLAN OF CARE
Problem: Discharge Planning  Goal: Discharge to home or other facility with appropriate resources  Outcome: Progressing  Flowsheets (Taken 8/6/2022 0515)  Discharge to home or other facility with appropriate resources:   Identify barriers to discharge with patient and caregiver   Identify discharge learning needs (meds, wound care, etc)     Problem: Pain  Goal: Verbalizes/displays adequate comfort level or baseline comfort level  Outcome: Progressing  Flowsheets (Taken 8/6/2022 0515)  Verbalizes/displays adequate comfort level or baseline comfort level:   Encourage patient to monitor pain and request assistance   Administer analgesics based on type and severity of pain and evaluate response   Implement non-pharmacological measures as appropriate and evaluate response   Assess pain using appropriate pain scale     Problem: Safety - Adult  Goal: Free from fall injury  Outcome: Progressing  Flowsheets (Taken 8/6/2022 0515)  Free From Fall Injury: Instruct family/caregiver on patient safety     Problem: Chronic Conditions and Co-morbidities  Goal: Patient's chronic conditions and co-morbidity symptoms are monitored and maintained or improved  Outcome: Progressing  Flowsheets (Taken 8/6/2022 0515)  Care Plan - Patient's Chronic Conditions and Co-Morbidity Symptoms are Monitored and Maintained or Improved: Monitor and assess patient's chronic conditions and comorbid symptoms for stability, deterioration, or improvement     Problem: Skin/Tissue Integrity  Goal: Absence of new skin breakdown  Description: 1. Monitor for areas of redness and/or skin breakdown  2. Assess vascular access sites hourly  3. Every 4-6 hours minimum:  Change oxygen saturation probe site  4. Every 4-6 hours:  If on nasal continuous positive airway pressure, respiratory therapy assess nares and determine need for appliance change or resting period. Outcome: Progressing  Note: Alfonso scale monitoring per protocol.  Inspect skin for breakdown frequently. Encourage pt to make frequent large adjustments in position or assist patient with turning. Document all areas of breakdown.

## 2022-08-06 NOTE — PROGRESS NOTES
[address], [address]    Progress Note    Date:   8/6/2022  Patient name:  Ludivina Burgess  Date of admission:  8/3/2022 11:12 AM  MRN:   [id number]  YOB: 1950    SUBJECTIVE/Last 24 hours update:     Patient seen and examined at the bed side , no new acute events overnight except that he had a hard time with his breathing and that he is feeling weak this morning. Notes from nursing staff and Consults had been reviewed, and the overnight progress had been checked with the nursing staff as well. REVIEW OF SYSTEMS:      CONSTITUTIONAL:  no fevers, no headcahes  EYES: negative for blury vision  HEENT: No headaches, No nasal congestion, no difficulty swallowing  RESPIRATORY:negative for dyspnea, no wheezing, no Cough  CARDIOVASCULAR: negative for chest pain, no palpitations  GASTROINTESTINAL: no nausea, no vomiting, no change in bowel habits, no abdominal pain   GENITOURINARY: negative for dysuria, no hematuria   MUSCULOSKELETAL: no joint pains, no muscle aches, no swelling of joints or extremities  NEUROLOGICAL: No  Weakness or numbness      PAST MEDICAL HISTORY:      has a past medical history of Acute kidney injury ([address] Ascites, Atrial fibrillation ([address] CHF (congestive heart failure) ([address] Cirrhosis of liver with ascites ([address] CKD (chronic kidney disease), Coronary artery disease, Hyperlipidemia, Hypertension, Hypomagnesemia, Hyponatremia, and Thrombocytopenia ([address] PAST SURGICAL HISTORY:      has a past surgical history that includes Rotator cuff repair (Right); Carotid stent placement (Bilateral); hernia repair; and Tonsillectomy and adenoidectomy. SOCIAL HISTORY:      reports that he quit smoking about 5 weeks ago. His smoking use included cigarettes. He has a 56.00 pack-year smoking history.  He has never used smokeless tobacco. He reports that he does not currently use alcohol after a past usage of about 2.0 standard drinks per week. He reports that he does not currently use drugs. TOBACCO:   reports that he quit smoking about 5 weeks ago. His smoking use included cigarettes. He has a 56.00 pack-year smoking history. He has never used smokeless tobacco.  ETOH:   reports that he does not currently use alcohol after a past usage of about 2.0 standard drinks per week. FAMILY HISTORY:     family history includes Heart Disease in his father and mother; Hypertension in his father and mother. Problem Relation Age of Onset    Hypertension Father     Heart Disease Father     Heart Disease Mother     Hypertension Mother      HOME MEDICATIONS:      Prior to Admission medications    Medication Sig Start Date End Date Taking? Authorizing Provider   fludrocortisone (FLORINEF) 0.1 MG tablet Take 3 tablets by mouth in the morning. Patient not taking: Reported on 8/3/2022 7/28/22 10/26/22  Justin Prado PA-C   vitamin D (ERGOCALCIFEROL) 1.25 MG (31504 UT) CAPS capsule Take 1 capsule by mouth once a week 7/27/22   Margarita Altman MD   tamsulosin [facility]) 0.4 MG capsule Take 1 capsule by mouth in the morning. 7/21/22   Margarita Altman MD   midodrine (PROAMATINE) 10 MG tablet Take 1 tablet by mouth in the morning and 1 tablet at noon and 1 tablet in the evening. Take with meals. 7/21/22   Margarita Altman MD   vitamin B-1 (THIAMINE) 100 MG tablet Take 1 tablet by mouth in the morning. 7/19/22   Margarita Altman MD   Multiple Vitamin (MULTIVITAMIN) TABS tablet Take 1 tablet by mouth in the morning. 7/19/22   Margarita Altman MD   pantoprazole (PROTONIX) 40 MG tablet Take 1 tablet by mouth every morning (before breakfast) 7/19/22   Margarita Altman MD   potassium chloride (KLOR-CON M) 20 MEQ extended release tablet Take 1 tablet by mouth in the morning and 1 tablet in the evening. Take with meals.  7/19/22   Margarita Altman MD   folic acid (FOLVITE) 1 MG tablet Take 1 tablet by mouth daily 7/12/22   Margarita Altman MD   metoprolol succinate (TOPROL XL) 25 MG extended release tablet Take 1 tablet by mouth daily 6/24/22   Asya Arguello MD   bumetanide (BUMEX) 1 MG tablet Take 1 tablet by mouth daily 6/11/22   Harry Carter MD   melatonin 5 mg TABS tablet Take 2 tablets by mouth nightly 6/11/22   Harry Carter MD   magnesium oxide (MAGOX 400) 400 (240 Mg) MG tablet Take 1.5 tablets by mouth 2 times daily 5/24/22   SEVEN Cook CNP   sacubitril-valsartan (ENTRESTO) 24-26 MG per tablet Take 0.5 tablets by mouth 2 times daily Lot #: [id number]  Exp: July 2024 5/24/22   Asya Arguello MD   amiodarone (CORDARONE) 200 MG tablet Take 200 mg by mouth 2 times daily 4/26/22   Historical Provider, MD   apixaban (ELIQUIS) 5 MG TABS tablet Take 5 mg by mouth 2 times daily 3/16/22   Historical Provider, MD   busPIRone (BUSPAR) 10 MG tablet Take 15 mg by mouth 2 times daily  5/10/22   Historical Provider, MD   clopidogrel (PLAVIX) 75 MG tablet Take 75 mg by mouth daily 3/16/22   Historical Provider, MD   escitalopram (LEXAPRO) 20 MG tablet Take 20 mg by mouth nightly 3/16/22   Historical Provider, MD   atorvastatin (LIPITOR) 80 MG tablet Take 80 mg by mouth daily 3/16/22   Historical Provider, MD   rOPINIRole (REQUIP) 0.5 MG tablet Take 0.5 mg by mouth nightly 4/11/22 7/28/22  Historical Provider, MD   Omega-3 Fatty Acids (FISH OIL CONCENTRATE) 1000 MG CAPS Take 2 caplets by mouth daily    Historical Provider, MD       ALLERGIES:     Patient has no known allergies.       OBJECTIVE:       Vitals:    08/06/22 0520 08/06/22 0806 08/06/22 0930 08/06/22 1525   BP:  (!) 148/75 (!) 148/75 (!) 115/49   Pulse:  68 68 53   Resp:  20  18   Temp:  97.8 °F (36.6 °C)  97.2 °F (36.2 °C)   TempSrc:  Temporal  Temporal   SpO2:  92%  93%   Weight: 172 lb 13.5 oz (78.4 kg)      Height:             Intake/Output Summary (Last 24 hours) at 8/6/2022 1605  Last data filed at 8/6/2022 1530  Gross per 24 hour   Intake 700 ml   Output 200 ml   Net 500 ml       PHYSICAL EXAM:  General Appearance Alert , awake , not in acute distress  HEENT - Head is normocephalic, atraumatic. Lungs - Bilateral equal air entry diminished with some wheezes, rales or rhonchi, aeration good  Cardiovascular - Heart sounds are normal.  Regular rhythm, normal rate without murmur, gallop or rub.   Abdomen - Soft, nontender, nondistended, no masses or organomegaly  Neurologic - There are no new focal motor or sensory deficits  Skin - No bruising or bleeding on exposed skin area  Extremities - No cyanosis, clubbing or edema      DIAGNOSTICS:     Laboratory Testing:    Recent Results (from the past 24 hour(s))   CBC auto differential    Collection Time: 08/06/22  6:40 AM   Result Value Ref Range    WBC 2.2 (L) 3.5 - 11.3 k/uL    RBC 3.52 (L) 4.21 - 5.77 m/uL    Hemoglobin 11.5 (L) 13.0 - 17.0 g/dL    Hematocrit 36.2 (L) 40.7 - 50.3 %    .8 82.6 - 102.9 fL    MCH 32.7 25.2 - 33.5 pg    MCHC 31.8 28.4 - 34.8 g/dL    RDW 15.5 (H) 11.8 - 14.4 %    Platelets See Reflexed IPF Result 138 - 453 k/uL    NRBC Automated 0.0 0.0 per 100 WBC    Seg Neutrophils 78 (H) 36 - 65 %    Lymphocytes 18 (L) 24 - 43 %    Monocytes 2 (L) 3 - 12 %    Eosinophils % 0 (L) 1 - 4 %    Immature Granulocytes 1 (H) 0 %    Basophils 1 0 - 2 %    Segs Absolute 1.72 1.50 - 8.10 k/uL    Absolute Lymph # 0.40 (L) 1.10 - 3.70 k/uL    Absolute Mono # 0.04 (L) 0.10 - 1.20 k/uL    Absolute Eos # 0.00 0.00 - 0.44 k/uL    Absolute Immature Granulocyte 0.02 0.00 - 0.30 k/uL    Basophils Absolute 0.02 0.0 - 0.2 k/uL    Morphology Normal    Comprehensive Metabolic Panel w/ Reflex to MG    Collection Time: 08/06/22  6:40 AM   Result Value Ref Range    Glucose 95 70 - 99 mg/dL    BUN 20 8 - 23 mg/dL    Creatinine 0.92 0.70 - 1.20 mg/dL    Bun/Cre Ratio 22 (H) 9 - 20    Calcium 9.0 8.6 - 10.4 mg/dL    Sodium 140 135 - 144 mmol/L    Potassium 3.3 (L) 3.7 - 5.3 mmol/L    Chloride 98 98 - 107 mmol/L    CO2 35 (H) 20 - 31 mmol/L    Anion Gap 7 (L) 9 - 17 mmol/L    Alkaline Phosphatase 96 40 - 129 U/L    ALT 40 5 - 41 U/L    AST 49 (H) <40 U/L    Total Bilirubin 0.65 0.3 - 1.2 mg/dL    Total Protein 5.8 (L) 6.4 - 8.3 g/dL    Albumin 3.6 3.5 - 5.2 g/dL    Albumin/Globulin Ratio 1.6 1.0 - 2.5    GFR Non-African American >60 >60 mL/min    GFR African American >60 >60 mL/min    GFR Comment          GFR Staging         Magnesium    Collection Time: 08/06/22  6:40 AM   Result Value Ref Range    Magnesium 1.5 (L) 1.6 - 2.6 mg/dL   Immature Platelet Fraction    Collection Time: 08/06/22  6:40 AM   Result Value Ref Range    Platelet, Immature Fraction 6.2 1.1 - 10.3 %    Platelet, Fluorescence 77 (L) 138 - 453 k/uL       Current Facility-Administered Medications   Medication Dose Route Frequency Provider Last Rate Last Admin    furosemide (LASIX) injection 40 mg  40 mg IntraVENous Daily Ciera Cortez PA-C   40 mg at 08/06/22 3613    sodium chloride flush 0.9 % injection 10 mL  10 mL IntraVENous 2 times per day Celena Francis MD   10 mL at 08/06/22 0931    sodium chloride flush 0.9 % injection 10 mL  10 mL IntraVENous PRN Celena Francis MD   10 mL at 08/04/22 1650    0.9 % sodium chloride infusion   IntraVENous PRN Celena Francis MD        ondansetron (ZOFRAN-ODT) disintegrating tablet 4 mg  4 mg Oral Q8H PRN Celena Francis MD        Or    ondansetron (ZOFRAN) injection 4 mg  4 mg IntraVENous Q6H PRN Celena Francis MD        polyethylene glycol (GLYCOLAX) packet 17 g  17 g Oral Daily PRN Celena Francis MD        famotidine (PEPCID) tablet 20 mg  20 mg Oral BID Celena Francis MD   20 mg at 08/06/22 9357    acetaminophen (TYLENOL) tablet 650 mg  650 mg Oral Q6H PRN Celena Francis MD        Or    acetaminophen (TYLENOL) suppository 650 mg  650 mg Rectal Q6H PRN Celena Francis MD        potassium chloride (KLOR-CON M) extended release tablet 40 mEq  40 mEq Oral PRN Celena Francis MD   40 mEq at 08/06/22 2918    Or    potassium bicarb-citric acid (EFFER-K) effervescent tablet 40 mEq  40 mEq Oral PRN Matt Celis MD Tracie        Or    potassium chloride 10 mEq/100 mL IVPB (Peripheral Line)  10 mEq IntraVENous PRN Cas Richey MD        magnesium sulfate 2000 mg in 50 mL IVPB premix  2,000 mg IntraVENous PRN Cas Richey MD        amiodarone (CORDARONE) tablet 200 mg  200 mg Oral BID Cas Richey MD   200 mg at 08/06/22 0929    apixaban (ELIQUIS) tablet 5 mg  5 mg Oral BID Cas Richey MD   5 mg at 08/06/22 0932    atorvastatin (LIPITOR) tablet 80 mg  80 mg Oral Daily Cas Richey MD   80 mg at 08/06/22 0930    [Held by provider] bumetanide (BUMEX) tablet 1 mg  1 mg Oral Daily Cas Richey MD        busPIRone (BUSPAR) tablet 15 mg  15 mg Oral BID Cas Richey MD   15 mg at 08/06/22 0770    clopidogrel (PLAVIX) tablet 75 mg  75 mg Oral Daily Cas Richey MD   75 mg at 08/06/22 0928    escitalopram (LEXAPRO) tablet 20 mg  20 mg Oral Nightly Cas Richey MD   20 mg at 08/05/22 2124    fludrocortisone (FLORINEF) tablet 0.3 mg  0.3 mg Oral Daily Cas Richey MD   0.3 mg at 65/38/59 2875    folic acid (FOLVITE) tablet 1 mg  1 mg Oral Daily Cas Richey MD   1 mg at 08/06/22 0929    magnesium oxide (MAG-OX) tablet 600 mg  600 mg Oral BID Cas Richey MD   600 mg at 08/06/22 0929    melatonin tablet 10 mg  10 mg Oral Nightly Cas Richey MD   10 mg at 08/05/22 2125    metoprolol succinate (TOPROL XL) extended release tablet 25 mg  25 mg Oral Daily Cas Richey MD   25 mg at 08/06/22 0930    midodrine (PROAMATINE) tablet 10 mg  10 mg Oral TID WC Cas Richey MD   10 mg at 08/06/22 1234    multivitamin 1 tablet  1 tablet Oral Daily Cas Richey MD   1 tablet at 08/06/22 0927    pantoprazole (PROTONIX) tablet 40 mg  40 mg Oral QAM AC Cas Richey MD   40 mg at 08/06/22 0938    potassium chloride (KLOR-CON M) extended release tablet 20 mEq  20 mEq Oral BID  Cas Richey MD   20 mEq at 08/06/22 0931    rOPINIRole (REQUIP) tablet 0.5 mg  0.5 mg Oral Nightly Cas Richey MD   0.5 mg at 08/05/22 3767 sacubitril-valsartan (ENTRESTO) 24-26 MG per tablet 0.5 tablet  0.5 tablet Oral BID Kimberly Amato MD   0.5 tablet at 08/06/22 0929    tamsulosin (FLOMAX) capsule 0.4 mg  0.4 mg Oral Daily Kimberly Amato MD   0.4 mg at 08/06/22 2254    thiamine mononitrate tablet 100 mg  100 mg Oral Daily Kimberly Amato MD   100 mg at 08/06/22 3627    vitamin D (ERGOCALCIFEROL) capsule 50,000 Units  50,000 Units Oral Weekly Kimberly Amato MD   50,000 Units at 08/03/22 2113    magnesium sulfate 1000 mg in dextrose 5% 100 mL IVPB  1,000 mg IntraVENous PRN Kimberly Amato MD           ASSESSMENT:     Principal Problem:    Acute on chronic combined systolic and diastolic congestive heart failure (HCC)  Active Problems:    History of acute inferior wall MI    Chronic combined systolic (congestive) and diastolic (congestive) heart failure ([address]    Acute respiratory failure with hypoxia ([address]    Encounter for current long-term use of anticoagulants    Coronary artery disease    Hypertension    Hyperlipidemia    Cirrhosis of liver with ascites (HCC)    CKD (chronic kidney disease)    Severe malnutrition (HCC)    PAF (paroxysmal atrial fibrillation) (HCC)    Lightheaded    Gastroesophageal reflux disease    Hypokalemia    Hypovitaminosis D    Hypoxia    Nonischemic cardiomyopathy ([address]  Resolved Problems:    * No resolved hospital problems. *      PLAN:     Discussed care plan with nurse after getting their input. Acute on Chronic CHF, acute respiratory failure with hypoxia, COPD, CKD, PAF  Cardiology is following closely  IV Lasix 40mg  Monitor CBC/CMP  Patient is net negative -4.740L since admit  Holding Bumex for now  Lipitor, Toprol  Midodrine  Entresto  Buspar/Lexapro, Requip  Florinef  On Flomax  Pepcid/Protonix GI Ppx  Eliquis, Plavix      Above plan discussed with the patient who agreed to the above plan     Dispo pending clinical status. Please note that this chart was generated using voice recognition Dragon dictation software. Although every effort was made to ensure the accuracy of this automated transcription, some errors in transcription may have occurred.     Fiordaliza Bedoya MD  8/6/2022 4:05 PM

## 2022-08-06 NOTE — PLAN OF CARE
Problem: Discharge Planning  Goal: Discharge to home or other facility with appropriate resources  8/6/2022 0851 by Viola Silverman RN  Outcome: Progressing     Problem: Pain  Goal: Verbalizes/displays adequate comfort level or baseline comfort level  8/6/2022 0851 by Viola Silverman RN  Outcome: Progressing     Problem: Safety - Adult  Goal: Free from fall injury  8/6/2022 0851 by Viola Silverman RN  Outcome: Progressing     Problem: Chronic Conditions and Co-morbidities  Goal: Patient's chronic conditions and co-morbidity symptoms are monitored and maintained or improved  8/6/2022 0851 by Viola Silverman RN  Outcome: Progressing     Problem: Skin/Tissue Integrity  Goal: Absence of new skin breakdown  Description: 1. Monitor for areas of redness and/or skin breakdown  2. Assess vascular access sites hourly  3. Every 4-6 hours minimum:  Change oxygen saturation probe site  4. Every 4-6 hours:  If on nasal continuous positive airway pressure, respiratory therapy assess nares and determine need for appliance change or resting period.   8/6/2022 0851 by Viola Silverman RN  Outcome: Progressing     Problem: Nutrition Deficit:  Goal: Optimize nutritional status  8/6/2022 0851 by Viola Silverman RN  Outcome: Progressing

## 2022-08-07 PROCEDURE — 6370000000 HC RX 637 (ALT 250 FOR IP): Performed by: STUDENT IN AN ORGANIZED HEALTH CARE EDUCATION/TRAINING PROGRAM

## 2022-08-07 PROCEDURE — 94618 PULMONARY STRESS TESTING: CPT

## 2022-08-07 PROCEDURE — 1200000000 HC SEMI PRIVATE

## 2022-08-07 PROCEDURE — 2580000003 HC RX 258: Performed by: STUDENT IN AN ORGANIZED HEALTH CARE EDUCATION/TRAINING PROGRAM

## 2022-08-07 PROCEDURE — 97110 THERAPEUTIC EXERCISES: CPT

## 2022-08-07 PROCEDURE — 94761 N-INVAS EAR/PLS OXIMETRY MLT: CPT

## 2022-08-07 PROCEDURE — 97116 GAIT TRAINING THERAPY: CPT

## 2022-08-07 PROCEDURE — 6360000002 HC RX W HCPCS: Performed by: PHYSICIAN ASSISTANT

## 2022-08-07 PROCEDURE — 2700000000 HC OXYGEN THERAPY PER DAY

## 2022-08-07 RX ADMIN — THIAMINE HCL TAB 100 MG 100 MG: 100 TAB at 09:16

## 2022-08-07 RX ADMIN — CLOPIDOGREL BISULFATE 75 MG: 75 TABLET ORAL at 09:17

## 2022-08-07 RX ADMIN — MIDODRINE HYDROCHLORIDE 10 MG: 5 TABLET ORAL at 09:20

## 2022-08-07 RX ADMIN — POTASSIUM CHLORIDE 20 MEQ: 1500 TABLET, EXTENDED RELEASE ORAL at 16:04

## 2022-08-07 RX ADMIN — MIDODRINE HYDROCHLORIDE 10 MG: 5 TABLET ORAL at 12:00

## 2022-08-07 RX ADMIN — METOPROLOL SUCCINATE 25 MG: 25 TABLET, EXTENDED RELEASE ORAL at 09:17

## 2022-08-07 RX ADMIN — AMIODARONE HYDROCHLORIDE 200 MG: 200 TABLET ORAL at 09:16

## 2022-08-07 RX ADMIN — FLUDROCORTISONE ACETATE 0.3 MG: 0.1 TABLET ORAL at 09:17

## 2022-08-07 RX ADMIN — AMIODARONE HYDROCHLORIDE 200 MG: 200 TABLET ORAL at 23:07

## 2022-08-07 RX ADMIN — SODIUM CHLORIDE, PRESERVATIVE FREE 10 ML: 5 INJECTION INTRAVENOUS at 09:20

## 2022-08-07 RX ADMIN — SACUBITRIL AND VALSARTAN 0.5 TABLET: 24; 26 TABLET, FILM COATED ORAL at 23:06

## 2022-08-07 RX ADMIN — MIDODRINE HYDROCHLORIDE 10 MG: 5 TABLET ORAL at 16:05

## 2022-08-07 RX ADMIN — SACUBITRIL AND VALSARTAN 0.5 TABLET: 24; 26 TABLET, FILM COATED ORAL at 09:16

## 2022-08-07 RX ADMIN — Medication 600 MG: at 23:06

## 2022-08-07 RX ADMIN — ATORVASTATIN CALCIUM 80 MG: 40 TABLET, FILM COATED ORAL at 09:17

## 2022-08-07 RX ADMIN — TAMSULOSIN HYDROCHLORIDE 0.4 MG: 0.4 CAPSULE ORAL at 09:17

## 2022-08-07 RX ADMIN — ESCITALOPRAM 20 MG: 5 TABLET, FILM COATED ORAL at 23:06

## 2022-08-07 RX ADMIN — ACETAMINOPHEN 650 MG: 325 TABLET ORAL at 09:16

## 2022-08-07 RX ADMIN — PANTOPRAZOLE SODIUM 40 MG: 40 TABLET, DELAYED RELEASE ORAL at 09:20

## 2022-08-07 RX ADMIN — ROPINIROLE HYDROCHLORIDE 0.5 MG: 0.25 TABLET, FILM COATED ORAL at 23:07

## 2022-08-07 RX ADMIN — SODIUM CHLORIDE, PRESERVATIVE FREE 10 ML: 5 INJECTION INTRAVENOUS at 23:07

## 2022-08-07 RX ADMIN — FUROSEMIDE 40 MG: 10 INJECTION, SOLUTION INTRAMUSCULAR; INTRAVENOUS at 09:16

## 2022-08-07 RX ADMIN — BUSPIRONE HYDROCHLORIDE 15 MG: 5 TABLET ORAL at 09:16

## 2022-08-07 RX ADMIN — APIXABAN 5 MG: 5 TABLET, FILM COATED ORAL at 09:17

## 2022-08-07 RX ADMIN — MULTIVITAMIN TABLET 1 TABLET: TABLET at 09:16

## 2022-08-07 RX ADMIN — APIXABAN 5 MG: 5 TABLET, FILM COATED ORAL at 23:06

## 2022-08-07 RX ADMIN — Medication 600 MG: at 09:17

## 2022-08-07 RX ADMIN — FOLIC ACID 1 MG: 1 TABLET ORAL at 09:17

## 2022-08-07 RX ADMIN — POTASSIUM CHLORIDE 20 MEQ: 1500 TABLET, EXTENDED RELEASE ORAL at 09:20

## 2022-08-07 RX ADMIN — FAMOTIDINE 20 MG: 20 TABLET ORAL at 23:06

## 2022-08-07 RX ADMIN — BUSPIRONE HYDROCHLORIDE 15 MG: 5 TABLET ORAL at 23:05

## 2022-08-07 RX ADMIN — FAMOTIDINE 20 MG: 20 TABLET ORAL at 09:16

## 2022-08-07 ASSESSMENT — PAIN SCALES - GENERAL
PAINLEVEL_OUTOF10: 2
PAINLEVEL_OUTOF10: 4

## 2022-08-07 NOTE — PROGRESS NOTES
[address], [address]    Progress Note    Date:   8/7/2022  Patient name:  Chris Dugan  Date of admission:  8/3/2022 11:12 AM  MRN:   [id number]  YOB: 1950    SUBJECTIVE/Last 24 hours update:     Patient seen and examined at the bed side , no new acute events overnight except that he continues to have shortness of breath on exertion and with ambulation. He continues to have weakness this morning that is ongoing and slightly better than prior. Family was updated at the bedside this afternoon. Notes from nursing staff and Consults had been reviewed, and the overnight progress had been checked with the nursing staff as well. VSS, Afebrile. BP stable. REVIEW OF SYSTEMS:      CONSTITUTIONAL:  no fevers, no headcahes  EYES: negative for blury vision  HEENT: No headaches, No nasal congestion, no difficulty swallowing  RESPIRATORY:positive for dyspnea, no wheezing, no Cough  CARDIOVASCULAR: negative for chest pain, no palpitations  GASTROINTESTINAL: no nausea, no vomiting, no change in bowel habits, no abdominal pain   GENITOURINARY: negative for dysuria, no hematuria   MUSCULOSKELETAL: no joint pains, no muscle aches, no swelling of joints or extremities  NEUROLOGICAL: No  Weakness or numbness      PAST MEDICAL HISTORY:      has a past medical history of Acute kidney injury ([address] Ascites, Atrial fibrillation ([address] CHF (congestive heart failure) ([address] Cirrhosis of liver with ascites ([address] CKD (chronic kidney disease), Coronary artery disease, Hyperlipidemia, Hypertension, Hypomagnesemia, Hyponatremia, and Thrombocytopenia ([address] PAST SURGICAL HISTORY:      has a past surgical history that includes Rotator cuff repair (Right); Carotid stent placement (Bilateral); hernia repair; and Tonsillectomy and adenoidectomy. SOCIAL HISTORY:      reports that he quit smoking about 5 weeks ago. His smoking use included cigarettes.  He has a 56.00 pack-year smoking history. He has never used smokeless tobacco. He reports that he does not currently use alcohol after a past usage of about 2.0 standard drinks per week. He reports that he does not currently use drugs. TOBACCO:   reports that he quit smoking about 5 weeks ago. His smoking use included cigarettes. He has a 56.00 pack-year smoking history. He has never used smokeless tobacco.  ETOH:   reports that he does not currently use alcohol after a past usage of about 2.0 standard drinks per week. FAMILY HISTORY:     family history includes Heart Disease in his father and mother; Hypertension in his father and mother. Problem Relation Age of Onset    Hypertension Father     Heart Disease Father     Heart Disease Mother     Hypertension Mother      HOME MEDICATIONS:      Prior to Admission medications    Medication Sig Start Date End Date Taking? Authorizing Provider   fludrocortisone (FLORINEF) 0.1 MG tablet Take 3 tablets by mouth in the morning. Patient not taking: Reported on 8/3/2022 7/28/22 10/26/22  Jaleesa Tinsley PA-C   vitamin D (ERGOCALCIFEROL) 1.25 MG (00008 UT) CAPS capsule Take 1 capsule by mouth once a week 7/27/22   Fiordaliza Bedoya MD   tamsulosin [facility]) 0.4 MG capsule Take 1 capsule by mouth in the morning. 7/21/22   Fiordaliza Bedoya MD   midodrine (PROAMATINE) 10 MG tablet Take 1 tablet by mouth in the morning and 1 tablet at noon and 1 tablet in the evening. Take with meals. 7/21/22   Fiordaliza Bedoya MD   vitamin B-1 (THIAMINE) 100 MG tablet Take 1 tablet by mouth in the morning. 7/19/22   Fiordaliza Bedoya MD   Multiple Vitamin (MULTIVITAMIN) TABS tablet Take 1 tablet by mouth in the morning. 7/19/22   Fiordaliza Bedoya MD   pantoprazole (PROTONIX) 40 MG tablet Take 1 tablet by mouth every morning (before breakfast) 7/19/22   Fiordaliza Bedoya MD   potassium chloride (KLOR-CON M) 20 MEQ extended release tablet Take 1 tablet by mouth in the morning and 1 tablet in the evening. Take with meals. 7/19/22   Steff Mac MD   folic acid (FOLVITE) 1 MG tablet Take 1 tablet by mouth daily 7/12/22   Steff Mac MD   metoprolol succinate (TOPROL XL) 25 MG extended release tablet Take 1 tablet by mouth daily 6/24/22   Angy Hines MD   bumetanide (BUMEX) 1 MG tablet Take 1 tablet by mouth daily 6/11/22   Lyudmial Wahl MD   melatonin 5 mg TABS tablet Take 2 tablets by mouth nightly 6/11/22   Lyudmila Wahl MD   magnesium oxide (MAGOX 400) 400 (240 Mg) MG tablet Take 1.5 tablets by mouth 2 times daily 5/24/22   SEVEN Coe - CNP   sacubitril-valsartan (ENTRESTO) 24-26 MG per tablet Take 0.5 tablets by mouth 2 times daily Lot #: [id number]  Exp: July 2024 5/24/22   Angy Hines MD   amiodarone (CORDARONE) 200 MG tablet Take 200 mg by mouth 2 times daily 4/26/22   Historical Provider, MD   apixaban (ELIQUIS) 5 MG TABS tablet Take 5 mg by mouth 2 times daily 3/16/22   Historical Provider, MD   busPIRone (BUSPAR) 10 MG tablet Take 15 mg by mouth 2 times daily  5/10/22   Historical Provider, MD   clopidogrel (PLAVIX) 75 MG tablet Take 75 mg by mouth daily 3/16/22   Historical Provider, MD   escitalopram (LEXAPRO) 20 MG tablet Take 20 mg by mouth nightly 3/16/22   Historical Provider, MD   atorvastatin (LIPITOR) 80 MG tablet Take 80 mg by mouth daily 3/16/22   Historical Provider, MD   rOPINIRole (REQUIP) 0.5 MG tablet Take 0.5 mg by mouth nightly 4/11/22 7/28/22  Historical Provider, MD   Omega-3 Fatty Acids (FISH OIL CONCENTRATE) 1000 MG CAPS Take 2 caplets by mouth daily    Historical Provider, MD       ALLERGIES:     Patient has no known allergies.       OBJECTIVE:       Vitals:    08/06/22 2005 08/07/22 0020 08/07/22 0453 08/07/22 0745   BP: (!) 148/90 139/74  130/70   Pulse: 56 64  66   Resp: 18 18 22   Temp: 97.5 °F (36.4 °C) 97.3 °F (36.3 °C)  97.8 °F (36.6 °C)   TempSrc: Temporal Temporal  Temporal   SpO2: 96% 95%  92%   Weight:   172 lb 6.4 oz (78.2 kg)    Height:             Intake/Output Summary (Last 24 hours) at 8/7/2022 1502  Last data filed at 8/7/2022 9148  Gross per 24 hour   Intake 420 ml   Output 50 ml   Net 370 ml       PHYSICAL EXAM:  General Appearance  Alert , awake , not in acute distress  HEENT - Head is normocephalic, atraumatic. Lungs - Bilateral equal air entry diminished with some wheezes present as well as rales, no rhonchi, aeration good  Cardiovascular - Heart sounds are normal.  Regular rhythm, normal rate without murmur, gallop or rub. Abdomen - Soft, nontender, nondistended, no masses or organomegaly  Neurologic - There are no new focal motor or sensory deficits  Skin - No bruising or bleeding on exposed skin area  Extremities - No cyanosis, clubbing or edema      DIAGNOSTICS:     Laboratory Testing:    No results found for this or any previous visit (from the past 24 hour(s)).       Current Facility-Administered Medications   Medication Dose Route Frequency Provider Last Rate Last Admin    furosemide (LASIX) injection 40 mg  40 mg IntraVENous Daily Ciera Cortez PA-C   40 mg at 08/07/22 0916    sodium chloride flush 0.9 % injection 10 mL  10 mL IntraVENous 2 times per day Asha Villalobos MD   10 mL at 08/07/22 0920    sodium chloride flush 0.9 % injection 10 mL  10 mL IntraVENous PRN Asha Villalobos MD   10 mL at 08/04/22 1650    0.9 % sodium chloride infusion   IntraVENous PRN Asha Villalobos MD        ondansetron (ZOFRAN-ODT) disintegrating tablet 4 mg  4 mg Oral Q8H PRN Asha Villalobos MD        Or    ondansetron (ZOFRAN) injection 4 mg  4 mg IntraVENous Q6H PRN Asha Villalobos MD        polyethylene glycol (GLYCOLAX) packet 17 g  17 g Oral Daily PRN Asha Villalobos MD        famotidine (PEPCID) tablet 20 mg  20 mg Oral BID Asha Villalobos MD   20 mg at 08/07/22 0916    acetaminophen (TYLENOL) tablet 650 mg  650 mg Oral Q6H PRN Asha Villalobos MD   650 mg at 08/07/22 0916    Or    acetaminophen (TYLENOL) suppository 650 mg  650 mg Rectal Q6H PRN Asha Villalobos MD        potassium chloride (KLOR-CON M) extended release tablet 40 mEq  40 mEq Oral PRN Walter Ramos MD   40 mEq at 08/06/22 7435    Or    potassium bicarb-citric acid (EFFER-K) effervescent tablet 40 mEq  40 mEq Oral PRN Walter Ramos MD        Or    potassium chloride 10 mEq/100 mL IVPB (Peripheral Line)  10 mEq IntraVENous PRN Walter Ramos MD        magnesium sulfate 2000 mg in 50 mL IVPB premix  2,000 mg IntraVENous PRN Walter Ramos MD        amiodarone (CORDARONE) tablet 200 mg  200 mg Oral BID Walter Ramos MD   200 mg at 08/07/22 0916    apixaban (ELIQUIS) tablet 5 mg  5 mg Oral BID Walter Ramos MD   5 mg at 08/07/22 9636    atorvastatin (LIPITOR) tablet 80 mg  80 mg Oral Daily Walter Ramos MD   80 mg at 08/07/22 1326    [Held by provider] bumetanide (BUMEX) tablet 1 mg  1 mg Oral Daily Walter Ramos MD        busPIRone (BUSPAR) tablet 15 mg  15 mg Oral BID Walter Ramos MD   15 mg at 08/07/22 0916    clopidogrel (PLAVIX) tablet 75 mg  75 mg Oral Daily Walter Ramos MD   75 mg at 08/07/22 0917    escitalopram (LEXAPRO) tablet 20 mg  20 mg Oral Nightly Walter Ramos MD   20 mg at 08/06/22 2303    fludrocortisone (FLORINEF) tablet 0.3 mg  0.3 mg Oral Daily Walter Ramos MD   0.3 mg at 41/87/86 7364    folic acid (FOLVITE) tablet 1 mg  1 mg Oral Daily Walter Ramos MD   1 mg at 08/07/22 0917    magnesium oxide (MAG-OX) tablet 600 mg  600 mg Oral BID Walter Ramos MD   600 mg at 08/07/22 0917    melatonin tablet 10 mg  10 mg Oral Nightly Walter Ramos MD   10 mg at 08/06/22 2303    metoprolol succinate (TOPROL XL) extended release tablet 25 mg  25 mg Oral Daily Walter Ramos MD   25 mg at 08/07/22 0917    midodrine (PROAMATINE) tablet 10 mg  10 mg Oral TID WC Walter Ramos MD   10 mg at 08/07/22 0920    multivitamin 1 tablet  1 tablet Oral Daily Walter Ramos MD   1 tablet at 08/07/22 0916    pantoprazole (PROTONIX) tablet 40 mg  [facility]   40 mg at 08/07/22 0920    potassium chloride (KLOR-CON M) extended release tablet 20 mEq  20 mEq Oral BID WC Walter Ramos MD   20 mEq at 08/07/22 0920    rOPINIRole (REQUIP) tablet 0.5 mg  0.5 mg Oral Nightly Walter Ramos MD   0.5 mg at 08/06/22 2302    sacubitril-valsartan (ENTRESTO) 24-26 MG per tablet 0.5 tablet  0.5 tablet Oral BID Walter Ramos MD   0.5 tablet at 08/07/22 0916    tamsulosin (FLOMAX) capsule 0.4 mg  0.4 mg Oral Daily Walter Ramos MD   0.4 mg at 08/07/22 8034    thiamine mononitrate tablet 100 mg  100 mg Oral Daily Walter Ramos MD   100 mg at 08/07/22 2138    vitamin D (ERGOCALCIFEROL) capsule 50,000 Units  50,000 Units Oral Weekly Walter Ramos MD   50,000 Units at 08/03/22 2113    magnesium sulfate 1000 mg in dextrose 5% 100 mL IVPB  1,000 mg IntraVENous PRN Walter Ramos MD           ASSESSMENT:     Principal Problem:    Acute on chronic combined systolic and diastolic congestive heart failure (HCC)  Active Problems:    History of acute inferior wall MI    Chronic combined systolic (congestive) and diastolic (congestive) heart failure ([address]    Acute respiratory failure with hypoxia ([address]    Encounter for current long-term use of anticoagulants    Coronary artery disease    Hypertension    Hyperlipidemia    Cirrhosis of liver with ascites (HCC)    CKD (chronic kidney disease)    Severe malnutrition (HCC)    PAF (paroxysmal atrial fibrillation) (HCC)    Lightheaded    Gastroesophageal reflux disease    Hypokalemia    Hypovitaminosis D    Hypoxia    Nonischemic cardiomyopathy ([address]  Resolved Problems:    * No resolved hospital problems. *      PLAN:     Discussed care plan with nurse after getting their input.     Acute on Chronic CHF, acute respiratory failure with hypoxia, COPD, CKD, PAF  Cardiology is following   IV Lasix 40mg for now, transition back to 2 [facility] O2 eval today  Monitor CBC/CMP  Patient is net negative -4.6L since admit  Lipitor, Toprol  Midodrine  Entresto  Buspar/Lexapro, Requip  Florinef  On Flomax  Pepcid/Protonix GI Ppx  Eliquis, Plavix    Above plan discussed with the patient who agreed to the above plan     Dispo pending clinical status, plan likely for tomorrow    Please note that this chart was generated using voice recognition Dragon dictation software. Although every effort was made to ensure the accuracy of this automated transcription, some errors in transcription may have occurred.     Margarita Altman MD  8/7/2022 3:02 PM

## 2022-08-07 NOTE — PLAN OF CARE
Problem: Discharge Planning  Goal: Discharge to home or other facility with appropriate resources  Outcome: Progressing  Flowsheets (Taken 8/7/2022 0508)  Discharge to home or other facility with appropriate resources:   Identify discharge learning needs (meds, wound care, etc)   Identify barriers to discharge with patient and caregiver     Problem: Pain  Goal: Verbalizes/displays adequate comfort level or baseline comfort level  Outcome: Progressing  Flowsheets (Taken 8/7/2022 0508)  Verbalizes/displays adequate comfort level or baseline comfort level:   Assess pain using appropriate pain scale   Implement non-pharmacological measures as appropriate and evaluate response   Administer analgesics based on type and severity of pain and evaluate response   Encourage patient to monitor pain and request assistance     Problem: Safety - Adult  Goal: Free from fall injury  Outcome: Progressing  Flowsheets (Taken 8/7/2022 0508)  Free From Fall Injury: Instruct family/caregiver on patient safety     Problem: Chronic Conditions and Co-morbidities  Goal: Patient's chronic conditions and co-morbidity symptoms are monitored and maintained or improved  Outcome: Progressing  Flowsheets (Taken 8/6/2022 0515)  Care Plan - Patient's Chronic Conditions and Co-Morbidity Symptoms are Monitored and Maintained or Improved: Monitor and assess patient's chronic conditions and comorbid symptoms for stability, deterioration, or improvement     Problem: Skin/Tissue Integrity  Goal: Absence of new skin breakdown  Description: 1. Monitor for areas of redness and/or skin breakdown  2. Assess vascular access sites hourly  3. Every 4-6 hours minimum:  Change oxygen saturation probe site  4. Every 4-6 hours:  If on nasal continuous positive airway pressure, respiratory therapy assess nares and determine need for appliance change or resting period. Outcome: Progressing  Note: Alfonso scale monitoring per protocol.  Inspect skin for breakdown frequently. Encourage pt to make frequent large adjustments in position or assist patient with turning. Document all areas of breakdown.

## 2022-08-07 NOTE — PROGRESS NOTES
[address] [facility] [address]        Patient Name: Matthew Bhatia 1950      A home oxygen evaluation has been completed. Patient was placed on room air upon my arrival to room. SpO2 was 64-67 % on room air at rest. Patients SpO2 was below 88% and qualified for home oxygen. Oxygen was applied at 5 lpm via nasal cannula to maintain a SpO2 between 90-92% while at rest. Actual SpO2 was 92 %. Pt was reminded to keep O2 on at all times.

## 2022-08-07 NOTE — PROGRESS NOTES
Pt sitting up in recliner working with therapy upon entering room. Denies pain or concerns at this time. States he feels really good today especially for just waking up. Asked if he could go home. Told him the Dr will be around. Call light in reach, Will continue to monitor.

## 2022-08-07 NOTE — PLAN OF CARE
Problem: Discharge Planning  Goal: Discharge to home or other facility with appropriate resources  8/7/2022 1017 by Case Hernandez RN  Outcome: Progressing     Problem: Pain  Goal: Verbalizes/displays adequate comfort level or baseline comfort level  8/7/2022 1017 by Case Hernandez RN  Outcome: Progressing     Problem: Safety - Adult  Goal: Free from fall injury  8/7/2022 1017 by Case Hernandez RN  Outcome: Progressing     Problem: Chronic Conditions and Co-morbidities  Goal: Patient's chronic conditions and co-morbidity symptoms are monitored and maintained or improved  8/7/2022 1017 by Case Hernandez RN  Outcome: Progressing     Problem: Skin/Tissue Integrity  Goal: Absence of new skin breakdown  Description: 1. Monitor for areas of redness and/or skin breakdown  2. Assess vascular access sites hourly  3. Every 4-6 hours minimum:  Change oxygen saturation probe site  4. Every 4-6 hours:  If on nasal continuous positive airway pressure, respiratory therapy assess nares and determine need for appliance change or resting period.   8/7/2022 1017 by Case Hernandez RN  Outcome: Progressing     Problem: Nutrition Deficit:  Goal: Optimize nutritional status  8/7/2022 1017 by Case Hernandez RN  Outcome: Progressing

## 2022-08-07 NOTE — PROGRESS NOTES
Physical Therapy  Facility/Department: [facility] AT THE [facility] MED SURG  Daily Treatment Note  NAME: Ellie Pruitt  :   MRN: [id number]    Date of Service: 2022    Discharge Recommendations:  Continue to assess pending progress, Home with Home health PT, Outpatient PT        Patient Diagnosis(es): The primary encounter diagnosis was Acute respiratory failure with hypoxia ([address] A diagnosis of Acute on chronic combined systolic and diastolic CHF (congestive heart failure) (HCC) was also pertinent to this visit. Assessment   Activity Tolerance: Patient tolerated treatment well     Plan          Restrictions  Restrictions/Precautions  Restrictions/Precautions: General Precautions     Subjective    Subjective  Subjective: Pt sitting EOB with wife in room, agreeable to therapy. Pain: Denies  Orientation  Overall Orientation Status: Within Functional Limits  Cognition  Overall Cognitive Status: WFL     Objective   Vitals     Bed Mobility Training  Bed Mobility Training: No  Overall Level of Assistance: Supervision  Rolling: Supervision  Supine to Sit: Supervision  Balance  Sitting: Intact  Standing: Intact  Standing - Static: Fair;Good  Standing - Dynamic: Fair;Good  Transfer Training  Transfer Training: Yes  Overall Level of Assistance: Stand-by assistance;Supervision  Interventions: Verbal cues; Safety awareness training  Sit to Stand: Supervision  Stand to Sit: Supervision  Bed to Chair: Supervision  Gait Training  Gait Training: Yes  Gait  Overall Level of Assistance: Stand-by assistance;Supervision  Interventions: Verbal cues; Safety awareness training (O2 tubing management)  Speed/Andreina: Slow  Distance (ft): 75 Feet  Assistive Device: Other (comment) (no AD)     PT Exercises  Exercise Treatment: Standing exercises with B UE support x15 including calf raises, marches, mini squats and abduction     Safety Devices  Type of Devices: Call light within reach; Left in bed  Restraints  Restraints Initially in Place:

## 2022-08-08 ENCOUNTER — APPOINTMENT (OUTPATIENT)
Dept: CT IMAGING | Age: 72
DRG: 291 | End: 2022-08-08
Payer: MEDICARE

## 2022-08-08 ENCOUNTER — APPOINTMENT (OUTPATIENT)
Dept: GENERAL RADIOLOGY | Age: 72
DRG: 291 | End: 2022-08-08
Payer: MEDICARE

## 2022-08-08 PROCEDURE — 2580000003 HC RX 258: Performed by: STUDENT IN AN ORGANIZED HEALTH CARE EDUCATION/TRAINING PROGRAM

## 2022-08-08 PROCEDURE — 6370000000 HC RX 637 (ALT 250 FOR IP): Performed by: PHYSICIAN ASSISTANT

## 2022-08-08 PROCEDURE — 6360000002 HC RX W HCPCS: Performed by: STUDENT IN AN ORGANIZED HEALTH CARE EDUCATION/TRAINING PROGRAM

## 2022-08-08 PROCEDURE — 94761 N-INVAS EAR/PLS OXIMETRY MLT: CPT

## 2022-08-08 PROCEDURE — 97110 THERAPEUTIC EXERCISES: CPT

## 2022-08-08 PROCEDURE — 94669 MECHANICAL CHEST WALL OSCILL: CPT

## 2022-08-08 PROCEDURE — 71260 CT THORAX DX C+: CPT | Performed by: NURSE PRACTITIONER

## 2022-08-08 PROCEDURE — 1200000000 HC SEMI PRIVATE

## 2022-08-08 PROCEDURE — 6370000000 HC RX 637 (ALT 250 FOR IP): Performed by: STUDENT IN AN ORGANIZED HEALTH CARE EDUCATION/TRAINING PROGRAM

## 2022-08-08 PROCEDURE — 71046 X-RAY EXAM CHEST 2 VIEWS: CPT

## 2022-08-08 PROCEDURE — 6360000004 HC RX CONTRAST MEDICATION: Performed by: STUDENT IN AN ORGANIZED HEALTH CARE EDUCATION/TRAINING PROGRAM

## 2022-08-08 PROCEDURE — 2700000000 HC OXYGEN THERAPY PER DAY

## 2022-08-08 PROCEDURE — 94664 DEMO&/EVAL PT USE INHALER: CPT

## 2022-08-08 PROCEDURE — 97116 GAIT TRAINING THERAPY: CPT

## 2022-08-08 PROCEDURE — 99232 SBSQ HOSP IP/OBS MODERATE 35: CPT | Performed by: PHYSICIAN ASSISTANT

## 2022-08-08 PROCEDURE — 94640 AIRWAY INHALATION TREATMENT: CPT

## 2022-08-08 RX ORDER — SPIRONOLACTONE 25 MG/1
25 TABLET ORAL DAILY
Status: DISCONTINUED | OUTPATIENT
Start: 2022-08-08 | End: 2022-08-13 | Stop reason: HOSPADM

## 2022-08-08 RX ORDER — ALBUTEROL SULFATE 2.5 MG/3ML
2.5 SOLUTION RESPIRATORY (INHALATION) 4 TIMES DAILY
Status: DISCONTINUED | OUTPATIENT
Start: 2022-08-08 | End: 2022-08-13 | Stop reason: HOSPADM

## 2022-08-08 RX ADMIN — THIAMINE HCL TAB 100 MG 100 MG: 100 TAB at 10:45

## 2022-08-08 RX ADMIN — AMIODARONE HYDROCHLORIDE 200 MG: 200 TABLET ORAL at 10:45

## 2022-08-08 RX ADMIN — Medication 600 MG: at 21:18

## 2022-08-08 RX ADMIN — FOLIC ACID 1 MG: 1 TABLET ORAL at 10:45

## 2022-08-08 RX ADMIN — MULTIVITAMIN TABLET 1 TABLET: TABLET at 10:55

## 2022-08-08 RX ADMIN — SPIRONOLACTONE 25 MG: 25 TABLET ORAL at 10:45

## 2022-08-08 RX ADMIN — FLUDROCORTISONE ACETATE 0.3 MG: 0.1 TABLET ORAL at 10:55

## 2022-08-08 RX ADMIN — Medication 600 MG: at 10:44

## 2022-08-08 RX ADMIN — PANTOPRAZOLE SODIUM 40 MG: 40 TABLET, DELAYED RELEASE ORAL at 07:08

## 2022-08-08 RX ADMIN — ALBUTEROL SULFATE 2.5 MG: 2.5 SOLUTION RESPIRATORY (INHALATION) at 10:28

## 2022-08-08 RX ADMIN — METOPROLOL SUCCINATE 25 MG: 25 TABLET, EXTENDED RELEASE ORAL at 10:45

## 2022-08-08 RX ADMIN — IOPAMIDOL 75 ML: 755 INJECTION, SOLUTION INTRAVENOUS at 15:38

## 2022-08-08 RX ADMIN — AMIODARONE HYDROCHLORIDE 200 MG: 200 TABLET ORAL at 21:18

## 2022-08-08 RX ADMIN — MIDODRINE HYDROCHLORIDE 10 MG: 5 TABLET ORAL at 10:44

## 2022-08-08 RX ADMIN — SACUBITRIL AND VALSARTAN 0.5 TABLET: 24; 26 TABLET, FILM COATED ORAL at 10:44

## 2022-08-08 RX ADMIN — SACUBITRIL AND VALSARTAN 0.5 TABLET: 24; 26 TABLET, FILM COATED ORAL at 21:23

## 2022-08-08 RX ADMIN — BUMETANIDE 1 MG: 1 TABLET ORAL at 10:45

## 2022-08-08 RX ADMIN — BUSPIRONE HYDROCHLORIDE 15 MG: 5 TABLET ORAL at 21:18

## 2022-08-08 RX ADMIN — TAMSULOSIN HYDROCHLORIDE 0.4 MG: 0.4 CAPSULE ORAL at 10:45

## 2022-08-08 RX ADMIN — MIDODRINE HYDROCHLORIDE 10 MG: 5 TABLET ORAL at 17:10

## 2022-08-08 RX ADMIN — ESCITALOPRAM 20 MG: 5 TABLET, FILM COATED ORAL at 21:18

## 2022-08-08 RX ADMIN — SODIUM CHLORIDE, PRESERVATIVE FREE 10 ML: 5 INJECTION INTRAVENOUS at 21:24

## 2022-08-08 RX ADMIN — ALBUTEROL SULFATE 2.5 MG: 2.5 SOLUTION RESPIRATORY (INHALATION) at 20:50

## 2022-08-08 RX ADMIN — SODIUM CHLORIDE, PRESERVATIVE FREE 10 ML: 5 INJECTION INTRAVENOUS at 10:46

## 2022-08-08 RX ADMIN — ALBUTEROL SULFATE 2.5 MG: 2.5 SOLUTION RESPIRATORY (INHALATION) at 16:15

## 2022-08-08 RX ADMIN — ATORVASTATIN CALCIUM 80 MG: 40 TABLET, FILM COATED ORAL at 10:45

## 2022-08-08 RX ADMIN — ROPINIROLE HYDROCHLORIDE 0.5 MG: 0.25 TABLET, FILM COATED ORAL at 21:18

## 2022-08-08 RX ADMIN — CLOPIDOGREL BISULFATE 75 MG: 75 TABLET ORAL at 10:45

## 2022-08-08 RX ADMIN — FAMOTIDINE 20 MG: 20 TABLET ORAL at 10:45

## 2022-08-08 RX ADMIN — APIXABAN 5 MG: 5 TABLET, FILM COATED ORAL at 21:18

## 2022-08-08 RX ADMIN — MIDODRINE HYDROCHLORIDE 10 MG: 5 TABLET ORAL at 13:33

## 2022-08-08 RX ADMIN — APIXABAN 5 MG: 5 TABLET, FILM COATED ORAL at 10:45

## 2022-08-08 RX ADMIN — FAMOTIDINE 20 MG: 20 TABLET ORAL at 21:18

## 2022-08-08 RX ADMIN — Medication 10 MG: at 21:18

## 2022-08-08 RX ADMIN — BUSPIRONE HYDROCHLORIDE 15 MG: 5 TABLET ORAL at 10:44

## 2022-08-08 NOTE — PROGRESS NOTES
RESPIRATORY ASSESSMENT PROTOCOL                                                                                              Patient Name: Percy Olivo Room#: 7020/3657-03 : 1950     Admitting diagnosis: Hypoxia [R09.02]  Acute respiratory failure with hypoxia ([facility] [J96.01]  Acute on chronic combined systolic and diastolic CHF (congestive heart failure) ([facility] [I50.43]       Medical History:   Past Medical History:   Diagnosis Date    Acute kidney injury ([facility]     Ascites 2022    Atrial fibrillation (HCC)     CHF (congestive heart failure) ([facility]     Cirrhosis of liver with ascites ([facility] 2022    CKD (chronic kidney disease) 2022    Coronary artery disease     Hyperlipidemia     Hypertension     Hypomagnesemia 2022    Hyponatremia     Thrombocytopenia ([facility]        PATIENT ASSESSMENT    LABORATORY DATA  Hematology:   Lab Results   Component Value Date/Time    WBC 2.2 2022 06:40 AM    RBC 3.52 2022 06:40 AM    HGB 11.5 2022 06:40 AM    HCT 36.2 2022 06:40 AM    PLT See Reflexed IPF Result 2022 06:40 AM     Chemistry:    Lab Results   Component Value Date/Time    PHART 7.451 2022 12:00 PM    DYP7SYI 42.3 2022 12:00 PM    PO2ART 58.4 2022 12:00 PM    V3POXKJI 89.2 2022 12:00 PM    ILY8EWT 28.8 2022 12:00 PM    PBEA 4.4 2022 12:00 PM       VITALS  Heart Rate: 58   Resp: 20  BP: 126/88  SpO2: 93 % O2 Device: Nasal cannula  Temp: 98.8 °F (37.1 °C)    SKIN COLOR  [x] Normal  [] Pale  [] Dusky  [] Cyanotic    RESPIRATORY PATTERN  [x] Normal  [] Dyspnea  [] Cheyne-Friend  [] Kussmaul  [] Biots    AMBULATORY  [] Yes  [] No  [x] With Assistance      Patient Acuity 0 1 2 3 4 Score   Level of Concious (LOC) [x]  Alert & Oriented or Pt normal LOC []  Confused;follows directions []  Confused & uncooper-ative []  Obtunded []  Comatose 0   Respiratory Rate  (RR) []  Reg. rate & pattern. 12 - 20 bpm  []  Increased RR.  Greater than 20 bpm   [x]  SOB w/ exertion or RR greater than 24 bpm []  Access- ory muscle use at rest. Abn.  resp. []  SOB at rest.   2   Bilateral Breath Sounds (BBS) []  Clear []  Diminish-ed bases  []  Diminish-ed t/o, or rales   [x]  Sporadic, scattered wheezes or rhonchi []  Persistentwheezes and, or absent BBS 3   Cough [x]  Strong, effective, & non-prod. []  Effective & prod. Less than 25 ml (2 TBSP) over past 24 hrs []  Ineffective & non-prod to less than 25 ML over past 24 hrs []  Ineffective and, or greater than 25 ml sputum prod. past 24 hrs. []  Nonspon- taneous; Requires suctioning 0   Pulmonary History  (PULM HX) []  No smoking and no chronic pulmonaryhistory []  Former smoker. Quit over 12 mos. ago []  Current smoker or quit w/ in 12 mos []  Pulm. History and, or 20 pk/yr smoking hx [x]  Admitted w/ acute pulm. dx and, or has been admitted w/ pulm. dx 2 or more times over past 12 mos 4   Surgical History this Admit  (SURG HX) [x]  No surgery []  General surgery []  Lower abdominal []  Thoracic or upper abdominal   []  Thoracic w/ pulm. disease 0   Chest X-Ray (CXR)/CT Scan []  Clear or not applicable []  Not available []  Atelect- asis or pleural effusions [x]  Localized infiltrate or pulm. edema []  Con-solidated Infiltrates, bilateral, or in more than 1 lobe 3   Slow or Forced VC, FEV1 OR PEFR (PULM FXN)  [x]  80% or greater, or not indicated []  Pt. unable to perform []  FEV1 or PEFR or VC 51-79%.   []  FEV1 or PEFR or VC  30-49%   []  FEV1 or PEFR or VC less than 30%   0   TOTAL ACUITY: 12       CARE PLAN    If Acuity Level is 2, 3, or 4 in any of the following:    [x] BILATERAL BREATH SOUNDS (BBS)     [x] PULMONARY HISTORY (PULM HX)  [] PULMONARY FUNCTION (PULM FX)    Goal: Improve respiratory functions in patients with airway disease and decrease WOB    [x] AEROSOL PROTOCOL    Total Acuity:   16-32  []  Secondary Assessment in 24 hrs Total Acuity:  9-15  [x]  Secondary Assessment in 24 hrs Total Acuity:  4-8  []  Secondary Assessment in 48 hrs Total Acuity:  0-3  []  Secondary Assessment in 72 hrs   HHN AEROSOL THERAPY with  [physician-ordered bronchodilator(s)] q 4 & Albuterol PRN q2 hrs. Breath-Actuated Neb if BBS Acuity = 4, and pt. can use MP. Notify physician if condition deteriorates. HHN AEROSOL THERAPY with  [physician-ordered bronchodilator(s)]  QID and Albuterol PRN q4 hrs. Breath-Actuated Neb if BBS Acuity = 4, and pt. can use MP. Notify physician if condition deteriorates. MDI THERAPY with  2 actuations of [physician-ordered bronchodilator(s)] via spacer TID Albuterol and PRNq4 hrs. If unable to utilize MDI: HHN [physician-ordered bronchodilator(s)] TID and Albuterol PRN q4 hrs. Notify physician if condition deteriorates. MDI THERAPY with  [physician-ordered bronchodilator(s)] via spacer TID PRN. If unable to utilize MDI: HHN [physician-ordered bronchodilator(s)] TID PRN. Notify physician if condition deteriorates. If Acuity Level is 2, 3, or 4 in any of the following:    [] COUGH     [] SURGICAL HISTORY (SURG HX)  [x] CHEST XRAY (CXR)    Goal: Improvement in sputum mobilization in patients with ineffective airway clearance. Reverse atelectasis.     [x] Bronchopulmonary Hygiene Protocol    Total Acuity:   16-32  []  Secondary Assessment in 24 hrs Total Acuity:  9-15  [x]  Secondary Assessment in 24 hrs Total Acuity:  4-8  []  Secondary Assessment in 48 hrs Total Acuity:  0-3  []  Secondary Assessment in 72 hrs   METANEB QID with [physician-ordered bronchodilator(s)] if CXR Acuity = 4; otherwise:  PD&P, PEP, or Vest QID & PRN  NT Sxn PRN for ineffective cough  METANEB QID with [physician-ordered bronchodilator(s)] if CXR Acuity = 4; otherwise:  PD&P, PEP, or Vest TID & PRN  NT Sxn PRN for ineffective cough  Instruct patient to self-perform IS q1hr WA   Directed Cough self-performed q1hr WA     If Acuity Level is 2 or above in the following:    [] PULMONARY HISTORY (PULM HX)    Goal: Assist patient in quitting smoking to slow or stop the progression of lung disease.     [] Smoking Cessation Protocol    SMOKING CESSATION EDUCATION provided according to policy [id number]: (tomasa with an X)  ____Yes    ____ No     ____ NA    Smoking Cessation Booklet given:  ____Yes  ____No ____Ana Barrett

## 2022-08-08 NOTE — PROGRESS NOTES
Patient: Saurabh Baeza  :   Date of Admission: 8/3/2022  Primary Care Physician: Surjit Ford  Today's Date: 2022    REASON FOR CONSULTATION: atrial fibrillation with RVR    HPI:  Mr. Rodri Coles is a 70 y.o. male who was admitted to the hospital on 2022 for weakness and shortness of breath. In the ER he was found to be in atrial fibrillation with RVR leading to a consultation. He was then readmitted due to severe dehydration. In the past he reports seeing a Cardiologist in [place] and was planning on having a cardioversion done in  to put him back into normal sinus rhythm due to his history of new onset atrial fibrillation. He reports a history of a heart attack at age 46 and needed stents placed at that time but denies any cardiac cath since that time. He also has carotid artery stenosis and abdominal aortic aneurysm. He did report having surgery on one side of his neck, however he is not sure which side or how long ago it was. He has had hypertension and hyperlipidemia for years as well. He is a current every day smoker and he smoked for about 55 years and smokes about a pack a day. Family history consists of a lot of people in his family with significant cardiac history, however he wanted us to talk to his sister about the family history as she knows the details. Echo done on 2022 showed an EF of 15-20%. Echo done on 2022: Compared to the previous study of 2022, no significant change in ejection fraction. Functional mitral and tricuspid regurgitation are worse. Bilateral pleural effusion and left elevated left ventricular filling pressure along with dilated IVC suggest worsening fluid overload. Mr. Rodri Coles was admitted for what appears to be a heart failure exacerbation as well as increased generalized weakness.  He says that what ultimately led to his admission was a 2 day history of increasing shortness of breath followed by a fall when he was trying to pet his cat. Today Mr. Sandor Arora reports today he is doing well. He continues to feel like it is hard to breath when he lays down flat at night. He denies any chest pain now or in the recent past. He continues to have rib pain from his fall. He also denied any abdominal pain, bleeding problems, problems with his medications or any other concerns at this time. He has no bowel issues at this time. No nausea or vomiting. No fever, cough or chills. -4370 mL   Magnesium 1.5 and Potassium is 3.3 today. CXR done today shows stable pulmonary edema and bilateral pleural effusions. Past Medical History:   Diagnosis Date    Acute kidney injury ([address]     Ascites 2022    Atrial fibrillation (HCC)     CHF (congestive heart failure) (HCC)     Cirrhosis of liver with ascites ([facility] 2022    CKD (chronic kidney disease) 2022    Coronary artery disease     Hyperlipidemia     Hypertension     Hypomagnesemia 2022    Hyponatremia     Thrombocytopenia (HCC)        CURRENT ALLERGIES: Patient has no known allergies. REVIEW OF SYSTEMS: 14 systems were reviewed. Pertinent positives and negatives as above, all else negative. Past Surgical History:   Procedure Laterality Date    CAROTID STENT PLACEMENT Bilateral     HERNIA REPAIR      ROTATOR CUFF REPAIR Right     TONSILLECTOMY AND ADENOIDECTOMY      Social History:  Social History     Tobacco Use    Smoking status: Former     Packs/day: 1.00     Years: 56.00     Pack years: 56.00     Types: Cigarettes     Quit date: 2022     Years since quittin.1    Smokeless tobacco: Never   Vaping Use    Vaping Use: Never used   Substance Use Topics    Alcohol use: Not Currently     Alcohol/week: 2.0 standard drinks     Types: 2 Shots of liquor per week     Comment: Drinks about 2 Kesslers with mix nightly.   Reported history of heavy alcohol use for years with both beer and liquior several years ago    Drug use: Not Currently        CURRENT MEDICATIONS:  No outpatient medications have been marked as taking for the 8/3/22 encounter [facility] H[facility] Encounter). FAMILY HISTORY: Reviewed but non-contributory     PHYSICAL EXAM:   /70 (Site: Right Upper Arm, Position: Sitting, Cuff Size: Medium Adult)   Pulse 58   Resp 18   Ht 5' 6\" (1.676 m)   Wt 176 lb (79.8 kg)   SpO2 94%   BMI 28.41 kg/m²  Body mass index is 28.41 kg/m². Constitutional: He is oriented to person, place, and time. He appears well-developed and well-nourished. In no acute distress. HEENT: Normocephalic and atraumatic. No JVD present. Carotid bruit is not present. No mass and no thyromegaly present. No lymphadenopathy present. Cardiovascular: Normal rate, regular rhythm, normal heart sounds. Exam reveals no gallop and no friction rubs. No murmur was heard. Pulmonary/Chest: Effort normal and breath sounds normal. No respiratory distress. He has no wheezes, rhonchi or rales. Abdominal: Soft, non-tender. Bowel sounds and aorta are normal. He exhibits no organomegaly, mass or bruit. Extremities: Trace. No cyanosis or clubbing. 2+ radial and carotid pulses. Distal extremity pulses: 2+ bilaterally. Neurological: He is alert and oriented to person, place, and time. No evidence of gross cranial nerve deficit. Coordination appeared normal.   Skin: Skin is warm and dry. There is no rash or diaphoresis. Psychiatric: He has a normal mood and affect.  His speech is normal and behavior is normal.        MOST RECENT LABS ON RECORD:   Lab Results   Component Value Date    WBC 2.2 (L) 08/06/2022    HGB 11.5 (L) 08/06/2022    HCT 36.2 (L) 08/06/2022    PLT See Reflexed IPF Result 08/06/2022    ALT 40 08/06/2022    AST 49 (H) 08/06/2022     08/06/2022    K 3.3 (L) 08/06/2022    CL 98 08/06/2022    CREATININE 0.92 08/06/2022    BUN 20 08/06/2022    CO2 35 (H) 08/06/2022    TSH 5.77 (H) 07/25/2022    INR 1.4 08/03/2022     ASSESSMENT:  Patient Active Problem List    Diagnosis Date Noted    Acute respiratory failure with hypoxia ([facility] 08/04/2022    Encounter for current long-term use of anticoagulants 08/04/2022    Chronic combined systolic (congestive) and diastolic (congestive) heart failure (HCC)     Dehydration, moderate     Medication side effect, initial encounter     History of acute inferior wall MI     Ischemic cardiomyopathy     Nonischemic cardiomyopathy ([facility] 08/04/2022    Hypoxia 08/03/2022    PAF (paroxysmal atrial fibrillation) ([facility] 07/19/2022    Lightheaded 07/19/2022    Tobacco abuse counseling 07/19/2022    Gastroesophageal reflux disease 07/19/2022    Hypotension 07/19/2022    Acute on chronic combined systolic and diastolic congestive heart failure ([facility] 07/19/2022    Hypokalemia 07/19/2022    Hypovitaminosis D 07/19/2022    Severe malnutrition ([facility] 06/07/2022    Hypomagnesemia 05/20/2022    Ascites 05/20/2022    CKD (chronic kidney disease) 05/20/2022    Cirrhosis of liver with ascites ([facility] 05/19/2022    Acute on chronic combined systolic and diastolic CHF, NYHA class 4 (HCC)     Coronary artery disease     Hypertension     Thrombocytopenia (HCC)     Hyponatremia     Hyperlipidemia     Atrial fibrillation with RVR ([facility] 05/18/2022      PLAN:    History of Hypotension/ Lightheadedness/dizziness: One recent fall at home but current appears well controlled. Continue to monitor   Continue Florinef (fludrocortisone) 0.3 mg daily. Beta Blocker: Continue Metoprolol succinate (Toprol XL) 25 mg daily. Diuretics: Recently we DECREASE to bumetinide (Bumex) 1 mg every morning. and stopped the additional Bumex three times a week but I suspect that this is what led to his heart failure exacerbation so I expect that we may need to go back at least to this regimine prior to discharge  Continue Bumex 1 mg daily. Diuretics: START Spironolactone (Aldactone) 25 mg daily.  I also discussed the potential side effects of this medication including lightheadedness and dizziness and instructed them to stop the medication of this occurs and call our office if this occurs. He has been having hypokalemia, therefore I made a change to his medications. Will continue to monitor his BMP. Nonpharmacologic counseling: Because of his condition, I reminded him to try and keep himself well-hydrated and to take extra time when moving from laying to sitting, sitting to standing and standing to walking. I also explained to him to help improve his symptoms he should include 3 g sodium diet, 1 or 2 L of sports drinks daily, knee-high compressions stockings. Paroxysmal Atrial Fibrillation: Rhythm Control Asymptomatic   Beta Blocker: Continue Metoprolol succinate (Toprol XL) 25 mg daily. Anti-Arrhythmic: Continue amiodarone (Pacerone) 200 mg BID. Monitoring: Since they will being maintained on Amiodarone, I told them that we will need to closely monitor them for potential side effects. These include monitoring LFTs and TSH at least every 6 months as well as chest x-rays, pulmonary function tests, and eye exams at least yearly. [id number] Score for Atrial Fibrillation Stroke Risk   Risk   Factors  Component Value   C CHF Yes 1   H HTN Yes 1   A2 Age >= 76 No,  (75 y.o.) 0   D DM No 0   S2 Prior Stroke/TIA No 0   V Vascular Disease No 0   A Age 74-69 Yes,  (75 y.o.) 1   Sc Sex male 0    [id number]  Score  3   Score last updated 9/8/10 1:26 PM EDT  Click here for a link to the UpToDate guideline \"Atrial Fibrillation: Anticoagulation therapy to prevent embolization  Disclaimer: Risk Score calculation is dependent on accuracy of patient problem list and past encounter diagnosis. Stroke Risk: CHADS2-VASc Score: 3/9 (3.2% stroke risk).  Because of his atrial fibrillation, I also would also recommend that he continue with anticoagulation to decrease his risk of stoke but also reminded him to watch for signs of blood in his stool or black tarry stools and stop the medication immediately if this develops as this could be life threatening. Anticoagulation: Continue Apixaban (Eliquis) 5 mg every 12 hours. Acute on chronic combined heart failure: Ischemic Cardiomyopathy, Echo done on 5/18/2022 which showed an EF of 15-20%. We recently decreased his Bumex from 2 mg to 1 mg due to his falling and I suspect this is what caused his heart failure exacerbation. Beta Blocker: Continue Metoprolol succinate (Toprol XL) 25 mg daily. ACE Inibitor/ARB: Continue sacubitril/valsartan (Entresto) 24/26 mg 1/2 a tablet twice daily. Diuretics: Continue Bumex 1mg daily. Diuretics: START Spironolactone (Aldactone) 25 mg daily. Heart failure counseling: I advised them to try and keep their legs up whenever possible and to limit salt in their diet. Life Vest: Although I do not think it is a bad idea for him to wear the Life Vest in the hospital, I am actually ok if he does not wear it as long as he is on Telemetry but will leave this up to Mr. Maricruz Ramirez . Tobacco Abuse Counseling: Will discuss further prior to discharge home    Once again, thank you for allowing me to participate in this patients care. Please do not hesitate to contact me could I be of further assistance. We will follow up in 1 week outpatient. I discussed patient's symptoms and treatment plan with Dr Dennis Capone, he was in agreement with the plan. Sincerely,  Tiara Malin PA-C  [facility] Cardiology Specialist    [address], [address] [place] [address]  Phone: [phone], Fax: [phone]     I believe that the risk of significant morbidity and mortality related to the patient's current medical conditions are: intermediate-high.          August 8, 2022

## 2022-08-08 NOTE — PROGRESS NOTES
Occupational Therapy  Facility/Department: [facility] AT THE [facility] MED SURG  Daily Treatment Note  NAME: Padmaja Hughes  :   MRN: [id number]    Date of Service: 2022    Discharge Recommendations:  Continue to assess pending progress         Patient Diagnosis(es): The primary encounter diagnosis was Acute respiratory failure with hypoxia ([address] A diagnosis of Acute on chronic combined systolic and diastolic CHF (congestive heart failure) (HCC) was also pertinent to this visit. Assessment    Activity Tolerance: Patient tolerated treatment well  Discharge Recommendations: Continue to assess pending progress      Plan   Plan  Times per Week: 7  Times per Day: Daily  Current Treatment Recommendations: Strengthening;Balance training;Functional mobility training; Endurance training; Safety education & training;Patient/Caregiver education & training;Equipment evaluation, education, & procurement;Home management training;Self-Care / ADL     Restrictions  Restrictions/Precautions  Restrictions/Precautions: General Precautions    Subjective   Subjective  Subjective: Pt sitting up in bedside chair upon arrival. Pt agreed to participate in therapy session however declined self care this date. Pain: Pt reported 7/10 pain in L ribs this date  Orientation  Overall Orientation Status: Within Functional Limits  Pain: Denies           Objective    Vitals          OT Exercises  Exercise Treatment: Pt tolerated BUE ther ex with 1# dumbbell x 7 planes x 15 reps x 1 set, yellow flex bar x 3 variations x 10 reps x 1 set to increase UE strength and endurance in order to ease completion of ADL tasks. Pt required RBs as needed secondary to fatigue. Safety Devices  Type of Devices: Left in chair;Call light within reach     Patient Education  Education Given To: Patient  Education Provided: Role of Therapy;Plan of Care  Education Provided Comments: Pt educated on d/c folder, AE/DME, and safety with G understanding.   Education Method: Demonstration;Verbal  Barriers to Learning: None  Education Outcome: Verbalized understanding;Demonstrated understanding    Goals  Short Term Goals  Time Frame for Short term goals: 21 visits  Short Term Goal 1: Patient to be educated on d/c folder, AE/DME and EC/WS techniques to ensure safe and indep return home. Short Term Goal 2: Patient to complete ADL routine c Mod I c AE/DME and implementation of EC/WS techniques as needed to ensure safe return home.   Short Term Goal 3: Patient will tolerate 15 mins of BUE ther ex/act to increase overall strength/activity tolerance for functional tasks       Therapy Time   Individual Concurrent Group Co-treatment   Time In 0840         Time Out 0907         Minutes 27                 GERMAN Guzman/WANDA

## 2022-08-08 NOTE — PROGRESS NOTES
Pt vitals and assessment completed at this time, see flowsheet. Pt A&O x4, breathing unlabored, even, with end expiratory wheezes in the right lobe, diminished in the left lobe. Pt denies any pain or any other needs at this time, call light within reach, will continue to monitor.

## 2022-08-08 NOTE — PROGRESS NOTES
Physical Therapy  Facility/Department: [facility] AT THE [facility] MED SURG  Daily Treatment Note  NAME: Bhargavi Goncalves  :   MRN: [id number]  Date of Service: 2022  Discharge Recommendations:  Continue to assess pending progress, Home with Home health PT, Outpatient PT   Patient Diagnosis(es): The primary encounter diagnosis was Acute respiratory failure with hypoxia ([address] A diagnosis of Acute on chronic combined systolic and diastolic CHF (congestive heart failure) (HCC) was also pertinent to this visit. Assessment   Activity Tolerance: Patient tolerated treatment well   Plan    Plan  Plan: 2 times a day 7 days a week  Plan weeks: 20 days  Current Treatment Recommendations: Strengthening;Balance training;Functional mobility training;Transfer training; Endurance training;Gait training;Stair training;Neuromuscular re-education;Home exercise program;Safety education & training;Positioning; Therapeutic activities   Restrictions  Restrictions/Precautions  Restrictions/Precautions: General Precautions   Subjective    Subjective  Subjective: Pt. in bed upon arrival, agreeable to get up to chair at this time. Pain: Denies  Orientation  Overall Orientation Status: Within Functional Limits   Objective   Bed Mobility Training  Bed Mobility Training: Yes  Interventions: Safety awareness training;Verbal cues  Rolling: Supervision  Supine to Sit: Supervision  Scooting: Supervision  Transfer Training  Transfer Training: Yes  Overall Level of Assistance: Stand-by assistance;Supervision  Interventions: Verbal cues; Safety awareness training  Sit to Stand: Supervision  Stand to Sit: Supervision  Gait Training  Gait Training: Yes  Gait  Overall Level of Assistance: Stand-by assistance;Supervision  Interventions: Verbal cues; Safety awareness training  Speed/Andreina: Slow  Distance (ft):  (20)  PT Exercises  Exercise Treatment: Seated exercises B LE x20  Safety Devices  Type of Devices: Left in chair;Call light within reach Goals  Short Term Goals  Time Frame for Short term goals: 20 days  Short term goal 1: Pt to be able to ambulate 50 ft IND to be able to retrun home safely. Short term goal 2: Pt to be IND with all transfers to improve functional mobility and safety of transfers  Short term goal 3: Pt to be able to tolerate 20-30 mins therex/act to improve functional capacity for ADLs.   Patient Goals   Patient goals : to get back home  Education  Patient Education  Education Given To: Patient  Education Provided: Role of Therapy;Plan of Care;Home Exercise Program  Education Method: Verbal  Barriers to Learning: None  Education Outcome: Verbalized understanding  Therapy Time   Individual Concurrent Group Co-treatment   Time In 0715         Time Out 0730         Minutes CECILIA Regalado

## 2022-08-08 NOTE — PROGRESS NOTES
Family came up questioning if pt. Had had a covid test or not, they wanted to see if pt. Should be tested due to his symptoms. RN.  Notified Janet Tyler NP.

## 2022-08-08 NOTE — PROGRESS NOTES
Progress Note    SUBJECTIVE:    Patient seen for f/u of Acute on chronic combined systolic and diastolic congestive heart failure ([address] He sitting up in  chair alert and in no distress. Remains on 5L of oxygen. Afebrile. Stated he feels like he is having anxiety attacks at times. Stated he does have a history in the past he thought    ROS:   Constitutional: negative  for fevers, and negative for chills. Respiratory: negative for shortness of breath, negative for cough, and negative for wheezing  Cardiovascular: negative for chest pain, and negative for palpitations  Gastrointestinal: negative for abdominal pain, negative for nausea,negative for vomiting, negative for diarrhea, and negative for constipation     All other systems were reviewed with the patient and are negative unless otherwise stated in HPI      OBJECTIVE:      Vitals:   Vitals:    08/08/22 0654   BP: (!) 146/78   Pulse: 60   Resp: 20   Temp: 98.8 °F (37.1 °C)   SpO2: 93%     Weight: 174 lb 2.6 oz (79 kg)   Height: 5' 6\" (167.6 cm)     Weight  Wt Readings from Last 3 Encounters:   08/08/22 174 lb 2.6 oz (79 kg)   07/28/22 176 lb (79.8 kg)   07/19/22 181 lb (82.1 kg)     Body mass index is 28.11 kg/m². 24HR INTAKE/OUTPUT:      Intake/Output Summary (Last 24 hours) at 8/8/2022 0827  Last data filed at 8/7/2022 2121  Gross per 24 hour   Intake 300 ml   Output --   Net 300 ml     -----------------------------------------------------------------  Exam:    GEN:    Awake, alert and oriented x3. EYES:  EOMI, pupils equal   NECK: Supple. No lymphadenopathy. No carotid bruit  CVS:    regular rate and rhythm, no audible murmur  PULM:  CTA, no wheezes, rales or rhonchi, no acute respiratory distress  ABD:    Bowels sounds normal.  Abdomen is soft. No distention. no tenderness to palpation. EXT:   no edema bilaterally . No calf tenderness. NEURO: Moves all extremities. Motor and sensory are grossly intact  SKIN:  No rashes. No skin lesions. -----------------------------------------------------------------    Diagnostic Data:      Complete Blood Count:   Recent Labs     08/06/22  0640   WBC 2.2*   RBC 3.52*   HGB 11.5*   HCT 36.2*   .8   MCH 32.7   MCHC 31.8   RDW 15.5*   PLT See Reflexed IPF Result        Last 3 Blood Glucose:   Recent Labs     08/06/22  0640   GLUCOSE 95        Comprehensive Metabolic Profile:   Recent Labs     08/06/22  0640      K 3.3*   CL 98   CO2 35*   BUN 20   CREATININE 0.92   GLUCOSE 95   CALCIUM 9.0   PROT 5.8*   LABALBU 3.6   BILITOT 0.65   ALKPHOS 96   AST 49*   ALT 40        Urinalysis:   Lab Results   Component Value Date/Time    NITRU NEGATIVE 07/27/2022 03:15 PM    COLORU Yellow 07/27/2022 03:15 PM    PHUR 7.5 07/27/2022 03:15 PM    WBCUA 0 TO 2 07/27/2022 03:15 PM    RBCUA 0 TO 2 07/27/2022 03:15 PM    MUCUS TRACE 07/27/2022 03:15 PM    BACTERIA 1+ 07/27/2022 03:15 PM    SPECGRAV 1.010 07/27/2022 03:15 PM    LEUKOCYTESUR NEGATIVE 07/27/2022 03:15 PM    UROBILINOGEN Normal 07/27/2022 03:15 PM    BILIRUBINUR NEGATIVE 07/27/2022 03:15 PM    GLUCOSEU NEGATIVE 07/27/2022 03:15 PM    KETUA NEGATIVE 07/27/2022 03:15 PM       HgBA1c:  No results found for: LABA1C    Lactic Acid:   Lab Results   Component Value Date/Time    LACTA 1.9 05/18/2022 02:35 PM        Troponin: No results for input(s): TROPONINI in the last 72 hours. CRP:  No results for input(s): CRP in the last 72 hours. Radiology/Imaging:  XR CHEST (2 VW)   Final Result   Interval increasing left pleural effusion. Cardiomegaly and vascular   congestion with probable underlying atelectasis. XR CHEST PORTABLE   Final Result   Overall findings most consistent with decompensated CHF with mild pulmonary   edema. Probable bibasilar subsegmental atelectasis and pleural effusions. However,   underlying infiltrates not excluded.          XR CHEST (2 VW)    (Results Pending)         ASSESSMENT / PLAN:  Acute on chronic combined systolic and diastolic congestive heart failure (HCC)  Continue current therapy   Appreciate cardiology  Off IV Lasix  Continue oral Bumex  Teds  Daily weights  CHF education given by myself information given  Acute respiratory failure with hypoxia  Supplemental O2-5L  Out of bed with meals  COPD  Monitor for need for nebs  CKD  Trend labs  Caution with IV diuretics  Monitor output  Nonischemic cardiomyopathy  Appreciate cardiology  Continue with LifeVest  PAF  Continue amiodarone, Eliquis, Toprol-XL  Nutrition status:   obesity, non-morbid  Dietician consult initiated  Hospital Prophylaxis:   DVT: Eliquis   Stress Ulcer: H2 Blocker   High risk medications: none   Disposition:    Discharge plan is home today  Home O2 eval  FACE TO FACE: Patient qualified for home O2. Discussed with patient the medical necessity of home O2. Patient voiced understanding and agreement.     SEVEN Cook CNP, SEVEN, NP-C  8/8/2022, 8:28 AM        Grzegorz Treece, APRN - CNP , APRN, NP-C  [facility]        8/8/2022, 8:27 AM

## 2022-08-08 NOTE — PROGRESS NOTES
Physical Therapy  Facility/Department: [facility] AT THE [facility] MED SURG  Daily Treatment Note  NAME: Julien Stallworth  : [id number]  MRN: [id number]  Date of Service: 2022  Discharge Recommendations:  Continue to assess pending progress, Home with Home health PT, Outpatient PT   Patient Diagnosis(es): The primary encounter diagnosis was Acute respiratory failure with hypoxia ([address] A diagnosis of Acute on chronic combined systolic and diastolic CHF (congestive heart failure) (HCC) was also pertinent to this visit. Assessment   Activity Tolerance: Patient tolerated treatment well   Plan    Plan  Plan: 2 times a day 7 days a week  Plan weeks: 20 days  Current Treatment Recommendations: Strengthening;Balance training;Functional mobility training;Transfer training; Endurance training;Gait training;Stair training;Neuromuscular re-education;Home exercise program;Safety education & training;Positioning; Therapeutic activities   Restrictions  Restrictions/Precautions  Restrictions/Precautions: General Precautions   Subjective    Subjective  Subjective: Pt. in bed upon arrival with family present, agreeable to get up with therapy at this time. Pain: Denies  Orientation  Overall Orientation Status: Within Functional Limits   Objective   Bed Mobility Training  Bed Mobility Training: Yes  Overall Level of Assistance: Supervision  Interventions: Safety awareness training;Verbal cues  Rolling: Supervision  Supine to Sit: Supervision  Sit to Supine: Supervision  Scooting: Supervision  Transfer Training  Transfer Training: Yes  Overall Level of Assistance: Supervision  Interventions: Verbal cues  Sit to Stand: Supervision  Stand to Sit: Supervision  Toilet Transfer: Supervision  Gait Training  Gait Training: Yes  Gait  Overall Level of Assistance: Supervision  Interventions: Verbal cues; Safety awareness training  Speed/Andreina: Slow  Distance (ft): 150 Feet  PT Exercises  Exercise Treatment: Seated exercises B LE x20  Other Specialty Interventions  Other Treatments/Modalities: commode use  Safety Devices  Type of Devices: Left in bed;Call light within reach   Goals  Short Term Goals  Time Frame for Short term goals: 20 days  Short term goal 1: Pt to be able to ambulate 50 ft IND to be able to retrun home safely. Short term goal 2: Pt to be IND with all transfers to improve functional mobility and safety of transfers  Short term goal 3: Pt to be able to tolerate 20-30 mins therex/act to improve functional capacity for ADLs.   Patient Goals   Patient goals : to get back home  Education  Patient Education  Education Given To: Patient  Education Provided: Role of Therapy;Plan of Care;Home Exercise Program  Education Method: Verbal  Barriers to Learning: None  Education Outcome: Verbalized understanding  Therapy Time   Individual Concurrent Group Co-treatment   Time In 1340         Time Out 1356         Minutes [address], PTA

## 2022-08-08 NOTE — PROGRESS NOTES
[address] [facility]                       Inpatient Medication Education Note                                 Patient admitted for respiratory failure/CHF    Medications reviewed with the patient include:  Lexapro, Buspar, Melatonin, and Mucinex DM      Patient education provided when necessary to include potential medication related side effects with acknowledgement of understanding.       Jackson Beltran, [facility], 8/8/2022, 12:58 PM

## 2022-08-09 ENCOUNTER — APPOINTMENT (OUTPATIENT)
Dept: CT IMAGING | Age: 72
DRG: 291 | End: 2022-08-09
Payer: MEDICARE

## 2022-08-09 DIAGNOSIS — I50.42 CHRONIC COMBINED SYSTOLIC AND DIASTOLIC CONGESTIVE HEART FAILURE (HCC): Primary | ICD-10-CM

## 2022-08-09 PROBLEM — J90 BILATERAL PLEURAL EFFUSION: Status: ACTIVE | Noted: 2022-08-09

## 2022-08-09 LAB
ABSOLUTE EOS #: 0 K/UL (ref 0–0.44)
ABSOLUTE IMMATURE GRANULOCYTE: 0 K/UL (ref 0–0.3)
ABSOLUTE LYMPH #: 0.38 K/UL (ref 1.1–3.7)
ABSOLUTE MONO #: 0.14 K/UL (ref 0.1–1.2)
ALBUMIN SERPL-MCNC: 3.6 G/DL (ref 3.5–5.2)
ALBUMIN/GLOBULIN RATIO: 1.4 (ref 1–2.5)
ALP BLD-CCNC: 97 U/L (ref 40–129)
ALT SERPL-CCNC: 160 U/L (ref 5–41)
AMMONIA: 20 UMOL/L (ref 16–60)
ANION GAP SERPL CALCULATED.3IONS-SCNC: 8 MMOL/L (ref 9–17)
AST SERPL-CCNC: 152 U/L
BASOPHILS # BLD: 0 % (ref 0–2)
BASOPHILS ABSOLUTE: 0 K/UL (ref 0–0.2)
BILIRUB SERPL-MCNC: 0.85 MG/DL (ref 0.3–1.2)
BUN BLDV-MCNC: 19 MG/DL (ref 8–23)
BUN/CREAT BLD: 23 (ref 9–20)
CALCIUM SERPL-MCNC: 9.3 MG/DL (ref 8.6–10.4)
CARBOXYHEMOGLOBIN: 0.9 % (ref 0–5)
CHLORIDE BLD-SCNC: 98 MMOL/L (ref 98–107)
CO2: 34 MMOL/L (ref 20–31)
CREAT SERPL-MCNC: 0.81 MG/DL (ref 0.7–1.2)
EOSINOPHILS RELATIVE PERCENT: 0 % (ref 1–4)
GFR AFRICAN AMERICAN: >60 ML/MIN
GFR NON-AFRICAN AMERICAN: >60 ML/MIN
GFR SERPL CREATININE-BSD FRML MDRD: ABNORMAL ML/MIN/{1.73_M2}
GFR SERPL CREATININE-BSD FRML MDRD: ABNORMAL ML/MIN/{1.73_M2}
GLUCOSE BLD-MCNC: 93 MG/DL (ref 70–99)
HCO3 ARTERIAL: 33.9 MMOL/L (ref 22–26)
HCT VFR BLD CALC: 37.2 % (ref 40.7–50.3)
HEMOGLOBIN: 11.6 G/DL (ref 13–17)
IMMATURE GRANULOCYTES: 0 %
LYMPHOCYTES # BLD: 19 % (ref 24–43)
MAGNESIUM: 1.7 MG/DL (ref 1.6–2.6)
MCH RBC QN AUTO: 32 PG (ref 25.2–33.5)
MCHC RBC AUTO-ENTMCNC: 31.2 G/DL (ref 28.4–34.8)
MCV RBC AUTO: 102.5 FL (ref 82.6–102.9)
METHEMOGLOBIN: 0.5 % (ref 0–1.9)
MONOCYTES # BLD: 7 % (ref 3–12)
MORPHOLOGY: ABNORMAL
NRBC AUTOMATED: 0 PER 100 WBC
O2 DEVICE/FLOW/%: ABNORMAL
O2 SAT, ARTERIAL: 87.2 % (ref 95–98)
PATIENT TEMP: 37
PCO2 ARTERIAL: 43.5 MMHG (ref 35–45)
PDW BLD-RTO: 15.3 % (ref 11.8–14.4)
PH ARTERIAL: 7.51 (ref 7.35–7.45)
PLATELET # BLD: ABNORMAL K/UL (ref 138–453)
PLATELET, FLUORESCENCE: 67 K/UL (ref 138–453)
PLATELET, IMMATURE FRACTION: 8.2 % (ref 1.1–10.3)
PO2 ARTERIAL: 52.3 MMHG (ref 80–100)
POSITIVE BASE EXCESS, ART: 9.8 MMOL/L (ref 0–2)
POTASSIUM SERPL-SCNC: 3.9 MMOL/L (ref 3.7–5.3)
PRO-BNP: 7741 PG/ML
PT. POSITION: ABNORMAL
RBC # BLD: 3.63 M/UL (ref 4.21–5.77)
RESPIRATORY RATE: 20
SAMPLE SITE: ABNORMAL
SEG NEUTROPHILS: 74 % (ref 36–65)
SEGMENTED NEUTROPHILS ABSOLUTE COUNT: 1.48 K/UL (ref 1.5–8.1)
SODIUM BLD-SCNC: 140 MMOL/L (ref 135–144)
TEXT FOR RESPIRATORY: ABNORMAL
TOTAL PROTEIN: 6.1 G/DL (ref 6.4–8.3)
WBC # BLD: 2 K/UL (ref 3.5–11.3)

## 2022-08-09 PROCEDURE — 85025 COMPLETE CBC W/AUTO DIFF WBC: CPT

## 2022-08-09 PROCEDURE — 6360000002 HC RX W HCPCS: Performed by: FAMILY MEDICINE

## 2022-08-09 PROCEDURE — 85055 RETICULATED PLATELET ASSAY: CPT

## 2022-08-09 PROCEDURE — 36415 COLL VENOUS BLD VENIPUNCTURE: CPT

## 2022-08-09 PROCEDURE — 94640 AIRWAY INHALATION TREATMENT: CPT

## 2022-08-09 PROCEDURE — 83880 ASSAY OF NATRIURETIC PEPTIDE: CPT

## 2022-08-09 PROCEDURE — 0202U NFCT DS 22 TRGT SARS-COV-2: CPT

## 2022-08-09 PROCEDURE — 1200000000 HC SEMI PRIVATE

## 2022-08-09 PROCEDURE — 6370000000 HC RX 637 (ALT 250 FOR IP): Performed by: PHYSICIAN ASSISTANT

## 2022-08-09 PROCEDURE — 83735 ASSAY OF MAGNESIUM: CPT

## 2022-08-09 PROCEDURE — 82140 ASSAY OF AMMONIA: CPT

## 2022-08-09 PROCEDURE — 6360000002 HC RX W HCPCS: Performed by: NURSE PRACTITIONER

## 2022-08-09 PROCEDURE — 2580000003 HC RX 258: Performed by: STUDENT IN AN ORGANIZED HEALTH CARE EDUCATION/TRAINING PROGRAM

## 2022-08-09 PROCEDURE — 6370000000 HC RX 637 (ALT 250 FOR IP): Performed by: STUDENT IN AN ORGANIZED HEALTH CARE EDUCATION/TRAINING PROGRAM

## 2022-08-09 PROCEDURE — 2700000000 HC OXYGEN THERAPY PER DAY

## 2022-08-09 PROCEDURE — 36600 WITHDRAWAL OF ARTERIAL BLOOD: CPT

## 2022-08-09 PROCEDURE — 97110 THERAPEUTIC EXERCISES: CPT

## 2022-08-09 PROCEDURE — 70498 CT ANGIOGRAPHY NECK: CPT

## 2022-08-09 PROCEDURE — 99233 SBSQ HOSP IP/OBS HIGH 50: CPT | Performed by: INTERNAL MEDICINE

## 2022-08-09 PROCEDURE — 97116 GAIT TRAINING THERAPY: CPT

## 2022-08-09 PROCEDURE — 99222 1ST HOSP IP/OBS MODERATE 55: CPT | Performed by: INTERNAL MEDICINE

## 2022-08-09 PROCEDURE — 94664 DEMO&/EVAL PT USE INHALER: CPT

## 2022-08-09 PROCEDURE — 94761 N-INVAS EAR/PLS OXIMETRY MLT: CPT

## 2022-08-09 PROCEDURE — 6360000004 HC RX CONTRAST MEDICATION: Performed by: FAMILY MEDICINE

## 2022-08-09 PROCEDURE — 80053 COMPREHEN METABOLIC PANEL: CPT

## 2022-08-09 PROCEDURE — 94669 MECHANICAL CHEST WALL OSCILL: CPT

## 2022-08-09 PROCEDURE — 82805 BLOOD GASES W/O2 SATURATION: CPT

## 2022-08-09 PROCEDURE — 6360000002 HC RX W HCPCS: Performed by: STUDENT IN AN ORGANIZED HEALTH CARE EDUCATION/TRAINING PROGRAM

## 2022-08-09 RX ORDER — FUROSEMIDE 10 MG/ML
40 INJECTION INTRAMUSCULAR; INTRAVENOUS 2 TIMES DAILY
Status: DISCONTINUED | OUTPATIENT
Start: 2022-08-09 | End: 2022-08-11

## 2022-08-09 RX ORDER — BUMETANIDE 1 MG/1
2 TABLET ORAL DAILY
Status: DISCONTINUED | OUTPATIENT
Start: 2022-08-10 | End: 2022-08-13 | Stop reason: HOSPADM

## 2022-08-09 RX ORDER — FUROSEMIDE 10 MG/ML
40 INJECTION INTRAMUSCULAR; INTRAVENOUS ONCE
Status: COMPLETED | OUTPATIENT
Start: 2022-08-09 | End: 2022-08-09

## 2022-08-09 RX ORDER — BUMETANIDE 1 MG/1
1 TABLET ORAL ONCE
Status: COMPLETED | OUTPATIENT
Start: 2022-08-09 | End: 2022-08-09

## 2022-08-09 RX ORDER — ALBUTEROL SULFATE 2.5 MG/3ML
2.5 SOLUTION RESPIRATORY (INHALATION) EVERY 4 HOURS PRN
Status: DISCONTINUED | OUTPATIENT
Start: 2022-08-09 | End: 2022-08-13 | Stop reason: HOSPADM

## 2022-08-09 RX ORDER — AMIODARONE HYDROCHLORIDE 200 MG/1
200 TABLET ORAL DAILY
Status: DISCONTINUED | OUTPATIENT
Start: 2022-08-10 | End: 2022-08-13 | Stop reason: HOSPADM

## 2022-08-09 RX ORDER — ALPRAZOLAM 0.25 MG/1
0.25 TABLET ORAL NIGHTLY PRN
Status: DISCONTINUED | OUTPATIENT
Start: 2022-08-09 | End: 2022-08-13 | Stop reason: HOSPADM

## 2022-08-09 RX ADMIN — ALBUTEROL SULFATE 2.5 MG: 2.5 SOLUTION RESPIRATORY (INHALATION) at 11:10

## 2022-08-09 RX ADMIN — CLOPIDOGREL BISULFATE 75 MG: 75 TABLET ORAL at 07:56

## 2022-08-09 RX ADMIN — APIXABAN 5 MG: 5 TABLET, FILM COATED ORAL at 07:56

## 2022-08-09 RX ADMIN — IOPAMIDOL 75 ML: 755 INJECTION, SOLUTION INTRAVENOUS at 22:22

## 2022-08-09 RX ADMIN — THIAMINE HCL TAB 100 MG 100 MG: 100 TAB at 07:57

## 2022-08-09 RX ADMIN — SACUBITRIL AND VALSARTAN 0.5 TABLET: 24; 26 TABLET, FILM COATED ORAL at 22:50

## 2022-08-09 RX ADMIN — FAMOTIDINE 20 MG: 20 TABLET ORAL at 22:48

## 2022-08-09 RX ADMIN — BUSPIRONE HYDROCHLORIDE 15 MG: 5 TABLET ORAL at 22:50

## 2022-08-09 RX ADMIN — SPIRONOLACTONE 25 MG: 25 TABLET ORAL at 07:56

## 2022-08-09 RX ADMIN — TAMSULOSIN HYDROCHLORIDE 0.4 MG: 0.4 CAPSULE ORAL at 07:56

## 2022-08-09 RX ADMIN — SODIUM CHLORIDE, PRESERVATIVE FREE 10 ML: 5 INJECTION INTRAVENOUS at 22:51

## 2022-08-09 RX ADMIN — SODIUM CHLORIDE, PRESERVATIVE FREE 10 ML: 5 INJECTION INTRAVENOUS at 07:57

## 2022-08-09 RX ADMIN — SACUBITRIL AND VALSARTAN 0.5 TABLET: 24; 26 TABLET, FILM COATED ORAL at 07:56

## 2022-08-09 RX ADMIN — EMPAGLIFLOZIN 10 MG: 10 TABLET, FILM COATED ORAL at 13:44

## 2022-08-09 RX ADMIN — ESCITALOPRAM 20 MG: 5 TABLET, FILM COATED ORAL at 22:48

## 2022-08-09 RX ADMIN — METOPROLOL SUCCINATE 25 MG: 25 TABLET, EXTENDED RELEASE ORAL at 07:56

## 2022-08-09 RX ADMIN — BUSPIRONE HYDROCHLORIDE 15 MG: 5 TABLET ORAL at 07:56

## 2022-08-09 RX ADMIN — Medication 600 MG: at 07:56

## 2022-08-09 RX ADMIN — FUROSEMIDE 40 MG: 10 INJECTION, SOLUTION INTRAMUSCULAR; INTRAVENOUS at 22:50

## 2022-08-09 RX ADMIN — ALBUTEROL SULFATE 2.5 MG: 2.5 SOLUTION RESPIRATORY (INHALATION) at 20:50

## 2022-08-09 RX ADMIN — FUROSEMIDE 40 MG: 10 INJECTION, SOLUTION INTRAMUSCULAR; INTRAVENOUS at 14:37

## 2022-08-09 RX ADMIN — APIXABAN 5 MG: 5 TABLET, FILM COATED ORAL at 22:51

## 2022-08-09 RX ADMIN — ALBUTEROL SULFATE 2.5 MG: 2.5 SOLUTION RESPIRATORY (INHALATION) at 05:31

## 2022-08-09 RX ADMIN — BUMETANIDE 1 MG: 1 TABLET ORAL at 13:44

## 2022-08-09 RX ADMIN — MULTIVITAMIN TABLET 1 TABLET: TABLET at 07:57

## 2022-08-09 RX ADMIN — MIDODRINE HYDROCHLORIDE 10 MG: 5 TABLET ORAL at 13:44

## 2022-08-09 RX ADMIN — PANTOPRAZOLE SODIUM 40 MG: 40 TABLET, DELAYED RELEASE ORAL at 07:56

## 2022-08-09 RX ADMIN — FOLIC ACID 1 MG: 1 TABLET ORAL at 07:56

## 2022-08-09 RX ADMIN — FAMOTIDINE 20 MG: 20 TABLET ORAL at 07:57

## 2022-08-09 RX ADMIN — MIDODRINE HYDROCHLORIDE 10 MG: 5 TABLET ORAL at 07:57

## 2022-08-09 RX ADMIN — ROPINIROLE HYDROCHLORIDE 0.5 MG: 0.25 TABLET, FILM COATED ORAL at 22:49

## 2022-08-09 RX ADMIN — ATORVASTATIN CALCIUM 80 MG: 40 TABLET, FILM COATED ORAL at 07:56

## 2022-08-09 RX ADMIN — MIDODRINE HYDROCHLORIDE 10 MG: 5 TABLET ORAL at 22:47

## 2022-08-09 RX ADMIN — FLUDROCORTISONE ACETATE 0.3 MG: 0.1 TABLET ORAL at 07:56

## 2022-08-09 RX ADMIN — Medication 600 MG: at 22:50

## 2022-08-09 RX ADMIN — ALBUTEROL SULFATE 2.5 MG: 2.5 SOLUTION RESPIRATORY (INHALATION) at 15:08

## 2022-08-09 RX ADMIN — Medication 10 MG: at 22:48

## 2022-08-09 RX ADMIN — AMIODARONE HYDROCHLORIDE 200 MG: 200 TABLET ORAL at 07:56

## 2022-08-09 RX ADMIN — BUMETANIDE 1 MG: 1 TABLET ORAL at 07:56

## 2022-08-09 ASSESSMENT — PAIN SCALES - GENERAL: PAINLEVEL_OUTOF10: 0

## 2022-08-09 NOTE — PROGRESS NOTES
Patient up to chair. Denies any pain. Brief and gown changed. Denies any pain. Lungs are diminished throughout. Pulse oximetry 91% on 5 liters. Turned down to 4.5 liters.

## 2022-08-09 NOTE — PROGRESS NOTES
Pt called out asking to have his wired untangled. This writer went into pt's room and helped pt untangle the wires of his telemetry and life vest.     Life vest was taken off and untangled, put back on, readjusted and battery changed. Telemetry was reapplied. Pt resting in bed at this time.

## 2022-08-09 NOTE — PROGRESS NOTES
Physical Therapy  Facility/Department: [facility] AT THE [facility] MED SURG  Daily Treatment Note  NAME: Yesy Verdugo  :   MRN: [id number]    Date of Service: 2022    Discharge Recommendations:  Continue to assess pending progress, Home with Home health PT, Outpatient PT   PT Equipment Recommendations  Equipment Needed: No    Patient Diagnosis(es): The primary encounter diagnosis was Acute respiratory failure with hypoxia ([address] A diagnosis of Acute on chronic combined systolic and diastolic CHF (congestive heart failure) (HCC) was also pertinent to this visit. Assessment   Assessment: Pt did very well with standing balance and strengthening therex. Needed frequent rest breaks to catch breath. Will continue as tolerated  Activity Tolerance: Patient tolerated treatment well  Equipment Needed: No     Plan    Plan  Plan: 2 times a day 7 days a week  Plan weeks: 20 days  Current Treatment Recommendations: Strengthening;Balance training;Functional mobility training;Transfer training; Endurance training;Gait training;Stair training;Neuromuscular re-education;Home exercise program;Safety education & training;Positioning; Therapeutic activities     Restrictions  Restrictions/Precautions  Restrictions/Precautions: General Precautions     Subjective    Subjective  Subjective: Pt in chair upon arrival, agreeable to therapy at this time  Pain: Denies  Orientation  Overall Orientation Status: Within Functional Limits  Cognition  Overall Cognitive Status: WFL     Objective   Vitals     Bed Mobility Training  Bed Mobility Training: Yes  Overall Level of Assistance: Supervision  Interventions: Safety awareness training;Verbal cues  Rolling: Supervision  Supine to Sit: Supervision  Sit to Supine: Supervision  Scooting: Supervision  Balance  Sitting: Intact  Standing: Intact  Standing - Static: Fair;Good  Standing - Dynamic: Fair;Good  Transfer Training  Transfer Training: Yes  Overall Level of Assistance: Supervision  Interventions: Verbal cues  Sit to Stand: Supervision  Stand to Sit: Supervision  Bed to Chair: Supervision  Toilet Transfer: Supervision  Gait Training  Gait Training: Yes  Gait  Overall Level of Assistance: Supervision  Interventions: Verbal cues; Safety awareness training  Speed/Andreina: Slow  Distance (ft): 30 Feet  Assistive Device: Other (comment)     PT Exercises  Exercise Treatment: Standing therex, sink exercises, Transfers (including toilet transfer), gait 10ft x3 around room for various therex, balance  Other Specialty Interventions  Other Treatments/Modalities: commode use  Safety Devices  Type of Devices: Call light within reach; Left in chair  Restraints  Restraints Initially in Place: No       Goals  Short Term Goals  Time Frame for Short term goals: 20 days  Short term goal 1: Pt to be able to ambulate 50 ft IND to be able to retrun home safely. Short term goal 2: Pt to be IND with all transfers to improve functional mobility and safety of transfers  Short term goal 3: Pt to be able to tolerate 20-30 mins therex/act to improve functional capacity for ADLs.   Patient Goals   Patient goals : to get back home    Education  Patient Education  Education Given To: Patient  Education Provided: Role of Therapy;Plan of Care;Home Exercise Program  Education Method: Verbal  Barriers to Learning: None  Education Outcome: Verbalized understanding    Therapy Time   Individual Concurrent Group Co-treatment   Time In 1608         Time Out 1635         Minutes 27         Timed Code Treatment Minutes: [address], PT

## 2022-08-09 NOTE — PROGRESS NOTES
Comprehensive Nutrition Assessment    Type and Reason for Visit:  Reassess    Nutrition Recommendations/Plan:   Continue current diet. Malnutrition Assessment:  Malnutrition Status:  Insufficient data (08/04/22 1238)    Context:  Chronic Illness     Findings of the 6 clinical characteristics of malnutrition:  Energy Intake:  Unable to assess  Weight Loss:   (8.1% weight loss x 5 months)     Body Fat Loss:  No significant body fat loss     Muscle Mass Loss:  No significant muscle mass loss    Fluid Accumulation:  Mild Extremities (trace non pitting)   Strength:  Not Performed    Nutrition Assessment:    Continued altered nutrition related labs aeb BNP 7741. Magnesium wnl now at 1.7. Pt asleep, low sodium diet information left at Pt bedside. Nutrition Related Findings:    no visual malnutrition indices noted Wound Type: None       Current Nutrition Intake & Therapies:    Average Meal Intake: 51-75%  Average Supplements Intake: None Ordered  ADULT DIET; Regular; Low Sodium (2 gm); 1800 ml    Anthropometric Measures:  Height: 5' 6\" (167.6 cm)  Ideal Body Weight (IBW): 142 lbs (65 kg)    Admission Body Weight: 176 lb 2.4 oz (79.9 kg)  Current Body Weight: 174 lb 9.7 oz (79.2 kg), 123.5 % IBW. Weight Source: Bed Scale  Current BMI (kg/m2): 28.2  Usual Body Weight: 191 lb (86.6 kg)  % Weight Change (Calculated): -8.2  Weight Adjustment For: No Adjustment                 BMI Categories: Overweight (BMI 25.0-29. 9)    Estimated Daily Nutrient Needs:  Energy Requirements Based On: Kcal/kg  Weight Used for Energy Requirements: Current  Energy (kcal/day): 3201-7145 (20-23/kg)  Weight Used for Protein Requirements: Ideal  Protein (g/day): 84-97g (1.3-1.5g/kg)  Method Used for Fluid Requirements: ml/Kg  Fluid (ml/day): 1829 ml (23/kg)    Nutrition Diagnosis:   Altered nutrition-related lab values related to cardiac dysfunction as evidenced by lab values    Nutrition Interventions:   Food and/or Nutrient Delivery: Continue Current Diet  Nutrition Education/Counseling: Education initiated (low sodium diet information left at Pt bedside)  Coordination of Nutrition Care: Continue to monitor while inpatient       Goals:     Goals: PO intake 75% or greater     Recent Labs     08/09/22  0600      K 3.9   CL 98   CO2 34*   BUN 19   CREATININE 0.81   GLUCOSE 93   *   ALKPHOS 97   GFR                 Lab Results   Component Value Date/Time    LABALBU 3.6 08/09/2022 06:00 AM      Lab Results   Component Value Date/Time    PHOS 4.3 05/02/2022 02:15 PM      Lab Results   Component Value Date/Time    MG 1.7 08/09/2022 06:00 AM    No results found for: PREALBUMIN   Nutrition Monitoring and Evaluation:   Behavioral-Environmental Outcomes: None Identified  Food/Nutrient Intake Outcomes: Food and Nutrient Intake  Physical Signs/Symptoms Outcomes: Biochemical Data, Weight, Fluid Status or Edema    Discharge Planning:    Continue current diet     Franklin Pearce RD, LD  Contact: [phone]

## 2022-08-09 NOTE — PROGRESS NOTES
Angel Swift was evaluated today and a DME order was entered for a nebulizer compressor in order to administer Albuterol due to the diagnosis of shortness of breath, emphysema. The need for this equipment and treatment was discussed with the patient and he understands and is in agreement.     Electronically signed by Israel Lamb MD on 8/9/2022 at 1:30 PM

## 2022-08-09 NOTE — PROGRESS NOTES
Occupational Therapy  Facility/Department: [facility] AT THE [facility] MED SURG  Daily Treatment Note  NAME: Minnie Ford  :   MRN: [id number]    Date of Service: 2022    Discharge Recommendations:  Continue to assess pending progress         Patient Diagnosis(es): The primary encounter diagnosis was Acute respiratory failure with hypoxia ([address] A diagnosis of Acute on chronic combined systolic and diastolic CHF (congestive heart failure) (HCC) was also pertinent to this visit. Assessment    Activity Tolerance: Patient tolerated treatment well  Discharge Recommendations: Continue to assess pending progress      Plan   Plan  Times per Week: 7  Times per Day: Daily  Current Treatment Recommendations: Strengthening;Balance training;Functional mobility training; Endurance training; Safety education & training;Patient/Caregiver education & training;Equipment evaluation, education, & procurement;Home management training;Self-Care / ADL     Restrictions  Restrictions/Precautions  Restrictions/Precautions: General Precautions    Subjective   Subjective  Subjective: Pt sitting up on EOB upon arrival. Pt agreed to participate in therapy session this date. Pain: Pt reported L shoulder and rib pain this date. Orientation  Overall Orientation Status: Within Functional Limits  Pain: Denies               OT Exercises  Exercise Treatment: Pt tolerated BUE ther ex with 1# dumbbell x 7 planes x 15 reps x 1 set to increase UE strength and endurance in order to ease completion of ADL tasks. Pt required RBs as needed secondary to fatigue. Safety Devices  Type of Devices: Call light within reach; Left in chair     Patient Education  Education Given To: Patient  Education Provided: Role of Therapy;Plan of Care  Education Method: Demonstration;Verbal  Barriers to Learning: None  Education Outcome: Verbalized understanding;Demonstrated understanding    Goals  Short Term Goals  Time Frame for Short term goals: 21 visits  Short Term Goal 1: Patient to be educated on d/c folder, AE/DME and EC/WS techniques to ensure safe and indep return home. Short Term Goal 2: Patient to complete ADL routine c Mod I c AE/DME and implementation of EC/WS techniques as needed to ensure safe return home.   Short Term Goal 3: Patient will tolerate 15 mins of BUE ther ex/act to increase overall strength/activity tolerance for functional tasks       Therapy Time   Individual Concurrent Group Co-treatment   Time In 0938         Time Out 1001         Minutes 23                 GERMAN Guzman/WANDA

## 2022-08-09 NOTE — PROGRESS NOTES
Patient: Jl Hernandez  :   Date of Admission: 8/3/2022  Primary Care Physician: Joy More  Today's Date: 2022    REASON FOR CONSULTATION: atrial fibrillation with RVR    HPI:  Mr. Maricruz Ramirez is a 70 y.o. male who was admitted to the hospital on 2022 for weakness and shortness of breath. In the ER he was found to be in atrial fibrillation with RVR leading to a consultation. He was then readmitted due to severe dehydration. In the past he reports seeing a Cardiologist in [place] and was planning on having a cardioversion done in  to put him back into normal sinus rhythm due to his history of new onset atrial fibrillation. He reports a history of a heart attack at age 46 and needed stents placed at that time but denies any cardiac cath since that time. He also has carotid artery stenosis and abdominal aortic aneurysm. He did report having surgery on one side of his neck, however he is not sure which side or how long ago it was. He has had hypertension and hyperlipidemia for years as well. He is a current every day smoker and he smoked for about 55 years and smokes about a pack a day he reported that he quit about 4 months ago. Family history consists of a lot of people in his family with significant cardiac history, however he wanted us to talk to his sister about the family history as she knows the details. Echo done on 2022 showed an EF of 15-20%. Echo done on 2022: Compared to the previous study of 2022, no significant change in ejection fraction. Functional mitral and tricuspid regurgitation are worse. Bilateral pleural effusion and left elevated left ventricular filling pressure along with dilated IVC suggest worsening fluid overload. Mr. Maricruz Ramirez was admitted for what appears to be a heart failure exacerbation as well as increased generalized weakness. He says that what ultimately led to his admission.     Today Mr. Maricruz Ramirez reports today he is doing okay, he feels slightly confused today. He continues to feel like it is hard to breath when he lays down flat at night. He denies any chest pain now or in the recent past. He continues to have rib pain from his fall. He is able to walk around carefully. He is sleeping well at night. He is not using home oxygen. However he is currently on 5 L of oxygen. He had a chest CT done that showed significant bilateral pleural effusion with associated atelectasis. He has had tremors for quite a while. He has smoked for 55 years but stopped 3 or 4 months ago. Denies nausea or vomiting. He also denied any abdominal pain, bleeding problems, problems with his medications or any other concerns at this time. He has no bowel issues at this time. No nausea or vomiting. No fever, cough or chills. -4510 mL   Magnesium and Potassium normal today. BNP 7741    Chest CT done 8/8/2022: Mild interstitial pulmonary edema on a background of centrilobular emphysema. A few scattered ground-glass opacities in the upper lobes may be due to a component of mild alveolar edema, though clinical correlation is recommended to exclude concern for contributory infectious causes. Moderate layering bilateral pleural effusions with partial collapse of thelower lobes, left more so than right. Cardiomegaly which appears unchanged from prior with 3 vessel coronary artery disease. Past Medical History:   Diagnosis Date    Acute kidney injury ([address]     Ascites 5/20/2022    Atrial fibrillation (HCC)     CHF (congestive heart failure) (HCC)     Cirrhosis of liver with ascites ([address] 5/19/2022    CKD (chronic kidney disease) 5/20/2022    Coronary artery disease     Hyperlipidemia     Hypertension     Hypomagnesemia 5/20/2022    Hyponatremia     Thrombocytopenia (HCC)        CURRENT ALLERGIES: Patient has no known allergies. REVIEW OF SYSTEMS: 14 systems were reviewed. Pertinent positives and negatives as above, all else negative.      Past Surgical History:   Procedure Laterality Date    CAROTID STENT PLACEMENT Bilateral     HERNIA REPAIR      ROTATOR CUFF REPAIR Right     TONSILLECTOMY AND ADENOIDECTOMY      Social History:  Social History     Tobacco Use    Smoking status: Former     Packs/day: 1.00     Years: 56.00     Pack years: 56.00     Types: Cigarettes     Quit date: 2022     Years since quittin.1    Smokeless tobacco: Never   Vaping Use    Vaping Use: Never used   Substance Use Topics    Alcohol use: Not Currently     Alcohol/week: 2.0 standard drinks     Types: 2 Shots of liquor per week     Comment: Drinks about 2 Kesslers with mix nightly.   Reported history of heavy alcohol use for years with both beer and liquior several years ago    Drug use: Not Currently        CURRENT MEDICATIONS:   [Held by provider] bumetanide  2 mg Oral Daily    furosemide  40 mg IntraVENous BID    [START ON 8/10/2022] dapagliflozin  10 mg Oral QAM    furosemide  40 mg IntraVENous Once    albuterol  2.5 mg Nebulization 4x daily    spironolactone  25 mg Oral Daily    sodium chloride flush  10 mL IntraVENous 2 times per day    famotidine  20 mg Oral BID    amiodarone  200 mg Oral BID    apixaban  5 mg Oral BID    atorvastatin  80 mg Oral Daily    busPIRone  15 mg Oral BID    clopidogrel  75 mg Oral Daily    escitalopram  20 mg Oral Nightly    fludrocortisone  0.3 mg Oral Daily    folic acid  1 mg Oral Daily    magnesium oxide  600 mg Oral BID    melatonin  10 mg Oral Nightly    metoprolol succinate  25 mg Oral Daily    midodrine  10 mg Oral TID WC    multivitamin  1 tablet Oral Daily    pantoprazole  40 mg Oral QAM AC    rOPINIRole  0.5 mg Oral Nightly    sacubitril-valsartan  0.5 tablet Oral BID    tamsulosin  0.4 mg Oral Daily    vitamin B-1  100 mg Oral Daily    vitamin D  50,000 Units Oral Weekly       FAMILY HISTORY: Reviewed but non-contributory     PHYSICAL EXAM:   /70 (Site: Right Upper Arm, Position: Sitting, Cuff Size: Medium Adult)   Pulse 58 Resp 18   Ht 5' 6\" (1.676 m)   Wt 176 lb (79.8 kg)   SpO2 94%   BMI 28.41 kg/m²  Body mass index is 28.41 kg/m². Constitutional: He is mildly confused today. He cannot give a good history. Ammonia level and ABG are stable. HEENT: Normocephalic and atraumatic. No JVD present. Carotid bruit is not present. No mass and no thyromegaly present. No lymphadenopathy present. Cardiovascular: Normal rate, regular rhythm, normal heart sounds. Exam reveals no gallop and no friction rubs. No murmur was heard. Pulmonary/Chest: ]Poor air entry bilaterally. Kyphoscoliosis of the thoracic spine noted. Markedly decreased air entry on bilateral lung bases. No crackles or wheezes. Abdominal: Soft, non-tender. Bowel sounds and aorta are normal. He exhibits no organomegaly, mass or bruit. Extremities: Trace. No cyanosis or clubbing. 2+ radial and carotid pulses. Distal extremity pulses: 2+ bilaterally. Neurological: He is alert and oriented to person, place, and time. No evidence of gross cranial nerve deficit. Coordination appeared normal.   Skin: Skin is warm and dry. There is no rash or diaphoresis. Psychiatric: He has a normal mood and affect.  His speech is normal and behavior is normal.        MOST RECENT LABS ON RECORD:   Lab Results   Component Value Date    WBC 2.0 (L) 08/09/2022    HGB 11.6 (L) 08/09/2022    HCT 37.2 (L) 08/09/2022    PLT See Reflexed IPF Result 08/09/2022     (H) 08/09/2022     (H) 08/09/2022     08/09/2022    K 3.9 08/09/2022    CL 98 08/09/2022    CREATININE 0.81 08/09/2022    BUN 19 08/09/2022    CO2 34 (H) 08/09/2022    TSH 5.77 (H) 07/25/2022    INR 1.4 08/03/2022     ASSESSMENT:  Patient Active Problem List    Diagnosis Date Noted    Acute respiratory failure with hypoxia ([facility] 08/04/2022    Encounter for current long-term use of anticoagulants 08/04/2022    Chronic combined systolic (congestive) and diastolic (congestive) heart failure (HCC)     Dehydration, moderate     Medication side effect, initial encounter     History of acute inferior wall MI     Ischemic cardiomyopathy     Nonischemic cardiomyopathy ([facility] 08/04/2022    Hypoxia 08/03/2022    PAF (paroxysmal atrial fibrillation) ([facility] 07/19/2022    Lightheaded 07/19/2022    Tobacco abuse counseling 07/19/2022    Gastroesophageal reflux disease 07/19/2022    Hypotension 07/19/2022    Acute on chronic combined systolic and diastolic congestive heart failure ([address] 07/19/2022    Hypokalemia 07/19/2022    Hypovitaminosis D 07/19/2022    Severe malnutrition ([address] 06/07/2022    Hypomagnesemia 05/20/2022    Ascites 05/20/2022    CKD (chronic kidney disease) 05/20/2022    Cirrhosis of liver with ascites ([facility] 05/19/2022    Acute on chronic combined systolic and diastolic CHF, NYHA class 4 (HCC)     Coronary artery disease     Hypertension     Thrombocytopenia (HCC)     Hyponatremia     Hyperlipidemia     Atrial fibrillation with RVR ([facility] 05/18/2022      PLAN:  Patient with extensive medical history. History of bare-metal stent to LAD many years ago. Atrial fibrillation with severely reduced left ventricular systolic function. As per his primary cardiologist, he is currently treated for tachycardia induced cardiomyopathy. He was a started on oral amiodarone, converted to normal sinus rhythm. He is maintaining sinus rhythm on telemetry. Continues to have significant shortness of breath. On oxygen via nasal cannula at 5 L/min. He has not been on oxygen at home. CT scan of the chest showed significant bilateral pleural effusion. He has been confused today without focal neurological abnormalities. I will get CT scan of the head and neck. Patient has extensive vascular disease including abdominal aortic aneurysm, right iliac artery aneurysm, peripheral vascular disease and bilateral carotid artery disease.     History of Hypotension/ Lightheadedness/dizziness: One recent fall at home but current appears well controlled. Continue to monitor   Discontinue Florinef. Florinef does cause water and salt retention and can worsen heart failure symptoms. Continue midodrine. This is a first-line therapy in elderly patient with orthostatic hypotension because it is not associated with salt and water retention and does not exacerbate heart failure symptoms. Beta Blocker: Continue Metoprolol succinate (Toprol XL) 25 mg daily. Diuretics: Restart IV Lasix 40 mg BID  Diuretics: Continue Spironolactone (Aldactone) 25 mg daily. Monitor blood pressure as per unit protocol. Paroxysmal Atrial Fibrillation: Continue maintaining sinus rhythm. Beta Blocker: Continue Metoprolol succinate (Toprol XL) 25 mg daily. Anti-Arrhythmic: Decrease amiodarone to 200 mg daily. Monitoring: Since they will being maintained on Amiodarone, I told them that we will need to closely monitor them for potential side effects. These include monitoring LFTs and TSH at least every 6 months as well as chest x-rays, pulmonary function tests, and eye exams at least yearly. [id number] Score for Atrial Fibrillation Stroke Risk   Risk   Factors  Component Value   C CHF Yes 1   H HTN Yes 1   A2 Age >= 76 No,  (75 y.o.) 0   D DM No 0   S2 Prior Stroke/TIA No 0   V Vascular Disease No 0   A Age 74-69 Yes,  (75 y.o.) 1   Sc Sex male 0    [id number]  Score  3   Score last updated 6/7/36 1:72 PM EDT  Click here for a link to the UpToDate guideline \"Atrial Fibrillation: Anticoagulation therapy to prevent embolization  Disclaimer: Risk Score calculation is dependent on accuracy of patient problem list and past encounter diagnosis. Stroke Risk: CHADS2-VASc Score: 3/9 (3.2% stroke risk).  Because of his atrial fibrillation, I also would also recommend that he continue with anticoagulation to decrease his risk of stoke but also reminded him to watch for signs of blood in his stool or black tarry stools and stop the medication immediately if this develops as this could be life threatening. Anticoagulation: Continue Apixaban (Eliquis) 5 mg every 12 hours. Even though his platelet count is low, he has no active bleeding. We will get a CT brain today and if no evidence of bleeding documented we will hold the Eliquis and try getting ultrasound-guided thoracentesis tomorrow morning. Acute on chronic combined heart failure: Ischemic Cardiomyopathy, Echo done on 5/18/2022 which showed an EF of 15-20%. We recently decreased his Bumex from 2 mg to 1 mg due to his falling and I suspect this is what caused his heart failure exacerbation. Beta Blocker: Continue Metoprolol succinate (Toprol XL) 25 mg daily. ACE Inibitor/ARB: Continue sacubitril/valsartan (Entresto) 24/26 mg 1/2 a tablet twice daily. Diuretics: Restart IV Lasix 40 mg BID  Diuretics: START Spironolactone (Aldactone) 25 mg daily. I would like to start him on a newer medication known to help decrease the risk of hospitalization morbidity and mortality such as heart attack and death which is called Chaim. I would like to start him on the recommended dose 10 mg once daily. As all medications do there is side effects to these medications including a slightly increased risk of UTI and yeast infections and if these were to occur to call our office immediately. I explained to him that we could give her a couple of weeks of samples and a one month free card to get him started on this medication. In the end he was in agreement with this and will go ahead and start taking Farxiga 10 mg once daily. Heart failure counseling: I advised them to try and keep their legs up whenever possible and to limit salt in their diet. I had a very long discussion with the patient regarding his LifeVest and CODE STATUS. He is DNR CCA, I am not sure if LifeVest is appropriate. He is a little confused today. We will discuss with his nephew and his sister tomorrow.   If he understand being DNR CCA and there is no need for LifeVest or ICD therapy. Tobacco Abuse Counseling: He did quit smoking about 3 to 4-month ago. Altered mental status: I ordered CTA of head and neck. If no evidence of bleeding detected we will hold Eliquis and consider ultrasound-guided thoracentesis by interventional radiology tomorrow morning. Leukopenia, mild anemia and thrombocytopenia  Consider hematology consultation as an outpatient. Extensive vascular disease including abdominal aortic aneurysm, right iliac artery aneurysm, peripheral vascular disease and bilateral carotid artery disease. Continue Plavix, Eliquis, metoprolol and Lipitor. Sincerely,  Brandon Hayes MD, F.A.C.C. [facility] Cardiology Specialist    [address] [facility], [address] [place] [address]  Phone: [phone], Fax: [phone]     I believe that the risk of significant morbidity and mortality related to the patient's current medical conditions are: intermediate-high.          August 9, 2022

## 2022-08-09 NOTE — PROGRESS NOTES
RESPIRATORY ASSESSMENT PROTOCOL                                                                                              Patient Name: Mily Alberts Room#: [id number] : 1950     Admitting diagnosis: Hypoxia [R09.02]  Acute respiratory failure with hypoxia ([facility] [J96.01]  Acute on chronic combined systolic and diastolic CHF (congestive heart failure) ([facility] [I50.43]       Medical History:   Past Medical History:   Diagnosis Date    Acute kidney injury ([facility]     Ascites 2022    Atrial fibrillation (HCC)     CHF (congestive heart failure) ([facility]     Cirrhosis of liver with ascites ([facility] 2022    CKD (chronic kidney disease) 2022    Coronary artery disease     Hyperlipidemia     Hypertension     Hypomagnesemia 2022    Hyponatremia     Thrombocytopenia ([facility]        PATIENT ASSESSMENT    LABORATORY DATA  Hematology:   Lab Results   Component Value Date/Time    WBC 2.0 2022 06:00 AM    RBC 3.63 2022 06:00 AM    HGB 11.6 2022 06:00 AM    HCT 37.2 2022 06:00 AM    PLT See Reflexed IPF Result 2022 06:00 AM     Chemistry:    Lab Results   Component Value Date/Time    PHART 7.510 2022 02:30 PM    VQC1CYA 43.5 2022 02:30 PM    PO2ART 52.3 2022 02:30 PM    T8IVFIYE 87.2 2022 02:30 PM    JAZ0DNV 33.9 2022 02:30 PM    PBEA 9.8 2022 02:30 PM       VITALS  Heart Rate: 64   Resp: 20  BP: (!) 146/69  SpO2: 91 % O2 Device: Nasal cannula  Temp: 98.6 °F (37 °C)    SKIN COLOR  [x] Normal  [] Pale  [] Dusky  [] Cyanotic    RESPIRATORY PATTERN  [x] Normal  [] Dyspnea  [] Cheyne-Friend  [] Kussmaul  [] Biots    AMBULATORY  [x] Yes  [] No  [] With Assistance      Patient Acuity 0 1 2 3 4 Score   Level of Concious (LOC) [x]  Alert & Oriented or Pt normal LOC []  Confused;follows directions []  Confused & uncooper-ative []  Obtunded []  Comatose 0   Respiratory Rate  (RR) []  Reg. rate & pattern. 12 - 20 bpm  []  Increased RR.  Greater than 20 bpm   [x]  SOB w/ exertion or RR greater than 24 bpm []  Access- ory muscle use at rest. Abn.  resp. []  SOB at rest.   2   Bilateral Breath Sounds (BBS) []  Clear []  Diminish-ed bases  []  Diminish-ed t/o, or rales   [x]  Sporadic, scattered wheezes or rhonchi []  Persistentwheezes and, or absent BBS 3   Cough [x]  Strong, effective, & non-prod. []  Effective & prod. Less than 25 ml (2 TBSP) over past 24 hrs []  Ineffective & non-prod to less than 25 ML over past 24 hrs []  Ineffective and, or greater than 25 ml sputum prod. past 24 hrs. []  Nonspon- taneous; Requires suctioning 0   Pulmonary History  (PULM HX) []  No smoking and no chronic pulmonaryhistory []  Former smoker. Quit over 12 mos. ago []  Current smoker or quit w/ in 12 mos []  Pulm. History and, or 20 pk/yr smoking hx [x]  Admitted w/ acute pulm. dx and, or has been admitted w/ pulm. dx 2 or more times over past 12 mos 4   Surgical History this Admit  (SURG HX) [x]  No surgery []  General surgery []  Lower abdominal []  Thoracic or upper abdominal   []  Thoracic w/ pulm. disease 0   Chest X-Ray (CXR)/CT Scan []  Clear or not applicable []  Not available []  Atelect- asis or pleural effusions [x]  Localized infiltrate or pulm. edema []  Con-solidated Infiltrates, bilateral, or in more than 1 lobe 3   Slow or Forced VC, FEV1 OR PEFR (PULM FXN)  [x]  80% or greater, or not indicated []  Pt. unable to perform []  FEV1 or PEFR or VC 51-79%.   []  FEV1 or PEFR or VC  30-49%   []  FEV1 or PEFR or VC less than 30%   0   TOTAL ACUITY: 12       CARE PLAN    If Acuity Level is 2, 3, or 4 in any of the following:    [x] BILATERAL BREATH SOUNDS (BBS)     [x] PULMONARY HISTORY (PULM HX)  [] PULMONARY FUNCTION (PULM FX)    Goal: Improve respiratory functions in patients with airway disease and decrease WOB    [x] AEROSOL PROTOCOL    Total Acuity:   16-32  []  Secondary Assessment in 24 hrs Total Acuity:  9-15  [x]  Secondary Assessment in 24 hrs Total Acuity:  4-8  []  Secondary Assessment in 48 hrs Total Acuity:  0-3  []  Secondary Assessment in 72 hrs   HHN AEROSOL THERAPY with  [physician-ordered bronchodilator(s)] q 4 & Albuterol PRN q2 hrs. Breath-Actuated Neb if BBS Acuity = 4, and pt. can use MP. Notify physician if condition deteriorates. HHN AEROSOL THERAPY with  [physician-ordered bronchodilator(s)]  QID and Albuterol PRN q4 hrs. Breath-Actuated Neb if BBS Acuity = 4, and pt. can use MP. Notify physician if condition deteriorates. MDI THERAPY with  2 actuations of [physician-ordered bronchodilator(s)] via spacer TID Albuterol and PRNq4 hrs. If unable to utilize MDI: HHN [physician-ordered bronchodilator(s)] TID and Albuterol PRN q4 hrs. Notify physician if condition deteriorates. MDI THERAPY with  [physician-ordered bronchodilator(s)] via spacer TID PRN. If unable to utilize MDI: HHN [physician-ordered bronchodilator(s)] TID PRN. Notify physician if condition deteriorates. If Acuity Level is 2, 3, or 4 in any of the following:    [] COUGH     [] SURGICAL HISTORY (SURG HX)  [x] CHEST XRAY (CXR)    Goal: Improvement in sputum mobilization in patients with ineffective airway clearance. Reverse atelectasis.     [x] Bronchopulmonary Hygiene Protocol    Total Acuity:   16-32  []  Secondary Assessment in 24 hrs Total Acuity:  9-15  [x]  Secondary Assessment in 24 hrs Total Acuity:  4-8  []  Secondary Assessment in 48 hrs Total Acuity:  0-3  []  Secondary Assessment in 72 hrs   METANEB QID with [physician-ordered bronchodilator(s)] if CXR Acuity = 4; otherwise:  PD&P, PEP, or Vest QID & PRN  NT Sxn PRN for ineffective cough  METANEB QID with [physician-ordered bronchodilator(s)] if CXR Acuity = 4; otherwise:  PD&P, PEP, or Vest TID & PRN  NT Sxn PRN for ineffective cough  Instruct patient to self-perform IS q1hr WA   Directed Cough self-performed q1hr WA     If Acuity Level is 2 or above in the following:    [] PULMONARY HISTORY (PULM HX)    Goal: Assist patient in quitting smoking to slow or stop the progression of lung disease.     [] Smoking Cessation Protocol    SMOKING CESSATION EDUCATION provided according to policy [id number]: (tomasa with an X)  ____Yes    ____ No     ____ NA    Smoking Cessation Booklet given:  ____Yes  ____No ____Patient Bill Left

## 2022-08-09 NOTE — CONSULTS
PULMONARY & CRITICAL CARE MEDICINE CONSULT NOTE     Patient:  Jose Engle  MRN: [id number]  Admit date: 8/3/2022  Primary Care Physician: Vitaly Lund MD  Consulting Physician: Vitaly Lund MD  CODE Status: DNR-CCA   LOS: 6    Jose Engle is a 70 y.o. male being evaluated by a Virtual Visit (video/telephone visit) encounter to address concerns as mentioned above. A caregiver was present when appropriate. Due to this being a TeleHealth encounter (During [facility] public health emergency), evaluation of the following organ systems was limited: Vitals/Constitutional/EENT/Resp/CV/GI//MS/Neuro/Skin/Heme-Lymph-Imm. Pursuant to the emergency declaration under the [address] [place] [address] authority and the [employer] Act, this Virtual Visit was conducted with patient's (and/or legal guardian's) consent, to reduce the patient's risk of exposure to COVID-19 and provide necessary medical care. The patient (and/or legal guardian) has also been advised to contact this office for worsening conditions or problems, and seek emergency medical treatment and/or call 911 if deemed necessary. Patient identification was verified at the start of the visit: Yes  Services were provided through a video/telephone synchronous discussion virtually to substitute for in-person clinic visit. Patient and provider were located at their individual locations at home and office respectively. This virtual visit is facilitated by MENDEZ Suggs MD on 8/9/2022 at 5:48 PM    An electronic signature was used to authenticate this note. HISTORY     CHIEF COMPLAINT/REASON FOR CONSULT:    Acute hypoxic respiratory failure/pulm edema/bilateral effusion.     HISTORY OF PRESENT ILLNESS:  The patient is a 70 y.o. male history of diabetes mellitus, coronary artery disease/ischemic cardiomyopathy/atrial fibrillation/hyperlipidemia, long history of smoking 1 to 2 pack/day for 50 years according patient was never diagnosed with COPD in the past history of chronic dyspnea for last 6 to 7 months but gradually has been getting worse. He had cough for for 5 days with whitish sputum did not complain of any fever without flulike symptoms nausea vomiting diarrhea or abdominal pain and did not complain of wheezing and no chest pain. He does have 2 pillow orthopnea. After presentation he was admitted he was hypoxic requiring 5 L of nasal cannula since admission he is on oral Bumex 1 mg once daily. Intake and output is not available currently. Patient has been on Entresto, amiodarone and on Aldactone. He continues require 5 L of oxygen but not in distress and afebrile during admission Per history no wheezing was reported but distant breath sound and decreased breath sound at bases. CTA chest was done which was negative for pulm embolism shows bilateral at least moderate pleural effusion with centrilobular emphysematous changes and pulm edema. Does have history of smoking 1 to 2 pack/day for 50 years according to patient he was never diagnosed with COPD not on home oxygen does not take any inhalers. He used to work [employer].           PAST MEDICAL HISTORY:        Diagnosis Date    Acute kidney injury ([address]     Ascites 5/20/2022    Atrial fibrillation (HCC)     CHF (congestive heart failure) (HCC)     Cirrhosis of liver with ascites ([address] 5/19/2022    CKD (chronic kidney disease) 5/20/2022    Coronary artery disease     Hyperlipidemia     Hypertension     Hypomagnesemia 5/20/2022    Hyponatremia     Thrombocytopenia (HCC)      PAST SURGICAL HISTORY:        Procedure Laterality Date    CAROTID STENT PLACEMENT Bilateral     HERNIA REPAIR      ROTATOR CUFF REPAIR Right     TONSILLECTOMY AND ADENOIDECTOMY       FAMILY HISTORY:       Problem Relation Age of Onset    Hypertension Father     Heart Disease Father     Heart Disease Mother Hypertension Mother      SOCIAL HISTORY:   TOBACCO:   reports that he quit smoking about 6 weeks ago. His smoking use included cigarettes. He has a 56.00 pack-year smoking history. He has never used smokeless tobacco.  ETOH:  reports that he does not currently use alcohol after a past usage of about 2.0 standard drinks per week. DRUGS: reports that he does not currently use drugs. AVOCATION/OCCUPATIONAL EXPOSURE:    The patient denies asbestos, silica dust, coal, foundry, quarry or [employer] exposure. ALLERGIES:    No Known Allergies      HOME MEDICATIONS:  Prior to Admission medications    Medication Sig Start Date End Date Taking? Authorizing Provider   dapagliflozin (FARXIGA) 10 MG tablet Take 1 tablet by mouth every morning Lot #: [id number]  Exp: 10/24  Three boxes given. 8/9/22   Ciera Cortez PA-C   fludrocortisone (FLORINEF) 0.1 MG tablet Take 3 tablets by mouth in the morning. Patient not taking: Reported on 8/3/2022 7/28/22 10/26/22  Jessica Cochran PA-C   vitamin D (ERGOCALCIFEROL) 1.25 MG (40999 UT) CAPS capsule Take 1 capsule by mouth once a week 7/27/22   Israel Lamb MD   tamsulosin [facility]) 0.4 MG capsule Take 1 capsule by mouth in the morning. 7/21/22   Israel Lamb MD   midodrine (PROAMATINE) 10 MG tablet Take 1 tablet by mouth in the morning and 1 tablet at noon and 1 tablet in the evening. Take with meals. 7/21/22   Israel Lamb MD   vitamin B-1 (THIAMINE) 100 MG tablet Take 1 tablet by mouth in the morning. 7/19/22   Israel Lamb MD   Multiple Vitamin (MULTIVITAMIN) TABS tablet Take 1 tablet by mouth in the morning. 7/19/22   Israel Lamb MD   pantoprazole (PROTONIX) 40 MG tablet Take 1 tablet by mouth every morning (before breakfast) 7/19/22   Israel Lamb MD   potassium chloride (KLOR-CON M) 20 MEQ extended release tablet Take 1 tablet by mouth in the morning and 1 tablet in the evening. Take with meals.  7/19/22   Israel Lamb MD   folic acid (FOLVITE) 1 MG tablet Take 1 tablet by mouth daily 7/12/22   Dominique Peguero MD   metoprolol succinate (TOPROL XL) 25 MG extended release tablet Take 1 tablet by mouth daily 6/24/22   Billy Baird MD   bumetanide (BUMEX) 1 MG tablet Take 1 tablet by mouth daily 6/11/22   Yuniel Jimenez MD   melatonin 5 mg TABS tablet Take 2 tablets by mouth nightly 6/11/22   Yuniel Jimenez MD   magnesium oxide (MAGOX 400) 400 (240 Mg) MG tablet Take 1.5 tablets by mouth 2 times daily 5/24/22   Guanakito Olguin, APRN - CNP   sacubitril-valsartan (ENTRESTO) 24-26 MG per tablet Take 0.5 tablets by mouth 2 times daily Lot #: [id number]  Exp: July 2024 5/24/22   Billy Baird MD   amiodarone (CORDARONE) 200 MG tablet Take 200 mg by mouth 2 times daily 4/26/22   Historical Provider, MD   apixaban (ELIQUIS) 5 MG TABS tablet Take 5 mg by mouth 2 times daily 3/16/22   Historical Provider, MD   busPIRone (BUSPAR) 10 MG tablet Take 15 mg by mouth 2 times daily  5/10/22   Historical Provider, MD   clopidogrel (PLAVIX) 75 MG tablet Take 75 mg by mouth daily 3/16/22   Historical Provider, MD   escitalopram (LEXAPRO) 20 MG tablet Take 20 mg by mouth nightly 3/16/22   Historical Provider, MD   atorvastatin (LIPITOR) 80 MG tablet Take 80 mg by mouth daily 3/16/22   Historical Provider, MD   rOPINIRole (REQUIP) 0.5 MG tablet Take 0.5 mg by mouth nightly 4/11/22 7/28/22  Historical Provider, MD   Omega-3 Fatty Acids (FISH OIL CONCENTRATE) 1000 MG CAPS Take 2 caplets by mouth daily    Historical Provider, MD     IMMUNIZATIONS:    There is no immunization history on file for this patient.     REVIEW OF SYSTEMS:  General: negative for chills, fatigue or fever  ENT: negative for headaches, nasal congestion, sore throat or visual changes  Allergy and Immunology: negative for postnasal drip or seasonal allergies  Hematological and Lymphatic: negative for bleeding problems, swollen lymph nodes  Respiratory: positive for cough, orthopnea, and shortness of breath negative for hemoptysis, pleuritic pain, sputum changes, or wheezing  Cardiovascular: negative for edema or palpitations  Gastrointestinal: negative for abdominal pain, change in bowel habits or nausea/vomiting  Genito-Urinary: negative for dysuria or urinary frequency/urgency  Musculoskeletal: negative for joint pain or joint swelling  Neurological: negative for numbness/tingling, seizures or weakness  Dermatological: negative for pruritus or rash    PHYSICAL EXAMINATION     VITAL SIGNS:   LAST-  BP (!) 146/69   Pulse 64   Temp 98.6 °F (37 °C) (Temporal)   Resp 20   Ht 5' 6\" (1.676 m)   Wt 174 lb 9.7 oz (79.2 kg)   SpO2 91%   BMI 28.18 kg/m²   8-24 HR RANGE-  TEMP Temp  Av.2 °F (36.8 °C)  Min: 97.5 °F (36.4 °C)  Max: 98.8 °F (31.9 °C)   BP Systolic (03XNJ), XRK:427 , Min:121 , QDW:479      Diastolic (99DDT), GBQ:55, Min:64, Max:77     PULSE Pulse  Av.2  Min: 59  Max: 68   RR Resp  Av  Min: 20  Max: 20   O2 SAT SpO2  Av.7 %  Min: 88 %  Max: 91 %   OXYGEN DELIVERY O2 Flow Rate (L/min)  Av L/min  Min: 5 L/min  Max: 5 L/min     SYSTEMIC EXAMINATION:   General appearance - overweight, comfortable and in no acute distress  Mental status - alert, oriented to person, place, and time  Extremities -venous stasis changes trace pedal edema on visual exam  \    DATA REVIEW     Medications: Current Inpatient  Scheduled Meds:   [Held by provider] bumetanide  2 mg Oral Daily    furosemide  40 mg IntraVENous BID    [START ON 8/10/2022] dapagliflozin  10 mg Oral QAM    albuterol  2.5 mg Nebulization 4x daily    spironolactone  25 mg Oral Daily    sodium chloride flush  10 mL IntraVENous 2 times per day    famotidine  20 mg Oral BID    amiodarone  200 mg Oral BID    apixaban  5 mg Oral BID    atorvastatin  80 mg Oral Daily    busPIRone  15 mg Oral BID    clopidogrel  75 mg Oral Daily    escitalopram  20 mg Oral Nightly    fludrocortisone  0.3 mg Oral Daily    folic acid  1 mg Oral Daily    magnesium oxide  600 mg Oral BID    melatonin  10 mg Oral Nightly    metoprolol succinate  25 mg Oral Daily    midodrine  10 mg Oral TID WC    multivitamin  1 tablet Oral Daily    pantoprazole  40 mg Oral QAM AC    rOPINIRole  0.5 mg Oral Nightly    sacubitril-valsartan  0.5 tablet Oral BID    tamsulosin  0.4 mg Oral Daily    vitamin B-1  100 mg Oral Daily    vitamin D  50,000 Units Oral Weekly     Continuous Infusions:   sodium chloride       INPUT/OUTPUT:  In: 760 [P.O.:740; I.V.:20]  Out: 900 [Urine:900]    LABS:-  ABG:   No results for input(s): POCPH, POCPCO2, POCPO2, POCHCO3, CTBW6VFF in the last 72 hours. CBC:   Recent Labs     08/09/22  0600   WBC 2.0*   HGB 11.6*   HCT 37.2*   .5   PLT See Reflexed IPF Result   LYMPHOPCT 19*   RBC 3.63*   MCH 32.0   MCHC 31.2   RDW 15.3*     BMP:   Recent Labs     08/09/22  0600      K 3.9   CL 98   CO2 34*   BUN 19   CREATININE 0.81   GLUCOSE 93     Liver Function Test:   Recent Labs     08/09/22  0600   PROT 6.1*   LABALBU 3.6   *   *   ALKPHOS 97   BILITOT 0.85     Amylase/Lipase:  No results for input(s): AMYLASE, LIPASE in the last 72 hours. Coagulation Profile:   No results for input(s): INR, PROTIME, APTT in the last 72 hours. Cardiac Enzymes:  No results for input(s): CKTOTAL, CKMB, CKMBINDEX, TROPONINI in the last 72 hours.   Lactic Acid:  Lab Results   Component Value Date    LACTA 1.9 05/18/2022     BNP:   No results found for: BNP  D-Dimer:  Lab Results   Component Value Date    DDIMER 2.61 (H) 08/03/2022     Others:   Lab Results   Component Value Date    TSH 5.77 (H) 07/25/2022     No results found for: MIMI, RHEUMFACTOR, SEDRATE, CRP  Lab Results   Component Value Date    URICACID 6.3 05/02/2022     Lab Results   Component Value Date    IRON 42 (L) 06/08/2022    TIBC 272 06/08/2022    FERRITIN 192 06/08/2022     No results found for: SPEP, UPEP  No results found for: PSA, CEA, , NB4292,   Microbiology:  No results for input(s): SPECDESC, SPECDESC, SPECIAL, CULTURE, CULTURE, STATUS, ORG, CDIFFTOXPCR, CAMPYLOBPCR, SALMONELLAPC, SHIGAPCR, SHIGELLAPCR, MPNEUG, MPNEUM, LACTOQL in the last 72 hours. Pathology:    Radiology Reports:  CT CHEST PULMONARY EMBOLISM W CONTRAST   Final Result   No evidence of pulmonary embolism. Mildly enlarged main pulmonary artery   which can be seen in the setting of pulmonary hypertension. Mild interstitial pulmonary edema on a background of centrilobular emphysema. A few scattered ground-glass opacities in the upper lobes may be due to a   component of mild alveolar edema, though clinical correlation is recommended   to exclude concern for contributory infectious causes. Moderate layering bilateral pleural effusions with partial collapse of the   lower lobes, left more so than right. Cardiomegaly which appears unchanged from prior with 3 vessel coronary artery   disease. XR CHEST (2 VW)   Final Result   Essentially stable exam demonstrating mild pulmonary edema and bilateral   pleural effusions, left greater than right. XR CHEST (2 VW)   Final Result   Interval increasing left pleural effusion. Cardiomegaly and vascular   congestion with probable underlying atelectasis. XR CHEST PORTABLE   Final Result   Overall findings most consistent with decompensated CHF with mild pulmonary   edema. Probable bibasilar subsegmental atelectasis and pleural effusions. However,   underlying infiltrates not excluded.              Pulmonary Function test:    Polysomnogram:    Echocardiogram:   Results for orders placed during the hospital encounter of 07/13/22    ECHO Complete 2D W Doppler W Color    Narrative  [facility]    Transthoracic Echocardiography Report (TTE)    Patient Name Kenny Villaseñor   Date of Study               07/13/2022  Mikaelachidi Amor RAE    Date of      1950  Gender                      Male  Birth    Age          70 year(s)  Race     Room Number              Height:                     66 inch, 167.64 cm    Corporate ID [id number]    Weight:                     189 pounds, 85.7 kg  #    Patient Acct [de-identified]   BSA:          1.95 m^2      BMI:      30.51  #                                                              kg/m^2    MR #         [id number]      Sonographer                 Elsa Richardson    Accession #  [id number]  Interpreting Physician      Sandy Garcia    Fellow                   Referring Nurse  Practitioner    Interpreting             Referring Physician         Joe Gould  Fellow    Type of Study    TTE procedure:2D Echocardiogram, M-Mode, Doppler, Color Doppler. Procedure Date  Date: 07/13/2022 Start: 01:11 PM    Study Location: [facility]    Indications:Lightheadedness, Atrial fibrillation, Congestive heart failure,  combined systolic and diastolic dysfunction, Cardiomyopathy, ischemic and  Hypotension. History / Tech. Comments:  Dx: lightheadedness, PAF, chronic combined systolic and diastolic CHF,  ischemic cardiomyopathy, hypotension  Hx: HTN, hyperlipidemia, stent    Patient Status: Outpatient    Height: 66 inches Weight: 189 pounds BSA: 1.95 m^2 BMI: 30.51 kg/m^2    BP: 118/64 mmHg    Allergies  - *No Known Allergies. CONCLUSIONS    Summary  Dilated all cardiac chambers. Severe biventricular systolic dysfunction. Estimated left ventricular ejection fraction is 20-25%  Moderate to severe mitral and moderate tricuspid regurgitation. Moderate pulmonary hypertension with an estimated right ventricular systolic  pressure of 61 mmHg. Evidence of moderate to severe diastolic dysfunction is seen. Bilateral pleural effusion noted. Compared to the previous study of 5/19/2022, no significant change in  ejection fraction. Functional mitral and tricuspid regurgitation are worse.   Bilateral pleural effusion and left elevated left ventricular filling  pressure along with dilated IVC suggest worsening fluid overload. Signature  ----------------------------------------------------------------------------  Electronically signed by Sandy Garcia(Interpreting physician) on 07/13/2022  04:15 PM  ----------------------------------------------------------------------------    ----------------------------------------------------------------------------  Electronically signed by Elsa Richardson(Sonographer) on 07/13/2022 04:17 PM  ----------------------------------------------------------------------------  FINDINGS  Left Atrium  The left atrium is severely dilated (>40) with a left atrial volume index of  57 ml/m2. Left Ventricle  Global left ventricular systolic function appears severely reduced with an  estimated ejection fraction of 25%. The left ventricular cavity size is mildly enlarged and the left ventricular  wall thickness is within normal limits. Severe global hypokinesis with minimal segmental variation. Right Atrium  The right atrium appears moderately dilated. Right Ventricle  Right ventricular dilatation with reduced right ventricular systolic  function. Mitral Valve  Myxomatous thickening of the mitral leaflets. Moderate to severe mitral regurgitation was seen. Aortic Valve  Normal aortic valve structure and function without stenosis or  regurgitation. Tricuspid Valve  Moderate tricuspid regurgitation. Moderate pulmonary hypertension with an estimated right ventricular systolic  pressure of 61 mmHg. Pulmonic Valve  Normal pulmonic valve structure with mild to moderate pulmonic  regurgitation. Pericardial Effusion  No significant pericardial effusion is seen. Pleural Effusion  Bilateral pleural effusion noted. Miscellaneous  Evidence of moderate to severe diastolic dysfunction is seen. Normal aortic root dimension. IVC Increased diameter and impaired or no inspiratory variation indicating  elevated RA filling pressure (i.e. CVP) .     M-mode / 2D Measurements & Calculations:    LVIDd:5.71 cm(3.7 - 5.6 cm)      Diastolic RXVBAO:461.9171 ml  LVIDs:5.12 cm(2.2 - 4.0 cm)      Systolic HFNVOH:339.03 ml  IVSd:0.96 cm(0.6 - 1.1 cm)       Aortic Root:3.42 cm(2.0 - 3.7 cm)  LVPWd:0.92 cm(0.6 - 1.1 cm)      LA Dimension: 4.35 cm(1.9 - 4.0 cm)  Fractional Shortening:10.33 %    LA volume/Index: 111.2 ml /57m^2  Calculated LVEF (%): 19.7 %      AV Cusp Separation: 1.9 cm    Mitral:                                 Aortic    Valve Area (P1/2-Time): 3.43 cm^2       Peak Velocity: 1.09 m/s  Peak E-Wave: 1.27 m/s                   Mean Velocity: 0.74 m/s  Peak A-Wave: 0.42 m/s                   Peak Gradient: 4.73 mmHg  E/A Ratio: 3.05                         Mean Gradient: 2.48 mmHg  Peak Gradient: 6.46 mmHg  Mean Gradient: 3.28 mmHg                Acceleration Time: 49.73 msec  P1/2t: 64.09 msec  AV VTI: 20.15 cm    Tricuspid:                              Pulmonic:    Estimated RVSP: 61.51 mmHg  Peak TR Velocity: 3.41 m/s  Peak TR Gradient: 46.5124 mmHg  Estimated RA Pressure: 15 mmHg  Estimated PASP: 61.51 mmHg    Diastology / Tissue Doppler    Lateral Wall E' velocity:0.07 m/s  Lateral Wall E/E':18.27      Cardiac Catheterization:   No results found for this or any previous visit. ASSESSMENT AND PLAN     Assessment:    //Acute on chronic systolic and diastolic heart failure.  //Severe ischemic cardiomyopathy.  //Acute hypoxemic respiratory failure  //Bilateral pleural effusion and pulmonary edema secondary to CHF  //Pulmonary hypertension likely group 2 and group 3  //COPD/emphysema severity not known.  //Paroxysmal atrial fibrillation.  //History of smoking greater than 50 pack year      Plan:    I personally interviewed/evaluated d the patient; reviewed interval history, interpreted all available radiographic and laboratory data at the time of service. Patient current symptoms and hypoxia is related to CHF pulm edema bilateral effusion along with underlying COPD.   Recommend to adjust diuretic and optimize CHF treatment especially diuretic. Recommend IV diuretic with monitoring of renal function creatinine. Recommend to check proBNP. Follow-up with cardiology recommendation. Continue optimize treatment of CHF per primary and cardiology service  On Entresto, Aldactone, statin, amiodarone  If patient does not improve with diuretics and negative balance and may need thoracentesis  Start patient on Symbicort and Spiriva. Albuterol aerosol be as needed. Patient should be discharged on Symbicort and Spiriva. Monitor electrolyte intake and output strictly and weight. Wean O2 to keep saturation greater than 88 to 90%. He will need home O2 evaluation as he will benefit from home O2 with CHF pulmonary hypertension and COPD and likely had chronic hypoxia. If needed will recommend to use BiPAP/NIV  Encourage incentive spirometry, pulmonary toilet, aspiration precautions and bronchodilators  Antimicrobials reviewed;   DVT prophylaxis on therapeutic Eliquis  Physical/occupational therapy; increase activity as tolerated. Will need outpatient pulmonary function test as outpatient    Plan and recommendation discussed with Yecenia Willis CNP with [facility] medicine group also with [facility] [place]. I updated the patient regarding the current clinical condition, provisional diagnosis and management plan. He verbalized a clear understanding and I addressed his concerns, and answered all questions to the best of my abilities. It was my pleasure to evaluate Ludivina Burgess today. We will continue to follow. I would like to thank you for allowing me to participate in the care of this patient. Please feel free to call with any further questions or concerns. Janee Osorio MD, M.D. Pulmonary and Critical Care Medicine           8/9/2022, 5:47 PM    Please note that this chart was generated using voice recognition Dragon dictation software.   Although every effort was made to ensure the accuracy of this automated transcription, some errors in transcription may have occurred.

## 2022-08-09 NOTE — PROGRESS NOTES
Progress Note    SUBJECTIVE:    Patient seen for f/u of Acute on chronic combined systolic and diastolic congestive heart failure ([address] He sitting on bedside in no distress. Continues on 5L of oxygen. Denies complaints    ROS:   Constitutional: negative  for fevers, and negative for chills. Respiratory: negative for shortness of breath, negative for cough, and negative for wheezing  Cardiovascular: negative for chest pain, and negative for palpitations  Gastrointestinal: negative for abdominal pain, negative for nausea,negative for vomiting, negative for diarrhea, and negative for constipation     All other systems were reviewed with the patient and are negative unless otherwise stated in HPI      OBJECTIVE:      Vitals:   Vitals:    08/09/22 0745   BP: 139/64   Pulse: 65   Resp: 20   Temp: 98.8 °F (37.1 °C)   SpO2: (!) 88%     Weight: 174 lb 9.7 oz (79.2 kg)   Height: 5' 6\" (167.6 cm)     Weight  Wt Readings from Last 3 Encounters:   08/09/22 174 lb 9.7 oz (79.2 kg)   07/28/22 176 lb (79.8 kg)   07/19/22 181 lb (82.1 kg)     Body mass index is 28.18 kg/m². 24HR INTAKE/OUTPUT:      Intake/Output Summary (Last 24 hours) at 8/9/2022 0907  Last data filed at 8/8/2022 2350  Gross per 24 hour   Intake 210 ml   Output 200 ml   Net 10 ml     -----------------------------------------------------------------  Exam:    GEN:    Awake, alert and oriented x3. EYES:  EOMI, pupils equal   NECK: Supple. No lymphadenopathy. No carotid bruit  CVS:    regular rate and rhythm, no audible murmur  PULM:  CTA, no wheezes, rales or rhonchi, no acute respiratory distress  ABD:    Bowels sounds normal.  Abdomen is soft. No distention. no tenderness to palpation. EXT:   no edema bilaterally . No calf tenderness. NEURO: Moves all extremities. Motor and sensory are grossly intact  SKIN:  No rashes.   No skin lesions.    -----------------------------------------------------------------    Diagnostic Data:      Complete Blood Count:   Recent Labs     08/09/22  0600   WBC 2.0*   RBC 3.63*   HGB 11.6*   HCT 37.2*   .5   MCH 32.0   MCHC 31.2   RDW 15.3*   PLT See Reflexed IPF Result        Last 3 Blood Glucose:   Recent Labs     08/09/22  0600   GLUCOSE 93        Comprehensive Metabolic Profile:   Recent Labs     08/09/22  0600      K 3.9   CL 98   CO2 34*   BUN 19   CREATININE 0.81   GLUCOSE 93   CALCIUM 9.3   PROT 6.1*   LABALBU 3.6   BILITOT 0.85   ALKPHOS 97   *   *        Urinalysis:   Lab Results   Component Value Date/Time    NITRU NEGATIVE 07/27/2022 03:15 PM    COLORU Yellow 07/27/2022 03:15 PM    PHUR 7.5 07/27/2022 03:15 PM    WBCUA 0 TO 2 07/27/2022 03:15 PM    RBCUA 0 TO 2 07/27/2022 03:15 PM    MUCUS TRACE 07/27/2022 03:15 PM    BACTERIA 1+ 07/27/2022 03:15 PM    SPECGRAV 1.010 07/27/2022 03:15 PM    LEUKOCYTESUR NEGATIVE 07/27/2022 03:15 PM    UROBILINOGEN Normal 07/27/2022 03:15 PM    BILIRUBINUR NEGATIVE 07/27/2022 03:15 PM    GLUCOSEU NEGATIVE 07/27/2022 03:15 PM    KETUA NEGATIVE 07/27/2022 03:15 PM       HgBA1c:  No results found for: LABA1C    Lactic Acid:   Lab Results   Component Value Date/Time    LACTA 1.9 05/18/2022 02:35 PM        Troponin: No results for input(s): TROPONINI in the last 72 hours. CRP:  No results for input(s): CRP in the last 72 hours. Radiology/Imaging:  CT CHEST PULMONARY EMBOLISM W CONTRAST   Final Result   No evidence of pulmonary embolism. Mildly enlarged main pulmonary artery   which can be seen in the setting of pulmonary hypertension. Mild interstitial pulmonary edema on a background of centrilobular emphysema. A few scattered ground-glass opacities in the upper lobes may be due to a   component of mild alveolar edema, though clinical correlation is recommended   to exclude concern for contributory infectious causes. Moderate layering bilateral pleural effusions with partial collapse of the   lower lobes, left more so than right. Cardiomegaly which appears unchanged from prior with 3 vessel coronary artery   disease. XR CHEST (2 VW)   Final Result   Essentially stable exam demonstrating mild pulmonary edema and bilateral   pleural effusions, left greater than right. XR CHEST (2 VW)   Final Result   Interval increasing left pleural effusion. Cardiomegaly and vascular   congestion with probable underlying atelectasis. XR CHEST PORTABLE   Final Result   Overall findings most consistent with decompensated CHF with mild pulmonary   edema. Probable bibasilar subsegmental atelectasis and pleural effusions. However,   underlying infiltrates not excluded. ASSESSMENT / PLAN:  Acute on chronic combined systolic and diastolic congestive heart failure (HCC)  Continue current therapy   Appreciate cardiology  Off IV Lasix  Stop oral Bumex  Start IV Lasix 40 mg bid  Liver Enzymes are elevated likely secondary to CHF  Teds  Daily weights  CHF education given by myself information given  Acute respiratory failure with hypoxia  Supplemental O2-5L  Out of bed with meals  CT Chest PE Protocol--No PE. Moderate bilateral pleural effusion with partial collapse of the lower lobes Lt>Rt  Appreciate Pulmonology--Spoke with Dr. Reginaldo Medeiros.  CHF and pleural   Viral Panel today  COPD  Monitor for need for nebs  CKD  Trend labs  Caution with IV diuretics  Monitor output  Nonischemic cardiomyopathy  Appreciate cardiology  Continue with LifeVest  PAF  Continue amiodarone, Eliquis, Toprol-XL  Nutrition status:   obesity, non-morbid  Dietician consult initiated  Hospital Prophylaxis:   DVT: Eliquis   Stress Ulcer: H2 Blocker   High risk medications: none   Disposition:    Discharge plan is home pending  Home O2 eval  FACE TO FACE: Patient qualified for home O2. Discussed with patient the medical necessity of home O2. Patient voiced understanding and agreement.     SEVEN Multani - CNP, SEVEN, NP-C  8/9/2022, 9:07 SEVEN Pardo - CNP , SEVEN, NP-C  Hospitalist Medicine        8/9/2022, 9:07 AM

## 2022-08-09 NOTE — PLAN OF CARE
Problem: Discharge Planning  Goal: Discharge to home or other facility with appropriate resources  8/9/2022 1458 by Jaxon Arias RN  Outcome: Progressing  8/9/2022 0205 by Keyla Yousif RN  Outcome: Progressing  Flowsheets (Taken 8/9/2022 0205)  Discharge to home or other facility with appropriate resources:   Identify barriers to discharge with patient and caregiver   Identify discharge learning needs (meds, wound care, etc)     Problem: Pain  Goal: Verbalizes/displays adequate comfort level or baseline comfort level  8/9/2022 1458 by Jaxon Arias RN  Outcome: Progressing  8/9/2022 0205 by Keyla Yousif RN  Outcome: Progressing  Flowsheets (Taken 8/9/2022 0205)  Verbalizes/displays adequate comfort level or baseline comfort level:   Encourage patient to monitor pain and request assistance   Administer analgesics based on type and severity of pain and evaluate response   Implement non-pharmacological measures as appropriate and evaluate response   Assess pain using appropriate pain scale     Problem: Safety - Adult  Goal: Free from fall injury  8/9/2022 1458 by Jaxon Arias RN  Outcome: Progressing  8/9/2022 0205 by Keyla Yousif RN  Outcome: Progressing  Flowsheets (Taken 8/9/2022 0205)  Free From Fall Injury: Instruct family/caregiver on patient safety     Problem: Chronic Conditions and Co-morbidities  Goal: Patient's chronic conditions and co-morbidity symptoms are monitored and maintained or improved  8/9/2022 1458 by Jaxon Arias RN  Outcome: Progressing  8/9/2022 0205 by Keyla Yousif RN  Outcome: Progressing  Flowsheets (Taken 8/9/2022 0205)  Care Plan - Patient's Chronic Conditions and Co-Morbidity Symptoms are Monitored and Maintained or Improved: Monitor and assess patient's chronic conditions and comorbid symptoms for stability, deterioration, or improvement     Problem: Skin/Tissue Integrity  Goal: Absence of new skin breakdown  Description: 1.   Monitor for areas of redness and/or skin breakdown  2. Assess vascular access sites hourly  3. Every 4-6 hours minimum:  Change oxygen saturation probe site  4. Every 4-6 hours:  If on nasal continuous positive airway pressure, respiratory therapy assess nares and determine need for appliance change or resting period. 8/9/2022 1458 by Mickie Galloway RN  Outcome: Progressing  8/9/2022 0205 by Isra Farah RN  Outcome: Progressing  Note: Alfonso scale monitoring per protocol. Inspect skin for breakdown frequently. Encourage pt to make frequent large adjustments in position or assist patient with turning. Document all areas of breakdown.         Problem: Nutrition Deficit:  Goal: Optimize nutritional status  8/9/2022 1458 by Mickie Galloway RN  Outcome: Progressing  8/9/2022 0205 by Isra Farah RN  Outcome: Progressing

## 2022-08-09 NOTE — PLAN OF CARE
Problem: Discharge Planning  Goal: Discharge to home or other facility with appropriate resources  Outcome: Progressing  Flowsheets (Taken 8/9/2022 0205)  Discharge to home or other facility with appropriate resources:   Identify barriers to discharge with patient and caregiver   Identify discharge learning needs (meds, wound care, etc)     Problem: Pain  Goal: Verbalizes/displays adequate comfort level or baseline comfort level  Outcome: Progressing  Flowsheets (Taken 8/9/2022 0205)  Verbalizes/displays adequate comfort level or baseline comfort level:   Encourage patient to monitor pain and request assistance   Administer analgesics based on type and severity of pain and evaluate response   Implement non-pharmacological measures as appropriate and evaluate response   Assess pain using appropriate pain scale     Problem: Safety - Adult  Goal: Free from fall injury  Outcome: Progressing  Flowsheets (Taken 8/9/2022 0205)  Free From Fall Injury: Instruct family/caregiver on patient safety     Problem: Chronic Conditions and Co-morbidities  Goal: Patient's chronic conditions and co-morbidity symptoms are monitored and maintained or improved  Outcome: Progressing  Flowsheets (Taken 8/9/2022 0205)  Care Plan - Patient's Chronic Conditions and Co-Morbidity Symptoms are Monitored and Maintained or Improved: Monitor and assess patient's chronic conditions and comorbid symptoms for stability, deterioration, or improvement     Problem: Skin/Tissue Integrity  Goal: Absence of new skin breakdown  Description: 1. Monitor for areas of redness and/or skin breakdown  2. Assess vascular access sites hourly  3. Every 4-6 hours minimum:  Change oxygen saturation probe site  4. Every 4-6 hours:  If on nasal continuous positive airway pressure, respiratory therapy assess nares and determine need for appliance change or resting period. Outcome: Progressing  Note: Alfonso scale monitoring per protocol.  Inspect skin for breakdown frequently. Encourage pt to make frequent large adjustments in position or assist patient with turning. Document all areas of breakdown.

## 2022-08-09 NOTE — PROGRESS NOTES
Physical Therapy  Facility/Department: [facility] THE [facility] MED SURG  Daily Treatment Note  NAME: Jl Hernandez  :   MRN: [id number]  Date of Service: 2022  Discharge Recommendations:  Continue to assess pending progress, Home with Home health PT, Outpatient PT   Patient Diagnosis(es): The primary encounter diagnosis was Acute respiratory failure with hypoxia ([address] A diagnosis of Acute on chronic combined systolic and diastolic CHF (congestive heart failure) (HCC) was also pertinent to this visit. Assessment   Activity Tolerance: Patient tolerated treatment well   Plan    Plan  Plan: 2 times a day 7 days a week  Plan weeks: 20 days  Current Treatment Recommendations: Strengthening;Balance training;Functional mobility training;Transfer training; Endurance training;Gait training;Stair training;Neuromuscular re-education;Home exercise program;Safety education & training;Positioning; Therapeutic activities   Restrictions  Restrictions/Precautions  Restrictions/Precautions: General Precautions   Subjective    Subjective  Subjective: Pt. in bed upon arrival, agreeable to therapy at this time. Pain: Denies  Orientation  Overall Orientation Status: Within Functional Limits   Objective   Bed Mobility Training  Bed Mobility Training: Yes  Overall Level of Assistance: Supervision  Interventions: Safety awareness training;Verbal cues  Rolling: Supervision  Supine to Sit: Supervision  Scooting: Supervision  Transfer Training  Transfer Training: Yes  Overall Level of Assistance: Supervision  Interventions: Verbal cues  Sit to Stand: Supervision  Stand to Sit: Supervision  Toilet Transfer: Supervision  Gait Training  Gait Training: Yes  Gait  Overall Level of Assistance: Supervision  Interventions: Verbal cues; Safety awareness training  Speed/Andreina: Slow  Distance (ft): 50 Feet  Assistive Device: Other (comment)  Other Specialty Interventions  Other Treatments/Modalities: commode use  Safety Devices  Type of Devices: Call light within reach; Left in chair   Goals  Short Term Goals  Time Frame for Short term goals: 20 days  Short term goal 1: Pt to be able to ambulate 50 ft IND to be able to retrun home safely. Short term goal 2: Pt to be IND with all transfers to improve functional mobility and safety of transfers  Short term goal 3: Pt to be able to tolerate 20-30 mins therex/act to improve functional capacity for ADLs.   Patient Goals   Patient goals : to get back home  Education  Patient Education  Education Given To: Patient  Education Provided: Role of Therapy;Plan of Care;Home Exercise Program  Education Method: Verbal  Barriers to Learning: None  Education Outcome: Verbalized understanding  Therapy Time   Individual Concurrent Group Co-treatment   Time In 0730         Time Out 0745         Minutes [place], [place]

## 2022-08-10 ENCOUNTER — APPOINTMENT (OUTPATIENT)
Dept: ULTRASOUND IMAGING | Age: 72
DRG: 291 | End: 2022-08-10
Payer: MEDICARE

## 2022-08-10 ENCOUNTER — APPOINTMENT (OUTPATIENT)
Dept: GENERAL RADIOLOGY | Age: 72
DRG: 291 | End: 2022-08-10
Payer: MEDICARE

## 2022-08-10 PROBLEM — D61.818 PANCYTOPENIA (HCC): Status: ACTIVE | Noted: 2022-08-10

## 2022-08-10 LAB
ABSOLUTE EOS #: 0 K/UL (ref 0–0.44)
ABSOLUTE IMMATURE GRANULOCYTE: 0 K/UL (ref 0–0.3)
ABSOLUTE LYMPH #: 0.34 K/UL (ref 1.1–3.7)
ABSOLUTE MONO #: 0.13 K/UL (ref 0.1–1.2)
ADENOVIRUS PCR: NOT DETECTED
ALBUMIN FLUID: 1.8 G/DL
ALBUMIN SERPL-MCNC: 3.5 G/DL (ref 3.5–5.2)
ALBUMIN/GLOBULIN RATIO: 1.3 (ref 1–2.5)
ALLEN TEST: ABNORMAL
ALP BLD-CCNC: 93 U/L (ref 40–129)
ALT SERPL-CCNC: 175 U/L (ref 5–41)
ANION GAP SERPL CALCULATED.3IONS-SCNC: 12 MMOL/L (ref 9–17)
APPEARANCE FLUID: ABNORMAL
AST SERPL-CCNC: 161 U/L
BASO FLUID: ABNORMAL %
BASOPHILS # BLD: 0 % (ref 0–2)
BASOPHILS ABSOLUTE: 0 K/UL (ref 0–0.2)
BILIRUB SERPL-MCNC: 1.1 MG/DL (ref 0.3–1.2)
BORDETELLA PARAPERTUSSIS: NOT DETECTED
BORDETELLA PERTUSSIS PCR: NOT DETECTED
BUN BLDV-MCNC: 14 MG/DL (ref 8–23)
BUN/CREAT BLD: 15 (ref 9–20)
C-REACTIVE PROTEIN: 52.8 MG/L (ref 0–5)
CALCIUM SERPL-MCNC: 9 MG/DL (ref 8.6–10.4)
CHLAMYDIA PNEUMONIAE BY PCR: NOT DETECTED
CHLORIDE BLD-SCNC: 94 MMOL/L (ref 98–107)
CO2: 35 MMOL/L (ref 20–31)
COLOR FLUID: YELLOW
CORONAVIRUS 229E PCR: NOT DETECTED
CORONAVIRUS HKU1 PCR: NOT DETECTED
CORONAVIRUS NL63 PCR: NOT DETECTED
CORONAVIRUS OC43 PCR: NOT DETECTED
CREAT SERPL-MCNC: 0.92 MG/DL (ref 0.7–1.2)
D-DIMER QUANTITATIVE: 2.4 MG/L FEU (ref 0–0.59)
EOSINOPHIL FLUID: ABNORMAL %
EOSINOPHILS RELATIVE PERCENT: 0 % (ref 1–4)
FERRITIN: 877 NG/ML (ref 30–400)
GFR AFRICAN AMERICAN: >60 ML/MIN
GFR NON-AFRICAN AMERICAN: >60 ML/MIN
GFR SERPL CREATININE-BSD FRML MDRD: ABNORMAL ML/MIN/{1.73_M2}
GFR SERPL CREATININE-BSD FRML MDRD: ABNORMAL ML/MIN/{1.73_M2}
GLUCOSE BLD-MCNC: 93 MG/DL (ref 70–99)
HCO3 ARTERIAL: 39.5 MMOL/L (ref 22–26)
HCT VFR BLD CALC: 36.8 % (ref 40.7–50.3)
HEMOGLOBIN: 11.9 G/DL (ref 13–17)
HUMAN METAPNEUMOVIRUS PCR: NOT DETECTED
IMMATURE GRANULOCYTES: 0 %
INFLUENZA A BY PCR: NOT DETECTED
INFLUENZA B BY PCR: NOT DETECTED
LACTATE DEHYDROGENASE, FLUID: 130 U/L
LYMPHOCYTES # BLD: 16 % (ref 24–43)
LYMPHOCYTES, BODY FLUID: 16 %
MAGNESIUM: 1.5 MG/DL (ref 1.6–2.6)
MCH RBC QN AUTO: 32.3 PG (ref 25.2–33.5)
MCHC RBC AUTO-ENTMCNC: 32.3 G/DL (ref 28.4–34.8)
MCV RBC AUTO: 100 FL (ref 82.6–102.9)
MONOCYTE, FLUID: 2 %
MONOCYTES # BLD: 6 % (ref 3–12)
MORPHOLOGY: ABNORMAL
MYCOPLASMA PNEUMONIAE PCR: NOT DETECTED
NEUTROPHIL, FLUID: ABNORMAL %
NRBC AUTOMATED: 0 PER 100 WBC
O2 DEVICE/FLOW/%: ABNORMAL
O2 SAT, ARTERIAL: 97.6 % (ref 95–98)
OTHER CELLS FLUID: 82 %
PARAINFLUENZA 1 PCR: NOT DETECTED
PARAINFLUENZA 2 PCR: NOT DETECTED
PARAINFLUENZA 3 PCR: NOT DETECTED
PARAINFLUENZA 4 PCR: NOT DETECTED
PATIENT TEMP: 37
PCO2 ARTERIAL: 51.7 MMHG (ref 35–45)
PDW BLD-RTO: 15.1 % (ref 11.8–14.4)
PH ARTERIAL: 7.5 (ref 7.35–7.45)
PLATELET # BLD: ABNORMAL K/UL (ref 138–453)
PLATELET, FLUORESCENCE: 62 K/UL (ref 138–453)
PLATELET, IMMATURE FRACTION: 6.6 % (ref 1.1–10.3)
PO2 ARTERIAL: 93.9 MMHG (ref 80–100)
POSITIVE BASE EXCESS, ART: 13.9 MMOL/L (ref 0–2)
POTASSIUM SERPL-SCNC: 2.8 MMOL/L (ref 3.7–5.3)
POTASSIUM SERPL-SCNC: 3 MMOL/L (ref 3.7–5.3)
PT. POSITION: ABNORMAL
RBC # BLD: 3.68 M/UL (ref 4.21–5.77)
RBC FLUID: <3000 /MM3
RESP SYNCYTIAL VIRUS PCR: NOT DETECTED
RHINO/ENTEROVIRUS PCR: NOT DETECTED
SAMPLE SITE: ABNORMAL
SARS-COV-2, PCR: DETECTED
SEG NEUTROPHILS: 78 % (ref 36–65)
SEGMENTED NEUTROPHILS ABSOLUTE COUNT: 1.63 K/UL (ref 1.5–8.1)
SODIUM BLD-SCNC: 141 MMOL/L (ref 135–144)
SPECIMEN DESCRIPTION: ABNORMAL
SPECIMEN TYPE: ABNORMAL
SPECIMEN TYPE: NORMAL
SPECIMEN TYPE: NORMAL
TOTAL PROTEIN: 6.1 G/DL (ref 6.4–8.3)
WBC # BLD: 2.1 K/UL (ref 3.5–11.3)
WBC FLUID: 193 /MM3

## 2022-08-10 PROCEDURE — 82042 OTHER SOURCE ALBUMIN QUAN EA: CPT

## 2022-08-10 PROCEDURE — 0W9B3ZZ DRAINAGE OF LEFT PLEURAL CAVITY, PERCUTANEOUS APPROACH: ICD-10-PCS | Performed by: RADIOLOGY

## 2022-08-10 PROCEDURE — 36415 COLL VENOUS BLD VENIPUNCTURE: CPT

## 2022-08-10 PROCEDURE — 6360000002 HC RX W HCPCS: Performed by: NURSE PRACTITIONER

## 2022-08-10 PROCEDURE — 83986 ASSAY PH BODY FLUID NOS: CPT

## 2022-08-10 PROCEDURE — 87070 CULTURE OTHR SPECIMN AEROBIC: CPT

## 2022-08-10 PROCEDURE — 84132 ASSAY OF SERUM POTASSIUM: CPT

## 2022-08-10 PROCEDURE — 83615 LACTATE (LD) (LDH) ENZYME: CPT

## 2022-08-10 PROCEDURE — 99222 1ST HOSP IP/OBS MODERATE 55: CPT | Performed by: INTERNAL MEDICINE

## 2022-08-10 PROCEDURE — 86140 C-REACTIVE PROTEIN: CPT

## 2022-08-10 PROCEDURE — 2700000000 HC OXYGEN THERAPY PER DAY

## 2022-08-10 PROCEDURE — 87205 SMEAR GRAM STAIN: CPT

## 2022-08-10 PROCEDURE — 6370000000 HC RX 637 (ALT 250 FOR IP): Performed by: STUDENT IN AN ORGANIZED HEALTH CARE EDUCATION/TRAINING PROGRAM

## 2022-08-10 PROCEDURE — 83735 ASSAY OF MAGNESIUM: CPT

## 2022-08-10 PROCEDURE — 2500000003 HC RX 250 WO HCPCS: Performed by: RADIOLOGY

## 2022-08-10 PROCEDURE — 71045 X-RAY EXAM CHEST 1 VIEW: CPT

## 2022-08-10 PROCEDURE — 6370000000 HC RX 637 (ALT 250 FOR IP): Performed by: NURSE PRACTITIONER

## 2022-08-10 PROCEDURE — 94664 DEMO&/EVAL PT USE INHALER: CPT

## 2022-08-10 PROCEDURE — 99232 SBSQ HOSP IP/OBS MODERATE 35: CPT | Performed by: FAMILY MEDICINE

## 2022-08-10 PROCEDURE — 88112 CYTOPATH CELL ENHANCE TECH: CPT

## 2022-08-10 PROCEDURE — 2709999900 US THORACENTESIS

## 2022-08-10 PROCEDURE — 94761 N-INVAS EAR/PLS OXIMETRY MLT: CPT

## 2022-08-10 PROCEDURE — 82805 BLOOD GASES W/O2 SATURATION: CPT

## 2022-08-10 PROCEDURE — 32555 ASPIRATE PLEURA W/ IMAGING: CPT

## 2022-08-10 PROCEDURE — 6360000002 HC RX W HCPCS: Performed by: STUDENT IN AN ORGANIZED HEALTH CARE EDUCATION/TRAINING PROGRAM

## 2022-08-10 PROCEDURE — 2580000003 HC RX 258: Performed by: NURSE PRACTITIONER

## 2022-08-10 PROCEDURE — 87075 CULTR BACTERIA EXCEPT BLOOD: CPT

## 2022-08-10 PROCEDURE — 85055 RETICULATED PLATELET ASSAY: CPT

## 2022-08-10 PROCEDURE — 85025 COMPLETE CBC W/AUTO DIFF WBC: CPT

## 2022-08-10 PROCEDURE — 82728 ASSAY OF FERRITIN: CPT

## 2022-08-10 PROCEDURE — 99223 1ST HOSP IP/OBS HIGH 75: CPT | Performed by: INTERNAL MEDICINE

## 2022-08-10 PROCEDURE — 6370000000 HC RX 637 (ALT 250 FOR IP): Performed by: INTERNAL MEDICINE

## 2022-08-10 PROCEDURE — 94669 MECHANICAL CHEST WALL OSCILL: CPT

## 2022-08-10 PROCEDURE — 88305 TISSUE EXAM BY PATHOLOGIST: CPT

## 2022-08-10 PROCEDURE — 2580000003 HC RX 258: Performed by: STUDENT IN AN ORGANIZED HEALTH CARE EDUCATION/TRAINING PROGRAM

## 2022-08-10 PROCEDURE — 94640 AIRWAY INHALATION TREATMENT: CPT

## 2022-08-10 PROCEDURE — 36600 WITHDRAWAL OF ARTERIAL BLOOD: CPT

## 2022-08-10 PROCEDURE — 89051 BODY FLUID CELL COUNT: CPT

## 2022-08-10 PROCEDURE — 85379 FIBRIN DEGRADATION QUANT: CPT

## 2022-08-10 PROCEDURE — 80053 COMPREHEN METABOLIC PANEL: CPT

## 2022-08-10 PROCEDURE — 0W993ZZ DRAINAGE OF RIGHT PLEURAL CAVITY, PERCUTANEOUS APPROACH: ICD-10-PCS | Performed by: RADIOLOGY

## 2022-08-10 PROCEDURE — 2000000000 HC ICU R&B

## 2022-08-10 PROCEDURE — 6370000000 HC RX 637 (ALT 250 FOR IP): Performed by: PHYSICIAN ASSISTANT

## 2022-08-10 RX ORDER — FAMOTIDINE 20 MG/1
40 TABLET, FILM COATED ORAL 2 TIMES DAILY
Status: DISCONTINUED | OUTPATIENT
Start: 2022-08-10 | End: 2022-08-13 | Stop reason: HOSPADM

## 2022-08-10 RX ORDER — ERGOCALCIFEROL 1.25 MG/1
50000 CAPSULE ORAL WEEKLY
Status: DISCONTINUED | OUTPATIENT
Start: 2022-08-10 | End: 2022-08-10 | Stop reason: SDUPTHER

## 2022-08-10 RX ORDER — DEXAMETHASONE 4 MG/1
6 TABLET ORAL DAILY
Status: DISCONTINUED | OUTPATIENT
Start: 2022-08-10 | End: 2022-08-12

## 2022-08-10 RX ORDER — ASCORBIC ACID 500 MG
1000 TABLET ORAL 4 TIMES DAILY
Status: DISCONTINUED | OUTPATIENT
Start: 2022-08-10 | End: 2022-08-13 | Stop reason: HOSPADM

## 2022-08-10 RX ORDER — ZINC SULFATE 50(220)MG
100 CAPSULE ORAL DAILY
Status: DISCONTINUED | OUTPATIENT
Start: 2022-08-10 | End: 2022-08-13 | Stop reason: HOSPADM

## 2022-08-10 RX ORDER — LIDOCAINE HYDROCHLORIDE 10 MG/ML
INJECTION, SOLUTION EPIDURAL; INFILTRATION; INTRACAUDAL; PERINEURAL
Status: COMPLETED | OUTPATIENT
Start: 2022-08-10 | End: 2022-08-10

## 2022-08-10 RX ORDER — IPRATROPIUM BROMIDE AND ALBUTEROL SULFATE 2.5; .5 MG/3ML; MG/3ML
1 SOLUTION RESPIRATORY (INHALATION) EVERY 4 HOURS PRN
Status: DISCONTINUED | OUTPATIENT
Start: 2022-08-10 | End: 2022-08-13 | Stop reason: HOSPADM

## 2022-08-10 RX ORDER — BEBTELOVIMAB 87.5 MG/ML
175 INJECTION, SOLUTION INTRAVENOUS ONCE
Status: COMPLETED | OUTPATIENT
Start: 2022-08-10 | End: 2022-08-10

## 2022-08-10 RX ORDER — MAGNESIUM SULFATE IN WATER 40 MG/ML
2000 INJECTION, SOLUTION INTRAVENOUS ONCE
Status: DISCONTINUED | OUTPATIENT
Start: 2022-08-10 | End: 2022-08-10

## 2022-08-10 RX ORDER — POTASSIUM CHLORIDE 20 MEQ/1
40 TABLET, EXTENDED RELEASE ORAL
Status: COMPLETED | OUTPATIENT
Start: 2022-08-10 | End: 2022-08-11

## 2022-08-10 RX ADMIN — LIDOCAINE HYDROCHLORIDE 5 ML: 10 INJECTION, SOLUTION EPIDURAL; INFILTRATION; INTRACAUDAL; PERINEURAL at 12:29

## 2022-08-10 RX ADMIN — SODIUM CHLORIDE, PRESERVATIVE FREE 10 ML: 5 INJECTION INTRAVENOUS at 20:10

## 2022-08-10 RX ADMIN — MULTIVITAMIN TABLET 1 TABLET: TABLET at 08:43

## 2022-08-10 RX ADMIN — SACUBITRIL AND VALSARTAN 0.5 TABLET: 24; 26 TABLET, FILM COATED ORAL at 20:03

## 2022-08-10 RX ADMIN — ATORVASTATIN CALCIUM 80 MG: 40 TABLET, FILM COATED ORAL at 08:43

## 2022-08-10 RX ADMIN — LIDOCAINE HYDROCHLORIDE 5 ML: 10 INJECTION, SOLUTION EPIDURAL; INFILTRATION; INTRACAUDAL; PERINEURAL at 12:28

## 2022-08-10 RX ADMIN — SODIUM CHLORIDE, PRESERVATIVE FREE 10 ML: 5 INJECTION INTRAVENOUS at 08:45

## 2022-08-10 RX ADMIN — MIDODRINE HYDROCHLORIDE 10 MG: 5 TABLET ORAL at 13:24

## 2022-08-10 RX ADMIN — Medication 10 MG: at 20:03

## 2022-08-10 RX ADMIN — AMIODARONE HYDROCHLORIDE 200 MG: 200 TABLET ORAL at 08:43

## 2022-08-10 RX ADMIN — Medication 600 MG: at 20:03

## 2022-08-10 RX ADMIN — SPIRONOLACTONE 25 MG: 25 TABLET ORAL at 08:45

## 2022-08-10 RX ADMIN — ERGOCALCIFEROL 50000 UNITS: 1.25 CAPSULE ORAL at 08:43

## 2022-08-10 RX ADMIN — TAMSULOSIN HYDROCHLORIDE 0.4 MG: 0.4 CAPSULE ORAL at 08:44

## 2022-08-10 RX ADMIN — ALBUTEROL SULFATE 2.5 MG: 2.5 SOLUTION RESPIRATORY (INHALATION) at 10:11

## 2022-08-10 RX ADMIN — BUSPIRONE HYDROCHLORIDE 15 MG: 5 TABLET ORAL at 08:45

## 2022-08-10 RX ADMIN — MIDODRINE HYDROCHLORIDE 10 MG: 5 TABLET ORAL at 17:58

## 2022-08-10 RX ADMIN — SACUBITRIL AND VALSARTAN 0.5 TABLET: 24; 26 TABLET, FILM COATED ORAL at 08:44

## 2022-08-10 RX ADMIN — CLOPIDOGREL BISULFATE 75 MG: 75 TABLET ORAL at 08:45

## 2022-08-10 RX ADMIN — FUROSEMIDE 40 MG: 10 INJECTION, SOLUTION INTRAMUSCULAR; INTRAVENOUS at 17:58

## 2022-08-10 RX ADMIN — ROPINIROLE HYDROCHLORIDE 0.5 MG: 0.25 TABLET, FILM COATED ORAL at 20:03

## 2022-08-10 RX ADMIN — Medication 600 MG: at 08:53

## 2022-08-10 RX ADMIN — MAGNESIUM SULFATE HEPTAHYDRATE 2000 MG: 40 INJECTION, SOLUTION INTRAVENOUS at 07:46

## 2022-08-10 RX ADMIN — POTASSIUM CHLORIDE 40 MEQ: 1500 TABLET, EXTENDED RELEASE ORAL at 13:26

## 2022-08-10 RX ADMIN — POTASSIUM CHLORIDE 40 MEQ: 1500 TABLET, EXTENDED RELEASE ORAL at 17:58

## 2022-08-10 RX ADMIN — OXYCODONE HYDROCHLORIDE AND ACETAMINOPHEN 1000 MG: 500 TABLET ORAL at 20:03

## 2022-08-10 RX ADMIN — ESCITALOPRAM 20 MG: 5 TABLET, FILM COATED ORAL at 20:03

## 2022-08-10 RX ADMIN — MIDODRINE HYDROCHLORIDE 10 MG: 5 TABLET ORAL at 08:44

## 2022-08-10 RX ADMIN — OXYCODONE HYDROCHLORIDE AND ACETAMINOPHEN 1000 MG: 500 TABLET ORAL at 17:58

## 2022-08-10 RX ADMIN — POTASSIUM CHLORIDE 40 MEQ: 1500 TABLET, EXTENDED RELEASE ORAL at 08:45

## 2022-08-10 RX ADMIN — ALBUTEROL SULFATE 2.5 MG: 2.5 SOLUTION RESPIRATORY (INHALATION) at 16:03

## 2022-08-10 RX ADMIN — ZINC SULFATE 220 MG (50 MG) CAPSULE 100 MG: CAPSULE at 08:44

## 2022-08-10 RX ADMIN — FUROSEMIDE 40 MG: 10 INJECTION, SOLUTION INTRAMUSCULAR; INTRAVENOUS at 08:45

## 2022-08-10 RX ADMIN — BUSPIRONE HYDROCHLORIDE 15 MG: 5 TABLET ORAL at 20:03

## 2022-08-10 RX ADMIN — DEXAMETHASONE 6 MG: 4 TABLET ORAL at 08:53

## 2022-08-10 RX ADMIN — FAMOTIDINE 40 MG: 20 TABLET ORAL at 08:44

## 2022-08-10 RX ADMIN — OXYCODONE HYDROCHLORIDE AND ACETAMINOPHEN 1000 MG: 500 TABLET ORAL at 08:43

## 2022-08-10 RX ADMIN — OXYCODONE HYDROCHLORIDE AND ACETAMINOPHEN 1000 MG: 500 TABLET ORAL at 13:25

## 2022-08-10 RX ADMIN — FOLIC ACID 1 MG: 1 TABLET ORAL at 08:43

## 2022-08-10 RX ADMIN — THIAMINE HCL TAB 100 MG 100 MG: 100 TAB at 08:44

## 2022-08-10 RX ADMIN — FAMOTIDINE 40 MG: 20 TABLET ORAL at 20:03

## 2022-08-10 RX ADMIN — BEBTELOVIMAB 175 MG: 87.5 INJECTION, SOLUTION INTRAVENOUS at 10:07

## 2022-08-10 RX ADMIN — ALBUTEROL SULFATE 2.5 MG: 2.5 SOLUTION RESPIRATORY (INHALATION) at 20:42

## 2022-08-10 RX ADMIN — METOPROLOL SUCCINATE 25 MG: 25 TABLET, EXTENDED RELEASE ORAL at 08:43

## 2022-08-10 RX ADMIN — SODIUM CHLORIDE, PRESERVATIVE FREE 10 ML: 5 INJECTION INTRAVENOUS at 17:59

## 2022-08-10 RX ADMIN — ALBUTEROL SULFATE 2.5 MG: 2.5 SOLUTION RESPIRATORY (INHALATION) at 05:31

## 2022-08-10 RX ADMIN — PANTOPRAZOLE SODIUM 40 MG: 40 TABLET, DELAYED RELEASE ORAL at 07:46

## 2022-08-10 ASSESSMENT — PAIN SCALES - GENERAL
PAINLEVEL_OUTOF10: 0
PAINLEVEL_OUTOF10: 0

## 2022-08-10 NOTE — PROGRESS NOTES
Patient O2 saturation again went down to 84-85% with NC so he has been switched back to non re breather mask at this time and his saturation is at 94%.

## 2022-08-10 NOTE — PLAN OF CARE
Problem: Discharge Planning  Goal: Discharge to home or other facility with appropriate resources  8/10/2022 0339 by Sam Ramires RN  Outcome: Progressing  Flowsheets  Taken 8/10/2022 0243  Discharge to home or other facility with appropriate resources:   Identify barriers to discharge with patient and caregiver   Arrange for needed discharge resources and transportation as appropriate   Identify discharge learning needs (meds, wound care, etc)     Problem: Pain  Goal: Verbalizes/displays adequate comfort level or baseline comfort level  8/10/2022 0339 by Sam Ramires RN  Outcome: Progressing  Flowsheets  Taken 8/10/2022 0236  Verbalizes/displays adequate comfort level or baseline comfort level:   Assess pain using appropriate pain scale   Encourage patient to monitor pain and request assistance     Problem: Safety - Adult  Goal: Free from fall injury  8/10/2022 0339 by Sam Ramires RN  Outcome: Progressing  Note: Patient is a high fall risk. Bed alarm on, bed wheels locked and kept in lowest position. Nonskid wear on, fall sign posted and fall sticker on. Bedside table and call light within reach. Needs assessed with hourly rounding and environment scanned for any safety issue. Problem: Chronic Conditions and Co-morbidities  Goal: Patient's chronic conditions and co-morbidity symptoms are monitored and maintained or improved  8/10/2022 0339 by Sam Ramires RN  Outcome: Progressing  Flowsheets  Taken 8/10/2022 0243  Care Plan - Patient's Chronic Conditions and Co-Morbidity Symptoms are Monitored and Maintained or Improved: Monitor and assess patient's chronic conditions and comorbid symptoms for stability, deterioration, or improvement     Problem: Skin/Tissue Integrity  Goal: Absence of new skin breakdown  Description: 1. Monitor for areas of redness and/or skin breakdown  2. Assess vascular access sites hourly  3. Every 4-6 hours minimum:  Change oxygen saturation probe site  4.   Every 4-6 hours:  If on nasal continuous positive airway pressure, respiratory therapy assess nares and determine need for appliance change or resting period.   8/10/2022 0339 by Patrica Parisi, RN  Outcome: Progressing     Problem: Nutrition Deficit:  Goal: Optimize nutritional status  8/10/2022 0339 by Patrica Parisi, RN  Outcome: Progressing

## 2022-08-10 NOTE — CONSULTS
[facility]acy Consult to Dose COVID-19 Monoclonal Antibodies      Danny Sauceda is a 70 y.o. male for whom pharmacy has been consulted to dose Bebtelovimab. Patient Active Problem List   Diagnosis    Atrial fibrillation with RVR (HCC)    Acute on chronic combined systolic and diastolic CHF, NYHA class 4 (HCC)    Coronary artery disease    Hypertension    Thrombocytopenia (HCC)    Hyponatremia    Hyperlipidemia    Cirrhosis of liver with ascites (HCC)    Hypomagnesemia    Ascites    CKD (chronic kidney disease)    History of acute inferior wall MI    Ischemic cardiomyopathy    Chronic combined systolic (congestive) and diastolic (congestive) heart failure (HCC)    Dehydration, moderate    Medication side effect, initial encounter    Severe malnutrition (HCC)    Paroxysmal atrial fibrillation (HCC)    Lightheaded    Tobacco abuse counseling    Gastroesophageal reflux disease    Hypotension    Acute on chronic combined systolic and diastolic CHF (congestive heart failure) (HCC)    Hypokalemia    Hypovitaminosis D    Hypoxia    Nonischemic cardiomyopathy (HCC)    Acute respiratory failure with hypoxia (HCC)    Chronic anticoagulation    Bilateral pleural effusion       Allergies:  Patient has no known allergies. Recent Labs     08/09/22  0600 08/10/22  0545   CREATININE 0.81 0.92       Ht/Wt:   Ht Readings from Last 1 Encounters:   08/04/22 5' 6\" (1.676 m)        Wt Readings from Last 1 Encounters:   08/10/22 162 lb 7.7 oz (73.7 kg)         Estimated Creatinine Clearance: 66 mL/min (based on SCr of 0.92 mg/dL). The patient meets the following criteria as indicated on the order panel to receive the COVID-19 Monoclonal Antibodies:  x Positive PCR, Rapid or Home test for SARS-CoV-2 (sample must be within 7 days of infusion.   x Symptomatic for COVID:  Cough, cold or sinus symptoms, fever loss of taste/smell, GI symptoms, diarrhea, nausea/vomiting.  x Onset of symptoms within 7-days  ONE OF THE FOLLOWING MUST BE MET TO QUALIFY  o Immunocompromised as defined by the NIH (NIH Panel Statement on Patient Prioritization of Outpatient COVID Therapy)    o Age 76 years & over (No additional risk factors required)    o Pregnant    x Age 72 years & over with 2 or more additional risk factors of severe disease per the Bebtelovimab EUA. Risk Factors: Bebtelovimab EUA: Must have 2 unless > 75yrs of age or Pregnancy  o Obesity or being overweight (for example, adults with BMI >25 kg/m2, or if 15to 16years of age, have BMI ? 85th percentile for their age and gender based on CDC growth charts. o Chronic kidney disease  o Diabetes  o Immunosuppressive disease or immunosuppressive treatments  x Cardiovascular disease (including congenital heart disease) or hypertension  x Chronic lung diseases (for example, chronic obstructive pulmonary disease, asthma [moderate-to-severe], interstitial lung disease, cystic fibrosis and pulmonary hypertension). o Sickle cell disease  o Neurodevelopmental disorders (for example, cerebral palsy) or other conditions that confer medical complexity (for example, genetic or metabolic syndromes and severe congenital anomalies)  o Having a medical-related technological dependence (for example, tracheostomy, gastrostomy, or positive pressure ventilation [not related to COVID 19]). Assessment/Plan:    Bebtelovimab 175 mg IV x 1 dose.           Aaliyah Garcia RPH,8/10/2022,8:41 AM

## 2022-08-10 NOTE — PROGRESS NOTES
Reassessment completed at this time. Patient has been switched back to nasal cannula at 5 L/min and his O2 saturation is at 93%. Patient is sleeping quietly in his bed. Further needs are assessed. Safety precautions are in place.

## 2022-08-10 NOTE — CONSULTS
[facility] - Initial Telemedicine Consult Note   COVID 19 Patient  Today's Date and Time: 8/10/2022, 4:18 PM    Impression :     COVID 19 Confirmed Infection  Previously unvaccinated individual  Covid tests:  8/9/2022 positive  Elevated CRP  Hypoxia  Congestive heart failure  Ischemic cardiomyopathy with left ventricular ejection fraction between 15 to 20%  Bilateral pleural effusions with compressive atelectasis  Status post removal of 825 mL of fluid from the right chest on 8/10/2022  Status post removal of 1.1 L of fluid from the left chest on 8/10/2022  Relative leukopenia    Recommendations:   Antibiotic treatment:  Monitor off antibiotics  Covid Rx:    Remdesivir: Not indicated  Decadron: On 6 mg Decadron per day. Start date 8/10/2022. Tentative stop date 8/20/2022  The Decadron is being given because of respiratory failure. At this point it is difficult to determine how much of the respiratory failure is secondary to the cardiac failure and pleural effusions versus a contribution from COVID-19.   If respiratory function improves markedly after removal of the fluid by thoracentesis, will need to reconsider need for Decadron based on clinical evolution  Actemra: Not indicated  Monoclonal antibodies Bebtelovimab infused 8/10/2022      Medical Decision Making/Summary/Discussion:8/10/2022     Patient admitted with biventricular congestive heart failure with pulmonary edema  CKD  Underlying COPD and centrilobular emphysema  Has developed secondary COVID 19 infection    Infection Control Recommendations   [place] Precautions  Airborne isolation  Droplet Isolation  Isolate until 8/19/2022    Antimicrobial Stewardship Recommendations     Discontinuation of therapy  Coordination of Outpatient Care:   Estimated Length of IV antimicrobials:TBD  Patient will need Midline Catheter Insertion: TBD  Patient will need PICC line Insertion: No  Patient will need: Home IV , Infusion Center,  SNF,  LTAC:TBD  Patient will need outpatient wound care:No    Chief complaint/reason for consultation:   Concern for COVID infection      History of Present Illness:   Grant Ybarra is a 70y.o.-year-old  male who was initially admitted on 8/3/2022. Patient seen at the request of Dr. Jorge Jauqez. INITIAL HISTORY:    Patient presented through ER with complaints of worsening shortness of breath, and having experienced several syncopal episodes the day prior to admission. The patient was found to have acute on chronic combined systolic and diastolic congestive heart failure. He was admitted for treatment. In addition his past medical history showed the presence of coronary artery disease, prior acute inferior wall MI, paroxysmal atrial fibrillation, essential hypertension, hyperlipidemia. The patient also has a history of CKD, cardiomyopathy with a left ventricular ejection fraction of 15% and cirrhosis of the liver with ascites. He also has a prior history of alcohol intake and smoking. He had recently stopped both of those activities because of the congestive heart failure. While in the hospital the patient has been treated for congestive heart failure with Bumex . He has also received nasal oxygen at 5 L/min because of the underlying COPD and emphysema. By CT scan no pulmonary emboli were noted, the patient was found to have moderate pleural effusions bilaterally and evidence of pulmonary edema. Patient was tested for COVID-19 on 8/9/2022 and the test was positive. ID service was asked to evaluate for the COVID-19. CURRENT EVALUATION : 8/10/2022    Afebrile  VS stable    Patient exhibiting respiratory distress. yes  Respiratory secretions: no    Patient receiving supplemental oxygen.   On high flow oxygen  Flow rate 35 L/min  FiO2 39%  RR 18  02 sat 95%    QTc:       NEWS Score: 0-4 Low risk group; 5-6: Medium risk group; 7 or above: High risk group  Parameters 3 2 1 0 1 2 3 Age    < 72   ? 72   RR ? 8  9-11 12-20  21-24 ? 25   O2 Sats ? 91 92-93 94-95 ? 96      Suppl O2  Yes  No      SBP ? 90  101-110 111-219   ? 220   HR ? 40  41-50 51-90  111-130 ? 131   Consciousness    Alert   Drowsiness, lethargy, or confusion   Temperature ? 35.0 C (95.0 F)  35.1-36.0 C 95.1-96.9 F 36.1-38.0 C 97.0-100.4 F 38.1-39.0 C 100.5-102.3 F ? 39.1 C ? 102.4 F      NEWS Score:  8/10/2022: 7 High Risk    Overall Daily Picture:   Unchanged    Presence of secondary bacterial Infection:    No   Additional antibiotics:     Labs, X rays reviewed: 8/10/2022    BUN: 14  Cr: 0.92    WBC: 2.1  Hb: 11.9  Plat: 62    Absolute Neutrophils: 1.63  Absolute Lymphocytes: 0.34  Neutrophil/Lymphocyte Ratio: 4.79 high risk    CRP: 52.8  Ferritin:  LDH:     Pro Calcitonin:  Pro BNP 7741  Troponin 27    Cultures:  Urine:  1/27/2022 no growth  Blood:  5/23/2022 no growth  Sputum :    Wound:      CXR:   8/8/2022: Mild pulmonary edema, bilateral pleural effusions left greater than right. CAT:  8/9/2022 no PE. Mild interstitial pulmonary edema. Centrilobular emphysema. Scattered groundglass opacities in the upper lobes. Discussed with patient, RN, CC, IM. I have personally reviewed the past medical history, past surgical history, medications, social history, and family history, and I have updated the database accordingly.   Past Medical History:     Past Medical History:   Diagnosis Date    Acute kidney injury ([address]     Ascites 5/20/2022    Atrial fibrillation (HCC)     CHF (congestive heart failure) ([address]     Cirrhosis of liver with ascites ([address] 5/19/2022    CKD (chronic kidney disease) 5/20/2022    Coronary artery disease     Hyperlipidemia     Hypertension     Hypomagnesemia 5/20/2022    Hyponatremia     Thrombocytopenia (HCC)        Past Surgical  History:     Past Surgical History:   Procedure Laterality Date    CAROTID STENT PLACEMENT Bilateral     HERNIA REPAIR      ROTATOR CUFF REPAIR Right TONSILLECTOMY AND ADENOIDECTOMY         Medications:      potassium chloride  40 mEq Oral TID WC    ascorbic acid  1,000 mg Oral 4x Daily    famotidine  40 mg Oral BID    zinc sulfate  100 mg Oral Daily    dexamethasone  6 mg Oral Daily    [Held by provider] bumetanide  2 mg Oral Daily    furosemide  40 mg IntraVENous BID    dapagliflozin  10 mg Oral QAM    amiodarone  200 mg Oral Daily    albuterol  2.5 mg Nebulization 4x daily    spironolactone  25 mg Oral Daily    sodium chloride flush  10 mL IntraVENous 2 times per day    [Held by provider] apixaban  5 mg Oral BID    atorvastatin  80 mg Oral Daily    busPIRone  15 mg Oral BID    clopidogrel  75 mg Oral Daily    escitalopram  20 mg Oral Nightly    folic acid  1 mg Oral Daily    magnesium oxide  600 mg Oral BID    melatonin  10 mg Oral Nightly    metoprolol succinate  25 mg Oral Daily    midodrine  10 mg Oral TID WC    multivitamin  1 tablet Oral Daily    pantoprazole  40 mg Oral QAM AC    rOPINIRole  0.5 mg Oral Nightly    sacubitril-valsartan  0.5 tablet Oral BID    tamsulosin  0.4 mg Oral Daily    vitamin B-1  100 mg Oral Daily    vitamin D  50,000 Units Oral Weekly       Social History:     Social History     Socioeconomic History    Marital status:      Spouse name: Not on file    Number of children: Not on file    Years of education: Not on file    Highest education level: Not on file   Occupational History    Not on file   Tobacco Use    Smoking status: Former     Packs/day: 1.00     Years: 56.00     Pack years: 56.00     Types: Cigarettes     Quit date: 2022     Years since quittin.1    Smokeless tobacco: Never   Vaping Use    Vaping Use: Never used   Substance and Sexual Activity    Alcohol use: Not Currently     Alcohol/week: 2.0 standard drinks     Types: 2 Shots of liquor per week     Comment: Drinks about 2 Kesslers with mix nightly.   Reported history of heavy alcohol use for years with both beer and liquior several years ago    Drug use: Not Currently    Sexual activity: Not on file   Other Topics Concern    Not on file   Social History Narrative    Not on file     Social Determinants of Health     Financial Resource Strain: Not on file   Food Insecurity: Not on file   Transportation Needs: Not on file   Physical Activity: Not on file   Stress: Not on file   Social Connections: Not on file   Intimate Partner Violence: Not on file   Housing Stability: Not on file       Family History:     Family History   Problem Relation Age of Onset    Hypertension Father     Heart Disease Father     Heart Disease Mother     Hypertension Mother         Allergies:   Patient has no known allergies. Review of Systems:       Constitutional: No fevers or chills. No systemic complaints  Head: No headaches  Eyes: No double vision or blurry vision. No conjunctival inflammation. ENT: No sore throat or runny nose. . No hearing loss, tinnitus or vertigo. Cardiovascular: No chest pain or palpitations. Shortness of breath. HIGGINS  Lung: Shortness of breath, cough. No sputum production  Abdomen: No nausea, vomiting, diarrhea, or abdominal pain. Adrianne Latosha No cramps. Genitourinary: No increased urinary frequency, or dysuria. No hematuria. No suprapubic or CVA pain  Musculoskeletal: No muscle aches or pains. No joint effusions, swelling or deformities  Hematologic: No bleeding or bruising. Neurologic: No headache, weakness, numbness, or tingling. Integument: No rash, no ulcers. Psychiatric: No depression. Endocrine: No polyuria, no polydipsia, no polyphagia.     Physical Examination :   Patient Vitals for the past 8 hrs:   BP Temp Temp src Pulse Resp SpO2   08/10/22 1603 -- -- -- -- 18 --   08/10/22 1500 (!) 120/49 -- -- 58 21 93 %   08/10/22 1443 -- -- -- 60 20 95 %   08/10/22 1400 (!) 130/42 -- -- 60 16 96 %   08/10/22 1336 -- -- -- 58 -- --   08/10/22 1333 (!) 101/47 98.5 °F (36.9 °C) -- -- -- --   08/10/22 1300 (!) 101/47 -- -- 63 14 93 %   08/10/22 1239 (!) 114/44 -- -- -- -- --   08/10/22 1238 -- -- -- 67 19 95 %   08/10/22 1230 (!) 129/43 -- -- 75 18 97 %   08/10/22 1226 (!) 129/43 -- -- 66 16 (!) 83 %   08/10/22 1200 (!) 129/43 -- -- 61 17 94 %   08/10/22 1108 (!) 120/49 -- -- 68 22 90 %   08/10/22 1029 -- -- -- -- 20 93 %   08/10/22 1011 -- -- -- -- -- 94 %   08/10/22 1000 (!) 150/58 98.3 °F (36.8 °C) Temporal 61 18 96 %     General Appearance: Awake, alert, and in no apparent distress  Head:  Normocephalic, no trauma  Eyes: Pupils equal, round, reactive to light; sclera anicteric; conjunctivae pink. No embolic phenomena. ENT: Oropharynx clear, without erythema, exudate, or thrush. No tenderness of sinuses. Mouth/throat: mucosa pink and moist. No lesions. Dentition in good repair. Neck:Supple, without lymphadenopathy. Thyroid normal, No bruits. Pulmonary/Chest: Decreased breath sounds with dullness of both bases  Cardiovascular: Regular rate and rhythm without murmurs, rubs, or gallops. LifeVest in place  Abdomen: Soft, non tender. Bowel sounds normal. No organomegaly  All four Extremities: No cyanosis, clubbing, or effusions. .  Leg edema  Neurologic: No gross sensory or motor deficits. Skin: Warm and dry with good turgor. Signs of peripheral arterial and venous insufficiency. No ulcerations. No open wounds.     Medical Decision Making -Laboratory:   I have independently reviewed/ordered the following labs:    CBC with Differential:   Recent Labs     08/09/22  0600 08/10/22  0545   WBC 2.0* 2.1*   HGB 11.6* 11.9*   HCT 37.2* 36.8*   PLT See Reflexed IPF Result See Reflexed IPF Result   LYMPHOPCT 19* 16*   MONOPCT 7 6     BMP:   Recent Labs     08/09/22  0600 08/10/22  0545 08/10/22  1250    141  --    K 3.9 2.8* 3.0*   CL 98 94*  --    CO2 34* 35*  --    BUN 19 14  --    CREATININE 0.81 0.92  --    MG 1.7 1.5*  --      Hepatic Function Panel:   Recent Labs     08/09/22  0600 08/10/22  0545   PROT 6.1* 6.1*   LABALBU 3.6 3.5   BILITOT 0.85 1.10   ALKPHOS 97 93   * 175*   * 161*     No results for input(s): RPR in the last 72 hours. No results for input(s): HIV in the last 72 hours. No results for input(s): BC in the last 72 hours. Lab Results   Component Value Date/Time    MUCUS TRACE 07/27/2022 03:15 PM    RBC 3.68 08/10/2022 05:45 AM    WBC 2.1 08/10/2022 05:45 AM    TURBIDITY Clear 07/27/2022 03:15 PM     Lab Results   Component Value Date/Time    CREATININE 0.92 08/10/2022 05:45 AM    GLUCOSE 93 08/10/2022 05:45 AM       Medical Decision Making-Imaging:     EXAMINATION:   CTA OF THE CHEST 8/8/2022 12:39 pm       TECHNIQUE:   CTA of the chest was performed after the administration of intravenous   contrast.  Multiplanar reformatted images are provided for review. MIP   images are provided for review. Automated exposure control, iterative   reconstruction, and/or weight based adjustment of the mA/kV was utilized to   reduce the radiation dose to as low as reasonably achievable. COMPARISON:   08/08/2022, 05/19/2022       HISTORY:   ORDERING SYSTEM PROVIDED HISTORY: hypoxia   TECHNOLOGIST PROVIDED HISTORY:   hypoxia       FINDINGS:   Pulmonary Arteries: Pulmonary arteries are adequately opacified for   evaluation. No evidence of intraluminal filling defect to suggest pulmonary   embolism. Main pulmonary artery is mildly enlarged. Mediastinum: Mildly enlarged mediastinal lymph nodes which are new from   prior. Cardiomegaly. Three-vessel coronary artery calcifications. Trace   pericardial effusion. There is no acute abnormality of the ectatic,   atherosclerotic thoracic aorta. Lungs/pleura: Mild interstitial edema superimposed on patient's emphysematous   change. Few scattered small ground-glass opacities in the anterior aspect of   both upper lobes. Dependent changes in both lungs. Moderate layering   bilateral pleural effusions with atelectasis of the adjacent parenchyma.    There is near complete collapse of the left lower lobe with only a small   portion of the superior segment remaining aerated. No pneumothorax. Trachea   is patent. Upper Abdomen: No acute abnormality. Soft Tissues/Bones: No acute bone or soft tissue abnormality with similar   mild body wall edema. Impression   No evidence of pulmonary embolism. Mildly enlarged main pulmonary artery   which can be seen in the setting of pulmonary hypertension. Mild interstitial pulmonary edema on a background of centrilobular emphysema. A few scattered ground-glass opacities in the upper lobes may be due to a   component of mild alveolar edema, though clinical correlation is recommended   to exclude concern for contributory infectious causes. Moderate layering bilateral pleural effusions with partial collapse of the   lower lobes, left more so than right. Cardiomegaly which appears unchanged from prior with 3 vessel coronary artery   disease.            Medical Decision [id number]:      Latest Reference Range & Units 8/9/22 09:30   Chlamydia pneumoniae By PCR Not Detected  Not Detected   Rhino/Enterovirus PCR Not Detected  Not Detected   Human Metapneumovirus PCR Not Detected  Not Detected   Adenovirus PCR Not Detected  Not Detected   B Pertussis by PCR Not Detected  Not Detected   Coronavirus 229E PCR Not Detected  Not Detected   Coronavirus HKU1 PCR Not Detected  Not Detected   Coronavirus NL63 PCR Not Detected  Not Detected   Coronavirus OC Not Detected  Not Detected   Influenza A by PCR Not Detected  Not Detected   Influenza B by PCR Not Detected  Not Detected   Parainfluenza 1 PCR Not Detected  Not Detected   Parainfluenza 2 PCR Not Detected  Not Detected   Parainfluenza 3 PCR Not Detected  Not Detected   Parainfluenza 4 PCR Not Detected  Not Detected   Resp Syncytial Virus PCR Not Detected  Not Detected   Mycoplasma pneumo by PCR Not Detected  Not Detected   SARS-CoV-2, PCR Not Detected  DETECTED !   !: Data is abnormal  Medical Decision Making-Other:     Note:  Labs, medications, radiologic studies were reviewed with personal review of films  Large amounts of data were reviewed  Discussed with nursing Staff, Discharge planner  Infection Control and Prevention measures reviewed  All prior entries were reviewed  Administer medications as ordered  Prognosis: Guarded  Discharge planning reviewed  Follow up as outpatient. Thank you for allowing us to participate in the care of this patient. Please call with questions.     Jocelyn Montoya MD  Pager: [phone] - Office: [phone]

## 2022-08-10 NOTE — PROGRESS NOTES
Writer in for breathing treatment. Audible wheezes noted. Pulse ox 84% on 4.5lpm. Treatment started. Placed pt on 15lpm frank. Pulse ox still noted at 84%. RN informed. Placed pt on NRB. Pulse ox increased to 98%.

## 2022-08-10 NOTE — PROGRESS NOTES
Progress Note    SUBJECTIVE:    Patient seen for f/u of Acute on chronic combined systolic and diastolic CHF (congestive heart failure) ([facility] [address] He resting in bed alert but stated he is tired. Wife a side. On NRB mask. No distress. ROS:   Constitutional: negative  for fevers, and negative for chills. Respiratory: negative for shortness of breath, negative for cough, and negative for wheezing  Cardiovascular: negative for chest pain, and negative for palpitations  Gastrointestinal: negative for abdominal pain, negative for nausea,negative for vomiting, negative for diarrhea, and negative for constipation     All other systems were reviewed with the patient and are negative unless otherwise stated in HPI      OBJECTIVE:      Vitals:   Vitals:    08/10/22 0533   BP:    Pulse:    Resp:    Temp:    SpO2: 97%     Weight: 162 lb 7.7 oz (73.7 kg) (Bed rezeroed before weighing the patient)   Height: 5' 6\" (167.6 cm)     Weight  Wt Readings from Last 3 Encounters:   08/10/22 162 lb 7.7 oz (73.7 kg)   07/28/22 176 lb (79.8 kg)   07/19/22 181 lb (82.1 kg)     Body mass index is 26.22 kg/m². 24HR INTAKE/OUTPUT:      Intake/Output Summary (Last 24 hours) at 8/10/2022 0809  Last data filed at 8/10/2022 0521  Gross per 24 hour   Intake 1090 ml   Output 2950 ml   Net -1860 ml     -----------------------------------------------------------------  Exam:    GEN:    Awake, alert and oriented x3. Answers   EYES:  EOMI, pupils equal   NECK: Supple. No lymphadenopathy. No carotid bruit  CVS:    regular rate and rhythm, no audible murmur  PULM:  CTA, no wheezes, rales or rhonchi, no acute respiratory distress  ABD:    Bowels sounds normal.  Abdomen is soft. No distention. no tenderness to palpation. EXT:   no edema bilaterally . No calf tenderness. NEURO: Moves all extremities. Motor and sensory are grossly intact  SKIN:  No rashes. No skin lesions. -----------------------------------------------------------------    Diagnostic Data:      Complete Blood Count:   Recent Labs     08/09/22  0600 08/10/22  0545   WBC 2.0* 2.1*   RBC 3.63* 3.68*   HGB 11.6* 11.9*   HCT 37.2* 36.8*   .5 100.0   MCH 32.0 32.3   MCHC 31.2 32.3   RDW 15.3* 15.1*   PLT See Reflexed IPF Result See Reflexed IPF Result        Last 3 Blood Glucose:   Recent Labs     08/09/22  0600 08/10/22  0545   GLUCOSE 93 93        Comprehensive Metabolic Profile:   Recent Labs     08/09/22  0600 08/10/22  0545    141   K 3.9 2.8*   CL 98 94*   CO2 34* 35*   BUN 19 14   CREATININE 0.81 0.92   GLUCOSE 93 93   CALCIUM 9.3 9.0   PROT 6.1* 6.1*   LABALBU 3.6 3.5   BILITOT 0.85 1.10   ALKPHOS 97 93   * 161*   * 175*        Urinalysis:   Lab Results   Component Value Date/Time    NITRU NEGATIVE 07/27/2022 03:15 PM    COLORU Yellow 07/27/2022 03:15 PM    PHUR 7.5 07/27/2022 03:15 PM    WBCUA 0 TO 2 07/27/2022 03:15 PM    RBCUA 0 TO 2 07/27/2022 03:15 PM    MUCUS TRACE 07/27/2022 03:15 PM    BACTERIA 1+ 07/27/2022 03:15 PM    SPECGRAV 1.010 07/27/2022 03:15 PM    LEUKOCYTESUR NEGATIVE 07/27/2022 03:15 PM    UROBILINOGEN Normal 07/27/2022 03:15 PM    BILIRUBINUR NEGATIVE 07/27/2022 03:15 PM    GLUCOSEU NEGATIVE 07/27/2022 03:15 PM    KETUA NEGATIVE 07/27/2022 03:15 PM       HgBA1c:  No results found for: LABA1C    Lactic Acid:   Lab Results   Component Value Date/Time    LACTA 1.9 05/18/2022 02:35 PM        Troponin: No results for input(s): TROPONINI in the last 72 hours. CRP:  No results for input(s): CRP in the last 72 hours. Radiology/Imaging:  CTA HEAD NECK W CONTRAST   Final Result   Postsurgical changes involving the right carotid bulb and proximal right   cervical ICA. There is a short dissection flap at the level of the carotid   bulb resulting in about 60% stenosis at the origin of the right cervical ICA.       Thick atherosclerotic calcified plaque is noted involving the left carotid   bulb and extending into the origin of the left cervical ICA with a short   segment of 90% stenosis. Short segment of high-grade stenosis involving the right intracranial   vertebral artery with about 70% stenosis. 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 CT CHEST PULMONARY EMBOLISM W CONTRAST   Final Result   No evidence of pulmonary embolism. Mildly enlarged main pulmonary artery   which can be seen in the setting of pulmonary hypertension. Mild interstitial pulmonary edema on a background of centrilobular emphysema. A few scattered ground-glass opacities in the upper lobes may be due to a   component of mild alveolar edema, though clinical correlation is recommended   to exclude concern for contributory infectious causes. Moderate layering bilateral pleural effusions with partial collapse of the   lower lobes, left more so than right. Cardiomegaly which appears unchanged from prior with 3 vessel coronary artery   disease. XR CHEST (2 VW)   Final Result   Essentially stable exam demonstrating mild pulmonary edema and bilateral   pleural effusions, left greater than right. XR CHEST (2 VW)   Final Result   Interval increasing left pleural effusion. Cardiomegaly and vascular   congestion with probable underlying atelectasis. XR CHEST PORTABLE   Final Result   Overall findings most consistent with decompensated CHF with mild pulmonary   edema. Probable bibasilar subsegmental atelectasis and pleural effusions. However,   underlying infiltrates not excluded. US THORACENTESIS Which side should the procedure be performed?  Radiologist Recommendation    (Results Pending)         ASSESSMENT / PLAN:  Acute on chronic combined systolic and diastolic CHF (congestive heart failure) ([address]  Continue current therapy   Appreciate cardiology  Off IV Lasix  Stop oral Bumex  Continue V Lasix 40 mg bid  Liver Enzymes are elevated likely secondary to CHF  Teds  Daily weights  CHF education given by myself information given  Acute respiratory failure with hypoxia  Supplemental O2-5L  Out of bed with meals  CT Chest PE Protocol--No PE.   Moderate bilateral pleural effusion with partial collapse of the lower lobes Lt>Rt  Appreciate Pulmonology--Spoke with Dr. Jayda Hoyt.  CHF and pleural   Viral Panel-Covid psoitive  ABG  Start BiPAP   Thoracentesis  COPD  Monitor for need for nebs  CKD  Trend labs  Caution with IV diuretics  Monitor output  Nonischemic cardiomyopathy  Appreciate cardiology  Continue with LifeVest  PAF  Continue amiodarone, Eliquis, Toprol-XL  Covid 19 Virus infection  Appreciate ID  Positive 8/9/2022  Request monoclonal antibodies  Start Dexamethasone 6 mg daily for 10 doses  Remdesivir-defer to ID  Acterma-Defer to ID  Start Vit C, D, Zinc  Nutrition status:   obesity, non-morbid  Dietician consult initiated  Hospital Prophylaxis:   DVT: Eliquis   Stress Ulcer: H2 Blocker   High risk medications: none   Disposition:    Discharge plan is home pending  Transfer to [facility] today          SEEVN Charlton - CNP , SEVEN, NP-C  Hospitalist Medicine        8/10/2022, 8:09 AM

## 2022-08-10 NOTE — PLAN OF CARE
Problem: Discharge Planning  Goal: Discharge to home or other facility with appropriate resources  8/10/2022 1419 by Misty Jacome RN  Outcome: Progressing  Flowsheets (Taken 8/10/2022 1000)  Discharge to home or other facility with appropriate resources:   Identify barriers to discharge with patient and caregiver   Arrange for needed discharge resources and transportation as appropriate   Identify discharge learning needs (meds, wound care, etc)   Refer to discharge planning if patient needs post-hospital services based on physician order or complex needs related to functional status, cognitive ability or social support system  8/10/2022 0339 by Umair Mobley RN  Outcome: Progressing  Flowsheets  Taken 8/10/2022 0243  Discharge to home or other facility with appropriate resources:   Identify barriers to discharge with patient and caregiver   Arrange for needed discharge resources and transportation as appropriate   Identify discharge learning needs (meds, wound care, etc)  Taken 8/9/2022 1839  Discharge to home or other facility with appropriate resources:   Identify barriers to discharge with patient and caregiver   Arrange for needed discharge resources and transportation as appropriate   Identify discharge learning needs (meds, wound care, etc)     Problem: Pain  Goal: Verbalizes/displays adequate comfort level or baseline comfort level  8/10/2022 1419 by Misty Jacome RN  Outcome: Progressing  Flowsheets (Taken 8/10/2022 0236 by Umair Mobley RN)  Verbalizes/displays adequate comfort level or baseline comfort level:   Assess pain using appropriate pain scale   Encourage patient to monitor pain and request assistance  8/10/2022 0339 by Umair Mobley RN  Outcome: Progressing  Flowsheets  Taken 8/10/2022 0236  Verbalizes/displays adequate comfort level or baseline comfort level:   Assess pain using appropriate pain scale   Encourage patient to monitor pain and request assistance  Taken 8/9/2022 5451  Verbalizes/displays adequate comfort level or baseline comfort level:   Assess pain using appropriate pain scale   Encourage patient to monitor pain and request assistance     Problem: Safety - Adult  Goal: Free from fall injury  8/10/2022 1419 by Connor Grissom RN  Outcome: Progressing  Flowsheets (Taken 8/9/2022 0205 by Shabana Fajardo RN)  Free From Fall Injury: Instruct family/caregiver on patient safety  8/10/2022 0339 by Patrica Parisi RN  Outcome: Progressing  Note: Patient is a high fall risk. Bed alarm on, bed wheels locked and kept in lowest position. Nonskid wear on, fall sign posted and fall sticker on. Bedside table and call light within reach. Needs assessed with hourly rounding and environment scanned for any safety issue.       Problem: Chronic Conditions and Co-morbidities  Goal: Patient's chronic conditions and co-morbidity symptoms are monitored and maintained or improved  8/10/2022 1419 by Connor Grissom RN  Outcome: Progressing  Flowsheets (Taken 8/10/2022 1000)  Care Plan - Patient's Chronic Conditions and Co-Morbidity Symptoms are Monitored and Maintained or Improved:   Monitor and assess patient's chronic conditions and comorbid symptoms for stability, deterioration, or improvement   Collaborate with multidisciplinary team to address chronic and comorbid conditions and prevent exacerbation or deterioration  8/10/2022 0339 by Patrica Parisi RN  Outcome: Progressing  Flowsheets  Taken 8/10/2022 0243  Care Plan - Patient's Chronic Conditions and Co-Morbidity Symptoms are Monitored and Maintained or Improved: Monitor and assess patient's chronic conditions and comorbid symptoms for stability, deterioration, or improvement  Taken 8/9/2022 3400 [facility] - Patient's Chronic Conditions and Co-Morbidity Symptoms are Monitored and Maintained or Improved: Monitor and assess patient's chronic conditions and comorbid symptoms for stability, deterioration, or improvement     Problem: Skin/Tissue Integrity  Goal: Absence of new skin breakdown  Description: 1. Monitor for areas of redness and/or skin breakdown  2. Assess vascular access sites hourly  3. Every 4-6 hours minimum:  Change oxygen saturation probe site  4. Every 4-6 hours:  If on nasal continuous positive airway pressure, respiratory therapy assess nares and determine need for appliance change or resting period.   8/10/2022 1419 by Shreyas Pérez RN  Outcome: Progressing  Note: Patient turns and repositions self in bed.  8/10/2022 0339 by Ingrid Andrea RN  Outcome: Progressing     Problem: Nutrition Deficit:  Goal: Optimize nutritional status  8/10/2022 1419 by Shreyas Pérez RN  Outcome: Progressing  Flowsheets (Taken 8/4/2022 1538 by Debbie Elizabeth, RD, LD)  Nutrient intake appropriate for improving, restoring, or maintaining nutritional needs:   Monitor oral intake, labs, and treatment plans   Recommend appropriate diets, oral nutritional supplements, and vitamin/mineral supplements  8/10/2022 0339 by Ingrid Andrea RN  Outcome: Progressing

## 2022-08-10 NOTE — PROGRESS NOTES
Patient transferred from [facility] to . Patient on non-rebreather @ 15L. Patient alert and oriented. Heart tones strong and regular. Noted Life vest in place. Life Vest removed as patient is placed on bedside monitor. Lungs diminished with crackles throughout with end expiratory wheeze to left base. No edema noted. Pedal pulses palpable but weak. Radial pulses strong. Patient denies pain. Voiding clear yellow urinePatient's spouse @ bedside. Writer contacted Radiology department in regards to Thoracentesis. Radiologist to look @ Xray and will return call on whether able to be done or not. Call light within reach. Spouse Sarah @ bedside. Updated patient and spouse on plan of care.

## 2022-08-10 NOTE — PROGRESS NOTES
Patient is back from the CT scan and his night medications have administered at this time. Patient's O2 saturation is at 97% on non-re breather. Family at the bedside and patient is not any more anxious at this time.

## 2022-08-10 NOTE — PROGRESS NOTES
RESPIRATORY ASSESSMENT PROTOCOL                                                                                              Patient Name: Angel Swift Room#: K594/C325-34 : 1950     Admitting diagnosis: Hypoxia [R09.02]  Ac[facility] [J96.01]  Acute on chronic combined systolic and diastolic CHF (congestive heart failure) ([facility] [I50.43]       Medical History:   Past Medical History:   Diagnosis Date    Acute kidney injury ([facility]     Ascites 2022    Atrial fibrillation (HCC)     CHF (congestive heart failure) (HCC)     Cirrhosis of liver with ascites ([facility] 2022    CKD (chronic kidney disease) 2022    Coronary artery disease     Hyperlipidemia     Hypertension     Hypomagnesemia 2022    Hyponatremia     Thrombocytopenia ([facility]        PATIENT ASSESSMENT    LABORATORY DATA  Hematology:   Lab Results   Component Value Date/Time    WBC 2.1 08/10/2022 05:45 AM    RBC 3.68 08/10/2022 05:45 AM    HGB 11.9 08/10/2022 05:45 AM    HCT 36.8 08/10/2022 05:45 AM    PLT See Reflexed IPF Result 08/10/2022 05:45 AM     Chemistry:    Lab Results   Component Value Date/Time    PHART 7.501 08/10/2022 10:10 AM    BMV7COE 51.7 08/10/2022 10:10 AM    PO2ART 93.9 08/10/2022 10:10 AM    K3SCONCK 97.6 08/10/2022 10:10 AM    NQI7IFV 39.5 08/10/2022 10:10 AM    PBEA 13.9 08/10/2022 10:10 AM       VITALS  Heart Rate: 62   Resp: 18  BP: 126/66  SpO2: 95 % O2 Device: Heated high flow cannula  Temp: 97.8 °F (36.6 °C)    SKIN COLOR  [x] Normal  [] Pale  [] Dusky  [] Cyanotic    RESPIRATORY PATTERN  [x] Normal  [] Dyspnea  [] Cheyne-Friend  [] Kussmaul  [] Biots    AMBULATORY  [] Yes  [] No  [x] With Assistance    PEAK FLOW  Predicted:     Personal Best:        VITAL CAPACITY  Predicted value:  ml  Actual Value:  ml  30% of Predicted:  ml  Patient Acuity 0 1 2 3 4 Score   Level of Concious (LOC) [x]  Alert & Oriented or Pt normal LOC []  Confused;follows directions []  Confused & uncooper-ative []  Obtunded []  Comatose 0   Respiratory Rate  (RR) []  Reg. rate & pattern. 12 - 20 bpm  []  Increased RR. Greater than 20 bpm   [x]  SOB w/ exertion or RR greater than 24 bpm []  Access- ory muscle use at rest. Abn.  resp. []  SOB at rest.   2   Bilateral Breath Sounds (BBS) []  Clear []  Diminish-ed bases  [x]  Diminish-ed t/o, or rales   []  Sporadic, scattered wheezes or rhonchi []  Persistentwheezes and, or absent BBS 2   Cough [x]  Strong, effective, & non-prod. []  Effective & prod. Less than 25 ml (2 TBSP) over past 24 hrs []  Ineffective & non-prod to less than 25 ML over past 24 hrs []  Ineffective and, or greater than 25 ml sputum prod. past 24 hrs. []  Nonspon- taneous; Requires suctioning 0   Pulmonary History  (PULM HX) []  No smoking and no chronic pulmonaryhistory []  Former smoker. Quit over 12 mos. ago []  Current smoker or quit w/ in 12 mos []  Pulm. History and, or 20 pk/yr smoking hx [x]  Admitted w/ acute pulm. dx and, or has been admitted w/ pulm. dx 2 or more times over past 12 mos 4   Surgical History this Admit  (SURG HX) [x]  No surgery []  General surgery []  Lower abdominal []  Thoracic or upper abdominal   []  Thoracic w/ pulm. disease 0   Chest X-Ray (CXR)/CT Scan []  Clear or not applicable []  Not available [x]  Atelect- asis or pleural effusions []  Localized infiltrate or pulm. edema []  Con-solidated Infiltrates, bilateral, or in more than 1 lobe 2   Slow or Forced VC, FEV1 OR PEFR (PULM FXN)  [x]  80% or greater, or not indicated []  Pt. unable to perform []  FEV1 or PEFR or VC 51-79%.   []  FEV1 or PEFR or VC  30-49%   []  FEV1 or PEFR or VC less than 30%   0   TOTAL ACUITY: 10       CARE PLAN    If Acuity Level is 2, 3, or 4 in any of the following:    [] BILATERAL BREATH SOUNDS (BBS)     [x] PULMONARY HISTORY (PULM HX)  [] PULMONARY FUNCTION (PULM FX)    Goal: Improve respiratory functions in patients with airway disease and decrease WOB    [x] AEROSOL PROTOCOL    Total Acuity:   16-32  []  Secondary Assessment in 24 hrs Total Acuity:  9-15  [x]  Secondary Assessment in 24 hrs Total Acuity:  4-8  []  Secondary Assessment in 48 hrs Total Acuity:  0-3  []  Secondary Assessment in 72 hrs   HHN AEROSOL THERAPY with  [physician-ordered bronchodilator(s)] q 4 & Albuterol PRN q2 hrs. Breath-Actuated Neb if BBS Acuity = 4, and pt. can use MP. Notify physician if condition deteriorates. HHN AEROSOL THERAPY with  [physician-ordered bronchodilator(s)]  QID and Albuterol PRN q4 hrs. Breath-Actuated Neb if BBS Acuity = 4, and pt. can use MP. Notify physician if condition deteriorates. MDI THERAPY with  2 actuations of [physician-ordered bronchodilator(s)] via spacer TID Albuterol and PRNq4 hrs. If unable to utilize MDI: HHN [physician-ordered bronchodilator(s)] TID and Albuterol PRN q4 hrs. Notify physician if condition deteriorates. MDI THERAPY with  [physician-ordered bronchodilator(s)] via spacer TID PRN. If unable to utilize MDI: HHN [physician-ordered bronchodilator(s)] TID PRN. Notify physician if condition deteriorates. If Acuity Level is 2, 3, or 4 in any of the following:    [] COUGH     [] SURGICAL HISTORY (SURG HX)  [] CHEST XRAY (CXR)    Goal: Improvement in sputum mobilization in patients with ineffective airway clearance. Reverse atelectasis.     [] Bronchopulmonary Hygiene Protocol    Total Acuity:   16-32  []  Secondary Assessment in 24 hrs Total Acuity:  9-15  []  Secondary Assessment in 24 hrs Total Acuity:  4-8  []  Secondary Assessment in 48 hrs Total Acuity:  0-3  []  Secondary Assessment in 72 hrs   METANEB QID with [physician-ordered bronchodilator(s)] if CXR Acuity = 4; otherwise:  PD&P, PEP, or Vest QID & PRN  NT Sxn PRN for ineffective cough  METANEB QID with [physician-ordered bronchodilator(s)] if CXR Acuity = 4; otherwise:  PD&P, PEP, or Vest TID & PRN  NT Sxn PRN for ineffective cough  Instruct patient to self-perform IS q1hr WA   Directed Cough self-performed q1hr WA     If Acuity Level is 2 or above in the following:    [] PULMONARY HISTORY (PULM HX)    Goal: Assist patient in quitting smoking to slow or stop the progression of lung disease.     [] Smoking Cessation Protocol    SMOKING CESSATION EDUCATION provided according to policy [id number]_001: (tomasa with an X)  ____Yes    ____ No     ____ NA    Smoking Cessation Booklet given:  ____Yes  ____No ____Patient Lonza Harp

## 2022-08-11 LAB
ABSOLUTE EOS #: 0 K/UL (ref 0–0.44)
ABSOLUTE IMMATURE GRANULOCYTE: 0 K/UL (ref 0–0.3)
ABSOLUTE LYMPH #: 0.14 K/UL (ref 1.1–3.7)
ABSOLUTE MONO #: 0.04 K/UL (ref 0.1–1.2)
ALBUMIN SERPL-MCNC: 3.5 G/DL (ref 3.5–5.2)
ALBUMIN/GLOBULIN RATIO: 1.4 (ref 1–2.5)
ALP BLD-CCNC: 91 U/L (ref 40–129)
ALT SERPL-CCNC: 173 U/L (ref 5–41)
ANION GAP SERPL CALCULATED.3IONS-SCNC: 8 MMOL/L (ref 9–17)
AST SERPL-CCNC: 153 U/L
BASOPHILS # BLD: 0 % (ref 0–2)
BASOPHILS ABSOLUTE: 0 K/UL (ref 0–0.2)
BILIRUB SERPL-MCNC: 1.12 MG/DL (ref 0.3–1.2)
BUN BLDV-MCNC: 20 MG/DL (ref 8–23)
BUN/CREAT BLD: 21 (ref 9–20)
C-REACTIVE PROTEIN: 79.4 MG/L (ref 0–5)
CALCIUM SERPL-MCNC: 9.3 MG/DL (ref 8.6–10.4)
CASE NUMBER:: NORMAL
CHLORIDE BLD-SCNC: 94 MMOL/L (ref 98–107)
CO2: 37 MMOL/L (ref 20–31)
CREAT SERPL-MCNC: 0.96 MG/DL (ref 0.7–1.2)
D-DIMER QUANTITATIVE: 2.4 MG/L FEU (ref 0–0.59)
EOSINOPHILS RELATIVE PERCENT: 0 % (ref 1–4)
FERRITIN: 1592 NG/ML (ref 30–400)
GFR AFRICAN AMERICAN: >60 ML/MIN
GFR NON-AFRICAN AMERICAN: >60 ML/MIN
GFR SERPL CREATININE-BSD FRML MDRD: ABNORMAL ML/MIN/{1.73_M2}
GFR SERPL CREATININE-BSD FRML MDRD: ABNORMAL ML/MIN/{1.73_M2}
GLUCOSE BLD-MCNC: 112 MG/DL (ref 70–99)
HCT VFR BLD CALC: 37.4 % (ref 40.7–50.3)
HEMOGLOBIN: 12.2 G/DL (ref 13–17)
IMMATURE GRANULOCYTES: 0 %
LYMPHOCYTES # BLD: 4 % (ref 24–43)
MAGNESIUM: 2 MG/DL (ref 1.6–2.6)
MCH RBC QN AUTO: 32.5 PG (ref 25.2–33.5)
MCHC RBC AUTO-ENTMCNC: 32.6 G/DL (ref 28.4–34.8)
MCV RBC AUTO: 99.7 FL (ref 82.6–102.9)
MONOCYTES # BLD: 1 % (ref 3–12)
MORPHOLOGY: ABNORMAL
NRBC AUTOMATED: 0 PER 100 WBC
PDW BLD-RTO: 14.9 % (ref 11.8–14.4)
PLATELET # BLD: ABNORMAL K/UL (ref 138–453)
PLATELET, FLUORESCENCE: 64 K/UL (ref 138–453)
PLATELET, IMMATURE FRACTION: 9.8 % (ref 1.1–10.3)
POTASSIUM SERPL-SCNC: 3.6 MMOL/L (ref 3.7–5.3)
RBC # BLD: 3.75 M/UL (ref 4.21–5.77)
SEG NEUTROPHILS: 95 % (ref 36–65)
SEGMENTED NEUTROPHILS ABSOLUTE COUNT: 3.32 K/UL (ref 1.5–8.1)
SODIUM BLD-SCNC: 139 MMOL/L (ref 135–144)
SPECIMEN DESCRIPTION: NORMAL
TOTAL PROTEIN: 6 G/DL (ref 6.4–8.3)
WBC # BLD: 3.5 K/UL (ref 3.5–11.3)

## 2022-08-11 PROCEDURE — 85379 FIBRIN DEGRADATION QUANT: CPT

## 2022-08-11 PROCEDURE — 97165 OT EVAL LOW COMPLEX 30 MIN: CPT

## 2022-08-11 PROCEDURE — 83735 ASSAY OF MAGNESIUM: CPT

## 2022-08-11 PROCEDURE — 6370000000 HC RX 637 (ALT 250 FOR IP): Performed by: NURSE PRACTITIONER

## 2022-08-11 PROCEDURE — 85025 COMPLETE CBC W/AUTO DIFF WBC: CPT

## 2022-08-11 PROCEDURE — 94761 N-INVAS EAR/PLS OXIMETRY MLT: CPT

## 2022-08-11 PROCEDURE — 99233 SBSQ HOSP IP/OBS HIGH 50: CPT | Performed by: FAMILY MEDICINE

## 2022-08-11 PROCEDURE — 2580000003 HC RX 258: Performed by: NURSE PRACTITIONER

## 2022-08-11 PROCEDURE — 99233 SBSQ HOSP IP/OBS HIGH 50: CPT | Performed by: INTERNAL MEDICINE

## 2022-08-11 PROCEDURE — 94664 DEMO&/EVAL PT USE INHALER: CPT

## 2022-08-11 PROCEDURE — 86140 C-REACTIVE PROTEIN: CPT

## 2022-08-11 PROCEDURE — 85055 RETICULATED PLATELET ASSAY: CPT

## 2022-08-11 PROCEDURE — 97162 PT EVAL MOD COMPLEX 30 MIN: CPT

## 2022-08-11 PROCEDURE — 80053 COMPREHEN METABOLIC PANEL: CPT

## 2022-08-11 PROCEDURE — 2700000000 HC OXYGEN THERAPY PER DAY

## 2022-08-11 PROCEDURE — 82728 ASSAY OF FERRITIN: CPT

## 2022-08-11 PROCEDURE — 1200000000 HC SEMI PRIVATE

## 2022-08-11 PROCEDURE — 94640 AIRWAY INHALATION TREATMENT: CPT

## 2022-08-11 PROCEDURE — 6360000002 HC RX W HCPCS: Performed by: NURSE PRACTITIONER

## 2022-08-11 PROCEDURE — 94669 MECHANICAL CHEST WALL OSCILL: CPT

## 2022-08-11 PROCEDURE — 36415 COLL VENOUS BLD VENIPUNCTURE: CPT

## 2022-08-11 RX ADMIN — OXYCODONE HYDROCHLORIDE AND ACETAMINOPHEN 1000 MG: 500 TABLET ORAL at 12:21

## 2022-08-11 RX ADMIN — CLOPIDOGREL BISULFATE 75 MG: 75 TABLET ORAL at 07:57

## 2022-08-11 RX ADMIN — ALBUTEROL SULFATE 2.5 MG: 2.5 SOLUTION RESPIRATORY (INHALATION) at 16:32

## 2022-08-11 RX ADMIN — SODIUM CHLORIDE, PRESERVATIVE FREE 10 ML: 5 INJECTION INTRAVENOUS at 08:08

## 2022-08-11 RX ADMIN — BUSPIRONE HYDROCHLORIDE 15 MG: 5 TABLET ORAL at 07:56

## 2022-08-11 RX ADMIN — DEXAMETHASONE 6 MG: 4 TABLET ORAL at 07:57

## 2022-08-11 RX ADMIN — OXYCODONE HYDROCHLORIDE AND ACETAMINOPHEN 1000 MG: 500 TABLET ORAL at 20:46

## 2022-08-11 RX ADMIN — Medication 600 MG: at 20:47

## 2022-08-11 RX ADMIN — Medication 600 MG: at 07:55

## 2022-08-11 RX ADMIN — APIXABAN 5 MG: 5 TABLET, FILM COATED ORAL at 12:52

## 2022-08-11 RX ADMIN — BUSPIRONE HYDROCHLORIDE 15 MG: 5 TABLET ORAL at 20:46

## 2022-08-11 RX ADMIN — MULTIVITAMIN TABLET 1 TABLET: TABLET at 07:57

## 2022-08-11 RX ADMIN — SPIRONOLACTONE 25 MG: 25 TABLET ORAL at 07:56

## 2022-08-11 RX ADMIN — ALBUTEROL SULFATE 2.5 MG: 2.5 SOLUTION RESPIRATORY (INHALATION) at 11:43

## 2022-08-11 RX ADMIN — ALBUTEROL SULFATE 2.5 MG: 2.5 SOLUTION RESPIRATORY (INHALATION) at 05:22

## 2022-08-11 RX ADMIN — PANTOPRAZOLE SODIUM 40 MG: 40 TABLET, DELAYED RELEASE ORAL at 07:56

## 2022-08-11 RX ADMIN — SACUBITRIL AND VALSARTAN 0.5 TABLET: 24; 26 TABLET, FILM COATED ORAL at 20:46

## 2022-08-11 RX ADMIN — APIXABAN 5 MG: 5 TABLET, FILM COATED ORAL at 20:46

## 2022-08-11 RX ADMIN — OXYCODONE HYDROCHLORIDE AND ACETAMINOPHEN 1000 MG: 500 TABLET ORAL at 16:53

## 2022-08-11 RX ADMIN — MIDODRINE HYDROCHLORIDE 10 MG: 5 TABLET ORAL at 16:53

## 2022-08-11 RX ADMIN — AMIODARONE HYDROCHLORIDE 200 MG: 200 TABLET ORAL at 07:55

## 2022-08-11 RX ADMIN — FAMOTIDINE 40 MG: 20 TABLET ORAL at 07:56

## 2022-08-11 RX ADMIN — MIDODRINE HYDROCHLORIDE 10 MG: 5 TABLET ORAL at 07:55

## 2022-08-11 RX ADMIN — ATORVASTATIN CALCIUM 80 MG: 40 TABLET, FILM COATED ORAL at 07:55

## 2022-08-11 RX ADMIN — POTASSIUM CHLORIDE 40 MEQ: 1500 TABLET, EXTENDED RELEASE ORAL at 07:56

## 2022-08-11 RX ADMIN — Medication 10 MG: at 20:47

## 2022-08-11 RX ADMIN — ESCITALOPRAM 20 MG: 5 TABLET, FILM COATED ORAL at 20:47

## 2022-08-11 RX ADMIN — ZINC SULFATE 220 MG (50 MG) CAPSULE 100 MG: CAPSULE at 07:56

## 2022-08-11 RX ADMIN — MIDODRINE HYDROCHLORIDE 10 MG: 5 TABLET ORAL at 12:21

## 2022-08-11 RX ADMIN — TAMSULOSIN HYDROCHLORIDE 0.4 MG: 0.4 CAPSULE ORAL at 07:56

## 2022-08-11 RX ADMIN — METOPROLOL SUCCINATE 25 MG: 25 TABLET, EXTENDED RELEASE ORAL at 07:56

## 2022-08-11 RX ADMIN — FAMOTIDINE 40 MG: 20 TABLET ORAL at 20:47

## 2022-08-11 RX ADMIN — FUROSEMIDE 40 MG: 10 INJECTION, SOLUTION INTRAMUSCULAR; INTRAVENOUS at 07:55

## 2022-08-11 RX ADMIN — ROPINIROLE HYDROCHLORIDE 0.5 MG: 0.25 TABLET, FILM COATED ORAL at 20:47

## 2022-08-11 RX ADMIN — OXYCODONE HYDROCHLORIDE AND ACETAMINOPHEN 1000 MG: 500 TABLET ORAL at 07:56

## 2022-08-11 RX ADMIN — SODIUM CHLORIDE, PRESERVATIVE FREE 10 ML: 5 INJECTION INTRAVENOUS at 20:50

## 2022-08-11 RX ADMIN — FOLIC ACID 1 MG: 1 TABLET ORAL at 07:57

## 2022-08-11 RX ADMIN — ALBUTEROL SULFATE 2.5 MG: 2.5 SOLUTION RESPIRATORY (INHALATION) at 20:32

## 2022-08-11 RX ADMIN — THIAMINE HCL TAB 100 MG 100 MG: 100 TAB at 07:56

## 2022-08-11 RX ADMIN — SACUBITRIL AND VALSARTAN 0.5 TABLET: 24; 26 TABLET, FILM COATED ORAL at 07:55

## 2022-08-11 ASSESSMENT — PAIN SCALES - GENERAL
PAINLEVEL_OUTOF10: 0
PAINLEVEL_OUTOF10: 0

## 2022-08-11 NOTE — PROGRESS NOTES
Mike Chávez am scribing for and in the presence of Azael Delgado MD, MS, F.A.C.C..    Patient: Steve Wallace  : [id number]  Date of Admission: 8/3/2022  Primary Care Physician: Chris Gruber  Today's Date: 8/10/2022    REASON FOR CONSULTATION: atrial fibrillation with RVR    HPI:  Mr. Hermes Rodriguez is a 70 y.o. male who was admitted to the hospital on 2022 for weakness and shortness of breath. In the ER he was found to be in atrial fibrillation with RVR leading to a consultation. He was then readmitted due to severe dehydration. In the past he reports seeing a Cardiologist in [place] and was planning on having a cardioversion done in  to put him back into normal sinus rhythm due to his history of new onset atrial fibrillation. He reports a history of a heart attack at age 46 and needed stents placed at that time but denies any cardiac cath since that time. He also has carotid artery stenosis and abdominal aortic aneurysm. He did report having surgery on one side of his neck, however he is not sure which side or how long ago it was. He has had hypertension and hyperlipidemia for years as well. He is a current every day smoker and he smoked for about 55 years and smokes about a pack a day. Family history consists of a lot of people in his family with significant cardiac history, however he wanted us to talk to his sister about the family history as she knows the details. Echo done on 2022 showed an EF of 15-20%. Mr. Hermes Rodriguez was admitted for what appears to be a heart failure exacerbation as well as increased generalized weakness. He underwent bilateral thoracentesis today and says he feels much better since then. He denies any chest pain now or in the recent past. Denies any medication changes, diarrhea or constipation. He also denied any abdominal pain, bleeding problems, problems with his medications or any other concerns at this time. He has no bowel issues at this time.  No nausea or vomiting. No fever, cough or chills. Past Medical History:   Diagnosis Date    Acute kidney injury ([facility]     Ascites 2022    Atrial fibrillation (HCC)     CHF (congestive heart failure) (HCC)     Cirrhosis of liver with ascites ([facility] 2022    CKD (chronic kidney disease) 2022    Coronary artery disease     Hyperlipidemia     Hypertension     Hypomagnesemia 2022    Hyponatremia     Thrombocytopenia (HCC)        CURRENT ALLERGIES: Patient has no known allergies. REVIEW OF SYSTEMS: 14 systems were reviewed. Pertinent positives and negatives as above, all else negative. Past Surgical History:   Procedure Laterality Date    CAROTID STENT PLACEMENT Bilateral     HERNIA REPAIR      ROTATOR CUFF REPAIR Right     TONSILLECTOMY AND ADENOIDECTOMY      Social History:  Social History     Tobacco Use    Smoking status: Former     Packs/day: 1.00     Years: 56.00     Pack years: 56.00     Types: Cigarettes     Quit date: 2022     Years since quittin.1    Smokeless tobacco: Never   Vaping Use    Vaping Use: Never used   Substance Use Topics    Alcohol use: Not Currently     Alcohol/week: 2.0 standard drinks     Types: 2 Shots of liquor per week     Comment: Drinks about 2 Kesslers with mix nightly. Reported history of heavy alcohol use for years with both beer and liquior several years ago    Drug use: Not Currently        CURRENT MEDICATIONS:  No outpatient medications have been marked as taking for the 8/3/22 encounter [facility] Encounter). FAMILY HISTORY: Reviewed but non-contributory     PHYSICAL EXAM:       Physical Examination:     BP (!) 143/64   Pulse 57   Temp 97.8 °F (36.6 °C) (Temporal)   Resp 21   Ht 5' 6\" (1.676 m)   Wt 162 lb 7.7 oz (73.7 kg) Comment: Bed rezeroed before weighing the patient  SpO2 94%   BMI 26.22 kg/m²  Body mass index is 26.22 kg/m². Constitutional: He appeared oriented to person and place. He appears well-developed and well-nourished.  In no acute distress. HEENT: Normocephalic and atraumatic. No JVD present. Carotid bruit is not present. No mass and no thyromegaly present. No lymphadenopathy noted. Cardiovascular: Normal rate, regular rhythm, normal heart sounds. Exam reveals no gallop and no friction rubs. 2/6 systolic murmur, 5th intercostal space on the LEFT in the mid-clavicular line (cardiac apex)  Pulmonary/Chest: Effort normal and breath sounds normal. No respiratory distress. He has no wheezes, rhonchi or rales. Abdominal: Soft, non-tender. He exhibits no organomegaly, mass or bruit. Extremities: Trace. No cyanosis or clubbing. 2+ radial and carotid pulses. Distal extremity pulses: 1+ bilaterally. Neurological: Alertness and orientation as per Constitutional exam. No evidence of gross cranial nerve deficit. Coordination appeared normal.   Skin: Skin is warm and dry. There is no rash or diaphoresis. Psychiatric: He has a normal mood and affect.  His speech is normal and behavior is normal.       Other pertinent observable physical exam findings: None       MOST RECENT LABS ON RECORD:   Lab Results   Component Value Date    WBC 2.1 (L) 08/10/2022    HGB 11.9 (L) 08/10/2022    HCT 36.8 (L) 08/10/2022    PLT See Reflexed IPF Result 08/10/2022     (H) 08/10/2022     (H) 08/10/2022     08/10/2022    K 3.0 (L) 08/10/2022    CL 94 (L) 08/10/2022    CREATININE 0.92 08/10/2022    BUN 14 08/10/2022    CO2 35 (H) 08/10/2022    TSH 5.77 (H) 07/25/2022    INR 1.4 08/03/2022     ASSESSMENT:  Patient Active Problem List    Diagnosis Date Noted    Acute respiratory failure with hypoxia ([address] 08/04/2022    Chronic anticoagulation 08/04/2022    Chronic combined systolic (congestive) and diastolic (congestive) heart failure (HCC)     Dehydration, moderate     Medication side effect, initial encounter     History of acute inferior wall MI     Ischemic cardiomyopathy     Pancytopenia ([address] 08/10/2022    Bilateral pleural effusion 08/09/2022    Nonischemic cardiomyopathy ([facility] 08/04/2022    Hypoxia 08/03/2022    Paroxysmal atrial fibrillation ([facility] 07/19/2022    Lightheaded 07/19/2022    Tobacco abuse counseling 07/19/2022    Gastroesophageal reflux disease 07/19/2022    Hypotension 07/19/2022    Acute on chronic combined systolic and diastolic CHF (congestive heart failure) ([facility] 07/19/2022    Hypokalemia 07/19/2022    Hypovitaminosis D 07/19/2022    Severe malnutrition ([facility] 06/07/2022    Hypomagnesemia 05/20/2022    Ascites 05/20/2022    CKD (chronic kidney disease) 05/20/2022    Cirrhosis of liver with ascites ([facility] 05/19/2022    Acute on chronic combined systolic and diastolic CHF, NYHA class 4 (HCC)     Coronary artery disease     Hypertension     Thrombocytopenia (HCC)     Hyponatremia     Hyperlipidemia     Atrial fibrillation with RVR ([facility] 05/18/2022      PLAN:    Paroxysmal Atrial Fibrillation: Rhythm Control Asymptomatic   Beta Blocker: Continue Metoprolol succinate (Toprol XL) 25 mg daily. Anti-Arrhythmic: Continue amiodarone (Pacerone) 200 mg BID. Monitoring: Since they will being maintained on Amiodarone, I told them that we will need to closely monitor them for potential side effects. These include monitoring LFTs and TSH at least every 6 months as well as chest x-rays, pulmonary function tests, and eye exams at least yearly. [id number] Score for Atrial Fibrillation Stroke Risk   Risk   Factors  Component Value   C CHF Yes 1   H HTN Yes 1   A2 Age >= 76 No,  (75 y.o.) 0   D DM No 0   S2 Prior Stroke/TIA No 0   V Vascular Disease No 0   A Age 74-69 Yes,  (75 y.o.) 1   Sc Sex male 0    [id number]  Score  3   Score last updated 4/9/42 1:15 PM EDT  Click here for a link to the UpToDate guideline \"Atrial Fibrillation: Anticoagulation therapy to prevent embolization  Disclaimer: Risk Score calculation is dependent on accuracy of patient problem list and past encounter diagnosis.    Stroke Risk: CHADS2-VASc Score: 3/9 (3.2% stroke risk). Because of his atrial fibrillation, I also would also recommend that he continue with anticoagulation to decrease his risk of stoke but also reminded him to watch for signs of blood in his stool or black tarry stools and stop the medication immediately if this develops as this could be life threatening. Anticoagulation: Continue Apixaban (Eliquis) 5 mg every 12 hours. Acute on chronic combined heart failure: Ischemic Cardiomyopathy, Echo done on 5/18/2022 which showed an EF of 15-20%. We recently decreased his Bumex from 2 mg to 1 mg due to his falling and I suspect this is what caused his heart failure exacerbation. Beta Blocker: Continue Metoprolol succinate (Toprol XL) 25 mg daily. ACE Inibitor/ARB: Continue sacubitril/valsartan (Entresto) 24/26 mg 1/2 a tablet twice daily. Diuretics: Continue furosemide (Lasix) 40 mg IV 2 times daily. However, I suspect that with him being 5 liters negative we may want to cut that back to once daily as soon as tomorrow. Recently we DECREASED his bumetinide to (Bumex) 1 mg every morning and stopped the additional Bumex three times a week but I suspect that this is what led to his heart failure exacerbation so I expect that we may need to go back at least to this regimine prior to discharge  Heart failure counseling: I advised them to try and keep their legs up whenever possible and to limit salt in their diet. Life Vest: Although I do not think it is a bad idea for him to wear the Life Vest in the hospital, I am actually ok if he does not wear it as long as he is on Telemetry but will leave this up to Mr. Sid Srinivasan . Tobacco Abuse Counseling: Will discuss further prior to discharge home    Once again, thank you for allowing me to participate in this patients care. Please do not hesitate to contact me could I be of further assistance. Sincerely,  Isa Degroot MD, MS, F.A.C.C.   [address] [facility]    [address], [place] [address], [address] [facility]  Phone: [phone], Fax: [phone]     I believe that the risk of significant morbidity and mortality related to the patient's current medical conditions are: intermediate-high. August 10, 2022     --Deepika Orta MD on 8/10/2022 at 11:45 PM    An electronic signature was used to authenticate this note.

## 2022-08-11 NOTE — PROGRESS NOTES
Pt sitting up in chair watching TV with wife at the bedside. Pt is A&Ox4. Vitals and assessment as charted. Pt denies pain. Pt denies any further needs at this time. Call light within reach.

## 2022-08-11 NOTE — PROGRESS NOTES
RESPIRATORY ASSESSMENT PROTOCOL                                                                                              Patient Name: Steve Wallace Room#: [id number]/[id number] : 1950     Admitting diagnosis: Hypoxia [R09.02]  Acute respiratory failure with hypoxia ([facility] [J96.01]  Acute on chronic combined systolic and diastolic CHF (congestive heart failure) ([facility] [I50.43]       Medical History:   Past Medical History:   Diagnosis Date    Acute kidney injury ([facility]     Ascites 2022    Atrial fibrillation (HCC)     CHF (congestive heart failure) ([facility]     Cirrhosis of liver with ascites ([facility] 2022    CKD (chronic kidney disease) 2022    Coronary artery disease     Hyperlipidemia     Hypertension     Hypomagnesemia 2022    Hyponatremia     Thrombocytopenia ([facility]        PATIENT ASSESSMENT    LABORATORY DATA  Hematology:   Lab Results   Component Value Date/Time    WBC 3.5 2022 05:20 AM    RBC 3.75 2022 05:20 AM    HGB 12.2 2022 05:20 AM    HCT 37.4 2022 05:20 AM    PLT See Reflexed IPF Result 2022 05:20 AM     Chemistry:    Lab Results   Component Value Date/Time    PHART 7.501 08/10/2022 10:10 AM    JZE1GVC 51.7 08/10/2022 10:10 AM    PO2ART 93.9 08/10/2022 10:10 AM    W7GBQAUV 97.6 08/10/2022 10:10 AM    KTA2ZQB 39.5 08/10/2022 10:10 AM    PBEA 13.9 08/10/2022 10:10 AM       VITALS  Heart Rate: 55   Resp: 18  BP: (!) 98/40  SpO2: 96 % O2 Device: Nasal cannula  Temp: 96.9 °F (36.1 °C)    SKIN COLOR  [x] Normal  [] Pale  [] Dusky  [] Cyanotic    RESPIRATORY PATTERN  [x] Normal  [] Dyspnea  [] Cheyne-Friend  [] Kussmaul  [] Biots    AMBULATORY  [] Yes  [] No  [x] With Assistance    PEAK FLOW  Predicted:     Personal Best:        VITAL CAPACITY  Predicted value:  ml  Actual Value:  ml  30% of Predicted:  ml  Patient Acuity 0 1 2 3 4 Score   Level of Concious (LOC) [x]  Alert & Oriented or Pt normal LOC []  Confused;follows directions []  Confused & uncooper-ative []  Obtunded []  Comatose 0   Respiratory Rate  (RR) []  Reg. rate & pattern. 12 - 20 bpm  []  Increased RR. Greater than 20 bpm   [x]  SOB w/ exertion or RR greater than 24 bpm []  Access- ory muscle use at rest. Abn.  resp. []  SOB at rest.   2   Bilateral Breath Sounds (BBS) []  Clear []  Diminish-ed bases  [x]  Diminish-ed t/o, or rales   []  Sporadic, scattered wheezes or rhonchi []  Persistentwheezes and, or absent BBS 2   Cough [x]  Strong, effective, & non-prod. []  Effective & prod. Less than 25 ml (2 TBSP) over past 24 hrs []  Ineffective & non-prod to less than 25 ML over past 24 hrs []  Ineffective and, or greater than 25 ml sputum prod. past 24 hrs. []  Nonspon- taneous; Requires suctioning 0   Pulmonary History  (PULM HX) []  No smoking and no chronic pulmonaryhistory []  Former smoker. Quit over 12 mos. ago []  Current smoker or quit w/ in 12 mos []  Pulm. History and, or 20 pk/yr smoking hx [x]  Admitted w/ acute pulm. dx and, or has been admitted w/ pulm. dx 2 or more times over past 12 mos 4   Surgical History this Admit  (SURG HX) [x]  No surgery []  General surgery []  Lower abdominal []  Thoracic or upper abdominal   []  Thoracic w/ pulm. disease 0   Chest X-Ray (CXR)/CT Scan []  Clear or not applicable []  Not available [x]  Atelect- asis or pleural effusions []  Localized infiltrate or pulm. edema []  Con-solidated Infiltrates, bilateral, or in more than 1 lobe 2   Slow or Forced VC, FEV1 OR PEFR (PULM FXN)  [x]  80% or greater, or not indicated []  Pt. unable to perform []  FEV1 or PEFR or VC 51-79%.   []  FEV1 or PEFR or VC  30-49%   []  FEV1 or PEFR or VC less than 30%   0   TOTAL ACUITY: 10       CARE PLAN    If Acuity Level is 2, 3, or 4 in any of the following:    [] BILATERAL BREATH SOUNDS (BBS)     [x] PULMONARY HISTORY (PULM HX)  [] PULMONARY FUNCTION (PULM FX)    Goal: Improve respiratory functions in patients with airway disease and decrease WOB    [x] AEROSOL PROTOCOL    Total Acuity:   16-32  []  Secondary Assessment in 24 hrs Total Acuity:  9-15  [x]  Secondary Assessment in 24 hrs Total Acuity:  4-8  []  Secondary Assessment in 48 hrs Total Acuity:  0-3  []  Secondary Assessment in 72 hrs   HHN AEROSOL THERAPY with  [physician-ordered bronchodilator(s)] q 4 & Albuterol PRN q2 hrs. Breath-Actuated Neb if BBS Acuity = 4, and pt. can use MP. Notify physician if condition deteriorates. HHN AEROSOL THERAPY with  [physician-ordered bronchodilator(s)]  QID and Albuterol PRN q4 hrs. Breath-Actuated Neb if BBS Acuity = 4, and pt. can use MP. Notify physician if condition deteriorates. MDI THERAPY with  2 actuations of [physician-ordered bronchodilator(s)] via spacer TID Albuterol and PRNq4 hrs. If unable to utilize MDI: HHN [physician-ordered bronchodilator(s)] TID and Albuterol PRN q4 hrs. Notify physician if condition deteriorates. MDI THERAPY with  [physician-ordered bronchodilator(s)] via spacer TID PRN. If unable to utilize MDI: HHN [physician-ordered bronchodilator(s)] TID PRN. Notify physician if condition deteriorates. If Acuity Level is 2, 3, or 4 in any of the following:    [] COUGH     [] SURGICAL HISTORY (SURG HX)  [] CHEST XRAY (CXR)    Goal: Improvement in sputum mobilization in patients with ineffective airway clearance. Reverse atelectasis.     [] Bronchopulmonary Hygiene Protocol    Total Acuity:   16-32  []  Secondary Assessment in 24 hrs Total Acuity:  9-15  []  Secondary Assessment in 24 hrs Total Acuity:  4-8  []  Secondary Assessment in 48 hrs Total Acuity:  0-3  []  Secondary Assessment in 72 hrs   METANEB QID with [physician-ordered bronchodilator(s)] if CXR Acuity = 4; otherwise:  PD&P, PEP, or Vest QID & PRN  NT Sxn PRN for ineffective cough  METANEB QID with [physician-ordered bronchodilator(s)] if CXR Acuity = 4; otherwise:  PD&P, PEP, or Vest TID & PRN  NT Sxn PRN for ineffective cough  Instruct patient to self-perform IS q1hr WA   Directed Cough self-performed q1hr WA     If Acuity Level is 2 or above in the following:    [] PULMONARY HISTORY (PULM HX)    Goal: Assist patient in quitting smoking to slow or stop the progression of lung disease.     [] Smoking Cessation Protocol    SMOKING CESSATION EDUCATION provided according to policy ZA_679: (tomasa with an X)  ____Yes    ____ No     ____ NA    Smoking Cessation Booklet given:  ____Yes  ____No ____Patient Courtland Kussmaul

## 2022-08-11 NOTE — PROGRESS NOTES
[facility] - Progress Note Telemedicine   COVID 19 Patient  Today's Date and Time: 8/11/2022, 4:26 PM    Impression :     COVID 19 Confirmed Infection  Previously unvaccinated individual  Covid tests:  8/9/2022 positive  Elevated CRP  Hypoxia  Congestive heart failure  Ischemic cardiomyopathy with left ventricular ejection fraction between 15 to 20%  Bilateral pleural effusions with compressive atelectasis  Status post removal of 825 mL of fluid from the right chest on 8/10/2022  Status post removal of 1.1 L of fluid from the left chest on 8/10/2022  Relative leukopenia    Recommendations:   Antibiotic treatment:  Monitor off antibiotics  Covid Rx:    Remdesivir: Not indicated  Decadron: On 6 mg Decadron per day. Start date 8/10/2022. Tentative stop date 8/20/2022  The Decadron is being given because of respiratory failure. At this point it is difficult to determine how much of the respiratory failure is secondary to the cardiac failure and pleural effusions versus a contribution from COVID-19.   If respiratory function improves markedly after removal of the fluid by thoracentesis, will need to reconsider need for Decadron based on clinical evolution  Actemra: Not indicated  Monoclonal antibodies Bebtelovimab infused 8/10/2022      Medical Decision Making/Summary/Discussion:8/11/2022     Patient admitted with biventricular congestive heart failure with pulmonary edema  CKD  Underlying COPD and centrilobular emphysema  Has developed secondary COVID 19 infection    Infection Control Recommendations   [place] Precautions  Airborne isolation  Droplet Isolation  Isolate until 8/19/2022    Antimicrobial Stewardship Recommendations     Discontinuation of therapy  Coordination of Outpatient Care:   Estimated Length of IV antimicrobials:TBD  Patient will need Midline Catheter Insertion: TBD  Patient will need PICC line Insertion: No  Patient will need: Home IV , Fide yes  Respiratory secretions: no    Patient receiving supplemental oxygen. On high flow oxygen  Flow rate 35 -->4 L/min  FiO2 39%  RR 18  02 sat 95-->94 %    QTc:       NEWS Score: 0-4 Low risk group; 5-6: Medium risk group; 7 or above: High risk group  Parameters 3 2 1 0 1 2 3   Age    < 65   ? 65   RR ? 8  9-11 12-20  21-24 ? 25   O2 Sats ? 91 92-93 94-95 ? 96      Suppl O2  Yes  No      SBP ? 90  101-110 111-219   ? 220   HR ? 40  41-50 51-90  111-130 ? 131   Consciousness    Alert   Drowsiness, lethargy, or confusion   Temperature ? 35.0 C (95.0 F)  35.1-36.0 C 95.1-96.9 F 36.1-38.0 C 97.0-100.4 F 38.1-39.0 C 100.5-102.3 F ? 39.1 C ? 102.4 F      NEWS Score:  8/10/2022: 7 High Risk    Overall Daily Picture:   Improved    Presence of secondary bacterial Infection:    No   Additional antibiotics:     Labs, X rays reviewed: 8/11/2022    BUN: 14-->20  Cr: 0.92-->0.96    WBC: 2.1-->3.5  Hb: 11.9-->12.2  Plat: 62-->64    Absolute Neutrophils: 1.63  Absolute Lymphocytes: 0.34  Neutrophil/Lymphocyte Ratio: 4.79 high risk    CRP: 52.8-->79.4  Ferritin:  LDH:     Pro Calcitonin:  Pro BNP 7741  Troponin 27    Cultures:  Urine:  1/27/2022 no growth  Blood:  5/23/2022 no growth  Sputum :    Wound:      CXR:   8/8/2022: Mild pulmonary edema, bilateral pleural effusions left greater than right. CAT:  8/9/2022 no PE. Mild interstitial pulmonary edema. Centrilobular emphysema. Scattered groundglass opacities in the upper lobes. 8-10-22: Decrease in pleural effusions post taps. Discussed with patient, RN, CC, IM.    8-10-22:    8-10-22      8-8-22            I have personally reviewed the past medical history, past surgical history, medications, social history, and family history, and I have updated the database accordingly.   Past Medical History:     Past Medical History:   Diagnosis Date    [facility] ([facility]     Ascites 5/20/2022    Atrial fibrillation (HCC)     CHF (congestive heart failure) ([facility] Cirrhosis of liver with ascites ([facility] 2022    CKD (chronic kidney disease) 2022    Coronary artery disease     Hyperlipidemia     Hypertension     Hypomagnesemia 2022    Hyponatremia     Thrombocytopenia (HCC)        Past Surgical  History:     Past Surgical History:   Procedure Laterality Date    CAROTID STENT PLACEMENT Bilateral     HERNIA REPAIR      ROTATOR CUFF REPAIR Right     TONSILLECTOMY AND ADENOIDECTOMY         Medications:      ascorbic acid  1,000 mg Oral 4x Daily    famotidine  40 mg Oral BID    zinc sulfate  100 mg Oral Daily    dexamethasone  6 mg Oral Daily    [Held by provider] bumetanide  2 mg Oral Daily    furosemide  40 mg IntraVENous BID    dapagliflozin  10 mg Oral QAM    amiodarone  200 mg Oral Daily    albuterol  2.5 mg Nebulization 4x daily    spironolactone  25 mg Oral Daily    sodium chloride flush  10 mL IntraVENous 2 times per day    apixaban  5 mg Oral BID    atorvastatin  80 mg Oral Daily    busPIRone  15 mg Oral BID    clopidogrel  75 mg Oral Daily    escitalopram  20 mg Oral Nightly    folic acid  1 mg Oral Daily    magnesium oxide  600 mg Oral BID    melatonin  10 mg Oral Nightly    metoprolol succinate  25 mg Oral Daily    midodrine  10 mg Oral TID WC    multivitamin  1 tablet Oral Daily    pantoprazole  40 mg Oral QAM AC    rOPINIRole  0.5 mg Oral Nightly    sacubitril-valsartan  0.5 tablet Oral BID    tamsulosin  0.4 mg Oral Daily    vitamin B-1  100 mg Oral Daily    vitamin D  50,000 Units Oral Weekly       Social History:     Social History     Socioeconomic History    Marital status:      Spouse name: Not on file    Number of children: Not on file    Years of education: Not on file    Highest education level: Not on file   Occupational History    Not on file   Tobacco Use    Smoking status: Former     Packs/day: 1.00     Years: 56.00     Pack years: 56.00     Types: Cigarettes     Quit date: 2022     Years since quittin.1    Smokeless tobacco: Never   Vaping Use    Vaping Use: Never used   Substance and Sexual Activity    Alcohol use: Not Currently     Alcohol/week: 2.0 standard drinks     Types: 2 Shots of liquor per week     Comment: Drinks about 2 Kesslers with mix nightly. Reported history of heavy alcohol use for years with both beer and liquior several years ago    Drug use: Not Currently    Sexual activity: Not on file   Other Topics Concern    Not on file   Social History Narrative    Not on file     Social Determinants of Health     Financial Resource Strain: Not on file   Food Insecurity: Not on file   Transportation Needs: Not on file   Physical Activity: Not on file   Stress: Not on file   Social Connections: Not on file   Intimate Partner Violence: Not on file   Housing Stability: Not on file       Family History:     Family History   Problem Relation Age of Onset    Hypertension Father     Heart Disease Father     Heart Disease Mother     Hypertension Mother         Allergies:   Patient has no known allergies. Review of Systems:       Constitutional: No fevers or chills. No systemic complaints  Head: No headaches  Eyes: No double vision or blurry vision. No conjunctival inflammation. ENT: No sore throat or runny nose. . No hearing loss, tinnitus or vertigo. Cardiovascular: No chest pain or palpitations. Shortness of breath. HIG[place]INS  Lung: Shortness of breath, cough. No sputum production  Abdomen: No nausea, vomiting, diarrhea, or abdominal pain. Curt Medin No cramps. Genitourinary: No increased urinary frequency, or dysuria. No hematuria. No suprapubic or CVA pain  Musculoskeletal: No muscle aches or pains. No joint effusions, swelling or deformities  Hematologic: No bleeding or bruising. Neurologic: No headache, weakness, numbness, or tingling. Integument: No rash, no ulcers. Psychiatric: No depression. Endocrine: No polyuria, no polydipsia, no polyphagia.     Physical Examination :   Patient Vitals for the past 8 hrs:   BP Pulse Resp SpO2   08/11/22 1250 -- -- -- 96 %   08/11/22 1200 (!) 98/40 59 21 --   08/11/22 1143 -- 55 20 93 %   08/11/22 1100 (!) 131/56 57 23 --   08/11/22 1000 123/61 58 22 --   08/11/22 0901 (!) 124/53 61 20 (!) 87 %       General Appearance: Awake, alert, and in no apparent distress  Head:  Normocephalic, no trauma  Eyes: Pupils equal, round, reactive to light; sclera anicteric; conjunctivae pink. No embolic phenomena. ENT: Oropharynx clear, without erythema, exudate, or thrush. No tenderness of sinuses. Mouth/throat: mucosa pink and moist. No lesions. Dentition in good repair. Neck:Supple, without lymphadenopathy. Thyroid normal, No bruits. Pulmonary/Chest: Decreased breath sounds with dullness of both bases  Cardiovascular: Regular rate and rhythm without murmurs, rubs, or gallops. LifeVest in place  Abdomen: Soft, non tender. Bowel sounds normal. No organomegaly  All four Extremities: No cyanosis, clubbing, or effusions. .  Leg edema  Neurologic: No gross sensory or motor deficits. Skin: Warm and dry with good turgor. Signs of peripheral arterial and venous insufficiency. No ulcerations. No open wounds. Medical Decision Making -Laboratory:   I have independently reviewed/ordered the following labs:    CBC with Differential:   Recent Labs     08/10/22  0545 08/11/22  0520   WBC 2.1* 3.5   HGB 11.9* 12.2*   HCT 36.8* 37.4*   PLT See Reflexed IPF Result See Reflexed IPF Result   LYMPHOPCT 16* 4*   MONOPCT 6 1*       BMP:   Recent Labs     08/10/22  0545 08/10/22  1250 08/11/22  0520     --  139   K 2.8* 3.0* 3.6*   CL 94*  --  94*   CO2 35*  --  37*   BUN 14  --  20   CREATININE 0.92  --  0.96   MG 1.5*  --  2.0       Hepatic Function Panel:   Recent Labs     08/10/22  0545 08/11/22  0520   PROT 6.1* 6.0*   LABALBU 3.5 3.5   BILITOT 1.10 1.12   ALKPHOS 93 91   * 173*   * 153*       No results for input(s): RPR in the last 72 hours. No results for input(s): HIV in the last 72 hours.   No results for input(s): BC in the last 72 hours. Lab Results   Component Value Date/Time    MUCUS TRACE 07/27/2022 03:15 PM    RBC 3.75 08/11/2022 05:20 AM    WBC 3.5 08/11/2022 05:20 AM    TURBIDITY Clear 07/27/2022 03:15 PM     Lab Results   Component Value Date/Time    CREATININE 0.96 08/11/2022 05:20 AM    GLUCOSE 112 08/11/2022 05:20 AM       Medical Decision Making-Imaging:     EXAMINATION:   CTA OF THE CHEST 8/8/2022 12:39 pm       TECHNIQUE:   CTA of the chest was performed after the administration of intravenous   contrast.  Multiplanar reformatted images are provided for review. MIP   images are provided for review. Automated exposure control, iterative   reconstruction, and/or weight based adjustment of the mA/kV was utilized to   reduce the radiation dose to as low as reasonably achievable. COMPARISON:   08/08/2022, 05/19/2022       HISTORY:   ORDERING SYSTEM PROVIDED HISTORY: hypoxia   TECHNOLOGIST PROVIDED HISTORY:   hypoxia       FINDINGS:   Pulmonary Arteries: Pulmonary arteries are adequately opacified for   evaluation. No evidence of intraluminal filling defect to suggest pulmonary   embolism. Main pulmonary artery is mildly enlarged. Mediastinum: Mildly enlarged mediastinal lymph nodes which are new from   prior. Cardiomegaly. Three-vessel coronary artery calcifications. Trace   pericardial effusion. There is no acute abnormality of the ectatic,   atherosclerotic thoracic aorta. Lungs/pleura: Mild interstitial edema superimposed on patient's emphysematous   change. Few scattered small ground-glass opacities in the anterior aspect of   both upper lobes. Dependent changes in both lungs. Moderate layering   bilateral pleural effusions with atelectasis of the adjacent parenchyma. There is near complete collapse of the left lower lobe with only a small   portion of the superior segment remaining aerated. No pneumothorax. Trachea   is patent.        Upper Abdomen: No acute abnormality. Soft Tissues/Bones: No acute bone or soft tissue abnormality with similar   mild body wall edema. Impression   No evidence of pulmonary embolism. Mildly enlarged main pulmonary artery   which can be seen in the setting of pulmonary hypertension. Mild interstitial pulmonary edema on a background of centrilobular emphysema. A few scattered ground-glass opacities in the upper lobes may be due to a   component of mild alveolar edema, though clinical correlation is recommended   to exclude concern for contributory infectious causes. Moderate layering bilateral pleural effusions with partial collapse of the   lower lobes, left more so than right. Cardiomegaly which appears unchanged from prior with 3 vessel coronary artery   disease.            Medical Decision Pobxzs-Lithleku-Rlayn:      Latest Reference Range & Units 8/9/22 09:30   Chlamydia pneumoniae By PCR Not Detected  Not Detected   Rhino/Enterovirus PCR Not Detected  Not Detected   Human Metapneumovirus PCR Not Detected  Not Detected   Adenovirus PCR Not Detected  Not Detected   B Pertussis by PCR Not Detected  Not Detected   Coronavirus 229E PCR Not Detected  Not Detected   Coronavirus HKU1 PCR Not Detected  Not Detected   Coronavirus NL63 PCR Not Detected  Not Detected   Coronavirus OC Not Detected  Not Detected   Influenza A by PCR Not Detected  Not Detected   Influenza B by PCR Not Detected  Not Detected   Parainfluenza 1 PCR Not Detected  Not Detected   Parainfluenza 2 PCR Not Detected  Not Detected   Parainfluenza 3 PCR Not Detected  Not Detected   Parainfluenza 4 PCR Not Detected  Not Detected   Resp Syncytial Virus PCR Not Detected  Not Detected   Mycoplasma pneumo by PCR Not Detected  Not Detected   SARS-CoV-2, PCR Not Detected  DETECTED !   !: Data is abnormal  Medical Decision Making-Other:     Note:  Labs, medications, radiologic studies were reviewed with personal review of films  Large amounts of data were reviewed  Discussed with nursing Staff, Discharge planner  Infection Control and Prevention measures reviewed  All prior entries were reviewed  Administer medications as ordered  Prognosis: Guarded  Discharge planning reviewed  Follow up as outpatient. Thank you for allowing us to participate in the care of this patient. Please call with questions.     Tyrese Crowley MD  Pager: [phone] - Office: [phone]

## 2022-08-11 NOTE — PROGRESS NOTES
[facility]  Inpatient/Observation/Outpatient Rehabilitation    Date: 2022  Patient Name: Hector Lester       [] Inpatient Acute/Observation       []  Outpatient  : 1950       [] Pt no showed for scheduled appointment    [] Pt refused/declined therapy at this time due to:           [x] Pt cancelled due to:  [] No Reason Given   [] Sick/ill   [x] Other: Patient transferred to ICU yesterday, requires new orders. Therapist/Assistant will attempt to see this patient once new orders are in place.      Brenna Rockwell, OTR/L Date: 2022

## 2022-08-11 NOTE — PROGRESS NOTES
Physical Therapy  Facility/Department: [facility] AT THE [facility]  Physical Therapy Initial Assessment    Name: Mellisa Rush  :   MRN: [id number]  Date of Service: 2022    Discharge Recommendations:  Continue to assess pending progress, Home with Home health PT, Outpatient PT   PT Equipment Recommendations  Equipment Needed: No      Patient Diagnosis(es): The primary encounter diagnosis was Acute respiratory failure with hypoxia ([address] A diagnosis of Acute on chronic combined systolic and diastolic CHF (congestive heart failure) (HCC) was also pertinent to this visit. Past Medical History:  has a past medical history of Acute kidney injury ([address] Ascites, Atrial fibrillation ([address] CHF (congestive heart failure) ([address] Cirrhosis of liver with ascites ([address] CKD (chronic kidney disease), Coronary artery disease, Hyperlipidemia, Hypertension, Hypomagnesemia, Hyponatremia, and Thrombocytopenia ([address] Past Surgical History:  has a past surgical history that includes Rotator cuff repair (Right); Carotid stent placement (Bilateral); hernia repair; and Tonsillectomy and adenoidectomy. Assessment   Assessment: Pt is a pleasant 69 yo man who presents with hypoxia. Pt currently presents with 4-/5 strength grossly in BLE. Pt demonstrates good to fair dynamic standing balance with no complete LoB. Pt is mildly unsteady with gait at this time, normally uses RW and SPC at home. Pt is currently SBA for transfers and gait, and was able to ambulate 5ft with no AD SBA for stability and safety. Pt would benefit from skilled PT to improve his standing balance and functional independence in order to allow for a safe return home.   Treatment Diagnosis: Generalized Weakness  Therapy Prognosis: Good  Decision Making: Medium Complexity  Requires PT Follow-Up: Yes  Activity Tolerance  Activity Tolerance: Patient tolerated treatment well     Plan   Plan  Plan: 2 times a day 7 days a week (1x daily on weekends)  Plan weeks: 20 days  Current Treatment Recommendations: Strengthening, Balance training, Functional mobility training, Transfer training, Endurance training, Gait training, Stair training, Neuromuscular re-education, Home exercise program, Safety education & training, Positioning, Therapeutic activities  Safety Devices  Type of Devices: Call light within reach, Left in chair  Restraints  Restraints Initially in Place: No     Restrictions  Restrictions/Precautions  Restrictions/Precautions: General Precautions, Isolation, Contact Precautions     Subjective   General  Chart Reviewed: Yes  Patient assessed for rehabilitation services?: Yes  Referring Practitioner: Tracie  Referral Date : 08/11/22  Diagnosis: Hypoxia  Follows Commands: Within Functional Limits  Subjective  Subjective: Pt is pleasant and agreeable to therapy eval         Social/Functional History  Social/Functional History  Lives With: Family  Type of Home: Mobile home  Home Layout: One level  Home Access: Ramped entrance  Bathroom Shower/Tub: Walk-in shower  Bathroom Toilet: Handicap height  Bathroom Equipment: Grab bars in shower, Shower chair  Bathroom Accessibility: Accessible  Home Equipment: Walker, rolling, [place], quad  Has the patient had two or more falls in the past year or any fall with injury in the past year?: No  Receives Help From: Family  ADL Assistance: Independent  Homemaking Assistance: Independent  Homemaking Responsibilities: Yes  Meal Prep Responsibility: No  Ambulation Assistance: Independent  Transfer Assistance: Independent  Active : Yes  Mode of Transportation: Car  Occupation: Retired  Additional Comments: Patient states that his nephew is also living with them currently and helps assist as needed.   Vision/Hearing  Vision  Vision: Within Functional Limits  Vision Exceptions: Wears glasses for reading  Hearing  Hearing: Within functional limits    Cognition   Orientation  Overall Orientation Status: Within Functional Limits  Cognition  Overall Cognitive Status: WFL     Objective   Heart Rate: 55  Heart Rate Source: Monitor  BP: (!) 131/56  BP Location: Left upper arm  BP Method: Automatic  Patient Position: Semi fowlers  MAP (Calculated): 81  Resp: 20  SpO2: 93 %  O2 Device: Nasal cannula     Observation/Palpation  Posture: Good        AROM RLE (degrees)  RLE AROM: WFL  AROM LLE (degrees)  LLE AROM : WFL  Strength RLE  Comment: 4-/5 grossly  Strength LLE  Comment: 4-/5 grossly           Bed mobility  Supine to Sit: Stand by assistance  Sit to Supine: Stand by assistance  Scooting: Stand by assistance  Transfers  Sit to Stand: Stand by assistance  Stand to sit: Stand by assistance  Bed to Chair: Stand by assistance  Ambulation  WB Status: FWB  Ambulation  Surface: level tile  Device: No Device  Assistance: Stand by assistance  Gait Deviations: Decreased step height  Distance: 5ft  Comments: does not always pay attention to connected leads     Balance  Posture: Good  Sitting - Static: Good  Sitting - Dynamic: Good  Standing - Static: Good;Fair  Standing - Dynamic: Good;Fair  Exercise Treatment: Transfers, gait, balance        AM-PAC Score     AM-PAC Inpatient Mobility without Stair Climbing Raw Score : 18 (08/11/22 1144)  AM-PAC Inpatient without Stair Climbing T-Scale Score : 51.97 (08/11/22 1144)  Mobility Inpatient CMS 0-100% Score: 23.26 (08/11/22 1144)  Mobility Inpatient without Stair CMS G-Code Modifier : CJ (08/11/22 1144)       Goals  Short Term Goals  Time Frame for Short term goals: 20 days  Short term goal 1: Pt to be able to ambulate 50 ft IND to be able to retrun home safely. Short term goal 2: Pt to be IND with all transfers to improve functional mobility and safety of transfers  Short term goal 3: Pt to be able to tolerate 20-30 mins therex/act to improve functional capacity for ADLs.   Patient Goals   Patient goals : to get back home       Education  Patient Education  Education Given To: Patient  Education Provided: Role of Therapy;Plan of Care;Home Exercise Program  Education Method: Verbal  Barriers to Learning: None  Education Outcome: Verbalized understanding      Therapy Time   Individual Concurrent Group Co-treatment   Time In 1015         Time Out 1025         Minutes 10         Timed Code Treatment Minutes: [address], PT

## 2022-08-11 NOTE — PROGRESS NOTES
Occupational Therapy  Facility/Department: [facility] AT THE [facility]  Occupational Therapy Initial Assessment    Name: Butch Jacob  :   MRN: [id number]  Date of Service: 2022    Discharge Recommendations:  Continue to assess pending progress, Home with assist PRN          Patient Diagnosis(es): The primary encounter diagnosis was Acute respiratory failure with hypoxia ([address] A diagnosis of Acute on chronic combined systolic and diastolic CHF (congestive heart failure) (HCC) was also pertinent to this visit. Past Medical History:  has a past medical history of Acute kidney injury ([address] Ascites, Atrial fibrillation ([address] CHF (congestive heart failure) ([address] Cirrhosis of liver with ascites ([address] CKD (chronic kidney disease), Coronary artery disease, Hyperlipidemia, Hypertension, Hypomagnesemia, Hyponatremia, and Thrombocytopenia ([address] Past Surgical History:  has a past surgical history that includes Rotator cuff repair (Right); Carotid stent placement (Bilateral); hernia repair; and Tonsillectomy and adenoidectomy. Treatment Diagnosis: Weakness      Assessment   Performance deficits / Impairments: Decreased functional mobility ; Decreased ADL status; Decreased strength;Decreased balance;Decreased endurance  Assessment: 69 y/o M admitted to [facility] for hypoxia and CHF. Patient presents with decresaed activity tolerance/endurance, decreased strength and incresaed shortness of breath during ADL. OT to address and educate on d/c folder, AE/DME, EC/WS techniques and home sfeaty to ensure safe and indep return to [facility].   Treatment Diagnosis: Weakness  Prognosis: Good  Decision Making: Low Complexity  REQUIRES OT FOLLOW-UP: Yes  Activity Tolerance  Activity Tolerance: Patient Tolerated treatment well        Plan   Plan  Times per Week: 7  Times per Day: Daily  Current Treatment Recommendations: Strengthening, Balance training, Functional mobility training, Endurance training, Safety education & training, Patient/Caregiver education & training, Equipment evaluation, education, & procurement, Home management training, Self-Care / ADL  Plan Comment: therex, theract, and ADL training     Restrictions  Restrictions/Precautions  Restrictions/Precautions: General Precautions, Isolation, Contact Precautions    Subjective   General  Chart Reviewed: Yes  Patient assessed for rehabilitation services?: Yes  Response to previous treatment: Patient with no complaints from previous session  Family / Caregiver Present: No  Referring Practitioner: Dr. Davies Base: Patient denies pain. General Comment  Comments: Patient sitting on bedside commode upon entry to room but agreeable to OT eval.     Social/Functional History  Social/Functional History  Lives With: Family  Type of Home: Mobile home  Home Layout: One level  Home Access: Ramped entrance  Bathroom Shower/Tub: Walk-in shower  Bathroom Toilet: Handicap height  Bathroom Equipment: Grab bars in shower, Shower chair  Bathroom Accessibility: Dominik Walton: [email or web address] Connell, [address] [facility], [place], [facility]  Has the patient had two or more falls in the past year or any fall with injury in the past year?: No  Receives Help From: Family  ADL Assistance: Independent  Homemaking Assistance: Independent  Homemaking Responsibilities: Yes  Meal Prep Responsibility: No  Ambulation Assistance: Independent  Transfer Assistance: Independent  Active : Yes  Mode of Transportation: Car  Occupation: Retired  Additional Comments: Patient states that his nephew is also living with them currently and helps assist as needed. Objective   Heart Rate: 59  Heart Rate Source: Monitor  BP: (!) 98/40  BP Location: Left upper arm  BP Method: Automatic  Patient Position: Semi fowlers  MAP (Calculated): 59.33  Resp: 21  SpO2: 96 %  O2 Device: Nasal cannula          Observation/Palpation  Posture: Good  Safety Devices  Type of Devices: Call light within reach; Left in bed  Restraints  Restraints Initially in Place: No  Bed Mobility Training  Bed Mobility Training: Yes  Overall Level of Assistance: Supervision  Rolling: Supervision  Supine to Sit: Supervision  Sit to Supine: Supervision  Transfer Training  Transfer Training: Yes  Overall Level of Assistance: Supervision  Interventions: Verbal cues  Sit to Stand: Supervision  Stand to Sit: Supervision  Toilet Transfer: Supervision        ADL  Feeding: Independent  Grooming: Setup;Stand by assistance  UE Bathing: Setup;Stand by assistance  LE Bathing: Setup;Stand by assistance  UE Dressing: Setup;Stand by assistance  LE Dressing: Setup;Stand by assistance  Toileting: Contact guard assistance  Toileting Skilled Clinical Factors: CGA to use bedside commode     Activity Tolerance  Activity Tolerance: Patient tolerated treatment well        Vision  Vision: Within Functional Limits  Vision Exceptions: Wears glasses for reading  Hearing  Hearing: Within functional limits  Cognition  Overall Cognitive Status: WFL  Orientation  Overall Orientation Status: Within Functional Limits                  Education Given To: Patient  Education Provided: Role of Therapy;Plan of Care  Education Provided Comments: Pt educated on d/c folder, AE/DME, and safety with G understanding. Education Method: Demonstration;Verbal  Barriers to Learning: None  Education Outcome: Verbalized understanding;Demonstrated understanding                        G-Code     OutComes Score                                                  AM-PAC Score             Tinneti Score       Goals  Short Term Goals  Time Frame for Short term goals: 21 visits  Short Term Goal 1: Patient to be educated on d/c folder, AE/DME and EC/WS techniques to ensure safe and indep return home. Short Term Goal 2: Patient to complete ADL routine c Mod I c AE/DME and implementation of EC/WS techniques as needed to ensure safe return home.   Short Term Goal 3: Patient will tolerate 15 mins of BUE ther ex/act to increase overall strength/activity tolerance for functional tasks  Short Term Goal 4: Patient will complete functional mobility during ADLs with no AD and mod I to maximise safety and return to PLOF       Therapy Time   Individual Concurrent Group Co-treatment   Time In 1115         Time Out 1123         Minutes 40[address] [place] [address]

## 2022-08-11 NOTE — PROGRESS NOTES
INTENSIVE CARE UNIT--COVID   APRN - Progress Note    Patient - Malick Grullon  Date of Admission -  8/3/2022 11:12 AM  Date of Evaluation -  2022  Hospital Day - 8      SUBJECTIVE:     The Malick Grullon is a 70 y.o. male who is seen for follow up in the ICU. Per nursing report and notes, overnight events include: no significant events. He resting in bed alert and oriented in no distress. Stated that he feels more \"with it\" today. ROS:   Constitutional: negative  for fevers, and negative for chills. Respiratory: negative for shortness of breath, negative for cough, and negative for wheezing  Cardiovascular: negative for chest pain, and negative for palpitations  Gastrointestinal: negative for abdominal pain, negative for nausea,negative for vomiting, negative for diarrhea, and negative for constipation    All other systems were reviewed with the patient and are negative unless otherwise stated in HPI.       OBJECTIVE:     VITAL SIGNS:  Patient Vitals for the past 8 hrs:   BP Temp Temp src Pulse Resp SpO2 Weight   22 0632 -- 96.9 °F (36.1 °C) Temporal 65 20 94 % --   22 0600 (!) 164/70 -- -- 59 16 94 % --   22 0500 131/65 -- -- 55 23 97 % 155 lb 13.8 oz (70.7 kg)   22 0400 131/69 -- -- 58 21 91 % --   22 0300 (!) 143/67 99 °F (37.2 °C) Temporal 58 18 90 % --   22 0200 (!) 153/51 -- -- 58 14 91 % --   22 0100 139/60 -- -- 59 19 94 % --   22 0000 (!) 117/51 98.1 °F (36.7 °C) Temporal 59 21 94 % --   08/10/22 2300 (!) 143/64 -- -- 57 21 94 % --         Temp: 96.9 °F (36.1 °C)  Temp range:    Temp  Av.2 °F (36.8 °C)  Min: 96.9 °F (36.1 °C)  Max: 99 °F (37.2 °C)    BP: (!) 164/70  BP Range:      Systolic (24TXT), ZIS:629 , Min:101 , FYW:591      Diastolic (33VDR), HBD:34, Min:41, Max:70    Heart Rate: 65  Pulse Range:    Pulse  Av.8  Min: 55  Max: 75    Resp: 20  Resp Range:   Resp  Av  Min: 14  Max: 24    SpO2: 94 % on heated high flow O2 cannula  SpO2 range:   SpO2  Av.4 %  Min: 83 %  Max: 97 %    VENTILATOR SETTINGS:  Vent Information  Ventilator ID: [email or web address]     PaO2/FiO2 RATIO:  No results for input(s): POCPO2 in the last 72 hours. FiO2 : 39 %       Weight  Wt Readings from Last 3 Encounters:   22 155 lb 13.8 oz (70.7 kg)   22 176 lb (79.8 kg)   22 181 lb (82.1 kg)     Body mass index is 25.16 kg/m². 24HR INTAKE/OUTPUT:      Intake/Output Summary (Last 24 hours) at 2022 0651  Last data filed at 2022 0501  Gross per 24 hour   Intake 780 ml   Output 1500 ml   Net -720 ml     Date 22 0000 - 22 2359   Shift 4525-6764 8766-3867 0011-6840 24 Hour Total   INTAKE   P.O. 120   120   Shift Total(mL/kg) 120(1.7)   120(1.7)   OUTPUT   Urine(mL/kg/hr) 275   275   Shift Total(mL/kg) 275(3.9)   275(3.9)   Weight (kg) 70.7 70.7 70.7 70.7         PHYSICAL EXAM:  GEN:    Awake and following commands:     [] No   [] Yes  MENTAL STATUS: alert and oriented x3. DISTRESS: Acute respiratory distress:       [x] No   [] Yes  EYES:  EOMI, pupils equal   NECK: Supple. No lymphadenopathy. No carotid bruit  CVS:    regular rate and rhythm, no audible murmur  PULM:   fine rales bases , no acute respiratory distress  ABD:    Bowels sounds normal.  Abdomen is soft. No distention. no tenderness to palpation. EXT:   no edema bilaterally . No calf tenderness. NEURO: Moves all extremities. Motor and sensory are grossly intact  SKIN:  No rashes. No skin lesions.           MEDICATIONS:  Scheduled Meds:   potassium chloride  40 mEq Oral TID WC    ascorbic acid  1,000 mg Oral 4x Daily    famotidine  40 mg Oral BID    zinc sulfate  100 mg Oral Daily    dexamethasone  6 mg Oral Daily    [Held by provider] bumetanide  2 mg Oral Daily    furosemide  40 mg IntraVENous BID    dapagliflozin  10 mg Oral QAM    amiodarone  200 mg Oral Daily    albuterol  2.5 mg Nebulization 4x daily    spironolactone  25 mg Oral Daily    sodium chloride flush  10 mL IntraVENous 2 times per day    [Held by provider] apixaban  5 mg Oral BID    atorvastatin  80 mg Oral Daily    busPIRone  15 mg Oral BID    clopidogrel  75 mg Oral Daily    escitalopram  20 mg Oral Nightly    folic acid  1 mg Oral Daily    magnesium oxide  600 mg Oral BID    melatonin  10 mg Oral Nightly    metoprolol succinate  25 mg Oral Daily    midodrine  10 mg Oral TID WC    multivitamin  1 tablet Oral Daily    pantoprazole  40 mg Oral QAM AC    rOPINIRole  0.5 mg Oral Nightly    sacubitril-valsartan  0.5 tablet Oral BID    tamsulosin  0.4 mg Oral Daily    vitamin B-1  100 mg Oral Daily    vitamin D  50,000 Units Oral Weekly     Continuous Infusions:   sodium chloride       PRN Meds:   ipratropium-albuterol, 1 ampule, Q4H PRN  albuterol, 2.5 mg, Q4H PRN  ALPRAZolam, 0.25 mg, Nightly PRN  iopamidol, 75 mL, ONCE PRN  dextromethorphan-guaiFENesin, 1 tablet, BID PRN  sodium chloride flush, 10 mL, PRN  sodium chloride, , PRN  ondansetron, 4 mg, Q8H PRN   Or  ondansetron, 4 mg, Q6H PRN  polyethylene glycol, 17 g, Daily PRN  acetaminophen, 650 mg, Q6H PRN   Or  acetaminophen, 650 mg, Q6H PRN  magnesium sulfate, 2,000 mg, PRN  magnesium sulfate, 1,000 mg, PRN            VASOPRESSORS:    [x] No      [] Yes  [] Levophed      [] Dopamine     [] Vasopressin      [] Dobutamine     [] Phenylephrine     [] Epinephrine        DATA:  Complete Blood Count:   Recent Labs     08/09/22  0600 08/10/22  0545 08/11/22  0520   WBC 2.0* 2.1* 3.5   RBC 3.63* 3.68* 3.75*   HGB 11.6* 11.9* 12.2*   HCT 37.2* 36.8* 37.4*   .5 100.0 99.7   RDW 15.3* 15.1* 14.9*   PLT See Reflexed IPF Result See Reflexed IPF Result See Reflexed IPF Result   SEGS 74* 78* 95*   NEUTROABS 1.48* 1.63 3.32   LYMPHOPCT 19* 16* 4*   LYMPHSABS 0.38* 0.34* 0.14*   MONOPCT 7 6 1*   EOSRELPCT 0* 0* 0*   BASOPCT 0 0 0   IMMGRAN 0 0 0        Recent Blood Glucose:   Recent Labs     08/09/22  0600 08/10/22  0545 08/11/22  0520   GLUCOSE 93 93 112*        Comprehensive Metabolic Profile:   Recent Labs     08/09/22  0600 08/10/22  0545 08/10/22  1250 08/11/22  0520   BUN 19 14  --  20   CREATININE 0.81 0.92  --  0.96    141  --  139   K 3.9 2.8* 3.0* 3.6*   CL 98 94*  --  94*   MG 1.7 1.5*  --  2.0   CALCIUM 9.3 9.0  --  9.3   ANIONGAP 8* 12  --  8*   CO2 34* 35*  --  37*   PROT 6.1* 6.1*  --  6.0*   LABALBU 3.6 3.5  --  3.5   BILITOT 0.85 1.10  --  1.12   ALKPHOS 97 93  --  91   * 161*  --  153*   * 175*  --  173*        Urinalysis:   Lab Results   Component Value Date/Time    NITRU NEGATIVE 07/27/2022 03:15 PM    COLORU Yellow 07/27/2022 03:15 PM    PHUR 7.5 07/27/2022 03:15 PM    WBCUA 0 TO 2 07/27/2022 03:15 PM    RBCUA 0 TO 2 07/27/2022 03:15 PM    MUCUS TRACE 07/27/2022 03:15 PM    BACTERIA 1+ 07/27/2022 03:15 PM    SPECGRAV 1.010 07/27/2022 03:15 PM    LEUKOCYTESUR NEGATIVE 07/27/2022 03:15 PM    UROBILINOGEN Normal 07/27/2022 03:15 PM    BILIRUBINUR NEGATIVE 07/27/2022 03:15 PM    GLUCOSEU NEGATIVE 07/27/2022 03:15 PM    KETUA NEGATIVE 07/27/2022 03:15 PM       HgBA1c:  No results found for: LABA1C    TSH:    Lab Results   Component Value Date/Time    TSH 5.77 07/25/2022 03:50 PM       Lactic Acid:   Lab Results   Component Value Date/Time    LACTA 1.9 05/18/2022 02:35 PM        High Sensitivity Troponin:  No results for input(s): TROPHS in the last 72 hours.   Pro-BNP:  Lab Results   Component Value Date    PROBNP 7,741 (H) 08/09/2022     D-Dimer:  Lab Results   Component Value Date    DDIMER 2.40 (H) 08/11/2022     PT/INR:    Lab Results   Component Value Date/Time    PROTIME 16.7 08/03/2022 12:20 PM    INR 1.4 08/03/2022 12:20 PM     PTT:    Lab Results   Component Value Date/Time    APTT 33.8 06/06/2022 12:25 PM       CRP:   Recent Labs     08/10/22  0629   CRP 52.8*       ABGs:   Lab Results   Component Value Date/Time    PHART 7.501 08/10/2022 10:10 AM    HMR3MVT 51.7 08/10/2022 10:10 AM    PO2ART 93.9 08/10/2022 10:10 AM PXY1MDP 39.5 08/10/2022 10:10 AM    Z3JLONJO 97.6 08/10/2022 10:10 AM    FIO2 28 08/03/2022 12:00 PM         Radiology/Imaging:  XR CHEST PORTABLE   Final Result   1. Status post bilateral thoracentesis with decrease in the pleural effusions   and no immediate complications. US THORACENTESIS Which side should the procedure be performed? Radiologist Recommendation   Final Result   1. Successful ultrasound guided bilateral thoracentesis. US THORACENTESIS Which side should the procedure be performed? Right   Final Result   1. Successful ultrasound guided bilateral thoracentesis. CTA HEAD NECK W CONTRAST   Final Result   Postsurgical changes involving the right carotid bulb and proximal right   cervical ICA. There is a short dissection flap at the level of the carotid   bulb resulting in about 60% stenosis at the origin of the right cervical ICA. Thick atherosclerotic calcified plaque is noted involving the left carotid   bulb and extending into the origin of the left cervical ICA with a short   segment of 90% stenosis. Short segment of high-grade stenosis involving the right intracranial   vertebral artery with about 70% stenosis. 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 CT CHEST PULMONARY EMBOLISM W CONTRAST   Final Result   No evidence of pulmonary embolism. Mildly enlarged main pulmonary artery   which can be seen in the setting of pulmonary hypertension. Mild interstitial pulmonary edema on a background of centrilobular emphysema. A few scattered ground-glass opacities in the upper lobes may be due to a   component of mild alveolar edema, though clinical correlation is recommended   to exclude concern for contributory infectious causes. Moderate layering bilateral pleural effusions with partial collapse of the   lower lobes, left more so than right.       Cardiomegaly which appears unchanged from prior with 3 vessel coronary artery   disease. XR CHEST (2 VW)   Final Result   Essentially stable exam demonstrating mild pulmonary edema and bilateral   pleural effusions, left greater than right. XR CHEST (2 VW)   Final Result   Interval increasing left pleural effusion. Cardiomegaly and vascular   congestion with probable underlying atelectasis. XR CHEST PORTABLE   Final Result   Overall findings most consistent with decompensated CHF with mild pulmonary   edema. Probable bibasilar subsegmental atelectasis and pleural effusions. However,   underlying infiltrates not excluded. ASSESSMENT / PLAN:     Acute on chronic combined systolic and diastolic CHF (congestive heart failure) (HCC)  Continue current therapy  Consults: Cardiology, Pulmonology  Off IV Lasix  Stop oral Bumex  Continue V Lasix 40 mg bid  Liver Enzymes are elevated likely secondary to CHF  Teds  Daily weights  CHF education given by myself information given  Acute respiratory failure with hypoxia  Supplemental O2-5L  Out of bed with meals  CT Chest PE Protocol--No PE.   Moderate bilateral pleural effusion with partial collapse of the lower lobes Lt>Rt  Appreciate Pulmonology--Spoke with Dr. Panda Ingram.  CHF and pleural  Viral Panel-Covid  positive  ABG-normal  No BiPAP  Thoracentesis-2L removed total (1L/Lung)  COPD  Monitor for need for nebs  CKD  Trend labs  Caution with IV diuretics  Monitor output  Nonischemic cardiomyopathy  Appreciate cardiology  Continue with LifeVest  PAF  Continue amiodarone, Eliquis, Toprol-XL  Covid 19 Virus infection  Appreciate ID  Positive 8/9/2022  Monoclonal antibodies-8/10/2022  Continue Dexamethasone 6 mg daily for 10 doses  Remdesivir-defer to ID  Acterma-Defer to ID  Continue Vit C, D, Zinc  Nutrition status:  obesity, non-morbid  Dietician consult initiated  Hospital Prophylaxis:  DVT: Eliquis   Stress Ulcer: H2 Blocker   High risk medications: none    Disposition:    Transfer out of ICU: [x] yes [] no   Discharge plan is pending  Total critical care time caring for this patient with life threatening, unstable organ failure, including direct patient contact, management of life support systems, review of data including imaging and labs, discussions with other team members and physicians at least 0 minutes so far today, excluding separately billable procedures.       Dale Hoyos, SEVEN Chance CNP , SEVEN-NP-C  8/11/2022  6:51 AM

## 2022-08-11 NOTE — PROGRESS NOTES
Writer at bedside, patient still seems a little confused, continues to take his pulse ox probe off, re-oriented and teaching done as to why he needs it.

## 2022-08-11 NOTE — PROGRESS NOTES
Pt laying in bed watching TV when writer entered the room. Vitals and assessment as charted. Pt denies any further needs. Call light within reach. Bed alarm on.

## 2022-08-11 NOTE — PROGRESS NOTES
Patient attempting to get out of bed, wanting to \"clean up, turn off the lights and go to another room\". Confused at times and very impulsive.  Writer at bedside, assisted patient back to bed, tidied room up, bed alarm back on

## 2022-08-11 NOTE — PROGRESS NOTES
Morning assessment and vitals taken at this time. Patient sitting at edge of bed to use the urinal. Breakfast order taken. Patient left resting back in bed, call light within reach.

## 2022-08-11 NOTE — PROGRESS NOTES
Patient oriented to who he is, where he is, and month but very impulsiveness and forgetful. Continues to set bed alarm off with all wires connected and try to walk about his room. Writer at bedside, reminded of call light and the need to call out for any help.

## 2022-08-11 NOTE — PROGRESS NOTES
Patient continues to set bed alarm off and get up without assistance, very forgetful, does not call out for help.

## 2022-08-11 NOTE — PROGRESS NOTES
Pt called out for some ice water. Writer took in what the patient could have from [id number], since patient is on a fluids restriction. Vitals and assessment as charted. Pt denies the need for anything further. Call light within reach. Bed alarm on.

## 2022-08-11 NOTE — PLAN OF CARE
Problem: Pain  Goal: Verbalizes/displays adequate comfort level or baseline comfort level  8/10/2022 2208 by Radhika Palma RN  Outcome: Progressing  Note: Pt is able to verbalize when in pain. Pt denies pain at this time. Will continue to monitor. Problem: Safety - Adult  Goal: Free from fall injury  8/10/2022 2208 by Radhika Palma RN  Outcome: Progressing  Note: Bed in low position. Wheels locked. Bed alarm on. 2/4 side rails are up. Fall band on. Call light within reach. Problem: Respiratory - Adult  Goal: Achieves optimal ventilation and oxygenation  Outcome: Progressing  Note: Lungs are clear and diminished. Pt remains on the heated high flow, will continue to monitor.

## 2022-08-11 NOTE — CONSULTS
Today's Date: 8/10/2022  Patient Name: Hector Lester  Date of admission: 8/3/2022 11:12 AM  Patient's age: 70 y.o., 1950  Admission Dx: Hypoxia [R09.02]  Acute respiratory failure with hypoxia ([address] [J96.01]  Acute on chronic combined systolic and diastolic CHF (congestive heart failure) ([address] [I50.43]    Reason for Consult: management recommendations  Requesting Physician: Brennan Price MD    CHIEF COMPLAINT:  pancytopenia , COVID pneumonia     History Obtained From:  patient, electronic medical record    HISTORY OF PRESENT ILLNESS:      The patient is a 70 y.o.  male who is admitted to the hospital for worsening shortness of breath. He is discovered to have multiple issues including congestive heart failure with ischemic cardiomyopathy, systolic dysfunction with ejection fraction running between 15 and 20%. He also was found to have COVID-pneumonia with bilateral pleural effusion that where tapped today. The patient is seen and evaluated in ICU. He had progressive thrombocytopenia and leukopenia. He does not have frequent labs at [facility] prior to this admission but I found previous CBC from [address] [place] [address] showing relatively mild thrombocytopenia with borderline leukopenia at baseline. The patient is seen and evaluated. He is on high flow oxygen. His significant other at bedside and she was able to give me more information. Patient was diagnosed with history of liver cirrhosis secondary to alcohol intake. His cardiomyopathy is likely alcoholic in nature. He has history of CKD and he has been managed conservatively. He seems to be doing well with the current treatment for the COVID-pneumonia and shortness of breath improved significantly after the pleural tap. He was started on dexamethasone for COVID-pneumonia.     Past Medical History:   has a past medical history of Acute kidney injury ([address] Ascites, Atrial fibrillation ([address] CHF (congestive heart failure) ([address] Cirrhosis of liver with ascites ([address] CKD (chronic kidney disease), Coronary artery disease, Hyperlipidemia, Hypertension, Hypomagnesemia, Hyponatremia, and Thrombocytopenia ([address] Past Surgical History:   has a past surgical history that includes Rotator cuff repair (Right); Carotid stent placement (Bilateral); hernia repair; and Tonsillectomy and adenoidectomy. Medications:    Reviewed in Epic     Allergies:  Patient has no known allergies. Social History:   reports that he quit smoking about 6 weeks ago. His smoking use included cigarettes. He has a 56.00 pack-year smoking history. He has never used smokeless tobacco. He reports that he does not currently use alcohol after a past usage of about 2.0 standard drinks per week. He reports that he does not currently use drugs. Family History: family history includes Heart Disease in his father and mother; Hypertension in his father and mother. REVIEW OF SYSTEMS:    Constitutional: No fever or chills. No night sweats, no weight loss   Eyes: No eye discharge, double vision, or eye pain   HEENT: negative for sore mouth, sore throat, hoarseness and voice change   Respiratory: Worsening shortness of breath and cough, no hemoptysis  Cardiovascular: Dyspnea and chest discomfort, significant orthopnea improved after the pleural tap  Gastrointestinal: negative for nausea, vomiting, diarrhea, constipation, abdominal pain, Dysphagia, hematemesis and hematochezia   Genitourinary: negative for frequency, dysuria, nocturia, urinary incontinence, and hematuria   Integument: negative for rash, skin lesions, bruises.    Hematologic/Lymphatic: negative for easy bruising, bleeding, lymphadenopathy, or petechiae   Endocrine: negative for heat or cold intolerance,weight changes, change in bowel habits and hair loss   Musculoskeletal: negative for myalgias, arthralgias, pain, joint swelling,and bone pain   Neurological: negative for headaches, dizziness, seizures, weakness, numbness    PHYSICAL EXAM:      BP (!) 123/52   Pulse 56   Temp 97.8 °F (36.6 °C)   Resp 24   Ht 5' 6\" (1.676 m)   Wt 162 lb 7.7 oz (73.7 kg) Comment: Bed rezeroed before weighing the patient  SpO2 92%   BMI 26.22 kg/m²    Temp (24hrs), Av.4 °F (36.9 °C), Min:97.8 °F (36.6 °C), Max:99.1 °F (37.3 °C)    General appearance -ill-appearing elderly man who is in mild distress, he is on high flow oxygen improved  Mental status - alert and cooperative   Eyes - pupils equal and reactive, extraocular eye movements intact   Ears - bilateral TM's and external ear canals normal   Mouth - mucous membranes moist, pharynx normal without lesions   Neck - supple, no significant adenopathy   Lymphatics - no palpable lymphadenopathy, no hepatosplenomegaly   Chest -air entry, however it still decreased at both bases. No wheezing appreciated  Heart - normal rate, regular rhythm, normal S1, S2, no murmurs  Abdomen - soft, nontender, distended, no hepatosplenomegaly appreciated  Neurological - alert, oriented, normal speech, no focal findings or movement disorder noted   Musculoskeletal - no joint tenderness, deformity or swelling   Extremities - peripheral pulses normal, no pedal edema, no clubbing or cyanosis   Skin - normal coloration and turgor, no rashes, no suspicious skin lesions noted ,    DATA:    Labs:   CBC:   Recent Labs     22  0600 08/10/22  0545   WBC 2.0* 2.1*   HGB 11.6* 11.9*   HCT 37.2* 36.8*   PLT See Reflexed IPF Result See Reflexed IPF Result     BMP:   Recent Labs     22  0600 08/10/22  0545 08/10/22  1250    141  --    K 3.9 2.8* 3.0*   CO2 34* 35*  --    BUN 19 14  --    CREATININE 0.81 0.92  --    LABGLOM >60 >60  --    GLUCOSE 93 93  --      PT/INR: No results for input(s): PROTIME, INR in the last 72 hours.     IMAGING DATA:      Primary Problem  Acute on chronic combined systolic and diastolic CHF (congestive heart failure) [facility])    Active Hospital Problems    Diagnosis Date Noted Acute respiratory failure with hypoxia (HCC) [J96.01] 08/04/2022     Priority: High    Chronic anticoagulation [Z79.01] 08/04/2022     Priority: High    Chronic combined systolic (congestive) and diastolic (congestive) heart failure (HCC) [I50.42]      Priority: High    History of acute inferior wall MI [I25.2]      Priority: High    Bilateral pleural effusion [J90] 08/09/2022     Priority: Medium    Nonischemic cardiomyopathy ([facility] [I42.8] 08/04/2022     Priority: Medium    Hypoxia [R09.02] 08/03/2022     Priority: Medium    Paroxysmal atrial fibrillation ([address] [I48.0] 07/19/2022     Priority: Medium    Lightheaded [R42] 07/19/2022     Priority: Medium    Gastroesophageal reflux disease [K21.9] 07/19/2022     Priority: Medium    Acute on chronic combined systolic and diastolic CHF (congestive heart failure) ([facility] [I50.43] 07/19/2022     Priority: Medium    Hypokalemia [E87.6] 07/19/2022     Priority: Medium    Hypovitaminosis D [E55.9] 07/19/2022     Priority: Medium    Severe malnutrition ([address] [E43] 06/07/2022     Priority: Medium    CKD (chronic kidney disease) [N18.9] 05/20/2022     Priority: Medium    Cirrhosis of liver with ascites ([facility] [K74.60, R18.8] 05/19/2022     Priority: Medium    Hyperlipidemia [E78.5]      Priority: Medium    Coronary artery disease [I25.10]      Priority: Medium    Hypertension [I10]      Priority: Medium         IMPRESSION:   Leukopenia and thrombocytopenia with macrocytic anemia  COVID-19 pneumonia    Acute systolic congestive heart failure related to systolic cardiomyopathy likely alcoholic  History of liver cirrhosis  Pleural effusion status post thoracocentesis    RECOMMENDATIONS:  The patient has baseline mild leukopenia and thrombocytopenia likely related to his alcoholic liver disease and that will explain his macrocytosis.   Worsening counts are likely related to recent COVID-pneumonia, especially the lymphopenia and thrombocytopenia  Await thoracocentesis cytology and labs to exclude underlying infection/malignancy  Recommend supportive care and watch platelet count. Continue with steroids as they do help with infectious thrombocytopenia. COVID-pneumonia induced thrombocytopenia is usually immune mediated and would respond to steroid. However, I think the most important contributor to his low count is his underlying liver cirrhosis and the decompensation is secondary to the infection  No need for transfusion unless platelet drop under 37,604 or if he has active bleeding  No need for growth factor as the patient is mostly lymphopenic rather than neutropenic  Hoping that his counts will improve with treatment of the COVID-19 pneumonia  Will follow as an outpatient and decide after the counts recover if there is any further intervention needed  Please contact us with any question      Discussed with patient and Nurse. Thank you for asking us to see this patient.     MARSHAL PADRON [facility] MD [place]  Hematologist/Medical Oncologist  Cell: [phone]

## 2022-08-12 PROBLEM — E44.1 MILD MALNUTRITION (HCC): Status: ACTIVE | Noted: 2022-08-12

## 2022-08-12 LAB
ABSOLUTE EOS #: 0 K/UL (ref 0–0.44)
ABSOLUTE IMMATURE GRANULOCYTE: 0 K/UL (ref 0–0.3)
ABSOLUTE LYMPH #: 0.35 K/UL (ref 1.1–3.7)
ABSOLUTE MONO #: 0.2 K/UL (ref 0.1–1.2)
ALBUMIN SERPL-MCNC: 3.4 G/DL (ref 3.5–5.2)
ALBUMIN/GLOBULIN RATIO: 1.3 (ref 1–2.5)
ALP BLD-CCNC: 92 U/L (ref 40–129)
ALT SERPL-CCNC: 159 U/L (ref 5–41)
ANION GAP SERPL CALCULATED.3IONS-SCNC: 9 MMOL/L (ref 9–17)
AST SERPL-CCNC: 124 U/L
BASOPHILS # BLD: 1 % (ref 0–2)
BASOPHILS ABSOLUTE: 0.03 K/UL (ref 0–0.2)
BILIRUB SERPL-MCNC: 1.04 MG/DL (ref 0.3–1.2)
BUN BLDV-MCNC: 21 MG/DL (ref 8–23)
BUN/CREAT BLD: 23 (ref 9–20)
C-REACTIVE PROTEIN: 54.4 MG/L (ref 0–5)
CALCIUM SERPL-MCNC: 9.4 MG/DL (ref 8.6–10.4)
CHLORIDE BLD-SCNC: 95 MMOL/L (ref 98–107)
CO2: 35 MMOL/L (ref 20–31)
CREAT SERPL-MCNC: 0.9 MG/DL (ref 0.7–1.2)
D-DIMER QUANTITATIVE: 2.16 MG/L FEU (ref 0–0.59)
EOSINOPHILS RELATIVE PERCENT: 0 % (ref 1–4)
FERRITIN: 1826 NG/ML (ref 30–400)
GFR AFRICAN AMERICAN: >60 ML/MIN
GFR NON-AFRICAN AMERICAN: >60 ML/MIN
GFR SERPL CREATININE-BSD FRML MDRD: ABNORMAL ML/MIN/{1.73_M2}
GFR SERPL CREATININE-BSD FRML MDRD: ABNORMAL ML/MIN/{1.73_M2}
GLUCOSE BLD-MCNC: 117 MG/DL (ref 70–99)
HCT VFR BLD CALC: 38.1 % (ref 40.7–50.3)
HEMOGLOBIN: 12.4 G/DL (ref 13–17)
IMMATURE GRANULOCYTES: 0 %
LYMPHOCYTES # BLD: 12 % (ref 24–43)
MCH RBC QN AUTO: 32.8 PG (ref 25.2–33.5)
MCHC RBC AUTO-ENTMCNC: 32.5 G/DL (ref 28.4–34.8)
MCV RBC AUTO: 100.8 FL (ref 82.6–102.9)
MONOCYTES # BLD: 7 % (ref 3–12)
MORPHOLOGY: ABNORMAL
MORPHOLOGY: ABNORMAL
NRBC AUTOMATED: 0 PER 100 WBC
PDW BLD-RTO: 14.4 % (ref 11.8–14.4)
PH FLUID: 8.5
PLATELET # BLD: ABNORMAL K/UL (ref 138–453)
PLATELET, FLUORESCENCE: 65 K/UL (ref 138–453)
PLATELET, IMMATURE FRACTION: 11.7 % (ref 1.1–10.3)
POTASSIUM SERPL-SCNC: 4.7 MMOL/L (ref 3.7–5.3)
RBC # BLD: 3.78 M/UL (ref 4.21–5.77)
SEG NEUTROPHILS: 80 % (ref 36–65)
SEGMENTED NEUTROPHILS ABSOLUTE COUNT: 2.32 K/UL (ref 1.5–8.1)
SODIUM BLD-SCNC: 139 MMOL/L (ref 135–144)
SPECIMEN TYPE: NORMAL
SURGICAL PATHOLOGY REPORT: NORMAL
TOTAL PROTEIN: 6 G/DL (ref 6.4–8.3)
WBC # BLD: 2.9 K/UL (ref 3.5–11.3)

## 2022-08-12 PROCEDURE — 85025 COMPLETE CBC W/AUTO DIFF WBC: CPT

## 2022-08-12 PROCEDURE — 85379 FIBRIN DEGRADATION QUANT: CPT

## 2022-08-12 PROCEDURE — 6370000000 HC RX 637 (ALT 250 FOR IP): Performed by: NURSE PRACTITIONER

## 2022-08-12 PROCEDURE — 94761 N-INVAS EAR/PLS OXIMETRY MLT: CPT

## 2022-08-12 PROCEDURE — 2580000003 HC RX 258: Performed by: NURSE PRACTITIONER

## 2022-08-12 PROCEDURE — 97535 SELF CARE MNGMENT TRAINING: CPT

## 2022-08-12 PROCEDURE — 99232 SBSQ HOSP IP/OBS MODERATE 35: CPT | Performed by: FAMILY MEDICINE

## 2022-08-12 PROCEDURE — 6360000002 HC RX W HCPCS: Performed by: NURSE PRACTITIONER

## 2022-08-12 PROCEDURE — 80053 COMPREHEN METABOLIC PANEL: CPT

## 2022-08-12 PROCEDURE — 1200000000 HC SEMI PRIVATE

## 2022-08-12 PROCEDURE — 94669 MECHANICAL CHEST WALL OSCILL: CPT

## 2022-08-12 PROCEDURE — 99233 SBSQ HOSP IP/OBS HIGH 50: CPT | Performed by: INTERNAL MEDICINE

## 2022-08-12 PROCEDURE — 94640 AIRWAY INHALATION TREATMENT: CPT

## 2022-08-12 PROCEDURE — 82728 ASSAY OF FERRITIN: CPT

## 2022-08-12 PROCEDURE — 2700000000 HC OXYGEN THERAPY PER DAY

## 2022-08-12 PROCEDURE — 85055 RETICULATED PLATELET ASSAY: CPT

## 2022-08-12 PROCEDURE — 97110 THERAPEUTIC EXERCISES: CPT

## 2022-08-12 PROCEDURE — 86140 C-REACTIVE PROTEIN: CPT

## 2022-08-12 PROCEDURE — 36415 COLL VENOUS BLD VENIPUNCTURE: CPT

## 2022-08-12 PROCEDURE — 97116 GAIT TRAINING THERAPY: CPT

## 2022-08-12 RX ADMIN — ALBUTEROL SULFATE 2.5 MG: 2.5 SOLUTION RESPIRATORY (INHALATION) at 05:17

## 2022-08-12 RX ADMIN — MIDODRINE HYDROCHLORIDE 10 MG: 5 TABLET ORAL at 14:26

## 2022-08-12 RX ADMIN — AMIODARONE HYDROCHLORIDE 200 MG: 200 TABLET ORAL at 08:05

## 2022-08-12 RX ADMIN — METOPROLOL SUCCINATE 25 MG: 25 TABLET, EXTENDED RELEASE ORAL at 08:05

## 2022-08-12 RX ADMIN — MULTIVITAMIN TABLET 1 TABLET: TABLET at 08:05

## 2022-08-12 RX ADMIN — Medication 600 MG: at 20:53

## 2022-08-12 RX ADMIN — DEXAMETHASONE 6 MG: 4 TABLET ORAL at 08:06

## 2022-08-12 RX ADMIN — ZINC SULFATE 220 MG (50 MG) CAPSULE 100 MG: CAPSULE at 08:05

## 2022-08-12 RX ADMIN — MIDODRINE HYDROCHLORIDE 10 MG: 5 TABLET ORAL at 17:07

## 2022-08-12 RX ADMIN — MIDODRINE HYDROCHLORIDE 10 MG: 5 TABLET ORAL at 08:05

## 2022-08-12 RX ADMIN — FAMOTIDINE 40 MG: 20 TABLET ORAL at 20:53

## 2022-08-12 RX ADMIN — SODIUM CHLORIDE, PRESERVATIVE FREE 10 ML: 5 INJECTION INTRAVENOUS at 20:52

## 2022-08-12 RX ADMIN — OXYCODONE HYDROCHLORIDE AND ACETAMINOPHEN 1000 MG: 500 TABLET ORAL at 14:27

## 2022-08-12 RX ADMIN — BUMETANIDE 2 MG: 1 TABLET ORAL at 08:05

## 2022-08-12 RX ADMIN — Medication 600 MG: at 08:07

## 2022-08-12 RX ADMIN — APIXABAN 5 MG: 5 TABLET, FILM COATED ORAL at 08:06

## 2022-08-12 RX ADMIN — TAMSULOSIN HYDROCHLORIDE 0.4 MG: 0.4 CAPSULE ORAL at 08:06

## 2022-08-12 RX ADMIN — ALBUTEROL SULFATE 2.5 MG: 2.5 SOLUTION RESPIRATORY (INHALATION) at 12:00

## 2022-08-12 RX ADMIN — BUSPIRONE HYDROCHLORIDE 15 MG: 5 TABLET ORAL at 20:53

## 2022-08-12 RX ADMIN — SPIRONOLACTONE 25 MG: 25 TABLET ORAL at 08:06

## 2022-08-12 RX ADMIN — SACUBITRIL AND VALSARTAN 0.5 TABLET: 24; 26 TABLET, FILM COATED ORAL at 08:05

## 2022-08-12 RX ADMIN — ALBUTEROL SULFATE 2.5 MG: 2.5 SOLUTION RESPIRATORY (INHALATION) at 15:40

## 2022-08-12 RX ADMIN — OXYCODONE HYDROCHLORIDE AND ACETAMINOPHEN 1000 MG: 500 TABLET ORAL at 20:54

## 2022-08-12 RX ADMIN — FOLIC ACID 1 MG: 1 TABLET ORAL at 08:05

## 2022-08-12 RX ADMIN — CLOPIDOGREL BISULFATE 75 MG: 75 TABLET ORAL at 08:07

## 2022-08-12 RX ADMIN — ESCITALOPRAM 20 MG: 5 TABLET, FILM COATED ORAL at 20:53

## 2022-08-12 RX ADMIN — ATORVASTATIN CALCIUM 80 MG: 40 TABLET, FILM COATED ORAL at 08:06

## 2022-08-12 RX ADMIN — APIXABAN 5 MG: 5 TABLET, FILM COATED ORAL at 20:53

## 2022-08-12 RX ADMIN — ROPINIROLE HYDROCHLORIDE 0.5 MG: 0.25 TABLET, FILM COATED ORAL at 20:53

## 2022-08-12 RX ADMIN — OXYCODONE HYDROCHLORIDE AND ACETAMINOPHEN 1000 MG: 500 TABLET ORAL at 08:06

## 2022-08-12 RX ADMIN — BUSPIRONE HYDROCHLORIDE 15 MG: 5 TABLET ORAL at 08:05

## 2022-08-12 RX ADMIN — PANTOPRAZOLE SODIUM 40 MG: 40 TABLET, DELAYED RELEASE ORAL at 08:06

## 2022-08-12 RX ADMIN — ALBUTEROL SULFATE 2.5 MG: 2.5 SOLUTION RESPIRATORY (INHALATION) at 20:38

## 2022-08-12 RX ADMIN — FAMOTIDINE 40 MG: 20 TABLET ORAL at 08:06

## 2022-08-12 RX ADMIN — SACUBITRIL AND VALSARTAN 0.5 TABLET: 24; 26 TABLET, FILM COATED ORAL at 20:53

## 2022-08-12 RX ADMIN — Medication 10 MG: at 20:53

## 2022-08-12 RX ADMIN — OXYCODONE HYDROCHLORIDE AND ACETAMINOPHEN 1000 MG: 500 TABLET ORAL at 17:07

## 2022-08-12 RX ADMIN — THIAMINE HCL TAB 100 MG 100 MG: 100 TAB at 08:06

## 2022-08-12 RX ADMIN — SODIUM CHLORIDE, PRESERVATIVE FREE 10 ML: 5 INJECTION INTRAVENOUS at 08:07

## 2022-08-12 NOTE — PROGRESS NOTES
4430  Last data filed at 8/12/2022 0500  Gross per 24 hour   Intake 1035 ml   Output 1225 ml   Net -190 ml     Date 08/12/22 0000 - 08/12/22 2359   Shift 1560-0154 0908-9855 1900-5587 24 Hour Total   INTAKE   Shift Total(mL/kg)       OUTPUT   Urine(mL/kg/hr) 400   400   Shift Total(mL/kg) 400(5.8)   400(5.8)   Weight (kg) 69.5 69.5 69.5 69.5         PHYSICAL EXAM:  GEN:    Awake and following commands:     [] No   [] Yes  MENTAL STATUS: alert and oriented x3. DISTRESS: Acute respiratory distress:       [x] No   [] Yes  EYES:  EOMI, pupils equal   NECK: Supple. No lymphadenopathy. No carotid bruit  CVS:    regular rate and rhythm, no audible murmur  PULM:   fine rales bases , no acute respiratory distress  ABD:    Bowels sounds normal.  Abdomen is soft. No distention. no tenderness to palpation. EXT:   no edema bilaterally . No calf tenderness. NEURO: Moves all extremities. Motor and sensory are grossly intact  SKIN:  No rashes. No skin lesions.           MEDICATIONS:  Scheduled Meds:   ascorbic acid  1,000 mg Oral 4x Daily    famotidine  40 mg Oral BID    zinc sulfate  100 mg Oral Daily    dexamethasone  6 mg Oral Daily    bumetanide  2 mg Oral Daily    dapagliflozin  10 mg Oral QAM    amiodarone  200 mg Oral Daily    albuterol  2.5 mg Nebulization 4x daily    spironolactone  25 mg Oral Daily    sodium chloride flush  10 mL IntraVENous 2 times per day    apixaban  5 mg Oral BID    atorvastatin  80 mg Oral Daily    busPIRone  15 mg Oral BID    clopidogrel  75 mg Oral Daily    escitalopram  20 mg Oral Nightly    folic acid  1 mg Oral Daily    magnesium oxide  600 mg Oral BID    melatonin  10 mg Oral Nightly    metoprolol succinate  25 mg Oral Daily    midodrine  10 mg Oral TID WC    multivitamin  1 tablet Oral Daily    pantoprazole  40 mg Oral QAM AC    rOPINIRole  0.5 mg Oral Nightly    sacubitril-valsartan  0.5 tablet Oral BID    tamsulosin  0.4 mg Oral Daily    vitamin B-1  100 mg Oral Daily    vitamin D 50,000 Units Oral Weekly     Continuous Infusions:   sodium chloride       PRN Meds:   ipratropium-albuterol, 1 ampule, Q4H PRN  albuterol, 2.5 mg, Q4H PRN  ALPRAZolam, 0.25 mg, Nightly PRN  iopamidol, 75 mL, ONCE PRN  dextromethorphan-guaiFENesin, 1 tablet, BID PRN  sodium chloride flush, 10 mL, PRN  sodium chloride, , PRN  ondansetron, 4 mg, Q8H PRN   Or  ondansetron, 4 mg, Q6H PRN  polyethylene glycol, 17 g, Daily PRN  acetaminophen, 650 mg, Q6H PRN   Or  acetaminophen, 650 mg, Q6H PRN  magnesium sulfate, 2,000 mg, PRN  magnesium sulfate, 1,000 mg, PRN      DATA:  Complete Blood Count:   Recent Labs     08/10/22  0545 08/11/22  0520   WBC 2.1* 3.5   RBC 3.68* 3.75*   HGB 11.9* 12.2*   HCT 36.8* 37.4*   .0 99.7   RDW 15.1* 14.9*   PLT See Reflexed IPF Result See Reflexed IPF Result   SEGS 78* 95*   NEUTROABS 1.63 3.32   LYMPHOPCT 16* 4*   LYMPHSABS 0.34* 0.14*   MONOPCT 6 1*   EOSRELPCT 0* 0*   BASOPCT 0 0   IMMGRAN 0 0        Recent Blood Glucose:   Recent Labs     08/10/22  0545 08/11/22  0520   GLUCOSE 93 112*        Comprehensive Metabolic Profile:   Recent Labs     08/10/22  0545 08/10/22  1250 08/11/22  0520   BUN 14  --  20   CREATININE 0.92  --  0.96     --  139   K 2.8* 3.0* 3.6*   CL 94*  --  94*   MG 1.5*  --  2.0   CALCIUM 9.0  --  9.3   ANIONGAP 12  --  8*   CO2 35*  --  37*   PROT 6.1*  --  6.0*   LABALBU 3.5  --  3.5   BILITOT 1.10  --  1.12   ALKPHOS 93  --  91   *  --  153*   *  --  173*        Urinalysis:   Lab Results   Component Value Date/Time    NITRU NEGATIVE 07/27/2022 03:15 PM    COLORU Yellow 07/27/2022 03:15 PM    PHUR 7.5 07/27/2022 03:15 PM    WBCUA 0 TO 2 07/27/2022 03:15 PM    RBCUA 0 TO 2 07/27/2022 03:15 PM    MUCUS TRACE 07/27/2022 03:15 PM    BACTERIA 1+ 07/27/2022 03:15 PM    SPECGRAV 1.010 07/27/2022 03:15 PM    LEUKOCYTESUR NEGATIVE 07/27/2022 03:15 PM    UROBILINOGEN Normal 07/27/2022 03:15 PM    BILIRUBINUR NEGATIVE 07/27/2022 03:15 PM    GLUCOSEU NEGATIVE 07/27/2022 03:15 PM    [address] NEGATIVE 07/27/2022 03:15 PM       HgBA1c:  No results found for: LABA1C    TSH:    Lab Results   Component Value Date/Time    TSH 5.77 07/25/2022 03:50 PM       Lactic Acid:   Lab Results   Component Value Date/Time    LACTA 1.9 05/18/2022 02:35 PM        High Sensitivity Troponin:  No results for input(s): TROPHS in the last 72 hours. Pro-BNP:  Lab Results   Component Value Date    PROBNP 7,741 (H) 08/09/2022     D-Dimer:  Lab Results   Component Value Date    DDIMER 2.16 (H) 08/12/2022     PT/INR:    Lab Results   Component Value Date/Time    PROTIME 16.7 08/03/2022 12:20 PM    INR 1.4 08/03/2022 12:20 PM     PTT:    Lab Results   Component Value Date/Time    APTT 33.8 06/06/2022 12:25 PM       CRP:   Recent Labs     08/10/22  0629 08/11/22  0520   CRP 52.8* 79.4*       ABGs:   Lab Results   Component Value Date/Time    PHART 7.501 08/10/2022 10:10 AM    JUT2ZDT 51.7 08/10/2022 10:10 AM    PO2ART 93.9 08/10/2022 10:10 AM    YEM8BUW 39.5 08/10/2022 10:10 AM    Z8XGJULC 97.6 08/10/2022 10:10 AM    FIO2 28 08/03/2022 12:00 PM         Radiology/Imaging:  XR CHEST PORTABLE   Final Result   1. Status post bilateral thoracentesis with decrease in the pleural effusions   and no immediate complications. US THORACENTESIS Which side should the procedure be performed? Radiologist Recommendation   Final Result   1. Successful ultrasound guided bilateral thoracentesis. US THORACENTESIS Which side should the procedure be performed? Right   Final Result   1. Successful ultrasound guided bilateral thoracentesis. CTA HEAD NECK W CONTRAST   Final Result   Postsurgical changes involving the right carotid bulb and proximal right   cervical ICA. There is a short dissection flap at the level of the carotid   bulb resulting in about 60% stenosis at the origin of the right cervical ICA.       Thick atherosclerotic calcified plaque is noted involving the left carotid   bulb and extending into the origin of the left cervical ICA with a short   segment of 90% stenosis. Short segment of high-grade stenosis involving the right intracranial   vertebral artery with about 70% stenosis. 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 CT CHEST PULMONARY EMBOLISM W CONTRAST   Final Result   No evidence of pulmonary embolism. Mildly enlarged main pulmonary artery   which can be seen in the setting of pulmonary hypertension. Mild interstitial pulmonary edema on a background of centrilobular emphysema. A few scattered ground-glass opacities in the upper lobes may be due to a   component of mild alveolar edema, though clinical correlation is recommended   to exclude concern for contributory infectious causes. Moderate layering bilateral pleural effusions with partial collapse of the   lower lobes, left more so than right. Cardiomegaly which appears unchanged from prior with 3 vessel coronary artery   disease. XR CHEST (2 VW)   Final Result   Essentially stable exam demonstrating mild pulmonary edema and bilateral   pleural effusions, left greater than right. XR CHEST (2 VW)   Final Result   Interval increasing left pleural effusion. Cardiomegaly and vascular   congestion with probable underlying atelectasis. XR CHEST PORTABLE   Final Result   Overall findings most consistent with decompensated CHF with mild pulmonary   edema. Probable bibasilar subsegmental atelectasis and pleural effusions. However,   underlying infiltrates not excluded.                ASSESSMENT / PLAN:     Acute on chronic combined systolic and diastolic CHF (congestive heart failure) (HCC)  Continue current therapy  Consults: Cardiology, Pulmonology  Off IV Lasix  Resume oral Bumex  Teds  Daily weights  CHF education given by myself information given  Acute respiratory failure with hypoxia  Supplemental O2-2L  Out of bed with meals  CT Chest PE Protocol--No PE.   Moderate bilateral pleural effusion with partial collapse of the lower lobes Lt>Rt  Appreciate Pulmonology--Spoke with Dr. Marium Marley.  CHF and pleural  Viral Panel-Covid  positive  Thoracentesis-2L removed total (1L/Lung)  COPD  Monitor for need for nebs  CKD  Trend labs  Monitor output  Nonischemic cardiomyopathy  Appreciate cardiology  Continue with LifeVest  PAF  Continue amiodarone, Eliquis, Toprol-XL  Covid 19 Virus infection  Appreciate ID  Positive 8/9/2022  Monoclonal antibodies-8/10/2022  Continue Dexamethasone 6 mg daily for 10 doses  Remdesivir-defer to ID  Acterma-Defer to ID  Continue Vit C, D, Zinc  Nutrition status:  obesity, non-morbid  Dietician consult initiated  Hospital Prophylaxis:  DVT: Eliquis   Stress Ulcer: H2 Blocker   High risk medications: none    Disposition:    Discharge plan is pending      SEVEN Álvarez - CNP , APRN-NP-C  8/12/2022  7:16 AM

## 2022-08-12 NOTE — PROGRESS NOTES
Comprehensive Nutrition Assessment    Type and Reason for Visit:  Reassess    Nutrition Recommendations/Plan:   Continue current diet. Start 4 oz ensure enlive TID with meals. Malnutrition Assessment:  Malnutrition Status:  Mild malnutrition (08/12/22 0936)    Context:  Chronic Illness     Findings of the 6 clinical characteristics of malnutrition:  Energy Intake:  Mild decrease in energy intake (Comment)  Weight Loss:  Greater than 5% over 1 month (22.6#/12.8%)     Body Fat Loss:  No significant body fat loss     Muscle Mass Loss:  No significant muscle mass loss    Fluid Accumulation:  Mild Extremities (trace BLE)   Strength:  Not Performed    Nutrition Assessment:    mild malnutrition r/t inadequate oral intakes aeb PO 26-50%, weight loss 12.8% x 1 month. Pt with COVID-19 now. On vitamin C and D, zinc. On steroid therapy,  glucose 117. Phone call to room, therapy answered. Will add 4 oz ensure enlive TID with meals. Nutrition Related Findings:    no visual malnutrition indices noted Wound Type: None       Current Nutrition Intake & Therapies:    Average Meal Intake: 26-50%  Average Supplements Intake: None Ordered  ADULT DIET; Regular; Low Sodium (2 gm); 1800 ml    Anthropometric Measures:  Height: 5' 6\" (167.6 cm)  Ideal Body Weight (IBW): 142 lbs (65 kg)    Admission Body Weight: 176 lb 2.4 oz (79.9 kg)  Current Body Weight: 153 lb 3.5 oz (69.5 kg), 123.5 % IBW. Weight Source: Bed Scale  Current BMI (kg/m2): 24.7  Usual Body Weight: 191 lb (86.6 kg)  % Weight Change (Calculated): -8.2  Weight Adjustment For: No Adjustment                 BMI Categories: Overweight (BMI 25.0-29. 9)    Estimated Daily Nutrient Needs:  Energy Requirements Based On: Kcal/kg  Weight Used for Energy Requirements: Current  Energy (kcal/day): 6559-9449 (20-23/kg)  Weight Used for Protein Requirements: Ideal  Protein (g/day): 84-97g (1.3-1.5g/kg)  Method Used for Fluid Requirements: ml/Kg  Fluid (ml/day): 1829 ml (23/kg)    Nutrition Diagnosis:   Other (Comment) (mild malnutrition) related to inadequate protein-energy intake as evidenced by intake 26-50%, weight loss greater than or equal to 5% in 1 month    Nutrition Interventions:   Food and/or Nutrient Delivery: Continue Current Diet, Start Oral Nutrition Supplement  Nutrition Education/Counseling: Education initiated (low sodium diet information left at Pt bedside)  Coordination of Nutrition Care: Continue to monitor while inpatient       Goals:  Previous Goal Met: No Progress toward Goal(s)  Goals: PO intake 75% or greater     Recent Labs     08/10/22  0545 08/10/22  1250 08/11/22  0520 08/12/22  0555     --  139 139   K 2.8* 3.0* 3.6* 4.7   CL 94*  --  94* 95*   CO2 35*  --  37* 35*   BUN 14  --  20 21   CREATININE 0.92  --  0.96 0.90   GLUCOSE 93  --  112* 117*   *  --  173* 159*   ALKPHOS 93  --  91 92   GFR             --                             Lab Results   Component Value Date/Time    LABALBU 3.4 08/12/2022 05:55 AM         Nutrition Monitoring and Evaluation:   Behavioral-Environmental Outcomes: None Identified  Food/Nutrient Intake Outcomes: Food and Nutrient Intake, Supplement Intake  Physical Signs/Symptoms Outcomes: Biochemical Data, Weight, Fluid Status or Edema    Discharge Planning:    Continue current diet     Yue Marie RD, LD  Contact: [phone]

## 2022-08-12 NOTE — PLAN OF CARE
Problem: Pain  Goal: Verbalizes/displays adequate comfort level or baseline comfort level  Outcome: Progressing  Flowsheets (Taken 8/10/2022 0236 by Johan Garcia RN)  Verbalizes/displays adequate comfort level or baseline comfort level:   Assess pain using appropriate pain scale   Encourage patient to monitor pain and request assistance     Problem: Safety - Adult  Goal: Free from fall injury  Outcome: Progressing  Flowsheets (Taken 8/11/2022 2135)  Free From Fall Injury: Instruct family/caregiver on patient safety     Problem: Skin/Tissue Integrity  Goal: Absence of new skin breakdown  Description: 1. Monitor for areas of redness and/or skin breakdown  2. Assess vascular access sites hourly  3. Every 4-6 hours minimum:  Change oxygen saturation probe site  4. Every 4-6 hours:  If on nasal continuous positive airway pressure, respiratory therapy assess nares and determine need for appliance change or resting period. Outcome: Progressing  Note: No new skin breakdown observed. Problem: Respiratory - Adult  Goal: Achieves optimal ventilation and oxygenation  Outcome: Progressing  Flowsheets (Taken 8/11/2022 2135)  Achieves optimal ventilation and oxygenation:   Assess for changes in respiratory status   Assess for changes in mentation and behavior   Position to facilitate oxygenation and minimize respiratory effort   Oxygen supplementation based on oxygen saturation or arterial blood gases  Note: Currently on 2L O2 per NC per minute.

## 2022-08-12 NOTE — PROGRESS NOTES
Physical Therapy  Facility/Department: [facility] AT THE [facility]  Daily Treatment Note  NAME: Alivia Amaro  :   MRN: [id number]    Date of Service: 2022    Discharge Recommendations:  Continue to assess pending progress, Home with Home health PT, Outpatient PT        Patient Diagnosis(es): The primary encounter diagnosis was Acute respiratory failure with hypoxia ([address] A diagnosis of Acute on chronic combined systolic and diastolic CHF (congestive heart failure) (HCC) was also pertinent to this visit. Assessment         Plan    Plan  Plan: 2 times a day 7 days a week     Restrictions  Restrictions/Precautions  Restrictions/Precautions: General Precautions, Isolation, Contact Precautions     Subjective    Subjective  Subjective: Pt seated EOB upon arrival.  He is pleasant and agreeable to PT. He states he is happy to do exercises. Pain: Denies     Objective   Vitals     Bed Mobility Training  Bed Mobility Training: No  Transfer Training  Transfer Training: Yes  Overall Level of Assistance: Supervision  Sit to Stand: Supervision  Stand to Sit: Supervision  Gait Training  Gait Training: Yes  Gait  Overall Level of Assistance: Supervision  Distance (ft): 80 Feet  Assistive Device:  (No device. Unsteady at times. Spo2 stayed 94% until ~75 ft then dropped to 88%. Pt felt okay but therapist had pt sit to work on breathing.)     PT Exercises  Exercise Treatment: Seate dBLE exercises. SIt to stand x10     Safety Devices  Type of Devices: Call light within reach; Left in bed       Goals  Short Term Goals  Time Frame for Short term goals: 20 days  Short term goal 1: Pt to be able to ambulate 50 ft IND to be able to retrun home safely. Short term goal 2: Pt to be IND with all transfers to improve functional mobility and safety of transfers  Short term goal 3: Pt to be able to tolerate 20-30 mins therex/act to improve functional capacity for ADLs.   Patient Goals   Patient goals : to get back home    Education  Patient Education  Education Given To: Patient  Education Provided: Role of Therapy;Plan of Care;Home Exercise Program  Education Method: Verbal  Education Outcome: Verbalized understanding    Therapy Time   Individual Concurrent Group Co-treatment   Time In 1137         Time Out 1200         Minutes [address] [facility]

## 2022-08-12 NOTE — PROGRESS NOTES
[facility] - Progress Note Telemedicine   COVID 19 Patient  Today's Date and Time: 8/12/2022, 1:22 PM    Impression :     COVID 19 Confirmed Infection  Previously unvaccinated individual  Covid tests:  8/9/2022 positive  Elevated CRP  Hypoxia  Congestive heart failure  Ischemic cardiomyopathy with left ventricular ejection fraction between 15 to 20%  Bilateral pleural effusions with compressive atelectasis  Status post removal of 825 mL of fluid from the right chest on 8/10/2022  Status post removal of 1.1 L of fluid from the left chest on 8/10/2022  Relative leukopenia    Recommendations:   Antibiotic treatment:  Monitor off antibiotics  Covid Rx:    Remdesivir: Not indicated  Decadron: On 6 mg Decadron per day. Start date 8/10/2022. Stop date 8-12-22  Actemra: Not indicated  Monoclonal antibodies Bebtelovimab infused 8/10/2022      Medical Decision Making/Summary/Discussion:8/12/2022     Patient admitted with biventricular congestive heart failure with pulmonary edema  CKD  Underlying COPD and centrilobular emphysema  Has developed secondary COVID 19 infection    Infection Control Recommendations   [place] Precautions  Airborne isolation  Droplet Isolation  Isolate until 8/19/2022    Antimicrobial Stewardship Recommendations     Discontinuation of therapy  Coordination of Outpatient Care:   Estimated Length of IV antimicrobials:TBD  Patient will need Midline Catheter Insertion: TBD  Patient will need PICC line Insertion: No  Patient will need: Home IV , [place],  SNF,  LTAC:TBD  Patient will need outpatient wound care:No    Chief complaint/reason for consultation:   Concern for COVID infection      History of Present Illness:   Charlene Montemayor is a 70y.o.-year-old  male who was initially admitted on 8/3/2022. Patient seen at the request of Dr. Judit Wallace.     INITIAL HISTORY:    Patient presented through ER with complaints of worsening shortness of breath, and having experienced several syncopal episodes the day prior to admission. The patient was found to have acute on chronic combined systolic and diastolic congestive heart failure. He was admitted for treatment. In addition his past medical history showed the presence of coronary artery disease, prior acute inferior wall MI, paroxysmal atrial fibrillation, essential hypertension, hyperlipidemia. The patient also has a history of CKD, cardiomyopathy with a left ventricular ejection fraction of 15% and cirrhosis of the liver with ascites. He also has a prior history of alcohol intake and smoking. He had recently stopped both of those activities because of the congestive heart failure. While in the hospital the patient has been treated for congestive heart failure with Bumex . He has also received nasal oxygen at 5 L/min because of the underlying COPD and emphysema. By CT scan no pulmonary emboli were noted, the patient was found to have moderate pleural effusions bilaterally and evidence of pulmonary edema. Patient was tested for COVID-19 on 8/9/2022 and the test was positive. ID service was asked to evaluate for the COVID-19. CURRENT EVALUATION : 8/12/2022    Afebrile  VS stable    Bilateral pleural effusions with compressive atelectasis  Status post removal of 825 mL of fluid from the right chest on 8/10/2022  Status post removal of 1.1 L of fluid from the left chest on 8/10/2022    Improvement in respiratory distress after thoracenteses  CRP has decreased  Decadron D/C 8-12-22    Patient exhibiting respiratory distress. yes  Respiratory secretions: no    Patient receiving supplemental oxygen. On nasal oxygen  Flow rate 35 -->4 -->2 L/min  FiO2 39%  RR 18-->20  02 sat 95-->94-->95 %    QTc:       NEWS Score: 0-4 Low risk group; 5-6: Medium risk group; 7 or above: High risk group  Parameters 3 2 1 0 1 2 3   Age    < 65   ? 65   RR ? 8  9-11 12-20  21-24 ? 25   O2 Sats ?  91 92-93 94-95 ? 96 Suppl O2  Yes  No      SBP ? 90  101-110 111-219   ? 220   HR ? 40  41-50 51-90  111-130 ? 131   Consciousness    Alert   Drowsiness, lethargy, or confusion   Temperature ? 35.0 C (95.0 F)  35.1-36.0 C 95.1-96.9 F 36.1-38.0 C 97.0-100.4 F 38.1-39.0 C 100.5-102.3 F ? 39.1 C ? 102.4 F      NEWS Score:  8/10/2022: 7 High Risk    Overall Daily Picture:   Improved    Presence of secondary bacterial Infection:    No   Additional antibiotics:     Labs, X rays reviewed: 8/12/2022    BUN: 14-->21  Cr: 0.92-->0.96-->0.90    WBC: 2.1-->3.5-->2.9  Hb: 11.9-->12.2-->12.4  Plat: 62-->65    Absolute Neutrophils: 1.63  Absolute Lymphocytes: 0.34  Neutrophil/Lymphocyte Ratio: 4.79 high risk    CRP: 52.8-->79.4-->54  Ferritin:  LDH:     Pro Calcitonin:  Pro BNP 7741  Troponin 27    Cultures:  Urine:  1/27/2022 no growth  Blood:  5/23/2022 no growth  Sputum :    Wound:      CXR:   8/8/2022: Mild pulmonary edema, bilateral pleural effusions left greater than right. CAT:  8/9/2022 no PE. Mild interstitial pulmonary edema. Centrilobular emphysema. Scattered groundglass opacities in the upper lobes. 8-10-22: Decrease in pleural effusions post taps. Discussed with patient, RN, CC, IM.    8-10-22:    8-10-22      8-8-22            I have personally reviewed the past medical history, past surgical history, medications, social history, and family history, and I have updated the database accordingly.   Past Medical History:     Past Medical History:   Diagnosis Date    Acute kidney injury ([address]     Ascites 5/20/2022    Atrial fibrillation (HCC)     CHF (congestive heart failure) [address]     Cirrhosis of liver with ascites ([address] 5/19/2022    CKD (chronic kidney disease) 5/20/2022    Coronary artery disease     Hyperlipidemia     Hypertension     Hypomagnesemia 5/20/2022    Hyponatremia     Thrombocytopenia ([facility] [address]        Past Surgical  History:     Past Surgical History:   Procedure Laterality Date    CAROTID STENT PLACEMENT Bilateral     HERNIA REPAIR      ROTATOR CUFF REPAIR Right     TONSILLECTOMY AND ADENOIDECTOMY         Medications:      ascorbic acid  1,000 mg Oral 4x Daily    famotidine  40 mg Oral BID    zinc sulfate  100 mg Oral Daily    dexamethasone  6 mg Oral Daily    bumetanide  2 mg Oral Daily    dapagliflozin  10 mg Oral QAM    amiodarone  200 mg Oral Daily    albuterol  2.5 mg Nebulization 4x daily    spironolactone  25 mg Oral Daily    sodium chloride flush  10 mL IntraVENous 2 times per day    apixaban  5 mg Oral BID    atorvastatin  80 mg Oral Daily    busPIRone  15 mg Oral BID    clopidogrel  75 mg Oral Daily    escitalopram  20 mg Oral Nightly    folic acid  1 mg Oral Daily    magnesium oxide  600 mg Oral BID    melatonin  10 mg Oral Nightly    metoprolol succinate  25 mg Oral Daily    midodrine  10 mg Oral TID WC    multivitamin  1 tablet Oral Daily    pantoprazole  40 mg Oral QAM AC    rOPINIRole  0.5 mg Oral Nightly    sacubitril-valsartan  0.5 tablet Oral BID    tamsulosin  0.4 mg Oral Daily    vitamin B-1  100 mg Oral Daily    vitamin D  50,000 Units Oral Weekly       Social History:     Social History     Socioeconomic History    Marital status:      Spouse name: Not on file    Number of children: Not on file    Years of education: Not on file    Highest education level: Not on file   Occupational History    Not on file   Tobacco Use    Smoking status: Former     Packs/day: 1.00     Years: 56.00     Pack years: 56.00     Types: Cigarettes     Quit date: 2022     Years since quittin.1    Smokeless tobacco: Never   Vaping Use    Vaping Use: Never used   Substance and Sexual Activity    Alcohol use: Not Currently     Alcohol/week: 2.0 standard drinks     Types: 2 Shots of liquor per week     Comment: Drinks about 2 Kesslers with mix nightly.   Reported history of heavy alcohol use for years with both beer and liquior several years ago    Drug use: Not Currently    Sexual activity: Not on file Other Topics Concern    Not on file   Social History Narrative    Not on file     Social Determinants of Health     Financial Resource Strain: Not on file   Food Insecurity: Not on file   Transportation Needs: Not on file   Physical Activity: Not on file   Stress: Not on file   Social Connections: Not on file   Intimate Partner Violence: Not on file   Housing Stability: Not on file       Family History:     Family History   Problem Relation Age of Onset    Hypertension Father     Heart Disease Father     Heart Disease Mother     Hypertension Mother         Allergies:   Patient has no known allergies. Review of Systems:       Constitutional: No fevers or chills. No systemic complaints  Head: No headaches  Eyes: No double vision or blurry vision. No conjunctival inflammation. ENT: No sore throat or runny nose. . No hearing loss, tinnitus or vertigo. Cardiovascular: No chest pain or palpitations. Shortness of breath. HIGGINS  Lung: Shortness of breath, cough. No sputum production  Abdomen: No nausea, vomiting, diarrhea, or abdominal pain. Loreatha Press No cramps. Genitourinary: No increased urinary frequency, or dysuria. No hematuria. No suprapubic or CVA pain  Musculoskeletal: No muscle aches or pains. No joint effusions, swelling or deformities  Hematologic: No bleeding or bruising. Neurologic: No headache, weakness, numbness, or tingling. Integument: No rash, no ulcers. Psychiatric: No depression. Endocrine: No polyuria, no polydipsia, no polyphagia. Physical Examination :   Patient Vitals for the past 8 hrs:   BP Temp Temp src Pulse Resp SpO2   08/12/22 0715 129/71 97.8 °F (36.6 °C) Temporal 65 20 95 %       General Appearance: Awake, alert, and in no apparent distress  Head:  Normocephalic, no trauma  Eyes: Pupils equal, round, reactive to light; sclera anicteric; conjunctivae pink. No embolic phenomena. ENT: Oropharynx clear, without erythema, exudate, or thrush. No tenderness of sinuses.  Mouth/throat: mucosa pink and moist. No lesions. Dentition in good repair. Neck:Supple, without lymphadenopathy. Thyroid normal, No bruits. Pulmonary/Chest: Decreased breath sounds with dullness of both bases  Cardiovascular: Regular rate and rhythm without murmurs, rubs, or gallops. LifeVest in place  Abdomen: Soft, non tender. Bowel sounds normal. No organomegaly  All four Extremities: No cyanosis, clubbing, or effusions. .  Leg edema  Neurologic: No gross sensory or motor deficits. Skin: Warm and dry with good turgor. Signs of peripheral arterial and venous insufficiency. No ulcerations. No open wounds. Medical Decision Making -Laboratory:   I have independently reviewed/ordered the following labs:    CBC with Differential:   Recent Labs     08/11/22  0520 08/12/22  0555   WBC 3.5 2.9*   HGB 12.2* 12.4*   HCT 37.4* 38.1*   PLT See Reflexed IPF Result See Reflexed IPF Result   LYMPHOPCT 4* 12*   MONOPCT 1* 7       BMP:   Recent Labs     08/10/22  0545 08/10/22  1250 08/11/22  0520 08/12/22  0555     --  139 139   K 2.8*   < > 3.6* 4.7   CL 94*  --  94* 95*   CO2 35*  --  37* 35*   BUN 14  --  20 21   CREATININE 0.92  --  0.96 0.90   MG 1.5*  --  2.0  --     < > = values in this interval not displayed. Hepatic Function Panel:   Recent Labs     08/11/22  0520 08/12/22  0555   PROT 6.0* 6.0*   LABALBU 3.5 3.4*   BILITOT 1.12 1.04   ALKPHOS 91 92   * 159*   * 124*       No results for input(s): RPR in the last 72 hours. No results for input(s): HIV in the last 72 hours. No results for input(s): BC in the last 72 hours.   Lab Results   Component Value Date/Time    MUCUS TRACE 07/27/2022 03:15 PM    RBC 3.78 08/12/2022 05:55 AM    WBC 2.9 08/12/2022 05:55 AM    TURBIDITY Clear 07/27/2022 03:15 PM     Lab Results   Component Value Date/Time    CREATININE 0.90 08/12/2022 05:55 AM    GLUCOSE 117 08/12/2022 05:55 AM       Medical Decision Making-Imaging:     EXAMINATION:   CTA OF THE CHEST 8/8/2022 12:39 pm TECHNIQUE:   CTA of the chest was performed after the administration of intravenous   contrast.  Multiplanar reformatted images are provided for review. MIP   images are provided for review. Automated exposure control, iterative   reconstruction, and/or weight based adjustment of the mA/kV was utilized to   reduce the radiation dose to as low as reasonably achievable. COMPARISON:   08/08/2022, 05/19/2022       HISTORY:   ORDERING SYSTEM PROVIDED HISTORY: hypoxia   TECHNOLOGIST PROVIDED HISTORY:   hypoxia       FINDINGS:   Pulmonary Arteries: Pulmonary arteries are adequately opacified for   evaluation. No evidence of intraluminal filling defect to suggest pulmonary   embolism. Main pulmonary artery is mildly enlarged. Mediastinum: Mildly enlarged mediastinal lymph nodes which are new from   prior. Cardiomegaly. Three-vessel coronary artery calcifications. Trace   pericardial effusion. There is no acute abnormality of the ectatic,   atherosclerotic thoracic aorta. Lungs/pleura: Mild interstitial edema superimposed on patient's emphysematous   change. Few scattered small ground-glass opacities in the anterior aspect of   both upper lobes. Dependent changes in both lungs. Moderate layering   bilateral pleural effusions with atelectasis of the adjacent parenchyma. There is near complete collapse of the left lower lobe with only a small   portion of the superior segment remaining aerated. No pneumothorax. Trachea   is patent. Upper Abdomen: No acute abnormality. Soft Tissues/Bones: No acute bone or soft tissue abnormality with similar   mild body wall edema. Impression   No evidence of pulmonary embolism. Mildly enlarged main pulmonary artery   which can be seen in the setting of pulmonary hypertension. Mild interstitial pulmonary edema on a background of centrilobular emphysema.    A few scattered ground-glass opacities in the upper lobes may be due to a component of mild alveolar edema, though clinical correlation is recommended   to exclude concern for contributory infectious causes. Moderate layering bilateral pleural effusions with partial collapse of the   lower lobes, left more so than right. Cardiomegaly which appears unchanged from prior with 3 vessel coronary artery   disease. Medical Decision [id number]:      Latest Reference Range & Units 8/9/22 09:30   Chlamydia pneumoniae By PCR Not Detected  Not Detected   Rhino/Enterovirus PCR Not Detected  Not Detected   Human Metapneumovirus PCR Not Detected  Not Detected   Adenovirus PCR Not Detected  Not Detected   B Pertussis by PCR Not Detected  Not Detected   Coronavirus 229E PCR Not Detected  Not Detected   Coronavirus HKU1 PCR Not Detected  Not Detected   Coronavirus NL63 PCR Not Detected  Not Detected   Coronavirus OC Not Detected  Not Detected   Influenza A by PCR Not Detected  Not Detected   Influenza B by PCR Not Detected  Not Detected   Parainfluenza 1 PCR Not Detected  Not Detected   Parainfluenza 2 PCR Not Detected  Not Detected   Parainfluenza 3 PCR Not Detected  Not Detected   Parainfluenza 4 PCR Not Detected  Not Detected   Resp Syncytial Virus PCR Not Detected  Not Detected   Mycoplasma pneumo by PCR Not Detected  Not Detected   SARS-CoV-2, PCR Not Detected  DETECTED !   !: Data is abnormal  Medical Decision Making-Other:     Note:  Labs, medications, radiologic studies were reviewed with personal review of films  Large amounts of data were reviewed  Discussed with nursing Staff, Discharge planner  Infection Control and Prevention measures reviewed  All prior entries were reviewed  Administer medications as ordered  Prognosis: Guarded  Discharge planning reviewed  Follow up as outpatient. Thank you for allowing us to participate in the care of this patient. Please call with questions.     Kasie Nation MD  Pager: [phone] - Office: [phone]

## 2022-08-12 NOTE — PROGRESS NOTES
Patient pleasant and cooperative with assessment. States he \"feels more with it right now\". Oriented to person, place, time, date. VS stable, remains impulsive and unable to make safe choices on his own. Sitter watching at all times.

## 2022-08-12 NOTE — PROGRESS NOTES
Patient assessment completed along with vitals at this time. Patient calm and cooperative for assessment and stated that he is getting some good sleep tonight. Patient states that he has no needs, call light is in reach, will continue to monitor. Sitter continues watching patient at this time.

## 2022-08-12 NOTE — PLAN OF CARE
Problem: Discharge Planning  Goal: Discharge to home or other facility with appropriate resources  Outcome: Progressing     Problem: Pain  Goal: Verbalizes/displays adequate comfort level or baseline comfort level  Outcome: Progressing     Problem: Safety - Adult  Goal: Free from fall injury  Outcome: Progressing     Problem: Chronic Conditions and Co-morbidities  Goal: Patient's chronic conditions and co-morbidity symptoms are monitored and maintained or improved  Outcome: Progressing     Problem: Skin/Tissue Integrity  Goal: Absence of new skin breakdown  Description: 1. Monitor for areas of redness and/or skin breakdown  2. Assess vascular access sites hourly  3. Every 4-6 hours minimum:  Change oxygen saturation probe site  4. Every 4-6 hours:  If on nasal continuous positive airway pressure, respiratory therapy assess nares and determine need for appliance change or resting period.   Outcome: Progressing     Problem: Nutrition Deficit:  Goal: Optimize nutritional status  Outcome: Progressing     Problem: Respiratory - Adult  Goal: Achieves optimal ventilation and oxygenation  Outcome: Progressing

## 2022-08-12 NOTE — PROGRESS NOTES
Pt vitals and assessment completed at this time, see flowsheet. Pt A&O x4, breathing normal, O2 93 % on room air. Pt moved from chair to bed. Pt denies any pain or any other needs at this time, call light within reach, will continue to monitor.

## 2022-08-12 NOTE — PROGRESS NOTES
Physical Therapy  Facility/Department: [facility] AT THE [facility] MED SURG  Daily Treatment Note  NAME: Ellie Pruitt  :   MRN: [id number]    Date of Service: 2022    Discharge Recommendations:  Continue to assess pending progress, Home with Home health PT, Outpatient PT        Patient Diagnosis(es): The primary encounter diagnosis was Acute respiratory failure with hypoxia ([address] A diagnosis of Acute on chronic combined systolic and diastolic CHF (congestive heart failure) (HCC) was also pertinent to this visit. Assessment   Activity Tolerance: Patient tolerated treatment well     Plan    Plan  Plan: 2 times a day 7 days a week     Restrictions  Restrictions/Precautions  Restrictions/Precautions: General Precautions, Isolation, Contact Precautions     Subjective    Subjective  Subjective: Pt seated in chair upon arrival.  Pt eager to \"just do something and talk to someone. \"  Pt without nasal cannula on, nursing states she is seeing how he does without supplimental O2  Pain: Denies  Orientation  Overall Orientation Status: Within Functional Limits  Cognition  Overall Cognitive Status: WFL     Objective   Vitals     Bed Mobility Training  Bed Mobility Training: No  Balance  Sitting: Intact  Standing: Intact  Standing - Static: Fair;Good  Standing - Dynamic: Fair;Good  Transfer Training  Transfer Training: Yes  Overall Level of Assistance: Supervision  Interventions: Verbal cues  Sit to Stand: Supervision  Stand to Sit: Supervision  Toilet Transfer: Supervision  Gait Training  Gait Training: Yes  Gait  Overall Level of Assistance: Stand-by assistance (Pt without nasal cannula on during tx.)  Interventions: Verbal cues; Safety awareness training  Speed/Andreina: Slow  Gait Abnormalities: Path deviations  Distance (ft):  (80 ft + 60 ft. Pt completed second bout of amb without nasal cannula again with levels ranging 88-96%. Pt with better tolerance with second bout.   He did have bouts of unsteadiness, able to self correct.)  Assistive Device: Other (comment) (Pt did well without supplimental O2 until ~ 50 ft.  Spo2 then decreased to 87%. Once seated pt was talking, then levels decreased to 81%. Pt had c/o lightheadedness. poor increase with deep breathing so nasal cannula was reapplied  w/ quick inc to 90%)     PT Exercises  Exercise Treatment: Seate dBLE exercises. SIt to stand x10     Safety Devices  Type of Devices: Call light within reach; Chair alarm in place; Left in chair; All fall risk precautions in place  Restraints  Restraints Initially in Place: No       Goals  Short Term Goals  Time Frame for Short term goals: 20 days  Short term goal 1: Pt to be able to ambulate 50 ft IND to be able to retrun home safely. Short term goal 2: Pt to be IND with all transfers to improve functional mobility and safety of transfers  Short term goal 3: Pt to be able to tolerate 20-30 mins therex/act to improve functional capacity for ADLs.   Patient Goals   Patient goals : to get back home    Education  Patient Education  Education Given To: Patient  Education Provided: Role of Therapy;Plan of Care;Home Exercise Program  Education Provided Comments: Deep breathing  Education Method: Verbal  Education Outcome: Verbalized understanding    Therapy Time   Individual Concurrent Group Co-treatment   Time In 1449         Time Out 1521         Minutes [address], PTA

## 2022-08-12 NOTE — PROGRESS NOTES
Edouard Mancuso am scribing for and in the presence of Bhavik Freeman MD, MS, F.A.C.C..    Patient: Percy Olivo  : [id number]  Date of Admission: 8/3/2022  Primary Care Physician: Diandra Moss  Today's Date: 2022    REASON FOR CONSULTATION: atrial fibrillation with RVR    HPI:  Mr. Samantha Purcell is a 70 y.o. male who was admitted to the hospital on 2022 for weakness and shortness of breath. In the ER he was found to be in atrial fibrillation with RVR leading to a consultation. He was then readmitted due to severe dehydration. In the past he reports seeing a Cardiologist in [place] and was planning on having a cardioversion done in  to put him back into normal sinus rhythm due to his history of new onset atrial fibrillation. He reports a history of a heart attack at age 46 and needed stents placed at that time but denies any cardiac cath since that time. He also has carotid artery stenosis and abdominal aortic aneurysm. He did report having surgery on one side of his neck, however he is not sure which side or how long ago it was. He has had hypertension and hyperlipidemia for years as well. He is a current every day smoker and he smoked for about 55 years and smokes about a pack a day. Family history consists of a lot of people in his family with significant cardiac history, however he wanted us to talk to his sister about the family history as she knows the details. Echo done on 2022 showed an EF of 15-20%. Mr. Samantha Purcell was admitted for what appears to be a heart failure exacerbation as well as increased generalized weakness. He underwent bilateral thoracentesis on 8/10/22 and had about 1 liter taken off of each side. He says that he feels much better today including his breathing. He is only on 2 liters of oxygen and says he is quite thirsty on only 1800 ml of water/day.  He denies any chest pain now or in the recent past. Denies any medication changes, diarrhea or constipation. He also denied any abdominal pain, bleeding problems, problems with his medications or any other concerns at this time. He has no bowel issues at this time. No nausea or vomiting. No fever, cough or chills. Past Medical History:   Diagnosis Date    Acute kidney injury ([address]     Ascites 2022    Atrial fibrillation (HCC)     CHF (congestive heart failure) (HCC)     Cirrhosis of liver with ascites ([facility] 2022    CKD (chronic kidney disease) 2022    Coronary artery disease     Hyperlipidemia     Hypertension     Hypomagnesemia 2022    Hyponatremia     Thrombocytopenia (HCC)        CURRENT ALLERGIES: Patient has no known allergies. REVIEW OF SYSTEMS: 14 systems were reviewed. Pertinent positives and negatives as above, all else negative. Past Surgical History:   Procedure Laterality Date    CAROTID STENT PLACEMENT Bilateral     HERNIA REPAIR      ROTATOR CUFF REPAIR Right     TONSILLECTOMY AND ADENOIDECTOMY      Social History:  Social History     Tobacco Use    Smoking status: Former     Packs/day: 1.00     Years: 56.00     Pack years: 56.00     Types: Cigarettes     Quit date: 2022     Years since quittin.1    Smokeless tobacco: Never   Vaping Use    Vaping Use: Never used   Substance Use Topics    Alcohol use: Not Currently     Alcohol/week: 2.0 standard drinks     Types: 2 Shots of liquor per week     Comment: Drinks about 2 Kesslers with mix nightly. Reported history of heavy alcohol use for years with both beer and liquior several years ago    Drug use: Not Currently        CURRENT MEDICATIONS:  No outpatient medications have been marked as taking for the 8/3/22 encounter [facility] Encounter).      FAMILY HISTORY: Reviewed but non-contributory     PHYSICAL EXAM:       Physical Examination:     /64   Pulse 57   Temp 97.9 °F (36.6 °C) (Temporal)   Resp 18   Ht 5' 6\" (1.676 m)   Wt 155 lb 13.8 oz (70.7 kg)   SpO2 95%   BMI 25.16 kg/m²  Body mass index is 25.16 kg/m². Constitutional: He appeared oriented to person and place. He appears well-developed and well-nourished. In no acute distress. HEENT: Normocephalic and atraumatic. No JVD present. Carotid bruit is not present. No mass and no thyromegaly present. No lymphadenopathy noted. Cardiovascular: Normal rate, regular rhythm, normal heart sounds. Exam reveals no gallop and no friction rubs. 2/6 systolic murmur, 5th intercostal space on the LEFT in the mid-clavicular line (cardiac apex)  Pulmonary/Chest: Effort normal and breath sounds normal. No respiratory distress. He has no wheezes, rhonchi or rales. Abdominal: Soft, non-tender. He exhibits no organomegaly, mass or bruit. Extremities: Trace. No cyanosis or clubbing. 2+ radial and carotid pulses. Distal extremity pulses: 1+ bilaterally. Neurological: Alertness and orientation as per Constitutional exam. No evidence of gross cranial nerve deficit. Coordination appeared normal.   Skin: Skin is warm and dry. There is no rash or diaphoresis. Psychiatric: He has a normal mood and affect.  His speech is normal and behavior is normal.       Other pertinent observable physical exam findings: None       MOST RECENT LABS ON RECORD:   Lab Results   Component Value Date    WBC 3.5 08/11/2022    HGB 12.2 (L) 08/11/2022    HCT 37.4 (L) 08/11/2022    PLT See Reflexed IPF Result 08/11/2022     (H) 08/11/2022     (H) 08/11/2022     08/11/2022    K 3.6 (L) 08/11/2022    CL 94 (L) 08/11/2022    CREATININE 0.96 08/11/2022    BUN 20 08/11/2022    CO2 37 (H) 08/11/2022    TSH 5.77 (H) 07/25/2022    INR 1.4 08/03/2022     ASSESSMENT:  Patient Active Problem List    Diagnosis Date Noted    Acute respiratory failure with hypoxia ([facility] 08/04/2022    Encounter for current long-term use of anticoagulants 08/04/2022    Chronic combined systolic (congestive) and diastolic (congestive) heart failure (HCC)     Dehydration, moderate     Medication side effect, initial encounter     History of acute inferior wall MI     Ischemic cardiomyopathy     Pancytopenia ([facility] 08/10/2022    Bilateral pleural effusion 08/09/2022    Nonischemic cardiomyopathy ([facility] 08/04/2022    Hypoxia 08/03/2022    PAF (paroxysmal atrial fibrillation) ([facility] 07/19/2022    Lightheaded 07/19/2022    Tobacco abuse counseling 07/19/2022    Gastroesophageal reflux disease 07/19/2022    Hypotension 07/19/2022    Acute on chronic combined systolic and diastolic CHF (congestive heart failure) ([facility] 07/19/2022    Hypokalemia 07/19/2022    Hypovitaminosis D 07/19/2022    Severe malnutrition ([facility] 06/07/2022    Hypomagnesemia 05/20/2022    Ascites 05/20/2022    CKD (chronic kidney disease) 05/20/2022    Cirrhosis of liver with ascites ([facility] 05/19/2022    Acute on chronic combined systolic and diastolic CHF, NYHA class 4 (HCC)     Coronary artery disease     Hypertension     Thrombocytopenia (HCC)     Hyponatremia     Hyperlipidemia     Atrial fibrillation with RVR ([facility] 05/18/2022      PLAN:    Paroxysmal Atrial Fibrillation: Rhythm Control Asymptomatic   Beta Blocker: Continue Metoprolol succinate (Toprol XL) 25 mg daily. Anti-Arrhythmic: Continue amiodarone (Pacerone) 200 mg BID. Monitoring: Since they will being maintained on Amiodarone, I told them that we will need to closely monitor them for potential side effects. These include monitoring LFTs and TSH at least every 6 months as well as chest x-rays, pulmonary function tests, and eye exams at least yearly.    [id number] Score for Atrial Fibrillation Stroke Risk   Risk   Factors  Component Value   C CHF Yes 1   H HTN Yes 1   A2 Age >= 75 No,  (75 y.o.) 0   D DM No 0   S2 Prior Stroke/TIA No 0   V Vascular Disease No 0   A Age 74-69 Yes,  (75 y.o.) 1   Sc Sex male 0    [id number]  Score  3   Score last updated 0/8/20 6:00 PM EDT  Click here for a link to the UpToDate guideline \"Atrial Fibrillation: Anticoagulation therapy to prevent embolization  Disclaimer: Risk Score calculation is dependent on accuracy of patient problem list and past encounter diagnosis. Stroke Risk: CHADS2-VASc Score: 3/9 (3.2% stroke risk). Because of his atrial fibrillation, I also would also recommend that he continue with anticoagulation to decrease his risk of stoke but also reminded him to watch for signs of blood in his stool or black tarry stools and stop the medication immediately if this develops as this could be life threatening. Anticoagulation: Continue Apixaban (Eliquis) 5 mg every 12 hours. Acute on chronic combined heart failure: Ischemic Cardiomyopathy, Echo done on 5/18/2022 which showed an EF of 15-20%. We recently decreased his Bumex from 2 mg to 1 mg due to his falling and I suspect this is what caused his heart failure exacerbation. Beta Blocker: Continue Metoprolol succinate (Toprol XL) 25 mg daily. ACE Inibitor/ARB: Continue sacubitril/valsartan (Entresto) 24/26 mg 1/2 a tablet twice daily. Diuretics: STOP furosemide (Lasix) and START bumetinide (Bumex) 2 mg every morning. I also discussed the potential side effects of this medication including lightheadedness and dizziness and instructed them to stop the medication of this occurs and call our office if this occurs. However, I suspect that with him being 5 liters negative we may want to cut that back to once daily as soon as tomorrow. Recently we DECREASED his bumetinide to (Bumex) 1 mg every morning and stopped the additional Bumex three times a week but I suspect that this is what led to his heart failure exacerbation so I expect that we may need to go back at least to this regimine prior to discharge  Heart failure counseling: I advised them to try and keep their legs up whenever possible and to limit salt in their diet.    Life Vest: Although I do not think it is a bad idea for him to wear the Life Vest in the hospital, I am actually ok if he does not wear it as long as he is on Telemetry but will leave this up to Mr. Francis Singh . Tobacco Abuse Counseling: Will discuss further prior to discharge home    Once again, thank you for allowing me to participate in this patients care. Please do not hesitate to contact me could I be of further assistance. Sincerely,  Jonny Eddy MD, MS, F.A.C.C. [facility] Cardiology Specialist     [facility], [address] [place] [address]  Phone: [phone], Fax: [phone]     I believe that the risk of significant morbidity and mortality related to the patient's current medical conditions are: intermediate-high. August 11, 2022     --Tawanda Wood MD on 8/11/2022 at 11:17 PM    An electronic signature was used to authenticate this note.

## 2022-08-12 NOTE — PROGRESS NOTES
Occupational Therapy  Facility/Department: [facility] MED SURG  Daily Treatment Note  NAME: Ludivina Burgess  : [birth year]  MRN: [id number]    Date of Service: 2022    Discharge Recommendations:  Continue to assess pending progress, Home with assist PRN         Patient Diagnosis(es): The primary encounter diagnosis was Acute respiratory failure with hypoxia ([address] A diagnosis of Acute on chronic combined systolic and diastolic CHF (congestive heart failure) (HCC) was also pertinent to this visit. Assessment    Activity Tolerance: Patient tolerated treatment well  Discharge Recommendations: Continue to assess pending progress;Home with assist PRN        Restrictions  Restrictions/Precautions  Restrictions/Precautions: General Precautions;Isolation;Contact Precautions    Subjective   Subjective  Subjective: Patient sitting in bedside chair upon arrival, agreeable to ADLs this date. Pain: No pain reported. Orientation  Overall Orientation Status: Within Functional Limits  Pain: Denies  Cognition  Overall Cognitive Status: WFL        Objective    Vitals     Bed Mobility Training  Bed Mobility Training: No  Balance  Sitting: Intact  Standing: Intact  Standing - Static: Fair;Good  Standing - Dynamic: Fair;Good  Transfer Training  Transfer Training: Yes  Overall Level of Assistance: Supervision  Interventions: Verbal cues  Sit to Stand: Supervision  Stand to Sit: Supervision  Toilet Transfer: Supervision  Gait Training  Gait Training: Yes  Gait  Overall Level of Assistance: Supervision  Distance (ft): 80 Feet  Assistive Device:  (No device. Unsteady at times. Spo2 stayed 94% until ~75 ft then dropped to 88%.  Pt felt okay but therapist had pt sit to work on breathing.)     ADL  Feeding: Independent  Grooming: Supervision;Setup  UE Bathing: Supervision;Setup  LE Bathing: Supervision;Setup  UE Dressing: Setup;Stand by assistance  LE Dressing: Setup;Stand by assistance  Toileting: Stand by assistance  Additional Comments: Completed total body ADLs this date including upper body bathing/dressing, and grooming including shaving. Required 2- 3cues during tasks for appropriate sequencing. Able to stand for the first 6 minutes at sink. SPO2 ranged from 88-94% throughout the session. Noted to have some difficulty sequencing shaving and brushing teeth tasks this date. Safety Devices  Type of Devices: Call light within reach; Left in bed  Restraints  Restraints Initially in Place: No     Patient Education  Education Provided: ADL Adaptive Strategies; Energy Conservation  Education Provided Comments: Educating on seqeuencing during ADL task completion and EC strategies when completing routine ADLs to increase ease of participation. Education Method: Demonstration;Verbal  Barriers to Learning: None  Education Outcome: Verbalized understanding;Demonstrated understanding    Goals  Short Term Goals  Time Frame for Short term goals: 21 visits  Short Term Goal 1: Patient to be educated on d/c folder, AE/DME and EC/WS techniques to ensure safe and indep return home. Short Term Goal 2: Patient to complete ADL routine c Mod I c AE/DME and implementation of EC/WS techniques as needed to ensure safe return home.   Short Term Goal 3: Patient will tolerate 15 mins of BUE ther ex/act to increase overall strength/activity tolerance for functional tasks  Short Term Goal 4: Patient will complete functional mobility during ADLs with no AD and mod I to maximise safety and return to PLOF       Therapy Time   Individual Concurrent Group Co-treatment   Time In 1032         Time Out 1121         Minutes [address], OTR/L

## 2022-08-13 VITALS
BODY MASS INDEX: 24.8 KG/M2 | RESPIRATION RATE: 18 BRPM | DIASTOLIC BLOOD PRESSURE: 62 MMHG | OXYGEN SATURATION: 97 % | TEMPERATURE: 97 F | HEART RATE: 52 BPM | SYSTOLIC BLOOD PRESSURE: 112 MMHG | HEIGHT: 66 IN | WEIGHT: 154.32 LBS

## 2022-08-13 DIAGNOSIS — I50.32 CHRONIC DIASTOLIC HF (HEART FAILURE), NYHA CLASS 3 (HCC): Primary | ICD-10-CM

## 2022-08-13 LAB
ABSOLUTE EOS #: 0 K/UL (ref 0–0.44)
ABSOLUTE IMMATURE GRANULOCYTE: 0 K/UL (ref 0–0.3)
ABSOLUTE LYMPH #: 0.13 K/UL (ref 1.1–3.7)
ABSOLUTE MONO #: 0.16 K/UL (ref 0.1–1.2)
ALBUMIN SERPL-MCNC: 3.6 G/DL (ref 3.5–5.2)
ALBUMIN/GLOBULIN RATIO: 1.4 (ref 1–2.5)
ALP BLD-CCNC: 94 U/L (ref 40–129)
ALT SERPL-CCNC: 146 U/L (ref 5–41)
ANION GAP SERPL CALCULATED.3IONS-SCNC: 9 MMOL/L (ref 9–17)
AST SERPL-CCNC: 93 U/L
BASOPHILS # BLD: 0 % (ref 0–2)
BASOPHILS ABSOLUTE: 0 K/UL (ref 0–0.2)
BILIRUB SERPL-MCNC: 0.95 MG/DL (ref 0.3–1.2)
BUN BLDV-MCNC: 23 MG/DL (ref 8–23)
BUN/CREAT BLD: 27 (ref 9–20)
C-REACTIVE PROTEIN: 28.5 MG/L (ref 0–5)
CALCIUM SERPL-MCNC: 9.5 MG/DL (ref 8.6–10.4)
CHLORIDE BLD-SCNC: 96 MMOL/L (ref 98–107)
CO2: 35 MMOL/L (ref 20–31)
CREAT SERPL-MCNC: 0.86 MG/DL (ref 0.7–1.2)
D-DIMER QUANTITATIVE: 1.93 MG/L FEU (ref 0–0.59)
EOSINOPHILS RELATIVE PERCENT: 0 % (ref 1–4)
FERRITIN: 1251 NG/ML (ref 30–400)
GFR AFRICAN AMERICAN: >60 ML/MIN
GFR NON-AFRICAN AMERICAN: >60 ML/MIN
GFR SERPL CREATININE-BSD FRML MDRD: ABNORMAL ML/MIN/{1.73_M2}
GFR SERPL CREATININE-BSD FRML MDRD: ABNORMAL ML/MIN/{1.73_M2}
GLUCOSE BLD-MCNC: 116 MG/DL (ref 70–99)
HCT VFR BLD CALC: 38.5 % (ref 40.7–50.3)
HEMOGLOBIN: 12.6 G/DL (ref 13–17)
IMMATURE GRANULOCYTES: 0 %
LYMPHOCYTES # BLD: 4 % (ref 24–43)
MCH RBC QN AUTO: 32.6 PG (ref 25.2–33.5)
MCHC RBC AUTO-ENTMCNC: 32.7 G/DL (ref 28.4–34.8)
MCV RBC AUTO: 99.7 FL (ref 82.6–102.9)
MONOCYTES # BLD: 5 % (ref 3–12)
MORPHOLOGY: NORMAL
NRBC AUTOMATED: 0 PER 100 WBC
PDW BLD-RTO: 14.2 % (ref 11.8–14.4)
PLATELET # BLD: ABNORMAL K/UL (ref 138–453)
PLATELET, FLUORESCENCE: 75 K/UL (ref 138–453)
PLATELET, IMMATURE FRACTION: 11.6 % (ref 1.1–10.3)
POTASSIUM SERPL-SCNC: 4.1 MMOL/L (ref 3.7–5.3)
RBC # BLD: 3.86 M/UL (ref 4.21–5.77)
SEG NEUTROPHILS: 91 % (ref 36–65)
SEGMENTED NEUTROPHILS ABSOLUTE COUNT: 2.91 K/UL (ref 1.5–8.1)
SODIUM BLD-SCNC: 140 MMOL/L (ref 135–144)
TOTAL PROTEIN: 6.1 G/DL (ref 6.4–8.3)
WBC # BLD: 3.2 K/UL (ref 3.5–11.3)

## 2022-08-13 PROCEDURE — 6370000000 HC RX 637 (ALT 250 FOR IP): Performed by: NURSE PRACTITIONER

## 2022-08-13 PROCEDURE — 99232 SBSQ HOSP IP/OBS MODERATE 35: CPT | Performed by: FAMILY MEDICINE

## 2022-08-13 PROCEDURE — 86140 C-REACTIVE PROTEIN: CPT

## 2022-08-13 PROCEDURE — 97116 GAIT TRAINING THERAPY: CPT

## 2022-08-13 PROCEDURE — 82728 ASSAY OF FERRITIN: CPT

## 2022-08-13 PROCEDURE — 36415 COLL VENOUS BLD VENIPUNCTURE: CPT

## 2022-08-13 PROCEDURE — 94640 AIRWAY INHALATION TREATMENT: CPT

## 2022-08-13 PROCEDURE — 85055 RETICULATED PLATELET ASSAY: CPT

## 2022-08-13 PROCEDURE — 97110 THERAPEUTIC EXERCISES: CPT

## 2022-08-13 PROCEDURE — 94761 N-INVAS EAR/PLS OXIMETRY MLT: CPT

## 2022-08-13 PROCEDURE — 85379 FIBRIN DEGRADATION QUANT: CPT

## 2022-08-13 PROCEDURE — 6360000002 HC RX W HCPCS: Performed by: NURSE PRACTITIONER

## 2022-08-13 PROCEDURE — 85025 COMPLETE CBC W/AUTO DIFF WBC: CPT

## 2022-08-13 PROCEDURE — 94669 MECHANICAL CHEST WALL OSCILL: CPT

## 2022-08-13 PROCEDURE — 97535 SELF CARE MNGMENT TRAINING: CPT

## 2022-08-13 PROCEDURE — 80053 COMPREHEN METABOLIC PANEL: CPT

## 2022-08-13 RX ORDER — SPIRONOLACTONE 25 MG/1
25 TABLET ORAL DAILY
Qty: 30 TABLET | Refills: 3 | Status: SHIPPED | OUTPATIENT
Start: 2022-08-14 | End: 2022-10-18

## 2022-08-13 RX ORDER — IPRATROPIUM BROMIDE AND ALBUTEROL SULFATE 2.5; .5 MG/3ML; MG/3ML
3 SOLUTION RESPIRATORY (INHALATION) EVERY 4 HOURS PRN
Qty: 360 ML | Refills: 1 | Status: SHIPPED | OUTPATIENT
Start: 2022-08-13

## 2022-08-13 RX ORDER — ZINC SULFATE 50(220)MG
100 CAPSULE ORAL DAILY
Qty: 30 CAPSULE | Refills: 3 | COMMUNITY
Start: 2022-08-14

## 2022-08-13 RX ORDER — ALBUTEROL SULFATE 90 UG/1
2 AEROSOL, METERED RESPIRATORY (INHALATION) 4 TIMES DAILY PRN
Qty: 54 G | Refills: 1 | Status: SHIPPED | OUTPATIENT
Start: 2022-08-13

## 2022-08-13 RX ADMIN — ALBUTEROL SULFATE 2.5 MG: 2.5 SOLUTION RESPIRATORY (INHALATION) at 05:04

## 2022-08-13 RX ADMIN — AMIODARONE HYDROCHLORIDE 200 MG: 200 TABLET ORAL at 09:24

## 2022-08-13 RX ADMIN — PANTOPRAZOLE SODIUM 40 MG: 40 TABLET, DELAYED RELEASE ORAL at 07:32

## 2022-08-13 RX ADMIN — SPIRONOLACTONE 25 MG: 25 TABLET ORAL at 09:24

## 2022-08-13 RX ADMIN — CLOPIDOGREL BISULFATE 75 MG: 75 TABLET ORAL at 09:23

## 2022-08-13 RX ADMIN — ALBUTEROL SULFATE 2.5 MG: 2.5 SOLUTION RESPIRATORY (INHALATION) at 11:45

## 2022-08-13 RX ADMIN — MIDODRINE HYDROCHLORIDE 10 MG: 5 TABLET ORAL at 07:32

## 2022-08-13 RX ADMIN — MULTIVITAMIN TABLET 1 TABLET: TABLET at 09:23

## 2022-08-13 RX ADMIN — BUSPIRONE HYDROCHLORIDE 15 MG: 5 TABLET ORAL at 09:22

## 2022-08-13 RX ADMIN — Medication 600 MG: at 09:23

## 2022-08-13 RX ADMIN — TAMSULOSIN HYDROCHLORIDE 0.4 MG: 0.4 CAPSULE ORAL at 09:23

## 2022-08-13 RX ADMIN — SACUBITRIL AND VALSARTAN 0.5 TABLET: 24; 26 TABLET, FILM COATED ORAL at 09:22

## 2022-08-13 RX ADMIN — FAMOTIDINE 40 MG: 20 TABLET ORAL at 09:23

## 2022-08-13 RX ADMIN — OXYCODONE HYDROCHLORIDE AND ACETAMINOPHEN 1000 MG: 500 TABLET ORAL at 09:22

## 2022-08-13 RX ADMIN — ZINC SULFATE 220 MG (50 MG) CAPSULE 100 MG: CAPSULE at 09:23

## 2022-08-13 RX ADMIN — MIDODRINE HYDROCHLORIDE 10 MG: 5 TABLET ORAL at 12:10

## 2022-08-13 RX ADMIN — ATORVASTATIN CALCIUM 80 MG: 40 TABLET, FILM COATED ORAL at 09:23

## 2022-08-13 RX ADMIN — APIXABAN 5 MG: 5 TABLET, FILM COATED ORAL at 09:23

## 2022-08-13 RX ADMIN — THIAMINE HCL TAB 100 MG 100 MG: 100 TAB at 09:23

## 2022-08-13 RX ADMIN — BUMETANIDE 2 MG: 1 TABLET ORAL at 09:23

## 2022-08-13 RX ADMIN — OXYCODONE HYDROCHLORIDE AND ACETAMINOPHEN 1000 MG: 500 TABLET ORAL at 12:10

## 2022-08-13 RX ADMIN — FOLIC ACID 1 MG: 1 TABLET ORAL at 09:23

## 2022-08-13 ASSESSMENT — PAIN SCALES - GENERAL: PAINLEVEL_OUTOF10: 0

## 2022-08-13 NOTE — DISCHARGE SUMMARY
Physician Discharge Summary  Valerie Mendez MD       Patient ID:  Bolivar Ward  [id number]  1950    Admission date: 8/3/2022    Discharge date: 8/13/2022     Admitting Physician: Felicita Quick MD     Primary Care Physician: Felicita Quick MD     Primary Discharge Diagnoses:   Patient Active Problem List    Diagnosis Date Noted    Acute respiratory failure with hypoxia ([address] 08/04/2022    Encounter for current long-term use of anticoagulants 08/04/2022    Chronic combined systolic (congestive) and diastolic (congestive) heart failure (HCC)     Dehydration, moderate     Medication side effect, initial encounter     History of acute inferior wall MI     Ischemic cardiomyopathy     Mild malnutrition ([address] 08/12/2022    Pancytopenia ([address] 08/10/2022    Bilateral pleural effusion 08/09/2022    Nonischemic cardiomyopathy ([address] 08/04/2022    Hypoxia 08/03/2022    PAF (paroxysmal atrial fibrillation) ([address] 07/19/2022    Lightheaded 07/19/2022    Tobacco abuse counseling 07/19/2022    Gastroesophageal reflux disease 07/19/2022    Hypotension 07/19/2022    Acute on chronic combined systolic and diastolic CHF (congestive heart failure) ([address] 07/19/2022    Hypokalemia 07/19/2022    Hypovitaminosis D 07/19/2022    Severe malnutrition ([address] 06/07/2022    Hypomagnesemia 05/20/2022    Ascites 05/20/2022    CKD (chronic kidney disease) 05/20/2022    Cirrhosis of liver with ascites ([address] 05/19/2022    Acute on chronic combined systolic and diastolic CHF, NYHA class 4 (HCC)     Coronary artery disease     Hypertension     Thrombocytopenia (HCC)     Hyponatremia     Hyperlipidemia     Atrial fibrillation with RVR ([address] 05/18/2022       Additional Diagnoses:       Diagnosis Date    Acute kidney injury ([address]     Ascites 5/20/2022    Atrial fibrillation (HCC)     CHF (congestive heart failure) ([address]     Cirrhosis of liver with ascites ([address] 5/19/2022    CKD (chronic kidney disease) 5/20/2022    Coronary artery disease Hyperlipidemia     Hypertension     Hypomagnesemia 5/20/2022    Hyponatremia     Thrombocytopenia (HCC)          Review of Systems:  Constitutional: negative for fevers or chills  Eyes: negative for visual disturbance   ENT: negative for sore throat or nasal congestion  Respiratory: negative for shortness of breath or cough  Cardiovascular: negative for chest pain ,palpitations,pnd,syncope  Gastrointestinal: negative for abd pain, nausea, vomiting, diarrhea , constipation,hemetemesis,karrie,blood in stool  Genitourinary: negative for dysuria, urgency ,frequency,hematuria  Integument/breast: negative for skin rash or lesions  Neurological: negative for unilateral weakness, numbness or tingling. Skeletal Muscular: no joint pain,jont swelling,back pain              Physical exam:      -----------------------------------------------------------------  Exam:  GEN:   A & O x3, no apparent distress  EYES: No gross abnormalities. NECK: normal, supple, no lymphadenopathy,  no carotid bruits  PULM: clear to auscultation bilaterally- no wheezes, rales or rhonchi, normal air movement, no respiratory distress  COR: regular rate & rhythm and no gallops  ABD:  soft, non-tender, non-distended, normal bowel sounds, no masses or organomegaly  EXT:   no cyanosis, clubbing or edema present    NEURO: negative  SKIN:  no rashes or significant lesions  -----------------------------------------------------------------          Hospital Course: The patient was admitted for chf,covid,afib with rvr. He has complicated cardiac history. He was treated with iv diuretics and continued to have shortness of breath,required bilat thoracentesis with improvement. His medications adjusted and improved over the course of his hospitalization.       Consultants:    cardiology    Procedures:    thoracentesis    Complications:   none    Significant Diagnostic Studies:   XR CHEST (2 VW)    Result Date: 8/8/2022  EXAMINATION: TWO XRAY VIEWS OF THE CHEST 8/8/2022 6:49 am COMPARISON: August 4, 2022 HISTORY: ORDERING SYSTEM PROVIDED HISTORY: Cough, shortness of breath TECHNOLOGIST PROVIDED HISTORY: Cough, shortness of breath FINDINGS: Stable cardiac silhouette enlargement as well as mild pulmonary edema. Essentially stable bilateral pleural effusions with fluid shift, currently small-moderate on the left and trace on the right, redemonstrated. Bibasilar and left perihilar airspace disease probably atelectasis. No pneumothorax. Osseous structures grossly intact. Essentially stable exam demonstrating mild pulmonary edema and bilateral pleural effusions, left greater than right. XR CHEST (2 VW)    Result Date: 8/4/2022  EXAMINATION: TWO XRAY VIEWS OF THE CHEST 8/4/2022 7:27 am COMPARISON: 3 August 2022 HISTORY: ORDERING SYSTEM PROVIDED HISTORY: Comparison Study, Shortness of breath TECHNOLOGIST PROVIDED HISTORY: Comparison Study, Shortness of breath FINDINGS: Two-view chest demonstrates overlying monitoring electrodes, stable cardiac size and a small to moderate left pleural effusion representing interval change since prior exam.  Patient has pulmonary vascular congestion with mild edema and basilar atelectasis. A small right effusion is noted. No extrapleural air. Interval increasing left pleural effusion. Cardiomegaly and vascular congestion with probable underlying atelectasis. XR CHEST PORTABLE    Result Date: 8/10/2022  EXAMINATION: ONE XRAY VIEW OF THE CHEST 8/10/2022 12:54 pm COMPARISON: 08/08/2022. HISTORY: ORDERING SYSTEM PROVIDED HISTORY: post thoracentesis TECHNOLOGIST PROVIDED HISTORY: post thoracentesis FINDINGS: The heart size and pulmonary vasculature are within normal limits. There are patchy densities seen within the lungs bilaterally. There has been interval bilateral thoracentesis with decrease in the pleural effusions. No pneumothoraces are seen.      1. Status post bilateral thoracentesis with decrease in the pleural effusions and no immediate complications. XR CHEST PORTABLE    Result Date: 8/3/2022  EXAMINATION: ONE XRAY VIEW OF THE CHEST 8/3/2022 11:49 am COMPARISON: June 8, 2022 HISTORY: ORDERING SYSTEM PROVIDED HISTORY: shortness of breath TECHNOLOGIST PROVIDED HISTORY: shortness of breath FINDINGS: Stable mild cardiac silhouette enlargement. Parahilar opacities and diffuse interstitial densities favor mild pulmonary edema. There are lower lung volumes with increased bibasilar opacities, left greater than right, probably reflecting atelectasis with trace-small bilateral pleural effusions. However underlying infiltrate not excluded. No pneumothorax. Overall findings most consistent with decompensated CHF with mild pulmonary edema. Probable bibasilar subsegmental atelectasis and pleural effusions. However, underlying infiltrates not excluded. CT CHEST PULMONARY EMBOLISM W CONTRAST    Result Date: 8/8/2022  EXAMINATION: CTA OF THE CHEST 8/8/2022 12:39 pm TECHNIQUE: CTA of the chest was performed after the administration of intravenous contrast.  Multiplanar reformatted images are provided for review. MIP images are provided for review. Automated exposure control, iterative reconstruction, and/or weight based adjustment of the mA/kV was utilized to reduce the radiation dose to as low as reasonably achievable. COMPARISON: 08/08/2022, 05/19/2022 HISTORY: ORDERING SYSTEM PROVIDED HISTORY: hypoxia TECHNOLOGIST PROVIDED HISTORY: hypoxia FINDINGS: Pulmonary Arteries: Pulmonary arteries are adequately opacified for evaluation. No evidence of intraluminal filling defect to suggest pulmonary embolism. Main pulmonary artery is mildly enlarged. Mediastinum: Mildly enlarged mediastinal lymph nodes which are new from prior. Cardiomegaly. Three-vessel coronary artery calcifications. Trace pericardial effusion. There is no acute abnormality of the ectatic, atherosclerotic thoracic aorta.  Lungs/pleura: Mild interstitial edema superimposed on patient's emphysematous change. Few scattered small ground-glass opacities in the anterior aspect of both upper lobes. Dependent changes in both lungs. Moderate layering bilateral pleural effusions with atelectasis of the adjacent parenchyma. There is near complete collapse of the left lower lobe with only a small portion of the superior segment remaining aerated. No pneumothorax. Trachea is patent. Upper Abdomen: No acute abnormality. Soft Tissues/Bones: No acute bone or soft tissue abnormality with similar mild body wall edema. No evidence of pulmonary embolism. Mildly enlarged main pulmonary artery which can be seen in the setting of pulmonary hypertension. Mild interstitial pulmonary edema on a background of centrilobular emphysema. A few scattered ground-glass opacities in the upper lobes may be due to a component of mild alveolar edema, though clinical correlation is recommended to exclude concern for contributory infectious causes. Moderate layering bilateral pleural effusions with partial collapse of the lower lobes, left more so than right. Cardiomegaly which appears unchanged from prior with 3 vessel coronary artery disease. US THORACENTESIS Which side should the procedure be performed? Right    Result Date: 8/10/2022  PROCEDURE: ULTRASOUNDGUIDED BILATERAL THORACENTESIS WITH TUBE PLACEMENT 8/10/2022 HISTORY: Bilateral pleural effusion presents for thoracentesis. TECHNIQUE: Informed consent was obtained after a detailed explanation of the procedure including risks, benefits, and alternatives. Universal protocol was performed. The right and left chest was prepped and draped in sterile fashion and local anesthesia was achieved with lidocaine. A 5 [place] needle sheath was advanced under ultrasound guidance into pleural effusions and bilateral thoracentesis was performed. The tubes were then removed. The patient tolerated the procedure well.  FINDINGS: Right thoracentesis 825 mL of fluid removed. Left thoracentesis 1100 mL of fluid removed. 1.  Successful ultrasound guided bilateral thoracentesis. US THORACENTESIS Which side should the procedure be performed? Radiologist Recommendation    Result Date: 8/10/2022  PROCEDURE: ULTRASOUNDGUIDED BILATERAL THORACENTESIS WITH TUBE PLACEMENT 8/10/2022 HISTORY: Bilateral pleural effusion presents for thoracentesis. TECHNIQUE: Informed consent was obtained after a detailed explanation of the procedure including risks, benefits, and alternatives. Universal protocol was performed. The right and left chest was prepped and draped in sterile fashion and local anesthesia was achieved with lidocaine. A 5 [place] needle sheath was advanced under ultrasound guidance into pleural effusions and bilateral thoracentesis was performed. The tubes were then removed. The patient tolerated the procedure well. FINDINGS: Right thoracentesis 825 mL of fluid removed. Left thoracentesis 1100 mL of fluid removed. 1.  Successful ultrasound guided bilateral thoracentesis. CTA HEAD NECK W CONTRAST    Result Date: 8/9/2022  EXAMINATION: CTA OF THE HEAD AND NECK WITH CONTRAST 8/9/2022 10:36 pm: TECHNIQUE: CTA of the head and neck was performed with the administration of intravenous contrast. Multiplanar reformatted images are provided for review. MIP images are provided for review. Stenosis of the internal carotid arteries measured using NASCET criteria. Automated exposure control, iterative reconstruction, and/or weight based adjustment of the mA/kV was utilized to reduce the radiation dose to as low as reasonably achievable. COMPARISON: None. HISTORY: ORDERING SYSTEM PROVIDED HISTORY: Patient confused, altered mental status. He also has thrombocytopenia and on chronic anticoagulation. TECHNOLOGIST PROVIDED HISTORY: Patient confused, altered mental status. He also has thrombocytopenia and on chronic anticoagulation.  FINDINGS: CTA NECK: AORTIC ARCH/ARCH VESSELS: No dissection or arterial injury. No significant stenosis of the brachiocephalic or subclavian arteries. CAROTID ARTERIES: Scattered atherosclerotic calcified plaques. Postsurgical changes involving the right carotid bulb and proximal right cervical ICA. There is a short dissection flap at the level of the carotid bulb resulting in about 60% stenosis at the origin of the right cervical ICA. Thick atherosclerotic calcified plaque is noted involving the left carotid bulb and extending into the origin of the left cervical ICA with a short segment of 90% stenosis. 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 There is distal reconstitution by way of contralateral retrograde flow. There is appropriate patency of the left cervical vertebral artery. SOFT TISSUES: Partially imaged bilateral pleural effusions. Diffuse centrilobular pulmonary emphysema. Partially imaged areas of patchy consolidation involving the bilateral upper lungs. No cervical or superior mediastinal lymphadenopathy. The larynx and pharynx are unremarkable. No acute abnormality of the salivary and thyroid glands. BONES: No acute osseous abnormality. CTA HEAD: ANTERIOR CIRCULATION: Thick atherosclerotic calcified plaques involving the bilateral carotid siphons. No significant stenosis of the intracranial internal carotid, anterior cerebral, or middle cerebral arteries. No aneurysm. POSTERIOR CIRCULATION: Short segment of high-grade stenosis involving the right intracranial vertebral artery with about 70% stenosis. Left intracranial vertebral artery demonstrates normal caliber and patency. Persistent fetal origin of the left PCA. OTHER: No dural venous sinus thrombosis on this non-dedicated study. BRAIN: No mass effect or midline shift. No extra-axial fluid collection. The gray-white differentiation is maintained.      Postsurgical changes involving the right carotid bulb and proximal right cervical ICA. There is a short dissection flap at the level of the carotid bulb resulting in about 60% stenosis at the origin of the right cervical ICA. Thick atherosclerotic calcified plaque is noted involving the left carotid bulb and extending into the origin of the left cervical ICA with a short segment of 90% stenosis. Short segment of high-grade stenosis involving the right intracranial vertebral artery with about 70% stenosis. 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 Recent Results (from the past 96 hour(s))   Blood Gas, Arterial    Collection Time: 08/09/22  2:30 PM   Result Value Ref Range    pH, Arterial 7.510 (H) 7.35 - 7.45    pCO2, Arterial 43.5 35 - 45 mmHg    pO2, Arterial 52.3 (L) 80.0 - 100.0 mmHg    HCO3, Arterial 33.9 (H) 22 - 26 mmol/L    Positive Base Excess, Art 9.8 (H) 0.0 - 2.0 mmol/L    O2 Sat, Arterial 87.2 (L) 95 - 98 %    Carboxyhemoglobin 0.9 0.0 - 5.0 %    Methemoglobin 0.5 0.0 - 1.9 %    Pt Temp 37.0     O2 Device/Flow/% Cannula     Respiratory Rate 20     Sample Site Right Brachial Artery     Pt.  Position FOWLERS     Text for Respiratory 88% SPO2 ON MONITOR    Ammonia    Collection Time: 08/09/22  2:35 PM   Result Value Ref Range    Ammonia 20 16 - 60 umol/L   CBC with Auto Differential    Collection Time: 08/10/22  5:45 AM   Result Value Ref Range    WBC 2.1 (L) 3.5 - 11.3 k/uL    RBC 3.68 (L) 4.21 - 5.77 m/uL    Hemoglobin 11.9 (L) 13.0 - 17.0 g/dL    Hematocrit 36.8 (L) 40.7 - 50.3 %    .0 82.6 - 102.9 fL    MCH 32.3 25.2 - 33.5 pg    MCHC 32.3 28.4 - 34.8 g/dL    RDW 15.1 (H) 11.8 - 14.4 %    Platelets See Reflexed IPF Result 138 - 453 k/uL    NRBC Automated 0.0 0.0 per 100 WBC    Seg Neutrophils 78 (H) 36 - 65 %    Lymphocytes 16 (L) 24 - 43 %    Monocytes 6 3 - 12 %    Eosinophils % 0 (L) 1 - 4 %    Immature Granulocytes 0 0 %    Basophils 0 0 - 2 %    Segs Absolute 1.63 1.50 - 8.10 k/uL    Absolute Lymph # 0.34 (L) 1.10 - 3.70 k/uL Absolute Mono # 0.13 0.10 - 1.20 k/uL    Absolute Eos # 0.00 0.00 - 0.44 k/uL    Absolute Immature Granulocyte 0.00 0.00 - 0.30 k/uL    Basophils Absolute 0.00 0.0 - 0.2 k/uL    Morphology Platelet scan shows Decreased Platelets    Comprehensive Metabolic Panel w/ Reflex to MG    Collection Time: 08/10/22  5:45 AM   Result Value Ref Range    Glucose 93 70 - 99 mg/dL    BUN 14 8 - 23 mg/dL    Creatinine 0.92 0.70 - 1.20 mg/dL    Bun/Cre Ratio 15 9 - 20    Calcium 9.0 8.6 - 10.4 mg/dL    Sodium 141 135 - 144 mmol/L    Potassium 2.8 (LL) 3.7 - 5.3 mmol/L    Chloride 94 (L) 98 - 107 mmol/L    CO2 35 (H) 20 - 31 mmol/L    Anion Gap 12 9 - 17 mmol/L    Alkaline Phosphatase 93 40 - 129 U/L     (H) 5 - 41 U/L     (H) <40 U/L    Total Bilirubin 1.10 0.3 - 1.2 mg/dL    Total Protein 6.1 (L) 6.4 - 8.3 g/dL    Albumin 3.5 3.5 - 5.2 g/dL    Albumin/Globulin Ratio 1.3 1.0 - 2.5    GFR Non-African American >60 >60 mL/min    GFR African American >60 >60 mL/min    GFR Comment          GFR Staging         Magnesium    Collection Time: 08/10/22  5:45 AM   Result Value Ref Range    Magnesium 1.5 (L) 1.6 - 2.6 mg/dL   Immature Platelet Fraction    Collection Time: 08/10/22  5:45 AM   Result Value Ref Range    Platelet, Immature Fraction 6.6 1.1 - 10.3 %    Platelet, Fluorescence 62 (L) 138 - 453 k/uL   C-Reactive Protein    Collection Time: 08/10/22  6:29 AM   Result Value Ref Range    CRP 52.8 (H) 0.0 - 5.0 mg/L   D-Dimer, Quantitative    Collection Time: 08/10/22  6:29 AM   Result Value Ref Range    D-Dimer, Quant 2.40 (H) 0.00 - 0.59 mg/L FEU   Ferritin    Collection Time: 08/10/22  6:29 AM   Result Value Ref Range    Ferritin 877 (H) 30 - 400 ng/mL   Blood Gas, Arterial    Collection Time: 08/10/22 10:10 AM   Result Value Ref Range    pH, Arterial 7.501 (H) 7.35 - 7.45    pCO2, Arterial 51.7 (H) 35 - 45 mmHg    pO2, Arterial 93.9 80 - 100 mmHg    HCO3, Arterial 39.5 (H) 22 - 26 mmol/L    Positive Base Excess, Art 13.9 (H) 0.0 - 2.0 mmol/L    O2 Sat, Arterial 97.6 95 - 98 %    Pt Temp 37.0     O2 Device/Flow/% O2 Mask     Dimitri Test PASS     Sample Site Right Radial Artery     Pt. Position FOWLERS    Albumin, Body Fluid    Collection Time: 08/10/22 12:50 PM   Result Value Ref Range    Specimen Type . PERITONEAL FLUID     Albumin, Fluid 1.8 g/dL   Cell Count with Differential, Body Fluid    Collection Time: 08/10/22 12:50 PM   Result Value Ref Range    Color, Fluid Yellow     Appearance, Fluid Cloudy     WBC, Fluid 193 /mm3    RBC, Fluid <3,000 /mm3    Specimen Type . PERITONEAL FLUID     Neutrophil Count, Fluid      0 %    Lymphocytes, Body Fluid 16 (H) 0 %    Monocyte Count, Fluid 2 (H) 0 %    Eos, Fluid      0 %    Basos, Fluid      0 %    Other Cells, Fluid 82 (H) 0 %   Culture, Body Fluid    Collection Time: 08/10/22 12:50 PM    Specimen: Thoracentesis; Body Fluid   Result Value Ref Range    Specimen Description . THORACENTESIS FLUID     Direct Exam MANY NEUTROPHILS (A)     Direct Exam NO ORGANISMS SEEN     Direct Exam       Gram stain made from cytocentrifuged specimen. Organisms and cells will be concentrated. Culture NO GROWTH 2 DAYS    Cytology, Non-Gyn    Collection Time: 08/10/22 12:50 PM   Result Value Ref Range    Specimen Description . LUNG     Case Number: [id number]    pH, Body Fluid    Collection Time: 08/10/22 12:50 PM   Result Value Ref Range    Specimen Type . PERITONEAL FLUID     pH, Fluid 8.5    Lactate Dehydrogenase, Body Fluid    Collection Time: 08/10/22 12:50 PM   Result Value Ref Range    Specimen Type . PERITONEAL FLUID     LD, Fluid 130 U/L   Potassium    Collection Time: 08/10/22 12:50 PM   Result Value Ref Range    Potassium 3.0 (L) 3.7 - 5.3 mmol/L   SURGICAL PATHOLOGY REPORT    Collection Time: 08/10/22  6:28 PM   Result Value Ref Range    Surgical Pathology Report       -- Diagnosis --  THORACENTESIS FLUID:          NEGATIVE FOR MALIGNANCY. Reuben Contreras D.O.  **Electronically Signed Out**         [facility]/8/12/2022       Clinical Information    No information given. Source of Specimen  A: THORACENTESIS FLUID    Gross Description  A. \"UNDESIGNATED\" 60.0 mL orange fluid. MICROSCOPIC DESCRIPTION     Microscopic examination performed. [address]       Patient Name: Inez Perkins [facility] Rec: [id number]  Path Number: [id number]    [employer]  [facility]  ANATOMIC PATHOLOGY  [address] [place] [address],  [address].   [facility], [address]  [phone]  Fax: [phone]     CBC with Auto Differential    Collection Time: 08/11/22  5:20 AM   Result Value Ref Range    WBC 3.5 3.5 - 11.3 k/uL    RBC 3.75 (L) 4.21 - 5.77 m/uL    Hemoglobin 12.2 (L) 13.0 - 17.0 g/dL    Hematocrit 37.4 (L) 40.7 - 50.3 %    MCV 99.7 82.6 - 102.9 fL    MCH 32.5 25.2 - 33.5 pg    MCHC 32.6 28.4 - 34.8 g/dL    RDW 14.9 (H) 11.8 - 14.4 %    Platelets See Reflexed IPF Result 138 - 453 k/uL    NRBC Automated 0.0 0.0 per 100 WBC    Seg Neutrophils 95 (H) 36 - 65 %    Lymphocytes 4 (L) 24 - 43 %    Monocytes 1 (L) 3 - 12 %    Eosinophils % 0 (L) 1 - 4 %    Immature Granulocytes 0 0 %    Basophils 0 0 - 2 %    Segs Absolute 3.32 1.50 - 8.10 k/uL    Absolute Lymph # 0.14 (L) 1.10 - 3.70 k/uL    Absolute Mono # 0.04 (L) 0.10 - 1.20 k/uL    Absolute Eos # 0.00 0.00 - 0.44 k/uL    Absolute Immature Granulocyte 0.00 0.00 - 0.30 k/uL    Basophils Absolute 0.00 0.0 - 0.2 k/uL    Morphology Platelet scan shows Decreased Platelets    Comprehensive Metabolic Panel w/ Reflex to MG    Collection Time: 08/11/22  5:20 AM   Result Value Ref Range    Glucose 112 (H) 70 - 99 mg/dL    BUN 20 8 - 23 mg/dL    Creatinine 0.96 0.70 - 1.20 mg/dL    Bun/Cre Ratio 21 (H) 9 - 20    Calcium 9.3 8.6 - 10.4 mg/dL    Sodium 139 135 - 144 mmol/L    Potassium 3.6 (L) 3.7 - 5.3 mmol/L    Chloride 94 (L) 98 - 107 mmol/L    CO2 37 (H) 20 - 31 mmol/L    Anion Gap 8 (L) 9 - 17 mmol/L    Alkaline Phosphatase 91 40 - 129 U/L     (H) 5 - 41 U/L     (H) <40 U/L    Total Bilirubin 1.12 0.3 - 1.2 mg/dL    Total Protein 6.0 (L) 6.4 - 8.3 g/dL    Albumin 3.5 3.5 - 5.2 g/dL    Albumin/Globulin Ratio 1.4 1.0 - 2.5    GFR Non-African American >60 >60 mL/min    GFR African American >60 >60 mL/min    GFR Comment          GFR Staging         Magnesium    Collection Time: 08/11/22  5:20 AM   Result Value Ref Range    Magnesium 2.0 1.6 - 2.6 mg/dL   C-Reactive Protein    Collection Time: 08/11/22  5:20 AM   Result Value Ref Range    CRP 79.4 (H) 0.0 - 5.0 mg/L   D-Dimer, Quantitative    Collection Time: 08/11/22  5:20 AM   Result Value Ref Range    D-Dimer, Quant 2.40 (H) 0.00 - 0.59 mg/L FEU   Ferritin    Collection Time: 08/11/22  5:20 AM   Result Value Ref Range    Ferritin 1,592 (H) 30 - 400 ng/mL   Immature Platelet Fraction    Collection Time: 08/11/22  5:20 AM   Result Value Ref Range    Platelet, Immature Fraction 9.8 1.1 - 10.3 %    Platelet, Fluorescence 64 (L) 138 - 453 k/uL   C-Reactive Protein    Collection Time: 08/12/22  5:55 AM   Result Value Ref Range    CRP 54.4 (H) 0.0 - 5.0 mg/L   D-Dimer, Quantitative    Collection Time: 08/12/22  5:55 AM   Result Value Ref Range    D-Dimer, Quant 2.16 (H) 0.00 - 0.59 mg/L FEU   Ferritin    Collection Time: 08/12/22  5:55 AM   Result Value Ref Range    Ferritin 1,826 (H) 30 - 400 ng/mL   Comprehensive Metabolic Panel w/ Reflex to MG    Collection Time: 08/12/22  5:55 AM   Result Value Ref Range    Glucose 117 (H) 70 - 99 mg/dL    BUN 21 8 - 23 mg/dL    Creatinine 0.90 0.70 - 1.20 mg/dL    Bun/Cre Ratio 23 (H) 9 - 20    Calcium 9.4 8.6 - 10.4 mg/dL    Sodium 139 135 - 144 mmol/L    Potassium 4.7 3.7 - 5.3 mmol/L    Chloride 95 (L) 98 - 107 mmol/L    CO2 35 (H) 20 - 31 mmol/L    Anion Gap 9 9 - 17 mmol/L    Alkaline Phosphatase 92 40 - 129 U/L     (H) 5 - 41 U/L     (H) <40 U/L    Total Bilirubin 1.04 0.3 - 1.2 mg/dL    Total Protein 6.0 (L) 6.4 - 8.3 g/dL    Albumin 3.4 (L) 3.5 - 5.2 g/dL    Albumin/Globulin Ratio 1.3 1.0 - 2.5    GFR Non-African American >60 >60 mL/min    GFR African American >60 >60 mL/min    GFR Comment          GFR Staging         CBC with Auto Differential    Collection Time: 08/12/22  5:55 AM   Result Value Ref Range    WBC 2.9 (L) 3.5 - 11.3 k/uL    RBC 3.78 (L) 4.21 - 5.77 m/uL    Hemoglobin 12.4 (L) 13.0 - 17.0 g/dL    Hematocrit 38.1 (L) 40.7 - 50.3 %    .8 82.6 - 102.9 fL    MCH 32.8 25.2 - 33.5 pg    MCHC 32.5 28.4 - 34.8 g/dL    RDW 14.4 11.8 - 14.4 %    Platelets See Reflexed IPF Result 138 - 453 k/uL    NRBC Automated 0.0 0.0 per 100 WBC    Seg Neutrophils 80 (H) 36 - 65 %    Lymphocytes 12 (L) 24 - 43 %    Monocytes 7 3 - 12 %    Eosinophils % 0 (L) 1 - 4 %    Immature Granulocytes 0 0 %    Basophils 1 0 - 2 %    Segs Absolute 2.32 1.50 - 8.10 k/uL    Absolute Lymph # 0.35 (L) 1.10 - 3.70 k/uL    Absolute Mono # 0.20 0.10 - 1.20 k/uL    Absolute Eos # 0.00 0.00 - 0.44 k/uL    Absolute Immature Granulocyte 0.00 0.00 - 0.30 k/uL    Basophils Absolute 0.03 0.0 - 0.2 k/uL    Morphology Platelet scan shows Decreased Platelets     Morphology Large platelets noted    Immature Platelet Fraction    Collection Time: 08/12/22  5:55 AM   Result Value Ref Range    Platelet, Immature Fraction 11.7 (H) 1.1 - 10.3 %    Platelet, Fluorescence 65 (L) 138 - 453 k/uL   C-Reactive Protein    Collection Time: 08/13/22  7:10 AM   Result Value Ref Range    CRP 28.5 (H) 0.0 - 5.0 mg/L   D-Dimer, Quantitative    Collection Time: 08/13/22  7:10 AM   Result Value Ref Range    D-Dimer, Quant 1.93 (H) 0.00 - 0.59 mg/L FEU   Comprehensive Metabolic Panel w/ Reflex to MG    Collection Time: 08/13/22  7:10 AM   Result Value Ref Range    Glucose 116 (H) 70 - 99 mg/dL    BUN 23 8 - 23 mg/dL    Creatinine 0.86 0.70 - 1.20 mg/dL    Bun/Cre Ratio 27 (H) 9 - 20    Calcium 9.5 8.6 - 10.4 mg/dL    Sodium 140 135 - 144 mmol/L    Potassium 4.1 3.7 - 5.3 mmol/L    Chloride 96 (L) 98 - 107 mmol/L    CO2 35 (H) 20 - 31 mmol/L    Anion Gap 9 9 - 17 mmol/L    Alkaline Phosphatase 94 40 - 129 U/L     (H) 5 - 41 U/L    AST 93 (H) <40 U/L    Total Bilirubin 0.95 0.3 - 1.2 mg/dL    Total Protein 6.1 (L) 6.4 - 8.3 g/dL    Albumin 3.6 3.5 - 5.2 g/dL    Albumin/Globulin Ratio 1.4 1.0 - 2.5    GFR Non-African American >60 >60 mL/min    GFR African American >60 >60 mL/min    GFR Comment          GFR Staging         CBC with Auto Differential    Collection Time: 08/13/22  7:10 AM   Result Value Ref Range    WBC 3.2 (L) 3.5 - 11.3 k/uL    RBC 3.86 (L) 4.21 - 5.77 m/uL    Hemoglobin 12.6 (L) 13.0 - 17.0 g/dL    Hematocrit 38.5 (L) 40.7 - 50.3 %    MCV 99.7 82.6 - 102.9 fL    MCH 32.6 25.2 - 33.5 pg    MCHC 32.7 28.4 - 34.8 g/dL    RDW 14.2 11.8 - 14.4 %    Platelets See Reflexed IPF Result 138 - 453 k/uL    NRBC Automated 0.0 0.0 per 100 WBC    Seg Neutrophils 91 (H) 36 - 65 %    Lymphocytes 4 (L) 24 - 43 %    Monocytes 5 3 - 12 %    Eosinophils % 0 (L) 1 - 4 %    Immature Granulocytes 0 0 %    Basophils 0 0 - 2 %    Segs Absolute 2.91 1.50 - 8.10 k/uL    Absolute Lymph # 0.13 (L) 1.10 - 3.70 k/uL    Absolute Mono # 0.16 0.10 - 1.20 k/uL    Absolute Eos # 0.00 0.00 - 0.44 k/uL    Absolute Immature Granulocyte 0.00 0.00 - 0.30 k/uL    Basophils Absolute 0.00 0.0 - 0.2 k/uL    Morphology Normal    Immature Platelet Fraction    Collection Time: 08/13/22  7:10 AM   Result Value Ref Range    Platelet, Immature Fraction 11.6 (H) 1.1 - 10.3 %    Platelet, Fluorescence 75 (L) 138 - 453 k/uL         Discharge Condition:   stable    Disposition:   home    Discharge Medications:       Medication List        START taking these medications      dapagliflozin 10 MG tablet  Commonly known as: Farxiga  Take 1 tablet by mouth every morning Lot #: [id number]  Exp: 10/24  Three boxes given. spironolactone 25 MG tablet  Commonly known as: ALDACTONE  Take 1 tablet by mouth in the morning.   Start taking on: August 14, 2022     zinc sulfate 220 (50 Zn) MG capsule  Commonly known as: ZINCATE  Take 2 capsules by mouth in the morning. Start taking on: August 14, 2022            CONTINUE taking these medications      amiodarone 200 MG tablet  Commonly known as: CORDARONE     apixaban 5 MG Tabs tablet  Commonly known as: ELIQUIS     atorvastatin 80 MG tablet  Commonly known as: LIPITOR     bumetanide 1 MG tablet  Commonly known as: BUMEX  Take 1 tablet by mouth daily     busPIRone 10 MG tablet  Commonly known as: BUSPAR     clopidogrel 75 MG tablet  Commonly known as: PLAVIX     escitalopram 20 MG tablet  Commonly known as: LEXAPRO     Fish Oil Concentrate 4625 MG Caps     folic acid 1 MG tablet  Commonly known as: FOLVITE  Take 1 tablet by mouth daily     magnesium oxide 400 (240 Mg) MG tablet  Commonly known as: MagOx 400  Take 1.5 tablets by mouth 2 times daily     melatonin 5 MG Tabs tablet  Take 2 tablets by mouth nightly     metoprolol succinate 25 MG extended release tablet  Commonly known as: Toprol XL  Take 1 tablet by mouth daily     midodrine 10 MG tablet  Commonly known as: PROAMATINE  Take 1 tablet by mouth in the morning and 1 tablet at noon and 1 tablet in the evening. Take with meals. multivitamin Tabs tablet  Take 1 tablet by mouth in the morning. pantoprazole 40 MG tablet  Commonly known as: PROTONIX  Take 1 tablet by mouth every morning (before breakfast)     potassium chloride 20 MEQ extended release tablet  Commonly known as: KLOR-CON M  Take 1 tablet by mouth in the morning and 1 tablet in the evening. Take with meals. rOPINIRole 0.5 MG tablet  Commonly known as: REQUIP     sacubitril-valsartan 24-26 MG per tablet  Commonly known as: ENTRESTO  Take 0.5 tablets by mouth 2 times daily Lot #: [id number]  Exp: July 2024     tamsulosin 0.4 MG capsule  Commonly known as: FLOMAX  Take 1 capsule by mouth in the morning.      vitamin B-1 100 MG tablet  Commonly known as: THIAMINE  Take 1 tablet by mouth in the morning. vitamin D 1.25 MG (22144 UT) Caps capsule  Commonly known as: ERGOCALCIFEROL  Take 1 capsule by mouth once a week            STOP taking these medications      fludrocortisone 0.1 MG tablet  Commonly known as: FLORINEF               Where to Get Your Medications        These medications were sent to [id number] [place] [place] - [address] [phone] [facility] [phone] [address] [address]      Phone: [phone]   spironolactone 25 MG tablet       You can get these medications from any pharmacy    You don't need a prescription for these medications  zinc sulfate 220 (50 Zn) MG capsule       Information about where to get these medications is not yet available    Ask your nurse or doctor about these medications  dapagliflozin 10 MG tablet          Resume all home medications unless otherwise directed  Add as above  Stop taking as above    Patient Instructions: Activity: activity as tolerated  Diet: cardiac diet  Wound Care: none needed  Other:   Follow up with Dr Clair Jerez and Cardiologist in 1 wk as directed      Time Spent on discharge services is 40 minutes in the examination, evaluation, counseling and review of medications and discharge plan.     Signed:  Yohannes Smith MD, M.D.  8/13/2022  9:45 AM

## 2022-08-13 NOTE — DISCHARGE INSTR - DIET

## 2022-08-13 NOTE — PROGRESS NOTES
Physician Progress Note      PATIENT:               Jayla Sun  [facility] #:                  [id number]  :                       1950  ADMIT DATE:       8/3/2022 11:12 AM  DISCH DATE:  RESPONDING  PROVIDER #:        Charlene Mcfadden MD          QUERY TEXT:      Pt admitted  8/3/22 with acute on chronic combined CHF. Pt noted to have positive Covid-19 PCR on 22. In 8/10/22 consult note,    Hematology Oncology documented Covid-19 pneumonia. If possible, please document in progress notes and discharge summary the   present on admission status of Covid 19 infection / Covid 19 pneumonia. The medical record reflects the following:  Risk Factors: CHF, CKD, COPD  Clinical Indicators: presented to ED with worsening shortness of breath/acute   respiratory failure with hypoxia;  22 CT chest: A few scattered   ground-glass opacities in the upper lobes;  positive Covid PCR on 22;     8/10/22 CRP 52.8; 8/10/22 Infectious Diseases Consult: At this point it is   difficult to determine how much of the respiratory failure is secondary to the   cardiac failure and pleural effusions?versus a contribution from COVID-19. Treatment: High flow oxygen, Decadron, Monoclonal antibodies infused 8/10/2022    Doug West, MSN, RN, [address] [place] [address]  Clinical   [phone]  .   Options provided:  -- Covid 19 pneumonia was present at the time of the order to admit to the   hospital  -- Covid 19 infection present on admission, pneumonia ruled out  -- Covid 19 pneumonia  was present  as an evolving condition at the time of   the order to admit to the hospital  -- Covid 19 infection was present on admission as an evolving condition,   pneumonia ruled out  -- Covid-19 pneumonia was not present on admission and developed during the   inpatient stay  -- Covid-19 infection not present on admission, developed during the inpatient   stay, pneumonia ruled out  -- Other - I will add my own diagnosis  -- Disagree - Not applicable / Not valid  -- Disagree - Clinically unable to determine / Unknown  -- Refer to Clinical Documentation Reviewer    PROVIDER RESPONSE TEXT:    Covid-19 pneumonia was not present on admission and developed during the   inpatient stay.     Query created by: Rach Collado on 8/11/2022 4:50 PM      Electronically signed by:  Jayden Christine MD 8/12/2022 8:45 PM

## 2022-08-13 NOTE — PROGRESS NOTES
Writer spoke with Dr. Shannan Warren regarding patient discharge. He stated to wait on discharging him until he sees the patient via video visit.

## 2022-08-13 NOTE — PROGRESS NOTES
Physical Therapy  Facility/Department: [facility] AT THE [facility] MED SURG  Daily Treatment Note  NAME: Malick Grullon  : [birth year]  MRN: [id number]    Date of Service: 2022    Discharge Recommendations:  Continue to assess pending progress, Home with Home health PT, Outpatient PT        Patient Diagnosis(es): The primary encounter diagnosis was Acute respiratory failure with hypoxia ([address] A diagnosis of Acute on chronic combined systolic and diastolic CHF (congestive heart failure) (HCC) was also pertinent to this visit. Assessment   Activity Tolerance: Patient tolerated treatment well (Simultaneous filing. User may not have seen previous data.)     Plan    Plan  Plan: 2 times a day 7 days a week  Plan weeks: 20 days  Current Treatment Recommendations: Strengthening;Balance training;Functional mobility training;Transfer training; Endurance training;Gait training;Stair training;Neuromuscular re-education;Home exercise program;Safety education & training;Positioning; Therapeutic activities     Restrictions  Restrictions/Precautions  Restrictions/Precautions: General Precautions, Isolation, Contact Precautions     Subjective    Subjective  Subjective: Pt in bed upon arrival and nursing placing new finger pulse ox on pt. Pt was pleasant and ready to work.   Pain: Denies  Orientation  Overall Orientation Status: Within Functional Limits  Cognition  Overall Cognitive Status: WFL     Objective   Vitals     Bed Mobility Training  Bed Mobility Training: Yes  Overall Level of Assistance: Supervision  Interventions: Safety awareness training;Verbal cues  Supine to Sit: Supervision  Sit to Supine: Supervision  Scooting: Supervision  Balance  Sitting: Intact  Standing: Intact  Standing - Static: Fair;Good  Standing - Dynamic: Fair;Good  Transfer Training  Transfer Training: Yes  Overall Level of Assistance: Supervision  Interventions: Verbal cues  Sit to Stand: Supervision  Stand to Sit: Supervision  Bed to Chair: Supervision  Gait  Overall Level of Assistance: Supervision  Interventions: Verbal cues; Safety awareness training  Assistive Device: Other (comment) (no device)     PT Exercises  Exercise Treatment: Seated B LE ther ex x20 reps. A/AROM Exercises: LAQ x20, marches x20, heel/toe raises x20, hip abd x20     Safety Devices  Type of Devices: Call light within reach; Chair alarm in place; Left in chair; All fall risk precautions in place  Restraints  Restraints Initially in Place: No       Goals  Short Term Goals  Time Frame for Short term goals: 20 days  Short term goal 1: Pt to be able to ambulate 50 ft IND to be able to retrun home safely. Short term goal 2: Pt to be IND with all transfers to improve functional mobility and safety of transfers  Short term goal 3: Pt to be able to tolerate 20-30 mins therex/act to improve functional capacity for ADLs.   Patient Goals   Patient goals : to get back home    Education  Patient Education  Education Given To: Patient  Education Provided: Role of Therapy;Plan of Care;Home Exercise Program  Education Method: Verbal  Barriers to Learning: None  Education Outcome: Verbalized understanding    Therapy Time   Individual Concurrent Group Co-treatment   Time In 0730         Time Out 0800         Minutes 30         Timed Code Treatment Minutes: 6 Halina Dodson, PTA

## 2022-08-13 NOTE — PROGRESS NOTES
Patient: Padmaja Hughes  : [id number]  Date of Admission: 8/3/2022  Primary Care Physician: Steff Mac  Today's Date: 2022    REASON FOR CONSULTATION: atrial fibrillation with RVR    HPI:  Mr. Valente Osman is a 70 y.o. male who was admitted to the hospital on 2022 for weakness and shortness of breath. In the ER he was found to be in atrial fibrillation with RVR leading to a consultation. He was then readmitted due to severe dehydration. In the past he reports seeing a Cardiologist in [place] and was planning on having a cardioversion done in  to put him back into normal sinus rhythm due to his history of new onset atrial fibrillation. He reports a history of a heart attack at age 46 and needed stents placed at that time but denies any cardiac cath since that time. He also has carotid artery stenosis and abdominal aortic aneurysm. He did report having surgery on one side of his neck, however he is not sure which side or how long ago it was. He has had hypertension and hyperlipidemia for years as well. He is a current every day smoker and he smoked for about 55 years and smokes about a pack a day. Family history consists of a lot of people in his family with significant cardiac history, however he wanted us to talk to his sister about the family history as she knows the details. Echo done on 2022 showed an EF of 15-20%. Mr. Valente Osman was admitted for what appears to be a heart failure exacerbation as well as increased generalized weakness. He underwent bilateral thoracentesis on 8/10/22 and had about 1 liter taken off of each side. He says that he feels much better today including his breathing. He is down to being on room air and although he says he is wobbly on his feet, he says he is getting stronger and feels strong enough to go home today. He says he has plenty of help at home.  He denies any chest pain now or in the recent past. Denies any medication changes, diarrhea or constipation. He also denied any abdominal pain, bleeding problems, problems with his medications or any other concerns at this time. He has no bowel issues at this time. No nausea or vomiting. No fever, cough or chills. Past Medical History:   Diagnosis Date    Acute kidney injury ([address]     Ascites 2022    Atrial fibrillation (HCC)     CHF (congestive heart failure) (HCC)     Cirrhosis of liver with ascites [address] 2022    CKD (chronic kidney disease) 2022    Coronary artery disease     Hyperlipidemia     Hypertension     Hypomagnesemia 2022    Hyponatremia     Thrombocytopenia (HCC)      CURRENT ALLERGIES: Patient has no known allergies. REVIEW OF SYSTEMS: 14 systems were reviewed. Pertinent positives and negatives as above, all else negative. Past Surgical History:   Procedure Laterality Date    CAROTID STENT PLACEMENT Bilateral     HERNIA REPAIR      ROTATOR CUFF REPAIR Right     TONSILLECTOMY AND ADENOIDECTOMY      Social History:  Social History     Tobacco Use    Smoking status: Former     Packs/day: 1.00     Years: 56.00     Pack years: 56.00     Types: Cigarettes     Quit date: 2022     Years since quittin.1    Smokeless tobacco: Never   Vaping Use    Vaping Use: Never used   Substance Use Topics    Alcohol use: Not Currently     Alcohol/week: 2.0 standard drinks     Types: 2 Shots of liquor per week     Comment: Drinks about 2 Kesslers with mix nightly. Reported history of heavy alcohol use for years with both beer and liquior several years ago    Drug use: Not Currently        CURRENT MEDICATIONS:  Outpatient Medications Marked as Taking for the 8/3/22 encounter [facility] Encounter)   Medication Sig Dispense Refill    [START ON 2022] spironolactone (ALDACTONE) 25 MG tablet Take 1 tablet by mouth in the morning. 30 tablet 3    [START ON 2022] zinc sulfate (ZINCATE) 220 (50 Zn) MG capsule Take 2 capsules by mouth in the morning.  30 capsule 3    albuterol sulfate HFA (VENTOLIN HFA) 108 (90 Base) MCG/ACT inhaler Inhale 2 puffs into the lungs 4 times daily as needed for Wheezing 54 g 1    ipratropium-albuterol (DUONEB) 0.5-2.5 (3) MG/3ML SOLN nebulizer solution Inhale 3 mLs into the lungs every 4 hours as needed for Shortness of Breath 360 mL 1     FAMILY HISTORY: Reviewed but non-contributory     PHYSICAL EXAM:       Physical Examination:     /62   Pulse 52   Temp 97 °F (36.1 °C) (Temporal)   Resp 18   Ht 5' 6\" (1.676 m)   Wt 154 lb 5.2 oz (70 kg)   SpO2 97%   BMI 24.91 kg/m²  Body mass index is 24.91 kg/m². Constitutional: He appeared oriented to person and place. He appears well-developed and well-nourished. In no acute distress. HEENT: Normocephalic and atraumatic. No JVD present. Carotid bruit is not present. No mass and no thyromegaly present. No lymphadenopathy noted. Cardiovascular: Normal rate, regular rhythm, normal heart sounds. Exam reveals no gallop and no friction rubs. 2/6 systolic murmur, 5th intercostal space on the LEFT in the mid-clavicular line (cardiac apex)  Pulmonary/Chest: Effort normal and breath sounds normal. No respiratory distress. He has no wheezes, rhonchi or rales. Abdominal: Soft, non-tender. He exhibits no organomegaly, mass or bruit. Extremities: Trace. No cyanosis or clubbing. 2+ radial and carotid pulses. Distal extremity pulses: 1+ bilaterally. Neurological: Alertness and orientation as per Constitutional exam. No evidence of gross cranial nerve deficit. Coordination appeared normal.   Skin: Skin is warm and dry. There is no rash or diaphoresis. Psychiatric: He has a normal mood and affect.  His speech is normal and behavior is normal.       Other pertinent observable physical exam findings: None       MOST RECENT LABS ON RECORD:   Lab Results   Component Value Date    WBC 3.2 (L) 08/13/2022    HGB 12.6 (L) 08/13/2022    HCT 38.5 (L) 08/13/2022    PLT See Reflexed IPF Result 08/13/2022     (H) 08/13/2022    AST 93 (H) 08/13/2022     08/13/2022    K 4.1 08/13/2022    CL 96 (L) 08/13/2022    CREATININE 0.86 08/13/2022    BUN 23 08/13/2022    CO2 35 (H) 08/13/2022    TSH 5.77 (H) 07/25/2022    INR 1.4 08/03/2022     ASSESSMENT:  Patient Active Problem List    Diagnosis Date Noted    Acute respiratory failure with hypoxia ([address] 08/04/2022    Encounter for current long-term use of anticoagulants 08/04/2022    Chronic combined systolic (congestive) and diastolic (congestive) heart failure (HCC)     Dehydration, moderate     Medication side effect, initial encounter     History of acute inferior wall MI     Ischemic cardiomyopathy     Mild malnutrition ([address] 08/12/2022    Pancytopenia ([address] 08/10/2022    Bilateral pleural effusion 08/09/2022    Nonischemic cardiomyopathy ([address] 08/04/2022    Hypoxia 08/03/2022    PAF (paroxysmal atrial fibrillation) ([address] 07/19/2022    Lightheaded 07/19/2022    Tobacco abuse counseling 07/19/2022    Gastroesophageal reflux disease 07/19/2022    Hypotension 07/19/2022    Acute on chronic combined systolic and diastolic CHF (congestive heart failure) ([address] 07/19/2022    Hypokalemia 07/19/2022    Hypovitaminosis D 07/19/2022    Severe malnutrition ([address] 06/07/2022    Hypomagnesemia 05/20/2022    Ascites 05/20/2022    CKD (chronic kidney disease) 05/20/2022    Cirrhosis of liver with ascites ([address] 05/19/2022    Acute on chronic combined systolic and diastolic CHF, NYHA class 4 (HCC)     Coronary artery disease     Hypertension     Thrombocytopenia (HCC)     Hyponatremia     Hyperlipidemia     Atrial fibrillation with RVR ([address] 05/18/2022      PLAN:    Paroxysmal Atrial Fibrillation: Rhythm Control Asymptomatic   Beta Blocker: Continue Metoprolol succinate (Toprol XL) 25 mg daily. Anti-Arrhythmic: Continue amiodarone (Pacerone) 200 mg BID.  Monitoring: Since they will being maintained on Amiodarone, I told them that we will need to closely monitor them for potential side effects. These include monitoring LFTs and TSH at least every 6 months as well as chest x-rays, pulmonary function tests, and eye exams at least yearly. [id number] Score for Atrial Fibrillation Stroke Risk   Risk   Factors  Component Value   C CHF Yes 1   H HTN Yes 1   A2 Age >= 76 No,  (75 y.o.) 0   D DM No 0   S2 Prior Stroke/TIA No 0   V Vascular Disease No 0   A Age 74-69 Yes,  (75 y.o.) 1   Sc Sex male 0    [id number]  Score  3   Score last updated 9/2/02 3:09 PM EDT  Click here for a link to the UpToDate guideline \"Atrial Fibrillation: Anticoagulation therapy to prevent embolization  Disclaimer: Risk Score calculation is dependent on accuracy of patient problem list and past encounter diagnosis. Stroke Risk: CHADS2-VASc Score: 3/9 (3.2% stroke risk). Because of his atrial fibrillation, I also would also recommend that he continue with anticoagulation to decrease his risk of stoke but also reminded him to watch for signs of blood in his stool or black tarry stools and stop the medication immediately if this develops as this could be life threatening. Anticoagulation: Continue Apixaban (Eliquis) 5 mg every 12 hours. Acute on chronic combined heart failure: Ischemic Cardiomyopathy, Echo done on 5/18/2022 which showed an EF of 15-20%. We recently decreased his Bumex from 2 mg to 1 mg due to his falling and I suspect this is what caused his heart failure exacerbation. Beta Blocker: Continue Metoprolol succinate (Toprol XL) 25 mg daily. ACE Inibitor/ARB: Continue sacubitril/valsartan (Entresto) 24/26 mg 1/2 a tablet twice daily. Diuretics: Continue bumetinide (Bumex) 2 mg every morning. Heart failure counseling: I advised them to try and keep their legs up whenever possible and to limit salt in their diet.    Life Vest: Although I do not think it is a bad idea for him to wear the Life Vest in the hospital, I am actually ok if he does not wear it as long as he is on Telemetry but will leave this up to Mr. Guerrero Favors . Ok to increase fluid restriction back up to 2 L/day  Referral to Pharmacy HF clinic next week and blood work in 3-5 days. I told him to call us if he developed increased shortness of breath, lower extremity edema or lightheadedness and dizziness. Tobacco Abuse Counseling: Patient says he is done smoking. I encouraged him to stick with this. Once again, thank you for allowing me to participate in this patients care. Please do not hesitate to contact me could I be of further assistance. Sincerely,  Stefano Blankenship MD, MS, F.A.C.C. [facility] Cardiology Specialist    [address] [place] [facility] [address] [facility]  Phone: [phone], Fax: [phone]     I believe that the risk of significant morbidity and mortality related to the patient's current medical conditions are: Intermediate. August 13, 2022     --Omar Herman MD on 8/13/2022 at 10:45 AM    An electronic signature was used to authenticate this note.

## 2022-08-13 NOTE — PROGRESS NOTES
IV removed and discharge instructions went over with patient and patient sister. Patient denies having needs or concerns. IV removed without complication. Dressing applied. Patient and sister deny questions. Patient discharged to home via private transportation.  Patient wearing life vest.

## 2022-08-13 NOTE — PROGRESS NOTES
Occupational Therapy  Facility/Department: [facility] AT THE [facility] MED SURG  Daily Treatment Note  NAME: Steve Wallace  :   MRN: [id number]    Date of Service: 2022    Discharge Recommendations:  Continue to assess pending progress, Home with assist PRN         Patient Diagnosis(es): The primary encounter diagnosis was Acute respiratory failure with hypoxia ([address] A diagnosis of Acute on chronic combined systolic and diastolic CHF (congestive heart failure) (HCC) was also pertinent to this visit. Assessment    Activity Tolerance: Patient tolerated treatment well (Simultaneous filing. User may not have seen previous data.)  Discharge Recommendations: Continue to assess pending progress;Home with assist PRN      Plan   Plan  Times per Week: 7  Times per Day: Daily  Current Treatment Recommendations: Strengthening;Balance training;Functional mobility training; Endurance training; Safety education & training;Patient/Caregiver education & training;Equipment evaluation, education, & procurement;Home management training;Self-Care / ADL     Restrictions  Restrictions/Precautions  Restrictions/Precautions: General Precautions;Isolation;Contact Precautions    Subjective   Subjective  Subjective: Pt sitting EOB with nurse present upon arrival. Pt agreed to participate in therapy session. Pain: pt had no complaints of pain this date.   Orientation  Overall Orientation Status: Within Functional Limits  Pain: Denies  Cognition  Overall Cognitive Status: WFL        Objective    Vitals     Bed Mobility Training  Bed Mobility Training: Yes  Overall Level of Assistance: Supervision  Interventions: Safety awareness training;Verbal cues  Supine to Sit: Supervision  Sit to Supine: Supervision  Scooting: Supervision  Balance  Sitting: Intact  Standing: Intact  Standing - Static: Fair;Good  Standing - Dynamic: Fair;Good  Transfer Training  Transfer Training: Yes  Overall Level of Assistance: Supervision  Interventions: Verbal cues  Sit to Stand: Supervision  Stand to Sit: Supervision  Bed to Chair: Supervision  Gait  Overall Level of Assistance: Supervision  Interventions: Verbal cues; Safety awareness training  Assistive Device: Other (comment) (no device)        OT Exercises  Exercise Treatment: Pt tolerated BUE AROM x 5 planes x 20 reps x 1 set to increase UE strength and endurance in order to ease completion of ADL tasks. Pt required RBs as needed secondary to fatigue. Safety Devices  Type of Devices: Call light within reach; Chair alarm in place; Left in chair; All fall risk precautions in place  Restraints  Restraints Initially in Place: No     Patient Education  Education Given To: Patient  Education Provided: Energy Conservation;Role of Therapy;Plan of Care;Transfer Training  Education Method: Demonstration;Verbal  Barriers to Learning: None  Education Outcome: Verbalized understanding;Demonstrated understanding    Goals  Short Term Goals  Time Frame for Short term goals: 21 visits  Short Term Goal 1: Patient to be educated on d/c folder, AE/DME and EC/WS techniques to ensure safe and indep return home. Short Term Goal 2: Patient to complete ADL routine c Mod I c AE/DME and implementation of EC/WS techniques as needed to ensure safe return home.   Short Term Goal 3: Patient will tolerate 15 mins of BUE ther ex/act to increase overall strength/activity tolerance for functional tasks  Short Term Goal 4: Patient will complete functional mobility during ADLs with no AD and mod I to maximise safety and return to PLOF       Therapy Time   Individual Concurrent Group Co-treatment   Time In 0730         Time Out 0800         Minutes 30                 JENNIE Guzman

## 2022-08-13 NOTE — PROGRESS NOTES
Patient in bed resting on room air. Vitals and assessment completed. Vitals stable. Lungs clear. Patient denies dizziness or SOB. Patient denies having any other needs or concerns. Call light within reach. Will continue to monitor.

## 2022-08-13 NOTE — PROGRESS NOTES
Patient: Ellie Pruitt  : [birth year]  Date of Admission: 8/3/2022  Primary Care Physician: Charlie Mata  Today's Date: 2022    REASON FOR CONSULTATION: atrial fibrillation with RVR    HPI:  Mr. Sid Srinivasan is a 70 y.o. male who was admitted to the hospital on 2022 for weakness and shortness of breath. In the ER he was found to be in atrial fibrillation with RVR leading to a consultation. He was then readmitted due to severe dehydration. In the past he reports seeing a Cardiologist in [place] and was planning on having a cardioversion done in  to put him back into normal sinus rhythm due to his history of new onset atrial fibrillation. He reports a history of a heart attack at age 46 and needed stents placed at that time but denies any cardiac cath since that time. He also has carotid artery stenosis and abdominal aortic aneurysm. He did report having surgery on one side of his neck, however he is not sure which side or how long ago it was. He has had hypertension and hyperlipidemia for years as well. He is a current every day smoker and he smoked for about 55 years and smokes about a pack a day. Family history consists of a lot of people in his family with significant cardiac history, however he wanted us to talk to his sister about the family history as she knows the details. Echo done on 2022 showed an EF of 15-20%. Mr. Sid Srinivasan was admitted for what appears to be a heart failure exacerbation as well as increased generalized weakness. He underwent bilateral thoracentesis on 8/10/22 and had about 1 liter taken off of each side. He says that he feels much better today including his breathing. He is down to being on room air and although he says he is wobbly on his feet, he says he is getting stronger. He says he is quite thirsty on only 1800 ml of water/day. He denies any chest pain now or in the recent past. Denies any medication changes, diarrhea or constipation.  He also denied any abdominal pain, bleeding problems, problems with his medications or any other concerns at this time. He has no bowel issues at this time. No nausea or vomiting. No fever, cough or chills. Past Medical History:   Diagnosis Date    Acute kidney injury ([address]     Ascites 2022    Atrial fibrillation (HCC)     CHF (congestive heart failure) (HCC)     Cirrhosis of liver with ascites ([facility] 2022    CKD (chronic kidney disease) 2022    Coronary artery disease     Hyperlipidemia     Hypertension     Hypomagnesemia 2022    Hyponatremia     Thrombocytopenia (HCC)      CURRENT ALLERGIES: Patient has no known allergies. REVIEW OF SYSTEMS: 14 systems were reviewed. Pertinent positives and negatives as above, all else negative. Past Surgical History:   Procedure Laterality Date    CAROTID STENT PLACEMENT Bilateral     HERNIA REPAIR      ROTATOR CUFF REPAIR Right     TONSILLECTOMY AND ADENOIDECTOMY      Social History:  Social History     Tobacco Use    Smoking status: Former     Packs/day: 1.00     Years: 56.00     Pack years: 56.00     Types: Cigarettes     Quit date: 2022     Years since quittin.1    Smokeless tobacco: Never   Vaping Use    Vaping Use: Never used   Substance Use Topics    Alcohol use: Not Currently     Alcohol/week: 2.0 standard drinks     Types: 2 Shots of liquor per week     Comment: Drinks about 2 Kesslers with mix nightly. Reported history of heavy alcohol use for years with both beer and liquior several years ago    Drug use: Not Currently        CURRENT MEDICATIONS:  No outpatient medications have been marked as taking for the 8/3/22 encounter [facility] Encounter). FAMILY HISTORY: Reviewed but non-contributory     PHYSICAL EXAM:       Physical Examination:     BP (!) 106/54   Pulse 56   Temp 97.2 °F (36.2 °C) (Temporal)   Resp 20   Ht 5' 6\" (1.676 m)   Wt 153 lb 3.5 oz (69.5 kg)   SpO2 92%   BMI 24.73 kg/m²  Body mass index is 24.73 kg/m². Constitutional: He appeared oriented to person and place. He appears well-developed and well-nourished. In no acute distress. HEENT: Normocephalic and atraumatic. No JVD present. Carotid bruit is not present. No mass and no thyromegaly present. No lymphadenopathy noted. Cardiovascular: Normal rate, regular rhythm, normal heart sounds. Exam reveals no gallop and no friction rubs. 2/6 systolic murmur, 5th intercostal space on the LEFT in the mid-clavicular line (cardiac apex)  Pulmonary/Chest: Effort normal and breath sounds normal. No respiratory distress. He has no wheezes, rhonchi or rales. Abdominal: Soft, non-tender. He exhibits no organomegaly, mass or bruit. Extremities: Trace. No cyanosis or clubbing. 2+ radial and carotid pulses. Distal extremity pulses: 1+ bilaterally. Neurological: Alertness and orientation as per Constitutional exam. No evidence of gross cranial nerve deficit. Coordination appeared normal.   Skin: Skin is warm and dry. There is no rash or diaphoresis. Psychiatric: He has a normal mood and affect.  His speech is normal and behavior is normal.       Other pertinent observable physical exam findings: None       MOST RECENT LABS ON RECORD:   Lab Results   Component Value Date    WBC 2.9 (L) 08/12/2022    HGB 12.4 (L) 08/12/2022    HCT 38.1 (L) 08/12/2022    PLT See Reflexed IPF Result 08/12/2022     (H) 08/12/2022     (H) 08/12/2022     08/12/2022    K 4.7 08/12/2022    CL 95 (L) 08/12/2022    CREATININE 0.90 08/12/2022    BUN 21 08/12/2022    CO2 35 (H) 08/12/2022    TSH 5.77 (H) 07/25/2022    INR 1.4 08/03/2022     ASSESSMENT:  Patient Active Problem List    Diagnosis Date Noted    Acute respiratory failure with hypoxia ([facility] 08/04/2022    Encounter for current long-term use of anticoagulants 08/04/2022    Chronic combined systolic (congestive) and diastolic (congestive) heart failure (HCC)     Dehydration, moderate     Medication side effect, initial encounter History of acute inferior wall MI     Ischemic cardiomyopathy     Mild malnutrition ([facility] 08/12/2022    Pancytopenia ([facility] 08/10/2022    Bilateral pleural effusion 08/09/2022    Nonischemic cardiomyopathy [facility] 08/04/2022    Hypoxia 08/03/2022    PAF (paroxysmal atrial fibrillation) ([facility] 07/19/2022    Lightheaded 07/19/2022    Tobacco abuse counseling 07/19/2022    Gastroesophageal reflux disease 07/19/2022    Hypotension 07/19/2022    Acute on chronic combined systolic and diastolic CHF (congestive heart failure) ([facility] 07/19/2022    Hypokalemia 07/19/2022    Hypovitaminosis D 07/19/2022    Severe malnutrition ([facility] 06/07/2022    Hypomagnesemia 05/20/2022    Ascites 05/20/2022    CKD (chronic kidney disease) 05/20/2022    Cirrhosis of liver with ascites ([facility] 05/19/2022    Acute on chronic combined systolic and diastolic CHF, NYHA class 4 (HCC)     Coronary artery disease     Hypertension     Thrombocytopenia (HCC)     Hyponatremia     Hyperlipidemia     Atrial fibrillation with RVR ([facility] 05/18/2022      PLAN:    Paroxysmal Atrial Fibrillation: Rhythm Control Asymptomatic   Beta Blocker: Continue Metoprolol succinate (Toprol XL) 25 mg daily. Anti-Arrhythmic: Continue amiodarone (Pacerone) 200 mg BID. Monitoring: Since they will being maintained on Amiodarone, I told them that we will need to closely monitor them for potential side effects. These include monitoring LFTs and TSH at least every 6 months as well as chest x-rays, pulmonary function tests, and eye exams at least yearly.    [id number] Score for Atrial Fibrillation Stroke Risk   Risk   Factors  Component Value   C CHF Yes 1   H HTN Yes 1   A2 Age >= 75 No,  (75 y.o.) 0   D DM No 0   S2 Prior Stroke/TIA No 0   V Vascular Disease No 0   A Age 74-69 Yes,  (75 y.o.) 1   Sc Sex male 0    [id number]  Score  3   Score last updated 8/0/52 0:73 PM EDT  Click here for a link to the UpToDate guideline \"Atrial Fibrillation: Anticoagulation therapy to prevent embolization  Disclaimer: Risk Score calculation is dependent on accuracy of patient problem list and past encounter diagnosis. Stroke Risk: CHADS2-VASc Score: 3/9 (3.2% stroke risk). Because of his atrial fibrillation, I also would also recommend that he continue with anticoagulation to decrease his risk of stoke but also reminded him to watch for signs of blood in his stool or black tarry stools and stop the medication immediately if this develops as this could be life threatening. Anticoagulation: Continue Apixaban (Eliquis) 5 mg every 12 hours. Acute on chronic combined heart failure: Ischemic Cardiomyopathy, Echo done on 5/18/2022 which showed an EF of 15-20%. We recently decreased his Bumex from 2 mg to 1 mg due to his falling and I suspect this is what caused his heart failure exacerbation. Beta Blocker: Continue Metoprolol succinate (Toprol XL) 25 mg daily. ACE Inibitor/ARB: Continue sacubitril/valsartan (Entresto) 24/26 mg 1/2 a tablet twice daily. Diuretics: Continue bumetinide (Bumex) 2 mg every morning. Heart failure counseling: I advised them to try and keep their legs up whenever possible and to limit salt in their diet. Life Vest: Although I do not think it is a bad idea for him to wear the Life Vest in the hospital, I am actually ok if he does not wear it as long as he is on Telemetry but will leave this up to Mr. Sid Srinivasan . Ok to increase fluid restriction back up to 2 L/day    Tobacco Abuse Counseling: Will discuss further prior to discharge home    Once again, thank you for allowing me to participate in this patients care. Please do not hesitate to contact me could I be of further assistance. Sincerely,  Isa Degroot MD, MS, F.A.C.C.   [facility] Cardiology Specialist    [address], [place], [address] [place] [address]  Phone: [phone], Fax: [phone]     I believe that the risk of significant morbidity and mortality related to the patient's current medical conditions are: Intermediate. August 12, 2022     --Daniela Forbes MD on 8/12/2022 at 10:15 PM    An electronic signature was used to authenticate this note.

## 2022-08-15 ENCOUNTER — TELEPHONE (OUTPATIENT)
Dept: PRIMARY CARE CLINIC | Age: 72
End: 2022-08-15

## 2022-08-15 ENCOUNTER — CARE COORDINATION (OUTPATIENT)
Dept: CASE MANAGEMENT | Age: 72
End: 2022-08-15

## 2022-08-15 DIAGNOSIS — R09.02 HYPOXIA: Primary | ICD-10-CM

## 2022-08-15 DIAGNOSIS — U07.1 COVID-19: ICD-10-CM

## 2022-08-15 DIAGNOSIS — R09.02 HYPOXIA: ICD-10-CM

## 2022-08-15 DIAGNOSIS — U07.1 COVID-19: Primary | ICD-10-CM

## 2022-08-15 RX ORDER — MIDODRINE HYDROCHLORIDE 10 MG/1
TABLET ORAL
Qty: 90 TABLET | Refills: 3 | Status: SHIPPED | OUTPATIENT
Start: 2022-08-15

## 2022-08-15 RX ORDER — BUMETANIDE 1 MG/1
1 TABLET ORAL DAILY
Qty: 30 TABLET | Refills: 3 | Status: SHIPPED | OUTPATIENT
Start: 2022-08-15 | End: 2022-08-22 | Stop reason: SDUPTHER

## 2022-08-15 RX ORDER — TAMSULOSIN HYDROCHLORIDE 0.4 MG/1
0.4 CAPSULE ORAL DAILY
Qty: 90 CAPSULE | Refills: 1 | Status: SHIPPED | OUTPATIENT
Start: 2022-08-15

## 2022-08-15 SDOH — HEALTH STABILITY: PHYSICAL HEALTH: ON AVERAGE, HOW MANY MINUTES DO YOU ENGAGE IN EXERCISE AT THIS LEVEL?: 40 MIN

## 2022-08-15 SDOH — HEALTH STABILITY: PHYSICAL HEALTH: ON AVERAGE, HOW MANY DAYS PER WEEK DO YOU ENGAGE IN MODERATE TO STRENUOUS EXERCISE (LIKE A BRISK WALK)?: 5 DAYS

## 2022-08-15 ASSESSMENT — PATIENT HEALTH QUESTIONNAIRE - PHQ9
SUM OF ALL RESPONSES TO PHQ QUESTIONS 1-9: 1
2. FEELING DOWN, DEPRESSED OR HOPELESS: 0
SUM OF ALL RESPONSES TO PHQ9 QUESTIONS 1 & 2: 1
1. LITTLE INTEREST OR PLEASURE IN DOING THINGS: 1
SUM OF ALL RESPONSES TO PHQ QUESTIONS 1-9: 1

## 2022-08-15 ASSESSMENT — LIFESTYLE VARIABLES
HOW MANY STANDARD DRINKS CONTAINING ALCOHOL DO YOU HAVE ON A TYPICAL DAY: 0
HOW OFTEN DO YOU HAVE A DRINK CONTAINING ALCOHOL: NEVER
HOW OFTEN DO YOU HAVE A DRINK CONTAINING ALCOHOL: 1
HOW MANY STANDARD DRINKS CONTAINING ALCOHOL DO YOU HAVE ON A TYPICAL DAY: PATIENT DOES NOT DRINK
HOW OFTEN DO YOU HAVE SIX OR MORE DRINKS ON ONE OCCASION: 1

## 2022-08-15 NOTE — TELEPHONE ENCOUNTER
Patient was discharged from the hospital post covid on 08/13 and was sent with prescriptions that he was not able to get or use. [facility] informed patient that they didn't have a prescription for inhaler (albuterol). He was able to get the duoneb but did not have a nebulizer. They found a nebulizer but they had to order new tubing that won't be here until today. He will start these today. When he got home from the hospital his SpO2 levels were 80's. They were able to use someone else's oxygen. They started him on 2L of oxygen and this brought oxygen up to mid 90's. Without the oxygen, he drops back into the 80's. They want to know if he should still  the inhaler. Please advise. VO per Dr. Lucas Vela, keep 08/22/22 appt. Make it [facility] well/Hosp f/u also. Re-send Rx for Zinc and Albuterol inhaler, Have him start neb treatments. Also place order for 6 minute walk test to get him qualified for his own oxygen, if needed. Notified Mikaela COLIN. 6 minute walk test ordered. Re-faxed zinc and albuterol inhaler.

## 2022-08-15 NOTE — CARE COORDINATION
[facility] Transitions Initial Follow Up Call #1 -Attempted initial 24 hour transitional call to patient/spouse. Left VM on both patient & spouse contact # to return call directly to CTN     Care Transitions Outreach Attempt - day #1    Call within 2 business days of discharge: Yes   Attempted to reach patient for transitions of care follow up. Unable to reach patient or spouse on initial attempt. Patient: Samuel Vaca   Patient : 1950   MRN: [id number]    Reason for Admission: respiratory distress, CHF, bilateral pleural effusions - thoracentesis  Discharge Date: 22   RARS: Readmission Risk Score: 20.7    Last Discharge [facility]       Date Complaint Diagnosis Description Type Department Provider    8/3/22 Respiratory Distress; Loss of Consciousness Acute respiratory failure with hypoxia ([facility] . .. ED to Hosp-Admission (Discharged) (ADMITTED) [facility] Lidia Christiansen MD; Rober Vang... Was this an external facility discharge?  No     Noted following upcoming appointments from discharge chart review:   [facility] follow up appointment(s):   Future Appointments   Date Time Provider Karol Vera   2022 10:30 AM [address] [place] [address]   2022  1:15 PM Lidia Christiansen MD [place] [facility]   2022  1:00 PM [facility] ECHO ROOM [facility] ECHO [place]   2022  1:00 PM [facility]   2022  3:15 PM Dimas Walter MD [place] [facility]                     Facility: [place]  Non-face-to-face services provided:  Obtained and reviewed discharge summary and/or continuity of care documents        Mohit Giraldo RN

## 2022-08-15 NOTE — TELEPHONE ENCOUNTER
[facility] Transitions Initial Follow Up Call    Outreach made within 2 business days of discharge: Yes    Patient: Sandoval Downing Patient : 1950   MRN: [id number]  Reason for Admission: There are no discharge diagnoses documented for the most recent discharge. Discharge Date: 22       Spoke with: Chirag Millan    Discharge department/facility: [facility]), [facility] Interactive Patient Contact:  Was patient able to fill all prescriptions: No: please see telephone note for today for further details. Was patient instructed to bring all medications to the follow-up visit: No  Is patient taking all medications as directed in the discharge summary? No, please see telephone note for further details and justification  Does patient understand their discharge instructions: Yes  Does patient have questions or concerns that need addressed prior to 7-14 day follow up office visit: yes - 6 minute walk test, ordered.  See telephone note    Scheduled appointment with PCP within 7-14 days    Follow Up  Future Appointments   Date Time Provider Karol Vera   2022  1:15 PM [facility]   2022  1:00 PM [facility] ECHO ROOM [facility] ECHO [place]   2022  1:00 PM Branden Tian MD [place] C[facility]   2022  3:15 PM Cyndee Garcia MD [place] [facility]OLOG[facility] [place], [place]

## 2022-08-16 ENCOUNTER — CARE COORDINATION (OUTPATIENT)
Dept: CASE MANAGEMENT | Age: 72
End: 2022-08-16

## 2022-08-16 NOTE — CARE COORDINATION
coordinate appts  medication management-check if all scripts were now taken care of & reconcile medications with sister once she compares list & new meds    Care Transitions 24 Hour Call    Do you have a copy of your discharge instructions?: Yes  Do you have all of your prescriptions and are they filled?: No (Comment: sister/caretaker picking up)  Have you been contacted by a [facility] Pharmacist?: No  Have you scheduled your follow up appointment?: Yes (Comment: sister plans to possibly move up appt - depends on her schedule)  How are you going to get to your appointment?: Car - family or friend to transport  Do you feel like you have everything you need to keep you well at home?: Yes (Comment: home care - PT - Med 1)  Care Transitions Interventions         Was this an external facility discharge? No   Challenges to be reviewed by the provider   Additional needs identified to be addressed with provider: No - office was already contacted by sister re: which other scripts were needed  medications-already eprescribed to pharmacy             Method of communication with provider : phone    Advance Care Planning:   Does patient have an Advance Directive: reviewed and current. Care Transition Nurse contacted the caregiver by telephone to perform post hospital discharge assessment. Verified name and  with caregiver as identifiers. Provided introduction to self, and explanation of the CTN role. CTN reviewed discharge instructions, medical action plan and red flags with caregiver who verbalized understanding. Caregiver given an opportunity to ask questions and does not have any further questions or concerns at this time. Were discharge instructions available to patient? Yes. Reviewed appropriate site of care based on symptoms and resources available to patient including: PCP  Specialist  Home health  CTN . The caregiver agrees to contact the PCP office for questions related to their healthcare.      Medication reconciliation was performed with caregiver, who verbalizes understanding of administration of home medications. Was patient discharged with a pulse oximeter? no    CTN provided contact information. Plan for follow-up call in 3-5 days based on severity of symptoms and risk factors.   Plan for next call: symptom management-reassess weight, swelling, SOB  follow up appointment-review timing of PCP appt - check re: hospital f/u - sister checking her schedule to coordinate appts  medication management-check if all scripts were now taken care of & reconcile medications with sister once she compares list & new meds         Follow Up  Future Appointments   Date Time Provider Karol Chanceisti   8/17/2022 10:30 AM [address] [place] [address]   8/22/2022  1:15 PM Lidia Christiansen MD [place] [facility]   8/25/2022  1:00 PM [facility] ECHO ROOM [facility] ECHO [place]   9/12/2022  1:00 PM Hilda Esparza MD [place] CARD [facility]   9/19/2022  3:15 PM Dimas Walter MD [place] [facility]       Mohit Giraldo RN

## 2022-08-17 ENCOUNTER — HOSPITAL ENCOUNTER (OUTPATIENT)
Dept: PHARMACY | Age: 72
Setting detail: THERAPIES SERIES
Discharge: HOME | End: 2022-08-17
Payer: MEDICARE

## 2022-08-17 ENCOUNTER — HOSPITAL ENCOUNTER (OUTPATIENT)
Age: 72
Discharge: HOME | End: 2022-08-17
Payer: MEDICARE

## 2022-08-17 VITALS
DIASTOLIC BLOOD PRESSURE: 48 MMHG | WEIGHT: 151 LBS | OXYGEN SATURATION: 97 % | HEART RATE: 49 BPM | SYSTOLIC BLOOD PRESSURE: 89 MMHG | BODY MASS INDEX: 24.37 KG/M2

## 2022-08-17 DIAGNOSIS — I50.42 CHRONIC COMBINED SYSTOLIC (CONGESTIVE) AND DIASTOLIC (CONGESTIVE) HEART FAILURE (HCC): Primary | ICD-10-CM

## 2022-08-17 DIAGNOSIS — I50.43 ACUTE ON CHRONIC COMBINED SYSTOLIC AND DIASTOLIC CHF, NYHA CLASS 4 (HCC): ICD-10-CM

## 2022-08-17 LAB
ANION GAP SERPL CALCULATED.3IONS-SCNC: 9 MMOL/L (ref 9–17)
BUN BLDV-MCNC: 23 MG/DL (ref 8–23)
BUN/CREAT BLD: 18 (ref 9–20)
CALCIUM SERPL-MCNC: 10.1 MG/DL (ref 8.6–10.4)
CHLORIDE BLD-SCNC: 100 MMOL/L (ref 98–107)
CO2: 27 MMOL/L (ref 20–31)
CREAT SERPL-MCNC: 1.3 MG/DL (ref 0.7–1.2)
CULTURE: ABNORMAL
DIRECT EXAM: ABNORMAL
GFR AFRICAN AMERICAN: >60 ML/MIN
GFR NON-AFRICAN AMERICAN: 54 ML/MIN
GFR SERPL CREATININE-BSD FRML MDRD: ABNORMAL ML/MIN/{1.73_M2}
GFR SERPL CREATININE-BSD FRML MDRD: ABNORMAL ML/MIN/{1.73_M2}
GLUCOSE BLD-MCNC: 94 MG/DL (ref 70–99)
POTASSIUM SERPL-SCNC: 5.2 MMOL/L (ref 3.7–5.3)
SODIUM BLD-SCNC: 136 MMOL/L (ref 135–144)
SPECIMEN DESCRIPTION: ABNORMAL

## 2022-08-17 PROCEDURE — 36415 COLL VENOUS BLD VENIPUNCTURE: CPT

## 2022-08-17 PROCEDURE — 99213 OFFICE O/P EST LOW 20 MIN: CPT

## 2022-08-17 PROCEDURE — 80048 BASIC METABOLIC PNL TOTAL CA: CPT

## 2022-08-17 NOTE — DISCHARGE INSTRUCTIONS
* If you have hypertension (high blood pressure), you may want to check your blood pressure daily. Your blood pressure goal is less than 130/80 unless instructed differently by your physician. Keep a log and bring it to each visit. * Be sure to keep good control of your Diabetes     * Be sure to keep good control of your Cholesterol    * Eat a Heart healthy diet    * Be active. Follow an exercise plan that is reasonable for you. Please go to lab for blood draw after this visit.

## 2022-08-17 NOTE — PROGRESS NOTES
Other   []  Early saiety, abd fullness []  Chest pain   []  Constipation   []  Night time bathroom trips  []  Alcohol intake changes  []  Acute illness  []  Edema    []  Number of pillows or recliner  [x]  Activity changes   [x]  Fatigue that limits activity []  Heart racing or skipping beats  []  Light headedness  []  Dizziness    []      []  Problems sleeping    Functional Activity New York Heart Association Classification  []   Class I No limitation of physical activity. Ordinary physical activity does not cause undue fatigue, palpitation, dyspnea (shortness of breath). []   Class II Slight limitation of physical activity. Comfortable at rest. Ordinary physical activity results in fatigue, palpitation, dyspnea (shortness of breath). []   Class III  Anil limitation of physical activity. Comfortable at rest.  Less than ordinary activity causes fatigue, palpitation, dyspnea. [x]   Class IV Unable to carry on any physical activity without discomfort. Symptoms of heart failure at rest.  If any physical activity is undertaken, discomfort increases. Last CHF Hospital Admission:  8/3/22-8/13/22    OUTPATIENT MEDICATIONS:  Outpatient Medications Marked as Taking for the 8/17/22 encounter [facility] Encounter) with [address]   Medication Sig Dispense Refill    tamsulosin (FLOMAX) 0.4 MG capsule Take 1 capsule by mouth in the morning. 90 capsule 1    bumetanide (BUMEX) 1 MG tablet Take 1 tablet by mouth in the morning. (Patient taking differently: Take 1 mg by mouth daily Bumex 2 mg po every MWF; 1 mg po all other days - starting 8/22/22 (continue Bumex 1 mg po daily until 8/22/22)) 30 tablet 3    midodrine (PROAMATINE) 10 MG tablet TAKE 1 TABLET BY MOUTH EVERY MORNING, 1 TABLET AT NOON, AND 1 TABLET IN THE EVENING. TAKE WITH MEALS. 90 tablet 3    spironolactone (ALDACTONE) 25 MG tablet Take 1 tablet by mouth in the morning.  30 tablet 3    albuterol sulfate HFA (VENTOLIN HFA) 108 (90 Base) MCG/ACT inhaler Inhale 2 puffs into the lungs 4 times daily as needed for Wheezing 54 g 1    ipratropium-albuterol (DUONEB) 0.5-2.5 (3) MG/3ML SOLN nebulizer solution Inhale 3 mLs into the lungs every 4 hours as needed for Shortness of Breath 360 mL 1    dapagliflozin (FARXIGA) 10 MG tablet Take 1 tablet by mouth every morning Lot #: [id number]  Exp: 10/24  Three boxes given. 21 tablet 0    vitamin D (ERGOCALCIFEROL) 1.25 MG (52676 UT) CAPS capsule Take 1 capsule by mouth once a week 12 capsule 0    vitamin B-1 (THIAMINE) 100 MG tablet Take 1 tablet by mouth in the morning. 90 tablet 1    Multiple Vitamin (MULTIVITAMIN) TABS tablet Take 1 tablet by mouth in the morning. 90 tablet 1    pantoprazole (PROTONIX) 40 MG tablet Take 1 tablet by mouth every morning (before breakfast) 90 tablet 1    potassium chloride (KLOR-CON M) 20 MEQ extended release tablet Take 1 tablet by mouth in the morning and 1 tablet in the evening. Take with meals.  60 tablet 3    folic acid (FOLVITE) 1 MG tablet Take 1 tablet by mouth daily 90 tablet 0    metoprolol succinate (TOPROL XL) 25 MG extended release tablet Take 1 tablet by mouth daily 90 tablet 3    melatonin 5 mg TABS tablet Take 2 tablets by mouth nightly  0    magnesium oxide (MAGOX 400) 400 (240 Mg) MG tablet Take 1.5 tablets by mouth 2 times daily 90 tablet 2    sacubitril-valsartan (ENTRESTO) 24-26 MG per tablet Take 0.5 tablets by mouth 2 times daily Lot #: [id number]  Exp: July 2024 56 tablet 0    amiodarone (CORDARONE) 200 MG tablet Take 200 mg by mouth 2 times daily      apixaban (ELIQUIS) 5 MG TABS tablet Take 5 mg by mouth 2 times daily      busPIRone (BUSPAR) 10 MG tablet Take 15 mg by mouth 2 times daily       clopidogrel (PLAVIX) 75 MG tablet Take 75 mg by mouth daily      escitalopram (LEXAPRO) 20 MG tablet Take 20 mg by mouth nightly      atorvastatin (LIPITOR) 80 MG tablet Take 80 mg by mouth daily      Omega-3 Fatty Acids (FISH OIL CONCENTRATE) 1000 MG CAPS Take 2 caplets by mouth daily               Objective / Assessment     Patient Active Problem List   Diagnosis Code    Atrial fibrillation with RVR ([facility]) I48.91    Acute on chronic combined systolic and diastolic CHF, NYHA class 4 (HCC) I50.43    Coronary artery disease I25.10    Hypertension I10    Thrombocytopenia (HCC) D69.6    Hyponatremia E87.1    Hyperlipidemia E78.5    Cirrhosis of liver with ascites (HCC) K74.60, R18.8    Hypomagnesemia E83.42    Ascites R18.8    CKD (chronic kidney disease) N18.9    History of acute inferior wall MI I25.2    Ischemic cardiomyopathy I25.5    Chronic combined systolic (congestive) and diastolic (congestive) heart failure (HCC) I50.42    Dehydration, moderate E86.0    Medication side effect, initial encounter T50.905A    Severe malnutrition (HCC) E43    PAF (paroxysmal atrial fibrillation) ([facility]) I48.0    Lightheaded R42    Tobacco abuse counseling Z71.6    Gastroesophageal reflux disease K21.9    Hypotension I95.9    Acute on chronic combined systolic and diastolic CHF (congestive heart failure) ([facility]) I50.43    Hypokalemia E87.6    Hypovitaminosis D E55.9    Hypoxia R09.02    Nonischemic cardiomyopathy (HCC) I42.8    Acute respiratory failure with hypoxia (HCC) J96.01    Encounter for current long-term use of anticoagulants Z79.01    Bilateral pleural effusion J90    Pancytopenia (HCC) D61.818    Mild malnutrition (HCC) E44.1     Social History     Tobacco Use    Smoking status: Former     Packs/day: 1.00     Years: 56.00     Pack years: 56.00     Types: Cigarettes     Quit date: 2022     Years since quittin.1    Smokeless tobacco: Never   Vaping Use    Vaping Use: Never used   Substance Use Topics    Alcohol use: Not Currently     Alcohol/week: 2.0 standard drinks     Types: 2 Shots of liquor per week     Comment: Drinks about 2 Kesslers with mix nightly.   Reported history of heavy alcohol use for years with both beer and liquior several years ago    Drug use: Not Currently       Potassium (mmol/L)   Date Value   08/17/2022 5.2   08/13/2022 4.1   08/12/2022 4.7   08/11/2022 3.6 (L)   08/10/2022 3.0 (L)   08/10/2022 2.8 (LL)     BUN (mg/dL)   Date Value   08/17/2022 23   08/13/2022 23   08/12/2022 21   08/11/2022 20   08/10/2022 14   08/09/2022 19     Creatinine (mg/dL)   Date Value   08/17/2022 1.30 (H)   08/13/2022 0.86   08/12/2022 0.90   08/11/2022 0.96   08/10/2022 0.92   08/09/2022 0.81     TSH (uIU/mL)   Date Value   07/25/2022 5.77 (H)   06/06/2022 3.69   05/11/2022 4.52     No results found for: T4      Plan:      Chronic Systolic and/or Diastolic Heart Failure (diagnosis): EF: 20-25% via echocardiogram on 7/13/22  Beta Blocker for EF </= 40%: metoprolol succinate (Toprol XL) 25 mg po daily  Diuretics: Bumetanide 2 mg po every AM (per Dr. Caitlin Lopez [facility] note) - 1 mg po daily on discharge medication list.  Patient using 2 mg tablets that he already had at home  ACE/ARB/ARNI for EF </= 40%: Entresto 24/26 - 0.5 tablet po BID  Spironolactone for EF </= 35%:  Spironolactone 25 mg po every AM  Nonpharmacologic management of Heart Failure: I told patient to continue wearing lower extremity compression stockings and I advised patient to try and keep legs up whenever possible and to limit salt in diet. Laboratory testing:   Basic metabolic panel (BMP) today to assess  potassium and renal function. Statin therapy:  atorvastatin 80 mg po daily    Anticoagulation         Apixaban (Eliquis) 5 mg po every 12 hours   I also reminded patient to watch for signs of blood in her stool or black tarry stools and stop the medication immediately if this develops as this could be life threatening. Patient Education/Instructions: I did spend about 15 minutes with patient going over his heart failure packet including dietary guidelines, the signs and symptoms to watch for including shortness of breath, weight gain, lightheadedness/dizziness.  I asked patient to call the clinic if develops persistent or worsening shortness of breath or weight gain of more than 3 pounds in 1-7 days. Patient verbalized understanding. Patient provided with The Heart Failure Handbook ([employer]) to take home to read and review. Patient is accompanied today by his sister, Xuan Smith, who helps manage all of his medications, provides transportation and accompanies him to his appointments. Patient's sister sets up medications in medication reminder boxes for TID doses. Patient's sister denies difficulty in obtaining medications. She completed paper work for financial assistance and patient qualified for [employer] free for 1 year. Patient's sister registered for [employer] Rx card as well. Patient received Farxiga samples prior to discharge and has a 10 day supply remaining. Patient is currently living with his nephew who does the cooking. He is working to minimize sodium intake. He is also following a 2 L/day fluid restriction. Patient is weighing himself daily. Weight is decreased 3 pounds since hospital discharge 8/13/22. Patient has home BP monitor. Renal function:  CKD - slight serum creatinine increase       Medication changes:  Bumex 1 mg po daily for 3 days, then increase Bumex to 2 mg po every MWF and 1 mg po all other days   Repeat labs in 7-10 days. Standing order for BMP placed in Epic. - per discussion with Dr. Dennys Kinsey 10 mg po daily - new medication - samples provided per cardiology prior to hospital discharge. Patient has #10 tablets remaining. Prescription called to [address] [place] [address] (voicemail line) for Farxiga 10 mg tablets - Take 1 tablet po daily    Qty:  90    Refill: 3   Prescriber:  Dr. Armond Vang      Patient's sister, Xuan Smith, notified of above changes. Patient will have repeat labs drawn 8/25/22 when he is at hospital for ECHO.     Patient's sister also notified that [place] samples may be available from Dr. Mccann Ards office if difficulty filling [place] prescription (cost prohibitive). Follow up in pharmacist CHF clinic 22. Thank you for the referral,    Shea Willson.  Félix Briseno, [address] [place] [address] Pharmacy Admin Tracking Only    Intervention Detail: Adherence Monitorin, Dose Adjustment: 1, reason: Improve Adherence, Therapy Optimization, Lab(s) Ordered, New Rx: 1, reason: Needs Additional Therapy, and Refill(s) Provided  Total # of Interventions Recommended: 5  Total # of Interventions Accepted: 5  Time Spent (min):  120 minutes

## 2022-08-18 ENCOUNTER — CARE COORDINATION (OUTPATIENT)
Dept: CASE MANAGEMENT | Age: 72
End: 2022-08-18

## 2022-08-18 NOTE — CARE COORDINATION
[facility] Transitions Follow Up Call    2022    Patient: Ermelinda Sorensen    Patient : 1950   MRN: [id number]    Reason for Admission:  respiratory distress, CHF, bilateral pleural effusions - thoracentesis, recent covid  Discharge Date: 22   RARS: Readmission Risk Score: 20.7       Sp - has PCP office appt . Spoke with: sister Rosenda Cortez who accompanies patient to appts & arranges his care & schedule. Wears oxygen most of time to keep oxygen sat in 90's, otherwise sat decreases to 80's without oxygen. Had CHF appt on . Sister plans to clarify some scripts when she takes patient to PCP appt on . Patient has some samples to get him through, but sister wants to make sure that her has refills & that scripts get to pharmacy. Outpatient oxygen testing is scheduled for , until he qualifies, he will continue to wear his nephew's oxygen. Med 1 continues home care. Echo is scheduled for     Plan for next call: symptom management-reassess CHF, respiratory status - check oxygen sat - with & without oxygen  follow up appointment-review PCP appt scheduled for     Needs to be reviewed by the provider   Additional needs identified to be addressed with provider: No  none             Method of communication with provider : none      Care Transition Nurse contacted the caregiver by telephone to follow up after admission Verified name and  with caregiver as identifiers. Addressed changes since last contact:  reviewed CHF clinic appt  Discussed follow-up appointments. If no appointment was previously scheduled, appointment scheduling offered: Yes. CTN reviewed discharge instructions, medical action plan and red flags with caregiver and discussed any barriers to care and/or understanding of plan of care after discharge. Discussed appropriate site of care based on symptoms and resources available to patient including: PCP  Specialist  CTN .  The caregiver agrees to contact the PCP office for questions related to their healthcare. Patients top risk factors for readmission: medical condition-CHF, s/p covid  Interventions to address risk factors: Scheduled appointment with PCP-8/22 and Obtained and reviewed discharge summary and/or continuity of care documents        CTN provided contact information for future needs. Plan for follow-up call in 5-7 days based on severity of symptoms and risk factors.   Plan for next call: symptom management-reassess CHF, respiratory status - check oxygen sat - with & without oxygen  follow up appointment-review PCP appt scheduled for 8/22           Follow Up  Future Appointments   Date Time Provider Karol Vera   8/22/2022  1:15 PM Chris Quezada MD [facility]   8/25/2022  1:00 PM [facility] ECHO ROOM [facility] ECHO [place]   8/31/2022 11:30 AM [address] [facility] MGMT [place]   9/7/2022  3:30 PM [facility] PULMONARY FUNCTION ROOM [facility] PFT [place]   9/12/2022  1:00 PM Alton Arango MD [facility] CARD [facility]   9/19/2022  3:15 PM Cecy Myers MD [facility] UROLOGY [facility]       Brynn Russo RN

## 2022-08-19 ENCOUNTER — TELEPHONE (OUTPATIENT)
Dept: PHARMACY | Age: 72
End: 2022-08-19

## 2022-08-19 NOTE — TELEPHONE ENCOUNTER
Patient's sister Quita Coburn (Healthcare decision maker) called CHF clinic stating that she is having difficulty getting Albuterol MDI and Farxiga prescriptions filled. Rosenda Cortez states that she was told prescriptions are not on file at [facility].    I called [facility] pharmacy and spoke to [facility]. Verbal prescriptions given for albuterol MDI (as below in Epic at discharge). Outpatient Medication Detail     Disp Refills Start End    albuterol sulfate HFA (VENTOLIN HFA) 108 (90 Base) MCG/ACT inhaler 54 g 1 8/13/2022     Sig - Route: Inhale 2 puffs into the lungs 4 times daily as needed for Wheezing - Inhalation    Sent to pharmacy as: Albuterol Sulfate  (90 Base) MCG/ACT Inhalation Aerosol Solution (Ventolin HFA)    E-Prescribing Status: Receipt confirmed by pharmacy (8/13/2022  9:47 AM EDT)        Karyna Harmon 10 mg po daily  qty:  90   Refill:  3   Prescriber:  Dr. Alexa Sullivan was called into [facility] 8/17/22 (voicemail line). A second verbal Rx given to [facility] today.

## 2022-08-22 ENCOUNTER — HOSPITAL ENCOUNTER (OUTPATIENT)
Dept: PULMONOLOGY | Age: 72
Discharge: HOME | End: 2022-08-22
Payer: MEDICARE

## 2022-08-22 ENCOUNTER — OFFICE VISIT (OUTPATIENT)
Dept: PRIMARY CARE CLINIC | Age: 72
End: 2022-08-22
Payer: MEDICARE

## 2022-08-22 VITALS
SYSTOLIC BLOOD PRESSURE: 86 MMHG | DIASTOLIC BLOOD PRESSURE: 42 MMHG | OXYGEN SATURATION: 83 % | WEIGHT: 150 LBS | HEART RATE: 64 BPM | BODY MASS INDEX: 24.11 KG/M2 | HEIGHT: 66 IN

## 2022-08-22 DIAGNOSIS — R09.02 HYPOXIA: ICD-10-CM

## 2022-08-22 DIAGNOSIS — I95.9 HYPOTENSION, UNSPECIFIED HYPOTENSION TYPE: ICD-10-CM

## 2022-08-22 DIAGNOSIS — I50.42 CHRONIC COMBINED SYSTOLIC AND DIASTOLIC CONGESTIVE HEART FAILURE (HCC): ICD-10-CM

## 2022-08-22 DIAGNOSIS — U07.1 COVID-19: ICD-10-CM

## 2022-08-22 DIAGNOSIS — Z09 HOSPITAL DISCHARGE FOLLOW-UP: Primary | ICD-10-CM

## 2022-08-22 LAB
DISTANCE WALKED: NORMAL FT
SPO2: NORMAL %

## 2022-08-22 PROCEDURE — 1111F DSCHRG MED/CURRENT MED MERGE: CPT | Performed by: STUDENT IN AN ORGANIZED HEALTH CARE EDUCATION/TRAINING PROGRAM

## 2022-08-22 PROCEDURE — 94618 PULMONARY STRESS TESTING: CPT

## 2022-08-22 PROCEDURE — 99495 TRANSJ CARE MGMT MOD F2F 14D: CPT | Performed by: STUDENT IN AN ORGANIZED HEALTH CARE EDUCATION/TRAINING PROGRAM

## 2022-08-22 RX ORDER — BUMETANIDE 1 MG/1
1 TABLET ORAL DAILY
Qty: 90 TABLET | Refills: 1 | Status: SHIPPED | OUTPATIENT
Start: 2022-08-22 | End: 2022-11-20

## 2022-08-22 NOTE — PROGRESS NOTES
[address] [facility] [address]        Patient Name: Mike Bernstein 1950          A home oxygen evaluation has been completed. Patient was placed on room air for 10 minutes. SpO2 was 95 % on room air at rest. Patient was walked for 6 minutes. SpO2 was 94-96 % on room air during walking. Patient does not qualify home oxygen at this time.   He was not in any distress during test.

## 2022-08-22 NOTE — PROGRESS NOTES
[address], [address] , [facility], [address] [facility]  Follow Up      Mike Bernstein   YOB: 1950    Date of Office Visit:  8/22/2022  Date of Hospital Admission: 8/3/22  Date of Hospital Discharge: 8/13/22  Risk of hospital readmission (high >=14%. Medium >=10%) :Readmission Risk Score: 20.7      Care management risk score Rising risk (score 2-5) and Complex Care (Scores >=6): No Risk Score On File     Non face to face  following discharge, date last encounter closed (first attempt may have been earlier): 08/16/2022    Call initiated 2 business days of discharge: Yes    ASSESSMENT/PLAN:   Hospital discharge follow-up  -     IA DISCHARGE MEDS RECONCILED W/ CURRENT OUTPATIENT MED LIST  Chronic combined systolic and diastolic congestive heart failure (HCC)  -     bumetanide (BUMEX) 1 MG tablet; Take 1 tablet by mouth daily, Disp-90 tablet, R-1Normal  Hypoxia  -     DME Order for Nebulizer as OP  Hypotension, unspecified hypotension type    Medical Decision Making: high complexity  Return in 1 week (on 8/29/2022) for AWV. Plan for Home O2 Eval today to be completed today. Discussed risks/benefits/alternatives with the patient regarding emergent ED visit today with workup vs. OP management. Continue w/ Bumex at 1mg PO daily currently and re-evaluate closely. RTC indications discussed with patient/family member, risks/benefits/alternatives were discussed. D/C Buspar Today, plan to wean off Lexapro as discussed today. Plan to re-evaluate the patient's other medications as well during the next encounter/ re: concern for Polypharmacy. Subjective:   HPI:  Follow up of Hospital problems/diagnosis(es): Chronic CHF/Hypoxia/Hypotension    Inpatient course: Discharge summary reviewed- see chart. Interval history/Current status:   The patient is here for a follow-Up from Hospital and Respiratory Distress The patient had been admitted on admitted 8/3/22 discharged 8/13/22. He is here w/ his family member  Seema Bragg of Onset and Mechanism -  worsening/stable at this time  Location - lungs/chest wall  Characteristics/Radiation/Quality - He has been having troubles keeping his oxygen. He has has been also having a cough intermittently (improving) with associated lightheadedness at this time. The patient is using his Nephew's oxygen/portable and concentrator. The patient's weight has been stable at this time. He is planning to monitor his weight  on a daily basis. He is taking Bumex 1mg PO daily. He has been intermittent low oxygen throughout the day. He is also on Bupsar and Lexapro and notes that he no longer feels depressed or anxious at this time. He would like to wean off these medications at this time. Aggravating Factors - standing, walking, climbing stairs, and changing positions makes it worse.   Relieving Factors/Treatment tried - nothing       Patient Active Problem List   Diagnosis    Atrial fibrillation with RVR (HCC)    Acute on chronic combined systolic and diastolic CHF, NYHA class 4 (HCC)    Coronary artery disease    Hypertension    Thrombocytopenia (HCC)    Hyponatremia    Hyperlipidemia    Cirrhosis of liver with ascites (HCC)    Hypomagnesemia    Ascites    CKD (chronic kidney disease)    History of acute inferior wall MI    Ischemic cardiomyopathy    Chronic combined systolic (congestive) and diastolic (congestive) heart failure (HCC)    Dehydration, moderate    Medication side effect, initial encounter    Severe malnutrition (HCC)    PAF (paroxysmal atrial fibrillation) (HCC)    Lightheaded    Tobacco abuse counseling    Gastroesophageal reflux disease    Hypotension    Acute on chronic combined systolic and diastolic CHF (congestive heart failure) (HCC)    Hypokalemia    Hypovitaminosis D    Hypoxia    Nonischemic cardiomyopathy ([address]    Acute respiratory failure with hypoxia ([address]    Encounter for current long-term use of anticoagulants    Bilateral pleural effusion    Pancytopenia (HCC)    Mild malnutrition (HCC)    Chronic combined systolic and diastolic congestive heart failure [facility])    Hospital discharge follow-up       Medications listed as ordered at the time of discharge from hospital     Medication List            Accurate as of August 22, 2022  1:51 PM. If you have any questions, ask your nurse or doctor. CHANGE how you take these medications      bumetanide 1 MG tablet  Commonly known as: BUMEX  Take 1 tablet by mouth daily  What changed: additional instructions            CONTINUE taking these medications      albuterol sulfate  (90 Base) MCG/ACT inhaler  Commonly known as: Ventolin HFA  Inhale 2 puffs into the lungs 4 times daily as needed for Wheezing     amiodarone 200 MG tablet  Commonly known as: CORDARONE     apixaban 5 MG Tabs tablet  Commonly known as: ELIQUIS     atorvastatin 80 MG tablet  Commonly known as: LIPITOR     clopidogrel 75 MG tablet  Commonly known as: PLAVIX     dapagliflozin 10 MG tablet  Commonly known as: Farxiga  Take 1 tablet by mouth every morning Lot #: [id number]  Exp: 10/24  Three boxes given. escitalopram 20 MG tablet  Commonly known as: LEXAPRO     Fish Oil Concentrate 4575 MG Caps     folic acid 1 MG tablet  Commonly known as: FOLVITE  Take 1 tablet by mouth daily     ipratropium-albuterol 0.5-2.5 (3) MG/3ML Soln nebulizer solution  Commonly known as: DUONEB  Inhale 3 mLs into the lungs every 4 hours as needed for Shortness of Breath     magnesium oxide 400 (240 Mg) MG tablet  Commonly known as: MagOx 400  Take 1.5 tablets by mouth 2 times daily     melatonin 5 MG Tabs tablet  Take 2 tablets by mouth nightly     metoprolol succinate 25 MG extended release tablet  Commonly known as:  Toprol XL  Take 1 tablet by mouth daily     midodrine 10 MG tablet  Commonly known as: PROAMATINE  TAKE 1 TABLET BY MOUTH EVERY MORNING, 1 TABLET AT NOON, AND 1 TABLET IN THE EVENING. TAKE WITH MEALS.     multivitamin Tabs tablet  Take 1 tablet by mouth in the morning. pantoprazole 40 MG tablet  Commonly known as: PROTONIX  Take 1 tablet by mouth every morning (before breakfast)     potassium chloride 20 MEQ extended release tablet  Commonly known as: KLOR-CON M  Take 1 tablet by mouth in the morning and 1 tablet in the evening. Take with meals. rOPINIRole 0.5 MG tablet  Commonly known as: REQUIP     sacubitril-valsartan 24-26 MG per tablet  Commonly known as: ENTRESTO  Take 0.5 tablets by mouth 2 times daily Lot #: [id number]  Exp: July 2024     spironolactone 25 MG tablet  Commonly known as: ALDACTONE  Take 1 tablet by mouth in the morning. tamsulosin 0.4 MG capsule  Commonly known as: FLOMAX  Take 1 capsule by mouth in the morning. vitamin B-1 100 MG tablet  Commonly known as: THIAMINE  Take 1 tablet by mouth in the morning. vitamin D 1.25 MG (46828 UT) Caps capsule  Commonly known as: ERGOCALCIFEROL  Take 1 capsule by mouth once a week     zinc sulfate 220 (50 Zn) MG capsule  Commonly known as: ZINCATE  Take 2 capsules by mouth in the morning. STOP taking these medications      busPIRone 10 MG tablet  Commonly known as: BUSPAR  Stopped by: Debbie Ramsey MD               Where to Get Your Medications        These medications were sent to [facility]/pharmacy #[id number] - [place], [place] - [address] [facility] [phone] - f [phone] [address]      Phone: [phone]   bumetanide 1 MG tablet           Medications marked \"taking\" at this time  Outpatient Medications Marked as Taking for the 8/22/22 encounter (Office Visit) with Debbie Ramesy MD   Medication Sig Dispense Refill    bumetanide (BUMEX) 1 MG tablet Take 1 tablet by mouth daily 90 tablet 1    tamsulosin (FLOMAX) 0.4 MG capsule Take 1 capsule by mouth in the morning.  90 capsule 1    midodrine (PROAMATINE) 10 MG tablet TAKE 1 TABLET BY MOUTH EVERY MORNING, 1 TABLET AT NOON, AND 1 TABLET IN THE EVENING. TAKE WITH MEALS. 90 tablet 3    spironolactone (ALDACTONE) 25 MG tablet Take 1 tablet by mouth in the morning. 30 tablet 3    zinc sulfate (ZINCATE) 220 (50 Zn) MG capsule Take 2 capsules by mouth in the morning. 30 capsule 3    albuterol sulfate HFA (VENTOLIN HFA) 108 (90 Base) MCG/ACT inhaler Inhale 2 puffs into the lungs 4 times daily as needed for Wheezing 54 g 1    ipratropium-albuterol (DUONEB) 0.5-2.5 (3) MG/3ML SOLN nebulizer solution Inhale 3 mLs into the lungs every 4 hours as needed for Shortness of Breath 360 mL 1    dapagliflozin (FARXIGA) 10 MG tablet Take 1 tablet by mouth every morning Lot #: [id number]  Exp: 10/24  Three boxes given. 21 tablet 0    vitamin D (ERGOCALCIFEROL) 1.25 MG (89874 UT) CAPS capsule Take 1 capsule by mouth once a week 12 capsule 0    vitamin B-1 (THIAMINE) 100 MG tablet Take 1 tablet by mouth in the morning. 90 tablet 1    Multiple Vitamin (MULTIVITAMIN) TABS tablet Take 1 tablet by mouth in the morning. 90 tablet 1    pantoprazole (PROTONIX) 40 MG tablet Take 1 tablet by mouth every morning (before breakfast) 90 tablet 1    potassium chloride (KLOR-CON M) 20 MEQ extended release tablet Take 1 tablet by mouth in the morning and 1 tablet in the evening. Take with meals.  60 tablet 3    folic acid (FOLVITE) 1 MG tablet Take 1 tablet by mouth daily 90 tablet 0    metoprolol succinate (TOPROL XL) 25 MG extended release tablet Take 1 tablet by mouth daily 90 tablet 3    melatonin 5 mg TABS tablet Take 2 tablets by mouth nightly  0    magnesium oxide (MAGOX 400) 400 (240 Mg) MG tablet Take 1.5 tablets by mouth 2 times daily 90 tablet 2    sacubitril-valsartan (ENTRESTO) 24-26 MG per tablet Take 0.5 tablets by mouth 2 times daily Lot #: [id number]  Exp: July 2024 56 tablet 0    amiodarone (CORDARONE) 200 MG tablet Take 200 mg by mouth 2 times daily      apixaban (ELIQUIS) 5 MG TABS tablet Take 5 mg by mouth 2 times daily      clopidogrel (PLAVIX) 75 MG tablet Take 75 mg by mouth daily      escitalopram (LEXAPRO) 20 MG tablet Take 20 mg by mouth nightly      atorvastatin (LIPITOR) 80 MG tablet Take 80 mg by mouth daily      Omega-3 Fatty Acids (FISH OIL CONCENTRATE) 1000 MG CAPS Take 2 caplets by mouth daily          Medications patient taking as of now reconciled against medications ordered at time of hospital discharge: Yes    Constitutional: Negative for activity change, appetite change, chills, diaphoresis, fatigue, fever and unexpected weight change. HENT: Negative for sinus pressure, sinus pain, sore throat and trouble swallowing. Respiratory: Negative for cough, shortness of breath and wheezing. Cardiovascular: Negative for chest pain, palpitations and leg swelling. Gastrointestinal: Negative for abdominal pain, diarrhea, nausea and vomiting. Endocrine: Negative for cold intolerance, polydipsia, polyphagia and polyuria. Genitourinary: Negative for difficulty urinating, flank pain and frequency. Musculoskeletal: Negative for gait problem and joint swelling. Negative for back pain, neck pain and neck stiffness. Skin: Negative for color change and wound. Negative for pallor and rash. Allergic/Immunologic: Negative for environmental allergies and food allergies. Neurological: Negative for light-headedness, numbness and headaches. Psychiatric/Behavioral: Negative for sleep disturbance. Negative for confusion and suicidal ideas. Objective:    BP (!) 86/42   Pulse 64   Ht 5' 6\" (1.676 m)   Wt 150 lb (68 kg)   SpO2 (!) 83%   BMI 24.21 kg/m²   General Appearance: alert and oriented to person, place and time, well developed and well- nourished, in no acute distress  Skin: warm and dry, no rash or erythema  Head: normocephalic and atraumatic, patient is wearing oxygen via NC at 3L in the office today.   Eyes: pupils equal, round, and reactive to light, extraocular eye movements intact, conjunctivae normal  ENT: tympanic membrane, external ear and ear canal normal bilaterally, nose without deformity, nasal mucosa and turbinates normal without polyps  Neck: supple and non-tender without mass, no thyromegaly or thyroid nodules, no cervical lymphadenopathy  Pulmonary/Chest: clear to auscultation bilaterally- no wheezes, minimal rales at the bases bilaterally, no rhonchi, normal air movement, no respiratory distress  Cardiovascular: normal rate, regular rhythm, normal S1 and S2, no murmurs, rubs, clicks, or gallops, distal pulses intact, no carotid bruits  Abdomen: soft, non-tender, non-distended, normal bowel sounds, no masses or organomegaly  Extremities: no cyanosis, clubbing or edema  Musculoskeletal: normal range of motion, no joint swelling, deformity or tenderness  Neurologic: reflexes normal and symmetric, no cranial nerve deficit, gait, coordination and speech normal      An electronic signature was used to authenticate this note.   Electronically signed by Jigar Green MD on 8/22/2022 at 1:51 PM

## 2022-08-23 ENCOUNTER — TELEPHONE (OUTPATIENT)
Dept: PHARMACY | Age: 72
End: 2022-08-23

## 2022-08-24 ENCOUNTER — CARE COORDINATION (OUTPATIENT)
Dept: CASE MANAGEMENT | Age: 72
End: 2022-08-24

## 2022-08-24 NOTE — CARE COORDINATION
[facility] Transitions Follow Up Call    2022    Patient: Horton Cheadle  Patient : 1950   MRN: <[id number]>  Reason for Admission: 8/3/2022 - 2022 [facility]. CHF, Pleural effusions, AF w/ RVR, s/p Bilat thoracentesis. Discharge Date: 22 RARS: Readmission Risk Score: 20.7  CT    Subsequent CT outreach attempt leaving [place]  w/ my outreach info. Echo  1:00  PFTs  3:30  Caridad Zepeda [phone]  11:30     Attended PCP 22. Dr Melendrez Mail  1:00      Care Transitions Subsequent and Final Call    Subsequent and Final Calls  Care Transitions Interventions  Other Interventions:              Follow Up  Future Appointments   Date Time Provider Karol Vera   2022  1:00 PM [facility] ECHO ROOM [facility] ECHO [place]   2022 11:30 AM [address] [facility] MED MGMT [place]   2022  3:30 PM [facility] PULMONARY FUNCTION ROOM [facility] PFT [place]   2022  3:00 PM Brandy Delgado MD [place] [facility]   2022  1:00 PM Crystal Spaulding MD [place] CARD [facility]   2022  3:15 PM Jose Dobbins MD [place] UROLOGY [facility]       Angela Almazan RN

## 2022-08-25 ENCOUNTER — HOSPITAL ENCOUNTER (OUTPATIENT)
Age: 72
Discharge: HOME | End: 2022-08-25
Payer: MEDICARE

## 2022-08-25 ENCOUNTER — HOSPITAL ENCOUNTER (OUTPATIENT)
Dept: NON INVASIVE DIAGNOSTICS | Age: 72
Discharge: HOME | End: 2022-08-25
Payer: MEDICARE

## 2022-08-25 DIAGNOSIS — I48.0 PAF (PAROXYSMAL ATRIAL FIBRILLATION) (HCC): ICD-10-CM

## 2022-08-25 DIAGNOSIS — I50.43 ACUTE ON CHRONIC COMBINED SYSTOLIC AND DIASTOLIC CHF, NYHA CLASS 4 (HCC): ICD-10-CM

## 2022-08-25 DIAGNOSIS — I50.42 CHRONIC COMBINED SYSTOLIC AND DIASTOLIC CONGESTIVE HEART FAILURE (HCC): ICD-10-CM

## 2022-08-25 DIAGNOSIS — I95.9 HYPOTENSION, UNSPECIFIED HYPOTENSION TYPE: ICD-10-CM

## 2022-08-25 DIAGNOSIS — R42 LIGHTHEADED: ICD-10-CM

## 2022-08-25 DIAGNOSIS — I50.43 ACUTE ON CHRONIC COMBINED SYSTOLIC AND DIASTOLIC CHF, NYHA CLASS 4 (HCC): Primary | ICD-10-CM

## 2022-08-25 LAB
ANION GAP SERPL CALCULATED.3IONS-SCNC: 11 MMOL/L (ref 9–17)
BUN BLDV-MCNC: 37 MG/DL (ref 8–23)
BUN/CREAT BLD: 19 (ref 9–20)
CALCIUM SERPL-MCNC: 10.5 MG/DL (ref 8.6–10.4)
CHLORIDE BLD-SCNC: 98 MMOL/L (ref 98–107)
CO2: 27 MMOL/L (ref 20–31)
CREAT SERPL-MCNC: 1.94 MG/DL (ref 0.7–1.2)
GFR AFRICAN AMERICAN: 41 ML/MIN
GFR NON-AFRICAN AMERICAN: 34 ML/MIN
GFR SERPL CREATININE-BSD FRML MDRD: ABNORMAL ML/MIN/{1.73_M2}
GFR SERPL CREATININE-BSD FRML MDRD: ABNORMAL ML/MIN/{1.73_M2}
GLUCOSE BLD-MCNC: 111 MG/DL (ref 70–99)
LV EF: 40 %
LVEF MODALITY: NORMAL
POTASSIUM SERPL-SCNC: 5.3 MMOL/L (ref 3.7–5.3)
SODIUM BLD-SCNC: 136 MMOL/L (ref 135–144)

## 2022-08-25 PROCEDURE — 93306 TTE W/DOPPLER COMPLETE: CPT

## 2022-08-25 PROCEDURE — 36415 COLL VENOUS BLD VENIPUNCTURE: CPT

## 2022-08-25 PROCEDURE — 80048 BASIC METABOLIC PNL TOTAL CA: CPT

## 2022-08-26 ENCOUNTER — TELEPHONE (OUTPATIENT)
Dept: CARDIOLOGY | Age: 72
End: 2022-08-26

## 2022-08-26 ENCOUNTER — CARE COORDINATION (OUTPATIENT)
Dept: CASE MANAGEMENT | Age: 72
End: 2022-08-26

## 2022-08-26 NOTE — TELEPHONE ENCOUNTER
----- Message from Amanda Anderson PA-C sent at 8/26/2022  8:12 AM EDT -----  Please let them know their echo looks good, his EF has improved to 40%. We will discuss at follow up appointment. Thanks.

## 2022-08-26 NOTE — RESULT ENCOUNTER NOTE
Please let them know their echo looks good, his EF has improved to 40%. We will discuss at follow up appointment. Thanks.

## 2022-08-26 NOTE — CARE COORDINATION
[facility] Transitions Follow Up Call    2022    Patient: Jhoana Cortes  Patient : 1950   MRN: <[id number]>  Reason for Admission:   respiratory distress, CHF, bilateral pleural effusions - thoracentesis, recent covid  Discharge Date: 22 RARS: Readmission Risk Score: 20.7         Spoke with: Subsequent transitional call. No answer. Left HIPPA compliant message to return call to this writer. Called to both numbers provided. Pt seen by PCP 22. Final call. Care Transitions Subsequent and Final Call    Subsequent and Final Calls  Care Transitions Interventions  Other Interventions:              Follow Up  Future Appointments   Date Time Provider Karol Vera   2022 11:30 AM [address] [facility] MED MGMT [place]   2022  3:30 PM [facility] PULMONARY FUNCTION ROOM [facility] PFT [place]   2022  3:00 PM Samy Gonzalez MD [place] [facility]   2022  1:00 PM Luis Forte MD [place] CARD [facility]   2022  3:15 PM Marek Christianson MD [place] UROLOGY [facility]       Kale Carrasco, [address] [place] [address] Care Transitions Nurse   [phone] Detail Level: Generalized Detail Level: Zone Detail Level: Detailed

## 2022-08-29 DIAGNOSIS — I50.42 CHRONIC COMBINED SYSTOLIC (CONGESTIVE) AND DIASTOLIC (CONGESTIVE) HEART FAILURE (HCC): Primary | ICD-10-CM

## 2022-08-30 ENCOUNTER — TELEPHONE (OUTPATIENT)
Dept: PHARMACY | Age: 72
End: 2022-08-30

## 2022-08-31 ENCOUNTER — HOSPITAL ENCOUNTER (OUTPATIENT)
Age: 72
Discharge: HOME | End: 2022-08-31
Payer: MEDICARE

## 2022-08-31 ENCOUNTER — TELEPHONE (OUTPATIENT)
Dept: CARDIOLOGY | Age: 72
End: 2022-08-31

## 2022-08-31 ENCOUNTER — HOSPITAL ENCOUNTER (OUTPATIENT)
Dept: PHARMACY | Age: 72
Setting detail: THERAPIES SERIES
Discharge: HOME | End: 2022-08-31
Payer: MEDICARE

## 2022-08-31 VITALS
WEIGHT: 151 LBS | SYSTOLIC BLOOD PRESSURE: 86 MMHG | BODY MASS INDEX: 24.37 KG/M2 | HEART RATE: 42 BPM | DIASTOLIC BLOOD PRESSURE: 45 MMHG | OXYGEN SATURATION: 98 %

## 2022-08-31 DIAGNOSIS — I50.42 CHRONIC COMBINED SYSTOLIC (CONGESTIVE) AND DIASTOLIC (CONGESTIVE) HEART FAILURE (HCC): ICD-10-CM

## 2022-08-31 DIAGNOSIS — I50.42 CHRONIC COMBINED SYSTOLIC (CONGESTIVE) AND DIASTOLIC (CONGESTIVE) HEART FAILURE (HCC): Primary | ICD-10-CM

## 2022-08-31 LAB
ANION GAP SERPL CALCULATED.3IONS-SCNC: 7 MMOL/L (ref 9–17)
BUN BLDV-MCNC: 34 MG/DL (ref 8–23)
BUN/CREAT BLD: 22 (ref 9–20)
CALCIUM SERPL-MCNC: 10 MG/DL (ref 8.6–10.4)
CHLORIDE BLD-SCNC: 101 MMOL/L (ref 98–107)
CO2: 30 MMOL/L (ref 20–31)
CREAT SERPL-MCNC: 1.57 MG/DL (ref 0.7–1.2)
GFR AFRICAN AMERICAN: 53 ML/MIN
GFR NON-AFRICAN AMERICAN: 44 ML/MIN
GFR SERPL CREATININE-BSD FRML MDRD: ABNORMAL ML/MIN/{1.73_M2}
GFR SERPL CREATININE-BSD FRML MDRD: ABNORMAL ML/MIN/{1.73_M2}
GLUCOSE BLD-MCNC: 74 MG/DL (ref 70–99)
POTASSIUM SERPL-SCNC: 5.4 MMOL/L (ref 3.7–5.3)
SODIUM BLD-SCNC: 138 MMOL/L (ref 135–144)

## 2022-08-31 PROCEDURE — 99212 OFFICE O/P EST SF 10 MIN: CPT

## 2022-08-31 PROCEDURE — 80048 BASIC METABOLIC PNL TOTAL CA: CPT

## 2022-08-31 PROCEDURE — 36415 COLL VENOUS BLD VENIPUNCTURE: CPT

## 2022-08-31 NOTE — TELEPHONE ENCOUNTER
----- Message from Delmy Prado MD sent at 8/31/2022 11:44 AM EDT -----  Let patient know lab work is borderline but better. Repeat BMP in 1 week.

## 2022-08-31 NOTE — PROGRESS NOTES
[facility] [place]  Pharmacist  Heart Failure    [facility]  [place] [address]  Phone: [phone]  Fax: [phone]      NAME: Halina Pena RECORD NUMBER:  [id number]  AGE: 67 y.o. GENDER: male  : 1950  EPISODE DATE:  2022      Heart Failure Management referral by Dr. Anca Zuniga    Dry weight: ?? pounds     Today's Wt: 151 lb  Wt Readings from Last 6 Encounters:   22 151 lb (68.5 kg)   22 150 lb (68 kg)   22 151 lb (68.5 kg)   22 154 lb 5.2 oz (70 kg)   22 176 lb (79.8 kg)   22 181 lb (82.1 kg)    and   Ht Readings from Last 1 Encounters:   22 5' 6\" (1.676 m)                   BP:  86/45 HR: 42  O2Sat: 98%     Extremities and pitting edema trace BLE  Skin warm and dry     Subjective   Mr. Dave Albright is a 67 y.o. male here for the Heart Failure Services. They are here today for a comprehensive medication review including over the counter medications and herbal products, overall wellbeing assessment, transition of care and any needed adjustments with updates and recommendations communicated to the referring physician. Patient Symptoms:  Follow up recent hospital CHF admission - folllow up medication changes/compliance. Patient's weight is stable. Denies SOB. Patient reports that he is feeling better and getting stronger every day. Patient is accompanied by his sister who helps him with his medications and transportation to his appointments.      Shortness of Breath:  []   None   []   Dyspnea on exertion   [x]   Dyspnea with normal activities  []   Dyspnea at rest  []   Dyspnea while sleeping    Patient Findings:  []  Missed doses  []  Diet changes  []  Sodium intake changes   []  Night time cough   []  Other   []  Early saiety, abd fullness []  Chest pain   []  Constipation   []  Night time bathroom trips  []  Alcohol intake changes  []  Acute illness  []  Edema    []  Number of pillows or recliner  []  Activity changes   [x]  Fatigue that limits activity []  Heart racing or skipping beats  [x]  Light headedness  [x]  Dizziness    []      []  Problems sleeping    Functional Activity New York Heart Association Classification  []   Class I No limitation of physical activity. Ordinary physical activity does not cause undue fatigue, palpitation, dyspnea (shortness of breath). []   Class II Slight limitation of physical activity. Comfortable at rest. Ordinary physical activity results in fatigue, palpitation, dyspnea (shortness of breath). []   Class III  Anil limitation of physical activity. Comfortable at rest.  Less than ordinary activity causes fatigue, palpitation, dyspnea. [x]   Class IV Unable to carry on any physical activity without discomfort. Symptoms of heart failure at rest.  If any physical activity is undertaken, discomfort increases. Last CHF Hospital Admission:  8/3/22-8/13/22 [facility]    OUTPATIENT MEDICATIONS:  Outpatient Medications Marked as Taking for the 8/31/22 encounter [facility] Encounter) with [employer]   Medication Sig Dispense Refill    bumetanide (BUMEX) 1 MG tablet Take 1 tablet by mouth daily 90 tablet 1    tamsulosin (FLOMAX) 0.4 MG capsule Take 1 capsule by mouth in the morning. 90 capsule 1    midodrine (PROAMATINE) 10 MG tablet TAKE 1 TABLET BY MOUTH EVERY MORNING, 1 TABLET AT NOON, AND 1 TABLET IN THE EVENING. TAKE WITH MEALS. 90 tablet 3    spironolactone (ALDACTONE) 25 MG tablet Take 1 tablet by mouth in the morning. 30 tablet 3    zinc sulfate (ZINCATE) 220 (50 Zn) MG capsule Take 2 capsules by mouth in the morning.  30 capsule 3    albuterol sulfate HFA (VENTOLIN HFA) 108 (90 Base) MCG/ACT inhaler Inhale 2 puffs into the lungs 4 times daily as needed for Wheezing 54 g 1    ipratropium-albuterol (DUONEB) 0.5-2.5 (3) MG/3ML SOLN nebulizer solution Inhale 3 mLs into the lungs every 4 hours as needed for Shortness of Breath 360 mL 1 Hypertension I10    Thrombocytopenia ([facility] [address] D69.6    Hyponatremia E87.1    Hyperlipidemia E78.5    Cirrhosis of liver with ascites (HCC) K74.60, R18.8    Hypomagnesemia E83.42    Ascites R18.8    CKD (chronic kidney disease) N18.9    History of acute inferior wall MI I25.2    Ischemic cardiomyopathy I25.5    Chronic combined systolic (congestive) and diastolic (congestive) heart failure (HCC) I50.42    Dehydration, moderate E86.0    Medication side effect, initial encounter T50.905A    Severe malnutrition (HCC) E43    PAF (paroxysmal atrial fibrillation) (HCC) I48.0    Lightheaded R42    Tobacco abuse counseling Z71.6    Gastroesophageal reflux disease K21.9    Hypotension I95.9    Acute on chronic combined systolic and diastolic CHF (congestive heart failure) (HCC) I50.43    Hypokalemia E87.6    Hypovitaminosis D E55.9    Hypoxia R09.02    Nonischemic cardiomyopathy (HCC) I42.8    Acute respiratory failure with hypoxia (HCC) J96.01    Encounter for current long-term use of anticoagulants Z79.01    Bilateral pleural effusion J90    Pancytopenia (HCC) D61.818    Mild malnutrition (HCC) E44.1    Chronic combined systolic and diastolic congestive heart failure ([facility] [address] I50.42    Hospital discharge follow-up Z09     Social History     Tobacco Use    Smoking status: Former     Packs/day: 1.00     Years: 56.00     Pack years: 56.00     Types: Cigarettes     Quit date: 2022     Years since quittin.1    Smokeless tobacco: Never   Vaping Use    Vaping Use: Never used   Substance Use Topics    Alcohol use: Not Currently     Alcohol/week: 2.0 standard drinks     Types: 2 Shots of liquor per week     Comment: Drinks about 2 Kesslers with mix nightly.   Reported history of heavy alcohol use for years with both beer and liquior several years ago    Drug use: Not Currently       Potassium (mmol/L)   Date Value   2022 5.4 (H)   2022 5.3   2022 5.2   2022 4.1   2022 4.7   2022 3.6 (L)     BUN (mg/dL)   Date Value   08/31/2022 34 (H)   08/25/2022 37 (H)   08/17/2022 23   08/13/2022 23   08/12/2022 21   08/11/2022 20     Creatinine (mg/dL)   Date Value   08/31/2022 1.57 (H)   08/25/2022 1.94 (H)   08/17/2022 1.30 (H)   08/13/2022 0.86   08/12/2022 0.90   08/11/2022 0.96     TSH (uIU/mL)   Date Value   07/25/2022 5.77 (H)   06/06/2022 3.69   05/11/2022 4.52     No results found for: T4      Plan:      Chronic Systolic and/or Diastolic Heart Failure (diagnosis): EF:40% via echocardiogram on 8/25/22  Beta Blocker for EF </= 40%: metoprolol succinate (Toprol XL) 25 mg po daily  Diuretics: bumetanide 1 mg po daily   ACE/ARB/ARNI for EF </= 40%: Entresto 24/26 - 0.5 tablet po BID  Spironolactone for EF </= 35%: Spironolactone 25 mg po every AM  SGLT2:  Farxiga 10 mg po daily  Nonpharmacologic management of Heart Failure: I told patient to continue wearing lower extremity compression stockings and I advised patient to try and keep legs up whenever possible and to limit salt in diet. Laboratory testing:   Basic metabolic panel (BMP) in 1 week from now to assess potassium and renal function. Statin Therapy: atorvastatin 80 mg po daily    Anticoagulation  Apixaban (Eliquis) 5 mg po every 12 hours  - I also reminded patient to watch for signs of blood in his stool or black tarry stools and stop the medication immediately if this develops as this could be life threatening. Amiodarone: LFTs and TSH monitoring recommended every 6 months  TSH and T4 7/25/22, Hepatic function panel 5/18/22  CXR 8/10/22    Patient Education/Instructions: I did spend about 15 minutes with patient going over his heart failure packet including dietary guidelines, the signs and symptoms to watch for including shortness of breath, weight gain, lightheadedness/dizziness. I asked patient to call the clinic if develops persistent or worsening shortness of breath or weight gain of more than 3 pounds in 1-7 days.  Patient verbalized understanding. BP is low and patient reports some dizziness and lightheadedness. Because of his condition, I reminded him to try and keep himself well-hydrated and to take extra time when moving from laying to sitting, sitting to standing and standing to walking. I also explained to him to help improve his symptoms he should include 3 g sodium diet, 1 or 2 L of sports drinks daily, knee-high compressions stockings. Medication changes since last visit:    Buspirone discontinued per PCP  Weaning of Lexapro per PCP  Bumex decreased to 1 mg po daily    Ejection fraction has improved to 40% - 22 (previously 20-25% 2022)    Patient is compliant with medication regimen. I called [facility] [employer] [facility] ([phone]) acting as agent of prescriber, Dr. Kacey Remy 22. Prior authorization approved for 36 months for Farxiga 10 mg tablets - quantity 90 - 90 day supply. PA reference - [id number]  Patient reports that she was able to  new Farxiga prescription at [facility] and there was $0 copay utilizing  discount she found online. Weight has remained stable. Labs remain borderline. Potassium 5.4 today. Repeat BMP in 1 week. Patient reports SOB improved, but he is still wearing his nephew's oxygen at night. I encouraged patient to get scheduled for cardiac rehab and he is agreeable to this. Follow up with pharmacist CHF clinic in 30 days. Thank you for the referral,    Dk Wilks.  Benjy Zapien, [address] Admin Tracking Only    Intervention Detail: Adherence Monitorin and Lab(s) Ordered  Total # of Interventions Recommended: 1  Total # of Interventions Accepted: 2  Time Spent (min):  90

## 2022-08-31 NOTE — DISCHARGE INSTRUCTIONS
HEART FAILURE:    With heart failure you must follow up with your Cardiologist, your PCP and other physicians as appropriate - especially 7 days after a hospitalization and then as directed. Please call right away with any signs or symptoms of heart failure or other medical conditions that need attention or with any questions at all. Please call your Cardiologist or your PCP or the Karol [facility] at [phone]. We can help manage these symptoms and often prevent a visit to the Emergency Room or hospitalization. * Know your dry weight (your weight without any fluid on board)    * Call any time you have questions about anything. Do not wait. * It is very important to take your medications as prescribed, do not miss any doses. Call for refills before you run out. Typically when you have one week left. If you have trouble getting your medications for any reason, call us at [phone]. * Avoid NSAIDs (non steroidal anti inflammatory drugs) like Aleve (naproxen), Advil and Motrin (ibuprofen), Mobic (diclofenac), Celebrex (celecoxib) and aspirin. A daily aspirin is okay if recommended by your physician. It is okay to take Tylenol (acetaminophen) unless your physician has told you otherwise. * Limit fluid intake. Typically this is no more than 1,500-2,000 milliliters (mL) per day. This is a liter and a half to two liters. This is equal to approximately 48-64 ounces (oz) per day    * Limit sodium intake. Typically this is no more than 2,000-2,400 milligrams (mg) per day. You will need to add up the sodium content found on the nutrition label for the foods you eat each day. * Weigh yourself every day. Weigh yourself before breakfast and after you go to the restroom. Wear the same thing each time you weigh yourself. Keep a log and bring it to each visit.    Call if you gain 3 or more pounds in 1-7 days - [phone] * If you have hypertension (high blood pressure), you may want to check your blood pressure daily. Your blood pressure goal is less than 130/80 unless instructed differently by your physician. Keep a log and bring it to each visit. * Be sure to keep good control of your Diabetes     * Be sure to keep good control of your Cholesterol    * Eat a Heart healthy diet    * Be active. Follow an exercise plan that is reasonable for you.           Repeat labs in 1 week - 9/8/22  Follow up with Dr. Marquis Bearden on 9/12/22

## 2022-09-01 SDOH — HEALTH STABILITY: PHYSICAL HEALTH: ON AVERAGE, HOW MANY MINUTES DO YOU ENGAGE IN EXERCISE AT THIS LEVEL?: 60 MIN

## 2022-09-01 SDOH — HEALTH STABILITY: PHYSICAL HEALTH: ON AVERAGE, HOW MANY DAYS PER WEEK DO YOU ENGAGE IN MODERATE TO STRENUOUS EXERCISE (LIKE A BRISK WALK)?: 6 DAYS

## 2022-09-01 ASSESSMENT — PATIENT HEALTH QUESTIONNAIRE - PHQ9
SUM OF ALL RESPONSES TO PHQ QUESTIONS 1-9: 0
SUM OF ALL RESPONSES TO PHQ QUESTIONS 1-9: 0
SUM OF ALL RESPONSES TO PHQ9 QUESTIONS 1 & 2: 0
2. FEELING DOWN, DEPRESSED OR HOPELESS: 0
1. LITTLE INTEREST OR PLEASURE IN DOING THINGS: 0
SUM OF ALL RESPONSES TO PHQ QUESTIONS 1-9: 0
SUM OF ALL RESPONSES TO PHQ QUESTIONS 1-9: 0

## 2022-09-01 ASSESSMENT — LIFESTYLE VARIABLES
HOW MANY STANDARD DRINKS CONTAINING ALCOHOL DO YOU HAVE ON A TYPICAL DAY: 0
HOW OFTEN DO YOU HAVE A DRINK CONTAINING ALCOHOL: 1
HOW MANY STANDARD DRINKS CONTAINING ALCOHOL DO YOU HAVE ON A TYPICAL DAY: PATIENT DOES NOT DRINK
HOW OFTEN DO YOU HAVE A DRINK CONTAINING ALCOHOL: NEVER
HOW OFTEN DO YOU HAVE SIX OR MORE DRINKS ON ONE OCCASION: 1

## 2022-09-06 ENCOUNTER — HOSPITAL ENCOUNTER (OUTPATIENT)
Age: 72
Discharge: HOME | End: 2022-09-06
Payer: MEDICARE

## 2022-09-06 ENCOUNTER — HOSPITAL ENCOUNTER (OUTPATIENT)
Dept: CARDIAC REHAB | Age: 72
Setting detail: THERAPIES SERIES
Discharge: HOME | End: 2022-09-06
Payer: MEDICARE

## 2022-09-06 DIAGNOSIS — I50.42 CHRONIC COMBINED SYSTOLIC (CONGESTIVE) AND DIASTOLIC (CONGESTIVE) HEART FAILURE (HCC): ICD-10-CM

## 2022-09-06 LAB
ANION GAP SERPL CALCULATED.3IONS-SCNC: 8 MMOL/L (ref 9–17)
BUN BLDV-MCNC: 28 MG/DL (ref 8–23)
BUN/CREAT BLD: 20 (ref 9–20)
CALCIUM SERPL-MCNC: 10.1 MG/DL (ref 8.6–10.4)
CHLORIDE BLD-SCNC: 98 MMOL/L (ref 98–107)
CO2: 30 MMOL/L (ref 20–31)
CREAT SERPL-MCNC: 1.38 MG/DL (ref 0.7–1.2)
GFR AFRICAN AMERICAN: >60 ML/MIN
GFR NON-AFRICAN AMERICAN: 51 ML/MIN
GFR SERPL CREATININE-BSD FRML MDRD: ABNORMAL ML/MIN/{1.73_M2}
GFR SERPL CREATININE-BSD FRML MDRD: ABNORMAL ML/MIN/{1.73_M2}
GLUCOSE BLD-MCNC: 88 MG/DL (ref 70–99)
POTASSIUM SERPL-SCNC: 5.3 MMOL/L (ref 3.7–5.3)
SODIUM BLD-SCNC: 136 MMOL/L (ref 135–144)

## 2022-09-06 PROCEDURE — 80048 BASIC METABOLIC PNL TOTAL CA: CPT

## 2022-09-06 PROCEDURE — 36415 COLL VENOUS BLD VENIPUNCTURE: CPT

## 2022-09-06 NOTE — PROGRESS NOTES
Isa Che     MRN  [id number]    Phase 2 Pulmonary Rehabilitation without continuous Sp02 monitoring  Diagnosis:  COVID-19  Onset Date:  8/3/2022    Prescribed Exercise Plan:  6 MWT pre- and post-program  Target HR: 40-60% HRR    Initial MET Level: 2-4 METs     Duration: 31 - 60 Minutes  Frequency: 3 Days per week  Modalities:  Treadmill        Ergometer  Seated Stepper  Rower  Weights/bands/weight machine  Progression:  May increase duration per F.I.T.T. protocol parameters on average 5-10 minutes every 1-2 weeks for the first 4-6 weeks. After 3-4 weeks completed, continue to gradually increase F.I.T.T. parameters gradually at the established duration on average 0.5-1.0 METs per 30 days over the course of the remaining program, as established by patient centered goals and guidelines, maintaining RPE 12-16. Introduce 8-15 bilateral upper /lower extremity resistance exercise at 1-3 sets per lift, on 2-3 non-consecutive days using TheraBand/weights to 8-15 reps on at least 2 occasions, maintaining RPE 12-16. Once repetition maximum has been achieved, additional weight sets may be added.     Standing Orders:  Initiate ACLS for cardiac events  Nitroglycerine 0.4mg SL every 5 minutes X 3 for angina pain  12 lead EKG for chest pain or dysrhythmia  O2 per nasal cannula as needed for SpO2 <90% with symptoms  Blood sugar monitoring for hyper/hypoglycemia symptoms

## 2022-09-06 NOTE — PROGRESS NOTES
Pulmonary Rehab Individualized Treatment Plan [facility]    Patient Name: Jhoana Cortes  Date of Initial Assessment: 9/6/2022  ACCOUNT #: [de-identified]  Diagnosis: [id number]  Onset/Start Date: 8/3/2022  Referring Physician: Dr. Matt Butterfield  Risk Stratification: HIGH (EF 40%)  Session Number: Assessment  COPD severity rating:    [x] NA  [] Moderate    FEV1/FVC < 70%   FEV1 >50% and < 80% predicted  [] Severe   FEV1/FVC < 70%   FEV1 >-30% and <50% predicted  [] Very Severe   FEV1/FVC < 70%   FEV1 < 30% predicted (or FEV1 < 50% predicted plus respiratory failure or clinical signs of right HF)    EXERCISE    Stages of Change:   [] pre-contemplation  [] Action   [] Contemplate    [] Maintainence   [x] Prep    [] Relapse           Exercise Prescription:  Mode: [x] TM [x] B [x] STP [x] R  Frequency: 3X per week  Duration: 31-60 minutes  Intensity: 2-6 METs  THRR: 40-60% HRR  Progression:   Increase duration per F.I.T.T. protocol parameters on average 5-10 minutes every 1-2 weeks for the first 4-6 weeks. After 3-4 weeks completed, continue to gradually increase F.I.T.T. parameters gradually at the established duration on average 0.5-1.0 METs per 30 days over the course of the remaining program, as established by patient centered goals and guidelines, maintaining RPE 12-14. Maintain Sp02 >=90% during exercise. [x] Resistance Training  Introduce 8-15 bilateral upper extremity resistance exercise at 1-3 sets per lift, on 2-3 non-consecutive days using TheraBand/Weights to 8-15 reps on at lease 2 occasions, maintaining RPE 12-14. Once repetition maximum has been achieved, additional weight sets may be added. SPO2:  Supplemental 02 titrated to maintain an SpO2 >=90%  [x] O2 - Rest: 0 L/min continuous flow via   [x] O2 - Exercise:  1-2 L/min continuous flow via NC  [x] Bronchodilators:   Albuterol HFA, Duoneb    Hypertension:  [x] Yes  [] No  Resting BP: 102/56  [] BP medications: Midodrine    Intervention:  Home Exercise:  Type: Walking, cycling, etc.  Duration: 20-60 minutes  Frequency: at least 2 non-rehab days  O2 (L/min): 1-2L/min NC  [x] Maintain Sp02 >= 90%  [] Resistance Training  Introduce 8-15 bilateral upper extremity resistance exercise at 1-3 sets per lift, on 2-3 non-consecutive days using TheraBand/Weights to 8-15 reps on at lease 2 occasions, maintaining RPE 12-14. Once repetition maximum has been achieved, additional weight sets may be added.     Education:   [x] Equipment Buhl     [x] Ex Safety      [x] S/S to report    [x] Warm up/ Cool down      [x] RPE/RPD Scales      [] Energy conservation   [] Exercise prescription   [] Home exercise       Target Goal:   - SPO2 > = 90% during exercise  - Individualized exercise Rx  Nutrition    Stages of Change:   [] pre-contemplation  [] Action   [] Contemplate    [] Maintainence   [x] Prep    [] Relapse     Diabetes:  [] Yes  [] No  BS:   HbA1c:  Random BS:  BS in range: [] yes [x] no  [] Diabetic medications:    Weight Management:  Height:  66 inches  Current Wt: 153.9 pounds  BMI: 24.7  Weight Goal: < 150 pounds  RYP Score:     [x] Overweight/obese  (BMI > 25)   [] Underweight (BMI <20)    Intervention:   [] Dietitian Consult       [] Staff/Patient Discussion    [] Diet Class           Education:  [] S and S hypo/hyperglycemia  [] Relate diabetes to pulmonary disease  [] Diet and nutrition  [] Weight management  [] Portions  [] Snacks and substitutes  [] Nutrition facts label  [] Food packaging claims  [] Fat and cholesterol  [] Sodium     Target Goal:  -HbA1c < 7%  -BMI > 20 and < 25   Education    Stages of Change:    [] pre-contemplation  [] Action   [] Contemplate    [] Maintainence   [x] Prep    [] Relapse     Family support: [x] Yes  [] No    Tobacco Use  Social History     Tobacco Use   Smoking Status Former    Packs/day: 1.00    Years: 56.00    Pack years: 56.00    Types: Cigarettes    Quit date: 2022    Years since quittin.1   Smokeless Tobacco Never     Current smokers:  Longest quit attempt:  Tobacco triggers:  Barriers to successful cessation:  [] Smoking cessation medication:    Breath sounds:    [] Clear         [] Left    [] Right     [x] Diminished slight bases         [x] Left    [x] Right   [] Rales         [] Left    [] Right   [] Rhonchi         [] Right [] Left       Intervention:  [] Referred to smoking cessation counselor     [] Individual education and counseling  [] Tobacco adjunct    Education Assessment Score:  Cognitive issues    Education:   [] Respiratory A and P  [] RLD/COPD  [] Preventing infection  [] Meds/ Aerosols    [] Breathing techniques    [] Energy conservation   [] O2 Therapy  [] Smoking cessation     Target Goal:  -Complete cessation/abstinence from tobacco   -Self-manage condition and prevention exacerbation  -Restore to optimal level of independence  -Improved post-program education assessment score    Psychosocial  Stages of Change:    [] pre-contemplation  [] Action   [] Contemplate    [] Maintainence   [x] Prep    [] Relapse    Intervention:  CAT score:  mMRC score:  LYNNE-7 (anxiety score):  PHQ9 (depression score):  Michelet and Power QOL survey scores:      [] Physician Referral        [] Uses stress management skills   [] Depression/anxiety medications:    Education:    [] Anxiety   [] Depression   [] Psych Consult/   [] Stress Management    Target Goal:  -Assess presence or absence of depression using a valid screening tool. -Maximize coping skills.  -Positive support system.     Medication Compliance (stated):    [] 95%  [] 75%           [] 50%  [] 25%      [] 0%          Target Goal:    -100% Medication adherence    Fall Risk Assessment:  History of falls with or without injury  [x] Yes   [] No   Use of ambulatory aid  [] Yes  [x] No   Difficulty walking/impaired gait  [] Yes  [x] No   Numbness in feet  [] Yes   [x] No   Vision changes  [] Yes  [x] No   Dizziness  [] Yes  [x] No   Shortness of breath  [x] Yes  [] No Medications  [x] Anticoagulant/Antiplatelet  [x] Betablocker /ACE/ARB  [] Antidepressant  [] Seizure medication   [] NA  Risk of fall  [] Low (none of above)  [] Medium (less than 3 above)  [x] High (3 or more above)    Patient 90-Day Goal(s): (Specific, Measurable, Achievable, Relevant, and Time-Bound)  \"Increase FC and become more healthy over 90 days\"    Program Goals  Achieve and progress prescribed exercise frequency, intensity, time, and/or type based upon initial evaluation and 6 MWT/submaximal testing results as evidenced by the attaining and maintaining a Darin rating of perceived exertion between 11 and 16, RPD <=5 during exercise, duration of >30 - 60 minutes using multiple exercise modes for at least 3 days per week with a goal of progressively achieving optimal functional capacity while maintaining Sp02 >= 90%, evidenced by daily session reports and pre-post exercise evaluation. Introduce and progress 8-10 different bilateral UE and/or LE progressive resistance exercises focused on major muscle groups using 1-3 sets each per lift, on 2-3 non-consecutive days implementing free and machine weights and/or TheraBands, as appropriate and increasing resistance once repetitions have progressed to 15 reps and feel fairly light and RPD <=5 on 2 prior occasions. Develop and maintain regular home aerobic exercise program for 20 - 60 minutes at least 2 non-rehab days per week, excluding  5 - 10 minutes warm-up and cool-down periods. Achieve and maintain an optimal average resting blood pressure of <120 / 80 (or as indicated by this patient's referring provider), over the program using lifestyle education, behavior modification, and medication compliance as evidenced by routine resting BP measurements WNL.      Establish and adhere to a healthy diet as determined by the dietician, including: gradually lose 5 lb of body weight over the program, as evidenced by pre- and post-program nutriton survey and routine rounding with patient per patient's self report, food diary, and Rate-your-Plate screening survey. Minimize self-reported psycho-social feelings of depression and anxiety through patient education, behavior modification, and medication compliance, as evidenced by routine rounding with patient. Improve/Maintain quality of life through education, behavior modification, and medication compliance as evidenced routine rounding with patient. Demonstrate an improved patient perception of dyspnea via patient education, lifestyle changes, and regular exercise. Demonstrate knowledge of chronic lung disease, exercise, self-management, depression/anxiety management, medications, oxygen, pursed lip breathing, and care of respiratory equipment. Complete abstinence from tobacco products over the pulmonary rehab program through intensive counseling, behavior modification, and medication compliance as evidenced by routine rounding with patient.     Electronically signed by Willie Benedict RN on 9/6/22 at 2:03 PM EDT     Physician Changes/Comments:

## 2022-09-06 NOTE — PROGRESS NOTES
Cardiopulmonary Rehab Initial History and Assessment    Goran Bhandari   1950  [id number]  9/6/2022    Primary Diagnosis: COVID-19 8/3/2022 (hospitalized for 11 days)  CHF - EF 20-25% on 7/13/2022 (40% on 8/25/2022)  Date: 7/13/2022/8/3/2022    Living Will: [x] Yes   [] No  On File: [x] Yes   [] No   [] N/A  Durable Power of : [x] Yes   [] No    Medical History  Past Medical History:   Diagnosis Date    Acute kidney injury ([facility] [address]     Ascites 5/20/2022    Atrial fibrillation (HCC)     CHF (congestive heart failure) ([facility] [address]     Cirrhosis of liver with ascites ([facility] [address] 5/19/2022    CKD (chronic kidney disease) 5/20/2022    Coronary artery disease     Hyperlipidemia     Hypertension     Hypomagnesemia 5/20/2022    Hyponatremia     Thrombocytopenia ([facility] [address]        Family History  Family History   Problem Relation Age of Onset    Heart Disease Mother     Hypertension Mother     Hypertension Father     Heart Disease Father     Coronary Art Dis Sister     Coronary Art Dis Brother        Symptoms:  1. Angina   [x] None   [] Tightness   [] Shortness of Breath   [] Pressure    [] Nausea   [] Sharp, Stabbing  [] Pallor   [] Indigestion, Heartburn [] Sweaty    Where was discomfort located? Precipitating Factors? Relieved by:    2. Arrhythmia   [] None   [] Irregular Beats (skips) [] Pacer    [x] Atrial Fibrillation  [] AICD    Arrhythmia Medications: Amiodarone    3. Congestive Heart Failure   [] None   [x] Pedal Edema  [x] Unusual weight gain   [x] SOB with mild exertion [x] Fatigue    4. Vascular   [] None   [x] Peripheral claudication [x] R [x] L  PAD History:  Claudication History:                Location:  Pain Scale (1-5):  [] Carotid Narrowing  [] R [] L    5. Musculoskeletal    [] None   [x] Back Pain  Where? Lower back with activity   [x] Joint discomfort Where?  Hips with activity    Symptoms:  [] Cough (frequency):   [] Wheezing (Onset/Cause):  [x] Fluid Retention: Legs and abdomen  [] Sleeping Problems (# Hours): 6-7  [] Sputum (Volume/Color/Viscosity/Frequency):  [x] Dyspnea (onset/cause): with moderate exertion  [] Extra Pillows (Number):  [] Use of accessory muscles of respiration:  Airway Clearance Technique:  [] Chest physio     [] Flutter   [] Acapella   [x] NA  Vaccines:   [] Flu (yr):  [] Pneumonia (yr):  [x] COVID-19: Postponed  [x] Bronchodilators: Duoneb, Ventolin HFA  Nutrition  Appetite:  []  Too Good    [x]  Good  []  Poor  Diet: Low Na  Restaurant food 2 times/wk  Dietary Screening:  Rate Your Plate: Cognitive issues  Daily Food Diary completed:       [x] Yes        [] No  Lipids:    No results found for: CHOL  No results found for: TRIG  No results found for: HDL  No results found for: LDLCHOLESTEROL, LDLCALC  No results found for: LABVLDL, VLDL  No results found for: CHOLHDLRATIO TC     Lipid Meds:  Lipitor    Alcohol:   Social History     Substance and Sexual Activity   Alcohol Use None   Caffeine: [x] Yes [] No  Type: Coffee  Amount: 2 cups per day  Water intake per day: \"not enough\"    Psychological  [] Depression    [] Anxiety    [] None  Treatment: NA    Tobacco Use  Social History     Tobacco Use   Smoking Status Former    Packs/day: 1.00    Years: 56.00    Pack years: 56.00    Types: Cigarettes    Quit date: 2022    Years since quittin.1   Smokeless Tobacco Never     Current smokers:  Longest quit attempt:  Tobacco triggers:  Barriers to successful cessation:  [] Smoking cessation medication:    Diabetes     [] Yes  [x] No  How Long:  How do you manage your diabetes:    Do you check your blood sugar? [] Yes  [] No  How often:  Have you seen a dietician or diabetic educator? [] Yes  [] No      HbA1c     FBS  74  mg/dL (2022)                    Diabetic Medication:  NA    Socio-Economic  Marital Status:     Medication Compliance (stated):    [x] 95%  [] 75%           [] 50%  [] 25%      [] 0%         Stress  Source:  Wife - lives with nephew for support  Relaxation techniques/hobbies: exercises with PT    Level of Education  [] 8th Grade  [] Associates  [] Masters  [x] High School [] Bachelor  []  Other:    Depression Screening:  [x] PHQ9 completed - score: 17    Psychosocial Screening:   [] Jolene Quality of Life Index scores:      Cardiac Rehab Pre - Test  [] Score: Cognitive issues    Physical Findings  Height: 66 inches  Weight:153.9 pounds  BMI: 24.7  Weight goal: <150 pounds  BP Sittin/52  BP Standin/52  Current Exercise -   [x] Yes - walks regularly  [] No    Cardiovascular- No edema, apical pulse regular to auscultation, strong bilateral upper and lower extremity pulses   12-Lead EKG on 2022:  Poor data quality, interpretation may be adversely affected  Sinus bradycardia  Low voltage QRS  ST & T wave abnormality, consider anterior ischemia  Abnormal ECG  When compared with ECG of 03-AUG-2022 11:30,  Inverted T waves have replaced nonspecific T wave abnormality in Anterior leads  QT has lengthened  VR- 52 bpm AZ-140 ms QRS-74 ms QT/QTc-622/578 ms  Cardiac Cath-Not available  Ejection Fraction-per TTE on 2022: 20-25% and 40% on 2022  Pulmonary-   Breath Sounds: Somewhat diminished bilaterally  SpO2: 98%  [x] 02 use: Has 02 but does not use   [] CPAP  [] BiPAP   [] Other    Neurological- No deficit noted or reported. Peripheral Vascular- PAD. Muscular Skeletal- S/P right rotator cuff repair. Pain Assessment- Leg and hip pain rated 10 at peak when walking  Endocrine- No deficit noted or reported. Gastrointestinal- No deficit noted or reported. Genitourinary- No deficit noted or reported. Other-Hx cirrhosis, patient demonstrates cognitive issues.     Barriers to Program Adherence:  [] Transportation   [] Travel distance  [] Insurance/copay    [] Motivation  [] Other  [x] NA    Risk Stratification (of untoward event during exercise):  [x] HIGH RISK  LVEF < 40%  Survivor of cardiac arrest or sudden cardiac death  Complex ventricular post-procedure ischemia  Absence of clinical depression   Non-smoker   0-1 Uncontrolled risk factors    Fall Risk Assessment:  History of falls with or without injury  [x] Yes   [] No   Use of ambulatory aid  [] Yes  [x] No   Difficulty walking/impaired gait  [] Yes  [x] No   Numbness in feet  [] Yes   [x] No   Vision changes  [] Yes  [x] No   Dizziness  [] Yes  [x] No   Shortness of breath  [x] Yes  [] No   Medications  [x] Anticoagulant/Antiplatelet  [x] Betablocker /ACE/ARB  [] Antidepressant  [] Seizure medication   [] NA  Risk of fall  [] Low (none of above)  [] Medium (less than 3 above)  [x] High (3 or more above)       Patient 90-Day Goals: (Specific, Measurable, Achievable, Relevant, and Time-Bound)  \"Increase FC and strength over the next 90 days\"    Program Goals:   Achieve and progress prescribed exercise frequency, intensity, time, and type based upon initial evaluation and submaximal graded exercise results as evidenced by the attaining and maintaining the prescribed target heart rate range, a Darin rating of perceived exertion between 11 and 16, duration of >30 - 60 minutes using multiple exercise modes for at least 3 days/week, progressing at least 0.5-1.0 METs/week as tolerated, as evidenced by daily session reports and pre/post MET levels. Introduce and progress 8-10 different bilateral UE and/or LE progressive resistance exercises focused on major muscle groups using 1-3 sets each per lift, on 2-3 non-consecutive days implementing free and machine weights and/or TheraBands, as appropriate, with a resistance of 40-60% 1-repetition maximum or 10 -15 repetitions to progressive overload and increasing resistance once repetitions have progressed to 15 reps and feel fairly light on 2 prior occasions.      Develop regular home aerobic exercise program for 20 - 60 minutes at least 2 non-rehab days per week, excluding  5 - 10 minutes warm-up and cool-down periods, as tracked on home workout log.    Establish and maintain individualized heart healthy eating plan, and gradually lose 10-15 lb of body weight over the program, utilizing dietician recommendations, moderating nutrional intake, and performing regular aerobic and strength training exercises as prescribed, as evidenced by routine weights, pre- and post-program nutriton survey and routine rounding with patient, food diary, and Rate-your-Plate screening survey. Strive for blood lipid optimization with an LDL-C of <100 mg/dL or  LDL 70 mg/dL, an HDL-C of > or = 40 mg/dL for men, and a triglyceride level of <150 mg/dL via lifestyle education, behavioral modification, and medication compliance, as evidenced by improved post-program lipid results. Achieve and maintain an optimal average resting blood pressure of <120 / 80 mmHg over the course of the program by educating patient, monitoring medication compliance, introducing and maintaining regular cardiovascular exercise program, as evidenced by routine BP monitoring. Minimize self-reported psycho-social feelings of stress over the program by educating patient, monitoring medication compliance, introducing and maintaining regular cardiovascular exercise as evidenced by pre- and post- surveys and by routine rounding with patient. Demonstrate knowledge about risk factor reduction, lifestyle modification, and heart health strategies with an increase of >=5% accuracy via pre- and post-program education assessment screening tool. Complete abstinence from tobacco products over the cardiac rehab program through intensive counseling, behavior modification, and medication compliance as evidenced by routine rounding with patient.     Electronically signed by Suzi Niño RN on 9/6/22 at 1:16 PM EDT

## 2022-09-07 ENCOUNTER — HOSPITAL ENCOUNTER (OUTPATIENT)
Dept: PULMONOLOGY | Age: 72
Discharge: HOME | End: 2022-09-07

## 2022-09-07 ENCOUNTER — TELEPHONE (OUTPATIENT)
Dept: CARDIOLOGY | Age: 72
End: 2022-09-07

## 2022-09-07 NOTE — TELEPHONE ENCOUNTER
----- Message from Umair Abel MD sent at 9/6/2022 11:39 PM EDT -----  Let Mr. Rach Schrader know their test result was ok. Will discuss at next visit. Thanks.

## 2022-09-08 ENCOUNTER — OFFICE VISIT (OUTPATIENT)
Dept: PRIMARY CARE CLINIC | Age: 72
End: 2022-09-08
Payer: MEDICARE

## 2022-09-08 VITALS
WEIGHT: 155.8 LBS | BODY MASS INDEX: 24.45 KG/M2 | DIASTOLIC BLOOD PRESSURE: 47 MMHG | RESPIRATION RATE: 18 BRPM | SYSTOLIC BLOOD PRESSURE: 84 MMHG | HEART RATE: 46 BPM | HEIGHT: 67 IN

## 2022-09-08 DIAGNOSIS — Z00.00 MEDICARE ANNUAL WELLNESS VISIT, SUBSEQUENT: Primary | ICD-10-CM

## 2022-09-08 DIAGNOSIS — Z12.11 ENCOUNTER FOR SCREENING COLONOSCOPY: ICD-10-CM

## 2022-09-08 PROCEDURE — G0439 PPPS, SUBSEQ VISIT: HCPCS | Performed by: STUDENT IN AN ORGANIZED HEALTH CARE EDUCATION/TRAINING PROGRAM

## 2022-09-08 PROCEDURE — 1123F ACP DISCUSS/DSCN MKR DOCD: CPT | Performed by: STUDENT IN AN ORGANIZED HEALTH CARE EDUCATION/TRAINING PROGRAM

## 2022-09-08 PROCEDURE — 3017F COLORECTAL CA SCREEN DOC REV: CPT | Performed by: STUDENT IN AN ORGANIZED HEALTH CARE EDUCATION/TRAINING PROGRAM

## 2022-09-08 RX ORDER — ROPINIROLE 0.5 MG/1
0.5 TABLET, FILM COATED ORAL NIGHTLY
Qty: 90 TABLET | Refills: 0 | Status: SHIPPED | OUTPATIENT
Start: 2022-09-08 | End: 2023-02-03

## 2022-09-08 ASSESSMENT — PATIENT HEALTH QUESTIONNAIRE - PHQ9
1. LITTLE INTEREST OR PLEASURE IN DOING THINGS: 1
SUM OF ALL RESPONSES TO PHQ QUESTIONS 1-9: 1
SUM OF ALL RESPONSES TO PHQ QUESTIONS 1-9: 1
SUM OF ALL RESPONSES TO PHQ9 QUESTIONS 1 & 2: 1
2. FEELING DOWN, DEPRESSED OR HOPELESS: 0
SUM OF ALL RESPONSES TO PHQ QUESTIONS 1-9: 1
SUM OF ALL RESPONSES TO PHQ QUESTIONS 1-9: 1

## 2022-09-08 ASSESSMENT — LIFESTYLE VARIABLES
HOW MANY STANDARD DRINKS CONTAINING ALCOHOL DO YOU HAVE ON A TYPICAL DAY: 3 OR 4
HOW OFTEN DO YOU HAVE A DRINK CONTAINING ALCOHOL: NEVER

## 2022-09-08 NOTE — PROGRESS NOTES
[address], [address] , [facility], [address]      Medicare Annual Wellness Visit    Jessica Bourne is here for Medicare AWV    Assessment & Plan   Medicare annual wellness visit, subsequent  Encounter for screening colonoscopy  -     [facility]    Recommendations for Preventive Services Due: see orders and patient instructions/AVS.  Recommended screening schedule for the next 5-10 years is provided to the patient in written form: see Patient Instructions/AVS.    F/U with Specialists as per prior plans, Cardiac Rehab       Return in 1 month (on 10/8/2022) for F/U Meds/Medicare Annual Wellness Visit in 1 year. Subjective   The following acute and/or chronic problems were also addressed today:    The patient has a history of hypotension. He is currently on Midodrine 10mg TID at this time. He is also on Bumex 1mg at this time. He recently had labs completed. He is following closely with Cardiology and is starting Cardiac Rehab next week. He was recently discharged from home health. The patient is also keeping track of his fluids. He is drinking about 2 32oz/day. He is not monitoring his salt intake/depending on how salty the foods are at this time. He is not longer on oxygen at this time. He is about currently on 10mg of Lexapro at this time and has been off the Buspar at this time. The patient is not smoking/drinking at this time. Patient's complete Health Risk Assessment and screening values have been reviewed and are found in Flowsheets. The following problems were reviewed today and where indicated follow up appointments were made and/or referrals ordered.     Positive Risk Factor Screenings with Interventions:    Fall Risk:  Do you feel unsteady or are you worried about falling? : no  2 or more falls in past year?: (!) yes  Fall with injury in past year?: (!) yes   Fall Risk Interventions:    Home safety tips provided  Home exercises provided to promote strength and balance            General Health and ACP:  General  In general, how would you say your health is?: Fair  In the past 7 days, have you experienced any of the following: New or Increased Pain, New or Increased Fatigue, Loneliness, Social Isolation, Stress or Anger?: No  Do you get the social and emotional support that you need?: Yes  Do you have a Living Will?: (!) No    Advance Directives       Power of [facility] Will ACP-Advance Directive ACP-Power of     Not on File Filed on 02/25/22 Filed Not on File        General Health Risk Interventions:  Poor self-assessment of health status: patient declines any further evaluation/treatment for this issue  Pain issues: patient declines any further evaluation/treatment for this issue  Fatigue: regular exercise recommended- 3-5 times per week, 30-45 minutes per session  Loneliness: patient declines any further intervention for this issue  Social isolation: patient declines any further intervention for this issue  Stress: regular exercise recommended- 3-5 times per week, 30-45 minutes per session, relaxation techniques discussed  Anger: regular exercise recommended- 3-5 times per week, 30-45 minutes per session, relaxation techniques discussed  Inadequate social/emotional support: patient declines any further intervention for this issue  No Living Will: ACP documents already completed- patient asked to provide copy to the office    Health Habits/Nutrition:  Physical Activity: Insufficiently Active    Days of Exercise per Week: 6 days    Minutes of Exercise per Session: 20 min     Have you lost any weight without trying in the past 3 months?: (!) Yes  Body mass index: 24.77  Have you seen the dentist within the past year?: (!) No  Health Habits/Nutrition Interventions:  Inadequate physical activity:  patient agrees to exercise for at least 150 minutes/week  Nutritional issues:  educational materials to promote weight loss provided  Dental exam overdue:  patient encouraged to make appointment with his/her dentist    Hearing/Vision:  Do you or your family notice any trouble with your hearing that hasn't been managed with hearing aids?: No  Do you have difficulty driving, watching TV, or doing any of your daily activities because of your eyesight?: No  Have you had an eye exam within the past year?: (!) No  No results found. Hearing/Vision Interventions:  Hearing concerns:  patient declines any further evaluation/treatment for hearing issues  Vision concerns:  patient encouraged to make appointment with his/her eye specialist    Safety:  Do you have working smoke detectors?: Yes  Do you have any tripping hazards - loose or unsecured carpets or rugs?: No  Do you have any tripping hazards - clutter in doorways, halls, or stairs?: No  Do you have either shower bars, grab bars, non-slip mats or non-slip surfaces in your shower or bathtub?: Yes  Do all of your stairways have a railing or banister?: Yes  Do you always fasten your seatbelt when you are in a car?: (!) No  Safety Interventions:  Home safety tips provided    ADLs:  In the past 7 days, did you need help from others to perform any of the following everyday activities: Eating, dressing, grooming, bathing, toileting, or walking/balance?: No  In the past 7 days, did you need help from others to take care of any of the following: Laundry, housekeeping, banking/finances, shopping, telephone use, food preparation, transportation, or taking medications?: (!) Yes  Select all that apply: (!) Banking/Finances, Food Preparation, Transportation  ADL Interventions:  Patient declines any further evaluation/treatment for this issue          Objective   Vitals:    09/08/22 1452   BP: (!) 84/47   Site: Right Upper Arm   Position: Sitting   Pulse: (!) 46   Resp: 18   Weight: 155 lb 12.8 oz (70.7 kg)   Height: 5' 6.5\" (1.689 m)      Body mass index is 24.77 kg/m².       General Appearance: alert and oriented to person, place and time, well developed and well- nourished, in no acute distress  Skin: warm and dry, no rash or erythema  Head: normocephalic and atraumatic. The patient has partial dentures. Eyes: pupils equal, round, and reactive to light, extraocular eye movements intact, conjunctivae normal  ENT: tympanic membrane, external ear and ear canal normal bilaterally, nose without deformity, nasal mucosa and turbinates normal without polyps  Neck: supple and non-tender without mass, no thyromegaly or thyroid nodules, no cervical lymphadenopathy  Pulmonary/Chest: clear to auscultation bilaterally- no wheezes, rales or rhonchi, normal air movement, no respiratory distress  Cardiovascular: normal rate, regular rhythm, normal S1 and S2, no murmurs, rubs, clicks, or gallops, distal pulses intact, no carotid bruits  Abdomen: soft, non-tender, non-distended, normal bowel sounds, no masses or organomegaly  Extremities: no cyanosis, clubbing or edema  Musculoskeletal: normal range of motion, no joint swelling, deformity or tenderness  Neurologic: reflexes normal and symmetric, no cranial nerve deficit, gait, coordination and speech normal       No Known Allergies  Prior to Visit Medications    Medication Sig Taking? Authorizing Provider   rOPINIRole (REQUIP) 0.5 MG tablet Take 1 tablet by mouth nightly Yes Alice Stuart MD   bumetanide (BUMEX) 1 MG tablet Take 1 tablet by mouth daily  Alice Stuart MD   tamsulosin (FLOMAX) 0.4 MG capsule Take 1 capsule by mouth in the morning. Alice Stuart MD   midodrine (PROAMATINE) 10 MG tablet TAKE 1 TABLET BY MOUTH EVERY MORNING, 1 TABLET AT NOON, AND 1 TABLET IN THE EVENING. TAKE WITH MEALS. Alice Stuart MD   spironolactone (ALDACTONE) 25 MG tablet Take 1 tablet by mouth in the morning. Geovanni Craft MD   zinc sulfate (ZINCATE) 220 (50 Zn) MG capsule Take 2 capsules by mouth in the morning.   Geovanni Craft MD   albuterol sulfate HFA (VENTOLIN HFA) 108 (90 Base) MCG/ACT inhaler Inhale 2 puffs into the lungs 4 times daily as needed for Wheezing  [address], MD   ipratropium-albuterol (DUONEB) 0.5-2.5 (3) MG/3ML SOLN nebulizer solution Inhale 3 mLs into the lungs every 4 hours as needed for Shortness of Breath  [address], MD   dapagliflozin (FARXIGA) 10 MG tablet Take 1 tablet by mouth every morning Lot #: [id number]  Exp: 10/24  Three boxes given. Ciera Cortez PA-C   vitamin D (ERGOCALCIFEROL) 1.25 MG (04323 UT) CAPS capsule Take 1 capsule by mouth once a week  Patient taking differently: Take 50,000 Units by mouth once a week Wednesday  Helga Babinski, MD   vitamin B-1 (THIAMINE) 100 MG tablet Take 1 tablet by mouth in the morning. Helga Babinski, MD   Multiple Vitamin (MULTIVITAMIN) TABS tablet Take 1 tablet by mouth in the morning. Helga Babinski, MD   pantoprazole (PROTONIX) 40 MG tablet Take 1 tablet by mouth every morning (before breakfast)  Helga Babinski, MD   potassium chloride (KLOR-CON M) 20 MEQ extended release tablet Take 1 tablet by mouth in the morning and 1 tablet in the evening. Take with meals.   Helga Babinski, MD   folic acid (FOLVITE) 1 MG tablet Take 1 tablet by mouth daily  Helga Babinski, MD   metoprolol succinate (TOPROL XL) 25 MG extended release tablet Take 1 tablet by mouth daily  Chata Harrington MD   melatonin 5 mg TABS tablet Take 2 tablets by mouth nightly  [address], MD   magnesium oxide (MAGOX 400) 400 (240 Mg) MG tablet Take 1.5 tablets by mouth 2 times daily  Alem Olsen, APRN - CNP   sacubitril-valsartan (ENTRESTO) 24-26 MG per tablet Take 0.5 tablets by mouth 2 times daily Lot #: [id number]  Exp: July 2024  Chata Harrington MD   amiodarone (CORDARONE) 200 MG tablet Take 200 mg by mouth 2 times daily  Historical Provider, MD   apixaban (ELIQUIS) 5 MG TABS tablet Take 5 mg by mouth 2 times daily  Historical Provider, MD   clopidogrel (PLAVIX) 75 MG tablet Take 75 mg by mouth daily  Historical Provider, MD   escitalopram (Yamile Cons) 20 MG tablet Take 20 mg by mouth nightly Taking 10 mg po nightly - patient trying to decrease and discontinue  Historical Provider, MD   atorvastatin (LIPITOR) 80 MG tablet Take 80 mg by mouth daily  Historical Provider, MD   Omega-3 Fatty Acids (FISH OIL CONCENTRATE) 1000 MG CAPS Take 2 caplets by mouth daily  Historical Provider, MD Cortez (Including outside providers/suppliers regularly involved in providing care):   Patient Care Team:  Rica Self MD as PCP - General (Family Medicine)  Rica Self MD as PCP - [facility] Empaneled Provider     Reviewed and updated this visit:  Tobacco  Allergies  Meds  Problems  Med Hx  Surg Hx  Soc Hx  Fam Hx        Electronically signed by Rica Self MD on 9/8/2022 at 3:27 PM

## 2022-09-12 ENCOUNTER — TELEPHONE (OUTPATIENT)
Dept: CARDIOLOGY | Age: 72
End: 2022-09-12

## 2022-09-12 ENCOUNTER — OFFICE VISIT (OUTPATIENT)
Dept: CARDIOLOGY | Age: 72
End: 2022-09-12
Payer: MEDICARE

## 2022-09-12 ENCOUNTER — HOSPITAL ENCOUNTER (OUTPATIENT)
Age: 72
Discharge: HOME | End: 2022-09-12
Payer: MEDICARE

## 2022-09-12 VITALS
DIASTOLIC BLOOD PRESSURE: 38 MMHG | HEIGHT: 66 IN | OXYGEN SATURATION: 98 % | BODY MASS INDEX: 24.91 KG/M2 | HEART RATE: 36 BPM | RESPIRATION RATE: 18 BRPM | SYSTOLIC BLOOD PRESSURE: 92 MMHG | WEIGHT: 155 LBS

## 2022-09-12 DIAGNOSIS — R00.1 BRADYCARDIA: Primary | ICD-10-CM

## 2022-09-12 DIAGNOSIS — I25.5 ISCHEMIC CARDIOMYOPATHY: ICD-10-CM

## 2022-09-12 DIAGNOSIS — Z51.81 ENCOUNTER FOR MONITORING ANTI-ARRHYTHMIC THERAPY: ICD-10-CM

## 2022-09-12 DIAGNOSIS — I95.9 HYPOTENSION, UNSPECIFIED HYPOTENSION TYPE: ICD-10-CM

## 2022-09-12 DIAGNOSIS — R42 LIGHTHEADED: ICD-10-CM

## 2022-09-12 DIAGNOSIS — Z71.6 TOBACCO ABUSE COUNSELING: ICD-10-CM

## 2022-09-12 DIAGNOSIS — N18.30 STAGE 3 CHRONIC KIDNEY DISEASE, UNSPECIFIED WHETHER STAGE 3A OR 3B CKD (HCC): ICD-10-CM

## 2022-09-12 DIAGNOSIS — N18.32 STAGE 3B CHRONIC KIDNEY DISEASE (HCC): ICD-10-CM

## 2022-09-12 DIAGNOSIS — Z51.81 ENCOUNTER FOR MONITORING AMIODARONE THERAPY: ICD-10-CM

## 2022-09-12 DIAGNOSIS — Z79.01 ENCOUNTER FOR CURRENT LONG-TERM USE OF ANTICOAGULANTS: ICD-10-CM

## 2022-09-12 DIAGNOSIS — I50.42 CHRONIC COMBINED SYSTOLIC AND DIASTOLIC CONGESTIVE HEART FAILURE (HCC): ICD-10-CM

## 2022-09-12 DIAGNOSIS — I48.0 PAF (PAROXYSMAL ATRIAL FIBRILLATION) (HCC): ICD-10-CM

## 2022-09-12 DIAGNOSIS — Z79.899 ENCOUNTER FOR MONITORING AMIODARONE THERAPY: ICD-10-CM

## 2022-09-12 DIAGNOSIS — Z79.899 ENCOUNTER FOR MONITORING ANTI-ARRHYTHMIC THERAPY: ICD-10-CM

## 2022-09-12 DIAGNOSIS — R00.1 BRADYCARDIA: ICD-10-CM

## 2022-09-12 PROBLEM — E11.9 TYPE 2 DIABETES MELLITUS (HCC): Status: ACTIVE | Noted: 2022-09-12

## 2022-09-12 LAB
ALBUMIN SERPL-MCNC: 3.8 G/DL (ref 3.5–5.2)
ALBUMIN/GLOBULIN RATIO: 1.2 (ref 1–2.5)
ALP BLD-CCNC: 98 U/L (ref 40–129)
ALT SERPL-CCNC: 32 U/L (ref 5–41)
ANION GAP SERPL CALCULATED.3IONS-SCNC: 9 MMOL/L (ref 9–17)
AST SERPL-CCNC: 25 U/L
BILIRUB SERPL-MCNC: 0.4 MG/DL (ref 0.3–1.2)
BUN BLDV-MCNC: 30 MG/DL (ref 8–23)
BUN/CREAT BLD: 21 (ref 9–20)
CALCIUM SERPL-MCNC: 10 MG/DL (ref 8.6–10.4)
CHLORIDE BLD-SCNC: 99 MMOL/L (ref 98–107)
CO2: 28 MMOL/L (ref 20–31)
CREAT SERPL-MCNC: 1.46 MG/DL (ref 0.7–1.2)
GFR AFRICAN AMERICAN: 58 ML/MIN
GFR NON-AFRICAN AMERICAN: 47 ML/MIN
GFR SERPL CREATININE-BSD FRML MDRD: ABNORMAL ML/MIN/{1.73_M2}
GFR SERPL CREATININE-BSD FRML MDRD: ABNORMAL ML/MIN/{1.73_M2}
GLUCOSE BLD-MCNC: 101 MG/DL (ref 70–99)
HCT VFR BLD CALC: 35.6 % (ref 40.7–50.3)
HEMOGLOBIN: 11.4 G/DL (ref 13–17)
MCH RBC QN AUTO: 32.3 PG (ref 25.2–33.5)
MCHC RBC AUTO-ENTMCNC: 32 G/DL (ref 28.4–34.8)
MCV RBC AUTO: 100.8 FL (ref 82.6–102.9)
NRBC AUTOMATED: 0 PER 100 WBC
PDW BLD-RTO: 15 % (ref 11.8–14.4)
PLATELET # BLD: 123 K/UL (ref 138–453)
PMV BLD AUTO: 11.6 FL (ref 8.1–13.5)
POTASSIUM SERPL-SCNC: 5.5 MMOL/L (ref 3.7–5.3)
RBC # BLD: 3.53 M/UL (ref 4.21–5.77)
SODIUM BLD-SCNC: 136 MMOL/L (ref 135–144)
THYROXINE, FREE: 1.31 NG/DL (ref 0.93–1.7)
TOTAL PROTEIN: 6.9 G/DL (ref 6.4–8.3)
TSH SERPL DL<=0.05 MIU/L-ACNC: 6.02 UIU/ML (ref 0.3–5)
WBC # BLD: 5 K/UL (ref 3.5–11.3)

## 2022-09-12 PROCEDURE — 99211 OFF/OP EST MAY X REQ PHY/QHP: CPT | Performed by: FAMILY MEDICINE

## 2022-09-12 PROCEDURE — 93005 ELECTROCARDIOGRAM TRACING: CPT | Performed by: FAMILY MEDICINE

## 2022-09-12 PROCEDURE — 85027 COMPLETE CBC AUTOMATED: CPT

## 2022-09-12 PROCEDURE — G8420 CALC BMI NORM PARAMETERS: HCPCS | Performed by: FAMILY MEDICINE

## 2022-09-12 PROCEDURE — 1111F DSCHRG MED/CURRENT MED MERGE: CPT | Performed by: FAMILY MEDICINE

## 2022-09-12 PROCEDURE — 3017F COLORECTAL CA SCREEN DOC REV: CPT | Performed by: FAMILY MEDICINE

## 2022-09-12 PROCEDURE — 84443 ASSAY THYROID STIM HORMONE: CPT

## 2022-09-12 PROCEDURE — 1123F ACP DISCUSS/DSCN MKR DOCD: CPT | Performed by: FAMILY MEDICINE

## 2022-09-12 PROCEDURE — 80053 COMPREHEN METABOLIC PANEL: CPT

## 2022-09-12 PROCEDURE — 1036F TOBACCO NON-USER: CPT | Performed by: FAMILY MEDICINE

## 2022-09-12 PROCEDURE — 93010 ELECTROCARDIOGRAM REPORT: CPT | Performed by: FAMILY MEDICINE

## 2022-09-12 PROCEDURE — 36415 COLL VENOUS BLD VENIPUNCTURE: CPT

## 2022-09-12 PROCEDURE — G8427 DOCREV CUR MEDS BY ELIG CLIN: HCPCS | Performed by: FAMILY MEDICINE

## 2022-09-12 PROCEDURE — 99215 OFFICE O/P EST HI 40 MIN: CPT | Performed by: FAMILY MEDICINE

## 2022-09-12 PROCEDURE — 84439 ASSAY OF FREE THYROXINE: CPT

## 2022-09-12 RX ORDER — AMIODARONE HYDROCHLORIDE 200 MG/1
100 TABLET ORAL DAILY
Qty: 45 TABLET | Refills: 3 | Status: SHIPPED | OUTPATIENT
Start: 2022-09-12

## 2022-09-12 RX ORDER — METOPROLOL SUCCINATE 25 MG/1
12.5 TABLET, EXTENDED RELEASE ORAL DAILY
Qty: 45 TABLET | Refills: 3 | Status: SHIPPED | OUTPATIENT
Start: 2022-09-12

## 2022-09-12 NOTE — PROGRESS NOTES
Marylen Reap am scribing for and in the presence of Jeni Leger MD, MS, F.A.C.C..    Patient: Claudetta Rainbow  :   Date of Admission: (Not on file)  Primary Care Physician: Tania Acosta  Today's Date: 2022    REASON FOR CONSULTATION: atrial fibrillation with RVR    HPI:  Mr. Shaw Espinoza is a 67 y.o. male who was admitted to the hospital on 2022 for weakness and shortness of breath. In the ER he was found to be in atrial fibrillation with RVR leading to a consultation. He was then readmitted due to severe dehydration. In the past he reports seeing a Cardiologist in [place] and was planning on having a cardioversion done in  to put him back into normal sinus rhythm due to his history of new onset atrial fibrillation. He reports a history of a heart attack at age 46 and needed stents placed at that time but denies any cardiac cath since that time. He also has carotid artery stenosis and abdominal aortic aneurysm. He did report having surgery on one side of his neck, however he is not sure which side or how long ago it was. He has had hypertension and hyperlipidemia for years as well. He is a current every day smoker and he smoked for about 55 years and smokes about a pack a day. Family history consists of a lot of people in his family with significant cardiac history, however he wanted us to talk to his sister about the family history as she knows the details. Echo done on 2022 showed an EF of 15-20%. ECHO done on 22: Dilated all cardiac chambers. Severe biventricular systolic dysfunction. Estimated left ventricular ejection fraction is 20-25% Moderate to severe mitral and moderate tricuspid regurgitation. Moderate pulmonary hypertension with an estimated right ventricular systolic pressure of 61 mmHg. Evidence of moderate to severe diastolic dysfunction is seen. Bilateral pleural effusion noted.  Compared to the previous study of 2022, no significant change in ejection fraction. Functional mitral and tricuspid regurgitation are worse. Bilateral pleural effusion and left elevated left ventricular filling pressure along with dilated IVC suggest worsening fluid overload. Echo done on 8/25/2022 showed improved EF 40%, the left ventricular wall thickness is mildly increased. Mild mitral regurgitation. Evidence of moderate diastolic dysfunction is seen    Mr. Jose Collier is here today for a 6 week follow up. He did have a fall 5-6 days ago and he landed on his ribs. He does continue to have some rib pain. He was admitted to the hospital on 8/3/2022 for respiratory distress and had Covid. His shortness of breath is stable. He does admit he needs to drink more water. His appetite has come back. He also denied any abdominal pain, bleeding problems, problems with his medications or any other concerns at this time. He has no bowel issues at this time. No nausea or vomiting. No fever, cough or chills. Bleeding Risks: Mr. Jose Collier denies any current or recent bleeding problems including a history of a GI bleed, ulcers, recent or upcoming surgeries, blood in his stool or black tarry stools or blood in his urine. Past Medical History:   Diagnosis Date    Acute kidney injury ([address]     Ascites 5/20/2022    Atrial fibrillation (HCC)     CHF (congestive heart failure) (HCC)     Cirrhosis of liver with ascites ([address] 5/19/2022    CKD (chronic kidney disease) 5/20/2022    Coronary artery disease     Hyperlipidemia     Hypertension     Hypomagnesemia 5/20/2022    Hyponatremia     Thrombocytopenia (HCC)      CURRENT ALLERGIES: Patient has no known allergies. REVIEW OF SYSTEMS: 14 systems were reviewed. Pertinent positives and negatives as above, all else negative.      Past Surgical History:   Procedure Laterality Date    CAROTID STENT PLACEMENT Bilateral     HERNIA REPAIR      ROTATOR CUFF REPAIR Right     TONSILLECTOMY AND ADENOIDECTOMY      Social History:  Social History     Tobacco Use extended release tablet Take 1 tablet by mouth in the morning and 1 tablet in the evening. Take with meals. 60 tablet 3    folic acid (FOLVITE) 1 MG tablet Take 1 tablet by mouth daily 90 tablet 0    metoprolol succinate (TOPROL XL) 25 MG extended release tablet Take 1 tablet by mouth daily 90 tablet 3    melatonin 5 mg TABS tablet Take 2 tablets by mouth nightly  0    magnesium oxide (MAGOX 400) 400 (240 Mg) MG tablet Take 1.5 tablets by mouth 2 times daily 90 tablet 2    sacubitril-valsartan (ENTRESTO) 24-26 MG per tablet Take 0.5 tablets by mouth 2 times daily Lot #: [id number]  Exp: July 2024 56 tablet 0    apixaban (ELIQUIS) 5 MG TABS tablet Take 5 mg by mouth 2 times daily      clopidogrel (PLAVIX) 75 MG tablet Take 75 mg by mouth daily      escitalopram (LEXAPRO) 20 MG tablet Take 20 mg by mouth nightly Taking 5 mg po nightly - patient trying to decrease and discontinue      atorvastatin (LIPITOR) 80 MG tablet Take 80 mg by mouth daily      Omega-3 Fatty Acids (FISH OIL CONCENTRATE) 1000 MG CAPS Take 2 caplets by mouth daily       FAMILY HISTORY: Reviewed but non-contributory     PHYSICAL EXAM:   BP (!) 92/38 (Site: Left Upper Arm, Position: Sitting, Cuff Size: Medium Adult)   Pulse (!) 36   Resp 18   Ht 5' 6.5\" (1.689 m)   Wt 155 lb (70.3 kg)   SpO2 98%   BMI 24.65 kg/m²  Body mass index is 24.65 kg/m². Constitutional: He is oriented to person, place, and time. He appears well-developed and well-nourished. In no acute distress. HEENT: Normocephalic and atraumatic. No JVD present. Carotid bruit is not present. No mass and no thyromegaly present. No lymphadenopathy present. Cardiovascular: Bradycardic rate, regular rhythm, normal heart sounds. Exam reveals no gallop and no friction rubs. No murmur was heard. Pulmonary/Chest: Effort normal and breath sounds normal. No respiratory distress. He has no wheezes, rhonchi or rales. Abdominal: Soft, non-tender.  Bowel sounds and aorta are normal. He exhibits no organomegaly, mass or bruit. Extremities: Trace. No cyanosis or clubbing. 2+ radial and carotid pulses. Distal extremity pulses: 2+ bilaterally. Neurological: He is alert and oriented to person, place, and time. No evidence of gross cranial nerve deficit. Coordination appeared normal.   Skin: Skin is warm and dry. There is no rash or diaphoresis. Psychiatric: He has a normal mood and affect. His speech is normal and behavior is normal.      MOST RECENT LABS ON RECORD:   Lab Results   Component Value Date    WBC 3.2 (L) 08/13/2022    HGB 12.6 (L) 08/13/2022    HCT 38.5 (L) 08/13/2022    PLT See Reflexed IPF Result 08/13/2022     (H) 08/13/2022    AST 93 (H) 08/13/2022     09/06/2022    K 5.3 09/06/2022    CL 98 09/06/2022    CREATININE 1.38 (H) 09/06/2022    BUN 28 (H) 09/06/2022    CO2 30 09/06/2022    TSH 5.77 (H) 07/25/2022    INR 1.4 08/03/2022     ASSESSMENT:  Encounter Diagnoses   Name Primary? Bradycardia Yes    Stage 3 chronic kidney disease, unspecified whether stage 3a or 3b CKD ([address]     Stage 3b chronic kidney disease ([address]      1. Bradycardia    2. Stage 3 chronic kidney disease, unspecified whether stage 3a or 3b CKD ([address]    3. Stage 3b chronic kidney disease ([address]    4. Hypotension, unspecified hypotension type    5. Lightheaded    6. PAF (paroxysmal atrial fibrillation) ([address]    7. Encounter for monitoring anti-arrhythmic therapy    8. Chronic combined systolic and diastolic congestive heart failure (HCC)       PLAN:  Severe bradycardia with mild Hypotension/ Lightheadedness/dizziness: recent fall 5 days ago  Continue Florinef (fludrocortisone) 0.3 mg daily. I explained that this can cause some increased lower extremity edema but usually this is fairly mild. Beta Blocker: DECREASE to Metoprolol succinate (Toprol XL) 25 mg 1/2 tab daily. Diuretics: Continue bumetinide (Bumex) 1 mg every morning.     Nonpharmacologic counseling: Because of his condition, I reminded him to try and keep himself well-hydrated and to take extra time when moving from laying to sitting, sitting to standing and standing to walking. I also explained to him to help improve his symptoms he should include 3 g sodium diet, 1 or 2 L of sports drinks daily, knee-high compressions stockings. Paroxysmal Atrial Fibrillation: Rhythm Control Symptomatic probably due to bradycardia due to amiodarone ; EKG done in office today showed marked sinus bradycardia 37 bpm  Beta Blocker: HOLD Metoprolol succinate (Toprol XL) 25 mg daily for 24 hours and then start decreased dose of Toprol XL 12.5 mg daily   Anti-Arrhythmic: HOLD amiodarone tonight and tomorrow and then started decreased dose of amiodarone (Pacerone) 100 mg once daily starting Wednesday. Monitoring: Since they will being maintained on Amiodarone, I told them that we will need to closely monitor them for potential side effects. These include monitoring LFTs and TSH at least every 6 months as well as chest x-rays, pulmonary function tests, and eye exams at least yearly. [id number] Score for Atrial Fibrillation Stroke Risk   Risk   Factors  Component Value   C CHF Yes 1   H HTN Yes 1   A2 Age >= 76 No,  (73 y.o.) 0   D DM Yes 1   S2 Prior Stroke/TIA No 0   V Vascular Disease No 0   A Age 74-69 Yes,  (73 y.o.) 1   Sc Sex male 0    [id number]  Score  4   Score last updated 2/1/69 0:81 PM EDT  Click here for a link to the UpToDate guideline \"Atrial Fibrillation: Anticoagulation therapy to prevent embolization  Disclaimer: Risk Score calculation is dependent on accuracy of patient problem list and past encounter diagnosis. Stroke Risk: CHADS2-VASc Score: 4/9 (4% stroke risk).  Because of his atrial fibrillation, I also would also recommend that he continue with anticoagulation to decrease his risk of stoke but also reminded him to watch for signs of blood in his stool or black tarry stools and stop the medication immediately if this develops as this could be life threatening. Anticoagulation: Continue Apixaban (Eliquis) 5 mg every 12 hours. Because of his atrial fibrillation, I also would also recommend that he continue with anticoagulation to decrease his risk of stoke but also reminded him to watch for signs of blood in his stool or black tarry stools and stop the medication immediately if this develops as this could be life threatening. Additional Testing List:  I took the liberty of ordering a CBC. I told them that they could get their lab work performed at the location of their choosing, unfortunately, if the lab work was not performed at a [facility]) facility I could not guarantee my ability to follow up with them on their results. , I took the liberty of ordering a TSH with reflex T4. I told them that they could get their lab work performed at the location of their choosing, unfortunately, if the lab work was not performed at a [facility]) facility I could not guarantee my ability to follow up with them on their results. , and I ordered a complete metabolic panel (CMP). I told them that they could get their lab work performed at the location of their choosing, unfortunately, if the lab work was not performed at a [facility]) facility I could not guarantee my ability to follow up with them on their results. Chronic combined heart failure: Ischemic Cardiomyopathy, Echo done on 5/18/2022 which showed an EF of 15-20%. Echo done on 8/25/2022 showed improved EF of 40%  Beta Blocker: HOLD Metoprolol succinate (Toprol XL) 25 mg daily for 24 hours and then start decreased dose of Toprol XL 12.5 mg daily   ACE Inibitor/ARB: Continue sacubitril/valsartan (Entresto) 24/26 mg 1/2 a tablet twice daily. Diuretics: Continue bumetinide (Bumex) 1 mg every morning. Continue Farxiga 10 mg once daily. Heart failure counseling: I advised them to try and keep their legs up whenever possible and to limit salt in their diet.    I do not want him to go to cardiac

## 2022-09-12 NOTE — TELEPHONE ENCOUNTER
----- Message from Cedric Emmanuel MD sent at 9/12/2022  4:45 PM EDT -----  Let Patient know test result was borderline. Repeat BMP in 1 week. Increase oral hydration. Will discuss at next visit. Thanks.

## 2022-09-12 NOTE — PATIENT INSTRUCTIONS
SURVEY:    You may be receiving a survey from [employer] regarding your visit today. Please complete the survey to enable us to provide the highest quality of care to you and your family. If you cannot score us a very good on any question, please call the office to discuss how we could have made your experience a very good one. Thank you.

## 2022-09-14 ENCOUNTER — HOSPITAL ENCOUNTER (OUTPATIENT)
Dept: CARDIAC REHAB | Age: 72
Setting detail: THERAPIES SERIES
Discharge: HOME | End: 2022-09-14
Payer: MEDICARE

## 2022-09-14 PROCEDURE — 94625 PHY/QHP OP PULM RHB W/O MNTR: CPT

## 2022-09-15 ENCOUNTER — APPOINTMENT (OUTPATIENT)
Dept: CARDIAC REHAB | Age: 72
End: 2022-09-15
Payer: MEDICARE

## 2022-09-19 ENCOUNTER — PROCEDURE VISIT (OUTPATIENT)
Dept: UROLOGY | Age: 72
End: 2022-09-19
Payer: MEDICARE

## 2022-09-19 ENCOUNTER — HOSPITAL ENCOUNTER (OUTPATIENT)
Dept: CARDIAC REHAB | Age: 72
Setting detail: THERAPIES SERIES
Discharge: HOME | End: 2022-09-19
Payer: MEDICARE

## 2022-09-19 VITALS
HEIGHT: 67 IN | SYSTOLIC BLOOD PRESSURE: 102 MMHG | HEART RATE: 60 BPM | WEIGHT: 156 LBS | BODY MASS INDEX: 24.48 KG/M2 | DIASTOLIC BLOOD PRESSURE: 58 MMHG | TEMPERATURE: 98.9 F

## 2022-09-19 DIAGNOSIS — N13.8 BPH WITH OBSTRUCTION/LOWER URINARY TRACT SYMPTOMS: ICD-10-CM

## 2022-09-19 DIAGNOSIS — R31.0 GROSS HEMATURIA: Primary | ICD-10-CM

## 2022-09-19 DIAGNOSIS — R33.9 INCOMPLETE BLADDER EMPTYING: ICD-10-CM

## 2022-09-19 DIAGNOSIS — N40.1 BPH WITH OBSTRUCTION/LOWER URINARY TRACT SYMPTOMS: ICD-10-CM

## 2022-09-19 PROCEDURE — 99213 OFFICE O/P EST LOW 20 MIN: CPT | Performed by: UROLOGY

## 2022-09-19 PROCEDURE — 52000 CYSTOURETHROSCOPY: CPT | Performed by: UROLOGY

## 2022-09-19 PROCEDURE — G8427 DOCREV CUR MEDS BY ELIG CLIN: HCPCS | Performed by: UROLOGY

## 2022-09-19 PROCEDURE — 3017F COLORECTAL CA SCREEN DOC REV: CPT | Performed by: UROLOGY

## 2022-09-19 PROCEDURE — 1123F ACP DISCUSS/DSCN MKR DOCD: CPT | Performed by: UROLOGY

## 2022-09-19 PROCEDURE — 1036F TOBACCO NON-USER: CPT | Performed by: UROLOGY

## 2022-09-19 PROCEDURE — G8420 CALC BMI NORM PARAMETERS: HCPCS | Performed by: UROLOGY

## 2022-09-19 PROCEDURE — 94625 PHY/QHP OP PULM RHB W/O MNTR: CPT

## 2022-09-19 ASSESSMENT — ENCOUNTER SYMPTOMS
CONSTIPATION: 0
ABDOMINAL PAIN: 0
NAUSEA: 0
SHORTNESS OF BREATH: 0
VOMITING: 0
BACK PAIN: 0
EYE REDNESS: 0
COLOR CHANGE: 0
WHEEZING: 0
COUGH: 0

## 2022-09-19 NOTE — PROGRESS NOTES
HPI:          Patient is a 67 y.o. male in no acute distress. He is alert and oriented to person, place, and time. History  The patient is a 70 y.o. male who was admitted for A. fib with RVR. Patient also had an admission 5/18/2022 through 5/24/2022 for A. fib with RVR. After admission patient was having urgency/frequecy every 15 minutes. In ICU bladder scan performed for 350ml. Garner catheter was placed. Flomax started by hospitalist team. Overnight patient with bladder spasms. B&O suppository given. Patient seen by nephrology for acute kidney injury. Patient is being followed by cardiology. Patient does have issues with EtOH use and was agitated and was pulling at garner catheter resulting in some hematuria. Patient is currently on the CIWA protocol secondary to alcohol withdrawal.  He is currently an unreliable historian. Patient's wife is at his bedside. She is also not fully aware of his complete medical history. Apparently patient had seen a urologist approximately 10 years ago but is uncertain what this was for. She states that he is no longer living in the same house as her. This has been going on for the last 6 months. Apparently patient's drinking has interfered with his relationship with wife's daughter. Since then he has been living alone in his sister's trailer. Prior to this patient's wife does state that he was getting up at least 4-5 times at night to urinate. She does report that he was having a daily bowel movement. Currently  Patient is here today for lower tract visualization. This is for persistent lower urinary tract symptoms. Patient is doing well on his current medication regimen. He has been taking the Flomax. Patient has been tolerating medication well. When he does take his diuretic he is having frequency and urgency. I did tell him this is normal.  No pain today.     Cystoscopy Procedure Note    Pre-operative Diagnosis: gross hematuria    Post-operative Diagnosis: Same     Surgeon: Angelo Zaldivar    Assistants: None    Anesthesia : Local    Procedure Details   The risks, benefits, complications, treatment options, and expected outcomes were discussed with the patient. The patient concurred with the proposed plan, giving informed consent. Cystoscopy was performed today under local anesthesia, using sterile technique. The patient was placed in the dorsal lithotomy position, prepped with CHG, and draped in the usual sterile fashion. A 14 [place] flexible cystoscope was used to systematically inspect both the urethra and bladder in their entirety. Findings:  Anterior urethra: normal without strictures  Hyperplasia: bilobar  Bladder: Normal mucosa, without lesions. Ureteral orifice(s) was/were seen in the normal position and effluxing clear urine  Trabeculations No  Diverticulum No  Description: Bilobar hyperplasia with high riding bladder neck         Specimens: Cytology/urine culture No                 Complications:  None; patient tolerated the procedure well.            Disposition: home           Condition: stable     Past Medical History:   Diagnosis Date    Acute kidney injury ([address]     Ascites 5/20/2022    Atrial fibrillation (HCC)     CHF (congestive heart failure) ([address]     Cirrhosis of liver with ascites ([address] 5/19/2022    CKD (chronic kidney disease) 5/20/2022    Coronary artery disease     Hyperlipidemia     Hypertension     Hypomagnesemia 5/20/2022    Hyponatremia     Thrombocytopenia (HCC)      Past Surgical History:   Procedure Laterality Date    CAROTID STENT PLACEMENT Bilateral     HERNIA REPAIR      ROTATOR CUFF REPAIR Right     TONSILLECTOMY AND ADENOIDECTOMY       Outpatient Encounter Medications as of 9/19/2022   Medication Sig Dispense Refill    amiodarone (CORDARONE) 200 MG tablet Take 0.5 tablets by mouth daily 45 tablet 3    metoprolol succinate (TOPROL XL) 25 MG extended release tablet Take 0.5 tablets by mouth daily 45 tablet 3    rOPINIRole (REQUIP) 0.5 MG tablet Take 1 tablet by mouth nightly 90 tablet 0    bumetanide (BUMEX) 1 MG tablet Take 1 tablet by mouth daily 90 tablet 1    tamsulosin (FLOMAX) 0.4 MG capsule Take 1 capsule by mouth in the morning. 90 capsule 1    midodrine (PROAMATINE) 10 MG tablet TAKE 1 TABLET BY MOUTH EVERY MORNING, 1 TABLET AT NOON, AND 1 TABLET IN THE EVENING. TAKE WITH MEALS. 90 tablet 3    spironolactone (ALDACTONE) 25 MG tablet Take 1 tablet by mouth in the morning. 30 tablet 3    zinc sulfate (ZINCATE) 220 (50 Zn) MG capsule Take 2 capsules by mouth in the morning. 30 capsule 3    ipratropium-albuterol (DUONEB) 0.5-2.5 (3) MG/3ML SOLN nebulizer solution Inhale 3 mLs into the lungs every 4 hours as needed for Shortness of Breath 360 mL 1    dapagliflozin (FARXIGA) 10 MG tablet Take 1 tablet by mouth every morning Lot #: [id number]  Exp: 10/24  Three boxes given. 21 tablet 0    vitamin D (ERGOCALCIFEROL) 1.25 MG (46008 UT) CAPS capsule Take 1 capsule by mouth once a week (Patient taking differently: Take 50,000 Units by mouth once a week Wednesday) 12 capsule 0    vitamin B-1 (THIAMINE) 100 MG tablet Take 1 tablet by mouth in the morning. 90 tablet 1    Multiple Vitamin (MULTIVITAMIN) TABS tablet Take 1 tablet by mouth in the morning. 90 tablet 1    pantoprazole (PROTONIX) 40 MG tablet Take 1 tablet by mouth every morning (before breakfast) 90 tablet 1    potassium chloride (KLOR-CON M) 20 MEQ extended release tablet Take 1 tablet by mouth in the morning and 1 tablet in the evening. Take with meals.  60 tablet 3    folic acid (FOLVITE) 1 MG tablet Take 1 tablet by mouth daily 90 tablet 0    melatonin 5 mg TABS tablet Take 2 tablets by mouth nightly  0    magnesium oxide (MAGOX 400) 400 (240 Mg) MG tablet Take 1.5 tablets by mouth 2 times daily 90 tablet 2    sacubitril-valsartan (ENTRESTO) 24-26 MG per tablet Take 0.5 tablets by mouth 2 times daily Lot #: [id number]  Exp: July 2024 56 tablet 0    apixaban (ELIQUIS) 5 MG TABS tablet Take 5 mg by mouth 2 times daily      clopidogrel (PLAVIX) 75 MG tablet Take 75 mg by mouth daily      escitalopram (LEXAPRO) 20 MG tablet Take 5 mg by mouth nightly Taking 5 mg po every other day- patient trying to decrease and discontinue      atorvastatin (LIPITOR) 80 MG tablet Take 80 mg by mouth daily      Omega-3 Fatty Acids (FISH OIL CONCENTRATE) 1000 MG CAPS Take 2 caplets by mouth daily      albuterol sulfate HFA (VENTOLIN HFA) 108 (90 Base) MCG/ACT inhaler Inhale 2 puffs into the lungs 4 times daily as needed for Wheezing (Patient not taking: No sig reported) 54 g 1     No facility-administered encounter medications on file as of 9/19/2022. Current Outpatient Medications on File Prior to Visit   Medication Sig Dispense Refill    amiodarone (CORDARONE) 200 MG tablet Take 0.5 tablets by mouth daily 45 tablet 3    metoprolol succinate (TOPROL XL) 25 MG extended release tablet Take 0.5 tablets by mouth daily 45 tablet 3    rOPINIRole (REQUIP) 0.5 MG tablet Take 1 tablet by mouth nightly 90 tablet 0    bumetanide (BUMEX) 1 MG tablet Take 1 tablet by mouth daily 90 tablet 1    tamsulosin (FLOMAX) 0.4 MG capsule Take 1 capsule by mouth in the morning. 90 capsule 1    midodrine (PROAMATINE) 10 MG tablet TAKE 1 TABLET BY MOUTH EVERY MORNING, 1 TABLET AT NOON, AND 1 TABLET IN THE EVENING. TAKE WITH MEALS. 90 tablet 3    spironolactone (ALDACTONE) 25 MG tablet Take 1 tablet by mouth in the morning. 30 tablet 3    zinc sulfate (ZINCATE) 220 (50 Zn) MG capsule Take 2 capsules by mouth in the morning. 30 capsule 3    ipratropium-albuterol (DUONEB) 0.5-2.5 (3) MG/3ML SOLN nebulizer solution Inhale 3 mLs into the lungs every 4 hours as needed for Shortness of Breath 360 mL 1    dapagliflozin (FARXIGA) 10 MG tablet Take 1 tablet by mouth every morning Lot #: [id number]  Exp: 10/24  Three boxes given.  21 tablet 0    vitamin D (ERGOCALCIFEROL) 1.25 MG (63037 UT) CAPS capsule Take 1 capsule by mouth once a week (Patient taking differently: Take 50,000 Units by mouth once a week Wednesday) 12 capsule 0    vitamin B-1 (THIAMINE) 100 MG tablet Take 1 tablet by mouth in the morning. 90 tablet 1    Multiple Vitamin (MULTIVITAMIN) TABS tablet Take 1 tablet by mouth in the morning. 90 tablet 1    pantoprazole (PROTONIX) 40 MG tablet Take 1 tablet by mouth every morning (before breakfast) 90 tablet 1    potassium chloride (KLOR-CON M) 20 MEQ extended release tablet Take 1 tablet by mouth in the morning and 1 tablet in the evening. Take with meals. 60 tablet 3    folic acid (FOLVITE) 1 MG tablet Take 1 tablet by mouth daily 90 tablet 0    melatonin 5 mg TABS tablet Take 2 tablets by mouth nightly  0    magnesium oxide (MAGOX 400) 400 (240 Mg) MG tablet Take 1.5 tablets by mouth 2 times daily 90 tablet 2    sacubitril-valsartan (ENTRESTO) 24-26 MG per tablet Take 0.5 tablets by mouth 2 times daily Lot #: [id number]  Exp: July 2024 56 tablet 0    apixaban (ELIQUIS) 5 MG TABS tablet Take 5 mg by mouth 2 times daily      clopidogrel (PLAVIX) 75 MG tablet Take 75 mg by mouth daily      escitalopram (LEXAPRO) 20 MG tablet Take 5 mg by mouth nightly Taking 5 mg po every other day- patient trying to decrease and discontinue      atorvastatin (LIPITOR) 80 MG tablet Take 80 mg by mouth daily      Omega-3 Fatty Acids (FISH OIL CONCENTRATE) 1000 MG CAPS Take 2 caplets by mouth daily      albuterol sulfate HFA (VENTOLIN HFA) 108 (90 Base) MCG/ACT inhaler Inhale 2 puffs into the lungs 4 times daily as needed for Wheezing (Patient not taking: No sig reported) 54 g 1     No current facility-administered medications on file prior to visit. Patient has no known allergies.   Family History   Problem Relation Age of Onset    Heart Disease Mother     Hypertension Mother     Hypertension Father     Heart Disease Father     Coronary Art Dis Sister     Coronary Art Dis Brother      Social History     Tobacco Use   Smoking Status Former    Packs/day: 1.00 found for: PSA    ASSESSMENT:  This is a 67 y.o. male with the following diagnoses:   Diagnosis Orders   1. Gross hematuria        2. BPH with obstruction/lower urinary tract symptoms        3. Incomplete bladder emptying             PLAN:  Patient will continue Flomax. He is clear from a gross hematuria standpoint. He will follow-up with us in 6 months for BPH follow-up. He will call back sooner with issues.

## 2022-09-19 NOTE — PATIENT INSTRUCTIONS
SURVEY:    You may be receiving a survey from [employer] regarding your visit today. Please complete the survey to enable us to provide the highest quality of care to you and your family. If you cannot score us a very good on any question, please call the office to discuss how we could have made your experience a very good one. Thank you.

## 2022-09-19 NOTE — PROGRESS NOTES
During cystoscopy the following was utilized on patient with no adverse affects:    45% SODIUM CHLORIDE 500 ML BAG  Lot number: [id number]  Expiration date: 10/23      LIDOCAINE HYDROCHLORIDE JELLY 2%   Lot number: [id number]  Expiration date: 02/24

## 2022-09-20 ENCOUNTER — TELEPHONE (OUTPATIENT)
Dept: CARDIOLOGY | Age: 72
End: 2022-09-20

## 2022-09-20 ENCOUNTER — HOSPITAL ENCOUNTER (OUTPATIENT)
Age: 72
Discharge: HOME | End: 2022-09-20
Payer: MEDICARE

## 2022-09-20 DIAGNOSIS — I50.42 CHRONIC COMBINED SYSTOLIC AND DIASTOLIC CONGESTIVE HEART FAILURE (HCC): ICD-10-CM

## 2022-09-20 DIAGNOSIS — N18.30 STAGE 3 CHRONIC KIDNEY DISEASE, UNSPECIFIED WHETHER STAGE 3A OR 3B CKD (HCC): ICD-10-CM

## 2022-09-20 DIAGNOSIS — N18.30 STAGE 3 CHRONIC KIDNEY DISEASE, UNSPECIFIED WHETHER STAGE 3A OR 3B CKD (HCC): Primary | ICD-10-CM

## 2022-09-20 DIAGNOSIS — I25.5 ISCHEMIC CARDIOMYOPATHY: ICD-10-CM

## 2022-09-20 LAB
ANION GAP SERPL CALCULATED.3IONS-SCNC: 8 MMOL/L (ref 9–17)
BUN BLDV-MCNC: 24 MG/DL (ref 8–23)
BUN/CREAT BLD: 19 (ref 9–20)
CALCIUM SERPL-MCNC: 10.1 MG/DL (ref 8.6–10.4)
CHLORIDE BLD-SCNC: 100 MMOL/L (ref 98–107)
CO2: 29 MMOL/L (ref 20–31)
CREAT SERPL-MCNC: 1.28 MG/DL (ref 0.7–1.2)
GFR AFRICAN AMERICAN: >60 ML/MIN
GFR NON-AFRICAN AMERICAN: 55 ML/MIN
GFR SERPL CREATININE-BSD FRML MDRD: ABNORMAL ML/MIN/{1.73_M2}
GFR SERPL CREATININE-BSD FRML MDRD: ABNORMAL ML/MIN/{1.73_M2}
GLUCOSE BLD-MCNC: 105 MG/DL (ref 70–99)
POTASSIUM SERPL-SCNC: 4.6 MMOL/L (ref 3.7–5.3)
SODIUM BLD-SCNC: 137 MMOL/L (ref 135–144)

## 2022-09-20 PROCEDURE — 80048 BASIC METABOLIC PNL TOTAL CA: CPT

## 2022-09-20 PROCEDURE — 36415 COLL VENOUS BLD VENIPUNCTURE: CPT

## 2022-09-21 ENCOUNTER — TELEPHONE (OUTPATIENT)
Dept: CARDIOLOGY | Age: 72
End: 2022-09-21

## 2022-09-21 ENCOUNTER — HOSPITAL ENCOUNTER (OUTPATIENT)
Dept: CARDIAC REHAB | Age: 72
Setting detail: THERAPIES SERIES
Discharge: HOME | End: 2022-09-21
Payer: MEDICARE

## 2022-09-21 PROBLEM — Z09 HOSPITAL DISCHARGE FOLLOW-UP: Status: RESOLVED | Noted: 2022-08-22 | Resolved: 2022-09-21

## 2022-09-21 PROCEDURE — 94625 PHY/QHP OP PULM RHB W/O MNTR: CPT

## 2022-09-21 NOTE — TELEPHONE ENCOUNTER
----- Message from Tanesha You MD sent at 9/21/2022 12:20 AM EDT -----  Let Mr. Farzad Curtis know their test result was ok. Will discuss at next visit. Thanks.

## 2022-09-22 ENCOUNTER — HOSPITAL ENCOUNTER (OUTPATIENT)
Dept: CARDIAC REHAB | Age: 72
Setting detail: THERAPIES SERIES
Discharge: HOME | End: 2022-09-22
Payer: MEDICARE

## 2022-09-22 PROCEDURE — 94625 PHY/QHP OP PULM RHB W/O MNTR: CPT

## 2022-09-26 ENCOUNTER — HOSPITAL ENCOUNTER (OUTPATIENT)
Dept: CARDIAC REHAB | Age: 72
Setting detail: THERAPIES SERIES
Discharge: HOME | End: 2022-09-26
Payer: MEDICARE

## 2022-09-26 PROCEDURE — 94625 PHY/QHP OP PULM RHB W/O MNTR: CPT

## 2022-09-28 ENCOUNTER — APPOINTMENT (OUTPATIENT)
Dept: CARDIAC REHAB | Age: 72
End: 2022-09-28
Payer: MEDICARE

## 2022-09-28 ENCOUNTER — HOSPITAL ENCOUNTER (OUTPATIENT)
Dept: PHARMACY | Age: 72
Setting detail: THERAPIES SERIES
Discharge: HOME | End: 2022-09-28
Payer: MEDICARE

## 2022-09-28 ENCOUNTER — HOSPITAL ENCOUNTER (OUTPATIENT)
Dept: CARDIAC REHAB | Age: 72
Setting detail: THERAPIES SERIES
Discharge: HOME | End: 2022-09-28
Payer: MEDICARE

## 2022-09-28 VITALS
DIASTOLIC BLOOD PRESSURE: 50 MMHG | BODY MASS INDEX: 24.64 KG/M2 | HEART RATE: 69 BPM | OXYGEN SATURATION: 97 % | SYSTOLIC BLOOD PRESSURE: 105 MMHG | WEIGHT: 155 LBS

## 2022-09-28 DIAGNOSIS — I50.42 CHRONIC COMBINED SYSTOLIC AND DIASTOLIC CONGESTIVE HEART FAILURE (HCC): Primary | ICD-10-CM

## 2022-09-28 PROCEDURE — 99212 OFFICE O/P EST SF 10 MIN: CPT

## 2022-09-28 PROCEDURE — 94625 PHY/QHP OP PULM RHB W/O MNTR: CPT

## 2022-09-28 NOTE — PROGRESS NOTES
[address] [facility]  Medication Management  Pharmacist  Heart Failure    [address] [facility]  [place] [address]  Phone: [phone]  Fax: [phone]      NAME: Jeralyn Gilford RECORD NUMBER:  [id number]  AGE: 67 y.o. GENDER: male  : 1950  EPISODE DATE:  2022      Heart Failure Management referral by Dr. Vanessa Blue    Dry weight: ?? pounds     Today's Wt: 155 lb  Wt Readings from Last 6 Encounters:   22 155 lb (70.3 kg)   22 156 lb (70.8 kg)   22 155 lb (70.3 kg)   22 155 lb 12.8 oz (70.7 kg)   22 151 lb (68.5 kg)   22 150 lb (68 kg)    and   Ht Readings from Last 1 Encounters:   22 5' 6.5\" (1.689 m)                   /50   HR: 69  O2Sat: 97%     Extremities and pitting edema trace  Skin warm and dry     Subjective   Mr. Farzad Cutris is a 67 y.o. male here for the Heart Failure Services. They are here today for a comprehensive medication review including over the counter medications and herbal products, overall wellbeing assessment, transition of care and any needed adjustments with updates and recommendations communicated to the referring physician. Patient Symptoms:  Patient is here today for 4 week follow up. He reports feeling better with recent medication changes (Toprol XL and amiodarone decreases). He is attending cardiac rehab every Monday, Wednesday, Thursday and walking at home on a regular basis. SOB stable. He is no longer using oxygen. He reports that his head feels more clear since stopping Buspar and slowly decreasing and discontinuing Lexapro.      Shortness of Breath:  []   None   [x]   Dyspnea on exertion   []   Dyspnea with normal activities  []   Dyspnea at rest  []   Dyspnea while sleeping    Patient Findings:   []  Missed doses  []  Diet changes  []  Sodium intake changes   []  Night time cough   []  Other   []  Early saiety, abd fullness []  Chest pain   [x]  Constipation   []  Night time bathroom trips  []  Alcohol intake changes  []  Acute illness  []  Edema    []  Number of pillows or recliner  []  Activity changes   []  Fatigue that limits activity []  Heart racing or skipping beats  [x]  Light headedness  []  Dizziness    []      []  Problems sleeping    Functional Activity New York Heart Association Classification  []   Class I No limitation of physical activity. Ordinary physical activity does not cause undue fatigue, palpitation, dyspnea (shortness of breath). [x]   Class II Slight limitation of physical activity. Comfortable at rest. Ordinary physical activity results in fatigue, palpitation, dyspnea (shortness of breath). []   Class III  Anil limitation of physical activity. Comfortable at rest.  Less than ordinary activity causes fatigue, palpitation, dyspnea. []   Class IV Unable to carry on any physical activity without discomfort. Symptoms of heart failure at rest.  If any physical activity is undertaken, discomfort increases. Last CHF Hospital Admission: 8/3/22-8/13/22     OUTPATIENT MEDICATIONS:  Outpatient Medications Marked as Taking for the 9/28/22 encounter [facility] HOSPITAL Encounter) with [facility]   Medication Sig Dispense Refill    amiodarone (CORDARONE) 200 MG tablet Take 0.5 tablets by mouth daily 45 tablet 3    metoprolol succinate (TOPROL XL) 25 MG extended release tablet Take 0.5 tablets by mouth daily 45 tablet 3    rOPINIRole (REQUIP) 0.5 MG tablet Take 1 tablet by mouth nightly 90 tablet 0    bumetanide (BUMEX) 1 MG tablet Take 1 tablet by mouth daily 90 tablet 1    tamsulosin (FLOMAX) 0.4 MG capsule Take 1 capsule by mouth in the morning. 90 capsule 1    midodrine (PROAMATINE) 10 MG tablet TAKE 1 TABLET BY MOUTH EVERY MORNING, 1 TABLET AT NOON, AND 1 TABLET IN THE EVENING. TAKE WITH MEALS. 90 tablet 3    spironolactone (ALDACTONE) 25 MG tablet Take 1 tablet by mouth in the morning.  30 tablet 3    ipratropium-albuterol (DUONEB) 0.5-2.5 (3) MG/3ML SOLN nebulizer solution Inhale 3 mLs into the lungs every 4 hours as needed for Shortness of Breath 360 mL 1    dapagliflozin (FARXIGA) 10 MG tablet Take 1 tablet by mouth every morning Lot #: [id number]  Exp: 10/24  Three boxes given. 21 tablet 0    vitamin D (ERGOCALCIFEROL) 1.25 MG (58841 UT) CAPS capsule Take 1 capsule by mouth once a week (Patient taking differently: Take 50,000 Units by mouth once a week Wednesday) 12 capsule 0    vitamin B-1 (THIAMINE) 100 MG tablet Take 1 tablet by mouth in the morning. 90 tablet 1    Multiple Vitamin (MULTIVITAMIN) TABS tablet Take 1 tablet by mouth in the morning. 90 tablet 1    pantoprazole (PROTONIX) 40 MG tablet Take 1 tablet by mouth every morning (before breakfast) 90 tablet 1    potassium chloride (KLOR-CON M) 20 MEQ extended release tablet Take 1 tablet by mouth in the morning and 1 tablet in the evening. Take with meals.  60 tablet 3    folic acid (FOLVITE) 1 MG tablet Take 1 tablet by mouth daily 90 tablet 0    melatonin 5 mg TABS tablet Take 2 tablets by mouth nightly  0    magnesium oxide (MAGOX 400) 400 (240 Mg) MG tablet Take 1.5 tablets by mouth 2 times daily 90 tablet 2    sacubitril-valsartan (ENTRESTO) 24-26 MG per tablet Take 0.5 tablets by mouth 2 times daily Lot #: [id number]  Exp: July 2024 56 tablet 0    apixaban (ELIQUIS) 5 MG TABS tablet Take 5 mg by mouth 2 times daily      clopidogrel (PLAVIX) 75 MG tablet Take 75 mg by mouth daily      atorvastatin (LIPITOR) 80 MG tablet Take 80 mg by mouth daily      Omega-3 Fatty Acids (FISH OIL CONCENTRATE) 1000 MG CAPS Take 2 caplets by mouth daily             Objective / Assessment     Patient Active Problem List   Diagnosis Code    Atrial fibrillation with RVR (HCC) I48.91    Acute on chronic combined systolic and diastolic CHF, NYHA class 4 (HCC) I50.43    Coronary artery disease I25.10    Hypertension I10    Thrombocytopenia (HCC) D69.6    Hyponatremia E87.1    Hyperlipidemia E78.5    Cirrhosis of liver with ascites ([facility] K74.60, R18.8    Hypomagnesemia E83.42    Ascites R18.8    CKD (chronic kidney disease) N18.9    History of acute inferior wall MI I25.2    Ischemic cardiomyopathy I25.5    Chronic combined systolic (congestive) and diastolic (congestive) heart failure (HCC) I50.42    Dehydration, moderate E86.0    Medication side effect, initial encounter T50.905A    Severe malnutrition (HCC) E43    PAF (paroxysmal atrial fibrillation) (HCC) I48.0    Lightheaded R42    Tobacco abuse counseling Z71.6    Gastroesophageal reflux disease K21.9    Hypotension I95.9    Acute on chronic combined systolic and diastolic CHF (congestive heart failure) (HCC) I50.43    Hypokalemia E87.6    Hypovitaminosis D E55.9    Hypoxia R09.02    Nonischemic cardiomyopathy ([facility] I42.8    Acute respiratory failure with hypoxia (HCC) J96.01    Encounter for current long-term use of anticoagulants Z79.01    Bilateral pleural effusion J90    Pancytopenia (HCC) D61.818    Mild malnutrition (HCC) E44.1    Chronic combined systolic and diastolic congestive heart failure (HCC) I50.42    Medicare annual wellness visit, subsequent Z00.00    Encounter for screening colonoscopy Z12.11    Type 2 diabetes mellitus E11.9    Chronic renal disease, stage III [facility]) [[id number]] N18.30     Social History     Tobacco Use    Smoking status: Former     Packs/day: 1.00     Years: 56.00     Pack years: 56.00     Types: Cigarettes     Quit date: 2022     Years since quittin.2    Smokeless tobacco: Never   Vaping Use    Vaping Use: Never used   Substance Use Topics    Drug use: Not Currently       Potassium (mmol/L)   Date Value   2022 4.6   2022 5.5 (H)   2022 5.3   2022 5.4 (H)   2022 5.3   2022 5.2     BUN (mg/dL)   Date Value   2022 24 (H)   2022 30 (H)   2022 28 (H)   2022 34 (H)   2022 37 (H)   2022 23     Creatinine (mg/dL)   Date Value   2022 1.28 (H)   2022 1.46 (H)   2022 1.38 (H)   08/31/2022 1.57 (H)   08/25/2022 1.94 (H)   08/17/2022 1.30 (H)     TSH (uIU/mL)   Date Value   09/12/2022 6.02 (H)   07/25/2022 5.77 (H)   06/06/2022 3.69   05/11/2022 4.52     No results found for: T4      Plan:      Chronic Systolic and/or Diastolic Heart Failure (diagnosis): EF:40% via echocardiogram on 8/25/22  Beta Blocker for EF </= 40%: metoprolol succinate (Toprol XL) - 1/2 tablet (=12.5 mg) po daily  Diuretics: bumetanide (Bumex) 1 mg po daily  ACE/ARB/ARNI for EF </= 40%: Entresto 24/26 - 0.5 tablet po BID  Spironolactone for EF </= 35%: Spironolactone 25 mg po every AM  Nonpharmacologic management of Heart Failure: I told patient to continue wearing lower extremity compression stockings and I advised patient to try and keep legs up whenever possible and to limit salt in diet. Laboratory testing:   Basic metabolic panel (BMP) in 3 weeks from now prior to cardiology appt to assess  potassium and renal function. BNP in 3 weeks from now to assess fluid volume. -- standing orders for lab placed  Statin Therapy: atorvastatin 80 mg po daily     Anticoagulation  Apixaban (Eliquis) 5 mg po every 12 hours  - I also reminded patient to watch for signs of blood in his stool or black tarry stools and stop the medication immediately if this develops as this could be life threatening. Amiodarone: LFTs and TSH monitoring recommended every 6 months  TSH and T4 7/25/22, Hepatic function panel 5/18/22  CXR 8/10/22  Patient Education/Instructions: I did spend about 15 minutes with patient going over his heart failure packet including dietary guidelines, the signs and symptoms to watch for including shortness of breath, weight gain, lightheadedness/dizziness. I asked patient to call the clinic if develops persistent or worsening shortness of breath or weight gain of more than 3 pounds in 1-7 days. Patient verbalized understanding.        Medication changes since last visit:  Stop buspirone  Decrease and discontinue Lexapro - currently taking 5 mg po every other day    Decrease metoprolol succinate (Toprol XL) 25 mg - 1/2 tab (=12.5 mg) po daily  Decrease amiodarone 100 mg po daily    Patient requested KCl refill. Potassium 4.6 on 22. No changes to diuretics - will continue bumetanide 1 mg po daily and spironolactone 25 mg po daily. Repeat labs ordered prior to cardiology appt on 10/18/22    Prescription refill called to [address] [place] [address] ([address]) for potassium chloride (Klor-Con M) 20 mEq ER tablet - Take 1 tablet po BID in AM and evening   Qty:  180   Refill:  3  Prescriber:  Dr. Dago Craven  Per verbal order Dr. Mitzy Sherman    Follow up with cardiology 10/18/22  Follow up pharmacist CHF clinic 22    Thank you for the referral,    Fallon Harmon.  [facility] [address] Tracking Only    Intervention Detail: Adherence Monitorin, Lab(s) Ordered, and Refill(s) Provided  Total # of Interventions Recommended: 4  Total # of Interventions Accepted: 4  Time Spent (min):  90

## 2022-09-28 NOTE — DISCHARGE INSTRUCTIONS
HEART FAILURE:    With heart failure you must follow up with your Cardiologist, your PCP and other physicians as appropriate - especially 7 days after a hospitalization and then as directed. Please call right away with any signs or symptoms of heart failure or other medical conditions that need attention or with any questions at all. Please call your Cardiologist or your PCP or the [facility] at [phone]. We can help manage these symptoms and often prevent a visit to the Emergency Room or hospitalization. * Know your dry weight (your weight without any fluid on board)    * Call any time you have questions about anything. Do not wait. * It is very important to take your medications as prescribed, do not miss any doses. Call for refills before you run out. Typically when you have one week left. If you have trouble getting your medications for any reason, call us at [phone]. * Avoid NSAIDs (non steroidal anti inflammatory drugs) like Aleve (naproxen), Advil and Motrin (ibuprofen), Mobic (diclofenac), Celebrex (celecoxib) and aspirin. A daily aspirin is okay if recommended by your physician. It is okay to take Tylenol (acetaminophen) unless your physician has told you otherwise. * Limit fluid intake. Typically this is no more than 1,500-2,000 milliliters (mL) per day. This is a liter and a half to two liters. This is equal to approximately 48-64 ounces (oz) per day    * Limit sodium intake. Typically this is no more than 2,000-2,400 milligrams (mg) per day. You will need to add up the sodium content found on the nutrition label for the foods you eat each day. * Weigh yourself every day. Weigh yourself before breakfast and after you go to the restroom. Wear the same thing each time you weigh yourself. Keep a log and bring it to each visit.    Call if you gain 3 or more pounds in 1-7 days - [phone] * If you have hypertension (high blood pressure), you may want to check your blood pressure daily. Your blood pressure goal is less than 130/80 unless instructed differently by your physician. Keep a log and bring it to each visit. * Be sure to keep good control of your Diabetes     * Be sure to keep good control of your Cholesterol    * Eat a Heart healthy diet    * Be active. Follow an exercise plan that is reasonable for you. * If you smoke, work to stop smoking. Best to avoid alcohol.

## 2022-09-29 ENCOUNTER — HOSPITAL ENCOUNTER (OUTPATIENT)
Dept: CARDIAC REHAB | Age: 72
Setting detail: THERAPIES SERIES
Discharge: HOME | End: 2022-09-29
Payer: MEDICARE

## 2022-09-29 PROCEDURE — 94625 PHY/QHP OP PULM RHB W/O MNTR: CPT

## 2022-10-03 ENCOUNTER — HOSPITAL ENCOUNTER (OUTPATIENT)
Dept: CARDIAC REHAB | Age: 72
Setting detail: THERAPIES SERIES
Discharge: HOME | End: 2022-10-03
Payer: MEDICARE

## 2022-10-03 PROCEDURE — 94626 PHY/QHP OP PULM RHB W/MNTR: CPT

## 2022-10-04 ENCOUNTER — TELEPHONE (OUTPATIENT)
Dept: CARDIAC REHAB | Age: 72
End: 2022-10-04

## 2022-10-04 NOTE — PROGRESS NOTES
Pulmonary Rehab Individualized Treatment Plan 30 day    Patient Name: Matthew Bhatia  Date: 10/4/2022  ACCOUNT #: [de-identified]  Diagnosis: [id number]  Onset/Start Date: 8/3/2022  Referring Physician: Dr. Katherine Mercado  Session Number: 8    EXERCISE    Stages of Change:   [] pre-contemplation  [x] Action   [] Contemplate    [] Maintainence   [] Prep    [] Relapse           Exercise Prescription:  Mode: [x] TM [x] B [x] STP [x] R  Frequency: 3X per week  Duration: 31-60 minutes  Intensity: 3-3.5 METs  THRR:  111-127 bpm  Progression:   Increase duration per F.I.T.T. protocol parameters on average 5-10 minutes every 1-2 weeks for the first 4-6 weeks. After 3-4 weeks completed, continue to gradually increase F.I.T.T. parameters gradually at the established duration on average 0.5-1.0 METs per 30 days over the course of the remaining program, as established by patient centered goals and guidelines, maintaining RPE 12-14. Maintain Sp02 >=90% during exercise. [x] Resistance Training  Introduce 8-15 bilateral upper extremity resistance exercise at 1-3 sets per lift, on 2-3 non-consecutive days using TheraBand/Weights to 8-15 reps on at lease 2 occasions, maintaining RPE 12-16. Once repetition maximum has been achieved, additional weight sets may be added.     SPO2:  Supplemental 02 titrated to maintain an SpO2 >=90%  [] O2 - Rest: 0 L/min continuous flow via   [] O2 - Exercise:  0 L/min continuous flow via   [x] Bronchodilators: Duoneb, Ventolin HFA,     Hypertension:  [x] Yes  [] No  Resting BP: 100/70  Peak Exercise BP: 132/50  [x] BP medications: Midodrine, Bumex, Aldactone, Farxiga, Toprol XL, Entresto    Intervention:  Home Exercise:  Type: Walking, cycling, etc.  Duration: 20-60 minutes  Frequency: at least 2 non-rehab days  O2 (L/min):   [x] Maintain Sp02 >= 90%  [] Resistance Training  8-15 bilateral upper extremity resistance exercise at 1-3 sets per lift, on 2-3 non-consecutive days using TheraBand/Weights to 8-15 reps on at lease 2 occasions, maintaining RPE 12-14. Once repetition maximum has been achieved, additional weight sets may be added.     Education:   [x] Equipment [place]     [x] Ex Safety      [x] S/S to report    [x] Warm up/ Cool down      [x] RPE/RPD Scale      [] Exercise prescription   [] Home exercise         Target Goal:   - SPO2 > = 90% during exercise  - Individualized exercise Rx  Nutrition    Stages of Change:   [] pre-contemplation  [x] Action   [] Contemplate    [] Maintainence   [] Prep    [] Relapse     Diabetes:  [] Yes  [x] No  BS:   HbA1c:  Random BS:  BS in range: [] yes [] no  [] Diabetes medications:    Weight Management:  Height: 66 inches  Current Wt: 154.4 pounds  BMI: 24.4   [] Overweight/obese  (BMI > 25)   [] Underweight (BMI <20)  Wt Goal: Maintain    Intervention:   [] Dietitian Consult       [] Diet Class      Education:  [] Diabetes  [] Diet and nutrition  [] Weight management  [] Portions  [] Snacks and substitutes  [] Nutrition facts label  [] Food packaging claims  [x] Fat and cholesterol  [] Sodium     Target Goal:  -HbA1c < 7%  -BMI > 20 and < 25   Education    Stages of Change:    [] pre-contemplation  [x] Action   [] Contemplate    [] Maintainence   [] Prep    [] Relapse     Family support: [x] Yes  [] No    Tobacco Use  Social History     Tobacco Use   Smoking Status Former    Packs/day: 1.00    Years: 56.00    Pack years: 56.00    Types: Cigarettes    Quit date: 2022    Years since quittin.2   Smokeless Tobacco Never   Current smokers:  Longest quit attempt:  Tobacco triggers:  Barriers to successful cessation:  [] Smoking cessation medication:    Breath sounds:    [x] Clear         [x] Left    [x] Right     [] Diminished         [] Left    [] Right   [] Rales         [] Left    [] Right   [] Rhonchi         [] Right [] Left    [] Wheezing         [] Right [] Left     Intervention:  [] Referred to smoking cessation counselor   [] Individual education and counseling  [] Tobacco adjunct    Education:   [] Respiratory A and P  [] RLD/COPD  [] Preventing infection    [] Breathing techniques    [] Energy conservation   [] Meds/MDI   [] O2 Therapy  [] Smoking cessation     Target Goal:  -Complete cessation/abstinence from tobacco   -Self-manage condition and prevention exacerbation  -Restore to optimal level of independence  -Improved post-program education assessment score    Psychosocial    Stages of Change:    [] pre-contemplation  [x] Action   [] Contemplate    [] Maintainence   [] Prep    [] Relapse    Intervention:  Evaluate for anxiety, depression, decreased QOL using LYNNE-7/PHQ9/[place] and Power QOL surveys      [] Physician Referral    [] Psych Consult/       [] Uses stress management skills   [] Depression/anxiety medications:    Education:    [] Anxiety   [] Relaxation techniques   [] Depression   [] Stress Management    Target Goal:  -Assess presence or absence of depression using a valid screening tool. -Maximize coping skills.  -Positive support system.     Medication Compliance (stated):    [x] 100%  [] 75%           [] 50%  [] 25%      [] 0%          Target Goal:    -100% Medication adherence    Fall Risk Assessment:  History of falls with or without injury  [] Yes   [x] No   Use of ambulatory aid  [x] Yes  [] No   Difficulty walking/impaired gait  [x] Yes  [] No   Numbness in feet  [] Yes   [x] No   Vision changes  [] Yes  [x] No   Dizziness  [] Yes  [x] No   Shortness of breath  [] Yes  [x] No   Medications  [x] Anticoagulant/Antiplatelet  [x] Betablocker /ACE/ARB  [] Antidepressant  [] Seizure medication   [] NA  Risk of fall  [] Low (none of above)  [] Medium (less than 3 above)  [x] High (3 or more above)    Patient 90-Day Goal(s):  (Specific, Measurable, Achievable, Relevant, and Time-Bound)  \"Increase FC and become more healthy over 90 days\"    Program Goals  Achieve and progress prescribed exercise frequency, intensity, time, and/or type based upon initial evaluation and 6 MWT/submaximal testing results as evidenced by the attaining and maintaining a Darin rating of perceived exertion between 11 and 16, RPD <=5 during exercise, duration of >30 - 60 minutes using multiple exercise modes for at least 3 days per week with a goal of progressively achieving optimal functional capacity and maintain Sp02 90%, evidenced by daily session reports and pre-post exercise evaluation. Sb Rides has achieved a 3% functional capacity improvement over the past 30 days. Introduce and progress 8-10 different bilateral UE and/or LE progressive resistance exercises focused on major muscle groups using 1-3 sets each per lift, on 2-3 non-consecutive days implementing free and machine weights and/or TheraBands, as appropriate and increasing resistance once repetitions have progressed to 15 reps and feel fairly light and RPD <=5 on 2 prior occasions. Resistance training not introduced yet. Develop and maintain regular home aerobic exercise program for 20 - 60 minutes at least 2 non-rehab days per week, excluding  5 - 10 minutes warm-up and cool-down periods, being tracked on home workout log. Walks at home per [facility] instruction. Achieve and maintain an optimal average resting blood pressure of <120 / 80 mmHg (or as indicated by this patient's referring provider), over the program using lifestyle education, behavior modification, and medication compliance as evidenced by routine resting BP measurements WNL. SBP has remained 100-110s and DBP 50-70s over the past 30 days. Establish and adhere to a healthy diet as determined by the dietician, including: maintain current body weight over the next 30 days, as evidenced by pre- and post-program nutriton survey and routine rounding with patient per patient's self report, food diary, and Rate-your-Plate screening survey. Author Petties has maintained a BMI of 24 over the past 30 days.     Minimize self-reported psycho-social feelings of depression and anxiety through patient education, behavior modification, and medication compliance, as evidenced by pre- and post- PHQ9 and GAD7 surveys and by routine rounding with patient. No reported ^ in depression or anxiety over the past 30 days. Improve/Maintain quality of life through education, behavior modification, and medication complianceas evidenced by [place] and Power QOL (pulmonary) survey evaluated pre-program and post-program and routine rounding with patient. Demonstrate an improved patient perception of dyspnea via patient education, lifestyle changes, and regular exercise, as evidenced by a pre/post-program mMRC score comparison. Demonstrate knowledge of chronic lung disease, exercise, self-management, depression/anxiety management, medications, oxygen, pursed lip breathing, and care of respiratory equipment, with > / = 5% accuracy improvement as evaluated by pre- and post-program education assessment screening tool. Participated in education classes over the past 30 days. Complete abstinence from tobacco products over the pulmonary rehab program through intensive counseling, behavior modification, and medication compliance as evidenced by routine rounding with patient. Has remained tobacco free over the past 30 days.     Electronically signed by Milta Severe, RN on 10/4/22 at [facility]    Physician Changes/Comments:

## 2022-10-05 ENCOUNTER — HOSPITAL ENCOUNTER (OUTPATIENT)
Dept: CARDIAC REHAB | Age: 72
Setting detail: THERAPIES SERIES
Discharge: HOME | End: 2022-10-05
Payer: MEDICARE

## 2022-10-05 PROCEDURE — 94625 PHY/QHP OP PULM RHB W/O MNTR: CPT

## 2022-10-06 ENCOUNTER — HOSPITAL ENCOUNTER (OUTPATIENT)
Dept: CARDIAC REHAB | Age: 72
Setting detail: THERAPIES SERIES
Discharge: HOME | End: 2022-10-06
Payer: MEDICARE

## 2022-10-06 PROCEDURE — 94625 PHY/QHP OP PULM RHB W/O MNTR: CPT

## 2022-10-08 DIAGNOSIS — E55.9 HYPOVITAMINOSIS D: ICD-10-CM

## 2022-10-08 PROBLEM — Z12.11 ENCOUNTER FOR SCREENING COLONOSCOPY: Status: RESOLVED | Noted: 2022-09-08 | Resolved: 2022-10-08

## 2022-10-08 PROBLEM — Z00.00 MEDICARE ANNUAL WELLNESS VISIT, SUBSEQUENT: Status: RESOLVED | Noted: 2022-09-08 | Resolved: 2022-10-08

## 2022-10-10 ENCOUNTER — HOSPITAL ENCOUNTER (OUTPATIENT)
Dept: CARDIAC REHAB | Age: 72
Setting detail: THERAPIES SERIES
Discharge: HOME | End: 2022-10-10
Payer: MEDICARE

## 2022-10-10 PROCEDURE — 94625 PHY/QHP OP PULM RHB W/O MNTR: CPT

## 2022-10-10 RX ORDER — FOLIC ACID 1 MG/1
TABLET ORAL
Qty: 90 TABLET | Refills: 0 | Status: SHIPPED | OUTPATIENT
Start: 2022-10-10

## 2022-10-10 RX ORDER — ERGOCALCIFEROL 1.25 MG/1
50000 CAPSULE ORAL WEEKLY
Qty: 12 CAPSULE | Refills: 0 | Status: SHIPPED | OUTPATIENT
Start: 2022-10-10 | End: 2022-10-21

## 2022-10-12 ENCOUNTER — HOSPITAL ENCOUNTER (OUTPATIENT)
Dept: CARDIAC REHAB | Age: 72
Setting detail: THERAPIES SERIES
Discharge: HOME | End: 2022-10-12
Payer: MEDICARE

## 2022-10-12 PROCEDURE — 94625 PHY/QHP OP PULM RHB W/O MNTR: CPT

## 2022-10-13 ENCOUNTER — HOSPITAL ENCOUNTER (OUTPATIENT)
Dept: CARDIAC REHAB | Age: 72
Setting detail: THERAPIES SERIES
Discharge: HOME | End: 2022-10-13
Payer: MEDICARE

## 2022-10-13 PROCEDURE — 94625 PHY/QHP OP PULM RHB W/O MNTR: CPT

## 2022-10-17 ENCOUNTER — HOSPITAL ENCOUNTER (OUTPATIENT)
Dept: CARDIAC REHAB | Age: 72
Setting detail: THERAPIES SERIES
Discharge: HOME | End: 2022-10-17
Payer: MEDICARE

## 2022-10-17 PROCEDURE — 94625 PHY/QHP OP PULM RHB W/O MNTR: CPT

## 2022-10-18 ENCOUNTER — OFFICE VISIT (OUTPATIENT)
Dept: CARDIOLOGY | Age: 72
End: 2022-10-18
Payer: MEDICARE

## 2022-10-18 VITALS
BODY MASS INDEX: 24.8 KG/M2 | DIASTOLIC BLOOD PRESSURE: 60 MMHG | RESPIRATION RATE: 20 BRPM | OXYGEN SATURATION: 97 % | HEIGHT: 67 IN | WEIGHT: 158 LBS | HEART RATE: 76 BPM | SYSTOLIC BLOOD PRESSURE: 96 MMHG

## 2022-10-18 DIAGNOSIS — Z01.818 PRE-OPERATIVE CLEARANCE: ICD-10-CM

## 2022-10-18 DIAGNOSIS — E78.2 MIXED HYPERLIPIDEMIA: ICD-10-CM

## 2022-10-18 DIAGNOSIS — I48.0 PAF (PAROXYSMAL ATRIAL FIBRILLATION) (HCC): Primary | ICD-10-CM

## 2022-10-18 DIAGNOSIS — Z79.01 CHRONIC ANTICOAGULATION: ICD-10-CM

## 2022-10-18 DIAGNOSIS — I73.9 PAD (PERIPHERAL ARTERY DISEASE) (HCC): ICD-10-CM

## 2022-10-18 DIAGNOSIS — I50.42 CHRONIC COMBINED SYSTOLIC AND DIASTOLIC CONGESTIVE HEART FAILURE (HCC): ICD-10-CM

## 2022-10-18 DIAGNOSIS — K59.00 CONSTIPATION, UNSPECIFIED CONSTIPATION TYPE: ICD-10-CM

## 2022-10-18 DIAGNOSIS — Z79.01 ENCOUNTER FOR CURRENT LONG-TERM USE OF ANTICOAGULANTS: ICD-10-CM

## 2022-10-18 DIAGNOSIS — I95.9 HYPOTENSION, UNSPECIFIED HYPOTENSION TYPE: ICD-10-CM

## 2022-10-18 PROCEDURE — G8427 DOCREV CUR MEDS BY ELIG CLIN: HCPCS | Performed by: FAMILY MEDICINE

## 2022-10-18 PROCEDURE — 1036F TOBACCO NON-USER: CPT | Performed by: FAMILY MEDICINE

## 2022-10-18 PROCEDURE — 1123F ACP DISCUSS/DSCN MKR DOCD: CPT | Performed by: FAMILY MEDICINE

## 2022-10-18 PROCEDURE — G8484 FLU IMMUNIZE NO ADMIN: HCPCS | Performed by: FAMILY MEDICINE

## 2022-10-18 PROCEDURE — 99211 OFF/OP EST MAY X REQ PHY/QHP: CPT | Performed by: FAMILY MEDICINE

## 2022-10-18 PROCEDURE — 3017F COLORECTAL CA SCREEN DOC REV: CPT | Performed by: FAMILY MEDICINE

## 2022-10-18 PROCEDURE — G8417 CALC BMI ABV UP PARAM F/U: HCPCS | Performed by: FAMILY MEDICINE

## 2022-10-18 PROCEDURE — 99214 OFFICE O/P EST MOD 30 MIN: CPT | Performed by: FAMILY MEDICINE

## 2022-10-18 RX ORDER — CLOPIDOGREL BISULFATE 75 MG/1
75 TABLET ORAL DAILY
Qty: 90 TABLET | Refills: 3 | Status: SHIPPED | OUTPATIENT
Start: 2022-10-18

## 2022-10-18 RX ORDER — DOCUSATE SODIUM 100 MG/1
100 CAPSULE, LIQUID FILLED ORAL 2 TIMES DAILY
Qty: 180 CAPSULE | Refills: 3 | Status: SHIPPED | OUTPATIENT
Start: 2022-10-18 | End: 2023-10-13

## 2022-10-18 RX ORDER — SPIRONOLACTONE 25 MG/1
12.5 TABLET ORAL DAILY
Qty: 90 TABLET | Refills: 3 | Status: SHIPPED | OUTPATIENT
Start: 2022-10-18

## 2022-10-18 NOTE — PROGRESS NOTES
Sherry Montez am scribing for and in the presence of Heath Mario MD, MS, F.A.C.C..    Patient: Isaiah Mccarty  :   Date of Admission: (Not on file)  Primary Care Physician: Walter Ramos  Today's Date: 10/18/2022    REASON FOR CONSULTATION: atrial fibrillation with RVR    HPI:  Mr. Tez Pierce is a 67 y.o. male who was admitted to the hospital on 2022 for weakness and shortness of breath. In the ER he was found to be in atrial fibrillation with RVR leading to a consultation. He was then readmitted due to severe dehydration. In the past he reports seeing a Cardiologist in [place] and was planning on having a cardioversion done in  to put him back into normal sinus rhythm due to his history of new onset atrial fibrillation. He reports a history of a heart attack at age 46 and needed stents placed at that time but denies any cardiac cath since that time. He also has carotid artery stenosis and abdominal aortic aneurysm. He did report having surgery on one side of his neck, however he is not sure which side or how long ago it was. He has had hypertension and hyperlipidemia for years as well. He is a current every day smoker and he smoked for about 55 years and smokes about a pack a day. Family history consists of a lot of people in his family with significant cardiac history, however he wanted us to talk to his sister about the family history as she knows the details. Echo done on 2022 showed an EF of 15-20%. ECHO done on 22: Dilated all cardiac chambers. Severe biventricular systolic dysfunction. Estimated left ventricular ejection fraction is 20-25% Moderate to severe mitral and moderate tricuspid regurgitation. Moderate pulmonary hypertension with an estimated right ventricular systolic pressure of 61 mmHg. Evidence of moderate to severe diastolic dysfunction is seen. Bilateral pleural effusion noted.  Compared to the previous study of 2022, no significant change in ejection fraction. Functional mitral and tricuspid regurgitation are worse. Bilateral pleural effusion and left elevated left ventricular filling pressure along with dilated IVC suggest worsening fluid overload. Echo done on 8/25/2022 showed improved EF 40%, the left ventricular wall thickness is mildly increased. Mild mitral regurgitation. Evidence of moderate diastolic dysfunction is seen    Mr. Debby Rene is here today for follow up. He reports doing great at this time. He is working out about three hours a day. He feels like he is trying to do everything he is told to do. He is living with his nephew at this time. He denied any lightheaded/ dizziness. No falls or near falls. He is eating and drinking ok. He does get some constipation however he drinks prune juice and this helps. He was told not to do edema or suppositories due to causing a stroke. did have a fall 5-6 days ago and he landed on his ribs. He does continue to have some rib pain. He was admitted to the hospital on 8/3/2022 for respiratory distress and had Covid. His shortness of breath is stable. He does admit he needs to drink more water. His appetite has come back. He also denied any abdominal pain, bleeding problems, problems with his medications or any other concerns at this time. He has no bowel issues at this time. No nausea or vomiting. No fever, cough or chills. Bleeding Risks: Mr. Debby Rene denies any current or recent bleeding problems including a history of a GI bleed, ulcers, recent or upcoming surgeries, blood in his stool or black tarry stools or blood in his urine.     Past Medical History:   Diagnosis Date    Acute kidney injury ([address]     Ascites 5/20/2022    Atrial fibrillation (HCC)     CHF (congestive heart failure) (HCC)     Cirrhosis of liver with ascites ([address] 5/19/2022    CKD (chronic kidney disease) 5/20/2022    Coronary artery disease     Hyperlipidemia     Hypertension     Hypomagnesemia 5/20/2022    Hyponatremia Thrombocytopenia ([address]      CURRENT ALLERGIES: Patient has no known allergies. REVIEW OF SYSTEMS: 14 systems were reviewed. Pertinent positives and negatives as above, all else negative. Past Surgical History:   Procedure Laterality Date    CAROTID STENT PLACEMENT Bilateral     HERNIA REPAIR      ROTATOR CUFF REPAIR Right     TONSILLECTOMY AND ADENOIDECTOMY      Social History:  Social History     Tobacco Use    Smoking status: Former     Packs/day: 1.00     Years: 56.00     Pack years: 56.00     Types: Cigarettes     Quit date: 2022     Years since quittin.3    Smokeless tobacco: Never   Vaping Use    Vaping Use: Never used   Substance Use Topics    Drug use: Not Currently        CURRENT MEDICATIONS:  Outpatient Medications Marked as Taking for the 10/18/22 encounter (Office Visit) with Fallon Briseno MD   Medication Sig Dispense Refill    vitamin D (ERGOCALCIFEROL) 1.25 MG (05636 UT) CAPS capsule Take 1 capsule by mouth once a week 12 capsule 0    folic acid (FOLVITE) 1 MG tablet TAKE 1 TABLET BY MOUTH EVERY DAY 90 tablet 0    amiodarone (CORDARONE) 200 MG tablet Take 0.5 tablets by mouth daily 45 tablet 3    metoprolol succinate (TOPROL XL) 25 MG extended release tablet Take 0.5 tablets by mouth daily 45 tablet 3    rOPINIRole (REQUIP) 0.5 MG tablet Take 1 tablet by mouth nightly 90 tablet 0    bumetanide (BUMEX) 1 MG tablet Take 1 tablet by mouth daily 90 tablet 1    tamsulosin (FLOMAX) 0.4 MG capsule Take 1 capsule by mouth in the morning. 90 capsule 1    midodrine (PROAMATINE) 10 MG tablet TAKE 1 TABLET BY MOUTH EVERY MORNING, 1 TABLET AT NOON, AND 1 TABLET IN THE EVENING. TAKE WITH MEALS. 90 tablet 3    spironolactone (ALDACTONE) 25 MG tablet Take 1 tablet by mouth in the morning.  30 tablet 3    ipratropium-albuterol (DUONEB) 0.5-2.5 (3) MG/3ML SOLN nebulizer solution Inhale 3 mLs into the lungs every 4 hours as needed for Shortness of Breath 360 mL 1    dapagliflozin (FARXIGA) 10 MG tablet Take 1 tablet by mouth every morning Lot #: [id number]  Exp: 10/24  Three boxes given. 21 tablet 0    vitamin B-1 (THIAMINE) 100 MG tablet Take 1 tablet by mouth in the morning. 90 tablet 1    Multiple Vitamin (MULTIVITAMIN) TABS tablet Take 1 tablet by mouth in the morning. 90 tablet 1    pantoprazole (PROTONIX) 40 MG tablet Take 1 tablet by mouth every morning (before breakfast) 90 tablet 1    potassium chloride (KLOR-CON M) 20 MEQ extended release tablet Take 1 tablet by mouth in the morning and 1 tablet in the evening. Take with meals. 60 tablet 3    melatonin 5 mg TABS tablet Take 2 tablets by mouth nightly  0    magnesium oxide (MAGOX 400) 400 (240 Mg) MG tablet Take 1.5 tablets by mouth 2 times daily 90 tablet 2    sacubitril-valsartan (ENTRESTO) 24-26 MG per tablet Take 0.5 tablets by mouth 2 times daily Lot #: [id number]  Exp: July 2024 56 tablet 0    apixaban (ELIQUIS) 5 MG TABS tablet Take 5 mg by mouth 2 times daily      clopidogrel (PLAVIX) 75 MG tablet Take 75 mg by mouth daily      atorvastatin (LIPITOR) 80 MG tablet Take 80 mg by mouth daily      Omega-3 Fatty Acids (FISH OIL CONCENTRATE) 1000 MG CAPS Take 2 caplets by mouth daily       FAMILY HISTORY: Reviewed but non-contributory     PHYSICAL EXAM:   BP 96/60 (Site: Right Upper Arm, Position: Sitting, Cuff Size: Medium Adult)   Pulse 76   Resp 20   Ht 5' 6.5\" (1.689 m)   Wt 158 lb (71.7 kg)   SpO2 97%   BMI 25.12 kg/m²  Body mass index is 25.12 kg/m². Constitutional: He is oriented to person, place, and time. He appears well-developed and well-nourished. In no acute distress. HEENT: Normocephalic and atraumatic. No JVD present. Carotid bruit is not present. No mass and no thyromegaly present. No lymphadenopathy present. Cardiovascular: Normal rate, regular rhythm, normal heart sounds. Exam reveals no gallop and no friction rubs. No murmur was heard. Pulmonary/Chest: Effort normal and breath sounds normal. No respiratory distress.  He has no wheezes, rhonchi or rales. Abdominal: Soft, non-tender. Bowel sounds and aorta are normal. He exhibits no organomegaly, mass or bruit. Extremities: Trace. No cyanosis or clubbing. 2+ radial and carotid pulses. Distal extremity pulses: 2+ bilaterally. Neurological: He is alert and oriented to person, place, and time. No evidence of gross cranial nerve deficit. Coordination appeared normal.   Skin: Skin is warm and dry. There is no rash or diaphoresis. Psychiatric: He has a normal mood and affect. His speech is normal and behavior is normal.      MOST RECENT LABS ON RECORD:   Lab Results   Component Value Date    WBC 5.0 09/12/2022    HGB 11.4 (L) 09/12/2022    HCT 35.6 (L) 09/12/2022     (L) 09/12/2022    ALT 32 09/12/2022    AST 25 09/12/2022     09/20/2022    K 4.6 09/20/2022     09/20/2022    CREATININE 1.28 (H) 09/20/2022    BUN 24 (H) 09/20/2022    CO2 29 09/20/2022    TSH 6.02 (H) 09/12/2022    INR 1.4 08/03/2022     ASSESSMENT:  1. PAF (paroxysmal atrial fibrillation) ([address]    2. Hypotension, unspecified hypotension type    3. Constipation, unspecified constipation type    4. Chronic anticoagulation    5. Chronic combined systolic and diastolic congestive heart failure ([address]    6. Mixed hyperlipidemia    7. PAD (peripheral artery disease) ([facility]    8. Pre-operative clearance    9. Encounter for current long-term use of anticoagulants      PLAN:  Hypotension: No falls or near falls but is certainly at risk   Diuretics: DECREASE to Spironolactone (Aldactone) 25 mg, 1/2 tab daily. I also discussed the potential side effects of this medication including lightheadedness and dizziness and instructed them to stop the medication of this occurs and call our office if this occurs. Nonpharmacologic counseling: Because of his condition, I reminded him to try and keep himself well-hydrated and to take extra time when moving from laying to sitting, sitting to standing and standing to walking.  I also explained to him to help improve his symptoms he should include 3 g sodium diet, 1 or 2 L of sports drinks daily, knee-high compressions stockings. I took the liberty of ordering a BMP in 5-7 days to assess their potassium and renal function. I told them that they could get their lab work performed at the location of their choosing, unfortunately, if the lab work was not performed at a [facility]) facility I could not guarantee my ability to follow up with them on their results. and  I took the liberty of ordering a CBC. I told them that they could get their lab work performed at the location of their choosing, unfortunately, if the lab work was not performed at a [facility]) facility I could not guarantee my ability to follow up with them on their results. Constipation Issues:  I did tell him to continue using prune juice as needed. I will write him a script for colace BID to help prevent this. Also if needed he can use Senakot as well to help him move. I advised him to drink fluids as well to keep things moving. Paroxysmal Atrial Fibrillation: Rhythm Control Asymptomatic   Anti-Arrhythmic: Continue amiodarone (Pacerone) 100 mg once daily. Monitoring: Since they will being maintained on Amiodarone, I told them that we will need to closely monitor them for potential side effects. These include monitoring LFTs and TSH at least every 6 months as well as chest x-rays, pulmonary function tests, and eye exams at least yearly. Beta Blocker: Continue Metoprolol succinate (Toprol XL) 25 mg 1/2 tab daily.   [id number] Score for Atrial Fibrillation Stroke Risk   Risk   Factors  Component Value   C CHF Yes 1   H HTN Yes 1   A2 Age >= 76 No,  (73 y.o.) 0   D DM Yes 1   S2 Prior Stroke/TIA No 0   V Vascular Disease No 0   A Age 74-69 Yes,  (73 y.o.) 1   Sc Sex male 0    [id number]  Score  4   Score last updated 7/2/88 5:70 PM EDT  Click here for a link to the UpToDate guideline \"Atrial Fibrillation: Anticoagulation therapy to prevent embolization  Disclaimer: Risk Score calculation is dependent on accuracy of patient problem list and past encounter diagnosis. Stroke Risk: CHADS2-VASc Score: 4/9 (4% stroke risk). Because of his atrial fibrillation, I also would also recommend that he continue with anticoagulation to decrease his risk of stoke but also reminded him to watch for signs of blood in his stool or black tarry stools and stop the medication immediately if this develops as this could be life threatening. Anticoagulation: Continue Apixaban (Eliquis) 5 mg every 12 hours. Because of his atrial fibrillation, I also would also recommend that he continue with anticoagulation to decrease his risk of stoke but also reminded him to watch for signs of blood in his stool or black tarry stools and stop the medication immediately if this develops as this could be life threatening. Chronic combined heart failure: Ischemic Cardiomyopathy, Echo done on 5/18/2022 which showed an EF of 15-20%. Echo done on 8/25/2022 showed improved EF of 40%. We will most likely repeat this Echo at next visit. Beta Blocker: Continue Metoprolol succinate (Toprol XL) 25 mg 1/2 tab daily. ACE Inibitor/ARB: Continue sacubitril/valsartan (Entresto) 24/26 mg 1/2 a tablet twice daily. Diuretics: Continue bumetinide (Bumex) 1 mg every morning. Diuretics: DECREASE to Spironolactone (Aldactone) 25 mg, 1/2 tab daily. Continue Farxiga 10 mg once daily. Heart failure counseling: I advised them to try and keep their legs up whenever possible and to limit salt in their diet. I took the liberty of ordering a BMP in 5-7 days to assess their potassium and renal function. I told them that they could get their lab work performed at the location of their choosing, unfortunately, if the lab work was not performed at a [facility]) facility I could not guarantee my ability to follow up with them on their results.   and  I took the liberty of ordering a CBC. I told them that they could get their lab work performed at the location of their choosing, unfortunately, if the lab work was not performed at a [facility]) facility I could not guarantee my ability to follow up with them on their results. Hyperlipidemia: Mixed, LDL done on 9/1/2021 was 65 mg/dL  Cholesterol Reduction Therapy: Continue Atorvastatin (Lipitor) 80 mg daily. Peripheral Artery Disease: Reviewed his recent MARCELLA's that were done at [address] in April of this year. He does have pain in his legs when he works out. His scans did show moderate however his symptoms sound more severe. Antiplatelet Agent: Continue clopidogrel (Plavix): Plavix 75 mg daily. Beta Blocker: Continue Metoprolol succinate (Toprol XL) 25 mg 1/2 tab daily. Cholesterol Reduction Therapy: Continue Atorvastatin (Lipitor) 80 mg daily. Referral to Dr. aL Alfaro for better assesses his pains in his legs when he walks around. Pre-Op Clearance: For possible Colonoscopy. Pre-Operative Risk assessment using 2014 ACC/AHA guidelines   Emergent procedure No  Active Cardiac Condition No (decompensated HF, Arrhythmia, MI <3 weeks, severe valve disease)  Risk Level of Procedure Intermediate Risk (intraperitoneal, intrathoracic, HENT, orthopedic, or carotid endarterectomy, etc.)  Revised Cardiac Risk Index Risk factors: History of heart failure  Measurement of Exercise Tolerance before Surgery >4 Yes  According to the 2014 ACC/AHA pre-operative risk assessment guidelines Matthew Bhatia is at a low-intermediate risk for major cardiac complications during a intermediate risk procedure and may continue as planned. Specific medication recommendations are listed below. Medications recommended to continue should be taken with a sip of water even when NPO. Medical management to reduce perioperative risk:  Antiplatelet Agent: Ok to HOLD Plavix 5-7 days prior to procedure.    Anticoagulant Agent: I would suggest holding his Apixaban (Eliquis) 2-3 days prior to the procedure. Additional Recommendations: I would also suggest that he continue his beta blocker and statin throughout the perioperative period. Once again, thank you for allowing me to participate in this patients care. Please do not hesitate to contact me could I be of further assistance. FOLLOW UP:   I told Mr. Yolanda Pepe to call my office if he had any problems, but otherwise told him to Return in about 3 months (around 1/18/2023). However, I would be happy to see him sooner should the need arise. Sincerely,  Stefano Blankenship MD, MS, F.A.C.C. [facility] Cardiology Specialist    [address] [facility] [address] [facility]  Phone: [phone], Fax: [phone]     I believe that the risk of significant morbidity and mortality related to the patient's current medical conditions are: Intermediate. The documentation recorded by the scribe, accurately and completely reflects the services I personally performed and the decisions made by me. Stefano Blankenship MD, MS, F.A.C.C.  October 18, 2022

## 2022-10-18 NOTE — PATIENT INSTRUCTIONS
SURVEY:    You may be receiving a survey from [employer] regarding your visit today. Please complete the survey to enable us to provide the highest quality of care to you and your family. If you cannot score us a very good on any question, please call the office to discuss how we could have made your experience a very good one. Thank you.

## 2022-10-19 ENCOUNTER — HOSPITAL ENCOUNTER (OUTPATIENT)
Dept: CARDIAC REHAB | Age: 72
Setting detail: THERAPIES SERIES
Discharge: HOME | End: 2022-10-19
Payer: MEDICARE

## 2022-10-19 DIAGNOSIS — I95.9 HYPOTENSION, UNSPECIFIED HYPOTENSION TYPE: ICD-10-CM

## 2022-10-19 PROCEDURE — 94625 PHY/QHP OP PULM RHB W/O MNTR: CPT

## 2022-10-19 RX ORDER — POTASSIUM CHLORIDE 1500 MG/1
TABLET, EXTENDED RELEASE ORAL
Qty: 180 TABLET | Refills: 1 | OUTPATIENT
Start: 2022-10-19

## 2022-10-20 ENCOUNTER — HOSPITAL ENCOUNTER (OUTPATIENT)
Dept: CARDIAC REHAB | Age: 72
Setting detail: THERAPIES SERIES
Discharge: HOME | End: 2022-10-20
Payer: MEDICARE

## 2022-10-20 PROCEDURE — 94625 PHY/QHP OP PULM RHB W/O MNTR: CPT

## 2022-10-21 DIAGNOSIS — E55.9 HYPOVITAMINOSIS D: ICD-10-CM

## 2022-10-21 RX ORDER — ERGOCALCIFEROL 1.25 MG/1
CAPSULE ORAL
Qty: 12 CAPSULE | Refills: 0 | Status: SHIPPED | OUTPATIENT
Start: 2022-10-21 | End: 2022-12-17 | Stop reason: SDUPTHER

## 2022-10-24 ENCOUNTER — HOSPITAL ENCOUNTER (OUTPATIENT)
Dept: CARDIAC REHAB | Age: 72
Setting detail: THERAPIES SERIES
Discharge: HOME | End: 2022-10-24
Payer: MEDICARE

## 2022-10-24 ENCOUNTER — HOSPITAL ENCOUNTER (OUTPATIENT)
Age: 72
Discharge: HOME | End: 2022-10-24
Payer: MEDICARE

## 2022-10-24 DIAGNOSIS — K59.00 CONSTIPATION, UNSPECIFIED CONSTIPATION TYPE: ICD-10-CM

## 2022-10-24 DIAGNOSIS — I73.9 PAD (PERIPHERAL ARTERY DISEASE) (HCC): ICD-10-CM

## 2022-10-24 DIAGNOSIS — I50.42 CHRONIC COMBINED SYSTOLIC AND DIASTOLIC CONGESTIVE HEART FAILURE (HCC): ICD-10-CM

## 2022-10-24 DIAGNOSIS — Z01.818 PRE-OPERATIVE CLEARANCE: ICD-10-CM

## 2022-10-24 DIAGNOSIS — I95.9 HYPOTENSION, UNSPECIFIED HYPOTENSION TYPE: ICD-10-CM

## 2022-10-24 DIAGNOSIS — E78.2 MIXED HYPERLIPIDEMIA: ICD-10-CM

## 2022-10-24 DIAGNOSIS — I48.0 PAF (PAROXYSMAL ATRIAL FIBRILLATION) (HCC): ICD-10-CM

## 2022-10-24 LAB
ANION GAP SERPL CALCULATED.3IONS-SCNC: 12 MMOL/L (ref 9–17)
BUN BLDV-MCNC: 28 MG/DL (ref 8–23)
BUN/CREAT BLD: 17 (ref 9–20)
CALCIUM SERPL-MCNC: 9.9 MG/DL (ref 8.6–10.4)
CHLORIDE BLD-SCNC: 99 MMOL/L (ref 98–107)
CO2: 29 MMOL/L (ref 20–31)
CREAT SERPL-MCNC: 1.63 MG/DL (ref 0.7–1.2)
GFR SERPL CREATININE-BSD FRML MDRD: 44 ML/MIN/1.73M2
GLUCOSE BLD-MCNC: 92 MG/DL (ref 70–99)
HCT VFR BLD CALC: 39.5 % (ref 40.7–50.3)
HEMOGLOBIN: 12.8 G/DL (ref 13–17)
MCH RBC QN AUTO: 32.7 PG (ref 25.2–33.5)
MCHC RBC AUTO-ENTMCNC: 32.4 G/DL (ref 28.4–34.8)
MCV RBC AUTO: 101 FL (ref 82.6–102.9)
NRBC AUTOMATED: 0 PER 100 WBC
PDW BLD-RTO: 15.5 % (ref 11.8–14.4)
PLATELET # BLD: ABNORMAL K/UL (ref 138–453)
PLATELET, FLUORESCENCE: 107 K/UL (ref 138–453)
PLATELET, IMMATURE FRACTION: 6.7 % (ref 1.1–10.3)
POTASSIUM SERPL-SCNC: 4.8 MMOL/L (ref 3.7–5.3)
RBC # BLD: 3.91 M/UL (ref 4.21–5.77)
SODIUM BLD-SCNC: 140 MMOL/L (ref 135–144)
WBC # BLD: 4.5 K/UL (ref 3.5–11.3)

## 2022-10-24 PROCEDURE — 94625 PHY/QHP OP PULM RHB W/O MNTR: CPT

## 2022-10-24 PROCEDURE — 85027 COMPLETE CBC AUTOMATED: CPT

## 2022-10-24 PROCEDURE — 36415 COLL VENOUS BLD VENIPUNCTURE: CPT

## 2022-10-24 PROCEDURE — 80048 BASIC METABOLIC PNL TOTAL CA: CPT

## 2022-10-26 ENCOUNTER — HOSPITAL ENCOUNTER (OUTPATIENT)
Dept: CARDIAC REHAB | Age: 72
Setting detail: THERAPIES SERIES
Discharge: HOME | End: 2022-10-26
Payer: MEDICARE

## 2022-10-26 ENCOUNTER — TELEPHONE (OUTPATIENT)
Dept: CARDIOLOGY | Age: 72
End: 2022-10-26

## 2022-10-26 PROCEDURE — 94625 PHY/QHP OP PULM RHB W/O MNTR: CPT

## 2022-10-26 NOTE — TELEPHONE ENCOUNTER
----- Message from Yehuda Cabrera MD sent at 10/26/2022 12:25 AM EDT -----  Let Patient know bloodwork borderline. Lets get him into heart failure Pharmacy clinic to assess for heart failure and possible need to decreased his bumex dose. Thanks.

## 2022-10-26 NOTE — TELEPHONE ENCOUNTER
----- Message from Samule Nyhan, MD sent at 10/26/2022 12:25 AM EDT -----  Let Patient know bloodwork borderline. Lets get him into heart failure Pharmacy clinic to assess for heart failure and possible need to decreased his bumex dose. Thanks.

## 2022-10-27 ENCOUNTER — HOSPITAL ENCOUNTER (OUTPATIENT)
Dept: CARDIAC REHAB | Age: 72
Setting detail: THERAPIES SERIES
Discharge: HOME | End: 2022-10-27
Payer: MEDICARE

## 2022-10-27 PROCEDURE — 94625 PHY/QHP OP PULM RHB W/O MNTR: CPT

## 2022-10-31 ENCOUNTER — HOSPITAL ENCOUNTER (OUTPATIENT)
Dept: CARDIAC REHAB | Age: 72
Setting detail: THERAPIES SERIES
Discharge: HOME | End: 2022-10-31
Payer: MEDICARE

## 2022-10-31 PROCEDURE — 94625 PHY/QHP OP PULM RHB W/O MNTR: CPT

## 2022-11-01 DIAGNOSIS — I50.42 CHRONIC COMBINED SYSTOLIC (CONGESTIVE) AND DIASTOLIC (CONGESTIVE) HEART FAILURE (HCC): Primary | ICD-10-CM

## 2022-11-01 NOTE — PROGRESS NOTES
Pulmonary Rehab Individualized Treatment Plan 60 day    Patient Name: Chris Dugan  Date: 11/1/2022  ACCOUNT #: [de-identified]  Diagnosis: COVID 23  Start Date: 9/6/2022  Referring Physician: Dr. Yamileth Johnson  Session Number: 21  EXERCISE    Stages of Change:   [] pre-contemplation  [x] Action   [] Contemplate    [] Maintenance   [] Prep   [] Relapse          Exercise Prescription:  Mode: [x] TM [x] B [x] STP  [x] R   [x] UBE     Frequency: 3 days per week  Duration: 31-60 minutes  Intensity: 3.5-4.0 METs     THRR: 107-117 bpm  Progression:   Based on risk stratification, may increase duration per F.I.T.T. protocol parameters on average 5-10 minutes every 1-2 weeks for the first 4-6 weeks. After 3-4 weeks completed, continue to gradually increase F.I.T.T. parameters gradually at the established duration on average 0.5-1.0 METs per 30 days over the course of the remaining program, as established by patient centered goals and guidelines, maintaining RPE 12-16. [x] Resistance Training  Introduce 8-15 bilateral upper extremity resistance exercise at 1-3 sets per lift, on 2-3 non-consecutive days using TheraBand/Weights to 8-15 reps on at lease 2 occasions, maintaining RPE 12-16. Once repetition maximum has been achieved, additional weight sets may be added. Hypertension:  [x] Yes  [] No  Resting BP: 108/62  Peak Exercise BP: 134/58  BP Meds: Bumex, Midodrine, Aldactone, Farxiga, Toprol XL, Entresto    Intervention:  Home Exercise:  Current Exercise -    [x] Yes - walk/stretches daily 15-30 min   [] No   [x] Resistance Training  Progression:  20-60 minutes of aerobic exercise on at lease 2 non-rehab days. 8-12 bilateral upper extremity resistance exercise at 1-3 sets per lift, on 2 non-consecutive days using TheraBand/Free Weights/Weight Machine to 8-15 reps on at lease 2 occasions. Once repetition maximum has been achieved, additional weight sets may be added.     Education:   [x]  Equipment [place]  [] Understanding BP   [x] S/S to report  [x] Sodium     [x] Warm up/ Cool down  [] Exercise Prescription   [x] RPE Scale   [] Hot/Cold weather guidelines   [x] Ex Safety   [] Home Exercise     [] Proper use weights/bands       Target Goal:   -Individual Exercise Plan  -BP<120/80 or <130/80 if DM   -Aerobic active 30 + minutes 5-7 days per week    Nutrition    Stages of Change:   [] pre-contemplation  [x] Action   [] Contemplate    [] Maintenance   [] Prep    [] Relapse    Hyperlipidemia:  [x] Yes  [] No   Lipid Meds: Lipitor    Diabetes:  [] Yes  [x] No  FBS:   HbA1c:  [] Monitor BS at home   Frequency?:   Diabetes Medication: NA    Weight Management:  Height: 66.5 inches  Weight: 161.6 pounds  BMI: 26.0  Wt Goal: <150 poundsIntervention:   [] Dietitian Consult       [] Diet Class      Education:  [] Diabetes  [] Diet and nutrition  [] Weight management  [] Portions  [] Snacks and substitutes  [x] Nutrition facts label  [] Food packaging claims  [x] Fat and cholesterol  [] Sodium     Target Goal:  -BMI > 20 and < 25   Education    Stages of Change:    [] pre-contemplation  [x] Action   [] Contemplate    [] Maintenance   [] Prep    [] Relapse     Family support: [x] Yes  [] No    Tobacco Use  Social History     Tobacco Use   Smoking Status Former    Packs/day: 1.00    Years: 56.00    Pack years: 56.00    Types: Cigarettes    Quit date: 2022    Years since quittin.3   Smokeless Tobacco Never   Current smokers:  Longest quit attempt:  Tobacco triggers:  Barriers to successful cessation:  [] Smoking cessation medication:  Breath sounds:    [x] Clear         [x] Left    [x] Right     [] Diminished         [] Left    [] Right   [] Rales         [] Left    [] Right   [] Rhonchi         [] Right [] Left    [] Wheezing         [] Right [] Left     Intervention:  [] Referred to smoking cessation counselor   [] Individual education and counseling  [] Tobacco adjunct    Education:   [x] Respiratory A and P  [] RLD/COPD  [] Preventing infection    [] Breathing techniques    [] Energy conservation   [] Meds/MDI   [] O2 Therapy  [] Smoking cessation     Target Goal:  -Complete cessation/abstinence from tobacco   -Self-manage condition and prevention exacerbation  -Restore to optimal level of independence  -Improved post-program education assessment score    Psychosocial    Stages of Change:    [] pre-contemplation  [x] Action   [] Contemplate    [] Maintenance   [] Prep    [] Relapse    Intervention:  Evaluate for anxiety, depression, decreased QOL using LYNNE-7/PHQ9/[place]ans and Power QOL surveys      [] Physician Referral    [] Psych Consult/       [] Depression/anxiety medications:    Education:    [] Anxiety   [] Relaxation techniques   [] Depression   [] Stress Management    Target Goal:  -Assess presence or absence of depression using a valid screening tool. -Maximize coping skills.  -Positive support system.     Medication Compliance (stated):    [x] 100%  [] 75%           [] 50%  [] 25%      [] 0%          Target Goal:    -100% Medication adherence    Fall Risk Assessment:  History of falls with or without injury  [] Yes   [x] No   Use of ambulatory aid  [x] Yes  [] No   Difficulty walking/impaired gait  [x] Yes  [] No   Numbness in feet  [] Yes   [x] No   Vision changes  [] Yes  [x] No   Dizziness  [] Yes  [x] No   Shortness of breath  [] Yes  [x] No   Medications  [x] Anticoagulant/Antiplatelet  [x] Betablocker /ACE/ARB  [] Antidepressant  [] Seizure medication   [] NA  Risk of fall  [] Low (none of above)  [x] Medium (less than 3 above)  [] High (3 or more above)    Patient 90-Day Goal(s):  (Specific, Measurable, \"Increase FC and become more healthy over 90 days\"      Program Goals  Achieve and progress prescribed exercise frequency, intensity, time, and/or type based upon initial evaluation and 6 MWT/submaximal testing results as evidenced by the attaining and maintaining a Darin rating of perceived exertion between 11 and 16, RPD <=5 during exercise, duration of >30 - 60 minutes using multiple exercise modes for at least 3 days per week with a goal of progressively achieving optimal functional capacity and maintain Sp02 90%, evidenced by daily session reports and pre-post exercise evaluation. Patient achieved a 25% FC improvement over the past 30 days. Introduce and progress 8-10 different bilateral UE and/or LE progressive resistance exercises focused on major muscle groups using 1-3 sets each per lift, on 2-3 non-consecutive days implementing free and machine weights and/or bands, as appropriate and increasing resistance once repetitions have progressed to 15 reps and feel fairly light and RPD <=5 on 2 prior occasions. Resistance training deferred d/t cognitive issues. Develop and maintain regular home aerobic exercise program for 20 - 60 minutes at least 2 non-rehab days per week, excluding  5 - 10 minutes warm-up and cool-down periods, being tracked on home workout log. Walks at home 5 days per week 15-20 min. Achieve and maintain an optimal average resting blood pressure of <130/80 mmHg (or as indicated by this patient's referring provider), over the program using lifestyle education, behavior modification, and medication compliance as evidenced by routine resting BP measurements WNL. Resting -120s and resting DBP 50-70s over the past 30  days. Establish and adhere to a healthy diet as determined by the dietician, including:   gradually lose /gain 2-4 lb of body weight over the next 30 days, as evidenced by pre- and post-program nutriton survey and routine rounding with patient per patient's self report, food diary, and Rate-your-Plate screening survey. Patient experienced a weight gain of 7.2 pounds over the past 30 days.     Minimize self-reported psycho-social feelings of depression and anxiety through patient education, behavior modification, and medication compliance, as evidenced by pre- and post- PHQ9 and GAD7 surveys and by routine rounding with patient. Denied need for psychosocial intervention over the past 30 days. Improve/Maintain quality of life through education, behavior modification, and medication complianceas evidenced by Nicky Tiffany and Power QOL (pulmonary) survey evaluated pre-program and post-program and routine rounding with patient. Denied need for psychosocial intervention over the past 30 days. Demonstrate an improved patient perception of dyspnea via patient education, lifestyle changes, and regular exercise, as evidenced by a pre/post-program mMRC score comparison. Patient rated RPD lower at higher intensity exercise over past 30 days. Demonstrate knowledge of chronic lung disease, exercise, self-management, depression/anxiety management, medications, oxygen, pursed lip breathing, and care of respiratory equipment, with > / = 5% accuracy improvement as evaluated by pre- and post-program education assessment screening tool. Participated in all education classes offered over the past 30 days. Complete abstinence from tobacco products over the pulmonary rehab program through intensive counseling, behavior modification, and medication compliance as evidenced by routine rounding with patient. Remained tobacco free over the past 30 days.     Electronically signed by April Beavers RN on 11/1/22 at 7:59 AM EDT    Physician Changes/Comments:

## 2022-11-02 ENCOUNTER — HOSPITAL ENCOUNTER (OUTPATIENT)
Age: 72
Discharge: HOME | End: 2022-11-02
Payer: MEDICARE

## 2022-11-02 ENCOUNTER — HOSPITAL ENCOUNTER (OUTPATIENT)
Dept: PHARMACY | Age: 72
Setting detail: THERAPIES SERIES
Discharge: HOME | End: 2022-11-02
Payer: MEDICARE

## 2022-11-02 ENCOUNTER — HOSPITAL ENCOUNTER (OUTPATIENT)
Dept: CARDIAC REHAB | Age: 72
Setting detail: THERAPIES SERIES
Discharge: HOME | End: 2022-11-02
Payer: MEDICARE

## 2022-11-02 VITALS
DIASTOLIC BLOOD PRESSURE: 43 MMHG | WEIGHT: 161 LBS | BODY MASS INDEX: 25.6 KG/M2 | OXYGEN SATURATION: 97 % | SYSTOLIC BLOOD PRESSURE: 80 MMHG | HEART RATE: 62 BPM

## 2022-11-02 DIAGNOSIS — I50.42 CHRONIC COMBINED SYSTOLIC (CONGESTIVE) AND DIASTOLIC (CONGESTIVE) HEART FAILURE (HCC): ICD-10-CM

## 2022-11-02 LAB
ANION GAP SERPL CALCULATED.3IONS-SCNC: 8 MMOL/L (ref 9–17)
BUN BLDV-MCNC: 26 MG/DL (ref 8–23)
BUN/CREAT BLD: 18 (ref 9–20)
CALCIUM SERPL-MCNC: 10.1 MG/DL (ref 8.6–10.4)
CHLORIDE BLD-SCNC: 101 MMOL/L (ref 98–107)
CO2: 30 MMOL/L (ref 20–31)
CREAT SERPL-MCNC: 1.45 MG/DL (ref 0.7–1.2)
GFR SERPL CREATININE-BSD FRML MDRD: 51 ML/MIN/1.73M2
GLUCOSE BLD-MCNC: 97 MG/DL (ref 70–99)
POTASSIUM SERPL-SCNC: 4.8 MMOL/L (ref 3.7–5.3)
SODIUM BLD-SCNC: 139 MMOL/L (ref 135–144)

## 2022-11-02 PROCEDURE — 94625 PHY/QHP OP PULM RHB W/O MNTR: CPT

## 2022-11-02 PROCEDURE — 36415 COLL VENOUS BLD VENIPUNCTURE: CPT

## 2022-11-02 PROCEDURE — 80048 BASIC METABOLIC PNL TOTAL CA: CPT

## 2022-11-02 PROCEDURE — 99212 OFFICE O/P EST SF 10 MIN: CPT

## 2022-11-02 RX ORDER — FLUDROCORTISONE ACETATE 0.1 MG/1
3 TABLET ORAL DAILY
COMMUNITY
Start: 2022-10-19 | End: 2022-11-02 | Stop reason: ALTCHOICE

## 2022-11-02 NOTE — DISCHARGE INSTRUCTIONS
HEART FAILURE:    With heart failure you must follow up with your Cardiologist, your PCP and other physicians as appropriate - especially 7 days after a hospitalization and then as directed. Please call right away with any signs or symptoms of heart failure or other medical conditions that need attention or with any questions at all. Please call your Cardiologist or your PCP or the [facility] at [phone]. We can help manage these symptoms and often prevent a visit to the Emergency Room or hospitalization. * Know your dry weight (your weight without any fluid on board)    * Call any time you have questions about anything. Do not wait. * It is very important to take your medications as prescribed, do not miss any doses. Call for refills before you run out. Typically when you have one week left. If you have trouble getting your medications for any reason, call us at [phone]. * Avoid NSAIDs (non steroidal anti inflammatory drugs) like Aleve (naproxen), Advil and Motrin (ibuprofen), Mobic (diclofenac), Celebrex (celecoxib) and aspirin. A daily aspirin is okay if recommended by your physician. It is okay to take Tylenol (acetaminophen) unless your physician has told you otherwise. * Limit fluid intake. Typically this is no more than 1,500-2,000 milliliters (mL) per day. This is a liter and a half to two liters. This is equal to approximately 48-64 ounces (oz) per day    * Limit sodium intake. Typically this is no more than 2,000-2,400 milligrams (mg) per day. You will need to add up the sodium content found on the nutrition label for the foods you eat each day. * Weigh yourself every day. Weigh yourself before breakfast and after you go to the restroom. Wear the same thing each time you weigh yourself. Keep a log and bring it to each visit.    Call if you gain 3 or more pounds in 1-7 days - [phone] * If you have hypertension (high blood pressure), you may want to check your blood pressure daily. Your blood pressure goal is less than 130/80 unless instructed differently by your physician. Keep a log and bring it to each visit. * Be sure to keep good control of your Diabetes     * Be sure to keep good control of your Cholesterol    * Eat a Heart healthy diet    * Be active. Follow an exercise plan that is reasonable for you. * If you smoke, work to stop smoking. Best to avoid alcohol.

## 2022-11-02 NOTE — PROGRESS NOTES
[facility] [place]  Medication Management  Pharmacist  Heart Failure    [address] [facility]  [place] [address]  Phone: [phone]  Fax: [phone]      NAME: Jose Dao RECORD NUMBER:  [id number]  AGE: 67 y.o. GENDER: male  : 1950  EPISODE DATE:  2022      Heart Failure Management referral by Dr. Sharon Rodriguez    Dry weight: ?? pounds     Today's Wt: 161 pounds  Wt Readings from Last 6 Encounters:   22 161 lb (73 kg)   10/18/22 158 lb (71.7 kg)   22 155 lb (70.3 kg)   22 156 lb (70.8 kg)   22 155 lb (70.3 kg)   22 155 lb 12.8 oz (70.7 kg)    and   Ht Readings from Last 1 Encounters:   10/18/22 5' 6.5\" (1.689 m)                   BP:  2 readings  1- 80/43  2- 95/52 HR: 62 / 61 O2Sat: 97%     Extremities and pitting edema trace  Skin warm and dry     Subjective   Mr. Rusty Rojas is a 67 y.o. male here for the Heart Failure Services. They are here today for a comprehensive medication review including over the counter medications and herbal products, overall wellbeing assessment, transition of care and any needed adjustments with updates and recommendations communicated to the referring physician. Patient Symptoms:  Patient is here for 4 week follow up. He reports feeling great. He is working out daily and also attending cardiac rehab. Denies SOB. Patient denies dizziness, lightheadedness, falls. Patient's weight is increased 3 pounds. Patient reports he is eating more - his appetite has increased/improved. Patient's lab work was borderline 10/24/22 with increase in serum creatinine from 1.28 (22) to 1.63 on 10/24/22.     Shortness of Breath:   [x]   None   []   Dyspnea on exertion   []   Dyspnea with normal activities  []   Dyspnea at rest  []   Dyspnea while sleeping    Patient Findings:   []  Missed doses  [x]  Diet changes (eating more)  []  Sodium intake changes   []  Night time cough   []  Other   []  Early saiety, abd fullness []  Chest pain   []  Constipation   []  Night time bathroom trips  []  Alcohol intake changes  []  Acute illness  []  Edema    []  Number of pillows or recliner  []  Activity changes   []  Fatigue that limits activity []  Heart racing or skipping beats  []  Light headedness  []  Dizziness    []      []  Problems sleeping    Functional Activity New York Heart Association Classification  []   Class I No limitation of physical activity. Ordinary physical activity does not cause undue fatigue, palpitation, dyspnea (shortness of breath). [x]   Class II Slight limitation of physical activity. Comfortable at rest. Ordinary physical activity results in fatigue, palpitation, dyspnea (shortness of breath). []   Class III  Anil limitation of physical activity. Comfortable at rest.  Less than ordinary activity causes fatigue, palpitation, dyspnea. []   Class IV Unable to carry on any physical activity without discomfort. Symptoms of heart failure at rest.  If any physical activity is undertaken, discomfort increases. Last CHF Hospital Admission: 8/3/22-8/13/22    OUTPATIENT MEDICATIONS:  Outpatient Medications Marked as Taking for the 11/2/22 encounter [facility] HOSPITAL Encounter) with [address] [place]   Medication Sig Dispense Refill    vitamin D (ERGOCALCIFEROL) 1.25 MG (50023 UT) CAPS capsule TAKE 1 CAPSULE BY MOUTH ONE TIME PER WEEK (Patient taking differently:  Wednesday) 12 capsule 0    clopidogrel (PLAVIX) 75 MG tablet Take 1 tablet by mouth daily 90 tablet 3    docusate sodium (COLACE) 100 MG capsule Take 1 capsule by mouth 2 times daily 180 capsule 3    spironolactone (ALDACTONE) 25 MG tablet Take 0.5 tablets by mouth daily 90 tablet 3    folic acid (FOLVITE) 1 MG tablet TAKE 1 TABLET BY MOUTH EVERY DAY 90 tablet 0    amiodarone (CORDARONE) 200 MG tablet Take 0.5 tablets by mouth daily 45 tablet 3    metoprolol succinate (TOPROL XL) 25 MG extended release tablet Take 0.5 tablets by mouth daily 45 tablet 3    rOPINIRole (REQUIP) 0.5 MG tablet Take 1 tablet by mouth nightly 90 tablet 0    bumetanide (BUMEX) 1 MG tablet Take 1 tablet by mouth daily 90 tablet 1    tamsulosin (FLOMAX) 0.4 MG capsule Take 1 capsule by mouth in the morning. 90 capsule 1    midodrine (PROAMATINE) 10 MG tablet TAKE 1 TABLET BY MOUTH EVERY MORNING, 1 TABLET AT NOON, AND 1 TABLET IN THE EVENING. TAKE WITH MEALS. 90 tablet 3    ipratropium-albuterol (DUONEB) 0.5-2.5 (3) MG/3ML SOLN nebulizer solution Inhale 3 mLs into the lungs every 4 hours as needed for Shortness of Breath 360 mL 1    dapagliflozin (FARXIGA) 10 MG tablet Take 1 tablet by mouth every morning Lot #: [id number]  Exp: 10/24  Three boxes given. 21 tablet 0    vitamin B-1 (THIAMINE) 100 MG tablet Take 1 tablet by mouth in the morning. 90 tablet 1    Multiple Vitamin (MULTIVITAMIN) TABS tablet Take 1 tablet by mouth in the morning. 90 tablet 1    pantoprazole (PROTONIX) 40 MG tablet Take 1 tablet by mouth every morning (before breakfast) 90 tablet 1    potassium chloride (KLOR-CON M) 20 MEQ extended release tablet Take 1 tablet by mouth in the morning and 1 tablet in the evening. Take with meals.  60 tablet 3    melatonin 5 mg TABS tablet Take 2 tablets by mouth nightly  0    magnesium oxide (MAGOX 400) 400 (240 Mg) MG tablet Take 1.5 tablets by mouth 2 times daily (Patient taking differently: Take 400 mg by mouth 3 times daily) 90 tablet 2    sacubitril-valsartan (ENTRESTO) 24-26 MG per tablet Take 0.5 tablets by mouth 2 times daily Lot #: [id number]  Exp: July 2024 56 tablet 0    apixaban (ELIQUIS) 5 MG TABS tablet Take 5 mg by mouth 2 times daily      atorvastatin (LIPITOR) 80 MG tablet Take 80 mg by mouth daily      Omega-3 Fatty Acids (FISH OIL CONCENTRATE) 1000 MG CAPS Take 2 caplets by mouth daily               Objective / Assessment     Patient Active Problem List   Diagnosis Code    Atrial fibrillation with RVR (HCC) I48.91    Acute on chronic combined systolic and diastolic CHF, NYHA class 4 (HCC) I50.43    Coronary artery disease I25.10    Hypertension I10    Thrombocytopenia (HCC) D69.6    Hyponatremia E87.1    Hyperlipidemia E78.5    Cirrhosis of liver with ascites (HCC) K74.60, R18.8    Hypomagnesemia E83.42    Ascites R18.8    CKD (chronic kidney disease) N18.9    History of acute inferior wall MI I25.2    Ischemic cardiomyopathy I25.5    Chronic combined systolic (congestive) and diastolic (congestive) heart failure (HCC) I50.42    Dehydration, moderate E86.0    Medication side effect, initial encounter T50.905A    Severe malnutrition (HCC) E43    PAF (paroxysmal atrial fibrillation) (HCC) I48.0    Lightheaded R42    Tobacco abuse counseling Z71.6    Gastroesophageal reflux disease K21.9    Hypotension I95.9    Acute on chronic combined systolic and diastolic CHF (congestive heart failure) (HCC) I50.43    Hypokalemia E87.6    Hypovitaminosis D E55.9    Hypoxia R09.02    Nonischemic cardiomyopathy ([address] I42.8    Acute respiratory failure with hypoxia (HCC) J96.01    Encounter for current long-term use of anticoagulants Z79.01    Bilateral pleural effusion J90    Pancytopenia (HCC) D61.818    Mild malnutrition (HCC) E44.1    Chronic combined systolic and diastolic congestive heart failure (HCC) I50.42    Type 2 diabetes mellitus E11.9    Chronic renal disease, stage III [facility]) [[id number]] N18.30     Social History     Tobacco Use    Smoking status: Former     Packs/day: 1.00     Years: 56.00     Pack years: 56.00     Types: Cigarettes     Quit date: 2022     Years since quittin.3    Smokeless tobacco: Never   Vaping Use    Vaping Use: Never used   Substance Use Topics    Drug use: Not Currently       Potassium (mmol/L)   Date Value   10/24/2022 4.8   2022 4.6   2022 5.5 (H)   2022 5.3   2022 5.4 (H)   2022 5.3     BUN (mg/dL)   Date Value   10/24/2022 28 (H)   2022 24 (H)   2022 30 (H)   2022 28 (H)   2022 34 (H) 08/25/2022 37 (H)     Creatinine (mg/dL)   Date Value   10/24/2022 1.63 (H)   09/20/2022 1.28 (H)   09/12/2022 1.46 (H)   09/06/2022 1.38 (H)   08/31/2022 1.57 (H)   08/25/2022 1.94 (H)     TSH (uIU/mL)   Date Value   09/12/2022 6.02 (H)   07/25/2022 5.77 (H)   06/06/2022 3.69   05/11/2022 4.52     No results found for: T4      Plan:      Chronic Systolic and/or Diastolic Heart Failure (diagnosis): EF: 40% via echocardiogram on 8/25/22  Beta Blocker for EF </= 40%: metoprolol succinate (Toprol XL) 25 mg - 0.5 tablet (=12.5 mg) po daily  Diuretics: bumetanide 1 mg po daily  ACE/ARB/ARNI for EF </= 40%: Entresto 24/26 - 0.5 tablet po BID  Spironolactone for EF </= 35%: Spironolactone 25 mg - 0.5 tablet (=12.5 mg) po daily  Farxiga 10 mg po once daily  Nonpharmacologic management of Heart Failure: I told patient to continue wearing lower extremity compression stockings and I advised patient to try and keep legs up whenever possible and to limit salt in diet. Laboratory testing:   Basic metabolic panel (BMP) today to assess potassium and renal function. Paroxysmal Atrial Fibrillation: Rhythm Control Asymptomatic   Anti-Arrhythmic: Continue amiodarone (Pacerone) 100 mg once daily. Monitoring: Since they will being maintained on Amiodarone, I told them that we will need to closely monitor them for potential side effects. These include monitoring LFTs and TSH at least every 6 months as well as chest x-rays, pulmonary function tests, and eye exams at least yearly. TSH/Thyroxine, free 9/12/22   LFTs 9/12/22 wnl  Beta Blocker: Continue Metoprolol succinate (Toprol XL) 25 mg 1/2 tab daily.   [id number] Score for Atrial Fibrillation Stroke Risk    Risk   Factors   Component Value   C CHF Yes 1   H HTN Yes 1   A2 Age >= 75 No,  (73 y.o.) 0   D DM Yes 1   S2 Prior Stroke/TIA No 0   V Vascular Disease No 0   A Age 74-69 Yes,  (73 y.o.) 1   Sc Sex male 0     [id number]  Score   4   Score last updated 6/6/22 4:56 PM EDT  Click here for a link to the UpToDate guideline \"Atrial Fibrillation: Anticoagulation therapy to prevent embolization  Disclaimer: Risk Score calculation is dependent on accuracy of patient problem list and past encounter diagnosis. Stroke Risk: CHADS2-VASc Score: 4/9 (4% stroke risk). Because of his atrial fibrillation, I also would also recommend that he continue with anticoagulation to decrease his risk of stoke but also reminded him to watch for signs of blood in his stool or black tarry stools and stop the medication immediately if this develops as this could be life threatening. Anticoagulation: Continue Apixaban (Eliquis) 5 mg every 12 hours. Because of his atrial fibrillation, I also would also recommend that he continue with anticoagulation to decrease his risk of stoke but also reminded him to watch for signs of blood in his stool or black tarry stools and stop the medication immediately if this develops as this could be life threatening. Peripheral Artery Disease:   Antiplatelet Agent: clopidogrel (Plavix) 75 mg po daily. I also reminded patient to watch for signs of blood in her stool or black tarry stools and stop the medication immediately if this develops as this could be life threatening. Statin Therapy: atorvastatin (Lipitor) 80 mg po daily    Patient Education/Instructions: I did spend about 15 minutes with patient going over his heart failure packet including dietary guidelines, the signs and symptoms to watch for including shortness of breath, weight gain, lightheadedness/dizziness. I asked patient to call the clinic if develops persistent or worsening shortness of breath or weight gain of more than 3 pounds in 1-7 days. Patient verbalized understanding. I spoke to Dr. Romina Wood regarding patient findings, lab work and above medication changes. Dr. Romina Wood agrees with plan below.      Hypotension: Denies falls, but is at risk    Serum creatinine increase with last labs 10/24/22. Patient admits to probably not drinking enough throughout the day. Repeat BMP today - shows improvement in serum creatinine. I encouraged patient to stay hydrated and try to drink more throughout the day. He will continue with one 12 ounce sports drink daily also. Continue current Bumex dosing of 1 mg po daily    Medication changes since last visit:  10/18/22 - Decrease spironolactone 25 mg - 0.5 tablet (=12.5 mg) po daily  Add Colace BID    Follow up with pharmacist CHF clinic - 22    Follow up with cardiology (3 month follow up) - 23      Thank you for the referral,    Grover Webber.  Izzy Mendoza, [address] [facility] Admin Tracking Only    Intervention Detail: Adherence Monitorin and Lab(s) Ordered  Total # of Interventions Recommended: 1  Total # of Interventions Accepted: 2  Time Spent (min):  60

## 2022-11-03 ENCOUNTER — HOSPITAL ENCOUNTER (OUTPATIENT)
Dept: CARDIAC REHAB | Age: 72
Setting detail: THERAPIES SERIES
Discharge: HOME | End: 2022-11-03
Payer: MEDICARE

## 2022-11-04 DIAGNOSIS — I95.9 HYPOTENSION, UNSPECIFIED HYPOTENSION TYPE: ICD-10-CM

## 2022-11-04 RX ORDER — POTASSIUM CHLORIDE 1500 MG/1
TABLET, EXTENDED RELEASE ORAL
Qty: 180 TABLET | Refills: 1 | Status: SHIPPED | OUTPATIENT
Start: 2022-11-04

## 2022-11-07 ENCOUNTER — HOSPITAL ENCOUNTER (OUTPATIENT)
Dept: CARDIAC REHAB | Age: 72
Setting detail: THERAPIES SERIES
Discharge: HOME | End: 2022-11-07
Payer: MEDICARE

## 2022-11-07 PROCEDURE — 94625 PHY/QHP OP PULM RHB W/O MNTR: CPT

## 2022-11-08 DIAGNOSIS — I50.42 CHRONIC COMBINED SYSTOLIC AND DIASTOLIC CONGESTIVE HEART FAILURE (HCC): ICD-10-CM

## 2022-11-08 DIAGNOSIS — E78.2 MIXED HYPERLIPIDEMIA: ICD-10-CM

## 2022-11-08 DIAGNOSIS — Z01.818 PRE-OPERATIVE CLEARANCE: ICD-10-CM

## 2022-11-08 DIAGNOSIS — I48.0 PAF (PAROXYSMAL ATRIAL FIBRILLATION) (HCC): ICD-10-CM

## 2022-11-08 DIAGNOSIS — I73.9 PAD (PERIPHERAL ARTERY DISEASE) (HCC): ICD-10-CM

## 2022-11-08 DIAGNOSIS — I95.9 HYPOTENSION, UNSPECIFIED HYPOTENSION TYPE: ICD-10-CM

## 2022-11-08 DIAGNOSIS — K59.00 CONSTIPATION, UNSPECIFIED CONSTIPATION TYPE: ICD-10-CM

## 2022-11-08 RX ORDER — SPIRONOLACTONE 25 MG/1
TABLET ORAL
Qty: 90 TABLET | Refills: 1 | Status: SHIPPED | OUTPATIENT
Start: 2022-11-08

## 2022-11-09 ENCOUNTER — HOSPITAL ENCOUNTER (OUTPATIENT)
Dept: CARDIAC REHAB | Age: 72
Setting detail: THERAPIES SERIES
Discharge: HOME | End: 2022-11-09
Payer: MEDICARE

## 2022-11-09 PROCEDURE — 94625 PHY/QHP OP PULM RHB W/O MNTR: CPT

## 2022-11-10 ENCOUNTER — APPOINTMENT (OUTPATIENT)
Dept: CARDIAC REHAB | Age: 72
End: 2022-11-10
Payer: MEDICARE

## 2022-11-14 ENCOUNTER — HOSPITAL ENCOUNTER (OUTPATIENT)
Dept: CARDIAC REHAB | Age: 72
Setting detail: THERAPIES SERIES
Discharge: HOME | End: 2022-11-14
Payer: MEDICARE

## 2022-11-14 PROCEDURE — 94625 PHY/QHP OP PULM RHB W/O MNTR: CPT

## 2022-11-14 RX ORDER — LANOLIN ALCOHOL/MO/W.PET/CERES
400 CREAM (GRAM) TOPICAL 3 TIMES DAILY
Qty: 90 TABLET | Refills: 1 | Status: SHIPPED | OUTPATIENT
Start: 2022-11-14

## 2022-11-16 ENCOUNTER — HOSPITAL ENCOUNTER (OUTPATIENT)
Dept: CARDIAC REHAB | Age: 72
Setting detail: THERAPIES SERIES
Discharge: HOME | End: 2022-11-16
Payer: MEDICARE

## 2022-11-16 ENCOUNTER — OFFICE VISIT (OUTPATIENT)
Dept: VASCULAR SURGERY | Age: 72
End: 2022-11-16
Payer: MEDICARE

## 2022-11-16 VITALS
HEIGHT: 66 IN | DIASTOLIC BLOOD PRESSURE: 53 MMHG | WEIGHT: 162.4 LBS | TEMPERATURE: 98 F | RESPIRATION RATE: 18 BRPM | BODY MASS INDEX: 26.1 KG/M2 | HEART RATE: 77 BPM | SYSTOLIC BLOOD PRESSURE: 94 MMHG

## 2022-11-16 DIAGNOSIS — I73.9 PAD (PERIPHERAL ARTERY DISEASE) (HCC): Primary | ICD-10-CM

## 2022-11-16 DIAGNOSIS — I83.93 ASYMPTOMATIC VARICOSE VEINS OF BOTH LOWER EXTREMITIES: ICD-10-CM

## 2022-11-16 DIAGNOSIS — I65.23 BILATERAL CAROTID ARTERY STENOSIS: ICD-10-CM

## 2022-11-16 PROCEDURE — G8427 DOCREV CUR MEDS BY ELIG CLIN: HCPCS | Performed by: INTERNAL MEDICINE

## 2022-11-16 PROCEDURE — 3074F SYST BP LT 130 MM HG: CPT | Performed by: INTERNAL MEDICINE

## 2022-11-16 PROCEDURE — G8484 FLU IMMUNIZE NO ADMIN: HCPCS | Performed by: INTERNAL MEDICINE

## 2022-11-16 PROCEDURE — 94625 PHY/QHP OP PULM RHB W/O MNTR: CPT

## 2022-11-16 PROCEDURE — 3017F COLORECTAL CA SCREEN DOC REV: CPT | Performed by: INTERNAL MEDICINE

## 2022-11-16 PROCEDURE — 1123F ACP DISCUSS/DSCN MKR DOCD: CPT | Performed by: INTERNAL MEDICINE

## 2022-11-16 PROCEDURE — 1036F TOBACCO NON-USER: CPT | Performed by: INTERNAL MEDICINE

## 2022-11-16 PROCEDURE — 99205 OFFICE O/P NEW HI 60 MIN: CPT | Performed by: INTERNAL MEDICINE

## 2022-11-16 PROCEDURE — 3078F DIAST BP <80 MM HG: CPT | Performed by: INTERNAL MEDICINE

## 2022-11-16 PROCEDURE — G8417 CALC BMI ABV UP PARAM F/U: HCPCS | Performed by: INTERNAL MEDICINE

## 2022-11-16 PROCEDURE — 99214 OFFICE O/P EST MOD 30 MIN: CPT | Performed by: INTERNAL MEDICINE

## 2022-11-16 NOTE — PROGRESS NOTES
Ludivina Burgess was seen on 2022 for   Chief Complaint   Patient presents with    New Patient     Vein pain most right leg   .  22 1st [facility] Vascular visit. Comes with his sister Vicente Meek who is very up to date with his health care. . Referred by Dr Supriya Mayers. PCP Dr Rekha Willis. Most vascular care has been through [facility]. Lavena Necessary. No hx of stroke. Had RCEA. CTA showed 60% residual dissection flap. The LICA  Is heavily calcified and has 90%. Hx of AAA. Not repaired. Hx of CHF with most recent ef up to 36. DM CKD 3 cr 1.38  Requip  Stopped smoking in   Has not had procedures on legs. Several yrs of leg problems. Uses a cane \"for stability\"  21 cailin r 0.82  l 0.92  Walks daily and his right calf \"\"starts screaming at me\", then left. Limits his activity. Pain goes away with resting. He limps with walking. Right anterior leg below knee hurts night. Prominent veins, but they don't hurt with standing. No hx of bleeding. Legs swelled up with CHF exacerbation. 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   No right arm sx    Social History     Socioeconomic History    Marital status:      Spouse name: Not on file    Number of children: Not on file    Years of education: Not on file    Highest education level: Not on file   Occupational History    Not on file   Tobacco Use    Smoking status: Former     Packs/day: 1.00     Years: 56.00     Pack years: 56.00     Types: Cigarettes     Quit date: 2022     Years since quittin.3    Smokeless tobacco: Never   Vaping Use    Vaping Use: Never used   Substance and Sexual Activity    Alcohol use: Not on file    Drug use: Not Currently    Sexual activity: Not on file   Other Topics Concern    Not on file   Social History Narrative    Not on file     Social Determinants of Health     Financial Resource Strain: Not on file   Food Insecurity: Not on file   Transportation Needs: Not on file   Physical Activity: Insufficiently Active    Days of Exercise per Week: 6 days    Minutes of Exercise per Session: 20 min   Stress: Not on file   Social Connections: Not on file   Intimate Partner Violence: Not on file   Housing Stability: Not on file     Family History   Problem Relation Age of Onset    Heart Disease Mother     Hypertension Mother     Hypertension Father     Heart Disease Father     Coronary Art Dis Sister     Coronary Art Dis Brother      Past Medical History:   Diagnosis Date    Acute kidney injury ([facility]     Ascites 5/20/2022    Atrial fibrillation (HCC)     CHF (congestive heart failure) ([facility]     [facility] 5/19/2022    CKD (chronic kidney disease) 5/20/2022    Coronary artery disease     Hyperlipidemia     Hypertension     Hypomagnesemia 5/20/2022    Hyponatremia     Thrombocytopenia (HCC)      Current Outpatient Medications   Medication Sig Dispense Refill    magnesium oxide (MAGOX 400) 400 (240 Mg) MG tablet Take 1 tablet by mouth 3 times daily 90 tablet 1    spironolactone (ALDACTONE) 25 MG tablet TAKE 1 TABLET BY MOUTH EVERY DAY IN THE MORNING 90 tablet 1    KLOR-CON M20 20 MEQ extended release tablet TAKE 1 TABLET BY MOUTH EVERY MORNING AND 1 TABLET AGAIN EVERY EVENING. TAKE WITH MEALS. 180 tablet 1    vitamin D (ERGOCALCIFEROL) 1.25 MG (29709 UT) CAPS capsule TAKE 1 CAPSULE BY MOUTH ONE TIME PER WEEK (Patient taking differently:  Wednesday) 12 capsule 0    clopidogrel (PLAVIX) 75 MG tablet Take 1 tablet by mouth daily 90 tablet 3    docusate sodium (COLACE) 100 MG capsule Take 1 capsule by mouth 2 times daily 788 capsule 3    folic acid (FOLVITE) 1 MG tablet TAKE 1 TABLET BY MOUTH EVERY DAY 90 tablet 0    amiodarone (CORDARONE) 200 MG tablet Take 0.5 tablets by mouth daily 45 tablet 3    metoprolol succinate (TOPROL XL) 25 MG extended release tablet Take 0.5 tablets by mouth daily 45 tablet 3    rOPINIRole (REQUIP) 0.5 MG tablet Take 1 tablet by mouth nightly 90 tablet 0    bumetanide (BUMEX) 1 MG tablet Take 1 tablet by mouth daily 90 tablet 1    tamsulosin (FLOMAX) 0.4 MG capsule Take 1 capsule by mouth in the morning. 90 capsule 1    midodrine (PROAMATINE) 10 MG tablet TAKE 1 TABLET BY MOUTH EVERY MORNING, 1 TABLET AT NOON, AND 1 TABLET IN THE EVENING. TAKE WITH MEALS. 90 tablet 3    albuterol sulfate HFA (VENTOLIN HFA) 108 (90 Base) MCG/ACT inhaler Inhale 2 puffs into the lungs 4 times daily as needed for Wheezing 54 g 1    ipratropium-albuterol (DUONEB) 0.5-2.5 (3) MG/3ML SOLN nebulizer solution Inhale 3 mLs into the lungs every 4 hours as needed for Shortness of Breath 360 mL 1    dapagliflozin (FARXIGA) 10 MG tablet Take 1 tablet by mouth every morning Lot #: [id number]  Exp: 10/24  Three boxes given. 21 tablet 0    vitamin B-1 (THIAMINE) 100 MG tablet Take 1 tablet by mouth in the morning. 90 tablet 1    Multiple Vitamin (MULTIVITAMIN) TABS tablet Take 1 tablet by mouth in the morning. 90 tablet 1    pantoprazole (PROTONIX) 40 MG tablet Take 1 tablet by mouth every morning (before breakfast) 90 tablet 1    melatonin 5 mg TABS tablet Take 2 tablets by mouth nightly  0    sacubitril-valsartan (ENTRESTO) 24-26 MG per tablet Take 0.5 tablets by mouth 2 times daily Lot #: [id number]  Exp: July 2024 56 tablet 0    apixaban (ELIQUIS) 5 MG TABS tablet Take 5 mg by mouth 2 times daily      atorvastatin (LIPITOR) 80 MG tablet Take 80 mg by mouth daily      Omega-3 Fatty Acids (FISH OIL CONCENTRATE) 1000 MG CAPS Take 2 caplets by mouth daily      zinc sulfate (ZINCATE) 220 (50 Zn) MG capsule Take 2 capsules by mouth in the morning. (Patient not taking: No sig reported) 30 capsule 3     No current facility-administered medications for this visit.          Physical findings:  General- no acute distress, oriented  HEENT - no xanthelasma, external ears normal  Neck- Supple, no thyromegaly  Carotids - no carotid bruits  Lungs - Clear to auscultation with decreased breath sounds  CV- Regular rate and rythym, no murmurs rubs or gallops  Abdomen - Non tender, non distended, no bruits. No pulsatile mass  Skin- warm, dry, no skin breakdown or gangrene  Extremities - no edema. Right >>left ropey vv    Pulses Right - Femoral 2+  Popliteal: absent  Posterior tibial:    absent  Dorsalis pedis:  absent  Radial absent  Ulnar absent  Brachial absent     Pulses Left -Femoral 2+  Popliteal: absent  Posterior tibial: absent  Dorsalis pedis:  absent  Radial 2+      Assessment:  1. PAD (peripheral artery disease) ([address]    2. Bilateral carotid artery stenosis    3. 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        Electronically signed by Vin Cornejo MD on 11/16/22 at 3:00 PM EST

## 2022-11-16 NOTE — PATIENT INSTRUCTIONS
SURVEY:    You may be receiving a survey from [employer] regarding your visit today. Please complete the survey to enable us to provide the highest quality of care to you and your family. If you cannot score us a very good on any question, please call the office to discuss how we could have made your experience a very good one. Thank you.

## 2022-11-16 NOTE — PROGRESS NOTES
Matthew Bhatia was seen on 11/16/2022 for   Chief Complaint   Patient presents with    New Patient     Vein pain most right leg   .                             REVIEW OF SYSTEMS    Constitutional Weight: absent, A, Fatigue: absent Fever: absent   HEENT Ears: normal,Visual disturbance: absent Sore throat: absent,    Respiratory Shortness of breath: absent, Cough: absent;, Snoring: absent   Cardivascular Chest pain: absent,  Leg pain: present,Leg swelling:absent, Non-healing wound:absent    GI Diarrhea: absent, Abdominal Pain: absent    Urinary frequency: absent, Urinary incontinence: absent   Musculoskeletal Neck pain: absent, Back pain: absent, Restless Leg:absent     Dermatological Hair loss: absent, Skin changes: absent   Neurological  Sudden Loss of Vision in one eye:absent, Slurred Speech:absent, Weakness on one side of body: absent,Headache: absent   Psychiatric Anxiety: present, Depression: present   Hematologic Abnormal bleeding: absent,

## 2022-11-17 ENCOUNTER — HOSPITAL ENCOUNTER (OUTPATIENT)
Dept: CARDIAC REHAB | Age: 72
Setting detail: THERAPIES SERIES
Discharge: HOME | End: 2022-11-17
Payer: MEDICARE

## 2022-11-17 PROCEDURE — 94625 PHY/QHP OP PULM RHB W/O MNTR: CPT

## 2022-11-21 ENCOUNTER — HOSPITAL ENCOUNTER (OUTPATIENT)
Dept: CARDIAC REHAB | Age: 72
Setting detail: THERAPIES SERIES
Discharge: HOME | End: 2022-11-21
Payer: MEDICARE

## 2022-11-21 PROCEDURE — 94625 PHY/QHP OP PULM RHB W/O MNTR: CPT

## 2022-11-23 ENCOUNTER — APPOINTMENT (OUTPATIENT)
Dept: CARDIAC REHAB | Age: 72
End: 2022-11-23
Payer: MEDICARE

## 2022-11-24 ENCOUNTER — APPOINTMENT (OUTPATIENT)
Dept: CARDIAC REHAB | Age: 72
End: 2022-11-24
Payer: MEDICARE

## 2022-11-28 ENCOUNTER — HOSPITAL ENCOUNTER (OUTPATIENT)
Dept: CARDIAC REHAB | Age: 72
Setting detail: THERAPIES SERIES
End: 2022-11-28
Payer: MEDICARE

## 2022-11-29 NOTE — PROGRESS NOTES
Pulmonary Rehab/Outpatient Respiratory Services   Individualized Treatment Plan 90 day    Patient Name: Chris Dugan  Date: 11/29/2022  ACCOUNT #: [de-identified]  Diagnosis: [id number]  Date of Diagnosis/Onset Date:  9/6/22  Referring Physician: Dr. Yamileth Johnson  Session Number: 32    EXERCISE    Stages of Change:   [] pre-contemplation  [x] Action   [] Contemplate    [] Maintenance   [] Prep    [] Relapse           Exercise Prescription:  Mode: [x] TM [x] UBE/BE [x] STP [x] R  Frequency: 3X per week  Duration: 31-60 minutes  Intensity: 3.5-4.0 METs  THRR:  107-117 bpm  Progression:   Increase duration per F.I.T.T. protocol parameters on average 5-10 minutes every 1-2 weeks for the first 4-6 weeks. After 3-4 weeks completed, continue to gradually increase F.I.T.T. parameters gradually at the established duration on average 0.5-1.0 METs per 30 days over the course of the remaining program, as established by patient centered goals and guidelines, maintaining RPE 12-14. Maintain Sp02 >=90% during exercise. [] Resistance Training  Introduce 8-15 bilateral upper extremity resistance exercise at 1-3 sets per lift, on 2-3 non-consecutive days using TheraBand/Weights to 8-15 reps on at lease 2 occasions, maintaining RPE 12-16. Once repetition maximum has been achieved, additional weight sets may be added.     SPO2:  Supplemental 02 titrated to maintain an SpO2 >=90%  [] O2 - Rest: 0 L/min continuous flow via   [] O2 - Exercise:  0 L/min continuous flow via   [x] Bronchodilators:  Duoneb, Ventolin HFA    Hypertension:  [x] Yes  [] No  Resting BP: 122/64  Peak Exercise BP: 142/62  [x] BP medications: Midodrine, Bumex, Toprol XL, Aldactone, Entresto    Intervention:  Home Exercise:  Type: Walking  Duration: 20-60 minutes  Frequency: at least 2 non-rehab days  O2 (L/min): 0  [x] Maintain Sp02 >= 90%  [x] Resistance Training  8-15 bilateral upper extremity resistance exercise at 1-3 sets per lift, on 2-3 non-consecutive days using TheraBand/Weights to 8-15 reps on at lease 2 occasions, maintaining RPE 12-14. Once repetition maximum has been achieved, additional weight sets may be added.     Education:   [x] Equipment [place]     [x] Ex Safety      [x] S/S to report    [x] Warm up/ Cool down      [x] RPE/RPD Scale      [x] Exercise prescription   [x] Home exercise        [] Hot/cold weather guidelines   [] Blood pressure     Target Goal:   - SPO2 > = 90% during exercise  - Resting BP <130/80 mmHg  - Individualized exercise Rx  Nutrition    Stages of Change:   [] pre-contemplation  [x] Action   [] Contemplate    [] Maintenance   [] Prep    [] Relapse     Diabetes:  [] Yes  [x] No  BS:   HbA1c:  Random BS:  BS in range: [] yes [] no  [] Diabetes medications:    Weight Management:  Height: 66 inches  Current Wt: 165.6 lb  BMI: 26.6   [x] Overweight/obese  (BMI > 25)   [] Underweight (BMI <20)  Wt Goal: <150 lb    Intervention:   [] Dietitian Consult - deferred      [] Diet Class      Education:  [] Diabetes  [] Diet and nutrition  [] Weight management  [x] Portions  [x] Snacks and substitutes  [x] Nutrition facts label  [] Food packaging claims  [x] Fat and cholesterol  [x] Sodium     Target Goal:  -HbA1c < 7%  -BMI > 20 and < 25   Education    Stages of Change:    [] pre-contemplation  [x] Action   [] Contemplate    [] Maintenance   [] Prep    [] Relapse     Family support: [x] Yes  [] No    Tobacco Use  Social History     Tobacco Use   Smoking Status Former    Packs/day: 1.00    Years: 56.00    Pack years: 56.00    Types: Cigarettes    Quit date: 2022    Years since quittin.4   Smokeless Tobacco Never   Current smokers:  Longest quit attempt:  Tobacco triggers:  Barriers to successful cessation:  [] Smoking cessation medication:  Breath sounds:    [x] Clear         [x] Left    [x] Right     [] Diminished         [] Left    [] Right   [] Rales         [] Left    [] Right   [] Rhonchi         [] Right [] Left    [] Wheezing         [] Right [] Left     Intervention:  [] Referred to smoking cessation counselor   [] Individual education and counseling  [] Tobacco adjunct    Education:   [x] Respiratory A and P  [] RLD/COPD  [] Preventing infection    [] Breathing techniques    [] Energy conservation   [x] Meds/MDI   [] O2 Therapy  [] Smoking cessation     Target Goal:  -Complete cessation/abstinence from tobacco   -Self-manage condition and prevention exacerbation  -Restore to optimal level of independence  -Improved post-program education assessment score    Psychosocial    Stages of Change:    [] pre-contemplation  [x] Action   [] Contemplate    [] Maintenance   [] Prep    [] Relapse    Intervention:  Evaluate for anxiety, depression, decreased QOL using LYNNE-7/PHQ9/Ferrans and Power QOL surveys      [] Physician Referral    [] Psych Consult/       [] Depression/anxiety medications:    Education:    [] Anxiety   [] Relaxation techniques   [] Depression   [] Stress Management    Target Goal:  -Assess presence or absence of depression using a valid screening tool. -Maximize coping skills.  -Positive support system.     Medication Compliance (stated):    [x] 100%  [] 75%           [] 50%  [] 25%      [] 0%          Target Goal:    -100% Medication adherence    Fall Risk Assessment:  History of falls with or without injury  [] Yes   [x] No   Use of ambulatory aid  [x] Yes  [] No   Difficulty walking/impaired gait  [x] Yes  [] No   Numbness in feet  [] Yes   [x] No   Vision changes  [] Yes  [x] No   Dizziness  [] Yes  [x] No   Shortness of breath  [] Yes  [x] No   Medications  [x] Anticoagulant/Antiplatelet  [x] Betablocker /ACE/ARB  [] Antidepressant  [] Seizure medication   [] NA  Risk of fall  [] Low (none of above)  [] Medium (less than 3 above)  [x] High (3 or more above)    Patient 90-Day Goal(s):  (Specific, Measurable, Achievable, Relevant, and Time-Bound)   \"Increase FC and become more healthy over 90 days\"    Program Goals  Achieve and progress prescribed exercise frequency, intensity, time, and/or type based upon initial evaluation and 6 MWT/submaximal testing results as evidenced by the attaining and maintaining a Darin rating of perceived exertion between 11 and 16, RPD <=5 during exercise, duration of >30 - 60 minutes using multiple exercise modes for at least 3 days per week with a goal of progressively achieving optimal functional capacity and maintain Sp02 90%, evidenced by daily session reports and pre-post exercise evaluation. Patient achieved a 10% FC improvement over the past 30 days. Introduce and progress 8-10 different bilateral UE and/or LE progressive resistance exercises focused on major muscle groups using 1-3 sets each per lift, on 2-3 non-consecutive days implementing free and machine weights and/or bands, as appropriate and increasing resistance once repetitions have progressed to 15 reps and feel fairly light and RPD <=5 on 2 prior occasions. Resistance training deferred. Develop and maintain regular home aerobic exercise program for 20 - 60 minutes at least 2 non-rehab days per week, excluding  5 - 10 minutes warm-up and cool-down periods, being tracked on home workout log. Walked and performs resistance training at home 5-7 days/week. Achieve and maintain an optimal average resting blood pressure of <130/80 mmHg (or as indicated by this patient's referring provider), over the program using lifestyle education, behavior modification, and medication compliance as evidenced by routine resting BP measurements WNL. Resting SBP 90-110s and resting DBP 40-60s over the past 30 days. Establish and adhere to a healthy diet as determined by the dietician, including:   gradually lose /gain 2-4 lb of body weight over the next 30 days, as evidenced by pre- and post-program nutriton survey and routine rounding with patient per patient's self report, food diary, and Rate-your-Plate screening survey. Patient gained 4 pounds over the past 30 days - current BMI 26.6. Minimize self-reported psycho-social feelings of depression and anxiety  and improve/maintain quality of life  through patient education, behavior modification, and medication compliance, as evidenced by pre- and post- through education, behavior modification, and medication complianceas evidenced by and PHQ9, GAD7, and Ferrandrew and Power QOL (pulmonary) survey surveys and by routine rounding with patient. Denied ^ anxiety, stress or depressive symptoms over the past 30 days. Demonstrate an improved patient perception of dyspnea via patient education, lifestyle changes, and regular exercise, as evidenced by a pre/post-program mMRC score comparison. mMRC not repeated over the past 30 days. Demonstrate knowledge of chronic lung disease, exercise, self-management, depression/anxiety management, medications, oxygen, pursed lip breathing, and care of respiratory equipment, with > / = 5% accuracy improvement as evaluated by pre- and post-program education assessment screening tool. Participated in all education classes offered over the past 30 days. Complete abstinence from tobacco products over the pulmonary rehab program through intensive counseling, behavior modification, and medication compliance as evidenced by routine rounding with patient. Remained tobacco free over the past 30 days.     Electronically signed by Nima Thomas RN on 11/29/22 at 7:41 AM EST

## 2022-11-30 ENCOUNTER — HOSPITAL ENCOUNTER (OUTPATIENT)
Dept: CARDIAC REHAB | Age: 72
Setting detail: THERAPIES SERIES
End: 2022-11-30
Payer: MEDICARE

## 2022-12-01 ENCOUNTER — APPOINTMENT (OUTPATIENT)
Dept: CARDIAC REHAB | Age: 72
End: 2022-12-01
Payer: MEDICARE

## 2022-12-02 ENCOUNTER — HOSPITAL ENCOUNTER (OUTPATIENT)
Dept: VASCULAR LAB | Age: 72
End: 2022-12-02
Payer: MEDICARE

## 2022-12-02 DIAGNOSIS — I65.23 BILATERAL CAROTID ARTERY STENOSIS: ICD-10-CM

## 2022-12-02 DIAGNOSIS — I73.9 PAD (PERIPHERAL ARTERY DISEASE) (HCC): ICD-10-CM

## 2022-12-02 PROCEDURE — 93880 EXTRACRANIAL BILAT STUDY: CPT

## 2022-12-02 PROCEDURE — 93925 LOWER EXTREMITY STUDY: CPT

## 2022-12-05 ENCOUNTER — APPOINTMENT (OUTPATIENT)
Dept: CARDIAC REHAB | Age: 72
End: 2022-12-05
Payer: MEDICARE

## 2022-12-05 RX ORDER — ROPINIROLE 0.5 MG/1
0.5 TABLET, FILM COATED ORAL NIGHTLY
Qty: 90 TABLET | Refills: 0 | Status: SHIPPED | OUTPATIENT
Start: 2022-12-05 | End: 2023-01-09 | Stop reason: SDUPTHER

## 2022-12-07 ENCOUNTER — OFFICE VISIT (OUTPATIENT)
Dept: VASCULAR SURGERY | Age: 72
End: 2022-12-07
Payer: MEDICARE

## 2022-12-07 ENCOUNTER — HOSPITAL ENCOUNTER (OUTPATIENT)
Dept: CARDIAC REHAB | Age: 72
Setting detail: THERAPIES SERIES
End: 2022-12-07
Payer: MEDICARE

## 2022-12-07 VITALS
WEIGHT: 169.8 LBS | TEMPERATURE: 97.1 F | SYSTOLIC BLOOD PRESSURE: 86 MMHG | RESPIRATION RATE: 18 BRPM | HEIGHT: 66 IN | HEART RATE: 78 BPM | BODY MASS INDEX: 27.29 KG/M2 | DIASTOLIC BLOOD PRESSURE: 54 MMHG

## 2022-12-07 DIAGNOSIS — I71.43 INFRARENAL ABDOMINAL AORTIC ANEURYSM (AAA) WITHOUT RUPTURE: ICD-10-CM

## 2022-12-07 DIAGNOSIS — I73.9 PAD (PERIPHERAL ARTERY DISEASE) (HCC): Primary | ICD-10-CM

## 2022-12-07 DIAGNOSIS — I70.213 ATHEROSCLEROSIS OF NATIVE ARTERIES OF EXTREMITIES WITH INTERMITTENT CLAUDICATION, BILATERAL LEGS (HCC): ICD-10-CM

## 2022-12-07 DIAGNOSIS — I65.23 BILATERAL CAROTID ARTERY STENOSIS: ICD-10-CM

## 2022-12-07 PROCEDURE — 99214 OFFICE O/P EST MOD 30 MIN: CPT | Performed by: INTERNAL MEDICINE

## 2022-12-07 NOTE — PROGRESS NOTES
Percy Olivo was seen on 12/7/2022 for   Chief Complaint   Patient presents with    Follow-up     Ultrasound results  Terrible pain in right foot and shin   .                             REVIEW OF SYSTEMS    Constitutional Weight: absent, A, Fatigue: absent Fever: absent   HEENT Ears: normal,Visual disturbance: absent Sore throat: absent,    Respiratory Shortness of breath: absent, Cough: absent;, Snoring: absent   Cardivascular Chest pain: absent,  Leg pain: present,Leg swelling:absent, Non-healing wound:absent    GI Diarrhea: absent, Abdominal Pain: absent    Urinary frequency: absent, Urinary incontinence: absent   Musculoskeletal Neck pain: absent, Back pain: absent, Restless Leg:absent     Dermatological Hair loss: absent, Skin changes: absent   Neurological  Sudden Loss of Vision in one eye:absent, Slurred Speech:absent, Weakness on one side of body: absent,Headache: absent   Psychiatric Anxiety: absent, Depression: absent   Hematologic Abnormal bleeding: absent,

## 2022-12-07 NOTE — PROGRESS NOTES
Anali Chopra was seen on 2022 for   Chief Complaint   Patient presents with    Follow-up     Ultrasound results  Terrible pain in right foot and shin   . 22 1st [facility] visit. Comes with his sister Maicol Umanzor who is very up to date with his health care. . Referred by Dr Linda Rucker. PCP Dr Jabari Crane. Most vascular care has been through [facility]. Akbar Charles. No hx of stroke. Had RCEA. CTA showed 60% residual dissection flap. The LICA  Is heavily calcified and has 90%. Hx of AAA. Not repaired. Hx of CHF with most recent ef up to 36. DM CKD 3 cr 1.38  Requip  Stopped smoking in   Has not had procedures on legs. Several yrs of leg problems. Uses a cane \"for stability\"  21 cailin r 0.82  l 0.92  Walks daily and his right calf \"\"starts screaming at me\", then left. Limits his activity. Pain goes away with resting. He limps with walking. Right anterior leg below knee hurts night. Prominent veins, but they don't hurt with standing. No hx of bleeding. Legs swelled up with CHF exacerbation. 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 Worse than on last visit. Takes a lot of tylenol with little benefit. 22 duplex showed critical right fem pop stenosis with trickle popliteal flow. LCFA questionable as access vessel. RCFA somewhat better. Carotid duplex FRAN 45-85 (345/82  9.82) LICA 48-58.  L Vert antegrade R vert bidirectional.   Is still on eliquis       Social History     Socioeconomic History    Marital status:      Spouse name: Not on file    Number of children: Not on file    Years of education: Not on file    Highest education level: Not on file   Occupational History    Not on file   Tobacco Use    Smoking status: Former     Packs/day: 1.00     Years: 56.00     Pack years: 56.00     Types: Cigarettes     Quit date: 2022     Years since quittin.4    Smokeless tobacco: Never   Vaping Use    Vaping Use: Never used   Substance and Sexual Activity    Alcohol use: Not on file    Drug use: Not Currently    Sexual activity: Not on file   Other Topics Concern    Not on file   Social History Narrative    Not on file     Social Determinants of Health     Financial Resource Strain: Not on file   Food Insecurity: Not on file   Transportation Needs: Not on file   Physical Activity: Insufficiently Active    Days of Exercise per Week: 6 days    Minutes of Exercise per Session: 20 min   Stress: Not on file   Social Connections: Not on file   Intimate Partner Violence: Not on file   Housing Stability: Not on file     Family History   Problem Relation Age of Onset    Heart Disease Mother     Hypertension Mother     Hypertension Father     Heart Disease Father     Coronary Art Dis Sister     Coronary Art Dis Brother      Past Medical History:   Diagnosis Date    Acute kidney injury ([facility] [address]     Ascites 5/20/2022    Atrial fibrillation (HCC)     CHF (congestive heart failure) ([facility] [address]     [facility]ites [facility] 5/19/2022    CKD (chronic kidney disease) 5/20/2022    Coronary artery disease     Hyperlipidemia     Hypertension     Hypomagnesemia 5/20/2022    Hyponatremia     Thrombocytopenia (HCC)      Current Outpatient Medications   Medication Sig Dispense Refill    rOPINIRole (REQUIP) 0.5 MG tablet Take 1 tablet by mouth nightly 90 tablet 0    magnesium oxide (MAGOX 400) 400 (240 Mg) MG tablet Take 1 tablet by mouth 3 times daily 90 tablet 1    spironolactone (ALDACTONE) 25 MG tablet TAKE 1 TABLET BY MOUTH EVERY DAY IN THE MORNING 90 tablet 1    KLOR-CON M20 20 MEQ extended release tablet TAKE 1 TABLET BY MOUTH EVERY MORNING AND 1 TABLET AGAIN EVERY EVENING. TAKE WITH MEALS. 180 tablet 1    vitamin D (ERGOCALCIFEROL) 1.25 MG (28298 UT) CAPS capsule TAKE 1 CAPSULE BY MOUTH ONE TIME PER WEEK (Patient taking differently:  Wednesday) 12 capsule 0    clopidogrel (PLAVIX) 75 MG tablet Take 1 tablet by mouth daily 90 tablet 3    docusate sodium (COLACE) 100 MG capsule Take 1 capsule by mouth 2 times daily 959 capsule 3    folic acid (FOLVITE) 1 MG tablet TAKE 1 TABLET BY MOUTH EVERY DAY 90 tablet 0    amiodarone (CORDARONE) 200 MG tablet Take 0.5 tablets by mouth daily 45 tablet 3    metoprolol succinate (TOPROL XL) 25 MG extended release tablet Take 0.5 tablets by mouth daily 45 tablet 3    bumetanide (BUMEX) 1 MG tablet Take 1 tablet by mouth daily 90 tablet 1    tamsulosin (FLOMAX) 0.4 MG capsule Take 1 capsule by mouth in the morning. 90 capsule 1    midodrine (PROAMATINE) 10 MG tablet TAKE 1 TABLET BY MOUTH EVERY MORNING, 1 TABLET AT NOON, AND 1 TABLET IN THE EVENING. TAKE WITH MEALS. 90 tablet 3    zinc sulfate (ZINCATE) 220 (50 Zn) MG capsule Take 2 capsules by mouth in the morning. 30 capsule 3    albuterol sulfate HFA (VENTOLIN HFA) 108 (90 Base) MCG/ACT inhaler Inhale 2 puffs into the lungs 4 times daily as needed for Wheezing 54 g 1    ipratropium-albuterol (DUONEB) 0.5-2.5 (3) MG/3ML SOLN nebulizer solution Inhale 3 mLs into the lungs every 4 hours as needed for Shortness of Breath 360 mL 1    dapagliflozin (FARXIGA) 10 MG tablet Take 1 tablet by mouth every morning Lot #: [id number]  Exp: 10/24  Three boxes given. 21 tablet 0    vitamin B-1 (THIAMINE) 100 MG tablet Take 1 tablet by mouth in the morning. 90 tablet 1    Multiple Vitamin (MULTIVITAMIN) TABS tablet Take 1 tablet by mouth in the morning.  90 tablet 1    pantoprazole (PROTONIX) 40 MG tablet Take 1 tablet by mouth every morning (before breakfast) 90 tablet 1    melatonin 5 mg TABS tablet Take 2 tablets by mouth nightly  0    sacubitril-valsartan (ENTRESTO) 24-26 MG per tablet Take 0.5 tablets by mouth 2 times daily Lot #: [id number]  Exp: July 2024 56 tablet 0    apixaban (ELIQUIS) 5 MG TABS tablet Take 5 mg by mouth 2 times daily      atorvastatin (LIPITOR) 80 MG tablet Take 80 mg by mouth daily      Omega-3 Fatty Acids (FISH OIL CONCENTRATE) 1000 MG CAPS Take 2 caplets by mouth daily       No current facility-administered medications for this visit. Physical findings:  General- no acute distress, oriented  HEENT - no xanthelasma, external ears normal  Neck- Supple, no thyromegaly  Skin- warm, dry, no skin breakdown or gangrene. Foot is warm although he says it feels \"frostbitten\"  Extremities - no edema    Pulses Right - Femoral absent  Radal: absent  Posterior tibial:    absent  Dorsalis pedis:  absent     Pulses Left -Femoral absent  Radal: 2+  Posterior tibial:    absent  Dorsalis pedis:  absent      Assessment:  1. PAD (peripheral artery disease) ([address]    2. Bilateral carotid artery stenosis       Extremely complex case. He has right carotid post cea restenosis, left carotid stenosis, probable right sca stenosis, and severe bilateral le pad, now with poncho 4 right leg sx. He also has chf and ckd  Plan of care:  12/20/22 left radial access after hydration and holding eliquis  Attempt to revascularize right leg  Will check for aaa prior by US  45 min chart review and pt encounter  Follow up and evaluate.        Electronically signed by Nikolas Mack MD on 12/7/22 at 3:10 PM EST

## 2022-12-09 ENCOUNTER — HOSPITAL ENCOUNTER (OUTPATIENT)
Dept: ULTRASOUND IMAGING | Age: 72
End: 2022-12-09
Payer: MEDICARE

## 2022-12-09 DIAGNOSIS — I71.43 INFRARENAL ABDOMINAL AORTIC ANEURYSM (AAA) WITHOUT RUPTURE: ICD-10-CM

## 2022-12-09 PROCEDURE — 93978 VASCULAR STUDY: CPT

## 2022-12-12 RX ORDER — MIDODRINE HYDROCHLORIDE 10 MG/1
TABLET ORAL
Qty: 270 TABLET | Refills: 1 | Status: SHIPPED | OUTPATIENT
Start: 2022-12-12

## 2022-12-14 ENCOUNTER — HOSPITAL ENCOUNTER (OUTPATIENT)
Dept: PHARMACY | Age: 72
Setting detail: THERAPIES SERIES
Discharge: HOME | End: 2022-12-14
Payer: MEDICARE

## 2022-12-14 ENCOUNTER — HOSPITAL ENCOUNTER (OUTPATIENT)
Age: 72
Discharge: HOME | End: 2022-12-14
Payer: MEDICARE

## 2022-12-14 VITALS
BODY MASS INDEX: 27.44 KG/M2 | WEIGHT: 170 LBS | HEART RATE: 76 BPM | DIASTOLIC BLOOD PRESSURE: 41 MMHG | SYSTOLIC BLOOD PRESSURE: 74 MMHG

## 2022-12-14 DIAGNOSIS — I50.43 ACUTE ON CHRONIC COMBINED SYSTOLIC AND DIASTOLIC CHF, NYHA CLASS 4 (HCC): Primary | ICD-10-CM

## 2022-12-14 DIAGNOSIS — I50.43 ACUTE ON CHRONIC COMBINED SYSTOLIC AND DIASTOLIC CHF, NYHA CLASS 4 (HCC): ICD-10-CM

## 2022-12-14 DIAGNOSIS — I25.5 ISCHEMIC CARDIOMYOPATHY: ICD-10-CM

## 2022-12-14 LAB
ANION GAP SERPL CALCULATED.3IONS-SCNC: 8 MMOL/L
BUN BLDV-MCNC: 26 MG/DL (ref 8–23)
BUN/CREAT BLD: 18 (ref 9–20)
CALCIUM SERPL-MCNC: 10.2 MG/DL (ref 8.6–10.4)
CHLORIDE BLD-SCNC: 102 MMOL/L (ref 98–107)
CO2: 29 MMOL/L (ref 20–31)
CREAT SERPL-MCNC: 1.41 MG/DL (ref 0.7–1.2)
GFR SERPL CREATININE-BSD FRML MDRD: 53 ML/MIN/1.73M2
GLUCOSE BLD-MCNC: 112 MG/DL (ref 70–99)
POTASSIUM SERPL-SCNC: 5.1 MMOL/L (ref 3.7–5.3)
PRO-BNP: 624 PG/ML
SODIUM BLD-SCNC: 139 MMOL/L (ref 135–144)

## 2022-12-14 PROCEDURE — 83880 ASSAY OF NATRIURETIC PEPTIDE: CPT

## 2022-12-14 PROCEDURE — 99212 OFFICE O/P EST SF 10 MIN: CPT

## 2022-12-14 PROCEDURE — 36415 COLL VENOUS BLD VENIPUNCTURE: CPT

## 2022-12-14 PROCEDURE — 80048 BASIC METABOLIC PNL TOTAL CA: CPT

## 2022-12-14 NOTE — PROGRESS NOTES
[facility]  Medication Management  Pharmacist  Heart Failure    [address] [facility]  [place] [address]  Phone: [phone]  Fax: [phone]      NAME: Eleuterio Amaro RECORD NUMBER:  [id number]  AGE: 67 y.o. GENDER: male  : 1950  EPISODE DATE:  2022      Heart Failure Management referral by Dr. Dolly Pitts Wt: 170 lb  Wt Readings from Last 6 Encounters:   22 170 lb (77.1 kg)   22 169 lb 12.8 oz (77 kg)   22 162 lb 6.4 oz (73.7 kg)   22 161 lb (73 kg)   10/18/22 158 lb (71.7 kg)   22 155 lb (70.3 kg)    and   Ht Readings from Last 1 Encounters:   22 5' 6\" (1.676 m)                BP 74/41   HR:76  O2Sat: 95     Extremities and pitting edema none  Skin warm and dry with viscosities. Subjective   Mr. Jose De Jesus Alexander is a 67 y.o. male here for the Heart Failure Services. They are here today for a comprehensive medication review including over the counter medications and herbal products, overall wellbeing assessment, transition of care and any needed adjustments with updates and recommendations communicated to the referring physician. Patient Symptoms Patient is here for 4 week follow up with his sister, Jyoti Okeefe, who helps him with appts and his medications. He is NOT attending cardiac rehab due to his foot and leg blockages. Denies SOB. Patient denies dizziness, lightheadedness, falls. Patient's weight is increased 9 pounds. Patient reports he is eating more - his appetite has increased/improved. Patient is having his leg angioplasty 22.     Shortness of Breath:   []   None   [x]   Dyspnea on exertion   []   Dyspnea with normal activities  []   Dyspnea at rest  []   Dyspnea while sleeping    Patient Findings:   []  Missed doses  []  Diet changes  []  Sodium intake changes   []  Night time cough   []  Other   []  Early saiety, abd fullness []  Chest pain   [x]  Constipation   []  Night time bathroom trips  []  Alcohol intake changes  []  Acute illness  []  Edema    []  Number of pillows or recliner  []  Activity changes   []  Fatigue that limits activity []  Heart racing or skipping beats  []  Light headedness  []  Dizziness    []      [x]  Problems sleeping    Functional Activity New York Heart Association Classification  []   Class I No limitation of physical activity. Ordinary physical activity does not cause undue fatigue, palpitation, dyspnea (shortness of breath). [x]   Class II Slight limitation of physical activity. Comfortable at rest. Ordinary physical activity results in fatigue, palpitation, dyspnea (shortness of breath). []   Class III  Anil limitation of physical activity. Comfortable at rest.  Less than ordinary activity causes fatigue, palpitation, dyspnea. []   Class IV Unable to carry on any physical activity without discomfort. Symptoms of heart failure at rest.  If any physical activity is undertaken, discomfort increases. Last CHF Hospital Admission:  8/3/22-8/13/22    OUTPATIENT MEDICATIONS:  Outpatient Medications Marked as Taking for the 12/14/22 encounter [facility] HOSPITAL Encounter) with [address]   Medication Sig Dispense Refill    midodrine (PROAMATINE) 10 MG tablet TAKE 1 TABLET BY MOUTH EVERY MORNING, 1 TABLET AT NOON, AND 1 TABLET IN THE EVENING. TAKE WITH MEALS 270 tablet 1    rOPINIRole (REQUIP) 0.5 MG tablet Take 1 tablet by mouth nightly 90 tablet 0    magnesium oxide (MAGOX 400) 400 (240 Mg) MG tablet Take 1 tablet by mouth 3 times daily 90 tablet 1    spironolactone (ALDACTONE) 25 MG tablet TAKE 1 TABLET BY MOUTH EVERY DAY IN THE MORNING 90 tablet 1    KLOR-CON M20 20 MEQ extended release tablet TAKE 1 TABLET BY MOUTH EVERY MORNING AND 1 TABLET AGAIN EVERY EVENING. TAKE WITH MEALS. 180 tablet 1    vitamin D (ERGOCALCIFEROL) 1.25 MG (72820 UT) CAPS capsule TAKE 1 CAPSULE BY MOUTH ONE TIME PER WEEK (Patient taking differently:  Wednesday) 12 capsule 0 clopidogrel (PLAVIX) 75 MG tablet Take 1 tablet by mouth daily 90 tablet 3    docusate sodium (COLACE) 100 MG capsule Take 1 capsule by mouth 2 times daily 925 capsule 3    folic acid (FOLVITE) 1 MG tablet TAKE 1 TABLET BY MOUTH EVERY DAY 90 tablet 0    amiodarone (CORDARONE) 200 MG tablet Take 0.5 tablets by mouth daily 45 tablet 3    metoprolol succinate (TOPROL XL) 25 MG extended release tablet Take 0.5 tablets by mouth daily 45 tablet 3    tamsulosin (FLOMAX) 0.4 MG capsule Take 1 capsule by mouth in the morning. 90 capsule 1    albuterol sulfate HFA (VENTOLIN HFA) 108 (90 Base) MCG/ACT inhaler Inhale 2 puffs into the lungs 4 times daily as needed for Wheezing 54 g 1    ipratropium-albuterol (DUONEB) 0.5-2.5 (3) MG/3ML SOLN nebulizer solution Inhale 3 mLs into the lungs every 4 hours as needed for Shortness of Breath 360 mL 1    dapagliflozin (FARXIGA) 10 MG tablet Take 1 tablet by mouth every morning Lot #: [id number]  Exp: 10/24  Three boxes given. 21 tablet 0    vitamin B-1 (THIAMINE) 100 MG tablet Take 1 tablet by mouth in the morning. 90 tablet 1    Multiple Vitamin (MULTIVITAMIN) TABS tablet Take 1 tablet by mouth in the morning.  90 tablet 1    pantoprazole (PROTONIX) 40 MG tablet Take 1 tablet by mouth every morning (before breakfast) 90 tablet 1    melatonin 5 mg TABS tablet Take 2 tablets by mouth nightly  0    sacubitril-valsartan (ENTRESTO) 24-26 MG per tablet Take 0.5 tablets by mouth 2 times daily Lot #: [id number]  Exp: July 2024 56 tablet 0    apixaban (ELIQUIS) 5 MG TABS tablet Take 5 mg by mouth 2 times daily      atorvastatin (LIPITOR) 80 MG tablet Take 80 mg by mouth daily      Omega-3 Fatty Acids (FISH OIL CONCENTRATE) 1000 MG CAPS Take 2 caplets by mouth daily           Objective / Assessment     Patient Active Problem List   Diagnosis Code    Atrial fibrillation with RVR ([facility]) I48.91    Acute on chronic combined systolic and diastolic CHF, NYHA class 4 ([facility]) I50.43    Coronary artery disease I25.10    Hypertension I10    Thrombocytopenia (HCC) D69.6    Hyponatremia E87.1    Hyperlipidemia E78.5    Cirrhosis of liver with ascites (HCC) K74.60, R18.8    Hypomagnesemia E83.42    Ascites R18.8    CKD (chronic kidney disease) N18.9    History of acute inferior wall MI I25.2    Ischemic cardiomyopathy I25.5    Chronic combined systolic (congestive) and diastolic (congestive) heart failure (HCC) I50.42    Dehydration, moderate E86.0    Medication side effect, initial encounter T50.905A    Severe malnutrition (HCC) E43    PAF (paroxysmal atrial fibrillation) (HCC) I48.0    Lightheaded R42    Tobacco abuse counseling Z71.6    Gastroesophageal reflux disease K21.9    Hypotension I95.9    Acute on chronic combined systolic and diastolic CHF (congestive heart failure) (HCC) I50.43    Hypokalemia E87.6    Hypovitaminosis D E55.9    Hypoxia R09.02    Nonischemic cardiomyopathy ([address] I42.8    Acute respiratory failure with hypoxia (HCC) J96.01    Encounter for current long-term use of anticoagulants Z79.01    Bilateral pleural effusion J90    Pancytopenia (HCC) D61.818    Mild malnutrition (HCC) E44.1    Chronic combined systolic and diastolic congestive heart failure (HCC) I50.42    Type 2 diabetes mellitus E11.9    Chronic renal disease, stage III [facility]) [[id number]] N18.30     Social History     Tobacco Use    Smoking status: Former     Packs/day: 1.00     Years: 56.00     Pack years: 56.00     Types: Cigarettes     Quit date: 2022     Years since quittin.4    Smokeless tobacco: Never   Vaping Use    Vaping Use: Never used   Substance Use Topics    Drug use: Not Currently       Potassium (mmol/L)   Date Value   2022 5.1   2022 4.8   10/24/2022 4.8   2022 4.6   2022 5.5 (H)   2022 5.3     BUN (mg/dL)   Date Value   2022 26 (H)   2022 26 (H)   10/24/2022 28 (H)   2022 24 (H)   2022 30 (H)   2022 28 (H)     Creatinine (mg/dL)   Date Value   2022 1.41 (H)   11/02/2022 1.45 (H)   10/24/2022 1.63 (H)   09/20/2022 1.28 (H)   09/12/2022 1.46 (H)   09/06/2022 1.38 (H)     TSH (uIU/mL)   Date Value   09/12/2022 6.02 (H)   07/25/2022 5.77 (H)   06/06/2022 3.69   05/11/2022 4.52      Latest Reference Range & Units Most Recent   Pro-BNP <300 pg/mL 624 (H)  12/14/22 12:28   (H): Data is abnormally high      Plan:      Chronic Systolic and/or Diastolic Heart Failure (diagnosis): EF: 40% via echocardiogram on 8/25/22  Beta Blocker for EF </= 40%: metoprolol succinate (Toprol XL) 25 mg - 0.5 tablet (=12.5 mg) po daily  Diuretics: bumetanide 1 mg po daily  ACE/ARB/ARNI for EF </= 40%: Entresto 24/26 - 0.5 tablet po BID  Spironolactone for EF </= 35%: Spironolactone 25 mg - 0.5 tablet (=12.5 mg) po daily  Farxiga 10 mg po once daily  Nonpharmacologic management of Heart Failure: I told patient to continue wearing lower extremity compression stockings and I advised patient to try and keep legs up whenever possible and to limit salt in diet. Laboratory testing:   Basic metabolic panel (BMP) today to assess potassium and renal function. BNP today to assess fluid. Paroxysmal Atrial Fibrillation: Rhythm Control Asymptomatic   Anti-Arrhythmic: Continue amiodarone (Pacerone) 100 mg once daily. Monitoring: Since they will being maintained on Amiodarone, I told them that we will need to closely monitor them for potential side effects. These include monitoring LFTs and TSH at least every 6 months as well as chest x-rays, pulmonary function tests, and eye exams at least yearly. TSH/Thyroxine, free 9/12/22   LFTs 9/12/22 wnl  Beta Blocker: Continue Metoprolol succinate (Toprol XL) 25 mg 1/2 tab daily.   [id number] Score for Atrial Fibrillation Stroke Risk    Risk   Factors   Component Value   C CHF Yes 1   H HTN Yes 1   A2 Age >= 75 No,  (73 y.o.) 0   D DM Yes 1   S2 Prior Stroke/TIA No 0   V Vascular Disease No 0   A Age 74-69 Yes,  (73 y.o.) 1   Sc Sex male 0 Denies falls, but is at risk     Patient admits to probably not drinking enough throughout the day. Repeat BMP today - shows improvement in serum creatinine. I encouraged patient to stay hydrated and try to drink more throughout the day. He will continue with one 12 ounce sports drink daily also. Educated to call clinic right away of any dizziness, lightheadedness or falls. Medication changes since last visit none    Approved plan with Dr Denny Gordon.     Follow up Dr Denny Gordon 23  Follow up [address] [facility] 3-1-23    Thank you for the referral,    Joyce Melton, [facility] PharmD    For Pharmacy Admin Tracking Only    Intervention Detail: Adherence Monitorin and Lab(s) Ordered  Total # of Interventions Recommended: 2  Total # of Interventions Accepted: 2  Time Spent (min):  90

## 2022-12-14 NOTE — DISCHARGE INSTRUCTIONS
HEART FAILURE:    With heart failure you must follow up with your Cardiologist, your PCP and other physicians as appropriate - especially 7 days after a hospitalization and then as directed. Please call right away with any signs or symptoms of heart failure or other medical conditions that need attention or with any questions at all. Please call your Cardiologist or your PCP or the [facility] at [phone]. We can help manage these symptoms and often prevent a visit to the Emergency Room or hospitalization. * Know your dry weight (your weight without any fluid on board)    * Call any time you have questions about anything. Do not wait. * It is very important to take your medications as prescribed, do not miss any doses. Call for refills before you run out. Typically when you have one week left. If you have trouble getting your medications for any reason, call us at [phone]. * Avoid NSAIDs (non steroidal anti inflammatory drugs) like Aleve (naproxen), Advil and Motrin (ibuprofen), Mobic (diclofenac), Celebrex (celecoxib) and aspirin. A daily aspirin is okay if recommended by your physician. It is okay to take Tylenol (acetaminophen) unless your physician has told you otherwise. * Limit fluid intake. Typically this is no more than 1,500-2,000 milliliters (mL) per day. This is a liter and a half to two liters. This is equal to approximately 48-64 ounces (oz) per day    * Limit sodium intake. Typically this is no more than 2,000-2,400 milligrams (mg) per day. You will need to add up the sodium content found on the nutrition label for the foods you eat each day. * Weigh yourself every day. Weigh yourself before breakfast and after you go to the restroom. Wear the same thing each time you weigh yourself. Keep a log and bring it to each visit.    Call if you gain 3 or more pounds in 1-7 days - [phone] * If you have hypertension (high blood pressure), you may want to check your blood pressure daily. Your blood pressure goal is less than 130/80 unless instructed differently by your physician. Keep a log and bring it to each visit. * Be sure to keep good control of your Diabetes     * Be sure to keep good control of your Cholesterol    * Eat a Heart healthy diet    * Be active. Follow an exercise plan that is reasonable for you. * If you smoke, work to stop smoking. Best to avoid alcohol.

## 2022-12-15 ENCOUNTER — TELEPHONE (OUTPATIENT)
Dept: CARDIOLOGY | Age: 72
End: 2022-12-15

## 2022-12-15 RX ORDER — LANOLIN ALCOHOL/MO/W.PET/CERES
400 CREAM (GRAM) TOPICAL 3 TIMES DAILY
Qty: 270 TABLET | Refills: 1 | Status: SHIPPED | OUTPATIENT
Start: 2022-12-15

## 2022-12-15 NOTE — TELEPHONE ENCOUNTER
----- Message from Yehuda Cabrera MD sent at 12/15/2022 12:28 AM EST -----  Let Mr. Rusty Rojas know their test result was ok. Will discuss at next visit. Thanks.

## 2022-12-17 DIAGNOSIS — E55.9 HYPOVITAMINOSIS D: ICD-10-CM

## 2022-12-17 DIAGNOSIS — I50.42 CHRONIC COMBINED SYSTOLIC AND DIASTOLIC CONGESTIVE HEART FAILURE (HCC): ICD-10-CM

## 2022-12-19 RX ORDER — DAPAGLIFLOZIN 10 MG/1
TABLET, FILM COATED ORAL
Qty: 90 TABLET | Refills: 3 | Status: SHIPPED | OUTPATIENT
Start: 2022-12-19

## 2022-12-19 RX ORDER — ERGOCALCIFEROL 1.25 MG/1
50000 CAPSULE ORAL WEEKLY
Qty: 12 CAPSULE | Refills: 0 | Status: SHIPPED | OUTPATIENT
Start: 2022-12-19

## 2022-12-20 ENCOUNTER — HOSPITAL ENCOUNTER (OUTPATIENT)
Dept: INTERVENTIONAL RADIOLOGY/VASCULAR | Age: 72
Discharge: HOME | End: 2022-12-20
Attending: INTERNAL MEDICINE | Admitting: INTERNAL MEDICINE
Payer: MEDICARE

## 2022-12-20 VITALS
DIASTOLIC BLOOD PRESSURE: 41 MMHG | SYSTOLIC BLOOD PRESSURE: 103 MMHG | HEART RATE: 62 BPM | OXYGEN SATURATION: 99 % | RESPIRATION RATE: 17 BRPM

## 2022-12-20 DIAGNOSIS — I70.213 ATHEROSCLEROSIS OF NATIVE ARTERIES OF EXTREMITIES WITH INTERMITTENT CLAUDICATION, BILATERAL LEGS (HCC): ICD-10-CM

## 2022-12-20 DIAGNOSIS — I73.9 PAD (PERIPHERAL ARTERY DISEASE) (HCC): ICD-10-CM

## 2022-12-20 LAB
ABSOLUTE EOS #: 0.17 K/UL (ref 0–0.44)
ABSOLUTE IMMATURE GRANULOCYTE: <0.03 K/UL (ref 0–0.3)
ABSOLUTE LYMPH #: 1.38 K/UL (ref 1.1–3.7)
ABSOLUTE MONO #: 0.58 K/UL (ref 0.1–1.2)
ACTIVATED CLOTTING TIME: 211 SEC (ref 79–149)
ACTIVATED CLOTTING TIME: 237 SEC (ref 79–149)
ALBUMIN SERPL-MCNC: 4.6 G/DL (ref 3.5–5.2)
ALBUMIN/GLOBULIN RATIO: 1.5 (ref 1–2.5)
ALP BLD-CCNC: 69 U/L (ref 40–129)
ALT SERPL-CCNC: 13 U/L (ref 5–41)
ANION GAP SERPL CALCULATED.3IONS-SCNC: 12 MMOL/L (ref 9–17)
AST SERPL-CCNC: 18 U/L
BASOPHILS # BLD: 0 % (ref 0–2)
BASOPHILS ABSOLUTE: <0.03 K/UL (ref 0–0.2)
BILIRUB SERPL-MCNC: 0.4 MG/DL (ref 0.3–1.2)
BUN BLDV-MCNC: 37 MG/DL (ref 8–23)
BUN/CREAT BLD: 20 (ref 9–20)
CALCIUM SERPL-MCNC: 10.1 MG/DL (ref 8.6–10.4)
CHLORIDE BLD-SCNC: 97 MMOL/L (ref 98–107)
CO2: 28 MMOL/L (ref 20–31)
CREAT SERPL-MCNC: 1.85 MG/DL (ref 0.7–1.2)
EOSINOPHILS RELATIVE PERCENT: 3 % (ref 1–4)
GFR SERPL CREATININE-BSD FRML MDRD: 38 ML/MIN/1.73M2
GLUCOSE BLD-MCNC: 90 MG/DL (ref 70–99)
HCT VFR BLD CALC: 43 % (ref 40.7–50.3)
HEMOGLOBIN: 15 G/DL (ref 13–17)
IMMATURE GRANULOCYTES: 0 %
INR BLD: 1
LYMPHOCYTES # BLD: 22 % (ref 24–43)
MCH RBC QN AUTO: 34.6 PG (ref 25.2–33.5)
MCHC RBC AUTO-ENTMCNC: 34.9 G/DL (ref 28.4–34.8)
MCV RBC AUTO: 99.1 FL (ref 82.6–102.9)
MONOCYTES # BLD: 9 % (ref 3–12)
NRBC AUTOMATED: 0 PER 100 WBC
PARTIAL THROMBOPLASTIN TIME: 29.2 SEC (ref 26.8–34.8)
PDW BLD-RTO: 13.3 % (ref 11.8–14.4)
PLATELET # BLD: ABNORMAL K/UL (ref 138–453)
PLATELET, FLUORESCENCE: 105 K/UL (ref 138–453)
PLATELET, IMMATURE FRACTION: 8.1 % (ref 1.1–10.3)
POTASSIUM SERPL-SCNC: 4.5 MMOL/L (ref 3.7–5.3)
PROTHROMBIN TIME: 13.5 SEC (ref 11.5–14.2)
RBC # BLD: 4.34 M/UL (ref 4.21–5.77)
SEG NEUTROPHILS: 65 % (ref 36–65)
SEGMENTED NEUTROPHILS ABSOLUTE COUNT: 4.09 K/UL (ref 1.5–8.1)
SODIUM BLD-SCNC: 137 MMOL/L (ref 135–144)
TOTAL PROTEIN: 7.6 G/DL (ref 6.4–8.3)
WBC # BLD: 6.3 K/UL (ref 3.5–11.3)

## 2022-12-20 PROCEDURE — 36415 COLL VENOUS BLD VENIPUNCTURE: CPT

## 2022-12-20 PROCEDURE — 2709999900 HC NON-CHARGEABLE SUPPLY

## 2022-12-20 PROCEDURE — 85055 RETICULATED PLATELET ASSAY: CPT

## 2022-12-20 PROCEDURE — 36245 INS CATH ABD/L-EXT ART 1ST: CPT

## 2022-12-20 PROCEDURE — 85610 PROTHROMBIN TIME: CPT

## 2022-12-20 PROCEDURE — C1887 CATHETER, GUIDING: HCPCS

## 2022-12-20 PROCEDURE — 2500000003 HC RX 250 WO HCPCS

## 2022-12-20 PROCEDURE — 6370000000 HC RX 637 (ALT 250 FOR IP): Performed by: INTERNAL MEDICINE

## 2022-12-20 PROCEDURE — 85025 COMPLETE CBC W/AUTO DIFF WBC: CPT

## 2022-12-20 PROCEDURE — C1894 INTRO/SHEATH, NON-LASER: HCPCS

## 2022-12-20 PROCEDURE — C1769 GUIDE WIRE: HCPCS

## 2022-12-20 PROCEDURE — 99152 MOD SED SAME PHYS/QHP 5/>YRS: CPT

## 2022-12-20 PROCEDURE — 85730 THROMBOPLASTIN TIME PARTIAL: CPT

## 2022-12-20 PROCEDURE — 6360000002 HC RX W HCPCS

## 2022-12-20 PROCEDURE — 99153 MOD SED SAME PHYS/QHP EA: CPT

## 2022-12-20 PROCEDURE — 37221 HC ILIAC TERRITORY PLASTY STENT: CPT

## 2022-12-20 PROCEDURE — 85347 COAGULATION TIME ACTIVATED: CPT

## 2022-12-20 PROCEDURE — 80053 COMPREHEN METABOLIC PANEL: CPT

## 2022-12-20 PROCEDURE — 2580000003 HC RX 258: Performed by: INTERNAL MEDICINE

## 2022-12-20 PROCEDURE — C1725 CATH, TRANSLUMIN NON-LASER: HCPCS

## 2022-12-20 RX ORDER — SODIUM CHLORIDE 9 MG/ML
INJECTION, SOLUTION INTRAVENOUS CONTINUOUS
Status: DISCONTINUED | OUTPATIENT
Start: 2022-12-20 | End: 2022-12-20 | Stop reason: HOSPADM

## 2022-12-20 RX ORDER — ASPIRIN 81 MG/1
81 TABLET, CHEWABLE ORAL DAILY
Status: DISCONTINUED | OUTPATIENT
Start: 2022-12-20 | End: 2022-12-20 | Stop reason: HOSPADM

## 2022-12-20 RX ADMIN — ASPIRIN 81 MG CHEWABLE TABLET 81 MG: 81 TABLET CHEWABLE at 14:45

## 2022-12-20 RX ADMIN — SODIUM CHLORIDE: 9 INJECTION, SOLUTION INTRAVENOUS at 09:51

## 2022-12-20 NOTE — DISCHARGE INSTRUCTIONS
Angiogram: What to Expect at [address] [place] [address] Dr with or without vascular stent placement is a procedure to open a narrowed artery in the lower body. Fatty buildup (plaque) can narrow or block these arteries. When one or more of your arteries are narrowed, it can make it hard for blood to flow to the body. This procedure may improve blood flow and reduce risk of loss of the limb. Your groin may have a bruise or a small lump where the catheter was put in (catheter site). The area may feel sore and the bruise may get bigger for a few days after the procedure. This care sheet gives you a general idea about how long it will take for you to recover. Follow the steps below to feel better as quickly as possible. How can you care for yourself at home? Sedation  No alcoholic beverages for 24 hours after the procedure. The sedative will make you sleepy. Rest until the effects have worn off. Nausea or vomiting from the sedative is normal and usually does not last long. Ask your doctor when you will be able to return to work. Do not drive, operate machinery, do anything that requires attention to detail, or sign important papers for at least 24 hours or until your doctor says it is safe. Activity  Rest when you feel tired. Getting enough sleep will help you recover. Try to walk each day. Start by walking a little more than you did the day before. Walking boosts blood flow and helps prevent pneumonia and constipation. Try not to walk up stairs for the first couple of days until the catheter site heals. Avoid strenuous activities, such as bicycle riding, jogging, weight lifting, or aerobic exercise, for 2 weeks or until your doctor says it is okay. Avoid heavy lifting for 1 week. Lift nothing over 10 pounds (a gallon of milk is 8 pounds). Ask your doctor when you can drive again. You will probably need to take 1 to 2 weeks off from work. It depends on the type of work you do and how you feel.   You may shower 24 to 48 hours after the procedure. Pat the site dry. Do not take a bath for 1 week. Your doctor will tell you when you can have sex again. Diet  You can eat your normal diet. If your stomach is upset, try bland, low-fat foods like plain rice, broiled chicken, toast, and yogurt. Drink plenty of fluids (unless your doctor tells you not to) to flush dye from system. Try to avoid constipation and straining with bowel movements. You may want to take a fiber supplement every day. If you have not had a bowel movement after a couple of days, ask your doctor about taking a mild laxative. Medicines  Your doctor will tell you if and when you can restart your medicines. He or she will also give you instructions about taking any new medicines. If you take blood thinners, such as warfarin (Coumadin), clopidogrel (Plavix), or aspirin, be sure to talk to your doctor. He or she will tell you if and when to start taking those medicines again. Make sure that you understand exactly what your doctor wants you to do. Be safe with medicines. Take your medicines exactly as prescribed. Call your doctor if you think you are having a problem with your medicine. If your doctor prescribed antibiotics, take them as directed. Do not stop taking them just because you feel better. You need to take the full course of antibiotics. Your doctor will probably prescribe a blood thinner when you go home. This helps prevent blood clots. Be sure you get instructions about how to take your medicine safely. Blood thinners can cause serious bleeding problems. Care of the catheter site  Remove bandage over the groin in 24 hours. You will need to lie flat for a few hours after the procedure to prevent bleeding. Check site for lump or visible bleeding or if you have new back or groin pain, apply pressure to groin and call 911. Pain  You may put ice or a cold pack on the catheter site for 10 to 15 minutes at a time to help with soreness or swelling.  Put a thin cloth between the ice and your skin. TakeTylenol for pain as needed per package instructions. Follow-up care is a key part of your treatment and safety. Be sure to make and go to all appointments, and call your doctor if you are having problems. It's also a good idea to know your test results and keep a list of the medicines you take. When should you call for help? Call 911 anytime you think you may need emergency care. For example, call if:  You passed out (lost consciousness). You have severe trouble breathing. You have sudden chest pain and shortness of breath, or you cough up blood. You have symptoms of a stroke. These may include:  Sudden numbness, tingling, weakness, or loss of movement in your face, arm, or leg, especially on only one side of your body. Sudden vision changes. Sudden trouble speaking. Sudden confusion or trouble understanding simple statements. Sudden problems with walking or balance. A sudden, severe headache that is different from past headaches. Your groin is very swollen and you have a lump that is getting bigger under your skin where the catheter was put in. Call your doctor now or seek immediate medical care if:  You have severe pain in your leg, or it becomes cold, pale, blue, tingly, or numb. You are bleeding from the catheter site. You have a fast-growing, painful lump at the catheter site. You have pain that does not get better after you take pain medicine. You have signs of infection, such as: Increased pain, swelling, warmth, or redness of the skin. Red streaks leading from the area. Pus draining from the area. Swollen lymph nodes in the groin. A fever. Watch closely for changes in your health, and be sure to contact your doctor if you have any problems. Sedation for a Medical Procedure: Care Instructions  Your Care Instructions  For a minor procedure or surgery, you will get a sedative to help you relax. This drug will make you sleepy.  It is usually given in a vein (by IV). It may be used with anesthesia. Common side effects from sedation include:  Feeling sleepy. (Your doctors and nurses will make sure you are not too sleepy to go home.)  Nausea and vomiting. This usually does not last long. Feeling tired. How can you care for yourself at home? Activity    Don't do anything for 24 hours that requires attention to detail. This includes going to work or school, making important decisions, and signing any legal documents. It takes time for the medicine effects to completely wear off. For your safety, you should not drive or operate any machinery that could be dangerous until the medicine wears off and you can think clearly and react easily. When you get home, it is important to rest until the anesthesia has worn off. Some people will feel drowsy or dizzy for up to a few hours after leaving the hospital.     Take your time and walk slowly. Sudden changes in position may also cause nausea. Rest when you feel tired. Getting enough sleep will help you recover. Diet    You can eat your normal diet, unless your doctor gives you other instructions. If your stomach is upset, try clear liquids and bland, low-fat foods like plain toast or rice. Drink plenty of fluids (unless your doctor tells you not to). Don't drink alcohol for 24 hours. When should you call for help? Call 911 anytime you think you may need emergency care. For example, call if:    You have severe trouble breathing. You passed out (lost consciousness). Call your doctor now or seek immediate medical care if:    You have trouble breathing. You have ongoing or worsening nausea or vomiting. You have a fever. You have a new or worse headache. The medicine is not wearing off and you can't think clearly. Watch closely for changes in your health, and be sure to contact your doctor if:    You do not get better as expected.    Where can you learn more?  Go to [email or web address] and sign in to your [employer] account. Enter N769 in the [employer] box to learn more about \"Sedation for a Medical Procedure: Care Instructions. \"     If you do not have an account, please click on the \"Sign Up Now\" link. Current as of: February 11, 2021               Content Version: 13.1  © 1370-7284 [employer]. Care instructions adapted under license by [facility]. If you have questions about a medical condition or this instruction, always ask your healthcare professional. [place] [address] any warranty or liability for your use of this information. Hold Eliquis for 2 weeks. Continue Plavix. Take Aspirin 81mg daily. TR Band- After first 30 minutes, pressure will be released from    device every 15 until completely decompressed. Inflation tube    may be cut prior to discharge but band left in place.    **Band can be removed after 4 hours:    TIME _______9pm_______________________  ***

## 2022-12-20 NOTE — PROCEDURES
[address], [place] [address]                            CARDIAC CATHETERIZATION    PATIENT NAME: Yany Franklin                :        1950  MED REC NO:   [id number]                              ROOM:  ACCOUNT NO:   [de-identified]                           ADMIT DATE: 2022  PROVIDER:     Joie Hashimoto    DATE OF PROCEDURE:  2022    FINAL IMPRESSION:  Successful angioplasty and drug-coated stent  placement in the right external iliac artery. ESTIMATED BLOOD LOSS:  20 mL. PROCEDURE:  Access of the left radial artery, placement of the catheter  in the abdominal aorta, bilateral lower extremity angiography, right  external iliac artery angioplasty, and drug-coated stent placement. METHOD:  Written informed consent was obtained. The left radial artery  was entered using micropuncture technique and a 6-[place] sheath was  placed into this vessel. The patient received systemic heparinization  and through the sheath received verapamil and nitroglycerin. A Schuster  wire was introduced along with a multipurpose catheter into the origin  of the right common iliac artery. A guidewire exchange was then  performed for a 105-cm, 6-[place] Island[place] sheath which was placed with its tip in  the common iliac artery and a NaviCross was introduced through this and  advanced over the guidewire into the right superficial femoral artery. Angiography was performed throughout the right leg and pressure  gradients were measured. Upon finding a severe stenosis in the right  external iliac artery and a significant pressure gradient at this  location, balloon angioplasty was performed using a 6 x 4 balloon  followed by placement of a 7 x 60 Zilver PTX stent with subsequent  inflation using a 7-mm balloon within the stent. Final angiography was  performed.   The catheter was then redirected into the left common iliac  artery, where additional angiography was performed. The sheath was  withdrawn. A TR band was used. There were no complications. Of note, the patient developed a Doppler detectable pulse in the right  posterior tibial location after the procedure. ANGIOGRAPHIC RESULTS:  ABDOMINAL AORTA:  This vessel demonstrated diffuse atherosclerotic  plaque. No evidence of aneurysm nor significant stenosis was detected. RIGHT LOWER EXTREMITY:  The common iliac artery had diffuse plaque  disease. The right internal iliac artery had 99% ostial stenosis. The  right external iliac artery had 95% calcific stenosis reduced to 30%  after placement of a Zilver PTX stent. The right common femoral artery  had diffuse plaque disease particularly in the more proximal and  posterior aspect of the vessel. The right profunda had plaque disease  throughout with an 80% stenosis at the origin. The right superficial  femoral had very severe calcified plaque throughout its course. Digital  subtraction revealed long 90% stenosis within this vessel. The right  popliteal had severe calcific disease and plaque. The anterior tibial  origin was at the level of the popliteal artery, near the patella. It  was not seen in the distal leg. The right posterior tibial was patent  to the ankle. The right peroneal was noted in the proximal leg, but  detailed distal imaging was not performed. LEFT LOWER EXTREMITY:  There was plaque disease in the common iliac  artery with a 60% distal stenosis. The internal iliac artery had 95%  ostial stenosis. The external iliac artery had 50% ostial stenosis. The left common femoral was exceedingly short due to a high bifurcation. Its entire length was approximately 1.5 cm and bifurcated into severely  diseased superficial femoral and profunda arteries. COMMENT:  The patient is a 66-year-old gentleman with severe PAD  involving multiple locations as well as severe heart disease and severe  carotid disease.   His major symptom was intolerable right leg pain. The  current study addressed this by improving right leg inflow. He will be  maintained on aspirin and Plavix. Eliquis will be held for the time  being. Should he continue to have severe right leg pain, he would be a  candidate for right common femoral antegrade access followed by  shockwave angioplasty of the right superficial femoral artery.         Mari Soto    D: 12/20/2022 14:27:23       T: 12/20/2022 14:31:32     [email or web address]  Job#: [id number]     Doc#: [id number]    CC:

## 2022-12-20 NOTE — PROGRESS NOTES
Discharge instructions reviewed with pt and sister, verbalize understanding, pt able to ambulate without difficulty, discharged home via wheelchair

## 2022-12-20 NOTE — PROGRESS NOTES
Patient brought to pre/post procedure area. Patient is alert and denies pain. TR band and elastoplast in place to left wrist. No bleeding or swelling noted.

## 2022-12-23 NOTE — H&P
22 1st [facility] Vascular visit. Comes with his sister Sterling Goltz who is very up to date with his health care. . Referred by Dr Kayla Do. PCP Dr Kirby Vela. Most vascular care has been through [facility] [place]. No hx of stroke. Had RCEA. CTA showed 60% residual dissection flap. The LICA  Is heavily calcified and has 90%. Hx of AAA. Not repaired. Hx of CHF with most recent ef up to 36. DM CKD 3 cr 1.38  Requip  Stopped smoking in   Has not had procedures on legs. Several yrs of leg problems. Uses a cane \"for stability\"  21 cailin r 0.82  l 0.92  Walks daily and his right calf \"\"starts screaming at me\", then left. Limits his activity. Pain goes away with resting. He limps with walking. Right anterior leg below knee hurts night. Prominent veins, but they don't hurt with standing. No hx of bleeding. Legs swelled up with CHF exacerbation. 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 No right arm sx  UPDATE 22 Having pretty intolerable right leg and foot pain. Worse than on last visit. Takes a lot of tylenol with little benefit. 22 duplex showed critical right fem pop stenosis with trickle popliteal flow. LCFA questionable as access vessel. RCFA somewhat better. Carotid duplex FRAN - (934/03  6.00) LICA 56-54.  L Vert antegrade R vert bidirectional.   Is still on eliquis        Social History            Socioeconomic History    Marital status:        Spouse name: Not on file    Number of children: Not on file    Years of education: Not on file    Highest education level: Not on file   Occupational History    Not on file   Tobacco Use    Smoking status: Former       Packs/day: 1.00       Years: 56.00       Pack years: 56.00       Types: Cigarettes       Quit date: 2022       Years since quittin.4    Smokeless tobacco: Never   Vaping Use    Vaping Use: Never used   Substance and Sexual Activity    Alcohol use: Not on file    Drug use: Not Currently    Sexual activity: Not on file   Other Topics Concern    Not on file   Social History Narrative    Not on file      Social Determinants of Health          Financial Resource Strain: Not on file   Food Insecurity: Not on file   Transportation Needs: Not on file   Physical Activity: Insufficiently Active    Days of Exercise per Week: 6 days    Minutes of Exercise per Session: 20 min   Stress: Not on file   Social Connections: Not on file   Intimate Partner Violence: Not on file   Housing Stability: Not on file            Family History   Problem Relation Age of Onset    Heart Disease Mother      Hypertension Mother      Hypertension Father      Heart Disease Father      Coronary Art Dis Sister      Coronary Art Dis Brother             Past Medical History:   Diagnosis Date    Acute kidney injury ([facility] [address]      Ascites 5/20/2022    Atrial fibrillation (HCC)      CHF (congestive heart failure) ([facility] [address]      Cirrhosis of liver with ascites ([facility] [address] 5/19/2022    CKD (chronic kidney disease) 5/20/2022    Coronary artery disease      Hyperlipidemia      Hypertension      Hypomagnesemia 5/20/2022    Hyponatremia      Thrombocytopenia (HCC)               Current Outpatient Medications   Medication Sig Dispense Refill    rOPINIRole (REQUIP) 0.5 MG tablet Take 1 tablet by mouth nightly 90 tablet 0    magnesium oxide (MAGOX 400) 400 (240 Mg) MG tablet Take 1 tablet by mouth 3 times daily 90 tablet 1    spironolactone (ALDACTONE) 25 MG tablet TAKE 1 TABLET BY MOUTH EVERY DAY IN THE MORNING 90 tablet 1    KLOR-CON M20 20 MEQ extended release tablet TAKE 1 TABLET BY MOUTH EVERY MORNING AND 1 TABLET AGAIN EVERY EVENING. TAKE WITH MEALS. 180 tablet 1    vitamin D (ERGOCALCIFEROL) 1.25 MG (60682 UT) CAPS capsule TAKE 1 CAPSULE BY MOUTH ONE TIME PER WEEK (Patient taking differently:  Wednesday) 12 capsule 0    clopidogrel (PLAVIX) 75 MG tablet Take 1 tablet by mouth daily 90 tablet 3    docusate sodium (COLACE) 100 MG capsule Take 1 capsule by mouth 2 times daily 406 capsule 3    folic acid (FOLVITE) 1 MG tablet TAKE 1 TABLET BY MOUTH EVERY DAY 90 tablet 0    amiodarone (CORDARONE) 200 MG tablet Take 0.5 tablets by mouth daily 45 tablet 3    metoprolol succinate (TOPROL XL) 25 MG extended release tablet Take 0.5 tablets by mouth daily 45 tablet 3    bumetanide (BUMEX) 1 MG tablet Take 1 tablet by mouth daily 90 tablet 1    tamsulosin (FLOMAX) 0.4 MG capsule Take 1 capsule by mouth in the morning. 90 capsule 1    midodrine (PROAMATINE) 10 MG tablet TAKE 1 TABLET BY MOUTH EVERY MORNING, 1 TABLET AT NOON, AND 1 TABLET IN THE EVENING. TAKE WITH MEALS. 90 tablet 3    zinc sulfate (ZINCATE) 220 (50 Zn) MG capsule Take 2 capsules by mouth in the morning. 30 capsule 3    albuterol sulfate HFA (VENTOLIN HFA) 108 (90 Base) MCG/ACT inhaler Inhale 2 puffs into the lungs 4 times daily as needed for Wheezing 54 g 1    ipratropium-albuterol (DUONEB) 0.5-2.5 (3) MG/3ML SOLN nebulizer solution Inhale 3 mLs into the lungs every 4 hours as needed for Shortness of Breath 360 mL 1    dapagliflozin (FARXIGA) 10 MG tablet Take 1 tablet by mouth every morning Lot #: [id number]  Exp: 10/24  Three boxes given. 21 tablet 0    vitamin B-1 (THIAMINE) 100 MG tablet Take 1 tablet by mouth in the morning. 90 tablet 1    Multiple Vitamin (MULTIVITAMIN) TABS tablet Take 1 tablet by mouth in the morning.  90 tablet 1    pantoprazole (PROTONIX) 40 MG tablet Take 1 tablet by mouth every morning (before breakfast) 90 tablet 1    melatonin 5 mg TABS tablet Take 2 tablets by mouth nightly   0    sacubitril-valsartan (ENTRESTO) 24-26 MG per tablet Take 0.5 tablets by mouth 2 times daily Lot #: [id number]  Exp: July 2024 56 tablet 0    apixaban (ELIQUIS) 5 MG TABS tablet Take 5 mg by mouth 2 times daily        atorvastatin (LIPITOR) 80 MG tablet Take 80 mg by mouth daily        Omega-3 Fatty Acids (FISH OIL CONCENTRATE) 1000 MG CAPS Take 2 caplets by mouth daily          No current facility-administered medications for this visit.            Physical findings:  General- no acute distress, oriented  HEENT - no xanthelasma, external ears normal  Neck- Supple, no thyromegaly  Skin- warm, dry, no skin breakdown or gangrene. Foot is warm although he says it feels \"frostbitten\"  Extremities - no edema     Pulses Right - Femoral absent  Radal: absent  Posterior tibial:    absent  Dorsalis pedis:  absent     Pulses Left -Femoral absent  Radal: 2+  Posterior tibial:    absent  Dorsalis pedis:  absent        Assessment:  1. PAD (peripheral artery disease) ([address]    2. Bilateral carotid artery stenosis       Extremely complex case. He has right carotid post cea restenosis, left carotid stenosis, probable right sca stenosis, and severe bilateral le pad, now with poncho 4 right leg sx.   He also has chf and ckd  Plan of care:  12/20/22 left radial access after hydration and holding eliquis  Attempt to revascularize right leg  Will check for aaa prior by US    No changes 12/20/22  Shanon Huddleston MD

## 2023-01-04 ENCOUNTER — OFFICE VISIT (OUTPATIENT)
Dept: VASCULAR SURGERY | Age: 73
End: 2023-01-04
Payer: MEDICARE

## 2023-01-04 VITALS
TEMPERATURE: 97 F | HEART RATE: 74 BPM | WEIGHT: 170.5 LBS | DIASTOLIC BLOOD PRESSURE: 54 MMHG | BODY MASS INDEX: 27.4 KG/M2 | RESPIRATION RATE: 18 BRPM | SYSTOLIC BLOOD PRESSURE: 82 MMHG | HEIGHT: 66 IN

## 2023-01-04 DIAGNOSIS — I73.9 PAD (PERIPHERAL ARTERY DISEASE) (HCC): Primary | ICD-10-CM

## 2023-01-04 PROCEDURE — 3074F SYST BP LT 130 MM HG: CPT | Performed by: INTERNAL MEDICINE

## 2023-01-04 PROCEDURE — G8427 DOCREV CUR MEDS BY ELIG CLIN: HCPCS | Performed by: INTERNAL MEDICINE

## 2023-01-04 PROCEDURE — G8417 CALC BMI ABV UP PARAM F/U: HCPCS | Performed by: INTERNAL MEDICINE

## 2023-01-04 PROCEDURE — 1123F ACP DISCUSS/DSCN MKR DOCD: CPT | Performed by: INTERNAL MEDICINE

## 2023-01-04 PROCEDURE — 99214 OFFICE O/P EST MOD 30 MIN: CPT | Performed by: INTERNAL MEDICINE

## 2023-01-04 PROCEDURE — 3017F COLORECTAL CA SCREEN DOC REV: CPT | Performed by: INTERNAL MEDICINE

## 2023-01-04 PROCEDURE — 1036F TOBACCO NON-USER: CPT | Performed by: INTERNAL MEDICINE

## 2023-01-04 PROCEDURE — 3078F DIAST BP <80 MM HG: CPT | Performed by: INTERNAL MEDICINE

## 2023-01-04 PROCEDURE — G8484 FLU IMMUNIZE NO ADMIN: HCPCS | Performed by: INTERNAL MEDICINE

## 2023-01-04 NOTE — PROGRESS NOTES
Kandy Vasques was seen on 1/4/2023 for   Chief Complaint   Patient presents with    Follow-up     Post procedure check up. Right foot and leg pain continues   .                             REVIEW OF SYSTEMS    Constitutional Weight: absent, A, Fatigue: present Fever: absent   HEENT Ears: normal,Visual disturbance: absent Sore throat: absent,    Respiratory Shortness of breath: present, Cough: absent;, Snoring: absent   Cardivascular Chest pain: absent,  Leg pain: absent,Leg swelling:present, Non-healing wound:absent    GI Diarrhea: absent, Abdominal Pain: absent    Urinary frequency: absent, Urinary incontinence: absent   Musculoskeletal Neck pain: absent, Back pain: absent, Restless Leg:absent     Dermatological Hair loss: absent, Skin changes: absent   Neurological  Sudden Loss of Vision in one eye:absent, Slurred Speech:absent, Weakness on one side of body: present,Headache: absent   Psychiatric Anxiety: absent, Depression: absent   Hematologic Abnormal bleeding: absent,

## 2023-01-04 NOTE — PATIENT INSTRUCTIONS
SURVEY:    You may be receiving a survey from [employer] regarding your visit today. Please complete the survey to enable us to provide the highest quality of care to you and your family. If you cannot score us a very good on any question, please call the office to discuss how we could have made your experience a very good one. Thank you.

## 2023-01-04 NOTE — PROGRESS NOTES
Regis Matthews was seen on 1/4/2023 for   Chief Complaint   Patient presents with    Follow-up     Post procedure check up. Right foot and leg pain continues   . 11/16/22 1st [facility] visit. Comes with his sister Elva Gore who is very up to date with his health care. . Referred by Dr Leighann Fernandez. PCP Dr Lucas Vela. Most vascular care has been through [facility]. [facility]. No hx of stroke. Had RCEA. CTA showed 60% residual dissection flap. The LICA  Is heavily calcified and has 90%. Hx of AAA. Not repaired. Hx of CHF with most recent ef up to 36. DM CKD 3 cr 1.38  Requip  Stopped smoking in June  Has not had procedures on legs. Several yrs of leg problems. Uses a cane \"for stability\"  4/8/21 cailin r 0.82  l 0.92  Walks daily and his right calf \"\"starts screaming at me\", then left. Limits his activity. Pain goes away with resting. He limps with walking. Right anterior leg below knee hurts night. Prominent veins, but they don't hurt with standing. No hx of bleeding. Legs swelled up with CHF exacerbation. 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 Worse than on last visit. Takes a lot of tylenol with little benefit. 12/2/22 duplex showed critical right fem pop stenosis with trickle popliteal flow. LCFA questionable as access vessel. RCFA somewhat better. Carotid duplex FRAN 34-70 (545/68  9.62) LICA 51-09. L Vert antegrade R vert bidirectional.   Is still on eliquis  UPDATE 1/4/23 On 12/10/22 had left radial access for leg angio. REIA ZPTX placed for 95 % stenosis. Distal disease not reached and antegrade cfa approach with shockwave entertained. Still has severe right foot pain. Foot feels cold to him.  Not sure about effect of position    Social History     Socioeconomic History    Marital status:      Spouse name: Not on file    Number of children: Not on file    Years of education: Not on file Highest education level: Not on file   Occupational History    Not on file   Tobacco Use    Smoking status: Former     Packs/day: 1.00     Years: 56.00     Pack years: 56.00     Types: Cigarettes     Quit date: 2022     Years since quittin.5    Smokeless tobacco: Never   Vaping Use    Vaping Use: Never used   Substance and Sexual Activity    Alcohol use: Not on file    Drug use: Not Currently    Sexual activity: Not on file   Other Topics Concern    Not on file   Social History Narrative    Not on file     Social Determinants of Health     Financial Resource Strain: Not on file   Food Insecurity: Not on file   Transportation Needs: Not on file   Physical Activity: Insufficiently Active    Days of Exercise per Week: 6 days    Minutes of Exercise per Session: 20 min   Stress: Not on file   Social Connections: Not on file   Intimate Partner Violence: Not on file   Housing Stability: Not on file     Family History   Problem Relation Age of Onset    Heart Disease Mother     Hypertension Mother     Hypertension Father     Heart Disease Father     Coronary Art Dis Sister     Coronary Art Dis Brother      Past Medical History:   Diagnosis Date    Acute kidney injury ([facility] [address]     Ascites 2022    Atrial fibrillation ([facility] [address]     CHF (congestive heart failure) ([facility] [address]     Cirrhosis of liver with ascites ([facility] [address] 2022    CKD (chronic kidney disease) 2022    Coronary artery disease     Hyperlipidemia     Hypertension     Hypomagnesemia 2022    Hyponatremia     Thrombocytopenia (HCC)      Current Outpatient Medications   Medication Sig Dispense Refill    FARXIGA 10 MG tablet TAKE 1 TABLET BY MOUTH EVERY DAY 90 tablet 3    vitamin D (ERGOCALCIFEROL) 1.25 MG (43857 UT) CAPS capsule Take 1 capsule by mouth once a week 12 capsule 0    magnesium oxide (MAGOX 400) 400 (240 Mg) MG tablet Take 1 tablet by mouth 3 times daily 270 tablet 1    midodrine (PROAMATINE) 10 MG tablet TAKE 1 TABLET BY MOUTH EVERY MORNING, 1 TABLET AT NOON, AND 1 TABLET IN THE EVENING. TAKE WITH MEALS 270 tablet 1    rOPINIRole (REQUIP) 0.5 MG tablet Take 1 tablet by mouth nightly 90 tablet 0    spironolactone (ALDACTONE) 25 MG tablet TAKE 1 TABLET BY MOUTH EVERY DAY IN THE MORNING 90 tablet 1    KLOR-CON M20 20 MEQ extended release tablet TAKE 1 TABLET BY MOUTH EVERY MORNING AND 1 TABLET AGAIN EVERY EVENING. TAKE WITH MEALS. 180 tablet 1    clopidogrel (PLAVIX) 75 MG tablet Take 1 tablet by mouth daily 90 tablet 3    docusate sodium (COLACE) 100 MG capsule Take 1 capsule by mouth 2 times daily 327 capsule 3    folic acid (FOLVITE) 1 MG tablet TAKE 1 TABLET BY MOUTH EVERY DAY 90 tablet 0    amiodarone (CORDARONE) 200 MG tablet Take 0.5 tablets by mouth daily 45 tablet 3    metoprolol succinate (TOPROL XL) 25 MG extended release tablet Take 0.5 tablets by mouth daily 45 tablet 3    bumetanide (BUMEX) 1 MG tablet Take 1 tablet by mouth daily 90 tablet 1    tamsulosin (FLOMAX) 0.4 MG capsule Take 1 capsule by mouth in the morning. 90 capsule 1    ipratropium-albuterol (DUONEB) 0.5-2.5 (3) MG/3ML SOLN nebulizer solution Inhale 3 mLs into the lungs every 4 hours as needed for Shortness of Breath 360 mL 1    vitamin B-1 (THIAMINE) 100 MG tablet Take 1 tablet by mouth in the morning. 90 tablet 1    Multiple Vitamin (MULTIVITAMIN) TABS tablet Take 1 tablet by mouth in the morning.  90 tablet 1    pantoprazole (PROTONIX) 40 MG tablet Take 1 tablet by mouth every morning (before breakfast) 90 tablet 1    melatonin 5 mg TABS tablet Take 2 tablets by mouth nightly  0    sacubitril-valsartan (ENTRESTO) 24-26 MG per tablet Take 0.5 tablets by mouth 2 times daily Lot #: [id number]  Exp: July 2024 56 tablet 0    atorvastatin (LIPITOR) 80 MG tablet Take 80 mg by mouth daily      Omega-3 Fatty Acids (FISH OIL CONCENTRATE) 1000 MG CAPS Take 2 caplets by mouth daily      albuterol sulfate HFA (VENTOLIN HFA) 108 (90 Base) MCG/ACT inhaler Inhale 2 puffs into the lungs 4 times daily as needed for Wheezing (Patient not taking: Reported on 1/4/2023) 54 g 1    apixaban (ELIQUIS) 5 MG TABS tablet Take 5 mg by mouth 2 times daily (Patient not taking: Reported on 1/4/2023)       No current facility-administered medications for this visit. Facility-Administered Medications Ordered in Other Visits   Medication Dose Route Frequency Provider Last Rate Last Admin    iopamidol (ISOVUE-370) 76 % injection 80 mL  80 mL IntraVENous ONCE PRN Stephane Harden MD             Physical findings:  General- no acute distress, oriented  HEENT - no xanthelasma, external ears normal  Neck- Supple, no thyromegaly  Skin- warm, dry, no skin breakdown or gangrene. Right foot is warm to tips of toes. Color is normal. Callous medial 2nd toe where great toe impinges. Bruised area lateral plantar  Extremities - no edema    Pulses Right - Posterior tibial:    doppler only, strong signal  Dorsalis pedis:  doppler only at distal AT location       Assessment:  1. PAD (peripheral artery disease) (HCC)       On pre procedure angio he had no doppler signal. Now has strong doppler pt and warm foot which makes me suspect that foot pain is not ischemic  Plan of care:  Glenda  Rtc 2 wks  30 min chart review and pt encounter  Continue asa, plavix  Angio is possible but at this point not clearly indicated  Follow up and evaluate.        Electronically signed by Stephane Harden MD on 1/4/23 at 1:05 PM EST

## 2023-01-05 RX ORDER — FOLIC ACID 1 MG/1
TABLET ORAL
Qty: 90 TABLET | Refills: 0 | Status: SHIPPED | OUTPATIENT
Start: 2023-01-05

## 2023-01-09 ENCOUNTER — HOSPITAL ENCOUNTER (OUTPATIENT)
Dept: VASCULAR LAB | Age: 73
Discharge: HOME | End: 2023-01-11
Payer: MEDICARE

## 2023-01-09 DIAGNOSIS — I73.9 PAD (PERIPHERAL ARTERY DISEASE) (HCC): ICD-10-CM

## 2023-01-09 PROCEDURE — 93926 LOWER EXTREMITY STUDY: CPT

## 2023-01-09 PROCEDURE — 93922 UPR/L XTREMITY ART 2 LEVELS: CPT

## 2023-01-09 RX ORDER — ROPINIROLE 0.5 MG/1
0.5 TABLET, FILM COATED ORAL NIGHTLY
Qty: 90 TABLET | Refills: 0 | Status: SHIPPED | OUTPATIENT
Start: 2023-01-09 | End: 2023-06-06

## 2023-01-12 NOTE — TELEPHONE ENCOUNTER
Hello, what medication has the patient tried thus far for the pain? Thank you.   Electronically signed by Rica Self MD on 1/12/2023 at 3:27 PM

## 2023-01-12 NOTE — TELEPHONE ENCOUNTER
Patient is having pain in his right leg and foot. He had vascular surgery and a stent placed which helped restore blood flow but he is still having a lot of nerve pain that is constant. Was advised to call his PCP for this. Patient is unable to sleep due to the pain. Would like to know if there is anything you can prescribe for this?

## 2023-01-13 RX ORDER — GABAPENTIN 300 MG/1
300 CAPSULE ORAL NIGHTLY
Qty: 30 CAPSULE | Refills: 0 | Status: SHIPPED | OUTPATIENT
Start: 2023-01-13 | End: 2023-01-20 | Stop reason: SDUPTHER

## 2023-01-13 NOTE — TELEPHONE ENCOUNTER
Hello, if he would like to trial Gabapentin that would be an option. We could start it at 300mg hs. And then have follow-up within 1-2 weeks of starting this, thank you.   Electronically signed by Oksana Gautam MD on 1/12/2023 at 7:04 PM

## 2023-01-13 NOTE — TELEPHONE ENCOUNTER
Patient would like to try Gabapentin, is scheduled to come in on 01/18/2023. RX has been pended for signature.

## 2023-01-18 ENCOUNTER — OFFICE VISIT (OUTPATIENT)
Dept: CARDIOLOGY | Age: 73
End: 2023-01-18
Payer: MEDICARE

## 2023-01-18 ENCOUNTER — OFFICE VISIT (OUTPATIENT)
Dept: VASCULAR SURGERY | Age: 73
End: 2023-01-18
Payer: MEDICARE

## 2023-01-18 VITALS
WEIGHT: 173.2 LBS | HEART RATE: 69 BPM | SYSTOLIC BLOOD PRESSURE: 100 MMHG | RESPIRATION RATE: 18 BRPM | HEIGHT: 66 IN | DIASTOLIC BLOOD PRESSURE: 60 MMHG | BODY MASS INDEX: 27.83 KG/M2 | OXYGEN SATURATION: 98 %

## 2023-01-18 VITALS — DIASTOLIC BLOOD PRESSURE: 53 MMHG | HEART RATE: 74 BPM | SYSTOLIC BLOOD PRESSURE: 85 MMHG

## 2023-01-18 DIAGNOSIS — I73.9 PAD (PERIPHERAL ARTERY DISEASE) (HCC): Primary | ICD-10-CM

## 2023-01-18 DIAGNOSIS — I50.42 CHRONIC COMBINED SYSTOLIC AND DIASTOLIC CONGESTIVE HEART FAILURE (HCC): ICD-10-CM

## 2023-01-18 DIAGNOSIS — I73.9 PAD (PERIPHERAL ARTERY DISEASE) (HCC): ICD-10-CM

## 2023-01-18 DIAGNOSIS — E78.2 MIXED HYPERLIPIDEMIA: ICD-10-CM

## 2023-01-18 DIAGNOSIS — I95.9 HYPOTENSION, UNSPECIFIED HYPOTENSION TYPE: Primary | ICD-10-CM

## 2023-01-18 DIAGNOSIS — I48.0 PAF (PAROXYSMAL ATRIAL FIBRILLATION) (HCC): ICD-10-CM

## 2023-01-18 DIAGNOSIS — Z01.818 PRE-OPERATIVE CLEARANCE: ICD-10-CM

## 2023-01-18 DIAGNOSIS — I70.261 CRITICAL LIMB ISCHEMIA OF RIGHT LOWER EXTREMITY WITH GANGRENE (HCC): ICD-10-CM

## 2023-01-18 PROCEDURE — 3017F COLORECTAL CA SCREEN DOC REV: CPT | Performed by: FAMILY MEDICINE

## 2023-01-18 PROCEDURE — 3074F SYST BP LT 130 MM HG: CPT | Performed by: FAMILY MEDICINE

## 2023-01-18 PROCEDURE — G8484 FLU IMMUNIZE NO ADMIN: HCPCS | Performed by: FAMILY MEDICINE

## 2023-01-18 PROCEDURE — G8427 DOCREV CUR MEDS BY ELIG CLIN: HCPCS | Performed by: FAMILY MEDICINE

## 2023-01-18 PROCEDURE — 3078F DIAST BP <80 MM HG: CPT | Performed by: FAMILY MEDICINE

## 2023-01-18 PROCEDURE — G8417 CALC BMI ABV UP PARAM F/U: HCPCS | Performed by: FAMILY MEDICINE

## 2023-01-18 PROCEDURE — 99214 OFFICE O/P EST MOD 30 MIN: CPT | Performed by: FAMILY MEDICINE

## 2023-01-18 PROCEDURE — 1036F TOBACCO NON-USER: CPT | Performed by: FAMILY MEDICINE

## 2023-01-18 PROCEDURE — 99211 OFF/OP EST MAY X REQ PHY/QHP: CPT | Performed by: FAMILY MEDICINE

## 2023-01-18 PROCEDURE — 93010 ELECTROCARDIOGRAM REPORT: CPT | Performed by: FAMILY MEDICINE

## 2023-01-18 PROCEDURE — 93005 ELECTROCARDIOGRAM TRACING: CPT | Performed by: FAMILY MEDICINE

## 2023-01-18 PROCEDURE — 99214 OFFICE O/P EST MOD 30 MIN: CPT | Performed by: INTERNAL MEDICINE

## 2023-01-18 PROCEDURE — 1123F ACP DISCUSS/DSCN MKR DOCD: CPT | Performed by: FAMILY MEDICINE

## 2023-01-18 RX ORDER — ASPIRIN 81 MG/1
81 TABLET ORAL DAILY
COMMUNITY

## 2023-01-18 RX ORDER — GLUCOSAMINE/D3/BOSWELLIA SERRA 1500MG-400
TABLET ORAL
COMMUNITY

## 2023-01-18 NOTE — PROGRESS NOTES
Laureen Medellin was seen on 1/18/2023 for   Chief Complaint   Patient presents with    Follow-up     Pain in right foot continues  Results of US   .                             REVIEW OF SYSTEMS    Constitutional Weight: absent, A, Fatigue: absent Fever: absent   HEENT Ears: normal,Visual disturbance: absent Sore throat: absent,    Respiratory Shortness of breath: absent, Cough: absent;, Snoring: absent   Cardivascular Chest pain: absent,  Leg pain: present,Leg swelling:absent, Non-healing wound:absent    GI Diarrhea: absent, Abdominal Pain: absent    Urinary frequency: absent, Urinary incontinence: absent   Musculoskeletal Neck pain: absent, Back pain: absent, Restless Leg:absent     Dermatological Hair loss: absent, Skin changes: absent   Neurological  Sudden Loss of Vision in one eye:absent, Slurred Speech:absent, Weakness on one side of body: absent,Headache: absent   Psychiatric Anxiety: absent, Depression: absent   Hematologic Abnormal bleeding: absent,

## 2023-01-18 NOTE — PROGRESS NOTES
Raghu Almodovar am scribing for and in the presence of Aneudy Kim MD, MS, F.A.C.C..    Patient: Clau Gregory  : 1950  Date of Admission: (Not on file)  Primary Care Physician: Selwyn Gonzalez  Today's Date: 2023    REASON FOR CONSULTATION: atrial fibrillation with RVR    HPI:  Mr. Mike London is a 67 y.o. male who was admitted to the hospital on 2022 for weakness and shortness of breath. In the ER he was found to be in atrial fibrillation with RVR leading to a consultation. He was then readmitted due to severe dehydration. In the past he reports seeing a Cardiologist in [place] and was planning on having a cardioversion done in  to put him back into normal sinus rhythm due to his history of new onset atrial fibrillation. He reports a history of a heart attack at age 46 and needed stents placed at that time but denies any cardiac cath since that time. He also has carotid artery stenosis and abdominal aortic aneurysm. He did report having surgery on one side of his neck, however he is not sure which side or how long ago it was. He has had hypertension and hyperlipidemia for years as well. He is a current every day smoker and he smoked for about 55 years and smokes about a pack a day. Family history consists of a lot of people in his family with significant cardiac history, however he wanted us to talk to his sister about the family history as she knows the details. Echo done on 2022 showed an EF of 15-20%. ECHO done on 22: Dilated all cardiac chambers. Severe biventricular systolic dysfunction. Estimated left ventricular ejection fraction is 20-25% Moderate to severe mitral and moderate tricuspid regurgitation. Moderate pulmonary hypertension with an estimated right ventricular systolic pressure of 61 mmHg. Evidence of moderate to severe diastolic dysfunction is seen. Bilateral pleural effusion noted.  Compared to the previous study of 2022, no significant change in ejection fraction. Functional mitral and tricuspid regurgitation are worse. Bilateral pleural effusion and left elevated left ventricular filling pressure along with dilated IVC suggest worsening fluid overload. Echo done on 8/25/2022 showed improved EF 40%, the left ventricular wall thickness is mildly increased. Mild mitral regurgitation. Evidence of moderate diastolic dysfunction is seen    Mr. Roseanna Shay is here today for three month follow up. He states cardiac wise he is doing well. His breathing is pretty good. His biggest complaint is the pain in right leg and also he is not sleeping well due to his pain. He is scheduled 2/14 to have another angiogram with Dr Cassidy Pop. He also reports he has been off of Eliquis since December 17 for first angiogram. He denies chest pain,pressure or tightness. Denies any blood in urine or stools. He also denied any abdominal pain, bleeding problems, problems with his medications or any other concerns at this time. He has no bowel issues at this time. No nausea or vomiting. No fever, cough or chills. Bleeding Risks: Mr. Roseanna Shay denies any current or recent bleeding problems including a history of a GI bleed, ulcers, recent or upcoming surgeries, blood in his stool or black tarry stools or blood in his urine. Past Medical History:   Diagnosis Date    Acute kidney injury ([facility]     Ascites 5/20/2022    Atrial fibrillation (HCC)     CHF (congestive heart failure) (HCC)     Cirrhosis of liver with ascites ([facility] [address] 5/19/2022    CKD (chronic kidney disease) 5/20/2022    Coronary artery disease     Hyperlipidemia     Hypertension     Hypomagnesemia 5/20/2022    Hyponatremia     Thrombocytopenia (HCC)      CURRENT ALLERGIES: Patient has no known allergies. REVIEW OF SYSTEMS: 14 systems were reviewed. Pertinent positives and negatives as above, all else negative.      Past Surgical History:   Procedure Laterality Date    CAROTID STENT PLACEMENT Bilateral     HERNIA REPAIR ROTATOR CUFF REPAIR Right     TONSILLECTOMY AND ADENOIDECTOMY      Social History:  Social History     Tobacco Use    Smoking status: Former     Packs/day: 1.00     Years: 56.00     Pack years: 56.00     Types: Cigarettes     Quit date: 2022     Years since quittin.5    Smokeless tobacco: Never   Vaping Use    Vaping Use: Never used   Substance Use Topics    Drug use: Not Currently        CURRENT MEDICATIONS:  Outpatient Medications Marked as Taking for the 23 encounter (Office Visit) with Bekah Hawkins MD   Medication Sig Dispense Refill    Biotin 39375 MCG TABS Take by mouth      aspirin 81 MG EC tablet Take 81 mg by mouth daily      gabapentin (NEURONTIN) 300 MG capsule Take 1 capsule by mouth nightly for 30 days. 30 capsule 0    rOPINIRole (REQUIP) 0.5 MG tablet Take 1 tablet by mouth nightly 90 tablet 0    folic acid (FOLVITE) 1 MG tablet TAKE 1 TABLET BY MOUTH EVERY DAY 90 tablet 0    FARXIGA 10 MG tablet TAKE 1 TABLET BY MOUTH EVERY DAY 90 tablet 3    vitamin D (ERGOCALCIFEROL) 1.25 MG (51959 UT) CAPS capsule Take 1 capsule by mouth once a week 12 capsule 0    magnesium oxide (MAGOX 400) 400 (240 Mg) MG tablet Take 1 tablet by mouth 3 times daily 270 tablet 1    midodrine (PROAMATINE) 10 MG tablet TAKE 1 TABLET BY MOUTH EVERY MORNING, 1 TABLET AT NOON, AND 1 TABLET IN THE EVENING. TAKE WITH MEALS 270 tablet 1    spironolactone (ALDACTONE) 25 MG tablet TAKE 1 TABLET BY MOUTH EVERY DAY IN THE MORNING 90 tablet 1    KLOR-CON M20 20 MEQ extended release tablet TAKE 1 TABLET BY MOUTH EVERY MORNING AND 1 TABLET AGAIN EVERY EVENING.  TAKE WITH MEALS. 180 tablet 1    clopidogrel (PLAVIX) 75 MG tablet Take 1 tablet by mouth daily 90 tablet 3    docusate sodium (COLACE) 100 MG capsule Take 1 capsule by mouth 2 times daily 180 capsule 3    amiodarone (CORDARONE) 200 MG tablet Take 0.5 tablets by mouth daily 45 tablet 3    metoprolol succinate (TOPROL XL) 25 MG extended release tablet Take 0.5 tablets by mouth daily 45 tablet 3    bumetanide (BUMEX) 1 MG tablet Take 1 tablet by mouth daily 90 tablet 1    tamsulosin (FLOMAX) 0.4 MG capsule Take 1 capsule by mouth in the morning. 90 capsule 1    ipratropium-albuterol (DUONEB) 0.5-2.5 (3) MG/3ML SOLN nebulizer solution Inhale 3 mLs into the lungs every 4 hours as needed for Shortness of Breath 360 mL 1    vitamin B-1 (THIAMINE) 100 MG tablet Take 1 tablet by mouth in the morning. 90 tablet 1    Multiple Vitamin (MULTIVITAMIN) TABS tablet Take 1 tablet by mouth in the morning. 90 tablet 1    pantoprazole (PROTONIX) 40 MG tablet Take 1 tablet by mouth every morning (before breakfast) 90 tablet 1    melatonin 5 mg TABS tablet Take 2 tablets by mouth nightly  0    sacubitril-valsartan (ENTRESTO) 24-26 MG per tablet Take 0.5 tablets by mouth 2 times daily Lot #: [id number]  Exp: July 2024 56 tablet 0    atorvastatin (LIPITOR) 80 MG tablet Take 80 mg by mouth daily      Omega-3 Fatty Acids (FISH OIL CONCENTRATE) 1000 MG CAPS Take 2 caplets by mouth daily       FAMILY HISTORY: Reviewed but non-contributory     PHYSICAL EXAM:   /60 (Site: Left Upper Arm, Position: Sitting, Cuff Size: Medium Adult)   Pulse 69   Resp 18   Ht 5' 6\" (1.676 m)   Wt 173 lb 3.2 oz (78.6 kg)   SpO2 98%   BMI 27.96 kg/m²  Body mass index is 27.96 kg/m². Constitutional: He is oriented to person, place, and time. He appears well-developed and well-nourished. In no acute distress. HEENT: Normocephalic and atraumatic. No JVD present. Carotid bruit is not present. No mass and no thyromegaly present. No lymphadenopathy present. Cardiovascular: Normal rate, regular rhythm, normal heart sounds. Exam reveals no gallop and no friction rubs. 2/6 systolic murmur, 5th intercostal space on the LEFT in the mid-clavicular line (cardiac apex)  Pulmonary/Chest: Effort normal and breath sounds normal. No respiratory distress. He has no wheezes, rhonchi or rales. Abdominal: Soft, non-tender.  Bowel sounds and aorta are normal. He exhibits no organomegaly, mass or bruit. Extremities: Trace. No cyanosis or clubbing. 2+ radial and carotid pulses. Distal extremity pulses: 2+ bilaterally. Neurological: He is alert and oriented to person, place, and time. No evidence of gross cranial nerve deficit. Coordination appeared normal.   Skin: Skin is warm and dry. There is no rash or diaphoresis. Psychiatric: He has a normal mood and affect. His speech is normal and behavior is normal.      MOST RECENT LABS ON RECORD:   Lab Results   Component Value Date    WBC 6.3 12/20/2022    HGB 15.0 12/20/2022    HCT 43.0 12/20/2022    PLT See Reflexed IPF Result 12/20/2022    ALT 13 12/20/2022    AST 18 12/20/2022     12/20/2022    K 4.5 12/20/2022    CL 97 (L) 12/20/2022    CREATININE 1.85 (H) 12/20/2022    BUN 37 (H) 12/20/2022    CO2 28 12/20/2022    TSH 6.02 (H) 09/12/2022    INR 1.0 12/20/2022    LABA1C 5.6 02/21/2022     ASSESSMENT:  1. Hypotension, unspecified hypotension type    2. PAF (paroxysmal atrial fibrillation) ([address]    3. Chronic combined systolic and diastolic congestive heart failure ([address]    4. Mixed hyperlipidemia    5. PAD (peripheral artery disease) ([address]    6. Pre-operative clearance      PLAN:  Hypotension: No falls or near falls but is certainly at risk   Diuretics: Continue Spironolactone (Aldactone) 25 mg, 1/2 tab daily. Nonpharmacologic counseling: Because of his condition, I reminded him to try and keep himself well-hydrated and to take extra time when moving from laying to sitting, sitting to standing and standing to walking. I also explained to him to help improve his symptoms he should include 3 g sodium diet, 1 or 2 L of sports drinks daily, knee-high compressions stockings. Paroxysmal Atrial Fibrillation: Rhythm Control Asymptomatic   Anti-Arrhythmic: Continue amiodarone (Pacerone) 100 mg once daily.  Monitoring: Since they will being maintained on Amiodarone, I told them that we will need to closely monitor them for potential side effects. These include monitoring LFTs and TSH at least every 6 months as well as chest x-rays, pulmonary function tests, and eye exams at least yearly. Beta Blocker: Continue Metoprolol succinate (Toprol XL) 25 mg 1/2 tab daily. [id number] Score for Atrial Fibrillation Stroke Risk   Risk   Factors  Component Value   C CHF Yes 1   H HTN Yes 1   A2 Age >= 76 No,  (73 y.o.) 0   D DM Yes 1   S2 Prior Stroke/TIA No 0   V Vascular Disease No 0   A Age 74-69 Yes,  (73 y.o.) 1   Sc Sex male 0    [id number]  Score  4   Score last updated 1/0/13 7:65 PM EDT  Click here for a link to the UpToDate guideline \"Atrial Fibrillation: Anticoagulation therapy to prevent embolization  Disclaimer: Risk Score calculation is dependent on accuracy of patient problem list and past encounter diagnosis. Stroke Risk: CHADS2-VASc Score: 4/9 (4% stroke risk). Because of his atrial fibrillation, I also would also recommend that he continue with anticoagulation to decrease his risk of stoke but also reminded him to watch for signs of blood in his stool or black tarry stools and stop the medication immediately if this develops as this could be life threatening. Anticoagulation: Restart Apixaban (Eliquis) 5 mg every 12 hours. Because of his atrial fibrillation, I also would also recommend that he continue with anticoagulation to decrease his risk of stoke but also reminded him to watch for signs of blood in his stool or black tarry stools and stop the medication immediately if this develops as this could be life threatening. Chronic combined heart failure: Ischemic Cardiomyopathy, Echo done on 5/18/2022 which showed an EF of 15-20%. Echo done on 8/25/2022 showed improved EF of 40%. Beta Blocker: Continue Metoprolol succinate (Toprol XL) 25 mg 1/2 tab daily. ACE Inibitor/ARB: Continue sacubitril/valsartan (Entresto) 24/26 mg 1/2 a tablet twice daily.   Diuretics: Continue bumetinide (Bumex) 1 mg every morning. Diuretics: Continue Spironolactone (Aldactone) 25 mg, 1/2 tab daily. Continue Farxiga 10 mg once daily. Heart failure counseling: I advised them to try and keep their legs up whenever possible and to limit salt in their diet. Hyperlipidemia: Mixed, LDL done on 9/1/2021 was 65 mg/dL  Cholesterol Reduction Therapy: Continue Atorvastatin (Lipitor) 80 mg daily. Peripheral Artery Disease: Reviewed his recent MARCELLA's that were done at [facility] in April of this year. He does have pain in his legs when he works out. His scans did show moderate however his symptoms sound more severe. Antiplatelet Agent: STOP clopidogrel (Plavix) and continue Aspirin 81 mg daily. Beta Blocker: Continue Metoprolol succinate (Toprol XL) 25 mg 1/2 tab daily. Cholesterol Reduction Therapy: Continue Atorvastatin (Lipitor) 80 mg daily. Pre-Op Clearance: For Angiogram 2/14/23. Pre-Operative Risk assessment using 2014 ACC/AHA guidelines   Emergent procedure No  Active Cardiac Condition No (decompensated HF, Arrhythmia, MI <3 weeks, severe valve disease)  Risk Level of Procedure Intermediate Risk (intraperitoneal, intrathoracic, HENT, orthopedic, or carotid endarterectomy, etc.)  Revised Cardiac Risk Index Risk factors: History of heart failure  Measurement of Exercise Tolerance before Surgery >4 Yes  According to the 2014 ACC/AHA pre-operative risk assessment guidelines Anderson Good is at a low-intermediate risk for major cardiac complications during a intermediate risk procedure and may continue as planned. Specific medication recommendations are listed below. Medications recommended to continue should be taken with a sip of water even when NPO. Medical management to reduce perioperative risk:  Antiplatelet Agent: Ok to HOLD Aspirin 5-7 days prior to procedure. Anticoagulant Agent: I would suggest holding his Apixaban (Eliquis) 2-3 days prior to the procedure.    Additional Recommendations: I would also suggest that he continue his beta blocker and statin throughout the perioperative period. Once again, thank you for allowing me to participate in this patients care. Please do not hesitate to contact me could I be of further assistance. FOLLOW UP:   I told Mr. Ramirez  to call my office if he had any problems, but otherwise told him to Return in about 2 months (around 3/18/2023). However, I would be happy to see him sooner should the need arise. Sincerely,  Haydee Fonseca MD, MS, F.A.C.C. [facility]    [address] [place] [address]  Phone: [phone], Fax: [phone]     I believe that the risk of significant morbidity and mortality related to the patient's current medical conditions are: Intermediate. The documentation recorded by the scribe, accurately and completely reflects the services I personally performed and the decisions made by me. Haydee Fonseca MD, MS, F.A.C.C.  January 18, 2023

## 2023-01-18 NOTE — PATIENT INSTRUCTIONS
SURVEY:    You may be receiving a survey from [employer] regarding your visit today. Please complete the survey to enable us to provide the highest quality of care to you and your family. If you cannot score us a very good on any question, please call the office to discuss how we could have made your experience a very good one. Thank you.

## 2023-01-18 NOTE — PROGRESS NOTES
Aryan Churchill was seen on 1/18/2023 for   Chief Complaint   Patient presents with    Follow-up     Pain in right foot continues  Results of US   .11/16/22 1st [facility] Vascular visit. Comes with his sister Steffanie España who is very up to date with his health care. . Referred by Dr Esau Shipman. PCP Dr Shahrzad Antonio. Most vascular care has been through [facility]. Demetrius Danielle. No hx of stroke. Had RCEA. CTA showed 60% residual dissection flap. The LICA  Is heavily calcified and has 90%. Hx of AAA. Not repaired. Hx of CHF with most recent ef up to 36. DM CKD 3 cr 1.38  Requip  Stopped smoking in June  Has not had procedures on legs. Several yrs of leg problems. Uses a cane \"for stability\"  4/8/21 glenda r 0.82  l 0.92  Walks daily and his right calf \"\"starts screaming at me\", then left. Limits his activity. Pain goes away with resting. He limps with walking. Right anterior leg below knee hurts night. Prominent veins, but they don't hurt with standing. No hx of bleeding. Legs swelled up with CHF exacerbation. 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 No right arm sx  UPDATE 12/7/22 Having pretty intolerable right leg and foot pain. Worse than on last visit. Takes a lot of tylenol with little benefit. 12/2/22 duplex showed critical right fem pop stenosis with trickle popliteal flow. LCFA questionable as access vessel. RCFA somewhat better. Carotid duplex FRAN 31-81 (541/25  0.54) LICA 11-91. L Vert antegrade R vert bidirectional.   Is still on eliquis  UPDATE 1/4/23 On 12/10/22 had left radial access for leg angio. REIA ZPTX placed for 95 % stenosis. Distal disease not reached and antegrade cfa approach with shockwave entertained. Still has severe right foot pain. Foot feels cold to him. Not sure about effect of position  UPDATE 1/18/23 On last visit he complained of severe pain, but had a loud right PT doppler signal. Glenda . 49 pt, .24 dp. Pain he says \"comes and goes\".  Can't clearly relate to elevation  Insurance kicks in Feb.         Social History     Socioeconomic History    Marital status:      Spouse name: Not on file    Number of children: Not on file    Years of education: Not on file    Highest education level: Not on file   Occupational History    Not on file   Tobacco Use    Smoking status: Former     Packs/day: 1.00     Years: 56.00     Pack years: 56.00     Types: Cigarettes     Quit date: 2022     Years since quittin.5    Smokeless tobacco: Never   Vaping Use    Vaping Use: Never used   Substance and Sexual Activity    Alcohol use: Not on file    Drug use: Not Currently    Sexual activity: Not on file   Other Topics Concern    Not on file   Social History Narrative    Not on file     Social Determinants of Health     Financial Resource Strain: Not on file   Food Insecurity: Not on file   Transportation Needs: Not on file   Physical Activity: Insufficiently Active    Days of Exercise per Week: 6 days    Minutes of Exercise per Session: 20 min   Stress: Not on file   Social Connections: Not on file   Intimate Partner Violence: Not on file   Housing Stability: Not on file     Family History   Problem Relation Age of Onset    Heart Disease Mother     Hypertension Mother     Hypertension Father     Heart Disease Father     Coronary Art Dis Sister     Coronary Art Dis Brother      Past Medical History:   Diagnosis Date    Acute kidney injury ([address]     Ascites 2022    Atrial fibrillation ([facility] [address]     CHF (congestive heart failure) ([facility] [address]     Cirrhosis of liver with ascites ([facility] [address] 2022    CKD (chronic kidney disease) 2022    Coronary artery disease     Hyperlipidemia     Hypertension     Hypomagnesemia 2022    Hyponatremia     Thrombocytopenia (HCC)      Current Outpatient Medications   Medication Sig Dispense Refill    Biotin 23303 MCG TABS Take by mouth      gabapentin (NEURONTIN) 300 MG capsule Take 1 capsule by mouth nightly for 30 days.  30 capsule 0    rOPINIRole (REQUIP) 0.5 MG tablet Take 1 tablet by mouth nightly 90 tablet 0    folic acid (FOLVITE) 1 MG tablet TAKE 1 TABLET BY MOUTH EVERY DAY 90 tablet 0    FARXIGA 10 MG tablet TAKE 1 TABLET BY MOUTH EVERY DAY 90 tablet 3    vitamin D (ERGOCALCIFEROL) 1.25 MG (09652 UT) CAPS capsule Take 1 capsule by mouth once a week 12 capsule 0    magnesium oxide (MAGOX 400) 400 (240 Mg) MG tablet Take 1 tablet by mouth 3 times daily 270 tablet 1    midodrine (PROAMATINE) 10 MG tablet TAKE 1 TABLET BY MOUTH EVERY MORNING, 1 TABLET AT NOON, AND 1 TABLET IN THE EVENING. TAKE WITH MEALS 270 tablet 1    spironolactone (ALDACTONE) 25 MG tablet TAKE 1 TABLET BY MOUTH EVERY DAY IN THE MORNING 90 tablet 1    KLOR-CON M20 20 MEQ extended release tablet TAKE 1 TABLET BY MOUTH EVERY MORNING AND 1 TABLET AGAIN EVERY EVENING. TAKE WITH MEALS. 180 tablet 1    clopidogrel (PLAVIX) 75 MG tablet Take 1 tablet by mouth daily 90 tablet 3    docusate sodium (COLACE) 100 MG capsule Take 1 capsule by mouth 2 times daily 180 capsule 3    amiodarone (CORDARONE) 200 MG tablet Take 0.5 tablets by mouth daily 45 tablet 3    metoprolol succinate (TOPROL XL) 25 MG extended release tablet Take 0.5 tablets by mouth daily 45 tablet 3    bumetanide (BUMEX) 1 MG tablet Take 1 tablet by mouth daily 90 tablet 1    tamsulosin (FLOMAX) 0.4 MG capsule Take 1 capsule by mouth in the morning. 90 capsule 1    ipratropium-albuterol (DUONEB) 0.5-2.5 (3) MG/3ML SOLN nebulizer solution Inhale 3 mLs into the lungs every 4 hours as needed for Shortness of Breath 360 mL 1    vitamin B-1 (THIAMINE) 100 MG tablet Take 1 tablet by mouth in the morning. 90 tablet 1    Multiple Vitamin (MULTIVITAMIN) TABS tablet Take 1 tablet by mouth in the morning.  90 tablet 1    pantoprazole (PROTONIX) 40 MG tablet Take 1 tablet by mouth every morning (before breakfast) 90 tablet 1    melatonin 5 mg TABS tablet Take 2 tablets by mouth nightly  0    sacubitril-valsartan (ENTRESTO) 24-26 MG per tablet Take 0.5 tablets by mouth 2 times daily Lot #: [id number]  Exp: July 2024 56 tablet 0    atorvastatin (LIPITOR) 80 MG tablet Take 80 mg by mouth daily      Omega-3 Fatty Acids (FISH OIL CONCENTRATE) 1000 MG CAPS Take 2 caplets by mouth daily      albuterol sulfate HFA (VENTOLIN HFA) 108 (90 Base) MCG/ACT inhaler Inhale 2 puffs into the lungs 4 times daily as needed for Wheezing (Patient not taking: No sig reported) 54 g 1    apixaban (ELIQUIS) 5 MG TABS tablet Take 5 mg by mouth 2 times daily (Patient not taking: No sig reported)       No current facility-administered medications for this visit. Facility-Administered Medications Ordered in Other Visits   Medication Dose Route Frequency Provider Last Rate Last Admin    iopamidol (ISOVUE-370) 76 % injection 80 mL  80 mL IntraVENous ONCE PRN Bhavani Kolb MD             Physical findings:  General- moderate distress, oriented  HEENT - no xanthelasma, external ears normal  Neck- Supple, no thyromegaly  Skin- warm, dry, no  gangrene. Right foot pain, especially dorsum and right lateral lower leg. Skin breakdown right 2nd toe  Extremities - no edema    Pulses Right - Posterior tibial:   strong doppler, continuous      Assessment:  1. PAD (peripheral artery disease) ([address]    2. Critical limb ischemia of right lower extremity with gangrene (HCC)       40 min chart reivew and pt encounter  Plan of care:  Right groin access for right sfa pta, shockwave  2/14/23 8:30  Follow up and evaluate.        Electronically signed by Bhavani Kolb MD on 1/18/23 at 3:04 PM EST

## 2023-01-20 ENCOUNTER — OFFICE VISIT (OUTPATIENT)
Dept: PRIMARY CARE CLINIC | Age: 73
End: 2023-01-20
Payer: MEDICARE

## 2023-01-20 ENCOUNTER — HOSPITAL ENCOUNTER (OUTPATIENT)
Age: 73
End: 2023-01-20
Payer: MEDICARE

## 2023-01-20 ENCOUNTER — HOSPITAL ENCOUNTER (OUTPATIENT)
Dept: GENERAL RADIOLOGY | Age: 73
End: 2023-01-20
Payer: MEDICARE

## 2023-01-20 VITALS
HEART RATE: 65 BPM | OXYGEN SATURATION: 97 % | SYSTOLIC BLOOD PRESSURE: 90 MMHG | BODY MASS INDEX: 27.16 KG/M2 | WEIGHT: 169 LBS | HEIGHT: 66 IN | DIASTOLIC BLOOD PRESSURE: 48 MMHG

## 2023-01-20 DIAGNOSIS — M79.674 PAIN IN RIGHT TOE(S): ICD-10-CM

## 2023-01-20 DIAGNOSIS — G62.9 NEUROPATHY: ICD-10-CM

## 2023-01-20 DIAGNOSIS — M79.674 PAIN IN RIGHT TOE(S): Primary | ICD-10-CM

## 2023-01-20 DIAGNOSIS — M79.671 RIGHT FOOT PAIN: ICD-10-CM

## 2023-01-20 PROCEDURE — G8417 CALC BMI ABV UP PARAM F/U: HCPCS | Performed by: STUDENT IN AN ORGANIZED HEALTH CARE EDUCATION/TRAINING PROGRAM

## 2023-01-20 PROCEDURE — G8484 FLU IMMUNIZE NO ADMIN: HCPCS | Performed by: STUDENT IN AN ORGANIZED HEALTH CARE EDUCATION/TRAINING PROGRAM

## 2023-01-20 PROCEDURE — 3078F DIAST BP <80 MM HG: CPT | Performed by: STUDENT IN AN ORGANIZED HEALTH CARE EDUCATION/TRAINING PROGRAM

## 2023-01-20 PROCEDURE — G8427 DOCREV CUR MEDS BY ELIG CLIN: HCPCS | Performed by: STUDENT IN AN ORGANIZED HEALTH CARE EDUCATION/TRAINING PROGRAM

## 2023-01-20 PROCEDURE — 3074F SYST BP LT 130 MM HG: CPT | Performed by: STUDENT IN AN ORGANIZED HEALTH CARE EDUCATION/TRAINING PROGRAM

## 2023-01-20 PROCEDURE — 73630 X-RAY EXAM OF FOOT: CPT

## 2023-01-20 PROCEDURE — 1123F ACP DISCUSS/DSCN MKR DOCD: CPT | Performed by: STUDENT IN AN ORGANIZED HEALTH CARE EDUCATION/TRAINING PROGRAM

## 2023-01-20 PROCEDURE — 1036F TOBACCO NON-USER: CPT | Performed by: STUDENT IN AN ORGANIZED HEALTH CARE EDUCATION/TRAINING PROGRAM

## 2023-01-20 PROCEDURE — 3017F COLORECTAL CA SCREEN DOC REV: CPT | Performed by: STUDENT IN AN ORGANIZED HEALTH CARE EDUCATION/TRAINING PROGRAM

## 2023-01-20 PROCEDURE — 99214 OFFICE O/P EST MOD 30 MIN: CPT | Performed by: STUDENT IN AN ORGANIZED HEALTH CARE EDUCATION/TRAINING PROGRAM

## 2023-01-20 RX ORDER — GABAPENTIN 300 MG/1
300 CAPSULE ORAL 3 TIMES DAILY
Qty: 30 CAPSULE | Refills: 0 | Status: SHIPPED | OUTPATIENT
Start: 2023-01-20 | End: 2023-02-19

## 2023-01-20 RX ORDER — TRAMADOL HYDROCHLORIDE 50 MG/1
50 TABLET ORAL NIGHTLY PRN
Qty: 20 TABLET | Refills: 0 | Status: SHIPPED | OUTPATIENT
Start: 2023-01-20 | End: 2023-01-27

## 2023-01-20 SDOH — ECONOMIC STABILITY: FOOD INSECURITY: WITHIN THE PAST 12 MONTHS, THE FOOD YOU BOUGHT JUST DIDN'T LAST AND YOU DIDN'T HAVE MONEY TO GET MORE.: NEVER TRUE

## 2023-01-20 SDOH — ECONOMIC STABILITY: FOOD INSECURITY: WITHIN THE PAST 12 MONTHS, YOU WORRIED THAT YOUR FOOD WOULD RUN OUT BEFORE YOU GOT MONEY TO BUY MORE.: NEVER TRUE

## 2023-01-20 ASSESSMENT — PATIENT HEALTH QUESTIONNAIRE - PHQ9
SUM OF ALL RESPONSES TO PHQ QUESTIONS 1-9: 0
SUM OF ALL RESPONSES TO PHQ9 QUESTIONS 1 & 2: 0
SUM OF ALL RESPONSES TO PHQ QUESTIONS 1-9: 0
DEPRESSION UNABLE TO ASSESS: PT REFUSES
SUM OF ALL RESPONSES TO PHQ QUESTIONS 1-9: 0
1. LITTLE INTEREST OR PLEASURE IN DOING THINGS: 0
2. FEELING DOWN, DEPRESSED OR HOPELESS: 0
SUM OF ALL RESPONSES TO PHQ QUESTIONS 1-9: 0

## 2023-01-20 ASSESSMENT — SOCIAL DETERMINANTS OF HEALTH (SDOH): HOW HARD IS IT FOR YOU TO PAY FOR THE VERY BASICS LIKE FOOD, HOUSING, MEDICAL CARE, AND HEATING?: NOT HARD AT ALL

## 2023-01-20 NOTE — PROGRESS NOTES
[address], [address] , [facility], [address]    Laura Jones is a 67 y.o. male with  has a past medical history of Acute kidney injury ([address] Ascites, Atrial fibrillation ([address] CHF (congestive heart failure) ([address] Cirrhosis of liver with ascites ([address] CKD (chronic kidney disease), Coronary artery disease, Hyperlipidemia, Hypertension, Hypomagnesemia, Hyponatremia, and Thrombocytopenia ([address] Presented to the office today for:  Chief Complaint   Patient presents with    Hypertension    Hyperlipidemia    Gastroesophageal Reflux       Assessment/Plan   1. Pain in right toe(s)  -     gabapentin (NEURONTIN) 300 MG capsule; Take 1 capsule by mouth 3 times daily for 30 days. , Disp-30 capsule, R-0Normal  -     External Referral To Podiatry  -     XR FOOT RIGHT (MIN 3 VIEWS); Future  -     traMADol (ULTRAM) 50 MG tablet; Take 1 tablet by mouth nightly as needed for Pain for up to 7 days. Intended supply: 5 days. Take lowest dose possible to manage pain Max Daily Amount: 50 mg, Disp-20 tablet, R-0Normal  -     mupirocin (BACTROBAN) 2 % ointment; Apply topically 3 times daily for 14 days Apply topically 3 times daily. , Topical, 3 TIMES DAILY Starting Fri 1/20/2023, Until Fri 2/3/2023, For 14 days, Disp-30 g, R-0, Normal  -     EMG; Future  2. Right foot pain  -     External Referral To Podiatry  -     XR FOOT RIGHT (MIN 3 VIEWS); Future  -     traMADol (ULTRAM) 50 MG tablet; Take 1 tablet by mouth nightly as needed for Pain for up to 7 days. Intended supply: 5 days. Take lowest dose possible to manage pain Max Daily Amount: 50 mg, Disp-20 tablet, R-0Normal  -     mupirocin (BACTROBAN) 2 % ointment; Apply topically 3 times daily for 14 days Apply topically 3 times daily. , Topical, 3 TIMES DAILY Starting Fri 1/20/2023, Until Fri 2/3/2023, For 14 days, Disp-30 g, R-0, Normal  -     EMG; Future  3.  Neuropathy  -     External Referral To Podiatry  -     Juan Daniel Freeman) 50 MG tablet; Take 1 tablet by mouth nightly as needed for Pain for up to 7 days. Intended supply: 5 days. Take lowest dose possible to manage pain Max Daily Amount: 50 mg, Disp-20 tablet, R-0Normal    Return in about 2 weeks (around 2/3/2023) for F/U right Foot pain. Local wound care to the affected area. Podiatry referral  Increased Gabapentin dose today  Severe pain today, short term use of Tramadol; risks/benefits/alternatives were discussed  EMG/XR  We discussed some of the etiologies and natural histories. We discussed the various treatment alternatives including anti-inflammatory medications, physical therapy, injections, further imaging studies and as a last resort surgery. Last PDMP [place] as Reviewed:  Review User Review Instant Review Result   Carly Griffin 1/20/2023  3:13 PM Reviewed PDMP [1]         All patient questions answered. Pt voiced understanding. Medications Discontinued During This Encounter   Medication Reason    gabapentin (NEURONTIN) 300 MG capsule REORDER       Patient received counseling on the following healthy behaviors: nutrition, exercise and medication adherence. I encouraged and discussed lifestyle modifications including diet and exercise and the patient was agreeable to making positive/beneficial changes to both to help improve their overall health. Discussed use, benefit, and side effects of prescribed medications. Barriers to medication compliance addressed. Patient given educational materials: see patient instructions. HM - HM items completed today as per orders. Outstanding HM items though not limited to immunizations were discussed with the patient today, including risks, benefits and alternatives. The patient will discuss these during the next appointment per their preference. If there are any worsening or concerning signs or symptoms, patient will report to the ED and/or contact [id number] for immediate evaluation. Teach back method was used.      Subjective: HPI  Chief Complaint   Patient presents with    Hypertension    Hyperlipidemia    Gastroesophageal Reflux     Right Foot Pain  Right foot/ankle pain. The patient states the pain has been present for several months. As far as trauma to the area, the patient indicates none. There is  pain with weight bearing. The patient states numbness and tingling is  present. Catching and locking has been present. He has tried 2 extra strength tylenol BID, Gabapentin 300mg without relief. His pain is severe. The patient is following with Cardiology and Vascular Surgery. There is concern for ischemia of the right lower extremity. No prior imaging was completed. He had to quit going to PT/inability to do the treadmill at the time. He is doing bands, stationary bicycling at home. Health Maintenance -   Alcohol/Substance use History - None    Tobacco Use      Smoking status: Former        Packs/day: 1.00        Years: 56.00        Pack years: 64        Types: Cigarettes        Quit date: 2022        Years since quittin.5      Smokeless tobacco: Never    Family History   Problem Relation Age of Onset    Heart Disease Mother     Hypertension Mother     Hypertension Father     Heart Disease Father     Coronary Art Dis Sister     Coronary Art Dis Brother        [facility] Scores 2023 2022 2022 8/15/2022 2022 2022   PHQ2 Score 0 1 0 1 0 0   PHQ9 Score 0 1 0 1 0 0     Interpretation of Total Score Depression Severity: 1-4 = Minimal depression, 5-9 = Mild depression, 10-14 = Moderate depression, 15-19 = Moderately severe depression, 20-27 = Severe depression    Review of Systems  Constitutional: Negative for activity change, appetite change, chills, diaphoresis, fatigue, fever and unexpected weight change. HENT: Negative for sinus pressure, sinus pain, sore throat and trouble swallowing. Respiratory: Negative for cough, shortness of breath and wheezing.     Cardiovascular: Negative for chest pain, palpitations and leg swelling. Gastrointestinal: Negative for abdominal pain, diarrhea, nausea and vomiting. Endocrine: Negative for cold intolerance, polydipsia, polyphagia and polyuria. Genitourinary: Negative for difficulty urinating, flank pain and frequency. Musculoskeletal: Negative for gait problem and joint swelling. Negative for back pain, neck pain and neck stiffness. Positive for right leg pain. Skin: Negative for color change and wound. Negative for pallor and rash. Allergic/Immunologic: Negative for environmental allergies and food allergies. Neurological: Negative for light-headedness, numbness and headaches. Psychiatric/Behavioral: Negative for sleep disturbance. Negative for confusion and suicidal ideas. Objective:    BP (!) 90/48   Pulse 65   Ht 5' 6\" (1.676 m)   Wt 169 lb (76.7 kg)   SpO2 97%   BMI 27.28 kg/m²    BP Readings from Last 3 Encounters:   01/20/23 (!) 90/48   01/18/23 100/60   01/18/23 (!) 85/53     Physical Exam  Constitutional: Patient is oriented to person, place, and time. Patient appears well-developed and well-nourished. No distress. HENT: Head: Normocephalic and atraumatic. Eyes: Pupils are equal, round, and reactive to light. Conjunctivae are normal. Right eye exhibits no discharge. Left eye exhibits no discharge. Cardiovascular: Normal rate, regular rhythm and normal heart sounds. Pulmonary/Chest: Effort normal and breath sounds normal. No respiratory distress. Patient has no wheezes. Abdominal: Soft. Bowel sounds are normal. Patient exhibits no distension. There is no tenderness. Musculoskeletal:  Patient exhibits no edema and tenderness. Patient exhibits no deformity. SKIN:  Intact without rashes, lesions or ulcerations. No obvious deformity or swelling. NEURO: Musculoskeletal and axillary nerves intact to sensory and motor testing. EYES:  Extraocular muscles intact. MOUTH: Oral mucosa moist.  No perioral lesions.   PULM:  Respirations unlabored and regular. VASC:  Capillary refill less than 3 seconds. Distal pulses are palpable. There is no lymphadenopathy. Ankle Exam:    Reveals there is not effusion. Swelling is not present. Edema is present. Right Toe discomfort with edema/erythema  Ecchymoses is not present. Palpation-Tenderness yes   The foot is in WNL alignment. ROM:  40 degrees plantarflexion and 20 degrees dorsiflexion. Subtalar motion is 30 degrees inversion and 20 degrees eversion. Strength-WNL  Sensation-normal to light touch  Special Tests-Ankle inversion: laxity negative  Ankle eversion: laxity negative  Ankle drawer: laxity negative  External rotation:negative  The patient is  able to single toe raise. Gait: Antalgic  PSYCH:  Patient has good fund of knowledge and displays understanding of exam.  Neurological: Patient is alert and oriented to person, place, and time. Skin: Skin is warm and dry. Patient is not diaphoretic. Psychiatric: Patient's speech is normal and behavior is normal. Thought content normal.   Vitals reviewed. Lab Results   Component Value Date    WBC 6.3 12/20/2022    HGB 15.0 12/20/2022    HCT 43.0 12/20/2022    PLT See Reflexed IPF Result 12/20/2022    ALT 13 12/20/2022    AST 18 12/20/2022     12/20/2022    K 4.5 12/20/2022    CL 97 (L) 12/20/2022    CREATININE 1.85 (H) 12/20/2022    BUN 37 (H) 12/20/2022    CO2 28 12/20/2022    TSH 6.02 (H) 09/12/2022    INR 1.0 12/20/2022    LABA1C 5.6 02/21/2022     Lab Results   Component Value Date    CALCIUM 10.1 12/20/2022    PHOS 4.3 05/02/2022     No results found for: LDLCALC, LDLCHOLESTEROL, LDLDIRECT    Please note that this chart was generated using voice recognition Dragon dictation software. Although every effort was made to ensure the accuracy of this automated transcription, some errors in transcription may have occurred.     Electronically signed by Dr. Seamus Hartman MD on 1/20/2023 at 3:15 PM

## 2023-01-21 DIAGNOSIS — I95.9 HYPOTENSION, UNSPECIFIED: ICD-10-CM

## 2023-01-21 DIAGNOSIS — K21.9 GASTRO-ESOPHAGEAL REFLUX DISEASE WITHOUT ESOPHAGITIS: ICD-10-CM

## 2023-01-23 RX ORDER — MULTIVIT-MIN/FERROUS FUMARATE 15 MG
TABLET ORAL
Qty: 90 TABLET | Refills: 0 | Status: SHIPPED | OUTPATIENT
Start: 2023-01-23

## 2023-01-30 DIAGNOSIS — M79.671 RIGHT FOOT PAIN: ICD-10-CM

## 2023-01-30 DIAGNOSIS — I95.9 HYPOTENSION, UNSPECIFIED HYPOTENSION TYPE: ICD-10-CM

## 2023-01-30 DIAGNOSIS — M79.674 PAIN IN RIGHT TOE(S): Primary | ICD-10-CM

## 2023-01-30 DIAGNOSIS — I25.5 ISCHEMIC CARDIOMYOPATHY: ICD-10-CM

## 2023-01-30 DIAGNOSIS — K21.9 GASTROESOPHAGEAL REFLUX DISEASE, UNSPECIFIED WHETHER ESOPHAGITIS PRESENT: ICD-10-CM

## 2023-01-30 DIAGNOSIS — G62.9 NEUROPATHY: ICD-10-CM

## 2023-01-30 RX ORDER — THIAMINE MONONITRATE (VIT B1) 100 MG
100 TABLET ORAL DAILY
Qty: 90 TABLET | Refills: 1 | Status: SHIPPED | OUTPATIENT
Start: 2023-01-30

## 2023-01-30 RX ORDER — GABAPENTIN 300 MG/1
CAPSULE ORAL
Qty: 120 CAPSULE | Refills: 1 | Status: SHIPPED | OUTPATIENT
Start: 2023-01-30 | End: 2023-03-01

## 2023-01-30 RX ORDER — TRAMADOL HYDROCHLORIDE 100 MG/1
100 TABLET, COATED ORAL EVERY 6 HOURS PRN
Qty: 40 TABLET | Refills: 0 | Status: SHIPPED | OUTPATIENT
Start: 2023-01-30 | End: 2023-02-02 | Stop reason: ALTCHOICE

## 2023-01-30 RX ORDER — PANTOPRAZOLE SODIUM 40 MG/1
40 TABLET, DELAYED RELEASE ORAL
Qty: 90 TABLET | Refills: 1 | Status: SHIPPED | OUTPATIENT
Start: 2023-01-30

## 2023-01-30 NOTE — TELEPHONE ENCOUNTER
Patients spouse called and reports that patient has had increased foot pain and has had no relief with the Gabapentin 300 TID or Tramadol 50mg. He was wondering if he can have these increased?     Also other medications are pended for refill.

## 2023-01-30 NOTE — TELEPHONE ENCOUNTER
Patient's wife informed and verbalizes understanding: \"Hello, can you have him take 100mg every 6 hours PRN (max 400m/day) of the Tramadol and then add another 300mg of Gabapentin in the morning 600/300/300 and check-in with me in a few days' time? \"    Confirmed appointment for 02/02/2023. Medication pended to PCP.

## 2023-02-02 ENCOUNTER — OFFICE VISIT (OUTPATIENT)
Dept: PRIMARY CARE CLINIC | Age: 73
End: 2023-02-02
Payer: MEDICARE

## 2023-02-02 ENCOUNTER — HOSPITAL ENCOUNTER (OUTPATIENT)
Age: 73
Discharge: HOME | End: 2023-02-02
Payer: MEDICARE

## 2023-02-02 VITALS
DIASTOLIC BLOOD PRESSURE: 48 MMHG | BODY MASS INDEX: 28.93 KG/M2 | HEIGHT: 66 IN | WEIGHT: 180 LBS | HEART RATE: 82 BPM | SYSTOLIC BLOOD PRESSURE: 110 MMHG

## 2023-02-02 DIAGNOSIS — M79.671 RIGHT FOOT PAIN: ICD-10-CM

## 2023-02-02 DIAGNOSIS — R73.9 HYPERGLYCEMIA: ICD-10-CM

## 2023-02-02 DIAGNOSIS — N18.32 STAGE 3B CHRONIC KIDNEY DISEASE (HCC): ICD-10-CM

## 2023-02-02 DIAGNOSIS — M19.079 ARTHRITIS OF FIRST METATARSOPHALANGEAL JOINT: Primary | ICD-10-CM

## 2023-02-02 PROBLEM — D61.818 OTHER PANCYTOPENIA (HCC): Status: ACTIVE | Noted: 2023-02-02

## 2023-02-02 PROBLEM — D69.6 THROMBOCYTOPENIA, UNSPECIFIED (HCC): Status: ACTIVE | Noted: 2023-02-02

## 2023-02-02 PROBLEM — E11.9 TYPE 2 DIABETES MELLITUS (HCC): Status: RESOLVED | Noted: 2022-09-12 | Resolved: 2023-02-02

## 2023-02-02 LAB
ABSOLUTE EOS #: 0.2 K/UL (ref 0–0.44)
ABSOLUTE IMMATURE GRANULOCYTE: <0.03 K/UL (ref 0–0.3)
ABSOLUTE LYMPH #: 1.16 K/UL (ref 1.1–3.7)
ABSOLUTE MONO #: 0.5 K/UL (ref 0.1–1.2)
ALBUMIN SERPL-MCNC: 4.4 G/DL (ref 3.5–5.2)
ALBUMIN/GLOBULIN RATIO: 1.5 (ref 1–2.5)
ALP SERPL-CCNC: 80 U/L (ref 40–129)
ALT SERPL-CCNC: 19 U/L (ref 5–41)
ANION GAP SERPL CALCULATED.3IONS-SCNC: 10 MMOL/L (ref 9–17)
AST SERPL-CCNC: 24 U/L
BASOPHILS # BLD: 1 % (ref 0–2)
BASOPHILS ABSOLUTE: 0.04 K/UL (ref 0–0.2)
BILIRUB SERPL-MCNC: 0.4 MG/DL (ref 0.3–1.2)
BUN SERPL-MCNC: 20 MG/DL (ref 8–23)
BUN/CREAT BLD: 16 (ref 9–20)
CALCIUM SERPL-MCNC: 10.5 MG/DL (ref 8.6–10.4)
CHLORIDE SERPL-SCNC: 96 MMOL/L (ref 98–107)
CO2 SERPL-SCNC: 32 MMOL/L (ref 20–31)
CREAT SERPL-MCNC: 1.29 MG/DL (ref 0.7–1.2)
CRP SERPL HS-MCNC: <3 MG/L (ref 0–5)
EOSINOPHILS RELATIVE PERCENT: 3 % (ref 1–4)
ERYTHROCYTE [SEDIMENTATION RATE] IN BLOOD BY WESTERGREN METHOD: 4 MM/HR (ref 0–20)
GFR SERPL CREATININE-BSD FRML MDRD: 59 ML/MIN/1.73M2
GLUCOSE SERPL-MCNC: 111 MG/DL (ref 70–99)
HCT VFR BLD AUTO: 41.9 % (ref 40.7–50.3)
HGB BLD-MCNC: 13.8 G/DL (ref 13–17)
IMMATURE GRANULOCYTES: 0 %
LYMPHOCYTES # BLD: 20 % (ref 24–43)
MCH RBC QN AUTO: 33.7 PG (ref 25.2–33.5)
MCHC RBC AUTO-ENTMCNC: 32.9 G/DL (ref 28.4–34.8)
MCV RBC AUTO: 102.2 FL (ref 82.6–102.9)
MONOCYTES # BLD: 9 % (ref 3–12)
NRBC AUTOMATED: 0 PER 100 WBC
PDW BLD-RTO: 13.5 % (ref 11.8–14.4)
PLATELET # BLD AUTO: 106 K/UL (ref 138–453)
PMV BLD AUTO: 10.8 FL (ref 8.1–13.5)
POTASSIUM SERPL-SCNC: 4.5 MMOL/L (ref 3.7–5.3)
PROT SERPL-MCNC: 7.3 G/DL (ref 6.4–8.3)
RBC # BLD: 4.1 M/UL (ref 4.21–5.77)
SEG NEUTROPHILS: 67 % (ref 36–65)
SEGMENTED NEUTROPHILS ABSOLUTE COUNT: 3.93 K/UL (ref 1.5–8.1)
SODIUM SERPL-SCNC: 138 MMOL/L (ref 135–144)
WBC # BLD AUTO: 5.9 K/UL (ref 3.5–11.3)

## 2023-02-02 PROCEDURE — 1123F ACP DISCUSS/DSCN MKR DOCD: CPT | Performed by: STUDENT IN AN ORGANIZED HEALTH CARE EDUCATION/TRAINING PROGRAM

## 2023-02-02 PROCEDURE — 3078F DIAST BP <80 MM HG: CPT | Performed by: STUDENT IN AN ORGANIZED HEALTH CARE EDUCATION/TRAINING PROGRAM

## 2023-02-02 PROCEDURE — 80053 COMPREHEN METABOLIC PANEL: CPT

## 2023-02-02 PROCEDURE — G8427 DOCREV CUR MEDS BY ELIG CLIN: HCPCS | Performed by: STUDENT IN AN ORGANIZED HEALTH CARE EDUCATION/TRAINING PROGRAM

## 2023-02-02 PROCEDURE — G8484 FLU IMMUNIZE NO ADMIN: HCPCS | Performed by: STUDENT IN AN ORGANIZED HEALTH CARE EDUCATION/TRAINING PROGRAM

## 2023-02-02 PROCEDURE — 83036 HEMOGLOBIN GLYCOSYLATED A1C: CPT

## 2023-02-02 PROCEDURE — G8417 CALC BMI ABV UP PARAM F/U: HCPCS | Performed by: STUDENT IN AN ORGANIZED HEALTH CARE EDUCATION/TRAINING PROGRAM

## 2023-02-02 PROCEDURE — 99214 OFFICE O/P EST MOD 30 MIN: CPT | Performed by: STUDENT IN AN ORGANIZED HEALTH CARE EDUCATION/TRAINING PROGRAM

## 2023-02-02 PROCEDURE — 36415 COLL VENOUS BLD VENIPUNCTURE: CPT

## 2023-02-02 PROCEDURE — 3074F SYST BP LT 130 MM HG: CPT | Performed by: STUDENT IN AN ORGANIZED HEALTH CARE EDUCATION/TRAINING PROGRAM

## 2023-02-02 PROCEDURE — 85652 RBC SED RATE AUTOMATED: CPT

## 2023-02-02 PROCEDURE — 1036F TOBACCO NON-USER: CPT | Performed by: STUDENT IN AN ORGANIZED HEALTH CARE EDUCATION/TRAINING PROGRAM

## 2023-02-02 PROCEDURE — 85025 COMPLETE CBC W/AUTO DIFF WBC: CPT

## 2023-02-02 PROCEDURE — 86140 C-REACTIVE PROTEIN: CPT

## 2023-02-02 PROCEDURE — 3017F COLORECTAL CA SCREEN DOC REV: CPT | Performed by: STUDENT IN AN ORGANIZED HEALTH CARE EDUCATION/TRAINING PROGRAM

## 2023-02-02 RX ORDER — OXYCODONE HYDROCHLORIDE AND ACETAMINOPHEN 5; 325 MG/1; MG/1
1 TABLET ORAL EVERY 8 HOURS PRN
Qty: 20 TABLET | Refills: 0 | Status: SHIPPED | OUTPATIENT
Start: 2023-02-02 | End: 2023-02-07

## 2023-02-02 SDOH — ECONOMIC STABILITY: FOOD INSECURITY: WITHIN THE PAST 12 MONTHS, YOU WORRIED THAT YOUR FOOD WOULD RUN OUT BEFORE YOU GOT MONEY TO BUY MORE.: NEVER TRUE

## 2023-02-02 SDOH — ECONOMIC STABILITY: INCOME INSECURITY: HOW HARD IS IT FOR YOU TO PAY FOR THE VERY BASICS LIKE FOOD, HOUSING, MEDICAL CARE, AND HEATING?: NOT HARD AT ALL

## 2023-02-02 SDOH — ECONOMIC STABILITY: HOUSING INSECURITY
IN THE LAST 12 MONTHS, WAS THERE A TIME WHEN YOU DID NOT HAVE A STEADY PLACE TO SLEEP OR SLEPT IN A SHELTER (INCLUDING NOW)?: NO

## 2023-02-02 SDOH — ECONOMIC STABILITY: FOOD INSECURITY: WITHIN THE PAST 12 MONTHS, THE FOOD YOU BOUGHT JUST DIDN'T LAST AND YOU DIDN'T HAVE MONEY TO GET MORE.: NEVER TRUE

## 2023-02-02 NOTE — PROGRESS NOTES
[address], [address] , [facility], [address]    Mukul Azul is a 67 y.o. male with  has a past medical history of Acute kidney injury ([address] Ascites, Atrial fibrillation ([address] CHF (congestive heart failure) ([address] Cirrhosis of liver with ascites ([facility] CKD (chronic kidney disease), Coronary artery disease, Hyperlipidemia, Hypertension, Hypomagnesemia, Hyponatremia, and Thrombocytopenia ([address] Presented to the office today for:  Chief Complaint   Patient presents with    Foot Pain       Assessment/Plan   1. Arthritis of first metatarsophalangeal joint  -     oxyCODONE-acetaminophen (PERCOCET) 5-325 MG per tablet; Take 1 tablet by mouth every 8 hours as needed for Pain for up to 5 days. Intended supply: 5 days. Take lowest dose possible to manage pain Max Daily Amount: 3 tablets, Disp-20 tablet, R-0Normal  2. Stage 3b chronic kidney disease (HCC)  -     Hemoglobin A1C; Future  3. Right foot pain  -     CBC with Auto Differential; Future  -     Comprehensive Metabolic Panel; Future  -     C-Reactive Protein; Future  -     Sedimentation Rate; Future  -     MRI FOOT RIGHT WO CONTRAST; Future  -     oxyCODONE-acetaminophen (PERCOCET) 5-325 MG per tablet; Take 1 tablet by mouth every 8 hours as needed for Pain for up to 5 days. Intended supply: 5 days. Take lowest dose possible to manage pain Max Daily Amount: 3 tablets, Disp-20 tablet, R-0Normal  -     Hemoglobin A1C; Future  4. Hyperglycemia  -     Hemoglobin A1C; Future    Return in about 1 month (around 3/2/2023) for F/U Toe pain. Obtain labs, CBC/CMP/CRP and plan for MRI of the Left Foot. Given the patient's severe pain, start on Percocet today. Risks/benefits/alternatives discussed, continue w/ Gabapentin. All patient questions answered. Pt voiced understanding.    Medications Discontinued During This Encounter   Medication Reason    traMADol HCl 100 MG TABS Therapy completed       Patient received counseling on the following healthy behaviors: nutrition, exercise and medication adherence. I encouraged and discussed lifestyle modifications including diet and exercise and the patient was agreeable to making positive/beneficial changes to both to help improve their overall health. Discussed use, benefit, and side effects of prescribed medications. Barriers to medication compliance addressed. Patient given educational materials: see patient instructions. HM - HM items completed today as per orders. Outstanding HM items though not limited to immunizations were discussed with the patient today, including risks, benefits and alternatives. The patient will discuss these during the next appointment per their preference. If there are any worsening or concerning signs or symptoms, patient will report to the ED and/or contact [id number] for immediate evaluation. Teach back method was used. Subjective:    HPI  Chief Complaint   Patient presents with    Foot Pain     Right Foot Pain  Patient is here for a follow up of Right foot pain. He reports that he has sharp pain and especially at night and early morning. He says the pain is constant and he is getting no relief. Pain is at a 10+. He has an angiogram with Dr. Christian Man on 2/14. The increase in gabapentin 600/300/300mg and tramadol at 100mg nightly PRN isn't helping thus far. As far as trauma to the area, the patient indicates none new. There is  pain with weight bearing. The patient is staying active at this time    01/21/2023  \"  FINDINGS:   No acute osseous abnormality. Generalized osteopenia. Advanced 1st   metatarsophalangeal joint degenerative change. Small plantar calcaneal spur   present. Soft tissues unremarkable.        \"    Health Maintenance -   Alcohol/Substance use History - None    Tobacco Use      Smoking status: Former        Packs/day: 1.00        Years: 56.00        Pack years: 64        Types: Cigarettes        Quit date: 6/28/2022 Years since quittin.6      Smokeless tobacco: Never    Family History   Problem Relation Age of Onset    Heart Disease Mother     Hypertension Mother     Hypertension Father     Heart Disease Father     Coronary Art Dis Sister     Coronary Art Dis Brother        [facility] Scores 2023 2022 2022 8/15/2022 2022 2022   PHQ2 Score 0 1 0 1 0 0   PHQ9 Score 0 1 0 1 0 0     Interpretation of Total Score Depression Severity: 1-4 = Minimal depression, 5-9 = Mild depression, 10-14 = Moderate depression, 15-19 = Moderately severe depression, 20-27 = Severe depression    Review of Systems  Constitutional: Negative for activity change, appetite change, chills, diaphoresis, fatigue, fever and unexpected weight change. HENT: Negative for sinus pressure, sinus pain, sore throat and trouble swallowing. Respiratory: Negative for cough, shortness of breath and wheezing. Cardiovascular: Negative for chest pain, palpitations and leg swelling. Gastrointestinal: Negative for abdominal pain, diarrhea, nausea and vomiting. Endocrine: Negative for cold intolerance, polydipsia, polyphagia and polyuria. Genitourinary: Negative for difficulty urinating, flank pain and frequency. Musculoskeletal: Negative for gait problem and joint swelling. Negative for back pain, neck pain and neck stiffness. Positive for right foot pain  Skin: Negative for color change and wound. Negative for pallor and rash. Allergic/Immunologic: Negative for environmental allergies and food allergies. Neurological: Negative for light-headedness, numbness and headaches. Psychiatric/Behavioral: Negative for sleep disturbance. Negative for confusion and suicidal ideas.      Objective:    BP (!) 110/48   Pulse 82   Ht 5' 6\" (1.676 m)   Wt 180 lb (81.6 kg)   BMI 29.05 kg/m²    BP Readings from Last 3 Encounters:   23 (!) 110/48   23 (!) 90/48   23 100/60     Physical Exam  Constitutional: Patient is oriented to person, place, and time. Patient appears well-developed and well-nourished. No distress. HENT: Head: Normocephalic and atraumatic. Eyes: Pupils are equal, round, and reactive to light. Conjunctivae are normal. Right eye exhibits no discharge. Left eye exhibits no discharge. Cardiovascular: Normal rate, regular rhythm and normal heart sounds. Pulmonary/Chest: Effort normal and breath sounds normal. No respiratory distress. Patient has no wheezes. Abdominal: Soft. Bowel sounds are normal. Patient exhibits no distension. There is no tenderness. Musculoskeletal:  Patient exhibits no edema and tenderness. Patient exhibits no deformity. SKIN:  Intact without rashes, lesions or ulcerations. No obvious deformity or swelling. NEURO: Musculoskeletal and axillary nerves intact to sensory and motor testing. EYES:  Extraocular muscles intact. MOUTH: Oral mucosa moist.  No perioral lesions. PULM:  Respirations unlabored and regular. VASC:  Capillary refill less than 3 seconds. Distal pulses are palpable. There is no lymphadenopathy. Ankle Exam:    Reveals there is not effusion. Swelling is not present. Edema is not present. Dry sangineous drainage noted today. Ecchymoses is not present. Palpation-Tenderness yes, at 1st MTP; The foot is in WNL alignment. ROM:  40 degrees plantarflexion and 20 degrees dorsiflexion. Subtalar motion is 30 degrees inversion and 20 degrees eversion. Strength-WNL  Sensation-normal to light touch  Gait: Antalgic  PSYCH:  Patient has good fund of knowledge and displays understanding of exam.  Neurological: Patient is alert and oriented to person, place, and time. Skin: Skin is warm and dry. Patient is not diaphoretic. Psychiatric: Patient's speech is normal and behavior is normal. Thought content normal.   Vitals reviewed.       Lab Results   Component Value Date    WBC 6.3 12/20/2022    HGB 15.0 12/20/2022    HCT 43.0 12/20/2022    PLT See Reflexed IPF Result 12/20/2022    ALT 13 12/20/2022    AST 18 12/20/2022     12/20/2022    K 4.5 12/20/2022    CL 97 (L) 12/20/2022    CREATININE 1.85 (H) 12/20/2022    BUN 37 (H) 12/20/2022    CO2 28 12/20/2022    TSH 6.02 (H) 09/12/2022    INR 1.0 12/20/2022    LABA1C 5.6 02/21/2022     Lab Results   Component Value Date    CALCIUM 10.1 12/20/2022    PHOS 4.3 05/02/2022     No results found for: LDLCALC, LDLCHOLESTEROL, LDLDIRECT    Please note that this chart was generated using voice recognition Dragon dictation software. Although every effort was made to ensure the accuracy of this automated transcription, some errors in transcription may have occurred.    Electronically signed by Dr. Hany Hodges MD on 2/2/2023 at 2:10 PM

## 2023-02-03 ENCOUNTER — TELEPHONE (OUTPATIENT)
Dept: PRIMARY CARE CLINIC | Age: 73
End: 2023-02-03

## 2023-02-03 LAB
EST. AVERAGE GLUCOSE BLD GHB EST-MCNC: 108 MG/DL
HBA1C MFR BLD: 5.4 % (ref 4–6)

## 2023-02-03 NOTE — TELEPHONE ENCOUNTER
aRdha from [facility] calling to make you aware that they have had 3 unsuccessful attempts of trying reach patient to schedule for EMG referral.

## 2023-02-14 ENCOUNTER — HOSPITAL ENCOUNTER (OUTPATIENT)
Dept: INTERVENTIONAL RADIOLOGY/VASCULAR | Age: 73
Discharge: HOME | End: 2023-02-16
Payer: MEDICARE

## 2023-02-14 VITALS
OXYGEN SATURATION: 99 % | TEMPERATURE: 98.7 F | BODY MASS INDEX: 28.93 KG/M2 | DIASTOLIC BLOOD PRESSURE: 63 MMHG | HEIGHT: 66 IN | WEIGHT: 180 LBS | RESPIRATION RATE: 18 BRPM | HEART RATE: 59 BPM | SYSTOLIC BLOOD PRESSURE: 99 MMHG

## 2023-02-14 DIAGNOSIS — I70.261 CRITICAL LIMB ISCHEMIA OF RIGHT LOWER EXTREMITY WITH GANGRENE (HCC): ICD-10-CM

## 2023-02-14 LAB
ACTIVATED CLOTTING TIME: 182 SEC (ref 79–149)
ACTIVATED CLOTTING TIME: 186 SEC (ref 79–149)
ACTIVATED CLOTTING TIME: 199 SEC (ref 79–149)
ACTIVATED CLOTTING TIME: 199 SEC (ref 79–149)
ACTIVATED CLOTTING TIME: 216 SEC (ref 79–149)
ACTIVATED CLOTTING TIME: 228 SEC (ref 79–149)
ACTIVATED CLOTTING TIME: 237 SEC (ref 79–149)
ACTIVATED CLOTTING TIME: 237 SEC (ref 79–149)
ACTIVATED CLOTTING TIME: 245 SEC (ref 79–149)
ACTIVATED CLOTTING TIME: 258 SEC (ref 79–149)

## 2023-02-14 PROCEDURE — 99153 MOD SED SAME PHYS/QHP EA: CPT

## 2023-02-14 PROCEDURE — 6360000002 HC RX W HCPCS

## 2023-02-14 PROCEDURE — 85347 COAGULATION TIME ACTIVATED: CPT

## 2023-02-14 PROCEDURE — 6360000004 HC RX CONTRAST MEDICATION: Performed by: INTERNAL MEDICINE

## 2023-02-14 PROCEDURE — 6360000002 HC RX W HCPCS: Performed by: INTERNAL MEDICINE

## 2023-02-14 PROCEDURE — C1769 GUIDE WIRE: HCPCS

## 2023-02-14 PROCEDURE — 6370000000 HC RX 637 (ALT 250 FOR IP): Performed by: INTERNAL MEDICINE

## 2023-02-14 PROCEDURE — C9765 REVASC INTRA LITHOTRIP-STENT: HCPCS

## 2023-02-14 PROCEDURE — 99152 MOD SED SAME PHYS/QHP 5/>YRS: CPT

## 2023-02-14 PROCEDURE — 2500000003 HC RX 250 WO HCPCS

## 2023-02-14 RX ORDER — ONDANSETRON 2 MG/ML
4 INJECTION INTRAMUSCULAR; INTRAVENOUS EVERY 6 HOURS PRN
Status: DISCONTINUED | OUTPATIENT
Start: 2023-02-14 | End: 2023-02-17 | Stop reason: HOSPADM

## 2023-02-14 RX ORDER — SODIUM CHLORIDE 0.9 % (FLUSH) 0.9 %
5-40 SYRINGE (ML) INJECTION EVERY 12 HOURS SCHEDULED
Status: DISCONTINUED | OUTPATIENT
Start: 2023-02-14 | End: 2023-02-17 | Stop reason: HOSPADM

## 2023-02-14 RX ORDER — FENTANYL CITRATE 50 UG/ML
25 INJECTION, SOLUTION INTRAMUSCULAR; INTRAVENOUS
Status: ACTIVE | OUTPATIENT
Start: 2023-02-14 | End: 2023-02-15

## 2023-02-14 RX ORDER — CLOPIDOGREL BISULFATE 75 MG/1
75 TABLET ORAL DAILY
Status: DISCONTINUED | OUTPATIENT
Start: 2023-02-15 | End: 2023-02-17 | Stop reason: HOSPADM

## 2023-02-14 RX ORDER — MIDAZOLAM HYDROCHLORIDE 2 MG/2ML
1 INJECTION, SOLUTION INTRAMUSCULAR; INTRAVENOUS
Status: DISPENSED | OUTPATIENT
Start: 2023-02-14 | End: 2023-02-15

## 2023-02-14 RX ORDER — SODIUM CHLORIDE 9 MG/ML
INJECTION, SOLUTION INTRAVENOUS PRN
Status: DISCONTINUED | OUTPATIENT
Start: 2023-02-14 | End: 2023-02-17 | Stop reason: HOSPADM

## 2023-02-14 RX ORDER — FENTANYL CITRATE 50 UG/ML
25 INJECTION, SOLUTION INTRAMUSCULAR; INTRAVENOUS ONCE
Status: COMPLETED | OUTPATIENT
Start: 2023-02-14 | End: 2023-02-14

## 2023-02-14 RX ORDER — CLOPIDOGREL BISULFATE 75 MG/1
300 TABLET ORAL ONCE
Status: COMPLETED | OUTPATIENT
Start: 2023-02-14 | End: 2023-02-14

## 2023-02-14 RX ORDER — ACETAMINOPHEN 325 MG/1
650 TABLET ORAL EVERY 4 HOURS PRN
Status: DISCONTINUED | OUTPATIENT
Start: 2023-02-14 | End: 2023-02-17 | Stop reason: HOSPADM

## 2023-02-14 RX ORDER — SODIUM CHLORIDE 0.9 % (FLUSH) 0.9 %
5-40 SYRINGE (ML) INJECTION PRN
Status: DISCONTINUED | OUTPATIENT
Start: 2023-02-14 | End: 2023-02-17 | Stop reason: HOSPADM

## 2023-02-14 RX ADMIN — CLOPIDOGREL BISULFATE 300 MG: 75 TABLET ORAL at 17:04

## 2023-02-14 RX ADMIN — FENTANYL CITRATE 25 MCG: 50 INJECTION, SOLUTION INTRAMUSCULAR; INTRAVENOUS at 12:10

## 2023-02-14 RX ADMIN — IOPAMIDOL 60 ML: 755 INJECTION, SOLUTION INTRAVENOUS at 12:01

## 2023-02-14 ASSESSMENT — PAIN SCALES - GENERAL: PAINLEVEL_OUTOF10: 10

## 2023-02-14 ASSESSMENT — PAIN DESCRIPTION - PAIN TYPE: TYPE: CHRONIC PAIN

## 2023-02-14 ASSESSMENT — PAIN DESCRIPTION - LOCATION: LOCATION: FOOT

## 2023-02-14 ASSESSMENT — PAIN DESCRIPTION - FREQUENCY: FREQUENCY: CONTINUOUS

## 2023-02-14 ASSESSMENT — PAIN DESCRIPTION - ORIENTATION: ORIENTATION: RIGHT

## 2023-02-14 NOTE — PROGRESS NOTES
Patient returned to pre/post area. Alert and oriented, rates pain 10/10 to lower back. Arterial sheath to right groin with tegaderm  noted clean/dry/intact with no signs/sx of hematoma noted. Will continue to monitor.

## 2023-02-14 NOTE — DISCHARGE INSTRUCTIONS
Angiogram: What to Expect at [address] [place] [address] Dr with or without vascular stent placement is a procedure to open a narrowed artery in the lower body. Fatty buildup (plaque) can narrow or block these arteries. When one or more of your arteries are narrowed, it can make it hard for blood to flow to the body. This procedure may improve blood flow and reduce risk of loss of the limb. Your groin may have a bruise or a small lump where the catheter was put in (catheter site). The area may feel sore and the bruise may get bigger for a few days after the procedure. This care sheet gives you a general idea about how long it will take for you to recover. Follow the steps below to feel better as quickly as possible. How can you care for yourself at home? Sedation  No alcoholic beverages for 24 hours after the procedure. The sedative will make you sleepy. Rest until the effects have worn off. Nausea or vomiting from the sedative is normal and usually does not last long. Ask your doctor when you will be able to return to work. Do not drive, operate machinery, do anything that requires attention to detail, or sign important papers for at least 24 hours or until your doctor says it is safe. Activity  Rest when you feel tired. Getting enough sleep will help you recover. Try to walk each day. Start by walking a little more than you did the day before. Walking boosts blood flow and helps prevent pneumonia and constipation. Try not to walk up stairs for the first couple of days until the catheter site heals. Avoid strenuous activities, such as bicycle riding, jogging, weight lifting, or aerobic exercise, for 2 weeks or until your doctor says it is okay. Avoid heavy lifting for 1 week. Lift nothing over 10 pounds (a gallon of milk is 8 pounds). Ask your doctor when you can drive again. You will probably need to take 1 to 2 weeks off from work. It depends on the type of work you do and how you feel.   You may shower 24 to 48 hours after the procedure. Pat the site dry. Do not take a bath for 1 week. Your doctor will tell you when you can have sex again. Diet  You can eat your normal diet. If your stomach is upset, try bland, low-fat foods like plain rice, broiled chicken, toast, and yogurt. Drink plenty of fluids (unless your doctor tells you not to) to flush dye from system. Try to avoid constipation and straining with bowel movements. You may want to take a fiber supplement every day. If you have not had a bowel movement after a couple of days, ask your doctor about taking a mild laxative. Medicines  Your doctor will tell you if and when you can restart your medicines. He or she will also give you instructions about taking any new medicines. If you take blood thinners, such as warfarin (Coumadin), clopidogrel (Plavix), or aspirin, be sure to talk to your doctor. He or she will tell you if and when to start taking those medicines again. Make sure that you understand exactly what your doctor wants you to do. Be safe with medicines. Take your medicines exactly as prescribed. Call your doctor if you think you are having a problem with your medicine. If your doctor prescribed antibiotics, take them as directed. Do not stop taking them just because you feel better. You need to take the full course of antibiotics. Your doctor will probably prescribe a blood thinner when you go home. This helps prevent blood clots. Be sure you get instructions about how to take your medicine safely. Blood thinners can cause serious bleeding problems. Care of the catheter site  Remove bandage over the groin in 24 hours. You will need to lie flat for a few hours after the procedure to prevent bleeding. Check site for lump or visible bleeding or if you have new back or groin pain, apply pressure to groin and call 911. Pain  You may put ice or a cold pack on the catheter site for 10 to 15 minutes at a time to help with soreness or swelling.  Put a thin cloth between the ice and your skin. TakeTylenol for pain as needed per package instructions. Follow-up care is a key part of your treatment and safety. Be sure to make and go to all appointments, and call your doctor if you are having problems. It's also a good idea to know your test results and keep a list of the medicines you take. When should you call for help? Call 911 anytime you think you may need emergency care. For example, call if:  You passed out (lost consciousness). You have severe trouble breathing. You have sudden chest pain and shortness of breath, or you cough up blood. You have symptoms of a stroke. These may include:  Sudden numbness, tingling, weakness, or loss of movement in your face, arm, or leg, especially on only one side of your body. Sudden vision changes. Sudden trouble speaking. Sudden confusion or trouble understanding simple statements. Sudden problems with walking or balance. A sudden, severe headache that is different from past headaches. Your groin is very swollen and you have a lump that is getting bigger under your skin where the catheter was put in. Call your doctor now or seek immediate medical care if:  You have severe pain in your leg, or it becomes cold, pale, blue, tingly, or numb. You are bleeding from the catheter site. You have a fast-growing, painful lump at the catheter site. You have pain that does not get better after you take pain medicine. You have signs of infection, such as: Increased pain, swelling, warmth, or redness of the skin. Red streaks leading from the area. Pus draining from the area. Swollen lymph nodes in the groin. A fever. Watch closely for changes in your health, and be sure to contact your doctor if you have any problems.

## 2023-02-14 NOTE — PROGRESS NOTES
Pressure released from right groin per Maine Herron RN. No signs/sx of hematoma noted. Band aid applied. Will continue to monitor.

## 2023-02-14 NOTE — PROCEDURES
[address], [place] [address]                            CARDIAC CATHETERIZATION    PATIENT NAME: Martha Marte                :        1950  MED REC NO:   [id number]                              ROOM:  ACCOUNT NO:   [de-identified]                           ADMIT DATE: 2023  PROVIDER:     Karen Casey    DATE OF PROCEDURE:  2023    FINAL IMPRESSION:  Successful shockwave angioplasty, conventional  angioplasty, and drug-coated stent placement of the right  femoropopliteal segment. PROCEDURES PERFORMED:  Access under ultrasound, right lower extremity  angiography, right femoropopliteal shockwave angioplasty and  conventional angioplasty, right femoropopliteal drug-coated stent  placement. ESTIMATED BLOOD LOSS:  20 mL. METHOD:  Written informed consent was obtained. The right common  femoral artery was identified using fluoroscopy and ultrasound. Using  micropuncture technique, the common femoral artery was entered with a  small needle and a guidewire advanced into the superficial femoral  artery. A micropuncture inner cannula was placed and angiography  performed. The micropuncture kit was then placed into the vessel and an  exchange was made over a Supra Core wire for a 5 [employer] Terumo sheath. Through this sheath, a 2.0 Marina balloon was introduced along with a 14  Advantage wire. Using this combination, stenoses in the femoropopliteal  segment were traversed and the balloon positioned in the popliteal  artery for the first of multiple inflations throughout the diseased  area. The 2 mm balloon was replaced then with a 5 mm shockwave balloon  and shockwave angioplasty was performed throughout the superficial  femoral and popliteal arteries. The shockwave catheter was withdrawn. A 6 mm balloon was then introduced and advanced into the popliteal  artery.   A guidewire exchange was performed for a Supra Core.   Angioplasty was performed with a 6 mm balloon throughout the  femoropopliteal segment. An inadequate result was then treated using  overlapping 6 x 120 Malka stents from the popliteal artery proximally  and then finally a 7 x 120 Malka in the superficial femoral artery. Final inflation was performed with a 6 mm balloon. Final angiography  was performed including digital subtraction of the tibial vessels. The  sheath was left in place to be removed by catheterization laboratory  staff after the ACT decreases to 180. There were no complications. At the termination of the procedure, there was no detectable dorsalis  pedis pulse. The posterior tibial Doppler signal was strong and there  was a faint Doppler signal at the base of the great toe. ANGIOGRAPHIC RESULTS:  RIGHT LOWER EXTREMITY:  There was very extensive calcification noted on  fluoroscopy throughout the right superficial femoral artery and  popliteal artery. Detailed imaging had been performed on a procedure  performed in 12/2022. This had shown 99% stenosis in the superficial  femoral artery. The anterior tibial had a high origin. The posterior  tibial was patent to the foot and the peroneal demonstrated diffuse  disease. After treatment, there was 30% residual in the superficial femoral  artery with brisk antegrade flow. At the adductor canal, there was 40%  stenosis and the behind-knee popliteal segment had a discrete 50%  stenosis. The flow in the anterior tibial had decreased. The stent  extended over the origin of the anterior tibial.  The posterior tibial  was patent. The peroneal had diffuse disease with a 99% distal  stenosis. There was no extravasation of contrast on final angiography. COMMENT:  The patient is a 72-year-old gentleman who had previously  undergone right external iliac artery stent placement from a radial  approach.   At that time, severe superficial femoral and popliteal artery  stenosis was detected, but not treated. He continued to experience  severe right foot pain, which prompted today's procedure. He will be  treated with aspirin and Plavix and will be followed as an outpatient.         Lulu Dao    D: 02/14/2023 12:24:29       T: 02/14/2023 12:28:41     [email or web address]  Job#: [id number]     Doc#: [id number]    CC:

## 2023-02-14 NOTE — PROGRESS NOTES
SisterMikaela updated on discharge. Call placed to Jennifer Hassan and updated on change of room location and reviewed discharge instructions over the phone, with speaker phone on, in [facility] 1525 presence. Both voice understanding. Transferred per cart to room 333.

## 2023-02-14 NOTE — PROGRESS NOTES
Report given to [facility], RN. Patient lying in bed  with HOB at 30 degrees. No change in right groin site.

## 2023-02-15 NOTE — PROGRESS NOTES
IUP at 20 weeks and 6 days  History of previous  x1  Recent level 2 ultrasound reveals low lying placenta patient is asymptomatic  Will schedule 24 week blood work after next visit reviewed signs of  labor and kick counts  Reports positive fetal movement  She does have a follow-up ultrasound scheduled at 28 weeks    All questions answered Pt alert and oriented x4 at this time. Pt denies any pain or complaints. Vitals, pulses and neuro checks are WDL. Pt taken via wheelchair to ED entrance and left with family member.

## 2023-02-22 ENCOUNTER — OFFICE VISIT (OUTPATIENT)
Dept: VASCULAR SURGERY | Age: 73
End: 2023-02-22
Payer: MEDICARE

## 2023-02-22 ENCOUNTER — TELEPHONE (OUTPATIENT)
Dept: CARDIAC REHAB | Age: 73
End: 2023-02-22

## 2023-02-22 VITALS
TEMPERATURE: 97 F | HEART RATE: 101 BPM | RESPIRATION RATE: 18 BRPM | DIASTOLIC BLOOD PRESSURE: 54 MMHG | SYSTOLIC BLOOD PRESSURE: 98 MMHG | BODY MASS INDEX: 30.71 KG/M2 | HEIGHT: 64 IN | WEIGHT: 179.9 LBS

## 2023-02-22 DIAGNOSIS — I73.9 PAD (PERIPHERAL ARTERY DISEASE) (HCC): Primary | ICD-10-CM

## 2023-02-22 DIAGNOSIS — I70.261 CRITICAL LIMB ISCHEMIA OF RIGHT LOWER EXTREMITY WITH GANGRENE (HCC): ICD-10-CM

## 2023-02-22 PROCEDURE — 99214 OFFICE O/P EST MOD 30 MIN: CPT | Performed by: INTERNAL MEDICINE

## 2023-02-22 NOTE — PROGRESS NOTES
Alejandra Kim was seen on 2/22/2023 for   Chief Complaint   Patient presents with    Follow-up     Post procedure  Still having pain in foot   .                             REVIEW OF SYSTEMS    Constitutional Weight: absent, A, Fatigue: absent Fever: absent   HEENT Ears: normal,Visual disturbance: absent Sore throat: absent,    Respiratory Shortness of breath: absent, Cough: absent;, Snoring: absent   Cardivascular Chest pain: absent,  Leg pain: absent,Leg swelling:absent, Non-healing wound:absent    GI Diarrhea: absent, Abdominal Pain: absent    Urinary frequency: absent, Urinary incontinence: absent   Musculoskeletal Neck pain: absent, Back pain: absent, Restless Leg:absent     Dermatological Hair loss: absent, Skin changes: absent   Neurological  Sudden Loss of Vision in one eye:absent, Slurred Speech:absent, Weakness on one side of body: absent,Headache: absent   Psychiatric Anxiety: absent, Depression: absent   Hematologic Abnormal bleeding: absent,

## 2023-02-22 NOTE — TELEPHONE ENCOUNTER
Dr Sophie Oliveros,  In response to your question regarding Kary Stout participating in PAD rehab with toe pain - we can use the PAD recumbent stepping protocol until such time his toe is less painful. I will contact him after his podiatrist appointment tomorrow.   Thank you for this referral.  [facility]

## 2023-02-22 NOTE — PROGRESS NOTES
Padmaja Artist was seen on 2/22/2023 for   Chief Complaint   Patient presents with    Follow-up     Post procedure  Still having pain in foot   . 11/16/22 1st [facility] visit. Comes with his sister Jamilah Davis who is very up to date with his health care. . Referred by Dr Mai Shi. PCP Dr Anthony Melton. Most vascular care has been through [address] [facility]. No hx of stroke. Had RCEA. CTA showed 60% residual dissection flap. The LICA  Is heavily calcified and has 90%. Hx of AAA. Not repaired. Hx of CHF with most recent ef up to 36. DM CKD 3 cr 1.38  Requip  Stopped smoking in June  Has not had procedures on legs. Several yrs of leg problems. Uses a cane \"for stability\"  4/8/21 glenda r 0.82  l 0.92  Walks daily and his right calf \"\"starts screaming at me\", then left. Limits his activity. Pain goes away with resting. He limps with walking. Right anterior leg below knee hurts night. Prominent veins, but they don't hurt with standing. No hx of bleeding. Legs swelled up with CHF exacerbation. 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 Worse than on last visit. Takes a lot of tylenol with little benefit. 12/2/22 duplex showed critical right fem pop stenosis with trickle popliteal flow. LCFA questionable as access vessel. RCFA somewhat better. Carotid duplex FRAN 35-75 (259/00  5.12) LICA 38-39. L Vert antegrade R vert bidirectional.   Is still on eliquis  UPDATE 1/4/23 On 12/10/22 had left radial access for leg angio. REIA ZPTX placed for 95 % stenosis. Distal disease not reached and antegrade cfa approach with shockwave entertained. Still has severe right foot pain. Foot feels cold to him. Not sure about effect of position  UPDATE 1/18/23 On last visit he complained of severe pain, but had a loud right PT doppler signal. Glenda . 49 pt, .24 dp. Pain he says \"comes and goes\".  Can't clearly relate to elevation  Insurance kicks in Fe.  UPDATE 23 On 23 he had right cfa antegrade puncture to treat full length severe right fem pop calcific stenosis. Severe as 99%. Good result with 3 overlapping TARA after shockwave. AT origin in diseased area with impaired flow post stent. Excellent PT flow to plantar surface. He says pain is improved but not gone          Social History     Socioeconomic History    Marital status:      Spouse name: Not on file    Number of children: Not on file    Years of education: Not on file    Highest education level: Not on file   Occupational History    Not on file   Tobacco Use    Smoking status: Former     Packs/day: 1.00     Years: 56.00     Pack years: 56.00     Types: Cigarettes     Quit date: 2022     Years since quittin.6    Smokeless tobacco: Never   Vaping Use    Vaping Use: Never used   Substance and Sexual Activity    Alcohol use: Not Currently    Drug use: Not Currently    Sexual activity: Not on file   Other Topics Concern    Not on file   Social History Narrative    Not on file     Social Determinants of Health     Financial Resource Strain: Low Risk     Difficulty of Paying Living Expenses: Not hard at all   Food Insecurity: No Food Insecurity    Worried About [address] in the Last Year: Never true    [address] in the Last Year: Never true   Transportation Needs: Unknown    Lack of Transportation (Medical): Not on file    Lack of Transportation (Non-Medical):  No   Physical Activity: Insufficiently Active    Days of Exercise per Week: 6 days    Minutes of Exercise per Session: 20 min   Stress: Not on file   Social Connections: Not on file   Intimate Partner Violence: Not on file   Housing Stability: Unknown    Unable to Pay for Housing in the Last Year: Not on file    Number of Places Lived in the Last Year: Not on file    Unstable Housing in the Last Year: No     Family History   Problem Relation Age of Onset    Heart Disease Mother Hypertension Mother     Hypertension Father     Heart Disease Father     Coronary Art Dis Sister     Coronary Art Dis Brother      Past Medical History:   Diagnosis Date    Acute kidney injury ([facility]     Ascites 5/20/2022    Atrial fibrillation (HCC)     CHF (congestive heart failure) ([facility]     Cirrhosis of liver with ascites ([facility] 5/19/2022    CKD (chronic kidney disease) 5/20/2022    Coronary artery disease     Hyperlipidemia     Hypertension     Hypomagnesemia 5/20/2022    Hyponatremia     Thrombocytopenia (HCC)      Current Outpatient Medications   Medication Sig Dispense Refill    clopidogrel (PLAVIX) 75 MG tablet Take 1 tablet by mouth daily 90 tablet 3    pantoprazole (PROTONIX) 40 MG tablet Take 1 tablet by mouth every morning (before breakfast) 90 tablet 1    vitamin B-1 (THIAMINE) 100 MG tablet Take 1 tablet by mouth daily 90 tablet 1    gabapentin (NEURONTIN) 300 MG capsule Take 2 tablets in the morning; 1 tablet in the afternoon and 1 tablet at bedtime. Quantity:  30 days. 120 capsule 1    Multiple Vitamins-Minerals (MULTIVITAMIN-MINERALS) TABS TAKE 1 TABLET BY MOUTH EVERY DAY IN THE MORNING 90 tablet 0    Biotin 77179 MCG TABS Take by mouth      aspirin 81 MG EC tablet Take 81 mg by mouth daily      rOPINIRole (REQUIP) 0.5 MG tablet Take 1 tablet by mouth nightly 90 tablet 0    folic acid (FOLVITE) 1 MG tablet TAKE 1 TABLET BY MOUTH EVERY DAY 90 tablet 0    FARXIGA 10 MG tablet TAKE 1 TABLET BY MOUTH EVERY DAY 90 tablet 3    vitamin D (ERGOCALCIFEROL) 1.25 MG (11957 UT) CAPS capsule Take 1 capsule by mouth once a week 12 capsule 0    magnesium oxide (MAGOX 400) 400 (240 Mg) MG tablet Take 1 tablet by mouth 3 times daily 270 tablet 1    midodrine (PROAMATINE) 10 MG tablet TAKE 1 TABLET BY MOUTH EVERY MORNING, 1 TABLET AT NOON, AND 1 TABLET IN THE EVENING.  TAKE WITH MEALS 270 tablet 1    spironolactone (ALDACTONE) 25 MG tablet TAKE 1 TABLET BY MOUTH EVERY DAY IN THE MORNING (Patient taking differently: 0.5 tabs) 90 tablet 1    KLOR-CON M20 20 MEQ extended release tablet TAKE 1 TABLET BY MOUTH EVERY MORNING AND 1 TABLET AGAIN EVERY EVENING. TAKE WITH MEALS. 180 tablet 1    docusate sodium (COLACE) 100 MG capsule Take 1 capsule by mouth 2 times daily 180 capsule 3    amiodarone (CORDARONE) 200 MG tablet Take 0.5 tablets by mouth daily 45 tablet 3    metoprolol succinate (TOPROL XL) 25 MG extended release tablet Take 0.5 tablets by mouth daily 45 tablet 3    bumetanide (BUMEX) 1 MG tablet Take 1 tablet by mouth daily 90 tablet 1    tamsulosin (FLOMAX) 0.4 MG capsule Take 1 capsule by mouth in the morning. 90 capsule 1    Multiple Vitamin (MULTIVITAMIN) TABS tablet Take 1 tablet by mouth in the morning. 90 tablet 1    melatonin 5 mg TABS tablet Take 2 tablets by mouth nightly  0    apixaban (ELIQUIS) 5 MG TABS tablet Take 5 mg by mouth 2 times daily      atorvastatin (LIPITOR) 80 MG tablet Take 80 mg by mouth daily      Omega-3 Fatty Acids (FISH OIL CONCENTRATE) 1000 MG CAPS Take 2 caplets by mouth daily      sacubitril-valsartan (ENTRESTO) 24-26 MG per tablet Take 0.5 tablets by mouth 2 times daily Lot #: [id number]  Exp: July 2024 (Patient not taking: Reported on 2/22/2023) 56 tablet 3    sacubitril-valsartan (ENTRESTO) 24-26 MG per tablet Take 1 tablet by mouth 2 times daily (Patient not taking: Reported on 2/22/2023) 28 tablet 0    albuterol sulfate HFA (VENTOLIN HFA) 108 (90 Base) MCG/ACT inhaler Inhale 2 puffs into the lungs 4 times daily as needed for Wheezing (Patient not taking: Reported on 2/22/2023) 54 g 1    ipratropium-albuterol (DUONEB) 0.5-2.5 (3) MG/3ML SOLN nebulizer solution Inhale 3 mLs into the lungs every 4 hours as needed for Shortness of Breath (Patient not taking: Reported on 2/22/2023) 360 mL 1     No current facility-administered medications for this visit.      Facility-Administered Medications Ordered in Other Visits   Medication Dose Route Frequency Provider Last Rate Last Admin    iopamidol (ISOVUE-370) 76 % injection 80 mL  80 mL IntraVENous ONCE PRN Alyx Watson MD             Physical findings:  General- no acute distress, oriented  HEENT - no xanthelasma, external ears normal  Neck- Supple, no thyromegaly  Skin- warm, dry, foot dry with skin breakdown 2nd toe  Extremities - no edema    Pulses Right - Posterior tibial:    doppler only  Dorsalis pedis:  absent, but signal present at base of great toe         Assessment:  1. PAD (peripheral artery disease) ([address]    2. Critical limb ischemia of right lower extremity with gangrene (HCC)       Improved flow, but persistent pain  Still has ischemic toe. To see Dr Jacques Whalen tomorrow  40 min chart review and encounter  Plan of care:  Right leg duplex  Pad rehab  Rtc 6 wks  Follow up and evaluate.        Electronically signed by Alyx Watson MD on 2/22/23 at 2:13 PM EST

## 2023-02-27 ENCOUNTER — HOSPITAL ENCOUNTER (OUTPATIENT)
Dept: VASCULAR LAB | Age: 73
Discharge: HOME | End: 2023-03-01
Payer: MEDICARE

## 2023-02-27 DIAGNOSIS — I73.9 PAD (PERIPHERAL ARTERY DISEASE) (HCC): ICD-10-CM

## 2023-02-27 PROCEDURE — 93926 LOWER EXTREMITY STUDY: CPT

## 2023-02-28 ENCOUNTER — HOSPITAL ENCOUNTER (OUTPATIENT)
Dept: CARDIAC REHAB | Age: 73
Setting detail: THERAPIES SERIES
Discharge: HOME | End: 2023-02-28

## 2023-02-28 DIAGNOSIS — I25.5 ISCHEMIC CARDIOMYOPATHY: ICD-10-CM

## 2023-02-28 RX ORDER — ATORVASTATIN CALCIUM 80 MG/1
80 TABLET, FILM COATED ORAL DAILY
Qty: 90 TABLET | Refills: 3 | Status: SHIPPED | OUTPATIENT
Start: 2023-02-28

## 2023-02-28 NOTE — PROGRESS NOTES
Supervised Exercise Therapy for [facility] Treatment Plan - Initial    Patient Name: Anh Kaba #: [de-identified]  Date: 2/28/2023  Diagnosis: PAD   Start Date: 2/28/23  Referring Physician: Dr. Renetta Gibson Stratification: Medium  Session Number: 1   EXERCISE    Stages of Change:   [] pre-contemplation  [] Action   [] Contemplate   [] Maintenance   [x] Prep   [] Relapse          Exercise Prescription:  Mode: [x] TM [x] STP [] R [x] TBE [x] UBE  Frequency: 3 days per week  Duration: 31-60 minutes  Intensity: 2-3 METs  THRR:    Goal: Achieve and progress prescribed exercise frequency, intensity, time, and type based upon initial evaluation and/or submaximal graded exercise//6MWT results as evidenced by the attaining and maintaining the prescribed target heart rate range, a claudication rating of <=4, Darin rating of perceived exertion between 11 and 16, duration of >30 - 60 minutes using multiple exercise modes for at least 3 days/week, progressing at least 40-50% of pain free exercise by program completion. Treadmill Progression:     An Increase in workload will occur using the following progression plan when over 8 minutes of exercise is sustained before experiencing moderate claudication (2-4 / 5 on Claudication Pain Scale)  [] Resistance Training  introduce 8-12 bilateral UE and LE progressive resistance exercises at 1-3 sets per lift, on 2-3 non-consecutive days using weights/weight machine/therabands to 8-24 # for 8-15 reps to progressive overload by increasing resistance once reps progressed to at least 15 reps on at least 2 occasions     Intervention:  Home Exercise:  Current Exercise -    [x] Yes - walking, biking, resistance training daily for 30-40 min total   [] No   [x] Resistance Training  Progression:  8-12 bilateral upper extremity resistance exercise at 1-3 sets per lift, on 2 non-consecutive days using Band/Free Weights/Weight Machine to 8-15 reps on at lease 2 occasions.   Once repetition maximum has been achieved, additional weight sets may be added.     Hypertension:  [x] Yes  [] No  Resting BP: 128/64  Peak Exercise BP:   BP Meds: Toprol XL, Midodrine    Education:   [x] Equipment [place]  [] Proper use weights/therabands   [x] S/S to report  [] Low Na Diet    [x] Warm up/ Cool down  [] Understand BP    [x] Ex Safety   [] Exercise Prescription   [x] Claudication Pain Scale   [] Hot/cold weather guidelines       [] Staying Fit   [] Home Exercise  Target Goal:   -Individual Exercise Plan  -BP<130/80    -Aerobic active 30 + minutes 5-7 days per week    Nutrition    Stages of Change:   [] pre-contemplation  [] Action   [] Contemplate   [] Maintenance   [x] Prep   [] Relapse    Lipids:   Repeated:   [] Yes  [] No    Total Cholesterol:   No results found for: CHOL  No results found for: TRIG  No results found for: HDL  No results found for: LDLCHOLESTEROL, LDLCALC  No results found for: LABVLDL, VLDL  No results found for: CHOLHDLRATIO  Lipid Meds: Lipitor     Diabetes:  [] Yes  [x] No  HbA1c: No components found for: HGBA1C  FBS:  Glucose (mg/dL)   Date Value   02/02/2023 111 (H)     Diabetic Medication:     Weight Management:  Height: 5'4\"  Weight: 175  BMI: 30.0  Wt Goal: BMI <25  Alcohol:   Social History     Substance and Sexual Activity   Alcohol Use Not Currently     Diet Assessment Tool:   [x]  Food Diary  []  Rate My Plate Score:     Special Diet:  2 gram NA+, reduced calorie, 30% total fat, <10% saturated fat, 25-35 grams fiber, <200 mg cholesterol, balanced nutrition    Intervention:   [] Dietitian Consult       [] Referred to Diabetes Education     Education:  [] Heart Healthy Nutrition  [] Weight Management  [] Portions  [] Fat/Cholesterol  [] Food Labels       [] Food Packaging Claims  [] Snacks and Substitutes      [] Relate Diabetes/CAD    Target Goal:  -LDL-C<100 if triglycerides are > 200  -LDL-C < 70 for high risk patients  -HbA1c < 7%  -BMI < 25   Education    Stages of Change:    [] pre-contemplation  [] Action   [] Contemplate    [] Maintenance   [x] Prep    [] Relapse    Learning Barriers:   [] Speech   [x] Cognitive   [] Literacy   [] Visions   [] Hearing    [] Ready Learn   [] None      Family support: [x] Yes  [] No    Tobacco use:   Social History     Tobacco Use   Smoking Status Former    Packs/day: 1.00    Years: 56.00    Pack years: 56.00    Types: Cigarettes    Quit date: 2022    Years since quittin.6   Smokeless Tobacco Never     Current smokers:  Longest quit attempt:  Tobacco triggers:  Barriers to successful cessation:  [] Smoking cessation medication:    Intervention:  [] Referred to physician for smoking cessation    [] Individual education and counseling  [] Tobacco adjunct    Education:   [] Cardiac A and P  [] Risk Factors     [] Angina      [] Medications  [] CHF  [] PAD/Interventions  [] Foot Care        Target Goal:  -Complete cessation of tobacco use (if applicable)  -Continued risk factor modifications  -Recognizing signs/symptoms to report  -Proper use of meds    Psychosocial  Stages of Change:    [] pre-contemplation  [] Action   [] Contemplate    [] Maintenance   [x] Prep    [] Relapse    Psychosocial Test:  Tools:   VASQOL: 13  PROMIS:29  Donaldo Stage:4  PAD Questionnaire:27    Intervention:   [] Psych Consult/   [] Physician Referral     Medications:     Education:    [] Stress Management   [] Depression    Target Goal:  -Assess presence or absence of depression using a valid screening tool. -Maximize coping skills.  -Positive support system.     Fall Risk Assessment:  History of falls with or without injury  [x] Yes   [] No   Use of ambulatory aid  [x] Yes  [] No   Difficulty walking/impaired gait  [x] Yes  [] No   Numbness in feet  [] Yes   [x] No   Vision changes  [] Yes  [x] No   Dizziness  [] Yes  [x] No   Shortness of breath  [x] Yes  [] No   Medications  [x] Anticoagulant/Antiplatelet  [x] Betablocker /ACE/ARB/Calcium channel blocker  [] Antidepressant  [] Seizure medication   [] NA  Risk of fall  [] Low (none of above)  [] Medium (less than 3 above)  [x] High (3 or more above)    Patient 90-Day Goals: (Specific, Measurable, Achievable, Relevant, and Time-Bound)  Develop stronger legs, be able to walk further. Program goals:   Achieve and progress prescribed exercise frequency, intensity, time, and type based upon initial evaluation and/or submaximal graded exercise/6MWT results as evidenced by the attaining and maintaining the prescribed target heart rate range, a claudication rating of 3-4, Darin rating of perceived exertion between 11 and 16, duration of 31-90 minutes using multiple exercise modes for at least 3 days/week, progressing at least 40-50% of pain free exercise by program completion. Introduce and progress 8-10 different bilateral UE and/or LE progressive resistance exercises focused on major muscle groups using 1-3 sets each per lift, on 2-3 non-consecutive days implementing free and machine weights and/or bands, as appropriate, with a resistance of 40-60% 1-repetition maximum or 10 -15 repetitions to progressive overload and increasing resistance once repetitions have progressed to 15 reps and feel fairly light on 2 prior occasions. Develop regular home aerobic exercise program for 20 - 60 minutes at least 2 non-rehab days per week, excluding 5 - 10 minutes warm-up and cool-down periods, as tracked on home workout log. Establish and maintain individualized heart healthy eating plan, and gradually lose 10-15 lb of body weight over the program, utilizing dietician recommendations, moderating nutrional intake, and performing regular exercise as prescribed, as evidenced by routine weights, pre- and post-program nutriton survey and routine rounding with patient, food diary, and Rate-your-Plate screening survey.     Strive for blood lipid optimization with an LDL-C of <100 mg/dL or LDL 70 mg/dL, an HDL-C of > or = 40 mg/dL for men and > or = 50 mg/dL for women, and a triglyceride level of <150 mg/dL via lifestyle education, behavioral modification, and medication compliance, as evidenced by improved post-program lipid results. Strive for HbA1C <= 7.0% via lifestyle education, behavioral modification, and medication compliance as evidenced by post program HbA1C lab results. Achieve and maintain an optimal average resting blood pressure of <130 / 80 mmHg over the course of the program by educating patient, monitoring medication compliance, introducing and maintaining regular cardiovascular exercise program, as evidenced by routine BP monitoring. Minimize self-reported psycho-social feelings of stress over the program by educating patient, monitoring medication compliance, introducing and maintaining regular cardiovascular exercise as evidenced by pre- and post- VASQOL and PROMIS:surveys and by routine rounding with patient. Demonstrate knowledge about risk factor reduction, lifestyle modification, and heart health strategies with a >=75% accuracy via patient education and counseling as evidenced by pre- and post-program education assessment screening tool. Complete abstinence from tobacco products over the cardiac rehab program through intensive counseling, behavior modification, and medication compliance as evidenced by routine rounding with patient.     Electronically signed by Robinson Mao CEP on 2/28/2023 at 11:29 AM

## 2023-02-28 NOTE — PROGRESS NOTES
[facility] for PAD Rehab Initial History and Assessment    Michael Chan   1950  2/28/2023  [id number]    Primary Diagnosis: PAD  Onset Date: 2/28/23  Living Will: [x] Yes [] No  On File: [x] Yes [] No [] NA  Durable Power of : [x] Yes [] No    Participated in [facility] rehab program before:  [] Yes     [x] No    Medical History  Past Medical History:   Diagnosis Date    Acute kidney injury (HCC)     Ascites 5/20/2022    Atrial fibrillation (HCC)     CHF (congestive heart failure) (HCC)     Cirrhosis of liver with ascites (HCC) 5/19/2022    CKD (chronic kidney disease) 5/20/2022    Coronary artery disease     Hyperlipidemia     Hypertension     Hypomagnesemia 5/20/2022    Hyponatremia     Thrombocytopenia (HCC)      Past Surgical History:   Procedure Laterality Date    CAROTID STENT PLACEMENT Bilateral     HERNIA REPAIR      ROTATOR CUFF REPAIR Right     TONSILLECTOMY AND ADENOIDECTOMY         Cardiac History  [x] Cardiac Catheterization   [x] Echocardiogram    [] Pacer/ICD   [] Carotid Stenosis  [] CVA/TIA  [] Upper Extremity PAD  [] Sudden Cardiac Death  [x] Atrial Fibrillation or Arrythmia  [] Other Cardiac History    Family History  Family History   Problem Relation Age of Onset    Heart Disease Mother     Hypertension Mother     Hypertension Father     Heart Disease Father     Coronary Art Dis Sister     Coronary Art Dis Brother        Current Symptoms (check all that apply):   [x] pain, aching or cramps in your calves or buttocks   [] leg pain at rest     [x] leg pain at night        [] leg pain relieved with dependency      [x] thickened toenails    [] frequent fungal infections of toenails   [x] ulcers, areas of broken skin, or sores on your feet that will not heal   [] history of gangrene, amputation        Claudication       Location of discomfort  [x] Right  [] Left       Increased by walking  [x] Yes  [] No     If yes, do you have to stop walking? [x] Yes  [] No       Frequency of leg pain:  daily       Duration of leg pain: constant  Walking Impairment Questionnaire Score:27  [place] Classification: 4    Congestive Heart Failure   [] None   [x] Pedal Edema  [] Unusual weight gain   [x] SOB with mild exertion [] Fatigue  Musculoskeletal    [] None   [x] Back Pain  Location/Duration   [] Joint discomfort Location/Duration  Nutrition  Height: 5'4\"  Weight:  175  BMI:  30.0  Weight Goal: BMI <25  Appetite:  []  Too Good    [x]  Good  []  Poor  Diet: Regular  Restaurant food 2-3 times/wk  Dietary Screening:  Rate Your Plate:    Daily Food Diary completed:       [x] Yes        [] No  Caffeine: [x] Yes [] No  Type: Coffee, pop  Amount: 2-3 cups of coffee, 1-2 glasses of pop  Water intake per day: 2-3 glasses  Special Diet  Lipids:   No results found for: CHOL  No results found for: TRIG  No results found for: HDL  No results found for: LDLCHOLESTEROL, LDLCALC  No results found for: LABVLDL, VLDL  No results found for: CHOLHDLRATIO   Lipid Meds: Lipitor     Psychosocial  [] Depression    [] Anxiety  [] Stress    [] NA  VASCUQOL Score:13  PROMIS Score: 29  Stress Source: Family, ex wife  Relaxation techniques/Hobbies: Exercise  Medications:    Marital Status:     Level of Education  [] 8th Grade  [] Associates  [] Masters  [x] [employer] [] Bachelor  []  Other:    Tobacco Use  Social History     Tobacco Use   Smoking Status Former    Packs/day: 1.00    Years: 56.00    Pack years: 56.00    Types: Cigarettes    Quit date: 2022    Years since quittin.6   Smokeless Tobacco Never     Current smokers:  Longest quit attempt:  Tobacco triggers:  Barriers to successful cessation:  [] Smoking cessation medication:    Diabetes     [] Yes  [x] No  How Long:  How do you manage your diabetes:    Do you check your blood sugar? [] Yes  [] No  How often:  Have you seen a dietician or diabetic educator?        [x] Yes  [] No      HbA1c: No components found for: HGBA1C  FBS:  Glucose (mg/dL)   Date Value 2023 111 (H)                      Diabetic Medication:     Physical Findings  Skin color: [x] Natural [] Pale [] Deni  [] Dusky   Skin Integrity: [x] Intact   Pain Assessment: [x] Yes [] No   Pain Level Tolerable: 8/10 on 10 point scale  Location/radiation/frequency/duration:Hips/back/left knee/right foot  Wounds on feet: [x] Yes [] No    Location: underneath right pinky, side of right foot   Regular foot inspection:  [x] Yes [] No  Ankle Brachial Index (supine):   R Doppler (MARCELLA) Score: 0.48   L Doppler (MARCELLA) Score : 0.90   Previous Intervention:   Peripheral Pulses:    RLE:  [] Strong    [x] Weak   LLE:   [x] Strong    [] Weak   RUE:  [x] Strong    [] Weak   LUE:  [x] Strong    [] Weak  Dependent Lower Extermity Skin Appearance and Temperature:   Right: [x] Natural [] Pale [] Deni             [] Warm [x] Cool [] Cold  Left:   [x] Natural [] Pale [] Deni              [x] Warm [] Cool [] Cold  Changes to Lower Extremity Appearance and Temperature when Elevated:   LLE: [] Yes [x] No / Comments:   RLE: [] Yes [x] No / Comments:  Cardiovascular:    Edema: [x] Yes [] No / Location & Degree  EK23 SB w/ frequent PVCs  VR: 58 bpm  NM: 150ms  QRS: 76 ms  QT: 462/453ms  Blood Pressure (supine):  Right Arm: 128  Left Arm: 128  Pulmonary:   SPO2: 95%   Cough: [] Yes [x] No      Productive: [] Yes [] No    Lung Sounds:  clear   SOB with mild exertion: [] Yes [x] No   Neurological: [] Yes [x] No   Musculoskeletal:  [] None [x] Back Pain [] Join Discomfort                      Location/duration/intensity:  Endocrine- [] Yes [x] No   Gastrointestinal- [] Yes [x] No   Genitourinary- [] Yes [x] No   Physical limitations- [x] Yes [] No   Other:    [facility] Pre - Test Score:    Barriers to Program Adherence:  [] Transportation   [] Travel distance  [] Insurance/copay    [] Motivation  [] Other  [x] NA    Fall Risk Assessment:  History of falls with or without injury  [x] Yes   [] No   Use of ambulatory aid  [x] Yes  [] No   Difficulty walking/impaired gait  [x] Yes  [] No   Numbness in feet  [] Yes   [x] No   Vision changes  [] Yes  [x] No   Dizziness  [] Yes  [x] No   Shortness of breath  [x] Yes  [] No   Medications  [x] Anticoagulant/Antiplatelet  [x] Betablocker /ACE/ARB  [] Antidepressant  [] Seizure medication   [] NA  Risk of fall  [] Low (none of above)  [] Medium (less than 3 above)  [x] High (3 or more above)    Patient 90-Day Goals: (Specific, Measurable, Achievable, Relevant, and Time-Bound)  Develop stronger legs, be able to walk further. Program goals:   Achieve and progress prescribed exercise frequency, intensity, time, and type based upon initial evaluation and submaximal graded exercise/cycle/NS/6MWT results as evidenced by the attaining and maintaining the prescribed target heart rate range, a claudication rating of 4, Darin rating of perceived exertion between 11 and 16, duration of 31 - 90 minutes using multiple exercise modes for at least 3 days/week, progressing at least 40-50% of pain free exercise duration by program completion. Introduce and progress 8-10 different bilateral UE and/or LE progressive resistance exercises focused on major muscle groups using 1-3 sets each per lift, on 2-3 non-consecutive days implementing free and machine weights and/or TheraBands, as appropriate, with a resistance of 40-60% 1-repetition maximum or 10 -15 repetitions to progressive overload and increasing resistance once repetitions have progressed to 15 reps and feel fairly light on 2 prior occasions. Develop regular home aerobic exercise program for 20 - 60 minutes at least 2 non-rehab days per week as reported by patient during routine rounding.     Establish and maintain individualized heart healthy eating plan, and gradually lose 10-15 lb of body weight over the program, utilizing dietician recommendations, moderating nutrional intake, and performing regular exercise as prescribed, as evidenced by routine weights, pre- and post-program nutriton survey and routine rounding with patient, food diary, and Rate-your-Plate screening survey. Strive for blood lipid optimization with an LDL-C of <100 mg/dL or  LDL 70 mg/dL, an HDL-C of > or = 40 mg/dL for men and > or = 50 mg/dL for women, and a triglyceride level of <150 mg/dL via lifestyle education, behavioral modification, and medication compliance, as evidenced by improved post-program lipid results. Strive for HbA1C <= 7.0% via lifestyle education, behavioral modification, and medication compliance as evidenced by post program HbA1C lab results. Achieve and maintain an optimal average resting blood pressure of <130 / 80 mmHg over the course of the program by educating patient, monitoring medication compliance, introducing and maintaining regular cardiovascular exercise program, as evidenced by routine BP monitoring. Minimize self-reported psycho-social feelings of stress over the program by educating patient, monitoring medication compliance, introducing and maintaining regular exercise as evidenced by pre- and post- surveys and by routine rounding with patient. Demonstrate knowledge about risk factor reduction, lifestyle modification, and peripheral artery health strategies with a >=75% accuracy via patient education and counseling as evidenced by pre- and post-program education assessment screening tool. Complete abstinence from tobacco products over the cardiac rehab program through intensive counseling, behavior modification, and medication compliance as evidenced by routine rounding with patient.     Electronically signed by Raysa Devlin on 2/28/2023 at 10:20 AM

## 2023-03-01 ENCOUNTER — HOSPITAL ENCOUNTER (OUTPATIENT)
Dept: PHARMACY | Age: 73
Setting detail: THERAPIES SERIES
Discharge: HOME | End: 2023-03-01
Payer: MEDICARE

## 2023-03-01 VITALS
BODY MASS INDEX: 31.07 KG/M2 | SYSTOLIC BLOOD PRESSURE: 115 MMHG | DIASTOLIC BLOOD PRESSURE: 52 MMHG | WEIGHT: 181 LBS | HEART RATE: 65 BPM | OXYGEN SATURATION: 94 %

## 2023-03-01 DIAGNOSIS — I50.43 ACUTE ON CHRONIC COMBINED SYSTOLIC AND DIASTOLIC CHF, NYHA CLASS 4 (HCC): Primary | ICD-10-CM

## 2023-03-01 PROCEDURE — 99212 OFFICE O/P EST SF 10 MIN: CPT

## 2023-03-01 RX ORDER — CEPHRADINE 500 MG
1 CAPSULE ORAL 2 TIMES DAILY
COMMUNITY

## 2023-03-01 NOTE — PROGRESS NOTES
[facility] - [place], [place]  Medication Management  Pharmacist  Heart Failure    [address] [facility]  [place] [address]  Phone: [phone]  Fax: [phone]      NAME: Louisa Demarco RECORD NUMBER:  [id number]  AGE: 67 y.o. GENDER: male  : 1950  EPISODE DATE:  3/1/2023      Heart Failure Management referral by Dr. Juan Luis Kennedy Wt: 181 lb (UP 11lb)  Wt Readings from Last 6 Encounters:   23 181 lb (82.1 kg)   23 179 lb 14.4 oz (81.6 kg)   23 180 lb (81.6 kg)   23 180 lb (81.6 kg)   23 169 lb (76.7 kg)   23 173 lb 3.2 oz (78.6 kg)    and   Ht Readings from Last 1 Encounters:   23 5' 4\" (1.626 m)                 /52   HR: 65  O2Sat: 94%     Extremities and pitting edema: No edema  Skin warm and dry with varicosities. Subjective   Mr. Gagan Oliveros is a 67 y.o. male here for the Heart Failure Services. They are here today for a comprehensive medication review including over the counter medications and herbal products, overall wellbeing assessment, transition of care and any needed adjustments with updates and recommendations communicated to the referring physician. Patient Symptoms Patient is here for follow up with his sister, Justin Knutson, who helps him with appts and his medications. He will start attending cardiac rehab next week. Denies SOB. Patient denies dizziness, lightheadedness, falls. Patient's weight is INCREASED AGAIN 11 lb. Patient reports he is eating more - his appetite has increased/improved. Patient had leg angioplasty 22 and foot pain is much better. Patient does leg lifts, 25 pushups, crunches and stationary bike twice daily. He walks about a half mile. He could be putting on muscle weight and he does eat a lot of candy.     Stopped smoking and drinking in 2022    Shortness of Breath:   []   None   [x]   Dyspnea on exertion   []   Dyspnea with normal activities  []   Dyspnea at rest  [] Dyspnea while sleeping    Patient Findings:   []  Missed doses  []  Diet changes  []  Sodium intake changes   []  Night time cough   []  Other   []  Early saiety, abd fullness []  Chest pain   []  Constipation   []  Night time bathroom trips  []  Alcohol intake changes  []  Acute illness  []  Edema    []  Number of pillows or recliner  [x]  Activity changes   []  Fatigue that limits activity []  Heart racing or skipping beats  []  Light headedness  []  Dizziness    []      [x]  Problems sleeping    Functional Activity New York Heart Association Classification  []   Class I No limitation of physical activity. Ordinary physical activity does not cause undue fatigue, palpitation, dyspnea (shortness of breath). [x]   Class II Slight limitation of physical activity. Comfortable at rest. Ordinary physical activity results in fatigue, palpitation, dyspnea (shortness of breath). []   Class III  Anil limitation of physical activity. Comfortable at rest.  Less than ordinary activity causes fatigue, palpitation, dyspnea. []   Class IV Unable to carry on any physical activity without discomfort. Symptoms of heart failure at rest.  If any physical activity is undertaken, discomfort increases.     Last CHF Hospital Admission: 8/3/22-8/13/22    OUTPATIENT MEDICATIONS:  Outpatient Medications Marked as Taking for the 3/1/23 encounter [facility] Encounter) with [address] [place]   Medication Sig Dispense Refill    Alpha-Lipoic Acid 200 MG CAPS Take 1 capsule by mouth in the morning and at bedtime      atorvastatin (LIPITOR) 80 MG tablet Take 1 tablet by mouth daily 90 tablet 3    sacubitril-valsartan (ENTRESTO) 24-26 MG per tablet Take 0.5 tablets by mouth 2 times daily Lot #: [id number]  Exp: July 2024 30 tablet 0    clopidogrel (PLAVIX) 75 MG tablet Take 1 tablet by mouth daily 90 tablet 3    pantoprazole (PROTONIX) 40 MG tablet Take 1 tablet by mouth every morning (before breakfast) 90 tablet 1    vitamin B-1 (THIAMINE) 100 MG tablet Take 1 tablet by mouth daily 90 tablet 1    gabapentin (NEURONTIN) 300 MG capsule Take 2 tablets in the morning; 1 tablet in the afternoon and 1 tablet at bedtime. Quantity:  30 days. 120 capsule 1    Multiple Vitamins-Minerals (MULTIVITAMIN-MINERALS) TABS TAKE 1 TABLET BY MOUTH EVERY DAY IN THE MORNING 90 tablet 0    Biotin 79701 MCG TABS Take 1 tablet by mouth in the morning and at bedtime      aspirin 81 MG EC tablet Take 81 mg by mouth daily      rOPINIRole (REQUIP) 0.5 MG tablet Take 1 tablet by mouth nightly 90 tablet 0    folic acid (FOLVITE) 1 MG tablet TAKE 1 TABLET BY MOUTH EVERY DAY 90 tablet 0    FARXIGA 10 MG tablet TAKE 1 TABLET BY MOUTH EVERY DAY 90 tablet 3    vitamin D (ERGOCALCIFEROL) 1.25 MG (49336 UT) CAPS capsule Take 1 capsule by mouth once a week 12 capsule 0    magnesium oxide (MAGOX 400) 400 (240 Mg) MG tablet Take 1 tablet by mouth 3 times daily 270 tablet 1    midodrine (PROAMATINE) 10 MG tablet TAKE 1 TABLET BY MOUTH EVERY MORNING, 1 TABLET AT NOON, AND 1 TABLET IN THE EVENING. TAKE WITH MEALS 270 tablet 1    spironolactone (ALDACTONE) 25 MG tablet TAKE 1 TABLET BY MOUTH EVERY DAY IN THE MORNING (Patient taking differently: 0.5 tabs) 90 tablet 1    KLOR-CON M20 20 MEQ extended release tablet TAKE 1 TABLET BY MOUTH EVERY MORNING AND 1 TABLET AGAIN EVERY EVENING. TAKE WITH MEALS. 180 tablet 1    docusate sodium (COLACE) 100 MG capsule Take 1 capsule by mouth 2 times daily 180 capsule 3    amiodarone (CORDARONE) 200 MG tablet Take 0.5 tablets by mouth daily 45 tablet 3    metoprolol succinate (TOPROL XL) 25 MG extended release tablet Take 0.5 tablets by mouth daily 45 tablet 3    tamsulosin (FLOMAX) 0.4 MG capsule Take 1 capsule by mouth in the morning.  90 capsule 1    albuterol sulfate HFA (VENTOLIN HFA) 108 (90 Base) MCG/ACT inhaler Inhale 2 puffs into the lungs 4 times daily as needed for Wheezing 54 g 1    ipratropium-albuterol (DUONEB) 0.5-2.5 (3) MG/3ML SOLN nebulizer solution Inhale 3 mLs into the lungs every 4 hours as needed for Shortness of Breath 360 mL 1    melatonin 5 mg TABS tablet Take 2 tablets by mouth nightly  0    apixaban (ELIQUIS) 5 MG TABS tablet Take 5 mg by mouth 2 times daily      Omega-3 Fatty Acids (FISH OIL CONCENTRATE) 1000 MG CAPS Take 2 caplets by mouth daily         Objective / Assessment     Patient Active Problem List   Diagnosis Code    Atrial fibrillation with RVR ([facility]) I48.91    Acute on chronic combined systolic and diastolic CHF, NYHA class 4 (HCC) I50.43    Coronary artery disease I25.10    Hypertension I10    Thrombocytopenia (HCC) D69.6    Hyponatremia E87.1    Hyperlipidemia E78.5    Cirrhosis of liver with ascites ([facility]) K74.60, R18.8    Hypomagnesemia E83.42    Ascites R18.8    CKD (chronic kidney disease) N18.9    History of acute inferior wall MI I25.2    Ischemic cardiomyopathy I25.5    Chronic combined systolic (congestive) and diastolic (congestive) heart failure ([facility]) I50.42    Dehydration, moderate E86.0    Medication side effect, initial encounter T50.905A    Severe malnutrition (HCC) E43    PAF (paroxysmal atrial fibrillation) ([facility]) I48.0    Lightheaded R42    Tobacco abuse counseling Z71.6    Gastroesophageal reflux disease K21.9    Hypotension I95.9    Acute on chronic combined systolic and diastolic CHF (congestive heart failure) ([facility]) I50.43    Hypokalemia E87.6    Hypovitaminosis D E55.9    Hypoxia R09.02    Nonischemic cardiomyopathy (HCC) I42.8    Acute respiratory failure with hypoxia ([facility]) J96.01    Encounter for current long-term use of anticoagulants Z79.01    Bilateral pleural effusion J90    Pancytopenia (HCC) D61.818    Mild malnutrition (HCC) E44.1    Chronic combined systolic and diastolic congestive heart failure (HCC) I50.42    Chronic renal disease, stage III ([facility] [address] [[id number]] N18.30    Right foot pain M79.671    Pain in right toe(s) M79.674    Neuropathy G62.9    Thrombocytopenia, unspecified (HCC) D69.6    Other pancytopenia ([facility] H71.269    Arthritis of first metatarsophalangeal joint M19.079    Hyperglycemia R73.9     Social History     Tobacco Use    Smoking status: Former     Packs/day: 1.00     Years: 56.00     Pack years: 56.00     Types: Cigarettes     Quit date: 2022     Years since quittin.6    Smokeless tobacco: Never   Vaping Use    Vaping Use: Never used   Substance Use Topics    Alcohol use: Not Currently    Drug use: Not Currently       Potassium (mmol/L)   Date Value   2023 4.5   2022 4.5   2022 5.1   2022 4.8   10/24/2022 4.8   2022 4.6     BUN (mg/dL)   Date Value   2023 20   2022 37 (H)   2022 26 (H)   2022 26 (H)   10/24/2022 28 (H)   2022 24 (H)     Creatinine (mg/dL)   Date Value   2023 1.29 (H)   2022 1.85 (H)   2022 1.41 (H)   2022 1.45 (H)   10/24/2022 1.63 (H)   2022 1.28 (H)     TSH (uIU/mL)   Date Value   2022 6.02 (H)   2022 5.77 (H)   2022 3.69   2022 4.52       Plan:      Chronic Systolic and/or Diastolic Heart Failure (diagnosis): EF: 40% via echocardiogram on 22  Beta Blocker for EF </= 40%: metoprolol succinate (Toprol XL) 25 mg - 0.5 tablet (=12.5 mg) po daily  Diuretics: bumetanide (Bumex) 1 mg po daily  ACE/ARB/ARNI for EF </= 40%: Entresto  - 0.5 tablet po BID  Spironolactone for EF </= 35%: Spironolactone 25 mg - 0.5 tablet (=12.5 mg) po daily  SGLT2:  Farxiga 10 mg po once daily  Nonpharmacologic management of Heart Failure: I told patient to continue wearing lower extremity compression stockings and I advised patient to try and keep legs up whenever possible and to limit salt in diet. Laboratory testing:   Basic metabolic panel (BMP) before next appt to assess potassium and renal function. BNP before next appt to assess fluid.      Paroxysmal Atrial Fibrillation: Rhythm Control Asymptomatic   Anti-Arrhythmic: Continue amiodarone (Pacerone) 100 mg once daily. Monitoring: Since they will being maintained on Amiodarone, I told them that we will need to closely monitor them for potential side effects. These include monitoring LFTs and TSH at least every 6 months as well as chest x-rays, pulmonary function tests, and eye exams at least yearly. TSH/Thyroxine, free 9/12/22   LFTs 9/12/22 wnl  Beta Blocker: Continue Metoprolol succinate (Toprol XL) 25 mg 1/2 tab daily. [id number] Score for Atrial Fibrillation Stroke Risk   Risk   Factors  Component Value   C CHF Yes 1   H HTN Yes 1   A2 Age >= 76 No,  (73 y.o.) 0   D DM No 0   S2 Prior Stroke/TIA No 0   V Vascular Disease No 0   A Age 74-69 Yes,  (73 y.o.) 1   Sc Sex male 0    [id number]  Score  3   Score last updated 3/1/23 5:53 AM EST    Click here for a link to the UpToDate guideline \"Atrial Fibrillation: Anticoagulation therapy to prevent embolization    Disclaimer: Risk Score calculation is dependent on accuracy of patient problem list and past encounter diagnosis. Stroke Risk: CHADS2-VASc Score: 4/9 (4% stroke risk). Because of his atrial fibrillation, I also would also recommend that he continue with anticoagulation to decrease his risk of stoke but also reminded him to watch for signs of blood in his stool or black tarry stools and stop the medication immediately if this develops as this could be life threatening. Anticoagulation: Continue Apixaban (Eliquis) 5 mg every 12 hours. Because of his atrial fibrillation, I also would also recommend that he continue with anticoagulation to decrease his risk of stoke but also reminded him to watch for signs of blood in his stool or black tarry stools and stop the medication immediately if this develops as this could be life threatening. Peripheral Artery Disease:   Antiplatelet Agent: Continue clopidogrel (Plavix) and continue Aspirin 81 mg daily.     I also reminded patient to watch for signs of blood in her stool or black tarry stools and stop the medication immediately if this develops as this could be life threatening. Statin Therapy: atorvastatin (Lipitor) 80 mg po daily    Continues Cardiac Rehab    Patient Education/Instructions: I did spend about 15 minutes with patient going over his heart failure including dietary guidelines, the signs and symptoms to watch for including shortness of breath, weight gain, lightheadedness/dizziness. I asked patient to call the clinic if develops persistent or worsening shortness of breath or weight gain of more than 3 pounds in 1-7 days. Patient verbalized understanding. Patient has patient assistance with medications mailed to patient for Entresto and Eliquis. [place] has drug $0 copay card.     Medication changes since last visit Restarted Plavix    Follow up Dr Giana Blum 3/20/23    Thank you for the referral,    Ashley Bocanegra, [facility] PharmD    For Pharmacy Admin Tracking Only    Intervention Detail: Adherence Monitorin and Lab(s) Ordered  Total # of Interventions Recommended: 1  Total # of Interventions Accepted: 1  Time Spent (min): 60

## 2023-03-01 NOTE — DISCHARGE INSTRUCTIONS
HEART FAILURE:    With heart failure you must follow up with your Cardiologist, your PCP and other physicians as appropriate - especially 7 days after a hospitalization and then as directed. Please call right away with any signs or symptoms of heart failure or other medical conditions that need attention or with any questions at all. Please call your Cardiologist or your PCP or the [facility] at [phone]. We can help manage these symptoms and often prevent a visit to the Emergency Room or hospitalization. * Know your dry weight (your weight without any fluid on board)    * Call any time you have questions about anything. Do not wait. * It is very important to take your medications as prescribed, do not miss any doses. Call for refills before you run out. Typically when you have one week left. If you have trouble getting your medications for any reason, call us at [phone]. * Avoid NSAIDs (non steroidal anti inflammatory drugs) like Aleve (naproxen), Advil and Motrin (ibuprofen), Mobic (diclofenac), Celebrex (celecoxib) and aspirin. A daily aspirin is okay if recommended by your physician. It is okay to take Tylenol (acetaminophen) unless your physician has told you otherwise. * Limit sodium intake. Typically this is no more than 2,000-2,400 milligrams (mg) per day. You will need to add up the sodium content found on the nutrition label for the foods you eat each day. * Weigh yourself every day. Weigh yourself before breakfast and after you go to the restroom. Wear the same thing each time you weigh yourself. Keep a log and bring it to each visit. Call if you gain 3 or more pounds in 1-7 days - [phone]                                      * If you have hypertension (high blood pressure), you may want to check your blood pressure daily. Your blood pressure goal is less than 130/80 unless instructed differently by your physician.  Keep a log and bring it to each visit. * Be sure to keep good control of your Diabetes     * Be sure to keep good control of your Cholesterol    * Eat a Heart healthy diet    * Be active. Follow an exercise plan that is reasonable for you. * If you smoke, work to stop smoking. Best to avoid alcohol.

## 2023-03-06 ENCOUNTER — HOSPITAL ENCOUNTER (OUTPATIENT)
Dept: CARDIAC REHAB | Age: 73
Setting detail: THERAPIES SERIES
Discharge: HOME | End: 2023-03-06
Payer: MEDICARE

## 2023-03-06 PROCEDURE — 93668 PERIPHERAL VASCULAR REHAB: CPT

## 2023-03-06 RX ORDER — TAMSULOSIN HYDROCHLORIDE 0.4 MG/1
CAPSULE ORAL
Qty: 90 CAPSULE | Refills: 1 | Status: SHIPPED | OUTPATIENT
Start: 2023-03-06

## 2023-03-08 ENCOUNTER — HOSPITAL ENCOUNTER (OUTPATIENT)
Dept: CARDIAC REHAB | Age: 73
Setting detail: THERAPIES SERIES
Discharge: HOME | End: 2023-03-08
Payer: MEDICARE

## 2023-03-08 PROCEDURE — 93668 PERIPHERAL VASCULAR REHAB: CPT

## 2023-03-09 ENCOUNTER — HOSPITAL ENCOUNTER (OUTPATIENT)
Dept: CARDIAC REHAB | Age: 73
Setting detail: THERAPIES SERIES
Discharge: HOME | End: 2023-03-09
Payer: MEDICARE

## 2023-03-09 DIAGNOSIS — I50.42 CHRONIC COMBINED SYSTOLIC AND DIASTOLIC CONGESTIVE HEART FAILURE (HCC): ICD-10-CM

## 2023-03-09 PROCEDURE — 93668 PERIPHERAL VASCULAR REHAB: CPT

## 2023-03-09 RX ORDER — BUMETANIDE 1 MG/1
TABLET ORAL
Qty: 90 TABLET | Refills: 0 | Status: SHIPPED | OUTPATIENT
Start: 2023-03-09 | End: 2023-05-09

## 2023-03-09 NOTE — PROGRESS NOTES
Cardiopulmonary Rehab   Medical Nutrition Therapy Food Diary Evaluation    Patient Name: Christy Redding  Registered Dietitian:  Jack Avalos, RD, LD, RDN, LD  Date:  3/9/2023    Dear Jacki Kuhn,    Thank you for sharing your information about your eating patterns, it serves not only to help me see what you are eating, but you as well. Eating 3 meals a day is great way to start, I see that you are not currently doing that. Whole grains, fruits and vegetables, along with lean meats and low fat dairy are the cornerstones of your health. It was good to see a salad on your food diary. I did not see any fruit or whole grains listed. With that in mind, look below for some suggestions. Your Rate Your Plate (RYP): No rate your plate report attached with food diary. Recommendations from the Dietitian based on your RYP and Food Diary / activity record to improve health and decrease risk factors    Limit sodium to less than 2,000 mg every day (added salt and hidden sodium combined). Include a minimum of 2 servings of non starchy vegetables such as broccoli, cauliflower, green beans, carrots, etc. every day (1/2 cup cooked, 1 cup raw). Add 3 servings of fruit every day (fresh, frozen, or canned in lite syrup or own juice). Drink 64 oz water every day. Include sources of whole grains in daily diet (whole wheat bread, buns, English muffins, brown rice, whole wheat pasta, etc.). 6.  Limit cheese to 1 oz serving size, choose white cheeses for lower sodium, lower fat option   7. Avoid foods high in fat such as arita, hot dogs, pepperoni, sausage, fried foods, desserts, etc.   8.  Include 150 minutes of physical activity weekly. (mozzarella, swiss, etc.). 9. Eat 3 well balanced meals with a variety of lean meats, fresh fruits, vegetables, whole grains and low fat dairy. 10. Follow the DASH diet, see attached resources on the diet.    11. Reduces hidden fats and sugars such as sweets, pop, fried foods, red meats. Men should limit added sugars to less than 37.5 grams daily, total fats should be limited to 50 grams or less daily. If there are many things to change, try making change with one recommendation, then move on from there. Recommendations are based on guidelines from the American Heart Association, American Diabetes Association and current literature.     Feel free to call with questions or concerns [phone] or [phone]          [facility], RD, LD RDN, LD

## 2023-03-10 ENCOUNTER — OFFICE VISIT (OUTPATIENT)
Dept: PRIMARY CARE CLINIC | Age: 73
End: 2023-03-10

## 2023-03-10 VITALS
WEIGHT: 180 LBS | HEIGHT: 64 IN | BODY MASS INDEX: 30.73 KG/M2 | DIASTOLIC BLOOD PRESSURE: 44 MMHG | SYSTOLIC BLOOD PRESSURE: 96 MMHG

## 2023-03-10 DIAGNOSIS — Z12.12 ENCOUNTER FOR COLORECTAL CANCER SCREENING: ICD-10-CM

## 2023-03-10 DIAGNOSIS — D69.6 THROMBOCYTOPENIA, UNSPECIFIED (HCC): ICD-10-CM

## 2023-03-10 DIAGNOSIS — Z12.11 ENCOUNTER FOR COLORECTAL CANCER SCREENING: ICD-10-CM

## 2023-03-10 DIAGNOSIS — K70.31 ALCOHOLIC CIRRHOSIS OF LIVER WITH ASCITES (HCC): ICD-10-CM

## 2023-03-10 DIAGNOSIS — M19.079 ARTHRITIS OF FIRST METATARSOPHALANGEAL JOINT: Primary | ICD-10-CM

## 2023-03-10 DIAGNOSIS — D61.818 OTHER PANCYTOPENIA (HCC): ICD-10-CM

## 2023-03-10 PROBLEM — E43 SEVERE MALNUTRITION (HCC): Status: RESOLVED | Noted: 2022-06-07 | Resolved: 2023-03-10

## 2023-03-10 PROBLEM — E11.9 TYPE 2 DIABETES MELLITUS (HCC): Status: RESOLVED | Noted: 2023-03-10 | Resolved: 2023-03-10

## 2023-03-10 PROBLEM — E44.1 MILD MALNUTRITION (HCC): Status: RESOLVED | Noted: 2022-08-12 | Resolved: 2023-03-10

## 2023-03-10 PROBLEM — E11.9 TYPE 2 DIABETES MELLITUS (HCC): Status: ACTIVE | Noted: 2023-03-10

## 2023-03-10 NOTE — PROGRESS NOTES
[address], [address] , [facility], [address]    Orlando Abraham is a 67 y.o. male with  has a past medical history of Acute kidney injury ([address] Ascites, Atrial fibrillation ([address] CHF (congestive heart failure) ([address] Cirrhosis of liver with ascites ([address] CKD (chronic kidney disease), Coronary artery disease, Hyperlipidemia, Hypertension, Hypomagnesemia, Hyponatremia, and Thrombocytopenia ([address] Presented to the office today for:  Chief Complaint   Patient presents with    1 Month Follow-Up    Foot Pain       Assessment/Plan   1. Arthritis of first metatarsophalangeal joint  2. Other pancytopenia ([address]  3. Thrombocytopenia, unspecified ([address]  4. Alcoholic cirrhosis of liver with ascites ([address]  -     [id number] [facility]  5. Encounter for colorectal cancer screening  -     Fecal DNA Colorectal cancer screening (Cologuard)    Return in about 3 months (around 6/10/2023) for F/U Meds/Labs. Continue w/ Gabapentin, for severe pain, given prior Hx plan to use Opiates as needed. GI referral discussed again today  F/U with Specialists as per prior plans     All patient questions answered. Pt voiced understanding. There are no discontinued medications. Patient received counseling on the following healthy behaviors: nutrition, exercise and medication adherence. I encouraged and discussed lifestyle modifications including diet and exercise and the patient was agreeable to making positive/beneficial changes to both to help improve their overall health. Discussed use, benefit, and side effects of prescribed medications. Barriers to medication compliance addressed. Patient given educational materials: see patient instructions. HM - HM items completed today as per orders. Outstanding HM items though not limited to immunizations were discussed with the patient today, including risks, benefits and alternatives.  The patient will discuss these during the next appointment per their preference. If there are any worsening or concerning signs or symptoms, patient will report to the ED and/or contact [phone] for immediate evaluation. Teach back method was used. Subjective:    HPI  Chief Complaint   Patient presents with    1 Month Follow-Up    Foot Pain     Right Foot Pain  Patient is here for a follow up of Right foot pain. He reports that he has sharp pain and especially at night and early morning. He says the pain is constant and he is getting no relief. Pain is still severe . Since second angioplasty and wearing spacer it is not the whole foot now anymore. The 2nd toe is the worse along with toes next to it. He is taking gabapentin and tolerating this well. He had also been on Percocet and also finishing up the prior Rx of Tramadol. He is following with Specialists/Podiatry-Vascular Surgery (F/U is on 2023). The patient had a consultation with Heme-Onc in the past re: Pancytopenia/Thrombocytopenia. The patient has not been drinking any alcohol. The patient has a history of cirrhosis. He has not been more confused more than normal. No bloating, abdominal pain, no n/v/d. The patient is having about 1 large and 1 small BM per day. The patient's weight has been fluctuating over the past year. He has not had scheduled follow-up with GI yet. He wants to complete a Cologuard. No GI symptoms, not coughing up blood or in the stool. His BP is low, but has not had not any syncopal episodes noted.     Health Maintenance -   Alcohol/Substance use History - None/Minimal    Tobacco Use      Smoking status: Former        Packs/day: 1.00        Years: 56.00        Pack years: 64        Types: Cigarettes        Quit date: 2022        Years since quittin.6      Smokeless tobacco: Never    Family History   Problem Relation Age of Onset    Heart Disease Mother     Hypertension Mother     Hypertension Father     Heart Disease Father     Coronary Art Dis Sister Coronary Art Dis Brother        [facility] Scores 1/20/2023 9/8/2022 9/1/2022 8/15/2022 7/19/2022 6/6/2022   PHQ2 Score 0 1 0 1 0 0   PHQ9 Score 0 1 0 1 0 0     Interpretation of Total Score Depression Severity: 1-4 = Minimal depression, 5-9 = Mild depression, 10-14 = Moderate depression, 15-19 = Moderately severe depression, 20-27 = Severe depression    Review of Systems  Constitutional: Negative for activity change, appetite change, chills, diaphoresis, fatigue, fever and unexpected weight change. HENT: Negative for sinus pressure, sinus pain, sore throat and trouble swallowing. Respiratory: Negative for cough, shortness of breath and wheezing. Cardiovascular: Negative for chest pain, palpitations and leg swelling. Gastrointestinal: Negative for abdominal pain, diarrhea, nausea and vomiting. Endocrine: Negative for cold intolerance, polydipsia, polyphagia and polyuria. Genitourinary: Negative for difficulty urinating, flank pain and frequency. Musculoskeletal: Negative for gait problem and joint swelling. Negative for back pain, neck pain and neck stiffness. Right foot pain  Skin: Negative for color change and wound. Negative for pallor and rash. Allergic/Immunologic: Negative for environmental allergies and food allergies. Neurological: Negative for light-headedness, numbness and headaches. Psychiatric/Behavioral: Negative for sleep disturbance. Negative for confusion and suicidal ideas. Objective:    BP (!) 96/44   Ht 5' 4\" (1.626 m)   Wt 180 lb (81.6 kg)   BMI 30.90 kg/m²    BP Readings from Last 3 Encounters:   03/10/23 (!) 96/44   03/01/23 (!) 115/52   02/22/23 (!) 98/54     Physical Exam  Constitutional: Patient is oriented to person, place, and time. Patient appears well-developed and well-nourished. No distress. HENT: Head: Normocephalic and atraumatic. Eyes: Pupils are equal, round, and reactive to light. Conjunctivae are normal. Right eye exhibits no discharge.  Left eye exhibits no discharge. Cardiovascular: Normal rate, regular rhythm and normal heart sounds. Pulmonary/Chest: Effort normal and breath sounds normal. No respiratory distress. Patient has no wheezes. Abdominal: Soft. Bowel sounds are normal. Patient exhibits no distension. There is no tenderness. Musculoskeletal:  Patient exhibits no edema and has tenderness of the right foot (ongoing), c/d/I. Berta Rhodes Patient exhibits no deformity. Neurological: Patient is alert and oriented to person, place, and time. Skin: Skin is warm and dry. Patient is not diaphoretic. Psychiatric: Patient's speech is normal and behavior is normal. Thought content normal.   Vitals reviewed. Lab Results   Component Value Date    WBC 5.9 02/02/2023    HGB 13.8 02/02/2023    HCT 41.9 02/02/2023     (L) 02/02/2023    ALT 19 02/02/2023    AST 24 02/02/2023     02/02/2023    K 4.5 02/02/2023    CL 96 (L) 02/02/2023    CREATININE 1.29 (H) 02/02/2023    BUN 20 02/02/2023    CO2 32 (H) 02/02/2023    TSH 6.02 (H) 09/12/2022    INR 1.0 12/20/2022    LABA1C 5.4 02/02/2023     Lab Results   Component Value Date    CALCIUM 10.5 (H) 02/02/2023    PHOS 4.3 05/02/2022     No results found for: LDLCALC, LDLCHOLESTEROL, LDLDIRECT    Please note that this chart was generated using voice recognition Dragon dictation software. Although every effort was made to ensure the accuracy of this automated transcription, some errors in transcription may have occurred.     Electronically signed by Dr. Derick Jovel MD on 3/10/2023 at 2:26 PM

## 2023-03-13 ENCOUNTER — HOSPITAL ENCOUNTER (OUTPATIENT)
Dept: CARDIAC REHAB | Age: 73
Setting detail: THERAPIES SERIES
Discharge: HOME | End: 2023-03-13
Payer: MEDICARE

## 2023-03-13 PROCEDURE — 93668 PERIPHERAL VASCULAR REHAB: CPT

## 2023-03-15 ENCOUNTER — HOSPITAL ENCOUNTER (OUTPATIENT)
Dept: CARDIAC REHAB | Age: 73
Setting detail: THERAPIES SERIES
Discharge: HOME | End: 2023-03-15
Payer: MEDICARE

## 2023-03-15 PROCEDURE — 93668 PERIPHERAL VASCULAR REHAB: CPT

## 2023-03-16 ENCOUNTER — APPOINTMENT (OUTPATIENT)
Dept: CARDIAC REHAB | Age: 73
End: 2023-03-16
Payer: MEDICARE

## 2023-03-18 DIAGNOSIS — M79.671 RIGHT FOOT PAIN: ICD-10-CM

## 2023-03-18 DIAGNOSIS — M79.674 PAIN IN RIGHT TOE(S): ICD-10-CM

## 2023-03-20 ENCOUNTER — APPOINTMENT (OUTPATIENT)
Dept: GENERAL RADIOLOGY | Age: 73
DRG: 683 | End: 2023-03-20
Payer: MEDICARE

## 2023-03-20 ENCOUNTER — HOSPITAL ENCOUNTER (OUTPATIENT)
Dept: CARDIAC REHAB | Age: 73
Setting detail: THERAPIES SERIES
Discharge: HOME | End: 2023-03-20
Payer: MEDICARE

## 2023-03-20 ENCOUNTER — OFFICE VISIT (OUTPATIENT)
Dept: CARDIOLOGY | Age: 73
End: 2023-03-20
Payer: MEDICARE

## 2023-03-20 ENCOUNTER — HOSPITAL ENCOUNTER (INPATIENT)
Age: 73
LOS: 1 days | Discharge: HOME | DRG: 683 | End: 2023-03-21
Attending: EMERGENCY MEDICINE | Admitting: STUDENT IN AN ORGANIZED HEALTH CARE EDUCATION/TRAINING PROGRAM
Payer: MEDICARE

## 2023-03-20 VITALS
BODY MASS INDEX: 28.09 KG/M2 | HEIGHT: 66 IN | OXYGEN SATURATION: 98 % | DIASTOLIC BLOOD PRESSURE: 42 MMHG | SYSTOLIC BLOOD PRESSURE: 78 MMHG | WEIGHT: 174.8 LBS | RESPIRATION RATE: 20 BRPM | HEART RATE: 64 BPM

## 2023-03-20 DIAGNOSIS — Z79.01 ENCOUNTER FOR CURRENT LONG-TERM USE OF ANTICOAGULANTS: ICD-10-CM

## 2023-03-20 DIAGNOSIS — I95.9 HYPOTENSION, UNSPECIFIED HYPOTENSION TYPE: ICD-10-CM

## 2023-03-20 DIAGNOSIS — I25.2 HISTORY OF ACUTE INFERIOR WALL MI: ICD-10-CM

## 2023-03-20 DIAGNOSIS — I50.42 CHRONIC COMBINED SYSTOLIC (CONGESTIVE) AND DIASTOLIC (CONGESTIVE) HEART FAILURE (HCC): ICD-10-CM

## 2023-03-20 DIAGNOSIS — N17.9 AKI (ACUTE KIDNEY INJURY) (HCC): Primary | ICD-10-CM

## 2023-03-20 DIAGNOSIS — I95.0 IDIOPATHIC HYPOTENSION: Primary | ICD-10-CM

## 2023-03-20 DIAGNOSIS — I25.5 ISCHEMIC CARDIOMYOPATHY: ICD-10-CM

## 2023-03-20 PROBLEM — N18.9 CKD (CHRONIC KIDNEY DISEASE): Chronic | Status: RESOLVED | Noted: 2022-05-20 | Resolved: 2023-03-20

## 2023-03-20 PROBLEM — N18.9 CKD (CHRONIC KIDNEY DISEASE): Chronic | Status: ACTIVE | Noted: 2022-05-20

## 2023-03-20 PROBLEM — R42 LIGHTHEADED: Status: RESOLVED | Noted: 2022-07-19 | Resolved: 2023-03-20

## 2023-03-20 PROBLEM — Z12.12 ENCOUNTER FOR COLORECTAL CANCER SCREENING: Status: RESOLVED | Noted: 2023-03-10 | Resolved: 2023-03-20

## 2023-03-20 PROBLEM — D61.818 OTHER PANCYTOPENIA (HCC): Status: RESOLVED | Noted: 2023-02-02 | Resolved: 2023-03-20

## 2023-03-20 PROBLEM — R09.02 HYPOXIA: Status: RESOLVED | Noted: 2022-08-03 | Resolved: 2023-03-20

## 2023-03-20 PROBLEM — R18.8 CIRRHOSIS OF LIVER WITH ASCITES (HCC): Chronic | Status: ACTIVE | Noted: 2022-05-19

## 2023-03-20 PROBLEM — M79.674 PAIN IN RIGHT TOE(S): Status: RESOLVED | Noted: 2023-01-20 | Resolved: 2023-03-20

## 2023-03-20 PROBLEM — I48.0 PAF (PAROXYSMAL ATRIAL FIBRILLATION) (HCC): Chronic | Status: ACTIVE | Noted: 2022-07-19

## 2023-03-20 PROBLEM — K74.60 CIRRHOSIS OF LIVER WITH ASCITES (HCC): Chronic | Status: ACTIVE | Noted: 2022-05-19

## 2023-03-20 PROBLEM — I42.8 NONISCHEMIC CARDIOMYOPATHY (HCC): Chronic | Status: ACTIVE | Noted: 2022-08-04

## 2023-03-20 PROBLEM — Z12.11 ENCOUNTER FOR COLORECTAL CANCER SCREENING: Status: RESOLVED | Noted: 2023-03-10 | Resolved: 2023-03-20

## 2023-03-20 PROBLEM — D61.818 PANCYTOPENIA (HCC): Status: RESOLVED | Noted: 2022-08-10 | Resolved: 2023-03-20

## 2023-03-20 PROBLEM — M79.671 RIGHT FOOT PAIN: Status: RESOLVED | Noted: 2023-01-20 | Resolved: 2023-03-20

## 2023-03-20 PROBLEM — I48.91 ATRIAL FIBRILLATION WITH RVR (HCC): Chronic | Status: ACTIVE | Noted: 2022-05-18

## 2023-03-20 PROBLEM — J96.01 ACUTE RESPIRATORY FAILURE WITH HYPOXIA (HCC): Status: RESOLVED | Noted: 2022-08-04 | Resolved: 2023-03-20

## 2023-03-20 PROBLEM — I50.43 ACUTE ON CHRONIC COMBINED SYSTOLIC AND DIASTOLIC CHF (CONGESTIVE HEART FAILURE) (HCC): Status: RESOLVED | Noted: 2022-07-19 | Resolved: 2023-03-20

## 2023-03-20 PROBLEM — E87.6 HYPOKALEMIA: Status: RESOLVED | Noted: 2022-07-19 | Resolved: 2023-03-20

## 2023-03-20 PROBLEM — N18.30 CHRONIC RENAL DISEASE, STAGE III (HCC): Chronic | Status: ACTIVE | Noted: 2022-09-12

## 2023-03-20 LAB
ABSOLUTE EOS #: 0.11 K/UL (ref 0–0.44)
ABSOLUTE IMMATURE GRANULOCYTE: <0.03 K/UL (ref 0–0.3)
ABSOLUTE LYMPH #: 1.44 K/UL (ref 1.1–3.7)
ABSOLUTE MONO #: 0.68 K/UL (ref 0.1–1.2)
ALBUMIN SERPL-MCNC: 4.6 G/DL (ref 3.5–5.2)
ALBUMIN/GLOBULIN RATIO: 1.4 (ref 1–2.5)
ALP SERPL-CCNC: 97 U/L (ref 40–129)
ALT SERPL-CCNC: 14 U/L (ref 5–41)
ANION GAP SERPL CALCULATED.3IONS-SCNC: 10 MMOL/L (ref 9–17)
AST SERPL-CCNC: 18 U/L
BASOPHILS # BLD: 0 % (ref 0–2)
BASOPHILS ABSOLUTE: <0.03 K/UL (ref 0–0.2)
BILIRUB SERPL-MCNC: 0.5 MG/DL (ref 0.3–1.2)
BILIRUBIN URINE: NEGATIVE
BUN SERPL-MCNC: 28 MG/DL (ref 8–23)
BUN/CREAT BLD: 13 (ref 9–20)
CALCIUM SERPL-MCNC: 10.4 MG/DL (ref 8.6–10.4)
CHLORIDE SERPL-SCNC: 101 MMOL/L (ref 98–107)
CO2 SERPL-SCNC: 28 MMOL/L (ref 20–31)
COLOR: YELLOW
CREAT SERPL-MCNC: 2.14 MG/DL (ref 0.7–1.2)
EOSINOPHILS RELATIVE PERCENT: 2 % (ref 1–4)
EPITHELIAL CELLS UA: ABNORMAL /HPF (ref 0–5)
GFR SERPL CREATININE-BSD FRML MDRD: 32 ML/MIN/1.73M2
GLUCOSE SERPL-MCNC: 116 MG/DL (ref 70–99)
GLUCOSE UR STRIP.AUTO-MCNC: ABNORMAL MG/DL
HCT VFR BLD AUTO: 44.1 % (ref 40.7–50.3)
HGB BLD-MCNC: 14.8 G/DL (ref 13–17)
IMMATURE GRANULOCYTES: 0 %
INR PPP: 1.2
KETONES UR STRIP.AUTO-MCNC: NEGATIVE MG/DL
LACTIC ACID, SEPSIS: 1.1 MMOL/L (ref 0.5–1.9)
LACTIC ACID, SEPSIS: 1.2 MMOL/L (ref 0.5–1.9)
LEUKOCYTE ESTERASE UR QL STRIP.AUTO: NEGATIVE
LYMPHOCYTES # BLD: 21 % (ref 24–43)
MCH RBC QN AUTO: 33 PG (ref 25.2–33.5)
MCHC RBC AUTO-ENTMCNC: 33.6 G/DL (ref 28.4–34.8)
MCV RBC AUTO: 98.2 FL (ref 82.6–102.9)
MONOCYTES # BLD: 10 % (ref 3–12)
NITRITE UR QL STRIP.AUTO: NEGATIVE
NRBC AUTOMATED: 0 PER 100 WBC
PARTIAL THROMBOPLASTIN TIME: 32.4 SEC (ref 26.8–34.8)
PDW BLD-RTO: 13.7 % (ref 11.8–14.4)
PLATELET # BLD AUTO: ABNORMAL K/UL (ref 138–453)
PLATELET, FLUORESCENCE: 108 K/UL (ref 138–453)
PLATELET, IMMATURE FRACTION: 6.2 % (ref 1.1–10.3)
POTASSIUM SERPL-SCNC: 5.1 MMOL/L (ref 3.7–5.3)
PROT SERPL-MCNC: 7.8 G/DL (ref 6.4–8.3)
PROT UR STRIP.AUTO-MCNC: 6 MG/DL (ref 5–9)
PROT UR STRIP.AUTO-MCNC: NEGATIVE MG/DL
PROTHROMBIN TIME: 15.7 SEC (ref 11.9–14.8)
RBC # BLD: 4.49 M/UL (ref 4.21–5.77)
RBC CLUMPS #/AREA URNS AUTO: ABNORMAL /HPF (ref 0–2)
SEG NEUTROPHILS: 67 % (ref 36–65)
SEGMENTED NEUTROPHILS ABSOLUTE COUNT: 4.59 K/UL (ref 1.5–8.1)
SODIUM SERPL-SCNC: 139 MMOL/L (ref 135–144)
SPECIFIC GRAVITY UA: <1.005 (ref 1.01–1.02)
TROPONIN I SERPL DL<=0.01 NG/ML-MCNC: 18 NG/L (ref 0–22)
TROPONIN I SERPL DL<=0.01 NG/ML-MCNC: 23 NG/L (ref 0–22)
TURBIDITY: CLEAR
URINE HGB: NEGATIVE
UROBILINOGEN, URINE: NORMAL
WBC # BLD AUTO: 6.9 K/UL (ref 3.5–11.3)
WBC UA: ABNORMAL /HPF (ref 0–5)

## 2023-03-20 PROCEDURE — 84484 ASSAY OF TROPONIN QUANT: CPT

## 2023-03-20 PROCEDURE — 2580000003 HC RX 258: Performed by: EMERGENCY MEDICINE

## 2023-03-20 PROCEDURE — 3074F SYST BP LT 130 MM HG: CPT | Performed by: FAMILY MEDICINE

## 2023-03-20 PROCEDURE — 3078F DIAST BP <80 MM HG: CPT | Performed by: FAMILY MEDICINE

## 2023-03-20 PROCEDURE — 83605 ASSAY OF LACTIC ACID: CPT

## 2023-03-20 PROCEDURE — 71045 X-RAY EXAM CHEST 1 VIEW: CPT

## 2023-03-20 PROCEDURE — 87086 URINE CULTURE/COLONY COUNT: CPT

## 2023-03-20 PROCEDURE — 93668 PERIPHERAL VASCULAR REHAB: CPT

## 2023-03-20 PROCEDURE — 81001 URINALYSIS AUTO W/SCOPE: CPT

## 2023-03-20 PROCEDURE — 96360 HYDRATION IV INFUSION INIT: CPT

## 2023-03-20 PROCEDURE — 1200000000 HC SEMI PRIVATE

## 2023-03-20 PROCEDURE — 85610 PROTHROMBIN TIME: CPT

## 2023-03-20 PROCEDURE — 2580000003 HC RX 258: Performed by: INTERNAL MEDICINE

## 2023-03-20 PROCEDURE — 85730 THROMBOPLASTIN TIME PARTIAL: CPT

## 2023-03-20 PROCEDURE — G8484 FLU IMMUNIZE NO ADMIN: HCPCS | Performed by: FAMILY MEDICINE

## 2023-03-20 PROCEDURE — G8427 DOCREV CUR MEDS BY ELIG CLIN: HCPCS | Performed by: FAMILY MEDICINE

## 2023-03-20 PROCEDURE — 99285 EMERGENCY DEPT VISIT HI MDM: CPT

## 2023-03-20 PROCEDURE — 96361 HYDRATE IV INFUSION ADD-ON: CPT

## 2023-03-20 PROCEDURE — 87040 BLOOD CULTURE FOR BACTERIA: CPT

## 2023-03-20 PROCEDURE — 99215 OFFICE O/P EST HI 40 MIN: CPT | Performed by: FAMILY MEDICINE

## 2023-03-20 PROCEDURE — 36415 COLL VENOUS BLD VENIPUNCTURE: CPT

## 2023-03-20 PROCEDURE — 93005 ELECTROCARDIOGRAM TRACING: CPT | Performed by: EMERGENCY MEDICINE

## 2023-03-20 PROCEDURE — 94664 DEMO&/EVAL PT USE INHALER: CPT

## 2023-03-20 PROCEDURE — 1123F ACP DISCUSS/DSCN MKR DOCD: CPT | Performed by: FAMILY MEDICINE

## 2023-03-20 PROCEDURE — G8417 CALC BMI ABV UP PARAM F/U: HCPCS | Performed by: FAMILY MEDICINE

## 2023-03-20 PROCEDURE — 80053 COMPREHEN METABOLIC PANEL: CPT

## 2023-03-20 PROCEDURE — 85025 COMPLETE CBC W/AUTO DIFF WBC: CPT

## 2023-03-20 PROCEDURE — 6370000000 HC RX 637 (ALT 250 FOR IP): Performed by: INTERNAL MEDICINE

## 2023-03-20 PROCEDURE — 94761 N-INVAS EAR/PLS OXIMETRY MLT: CPT

## 2023-03-20 PROCEDURE — 1036F TOBACCO NON-USER: CPT | Performed by: FAMILY MEDICINE

## 2023-03-20 PROCEDURE — 3017F COLORECTAL CA SCREEN DOC REV: CPT | Performed by: FAMILY MEDICINE

## 2023-03-20 RX ORDER — ROPINIROLE 0.25 MG/1
0.5 TABLET, FILM COATED ORAL NIGHTLY
Status: DISCONTINUED | OUTPATIENT
Start: 2023-03-20 | End: 2023-03-21 | Stop reason: HOSPADM

## 2023-03-20 RX ORDER — TAMSULOSIN HYDROCHLORIDE 0.4 MG/1
0.4 CAPSULE ORAL DAILY
Status: DISCONTINUED | OUTPATIENT
Start: 2023-03-20 | End: 2023-03-21 | Stop reason: HOSPADM

## 2023-03-20 RX ORDER — METOPROLOL SUCCINATE 25 MG/1
12.5 TABLET, EXTENDED RELEASE ORAL DAILY
Status: DISCONTINUED | OUTPATIENT
Start: 2023-03-20 | End: 2023-03-21 | Stop reason: HOSPADM

## 2023-03-20 RX ORDER — ALBUTEROL SULFATE 90 UG/1
2 AEROSOL, METERED RESPIRATORY (INHALATION) 4 TIMES DAILY PRN
Status: DISCONTINUED | OUTPATIENT
Start: 2023-03-20 | End: 2023-03-21 | Stop reason: HOSPADM

## 2023-03-20 RX ORDER — POLYETHYLENE GLYCOL 3350 17 G/17G
17 POWDER, FOR SOLUTION ORAL DAILY PRN
Status: DISCONTINUED | OUTPATIENT
Start: 2023-03-20 | End: 2023-03-21 | Stop reason: HOSPADM

## 2023-03-20 RX ORDER — CLOPIDOGREL BISULFATE 75 MG/1
75 TABLET ORAL DAILY
Status: DISCONTINUED | OUTPATIENT
Start: 2023-03-20 | End: 2023-03-21 | Stop reason: HOSPADM

## 2023-03-20 RX ORDER — SODIUM CHLORIDE 9 MG/ML
INJECTION, SOLUTION INTRAVENOUS PRN
Status: DISCONTINUED | OUTPATIENT
Start: 2023-03-20 | End: 2023-03-21 | Stop reason: HOSPADM

## 2023-03-20 RX ORDER — ONDANSETRON 2 MG/ML
4 INJECTION INTRAMUSCULAR; INTRAVENOUS EVERY 6 HOURS PRN
Status: DISCONTINUED | OUTPATIENT
Start: 2023-03-20 | End: 2023-03-21 | Stop reason: HOSPADM

## 2023-03-20 RX ORDER — PANTOPRAZOLE SODIUM 40 MG/1
40 TABLET, DELAYED RELEASE ORAL
Status: DISCONTINUED | OUTPATIENT
Start: 2023-03-21 | End: 2023-03-21 | Stop reason: HOSPADM

## 2023-03-20 RX ORDER — IPRATROPIUM BROMIDE AND ALBUTEROL SULFATE 2.5; .5 MG/3ML; MG/3ML
3 SOLUTION RESPIRATORY (INHALATION) EVERY 4 HOURS PRN
Status: DISCONTINUED | OUTPATIENT
Start: 2023-03-20 | End: 2023-03-21 | Stop reason: HOSPADM

## 2023-03-20 RX ORDER — MIDODRINE HYDROCHLORIDE 5 MG/1
10 TABLET ORAL 3 TIMES DAILY
Status: DISCONTINUED | OUTPATIENT
Start: 2023-03-20 | End: 2023-03-21 | Stop reason: HOSPADM

## 2023-03-20 RX ORDER — GABAPENTIN 300 MG/1
300 CAPSULE ORAL 3 TIMES DAILY
Status: DISCONTINUED | OUTPATIENT
Start: 2023-03-20 | End: 2023-03-21 | Stop reason: HOSPADM

## 2023-03-20 RX ORDER — DOCUSATE SODIUM 100 MG/1
100 CAPSULE, LIQUID FILLED ORAL 2 TIMES DAILY
Status: DISCONTINUED | OUTPATIENT
Start: 2023-03-20 | End: 2023-03-21 | Stop reason: HOSPADM

## 2023-03-20 RX ORDER — 0.9 % SODIUM CHLORIDE 0.9 %
500 INTRAVENOUS SOLUTION INTRAVENOUS ONCE
Status: COMPLETED | OUTPATIENT
Start: 2023-03-20 | End: 2023-03-20

## 2023-03-20 RX ORDER — SODIUM CHLORIDE 0.9 % (FLUSH) 0.9 %
10 SYRINGE (ML) INJECTION PRN
Status: DISCONTINUED | OUTPATIENT
Start: 2023-03-20 | End: 2023-03-21 | Stop reason: HOSPADM

## 2023-03-20 RX ORDER — SODIUM CHLORIDE 0.9 % (FLUSH) 0.9 %
10 SYRINGE (ML) INJECTION EVERY 12 HOURS SCHEDULED
Status: DISCONTINUED | OUTPATIENT
Start: 2023-03-20 | End: 2023-03-21 | Stop reason: HOSPADM

## 2023-03-20 RX ORDER — ATORVASTATIN CALCIUM 40 MG/1
80 TABLET, FILM COATED ORAL DAILY
Status: DISCONTINUED | OUTPATIENT
Start: 2023-03-20 | End: 2023-03-21 | Stop reason: HOSPADM

## 2023-03-20 RX ORDER — SODIUM CHLORIDE 9 MG/ML
INJECTION, SOLUTION INTRAVENOUS CONTINUOUS
Status: DISCONTINUED | OUTPATIENT
Start: 2023-03-20 | End: 2023-03-21 | Stop reason: HOSPADM

## 2023-03-20 RX ORDER — ACETAMINOPHEN 650 MG/1
650 SUPPOSITORY RECTAL EVERY 6 HOURS PRN
Status: DISCONTINUED | OUTPATIENT
Start: 2023-03-20 | End: 2023-03-21 | Stop reason: HOSPADM

## 2023-03-20 RX ORDER — ONDANSETRON 4 MG/1
4 TABLET, ORALLY DISINTEGRATING ORAL EVERY 8 HOURS PRN
Status: DISCONTINUED | OUTPATIENT
Start: 2023-03-20 | End: 2023-03-21 | Stop reason: HOSPADM

## 2023-03-20 RX ORDER — AMIODARONE HYDROCHLORIDE 200 MG/1
100 TABLET ORAL DAILY
Status: DISCONTINUED | OUTPATIENT
Start: 2023-03-20 | End: 2023-03-21 | Stop reason: HOSPADM

## 2023-03-20 RX ORDER — ACETAMINOPHEN 325 MG/1
650 TABLET ORAL EVERY 6 HOURS PRN
Status: DISCONTINUED | OUTPATIENT
Start: 2023-03-20 | End: 2023-03-21 | Stop reason: HOSPADM

## 2023-03-20 RX ADMIN — GABAPENTIN 300 MG: 300 CAPSULE ORAL at 20:13

## 2023-03-20 RX ADMIN — APIXABAN 5 MG: 5 TABLET, FILM COATED ORAL at 20:13

## 2023-03-20 RX ADMIN — DOCUSATE SODIUM 100 MG: 100 CAPSULE, LIQUID FILLED ORAL at 20:13

## 2023-03-20 RX ADMIN — MIDODRINE HYDROCHLORIDE 10 MG: 5 TABLET ORAL at 20:14

## 2023-03-20 RX ADMIN — ROPINIROLE HYDROCHLORIDE 0.5 MG: 0.25 TABLET, FILM COATED ORAL at 20:14

## 2023-03-20 RX ADMIN — SODIUM CHLORIDE: 9 INJECTION, SOLUTION INTRAVENOUS at 19:57

## 2023-03-20 RX ADMIN — SODIUM CHLORIDE 500 ML: 9 INJECTION, SOLUTION INTRAVENOUS at 16:02

## 2023-03-20 ASSESSMENT — PAIN SCALES - GENERAL: PAINLEVEL_OUTOF10: 1

## 2023-03-20 ASSESSMENT — PAIN DESCRIPTION - FREQUENCY: FREQUENCY: CONTINUOUS

## 2023-03-20 ASSESSMENT — PAIN DESCRIPTION - ONSET: ONSET: ON-GOING

## 2023-03-20 ASSESSMENT — LIFESTYLE VARIABLES
HOW OFTEN DO YOU HAVE A DRINK CONTAINING ALCOHOL: NEVER
HOW MANY STANDARD DRINKS CONTAINING ALCOHOL DO YOU HAVE ON A TYPICAL DAY: PATIENT DOES NOT DRINK

## 2023-03-20 ASSESSMENT — ENCOUNTER SYMPTOMS
RHINORRHEA: 0
VOMITING: 0
SHORTNESS OF BREATH: 0
NAUSEA: 0
SORE THROAT: 0
ABDOMINAL PAIN: 0

## 2023-03-20 ASSESSMENT — PAIN DESCRIPTION - LOCATION: LOCATION: FOOT

## 2023-03-20 ASSESSMENT — PAIN DESCRIPTION - PAIN TYPE: TYPE: CHRONIC PAIN

## 2023-03-20 ASSESSMENT — PAIN DESCRIPTION - ORIENTATION: ORIENTATION: RIGHT

## 2023-03-20 ASSESSMENT — PAIN - FUNCTIONAL ASSESSMENT: PAIN_FUNCTIONAL_ASSESSMENT: ACTIVITIES ARE NOT PREVENTED

## 2023-03-20 ASSESSMENT — PAIN DESCRIPTION - DESCRIPTORS: DESCRIPTORS: ACHING

## 2023-03-20 NOTE — PROGRESS NOTES
Greater than 20 bpm   []  SOB w/ exertion or RR greater than 24 bpm []  Access- ory muscle use at rest. Abn.  resp. []  SOB at rest.   0   Bilateral Breath Sounds (BBS) [x]  Clear []  Diminish-ed bases  []  Diminish-ed t/o, or rales   []  Sporadic, scattered wheezes or rhonchi []  Persistentwheezes and, or absent BBS 0   Cough [x]  Strong, effective, & non-prod. []  Effective & prod. Less than 25 ml (2 TBSP) over past 24 hrs []  Ineffective & non-prod to less than 25 ML over past 24 hrs []  Ineffective and, or greater than 25 ml sputum prod. past 24 hrs. []  Nonspon- taneous; Requires suctioning 0   Pulmonary History  (PULM HX) []  No smoking and no chronic pulmonary history [x]  Former smoker. Quit over 12 mos. ago []  Current smoker or quit w/ in 12 mos []  Pulm. History and, or 20 pk/yr smoking hx []  Admitted w/ acute pulm. dx and, or has been admitted w/ pulm. dx 2 or more times over past 12 mos 1   Surgical History this Admit  (SURG HX) [x]  No surgery []  General surgery []  Lower abdominal []  Thoracic or upper abdominal   []  Thoracic w/ pulm. disease 0   Chest X-Ray (CXR)/CT Scan [x]  Clear or not applicable []  Not available []  Atelectasis or pleural effusions []  Localized infiltrate or pulm. edema []  Con-solidated Infiltrates, bilateral, or in more than 1 lobe 0   TOTAL ACUITY: 1       CARE PLAN    If Acuity Level is 2, 3, or 4 in any of the following:    [] BILATERAL BREATH SOUNDS (BBS)     [] PULMONARY HISTORY (PULM HX)  [] Respiratory Rate  (RR)    Goal: Improve respiratory functions in patients with airway disease and decrease WOB    [x] AEROSOL PROTOCOL    Total Acuity:   14-28  []  Secondary Assessment in 24 hrs Total Acuity:  9-13  []  Secondary Assessment in 24 hrs Total Acuity:  4-8  []  Secondary Assessment in 24 hrs Total Acuity:  0-3  [x]  Secondary Assessment in 48 hrs   HHN AEROSOL THERAPY with  [physician-ordered bronchodilator(s)] q 4 & Albuterol PRN q2 hrs.    Breath-Actuated Neb if Protocol    SMOKING CESSATION EDUCATION provided according to policy KC_173: (tomasa with an X)  ____Yes    ____ No     ____ NA    Smoking Cessation Booklet given:  ____Yes  ____No ____Patient Kati Lara

## 2023-03-20 NOTE — PATIENT INSTRUCTIONS
SURVEY:    You may be receiving a survey from [employer] regarding your visit today. Please complete the survey to enable us to provide the highest quality of care to you and your family. If you cannot score us a very good on any question, please call the office to discuss how we could have made your experience a very good one. Thank you.

## 2023-03-20 NOTE — PROGRESS NOTES
outpatient vs inpatient testing and treatment strategy. In the end we both agreed that it would be safest for him to be admitted to the ER. Therefore I spoke with  Dr. Juliana To in the ER  who was in agreement with the plan to admit Mr. Ina Cabrera to the ER. I am concerned that he may be anemic or at least dehydrated from poor eating and drinking as he says he has lost 6 lbs over the last 1-2 weeks. Paroxysmal Atrial Fibrillation: Rhythm Control Asymptomatic   Anti-Arrhythmic: Continue amiodarone (Pacerone) 100 mg once daily. Monitoring: Since they will being maintained on Amiodarone, I told them that we will need to closely monitor them for potential side effects. These include monitoring LFTs and TSH at least every 6 months as well as chest x-rays, pulmonary function tests, and eye exams at least yearly. Beta Blocker: Continue Metoprolol succinate (Toprol XL) 25 mg 1/2 tab daily. [id number] Score for Atrial Fibrillation Stroke Risk   Risk   Factors  Component Value   C CHF Yes 1   H HTN Yes 1   A2 Age >= 76 No,  (73 y.o.) 0   D DM No 0   S2 Prior Stroke/TIA No 0   V Vascular Disease No 0   A Age 74-69 Yes,  (73 y.o.) 1   Sc Sex male 0    [id number]  Score  3   Score last updated 9/3/47 5:85 PM EDT  Click here for a link to the UpToDate guideline \"Atrial Fibrillation: Anticoagulation therapy to prevent embolization  Disclaimer: Risk Score calculation is dependent on accuracy of patient problem list and past encounter diagnosis. Stroke Risk: CHADS2-VASc Score: 4/9 (4% stroke risk). Because of his atrial fibrillation, I also would also recommend that he continue with anticoagulation to decrease his risk of stoke but also reminded him to watch for signs of blood in his stool or black tarry stools and stop the medication immediately if this develops as this could be life threatening. Anticoagulation: Restart Apixaban (Eliquis) 5 mg every 12 hours.  Because of his atrial fibrillation, I also would also recommend

## 2023-03-20 NOTE — ED PROVIDER NOTES
systolic and diastolic congestive heart failure ([facility] Pre-operative clearance; Mixed hyperlipidemia; PAD (peripheral artery disease) ([facility])      amiodarone (CORDARONE) 200 MG tablet Take 0.5 tablets by mouth daily  Qty: 45 tablet, Refills: 3      metoprolol succinate (TOPROL XL) 25 MG extended release tablet Take 0.5 tablets by mouth daily  Qty: 45 tablet, Refills: 3    Associated Diagnoses: Hypotension, unspecified hypotension type; Lightheaded; PAF (paroxysmal atrial fibrillation) ([facility] Chronic combined systolic and diastolic congestive heart failure ([facility] Ischemic cardiomyopathy; Tobacco abuse counseling      albuterol sulfate HFA (VENTOLIN HFA) 108 (90 Base) MCG/ACT inhaler Inhale 2 puffs into the lungs 4 times daily as needed for Wheezing  Qty: 54 g, Refills: 1      ipratropium-albuterol (DUONEB) 0.5-2.5 (3) MG/3ML SOLN nebulizer solution Inhale 3 mLs into the lungs every 4 hours as needed for Shortness of Breath  Qty: 360 mL, Refills: 1      melatonin 5 mg TABS tablet Take 2 tablets by mouth nightly  Refills: 0      apixaban (ELIQUIS) 5 MG TABS tablet Take 5 mg by mouth 2 times daily      Omega-3 Fatty Acids (FISH OIL CONCENTRATE) 1000 MG CAPS Take 2 caplets by mouth daily             ALLERGIES       Patient has no known allergies.     FAMILY HISTORY       Family History   Problem Relation Age of Onset    Heart Disease Mother     Hypertension Mother     Hypertension Father     Heart Disease Father     Coronary Art Dis Sister     Coronary Art Dis Brother           SOCIAL HISTORY       Social History     Tobacco Use    Smoking status: Former     Packs/day: 1.00     Years: 56.00     Pack years: 56.00     Types: Cigarettes     Quit date: 2022     Years since quittin.7    Smokeless tobacco: Never   Vaping Use    Vaping Use: Never used   Substance Use Topics    Alcohol use: Not Currently    Drug use: Not Currently         PHYSICAL EXAM       ED Triage Vitals   BP Temp Temp Source Heart Rate Resp SpO2 PORTABLE   Preliminary Result   Stable cardiomegaly. No active infiltrates to suggest pneumonia. No active   pleural disease. LABS:  Labs Reviewed   CBC WITH AUTO DIFFERENTIAL - Abnormal; Notable for the following components:       Result Value    Seg Neutrophils 67 (*)     Lymphocytes 21 (*)     All other components within normal limits   COMPREHENSIVE METABOLIC PANEL - Abnormal; Notable for the following components:    Glucose 116 (*)     BUN 28 (*)     Creatinine 2.14 (*)     Est, Glom Filt Rate 32 (*)     All other components within normal limits   URINALYSIS WITH MICROSCOPIC - Abnormal; Notable for the following components:    Glucose, Ur 2+ (*)     Specific Gravity, UA <1.005 (*)     All other components within normal limits   TROPONIN - Abnormal; Notable for the following components:    Troponin, High Sensitivity 23 (*)     All other components within normal limits   PROTIME-INR - Abnormal; Notable for the following components:    Protime 15.7 (*)     All other components within normal limits   IMMATURE PLATELET FRACTION - Abnormal; Notable for the following components:    Platelet, Fluorescence 108 (*)     All other components within normal limits   CULTURE, BLOOD 1   CULTURE, BLOOD 2   CULTURE, URINE   LACTATE, SEPSIS   LACTATE, SEPSIS   APTT   TROPONIN   CBC WITH AUTO DIFFERENTIAL   BASIC METABOLIC PANEL W/ REFLEX TO MG FOR LOW K       All other labs were within normal range or not returned as of this dictation. EMERGENCY DEPARTMENT COURSE and DIFFERENTIAL DIAGNOSIS/MDM:     Hypotensive in cardiology clinic. On arrival in ED, normotensive. Without acute complaint but does admit to recent fatigue and decreased appetite/thirst.     Exam demonstrates no acute process. With respect to RLE, no suggestion of acute occlusion or other issue pertaining to recent stent. No LE complaints. No evident acute infectious process. Not anemic. Labile BP in ED.  However, patient does take

## 2023-03-21 ENCOUNTER — APPOINTMENT (OUTPATIENT)
Dept: NON INVASIVE DIAGNOSTICS | Age: 73
DRG: 683 | End: 2023-03-21
Payer: MEDICARE

## 2023-03-21 VITALS
HEART RATE: 64 BPM | SYSTOLIC BLOOD PRESSURE: 121 MMHG | HEIGHT: 66 IN | DIASTOLIC BLOOD PRESSURE: 61 MMHG | WEIGHT: 176.5 LBS | RESPIRATION RATE: 18 BRPM | BODY MASS INDEX: 28.37 KG/M2 | OXYGEN SATURATION: 96 % | TEMPERATURE: 98.9 F

## 2023-03-21 LAB
ABSOLUTE EOS #: 0.17 K/UL (ref 0–0.44)
ABSOLUTE IMMATURE GRANULOCYTE: 0 K/UL (ref 0–0.3)
ABSOLUTE LYMPH #: 1.68 K/UL (ref 1.1–3.7)
ABSOLUTE MONO #: 0.62 K/UL (ref 0.1–1.2)
ANION GAP SERPL CALCULATED.3IONS-SCNC: 8 MMOL/L (ref 9–17)
BASOPHILS # BLD: 0 % (ref 0–2)
BASOPHILS ABSOLUTE: 0 K/UL (ref 0–0.2)
BUN SERPL-MCNC: 28 MG/DL (ref 8–23)
BUN/CREAT BLD: 17 (ref 9–20)
CALCIUM SERPL-MCNC: 9.6 MG/DL (ref 8.6–10.4)
CHLORIDE SERPL-SCNC: 105 MMOL/L (ref 98–107)
CO2 SERPL-SCNC: 27 MMOL/L (ref 20–31)
CREAT SERPL-MCNC: 1.66 MG/DL (ref 0.7–1.2)
EKG ATRIAL RATE: 57 BPM
EKG P AXIS: 74 DEGREES
EKG P-R INTERVAL: 154 MS
EKG Q-T INTERVAL: 460 MS
EKG QRS DURATION: 76 MS
EKG QTC CALCULATION (BAZETT): 447 MS
EKG R AXIS: 67 DEGREES
EKG T AXIS: 64 DEGREES
EKG VENTRICULAR RATE: 57 BPM
EOSINOPHILS RELATIVE PERCENT: 3 % (ref 1–4)
GFR SERPL CREATININE-BSD FRML MDRD: 44 ML/MIN/1.73M2
GLUCOSE SERPL-MCNC: 92 MG/DL (ref 70–99)
HCT VFR BLD AUTO: 40 % (ref 40.7–50.3)
HGB BLD-MCNC: 13.6 G/DL (ref 13–17)
IMMATURE GRANULOCYTES: 0 %
LV EF: 53 %
LVEF MODALITY: NORMAL
LYMPHOCYTES # BLD: 30 % (ref 24–43)
MCH RBC QN AUTO: 33 PG (ref 25.2–33.5)
MCHC RBC AUTO-ENTMCNC: 34 G/DL (ref 28.4–34.8)
MCV RBC AUTO: 97.1 FL (ref 82.6–102.9)
MICROORGANISM SPEC CULT: NORMAL
MONOCYTES # BLD: 11 % (ref 3–12)
MORPHOLOGY: ABNORMAL
NRBC AUTOMATED: 0 PER 100 WBC
PDW BLD-RTO: 13.6 % (ref 11.8–14.4)
PLATELET # BLD AUTO: ABNORMAL K/UL (ref 138–453)
PLATELET, FLUORESCENCE: 93 K/UL (ref 138–453)
PLATELET, IMMATURE FRACTION: 4.5 % (ref 1.1–10.3)
POTASSIUM SERPL-SCNC: 4.7 MMOL/L (ref 3.7–5.3)
RBC # BLD: 4.12 M/UL (ref 4.21–5.77)
SEG NEUTROPHILS: 56 % (ref 36–65)
SEGMENTED NEUTROPHILS ABSOLUTE COUNT: 3.13 K/UL (ref 1.5–8.1)
SODIUM SERPL-SCNC: 140 MMOL/L (ref 135–144)
SPECIMEN DESCRIPTION: NORMAL
WBC # BLD AUTO: 5.6 K/UL (ref 3.5–11.3)

## 2023-03-21 PROCEDURE — 80048 BASIC METABOLIC PNL TOTAL CA: CPT

## 2023-03-21 PROCEDURE — 93010 ELECTROCARDIOGRAM REPORT: CPT | Performed by: INTERNAL MEDICINE

## 2023-03-21 PROCEDURE — 99222 1ST HOSP IP/OBS MODERATE 55: CPT | Performed by: FAMILY MEDICINE

## 2023-03-21 PROCEDURE — 85025 COMPLETE CBC W/AUTO DIFF WBC: CPT

## 2023-03-21 PROCEDURE — 94761 N-INVAS EAR/PLS OXIMETRY MLT: CPT

## 2023-03-21 PROCEDURE — 36415 COLL VENOUS BLD VENIPUNCTURE: CPT

## 2023-03-21 PROCEDURE — 2580000003 HC RX 258: Performed by: INTERNAL MEDICINE

## 2023-03-21 PROCEDURE — 6370000000 HC RX 637 (ALT 250 FOR IP): Performed by: INTERNAL MEDICINE

## 2023-03-21 PROCEDURE — 85055 RETICULATED PLATELET ASSAY: CPT

## 2023-03-21 PROCEDURE — 97161 PT EVAL LOW COMPLEX 20 MIN: CPT

## 2023-03-21 PROCEDURE — 93306 TTE W/DOPPLER COMPLETE: CPT

## 2023-03-21 PROCEDURE — 97166 OT EVAL MOD COMPLEX 45 MIN: CPT

## 2023-03-21 RX ADMIN — APIXABAN 5 MG: 5 TABLET, FILM COATED ORAL at 09:30

## 2023-03-21 RX ADMIN — CLOPIDOGREL BISULFATE 75 MG: 75 TABLET ORAL at 09:35

## 2023-03-21 RX ADMIN — SODIUM CHLORIDE: 9 INJECTION, SOLUTION INTRAVENOUS at 09:42

## 2023-03-21 RX ADMIN — EMPAGLIFLOZIN 10 MG: 10 TABLET, FILM COATED ORAL at 09:35

## 2023-03-21 RX ADMIN — DOCUSATE SODIUM 100 MG: 100 CAPSULE, LIQUID FILLED ORAL at 09:35

## 2023-03-21 RX ADMIN — AMIODARONE HYDROCHLORIDE 100 MG: 200 TABLET ORAL at 09:35

## 2023-03-21 RX ADMIN — GABAPENTIN 300 MG: 300 CAPSULE ORAL at 09:30

## 2023-03-21 RX ADMIN — ATORVASTATIN CALCIUM 80 MG: 40 TABLET, FILM COATED ORAL at 09:35

## 2023-03-21 RX ADMIN — PANTOPRAZOLE SODIUM 40 MG: 40 TABLET, DELAYED RELEASE ORAL at 08:06

## 2023-03-21 RX ADMIN — TAMSULOSIN HYDROCHLORIDE 0.4 MG: 0.4 CAPSULE ORAL at 09:35

## 2023-03-21 RX ADMIN — METOPROLOL SUCCINATE 12.5 MG: 25 TABLET, EXTENDED RELEASE ORAL at 09:35

## 2023-03-21 RX ADMIN — GABAPENTIN 300 MG: 300 CAPSULE ORAL at 14:59

## 2023-03-21 RX ADMIN — MIDODRINE HYDROCHLORIDE 10 MG: 5 TABLET ORAL at 09:35

## 2023-03-21 RX ADMIN — MIDODRINE HYDROCHLORIDE 10 MG: 5 TABLET ORAL at 14:58

## 2023-03-21 ASSESSMENT — PAIN SCALES - GENERAL
PAINLEVEL_OUTOF10: 1
PAINLEVEL_OUTOF10: 0

## 2023-03-21 ASSESSMENT — PAIN DESCRIPTION - PAIN TYPE: TYPE: CHRONIC PAIN

## 2023-03-21 ASSESSMENT — PAIN DESCRIPTION - FREQUENCY: FREQUENCY: CONTINUOUS

## 2023-03-21 ASSESSMENT — PAIN DESCRIPTION - ONSET: ONSET: ON-GOING

## 2023-03-21 ASSESSMENT — PAIN DESCRIPTION - ORIENTATION: ORIENTATION: RIGHT

## 2023-03-21 ASSESSMENT — PAIN DESCRIPTION - DESCRIPTORS: DESCRIPTORS: DISCOMFORT;TIGHTNESS

## 2023-03-21 ASSESSMENT — PAIN DESCRIPTION - LOCATION: LOCATION: FOOT;TOE (COMMENT WHICH ONE)

## 2023-03-21 ASSESSMENT — PAIN - FUNCTIONAL ASSESSMENT: PAIN_FUNCTIONAL_ASSESSMENT: ACTIVITIES ARE NOT PREVENTED

## 2023-03-21 NOTE — PLAN OF CARE
Problem: Cardiovascular - Adult  Goal: Maintains optimal cardiac output and hemodynamic stability  Outcome: Progressing  Flowsheets (Taken 3/21/2023 0312)  Maintains optimal cardiac output and hemodynamic stability:   Monitor blood pressure and heart rate   Assess for signs of decreased cardiac output  Note: Pt has not had any episode of hypotension since admission. VS monitored. Goal: Absence of cardiac dysrhythmias or at baseline  Outcome: Progressing  Flowsheets (Taken 3/21/2023 0312)  Absence of cardiac dysrhythmias or at baseline: Monitor cardiac rate and rhythm  Note: Telemetry in place. NSR and SB noted.       Problem: Metabolic/Fluid and Electrolytes - Adult  Goal: Electrolytes maintained within normal limits  Outcome: Progressing

## 2023-03-21 NOTE — ACP (ADVANCE CARE PLANNING)
continue planning  [x] Referred individual to Provider for additional questions/concerns   [] Advised patient/agent/surrogate to review completed ACP document and update if needed with changes in condition, patient preferences or care setting    [x] This note routed to one or more involved healthcare providers    [address] [place] [address] and [place] Nurse Coordinator  3/21/2023 1:06 PM

## 2023-03-21 NOTE — PROGRESS NOTES
Patient arrived to floor from ED @ 1920. Navigator and assessment and vitals complete at this time as charted. Pt oriented to call light and room. Orders reviewed. Telemetry in place, NSR noted. Meds given as ordered. Pt denies any further needs at this time. Call light within reach, care ongoing.

## 2023-03-21 NOTE — PROGRESS NOTES
Physical Therapy  Facility/Department: [facility] MED SURG  Physical Therapy Initial Assessment    Name: Christy Redding  :   MRN: [id number]  Date of Service: 3/21/2023    Discharge Recommendations:  Home independently   PT Equipment Recommendations  Equipment Needed: No      Patient Diagnosis(es): The primary encounter diagnosis was MADIHA (acute kidney injury) ([address] A diagnosis of Hypotension, unspecified hypotension type was also pertinent to this visit. Past Medical History:  has a past medical history of Acute kidney injury ([address] Ascites, Atrial fibrillation ([address] CHF (congestive heart failure) ([address] Cirrhosis of liver with ascites ([address] CKD (chronic kidney disease), Coronary artery disease, Hyperlipidemia, Hypertension, Hypomagnesemia, Hyponatremia, and Thrombocytopenia ([address] Past Surgical History:  has a past surgical history that includes Rotator cuff repair (Right); Carotid stent placement (Bilateral); hernia repair; and Tonsillectomy and adenoidectomy. Assessment   Assessment: The patient is a 67 y.o. male who was admitted due to an acute kidney injury. He demonstrates good strength, good balance, and was able to ambulate independently in his room. He reports he has been getting up and going to the bathroom independently pushing his IV. He has no concerns with returning home and reports he will continue cardiac rehab on discharge. He does not require skilled PT at this time. He was educated to continue to ambulate while hospitalized. Therapy Prognosis: Good  Decision Making: Low Complexity  No Skilled PT: At baseline function  Requires PT Follow-Up: No  Activity Tolerance  Activity Tolerance: Patient tolerated evaluation without incident     Plan   Physcial Therapy Plan  Additional Comments: Pt does not require skilled PT at this time. Safety Devices  Type of Devices:  All fall risk precautions in place     Restrictions  Restrictions/Precautions  Restrictions/Precautions: General kiara  Device: No Device  Assistance: Independent  Gait Deviations: Decreased step height  Distance: 25'  More Ambulation?: No  Stairs/Curb  Stairs?: No     Balance  Posture: Good  Sitting - Static: Good  Sitting - Dynamic: Good  Standing - Static: Good  Standing - Dynamic: Fair;+    AM-PAC Score  AM-PAC Inpatient Mobility without Stair Climbing Raw Score : 20 (03/21/23 1103)  AM-PAC Inpatient without Stair Climbing T-Scale Score : 60.57 (03/21/23 1103)  Mobility Inpatient CMS 0-100% Score: 0 (03/21/23 1103)  Mobility Inpatient without Stair CMS G-Code Modifier : [address] (03/21/23 1103)    Goals  Patient Goals   Patient Goals : Be able to return home    Education  Patient Education  Education Provided Comments: PT POC, HEP      Therapy Time   Individual Concurrent Group Co-treatment   Time In 1025         Time Out 1043         Minutes 18         Timed Code Treatment Minutes: Pete 64, PT, DPT

## 2023-03-21 NOTE — PROGRESS NOTES
Writer reviewed discharge instructions with patient. Patient aware of need to call Dr. Madison November and Dr. Letha Jennings office tomorrow morning for follow up appointments. Writer reviewed medications with patient. Patient denies questions. Copy of discharge instructions given to patient.

## 2023-03-21 NOTE — CARE COORDINATION
No  Plans to Return to Present Housing: Yes  Other Identified Issues/Barriers to RETURNING to current housing: None Noted  Potential Assistance needed at discharge: N/A            Potential DME:    Patient expects to discharge to: Trailer/mobile home  Plan for transportation at discharge: Self    Financial    Payor: [address] [place] [address] / Plan: MEDICARE PART A AND B / Product Type: *No Product type* /     Does insurance require precert for SNF: No    Potential assistance Purchasing Medications: No  Meds-to-Beds request:        [facility] [place] [address] [employer] [phone] [facility] [phone]  [facility] [address]  Phone: [phone] Fax: [phone]      Notes:    Factors facilitating achievement of predicted outcomes: Cooperative and Pleasant    Barriers to discharge: Limited family support, Limited insight into deficits, and Medical complications (potential)    Additional Case Management Notes: See LSW note dated 3/21/2023    The Plan for Transition of Care is related to the following treatment goals of MADIHA (acute kidney injury) ([facility] [N17.9]  Acute kidney injury ([facility] [N17.9]  Hypotension, unspecified hypotension type [H91.7]    IF APPLICABLE: The Patient and/or patient representative Angeli Crane and his family were provided with a choice of provider and agrees with the discharge plan.  Freedom of choice list with basic dialogue that supports the patient's individualized plan of care/goals and shares the quality data associated with the providers was provided to:     Patient Representative Name:       The Patient and/or Patient Representative Agree with the Discharge Plan?  yes    Kell Koenig [place]  Case Management Department  Ph: [phone] Fax: [phone]

## 2023-03-21 NOTE — PROGRESS NOTES
Met with Patient this p.m. to discuss discharge planning. Patient is a 67year old , white male, admitted with a diagnosis of Fatigue. Patient is alert and oriented, polite and cooperative with this assessment. States that he wishes to return home with family following this hospitalization. Patient resides with his nephew at [facility] in [facility]. He states that he and his wife are . Patient uses a ramp, walker, shower chair and has grab bars in his bath rooms for assistance at home. Has used [employer] in the past but does not feel that he needs that type of service at this time. Patient drives himself and provides for his own transportation needs. He and his nephew share cooking and cleaning responsibilities. He is independent with his ADL's. PCP is Dr. Roby Stahl. Patient has medical insurance and denies needing financial assistance with the cost of his prescription medications. Discharge plan is home when stable. Patient has a '[facility]' order in place and does have Advanced Directives on file. No anticipated or unmet needs identified by Patient. LSW to monitor for further needs or concerns and assist with discharge planning as appropriate.     Nahed. [address]  3/21/2023

## 2023-03-21 NOTE — H&P
03/20/2023    LEUKOCYTESUR NEGATIVE 03/20/2023    GLUCOSEU 2+ (A) 03/20/2023    KETUA NEGATIVE 03/20/2023    PROTEINU NEGATIVE 03/20/2023    HGBUR NEGATIVE 03/20/2023    CRYSTUA CALCIUM OXALATE (A) 07/27/2022    CRYSTUA 10 TO 20 (A) 07/27/2022    BACTERIA 1+ (A) 07/27/2022       Lactic Acid:   Lab Results   Component Value Date    LACTA 1.9 05/18/2022       D-Dimer:  Lab Results   Component Value Date    DDIMER 1.93 (H) 08/13/2022       PT/INR:  Lab Results   Component Value Date/Time    PROTIME 15.7 03/20/2023 03:48 PM    INR 1.2 03/20/2023 03:48 PM       High Sensitivity Troponin:  Recent Labs     03/20/23  1548 03/20/23  1717   TROPHS 23* 18       ABGs:   Lab Results   Component Value Date/Time    PHART 7.501 08/10/2022 10:10 AM    IMY0OQS 51.7 08/10/2022 10:10 AM    PO2ART 93.9 08/10/2022 10:10 AM    BEJ1PFE 39.5 08/10/2022 10:10 AM    Q3CFWOQB 97.6 08/10/2022 10:10 AM    FIO2 28 08/03/2022 12:00 PM           XR CHEST PORTABLE   Final Result   Stable cardiomegaly. No active infiltrates to suggest pneumonia. No active   pleural disease.                  Assessment:    Principal Problem:    Acute kidney injury [facility])  Active Problems:    Ischemic cardiomyopathy    Chronic renal disease, stage III ([facility] [address] [131074]    Coronary artery disease    Cirrhosis of liver with ascites (HCC)    PAF (paroxysmal atrial fibrillation) (HCC)    Chronic combined systolic and diastolic congestive heart failure (HCC)  Resolved Problems:    Chronic combined systolic (congestive) and diastolic (congestive) heart failure (HCC)    CKD (chronic kidney disease)      Patient Active Problem List    Diagnosis Date Noted    Chronic renal disease, stage III [facility]) [075376] 09/12/2022    Encounter for current long-term use of anticoagulants 08/04/2022    Dehydration, moderate     Medication side effect, initial encounter     History of acute inferior wall MI     Ischemic cardiomyopathy     Acute kidney injury ([facility] [address] 03/20/2023    Thrombocytopenia, unspecified ([facility] 02/02/2023    Arthritis of first metatarsophalangeal joint 02/02/2023    Hyperglycemia 02/02/2023    Neuropathy 01/20/2023    Chronic combined systolic and diastolic congestive heart failure ([facility] [address] 08/22/2022    Bilateral pleural effusion 08/09/2022    Nonischemic cardiomyopathy ([facility] [address] 08/04/2022    PAF (paroxysmal atrial fibrillation) ([facility] 07/19/2022    Tobacco abuse counseling 07/19/2022    Gastroesophageal reflux disease 07/19/2022    Hypotension 07/19/2022    Hypovitaminosis D 07/19/2022    Hypomagnesemia 05/20/2022    Ascites 05/20/2022    Cirrhosis of liver with ascites ([facility] 05/19/2022    Coronary artery disease     Hypertension     Thrombocytopenia (HCC)     Hyponatremia     Hyperlipidemia     Atrial fibrillation with RVR ([facility] [address] 05/18/2022       Plan:     MEDICAL DECISION MAKING:    Primary Problem(s): Acute kidney injury ([facility]  Differential diagnoses: Dehydration  Condition is an undiagnosed new problem with uncertain prognosis  Condition is improving  Treatment plan: monitor labs  Imaging: no further imaging studies ordered today  Medications:   Continue IV fluids  Medication Monitoring / High Risk Medications: none      Hypotension/dehydration  Condition is improving  Treatment plan: Continue current treatment  Imaging: no further imaging studies ordered today  Medications:   Hold Entresto      PAF  Condition is a chronic stable condition  Treatment plan: Consultation: Cardiology  Imaging: Echo ordered  Medications:   Continue amiodarone, Eliquis    Nutrition status:    Well developed, well nourished with no malnutrition  Dietician consult initiated    Hospital Prophylaxis:   DVT: Eliquis   Stress Ulcer: PPI     MDM Data:   Test interpretation:  My independent EKG interpretation: sinus bradycardia  My independent X-ray interpretation:  CXR shows no acute process  Management and/or test interpretation discussed with ER MD at time of admission  Consults and Nursing notes were personally

## 2023-03-21 NOTE — PROGRESS NOTES
Occupational Therapy  Facility/Department: [facility] MED SURG  Occupational Therapy Initial Assessment    Name: Rebecca Boyce  : 3[birth year]  MRN: [id number]  Date of Service: 3/21/2023    Discharge Recommendations:  Home with assist PRN          Patient Diagnosis(es): The primary encounter diagnosis was MADIHA (acute kidney injury) ([address] A diagnosis of Hypotension, unspecified hypotension type was also pertinent to this visit. Past Medical History:  has a past medical history of Acute kidney injury ([address] Ascites, Atrial fibrillation ([address] CHF (congestive heart failure) ([address] Cirrhosis of liver with ascites ([address] CKD (chronic kidney disease), Coronary artery disease, Hyperlipidemia, Hypertension, Hypomagnesemia, Hyponatremia, and Thrombocytopenia ([address] Past Surgical History:  has a past surgical history that includes Rotator cuff repair (Right); Carotid stent placement (Bilateral); hernia repair; and Tonsillectomy and adenoidectomy. Assessment   Performance deficits / Impairments: Decreased endurance  Assessment: 68 y/o M admitted to T for MADIHA. Patient presents with generalized fatigue d/t hospitalization, however, mod I s/p s/u for self care routine. No further OT services warranted at this time.   Prognosis: Good  Decision Making: Medium Complexity  No Skilled OT: Independent with ADL's  REQUIRES OT FOLLOW-UP: No        Plan   Occupational Therapy Plan  Additional Comments: no further OT     Restrictions  Restrictions/Precautions  Restrictions/Precautions: Fall Risk, General Precautions    Subjective    Patient denies pain     Social/Functional History  Social/Functional History  Lives With: Family  Type of Home: Trailer  Home Layout: One level  Home Access: Ramped entrance  Bathroom Shower/Tub: Walk-in shower, Tub/Shower unit  Bathroom Toilet: Standard  Has the patient had two or more falls in the past year or any fall with injury in the past year?: No  ADL Assistance: [facility]

## 2023-03-21 NOTE — CONSULTS
03/21/2023    PLT See Reflexed IPF Result 03/21/2023    ALT 14 03/20/2023    AST 18 03/20/2023     03/21/2023    K 4.7 03/21/2023     03/21/2023    CREATININE 1.66 (H) 03/21/2023    BUN 28 (H) 03/21/2023    CO2 27 03/21/2023    TSH 6.02 (H) 09/12/2022    INR 1.2 03/20/2023    LABA1C 5.4 02/02/2023     ASSESSMENT:  Patient Active Problem List    Diagnosis Date Noted    Chronic renal disease, stage III [facility]) [512998] 09/12/2022    Encounter for current long-term use of anticoagulants 08/04/2022    Dehydration, moderate     Medication side effect, initial encounter     History of acute inferior wall MI     Ischemic cardiomyopathy     Acute kidney injury ([facility] 03/20/2023    Thrombocytopenia, unspecified ([facility] [address] 02/02/2023    Arthritis of first metatarsophalangeal joint 02/02/2023    Hyperglycemia 02/02/2023    Neuropathy 01/20/2023    Chronic combined systolic and diastolic congestive heart failure ([address] 08/22/2022    Bilateral pleural effusion 08/09/2022    Nonischemic cardiomyopathy ([address] 08/04/2022    PAF (paroxysmal atrial fibrillation) ([facility] [address] 07/19/2022    Tobacco abuse counseling 07/19/2022    Gastroesophageal reflux disease 07/19/2022    Hypotension 07/19/2022    Hypovitaminosis D 07/19/2022    Hypomagnesemia 05/20/2022    Ascites 05/20/2022    Cirrhosis of liver with ascites ([address] 05/19/2022    Coronary artery disease     Hypertension     Thrombocytopenia (HCC)     Hyponatremia     Hyperlipidemia     Atrial fibrillation with RVR ([address] 05/18/2022      PLAN:    Hypotension/Dehydration: Likely secondary to dehydration but improved S/P IVF  Continue current treatment   Creatinine at 1.66 mg/dL as of today  Additional Testing List: I ordered a echocardiogram to better assess for the etiology of this problem and to help guide future management    Paroxysmal Atrial Fibrillation: Rhythm Control Asymptomatic   Anti-Arrhythmic: Continue amiodarone (Pacerone) 100 mg once daily.  Monitoring: Since they will being maintained on Amiodarone, I told them that we will need to closely monitor them for potential side effects. These include monitoring LFTs and TSH at least every 6 months as well as chest x-rays, pulmonary function tests, and eye exams at least yearly. Beta Blocker: Continue Metoprolol succinate (Toprol XL) 25 mg 1/2 tab daily. [id number] Score for Atrial Fibrillation Stroke Risk   Risk   Factors  Component Value   C CHF Yes 1   H HTN Yes 1   A2 Age >= 76 No,  (73 y.o.) 0   D DM No 0   S2 Prior Stroke/TIA No 0   V Vascular Disease No 0   A Age 74-69 Yes,  (73 y.o.) 1   Sc Sex male 0    [id number]  Score  3   Score last updated 5/0/98 3:30 PM EDT  Click here for a link to the UpToDate guideline \"Atrial Fibrillation: Anticoagulation therapy to prevent embolization  Disclaimer: Risk Score calculation is dependent on accuracy of patient problem list and past encounter diagnosis. Stroke Risk: CHADS2-VASc Score: 4/9 (4% stroke risk). Because of his atrial fibrillation, I also would also recommend that he continue with anticoagulation to decrease his risk of stoke but also reminded him to watch for signs of blood in his stool or black tarry stools and stop the medication immediately if this develops as this could be life threatening. Anticoagulation: Continue Apixaban (Eliquis) 5 mg every 12 hours. Because of his atrial fibrillation, I also would also recommend that he continue with anticoagulation to decrease his risk of stoke but also reminded him to watch for signs of blood in his stool or black tarry stools and stop the medication immediately if this develops as this could be life threatening. Chronic combined heart failure: Ischemic Cardiomyopathy, Echo done on 5/18/2022 which showed an EF of 15-20%. Echo done on 8/25/2022 showed improved EF of 40%. Beta Blocker: Continue Metoprolol succinate (Toprol XL) 25 mg 1/2 tab daily.   ACE Inibitor/ARB: Continue sacubitril/valsartan (Entresto) 24/26 mg 1/2 a

## 2023-03-21 NOTE — PROGRESS NOTES
Outcomes: Readiness for Change  Food/Nutrient Intake Outcomes: Food and Nutrient Intake  Physical Signs/Symptoms Outcomes: Biochemical Data, Weight    Discharge Planning:    No discharge needs at this time     Katarina Abreu, [address], LD  Contact: [phone]

## 2023-03-21 NOTE — DISCHARGE SUMMARY
Discharge Summary    Erik Briscoe  :  1950  MRN:  [id number]    Admit date:  3/20/2023      Discharge date: 3/21/2023     Admitting Physician:  Iman Monteiro MD    Discharge Diagnoses:    Principal Problem:    Acute kidney injury [facility])  Active Problems:    Ischemic cardiomyopathy    Chronic renal disease, stage III ([facility] [792140]    Coronary artery disease    Cirrhosis of liver with ascites (HCC)    PAF (paroxysmal atrial fibrillation) (HCC)    Chronic combined systolic and diastolic congestive heart failure ([facility]  Resolved Problems:    Chronic combined systolic (congestive) and diastolic (congestive) heart failure (HCC)    CKD (chronic kidney disease)      Hospital Course: Erik Briscoe is a 67 y.o. male admitted with MADIHA. He presented to the ER from Dr. Dulce Connelly office due to hypotension. Patient stated he has been feeling tired for approximately 1 week. He denied recent illness including fever chills. He denied nausea vomiting or diarrhea. He denied recent illness. He denied shortness of breath. He states he is very active and gets a lot of exercise during the day. He stated he had decreased oral intake which was intentional.  Patient was found to have mild MADIHA otherwise work-up was negative. Patient was admitted placed on IV fluids and cardiology was consulted. Labs were trended. Hemodynamically patient is improving and stable. Repeat echocardiogram showed improvement in EF. No medication changes per cardiology. Plan will be to discharge home today and cleared by cardiology I will have him get a BMP on Friday and follow-up with both primary care and cardiology as an outpatient. Patient is agreeable and ready for discharge. Encouraged to increase oral intake of fluids for hydration. Consultants:  Dr. Flori Lazaro, cardiology    Procedures:  Echocardiogram    Complications: none    Discharge Condition: fair    Exam:  GEN:    Awake, alert and oriented x3.    EYES:   EOMI, pupils equal

## 2023-03-21 NOTE — PLAN OF CARE
Problem: Discharge Planning  Goal: Discharge to home or other facility with appropriate resources  Outcome: Completed     Problem: Pain  Goal: Verbalizes/displays adequate comfort level or baseline comfort level  Outcome: Completed     Problem: Safety - Adult  Goal: Free from fall injury  Outcome: Completed     Problem: Cardiovascular - Adult  Goal: Maintains optimal cardiac output and hemodynamic stability  Outcome: Completed  Goal: Absence of cardiac dysrhythmias or at baseline  Outcome: Completed     Problem: Metabolic/Fluid and Electrolytes - Adult  Goal: Electrolytes maintained within normal limits  Outcome: Completed     Problem: Nutrition Deficit:  Goal: Optimize nutritional status  3/21/2023 1902 by Emely Prince RN  Outcome: Completed  3/21/2023 1010 by Atif Henderson RD, LD  Outcome: Progressing  Flowsheets (Taken 3/21/2023 1010)  Nutrient intake appropriate for improving, restoring, or maintaining nutritional needs: Monitor oral intake, labs, and treatment plans  Note: Nutrition Problem #1: Limited adherence to nutrition-related recommendations  Intervention: Food and/or Nutrient Delivery: Modify Current Diet (encourage heart healthy diet)  Avoid salt

## 2023-03-21 NOTE — PROGRESS NOTES
Reassessment and vitals obtained at this time as charted. No changes from previous assessment. Pt denies any further needs at this time. Call light within reach, care ongoing.

## 2023-03-21 NOTE — DISCHARGE INSTR - DIET

## 2023-03-22 ENCOUNTER — CARE COORDINATION (OUTPATIENT)
Dept: CASE MANAGEMENT | Age: 73
End: 2023-03-22

## 2023-03-22 ENCOUNTER — APPOINTMENT (OUTPATIENT)
Dept: CARDIAC REHAB | Age: 73
End: 2023-03-22
Payer: MEDICARE

## 2023-03-22 NOTE — PROGRESS NOTES
Physician Progress Note      PATIENT:               Yesenia Carrasco  CSN #:                  [id number]  :                       1950  ADMIT DATE:       3/20/2023 3:29 PM  [address] DATE:        3/21/2023 7:25 PM  RESPONDING  PROVIDER #:        Jia Curran MD          QUERY TEXT:      Patient admitted 3/20/23 (discharged 3/21/23) with acute kidney injury (MADIHA)  Admission creatinine 2.14 -> 1.66 on 3/21/23, a less than 1.5 times decrease (   2.14 / 1.5 = 1.42)    In order to support the diagnosis of MADIHA, please include additional clinical   indicators in your documentation. Or please document if the diagnosis of MADIHA   has been ruled out after further study. Risk Factors: CKD,  CHF, cirrhosis of liver  Clinical Indicators:   Per ED provider: \"States that  he has been \"tired\" for   most of the past week and hasn't had much of an appetite. Subsequently, hasn't   had much to eat or drink since then\"; Per H&P:   \"He states he is very   active and gets a lot of exercise during the day. He stated he had decreased   oral intake which was intentional.\"  BUN / Cr / GFR  22     37 / 1.85 / 38    prior to admission  23      20 .29 / 59    prior to admission  3/20/23      28 / 2.14 / 32    admission  3/21/23      28 / 1.66 / 44    discharge day  Treatment: IV NS bolus in ED,  IV infusion,  lab monitoring    Defined by Kidney Disease Improving Global Outcomes (KDIGO) clinical practice   guideline for acute kidney injury:  -Increase in SCr by greater than or equal to 0.3 mg/dl within 48 hours; or  -Increase or decrease in SCr to greater than or equal to 1.5 times baseline,   which is known or presumed to have occurred within the prior 7 days; or  -Urine volume < 0.5ml/kg/h for 6 hours. Rula Quinones, MSN, RN, CCDS, [facility]  Clinical   [phone]  .   Options provided:  -- Acute kidney injury evidenced by, Please document evidence as well as a   numerical baseline creatinine, if known.   -- Dehydration without MADIHA  -- Acute kidney injury ruled out after study  -- Other - I will add my own diagnosis  -- Disagree - Not applicable / Not valid  -- Disagree - Clinically unable to determine / Unknown  -- Refer to Clinical Documentation Reviewer    PROVIDER RESPONSE TEXT:    This patient has acute kidney injury as evidenced by Labs    Query created by: Lovell Gottron on 3/21/2023 9:25 PM      Electronically signed by:  Marvin Shay MD 3/21/2023 9:30 PM

## 2023-03-22 NOTE — CARE COORDINATION
[facility] Care Transitions Initial Follow Up Call    Call within 2 business days of discharge: Yes       Patient: Kamaljit Valdes Patient : 1950   MRN: [id number]  Reason for Admission: MADIHA/Hypotension   Discharge Date: 3/21/23 RARS: Readmission Risk Score: 17.9      Last Discharge  Street       Date Complaint Diagnosis Description Type Department Provider    3/20/23 Fatigue MADIHA (acute kidney injury) ([facility] [address] . .. ED to Hosp-Admission (Discharged) ([facility]) Nicole Caba MD; Brook Fox MD            Was this an external facility discharge? No Discharge Facility: [address] to be reviewed by the provider   Additional needs identified to be addressed with provider: Yes  Hospital follow up appointment               Method of communication with provider: staff message. Attempt to reach patient for Care Transitions. [facility] requesting return call. Contact information provided.   [phone]      Non-face-to-face services provided:  Obtained and reviewed discharge summary and/or continuity of care documents      Care Transitions 24 Hour Call    Do you have all of your prescriptions and are they filled?: No (Comment: sister/caretaker picking up)  Care Transitions Interventions         Follow Up  Future Appointments   Date Time Provider Karol Vera   3/27/2023  2:00 PM [facility] CARDIOPULM REH[facility] RM [address] [place]   3/29/2023  2:00 PM [facility] CARDIOPULM REHAB RM 5 [facility] [place]   3/30/2023  2:00 PM [facility] CARDIOPULM REHAB RM 5 [facility] CARDIAC [place]   4/3/2023  2:00 PM [facility] CARDIOPULM REHAB RM 5 [facility]ARDI[facility] [place]   2023  1:00 PM [facility] CARDIOPULM REHAB RM 5 [facility]ARDI[facility] [place]   2023  2:00 PM [facility]   2023  2:00 PM [facility] CARDIOPULM REHAB RM 5 [facility]ARDI[facility] [place]   4/10/2023  2:00 PM [facility] CARDIOPULM REHAB RM 5 [facility] CARDI[facility] [place]   2023  2:00 PM [facility] CARDIOPULM REHAB RM 5 [facility] [place]   2023  2:00 PM [facility] CARDIOPULM REHAB RM 5 [facility] [place]

## 2023-03-23 ENCOUNTER — APPOINTMENT (OUTPATIENT)
Dept: CARDIAC REHAB | Age: 73
End: 2023-03-23
Payer: MEDICARE

## 2023-03-23 ENCOUNTER — CARE COORDINATION (OUTPATIENT)
Dept: CASE MANAGEMENT | Age: 73
End: 2023-03-23

## 2023-03-23 ENCOUNTER — HOSPITAL ENCOUNTER (OUTPATIENT)
Dept: CARDIAC REHAB | Age: 73
Setting detail: THERAPIES SERIES
Discharge: HOME | End: 2023-03-23
Payer: MEDICARE

## 2023-03-23 PROCEDURE — 93668 PERIPHERAL VASCULAR REHAB: CPT

## 2023-03-23 NOTE — CARE COORDINATION
CARDIOPULM REHAB RM 5 [facility] [place]   4/17/2023  2:00 PM [facility] CARDIOPULM REHAB RM 5 [facility] [place]   4/19/2023  2:00 PM [facility] CARDIOPULM REHAB RM 5 [facility] [place]   4/20/2023  2:00 PM M[facility] CARDIOPULM REHAB RM 5 [facility] [place]   4/24/2023  2:00 PM M[facility] CARDIOPULM REHAB RM 5 [facility] [place]   4/26/2023  2:00 PM MTH CARDIOPULM REHAB RM 5 [facility] [place]   4/27/2023  2:00 PM MTH CARDIOPULM REHAB RM 5 [facility] [place]   5/1/2023  2:00 PM MTH CARDIOPULM REHAB RM 5 [facility] [place]   5/3/2023  2:00 PM MTH CARDIOPULM REHAB RM 5 [facility] [place]   5/4/2023  2:00 PM MTH CARDIOPULM REHAB RM 5 [facility] [place]   5/8/2023  2:00 PM MTH CARDIOPULM REHAB RM 5 [facility] [place]   5/10/2023 11:00 AM [address] MTH MED MGMT [place]   5/10/2023  2:00 PM MTH CARDIOPULM REHAB RM 5 [facility] [place]   5/11/2023  2:00 PM MTH CARDIOPULM REHAB RM 5 [facility] [place]   5/15/2023  2:00 PM MTH CARDIOPULM REHAB RM 5 [facility] [place]   5/17/2023  2:00 PM MTH CARDIOPULM REHAB RM 5 [facility] [place]   5/18/2023  2:00 PM MTH CARDIOPULM REHAB RM 5 [facility] [place]   5/22/2023  2:00 PM MTH CARDIOPULM REHAB RM 5 [facility] [place]   5/24/2023  2:00 PM MTH CARDIOPULM REHAB RM 5 [facility] [place]   5/25/2023  2:00 PM MTH CARDIOPULM REHAB RM 5 [facility] [place]   6/12/2023  3:00 PM Kaity Cristobal MD [facility]         Fred Valentino RN

## 2023-03-24 ENCOUNTER — HOSPITAL ENCOUNTER (OUTPATIENT)
Age: 73
Discharge: HOME | End: 2023-03-24
Payer: MEDICARE

## 2023-03-24 DIAGNOSIS — N17.9 AKI (ACUTE KIDNEY INJURY) (HCC): ICD-10-CM

## 2023-03-24 LAB
ANION GAP SERPL CALCULATED.3IONS-SCNC: 11 MMOL/L (ref 9–17)
BUN SERPL-MCNC: 32 MG/DL (ref 8–23)
BUN/CREAT BLD: 19 (ref 9–20)
CALCIUM SERPL-MCNC: 10.1 MG/DL (ref 8.6–10.4)
CHLORIDE SERPL-SCNC: 104 MMOL/L (ref 98–107)
CO2 SERPL-SCNC: 26 MMOL/L (ref 20–31)
CREAT SERPL-MCNC: 1.66 MG/DL (ref 0.7–1.2)
GFR SERPL CREATININE-BSD FRML MDRD: 44 ML/MIN/1.73M2
GLUCOSE SERPL-MCNC: 107 MG/DL (ref 70–99)
POTASSIUM SERPL-SCNC: 4.8 MMOL/L (ref 3.7–5.3)
SODIUM SERPL-SCNC: 141 MMOL/L (ref 135–144)

## 2023-03-24 PROCEDURE — 80048 BASIC METABOLIC PNL TOTAL CA: CPT

## 2023-03-24 PROCEDURE — 36415 COLL VENOUS BLD VENIPUNCTURE: CPT

## 2023-03-24 NOTE — PROGRESS NOTES
first 30 days (176.8)    Strive for blood lipid optimization with an LDL-C of <100 mg/dL or LDL 70 mg/dL, an HDL-C of > or = 40 mg/dL for men and > or = 50 mg/dL for women, and a triglyceride level of <150 mg/dL via lifestyle education, behavioral modification, and medication compliance, as evidenced by improved post-program lipid results. Strive for HbA1C <= 7.0% via lifestyle education, behavioral modification, and medication compliance as evidenced by post program HbA1C lab results. Achieve and maintain an optimal average resting blood pressure of <130 / 80 mmHg over the course of the program by educating patient, monitoring medication compliance, introducing and maintaining regular cardiovascular exercise program, as evidenced by routine BP monitoring. BP runs 90s-110s/50-60s    Minimize self-reported psycho-social feelings of stress over the program by educating patient, monitoring medication compliance, introducing and maintaining regular cardiovascular exercise as evidenced by pre- and post- VASQOL and PROMIS:surveys and by routine rounding with patient. Demonstrate knowledge about risk factor reduction, lifestyle modification, and heart health strategies with a >=75% accuracy via patient education and counseling as evidenced by pre- and post-program education assessment screening tool. Complete abstinence from tobacco products over the cardiac rehab program through intensive counseling, behavior modification, and medication compliance as evidenced by routine rounding with patient.     Electronically signed by Jayden Varma CEP on 3/24/2023 at 9:09 AM

## 2023-03-25 LAB
MICROORGANISM SPEC CULT: NORMAL
MICROORGANISM SPEC CULT: NORMAL
SERVICE CMNT-IMP: NORMAL
SERVICE CMNT-IMP: NORMAL
SPECIMEN DESCRIPTION: NORMAL
SPECIMEN DESCRIPTION: NORMAL

## 2023-03-26 LAB — NONINV COLON CA DNA+OCC BLD SCRN STL QL: POSITIVE

## 2023-03-27 ENCOUNTER — HOSPITAL ENCOUNTER (OUTPATIENT)
Dept: CARDIAC REHAB | Age: 73
Setting detail: THERAPIES SERIES
Discharge: HOME | End: 2023-03-27
Payer: MEDICARE

## 2023-03-27 PROCEDURE — 93668 PERIPHERAL VASCULAR REHAB: CPT

## 2023-03-28 ENCOUNTER — OFFICE VISIT (OUTPATIENT)
Dept: PRIMARY CARE CLINIC | Age: 73
End: 2023-03-28

## 2023-03-28 VITALS
HEART RATE: 81 BPM | SYSTOLIC BLOOD PRESSURE: 112 MMHG | OXYGEN SATURATION: 99 % | DIASTOLIC BLOOD PRESSURE: 74 MMHG | HEIGHT: 66 IN | BODY MASS INDEX: 28.12 KG/M2 | WEIGHT: 175 LBS

## 2023-03-28 DIAGNOSIS — Z09 HOSPITAL DISCHARGE FOLLOW-UP: Primary | ICD-10-CM

## 2023-03-28 DIAGNOSIS — N17.9 AKI (ACUTE KIDNEY INJURY) (HCC): ICD-10-CM

## 2023-03-28 DIAGNOSIS — N18.32 STAGE 3B CHRONIC KIDNEY DISEASE (HCC): ICD-10-CM

## 2023-03-28 NOTE — PROGRESS NOTES
nose without deformity, nasal mucosa and turbinates normal without polyps  Neck: supple and non-tender without mass, no thyromegaly or thyroid nodules, no cervical lymphadenopathy  Pulmonary/Chest: clear to auscultation bilaterally- no wheezes, rales or rhonchi, normal air movement, no respiratory distress  Cardiovascular: normal rate, regular rhythm, normal S1 and S2, no murmurs, rubs, clicks, or gallops, distal pulses intact, no carotid bruits  Abdomen: soft, non-tender, non-distended, normal bowel sounds, no masses or organomegaly  Extremities: no cyanosis, clubbing or edema  Musculoskeletal: normal range of motion, no joint swelling, deformity or tenderness  Neurologic: reflexes normal and symmetric, no cranial nerve deficit, gait, coordination and speech normal      An electronic signature was used to authenticate this note.   Electronically signed by Zabrina Kowalski MD on 3/28/2023 at 2:44 PM

## 2023-03-29 ENCOUNTER — HOSPITAL ENCOUNTER (OUTPATIENT)
Dept: CARDIAC REHAB | Age: 73
Setting detail: THERAPIES SERIES
Discharge: HOME | End: 2023-03-29
Payer: MEDICARE

## 2023-03-29 PROCEDURE — 93668 PERIPHERAL VASCULAR REHAB: CPT

## 2023-03-30 ENCOUNTER — HOSPITAL ENCOUNTER (OUTPATIENT)
Dept: CARDIAC REHAB | Age: 73
Setting detail: THERAPIES SERIES
Discharge: HOME | End: 2023-03-30
Payer: MEDICARE

## 2023-03-30 PROCEDURE — 93668 PERIPHERAL VASCULAR REHAB: CPT

## 2023-04-03 ENCOUNTER — HOSPITAL ENCOUNTER (OUTPATIENT)
Dept: CARDIAC REHAB | Age: 73
Setting detail: THERAPIES SERIES
Discharge: HOME | End: 2023-04-03
Payer: MEDICARE

## 2023-04-03 DIAGNOSIS — M79.671 RIGHT FOOT PAIN: ICD-10-CM

## 2023-04-03 DIAGNOSIS — M79.674 PAIN IN RIGHT TOE(S): ICD-10-CM

## 2023-04-03 PROCEDURE — 93668 PERIPHERAL VASCULAR REHAB: CPT

## 2023-04-03 RX ORDER — GABAPENTIN 300 MG/1
CAPSULE ORAL
Qty: 120 CAPSULE | Refills: 1 | Status: SHIPPED | OUTPATIENT
Start: 2023-04-03 | End: 2023-05-03

## 2023-04-03 RX ORDER — FOLIC ACID 1 MG/1
TABLET ORAL
Qty: 90 TABLET | Refills: 0 | Status: SHIPPED | OUTPATIENT
Start: 2023-04-03

## 2023-04-05 ENCOUNTER — HOSPITAL ENCOUNTER (OUTPATIENT)
Dept: CARDIAC REHAB | Age: 73
Setting detail: THERAPIES SERIES
Discharge: HOME | End: 2023-04-05
Payer: MEDICARE

## 2023-04-05 ENCOUNTER — OFFICE VISIT (OUTPATIENT)
Dept: VASCULAR SURGERY | Age: 73
End: 2023-04-05

## 2023-04-05 VITALS
HEIGHT: 66 IN | SYSTOLIC BLOOD PRESSURE: 100 MMHG | RESPIRATION RATE: 16 BRPM | BODY MASS INDEX: 28.33 KG/M2 | TEMPERATURE: 97.6 F | DIASTOLIC BLOOD PRESSURE: 57 MMHG | HEART RATE: 65 BPM | WEIGHT: 176.3 LBS

## 2023-04-05 DIAGNOSIS — G20 PARKINSON'S DISEASE (HCC): ICD-10-CM

## 2023-04-05 DIAGNOSIS — I73.9 PAD (PERIPHERAL ARTERY DISEASE) (HCC): ICD-10-CM

## 2023-04-05 DIAGNOSIS — I70.261 CRITICAL LIMB ISCHEMIA OF RIGHT LOWER EXTREMITY WITH GANGRENE (HCC): Primary | ICD-10-CM

## 2023-04-05 PROBLEM — E11.9 TYPE 2 DIABETES MELLITUS (HCC): Status: ACTIVE | Noted: 2023-04-05

## 2023-04-05 PROBLEM — G20.A1 PARKINSON'S DISEASE: Status: ACTIVE | Noted: 2023-04-05

## 2023-04-05 PROCEDURE — 93668 PERIPHERAL VASCULAR REHAB: CPT

## 2023-04-05 NOTE — PATIENT INSTRUCTIONS
SURVEY:    You may be receiving a survey from [employer] regarding your visit today. Please complete the survey to enable us to provide the highest quality of care to you and your family. If you cannot score us a very good on any question, please call the office to discuss how we could have made your experience a very good one. Thank you.

## 2023-04-05 NOTE — PROGRESS NOTES
Loss of Vision in one eye:absent, Slurred Speech:absent, Weakness on one side of body: absent,Headache: absent   Psychiatric Anxiety: absent, Depression: present   Hematologic Abnormal bleeding: absent,

## 2023-04-06 ENCOUNTER — HOSPITAL ENCOUNTER (OUTPATIENT)
Age: 73
Discharge: HOME | End: 2023-04-06
Payer: MEDICARE

## 2023-04-06 ENCOUNTER — HOSPITAL ENCOUNTER (OUTPATIENT)
Dept: CARDIAC REHAB | Age: 73
Setting detail: THERAPIES SERIES
Discharge: HOME | End: 2023-04-06
Payer: MEDICARE

## 2023-04-06 DIAGNOSIS — E78.5 HYPERLIPIDEMIA, UNSPECIFIED HYPERLIPIDEMIA TYPE: ICD-10-CM

## 2023-04-06 DIAGNOSIS — N18.32 STAGE 3B CHRONIC KIDNEY DISEASE (HCC): ICD-10-CM

## 2023-04-06 DIAGNOSIS — N17.9 AKI (ACUTE KIDNEY INJURY) (HCC): ICD-10-CM

## 2023-04-06 DIAGNOSIS — I48.91 ATRIAL FIBRILLATION WITH RVR (HCC): ICD-10-CM

## 2023-04-06 LAB
ALBUMIN SERPL-MCNC: 4.7 G/DL (ref 3.5–5.2)
ALBUMIN/GLOBULIN RATIO: 1.5 (ref 1–2.5)
ALP SERPL-CCNC: 96 U/L (ref 40–129)
ALT SERPL-CCNC: 14 U/L (ref 5–41)
ANION GAP SERPL CALCULATED.3IONS-SCNC: 12 MMOL/L (ref 9–17)
AST SERPL-CCNC: 20 U/L
BILIRUB DIRECT SERPL-MCNC: <0.1 MG/DL
BILIRUB INDIRECT SERPL-MCNC: ABNORMAL MG/DL (ref 0–1)
BILIRUB SERPL-MCNC: 0.4 MG/DL (ref 0.3–1.2)
BUN SERPL-MCNC: 28 MG/DL (ref 8–23)
CALCIUM SERPL-MCNC: 10.2 MG/DL (ref 8.6–10.4)
CHLORIDE SERPL-SCNC: 99 MMOL/L (ref 98–107)
CHOLEST SERPL-MCNC: 134 MG/DL
CHOLESTEROL/HDL RATIO: 2.7
CO2 SERPL-SCNC: 30 MMOL/L (ref 20–31)
CREAT SERPL-MCNC: 1.75 MG/DL (ref 0.7–1.2)
GFR SERPL CREATININE-BSD FRML MDRD: 41 ML/MIN/1.73M2
GLUCOSE SERPL-MCNC: 105 MG/DL (ref 70–99)
HDLC SERPL-MCNC: 50 MG/DL
LDLC SERPL CALC-MCNC: 59 MG/DL (ref 0–130)
POTASSIUM SERPL-SCNC: 4.5 MMOL/L (ref 3.7–5.3)
PROT SERPL-MCNC: 7.9 G/DL (ref 6.4–8.3)
SODIUM SERPL-SCNC: 141 MMOL/L (ref 135–144)
TRIGL SERPL-MCNC: 127 MG/DL

## 2023-04-06 PROCEDURE — 80061 LIPID PANEL: CPT

## 2023-04-06 PROCEDURE — 80053 COMPREHEN METABOLIC PANEL: CPT

## 2023-04-06 PROCEDURE — 93668 PERIPHERAL VASCULAR REHAB: CPT

## 2023-04-06 PROCEDURE — 82248 BILIRUBIN DIRECT: CPT

## 2023-04-06 PROCEDURE — 36415 COLL VENOUS BLD VENIPUNCTURE: CPT

## 2023-04-06 RX ORDER — ROPINIROLE 0.5 MG/1
0.5 TABLET, FILM COATED ORAL NIGHTLY
Qty: 90 TABLET | Refills: 0 | Status: SHIPPED | OUTPATIENT
Start: 2023-04-06 | End: 2023-09-01

## 2023-04-17 ENCOUNTER — HOSPITAL ENCOUNTER (OUTPATIENT)
Dept: CARDIAC REHAB | Age: 73
Setting detail: THERAPIES SERIES
Discharge: HOME | End: 2023-04-17
Payer: MEDICARE

## 2023-04-17 PROCEDURE — 93668 PERIPHERAL VASCULAR REHAB: CPT

## 2023-04-19 ENCOUNTER — HOSPITAL ENCOUNTER (OUTPATIENT)
Dept: CARDIAC REHAB | Age: 73
Setting detail: THERAPIES SERIES
Discharge: HOME | End: 2023-04-19
Payer: MEDICARE

## 2023-04-19 PROCEDURE — 93668 PERIPHERAL VASCULAR REHAB: CPT

## 2023-04-20 ENCOUNTER — HOSPITAL ENCOUNTER (OUTPATIENT)
Dept: CARDIAC REHAB | Age: 73
Setting detail: THERAPIES SERIES
Discharge: HOME | End: 2023-04-20
Payer: MEDICARE

## 2023-04-20 PROCEDURE — 93668 PERIPHERAL VASCULAR REHAB: CPT

## 2023-04-24 ENCOUNTER — HOSPITAL ENCOUNTER (OUTPATIENT)
Dept: CARDIAC REHAB | Age: 73
Setting detail: THERAPIES SERIES
Discharge: HOME | End: 2023-04-24
Payer: MEDICARE

## 2023-04-24 PROCEDURE — 93668 PERIPHERAL VASCULAR REHAB: CPT

## 2023-04-26 ENCOUNTER — HOSPITAL ENCOUNTER (OUTPATIENT)
Dept: CARDIAC REHAB | Age: 73
Setting detail: THERAPIES SERIES
Discharge: HOME | End: 2023-04-26
Payer: MEDICARE

## 2023-04-26 PROCEDURE — 93668 PERIPHERAL VASCULAR REHAB: CPT

## 2023-04-27 ENCOUNTER — HOSPITAL ENCOUNTER (OUTPATIENT)
Dept: CARDIAC REHAB | Age: 73
Setting detail: THERAPIES SERIES
Discharge: HOME | End: 2023-04-27
Payer: MEDICARE

## 2023-04-27 PROBLEM — Z09 HOSPITAL DISCHARGE FOLLOW-UP: Status: RESOLVED | Noted: 2023-03-28 | Resolved: 2023-04-27

## 2023-04-27 PROCEDURE — 93668 PERIPHERAL VASCULAR REHAB: CPT

## 2023-04-30 DIAGNOSIS — I95.9 HYPOTENSION, UNSPECIFIED: ICD-10-CM

## 2023-04-30 DIAGNOSIS — K21.9 GASTRO-ESOPHAGEAL REFLUX DISEASE WITHOUT ESOPHAGITIS: ICD-10-CM

## 2023-05-01 ENCOUNTER — HOSPITAL ENCOUNTER (OUTPATIENT)
Dept: CARDIAC REHAB | Age: 73
Setting detail: THERAPIES SERIES
Discharge: HOME | End: 2023-05-01
Payer: MEDICARE

## 2023-05-01 PROCEDURE — 93668 PERIPHERAL VASCULAR REHAB: CPT

## 2023-05-01 RX ORDER — MULTIVIT-MIN/FERROUS FUMARATE 15 MG
1 TABLET ORAL EVERY MORNING
Qty: 90 TABLET | Refills: 0 | Status: SHIPPED | OUTPATIENT
Start: 2023-05-01

## 2023-05-03 ENCOUNTER — HOSPITAL ENCOUNTER (OUTPATIENT)
Dept: CARDIAC REHAB | Age: 73
Setting detail: THERAPIES SERIES
Discharge: HOME | End: 2023-05-03
Payer: MEDICARE

## 2023-05-03 ENCOUNTER — HOSPITAL ENCOUNTER (OUTPATIENT)
Age: 73
Discharge: HOME | End: 2023-05-03
Payer: MEDICARE

## 2023-05-03 DIAGNOSIS — I50.43 ACUTE ON CHRONIC COMBINED SYSTOLIC AND DIASTOLIC CHF, NYHA CLASS 4 (HCC): ICD-10-CM

## 2023-05-03 LAB
ANION GAP SERPL CALCULATED.3IONS-SCNC: 11 MMOL/L (ref 9–17)
BNP SERPL-MCNC: 363 PG/ML
BUN SERPL-MCNC: 35 MG/DL (ref 8–23)
BUN/CREAT BLD: 22 (ref 9–20)
CALCIUM SERPL-MCNC: 10.7 MG/DL (ref 8.6–10.4)
CHLORIDE SERPL-SCNC: 101 MMOL/L (ref 98–107)
CO2 SERPL-SCNC: 29 MMOL/L (ref 20–31)
CREAT SERPL-MCNC: 1.6 MG/DL (ref 0.7–1.2)
GFR SERPL CREATININE-BSD FRML MDRD: 45 ML/MIN/1.73M2
GLUCOSE SERPL-MCNC: 112 MG/DL (ref 70–99)
POTASSIUM SERPL-SCNC: 4.7 MMOL/L (ref 3.7–5.3)
SODIUM SERPL-SCNC: 141 MMOL/L (ref 135–144)

## 2023-05-03 PROCEDURE — 83880 ASSAY OF NATRIURETIC PEPTIDE: CPT

## 2023-05-03 PROCEDURE — 36415 COLL VENOUS BLD VENIPUNCTURE: CPT

## 2023-05-03 PROCEDURE — 80048 BASIC METABOLIC PNL TOTAL CA: CPT

## 2023-05-03 PROCEDURE — 93668 PERIPHERAL VASCULAR REHAB: CPT

## 2023-05-04 ENCOUNTER — TELEPHONE (OUTPATIENT)
Dept: CARDIOLOGY | Age: 73
End: 2023-05-04

## 2023-05-04 ENCOUNTER — HOSPITAL ENCOUNTER (OUTPATIENT)
Dept: CARDIAC REHAB | Age: 73
Setting detail: THERAPIES SERIES
Discharge: HOME | End: 2023-05-04
Payer: MEDICARE

## 2023-05-04 PROCEDURE — 93668 PERIPHERAL VASCULAR REHAB: CPT

## 2023-05-04 NOTE — TELEPHONE ENCOUNTER
----- Message from Sandee Godoy MD sent at 5/3/2023 11:36 PM EDT -----  Let Mr. Ally Humphreys know their test result was ok. Will discuss at next visit. Thanks.

## 2023-05-08 ENCOUNTER — HOSPITAL ENCOUNTER (OUTPATIENT)
Dept: CARDIAC REHAB | Age: 73
Setting detail: THERAPIES SERIES
Discharge: HOME | End: 2023-05-08
Payer: MEDICARE

## 2023-05-08 PROCEDURE — 93668 PERIPHERAL VASCULAR REHAB: CPT

## 2023-05-09 ENCOUNTER — OFFICE VISIT (OUTPATIENT)
Dept: CARDIOLOGY | Age: 73
End: 2023-05-09
Payer: MEDICARE

## 2023-05-09 VITALS
OXYGEN SATURATION: 93 % | HEIGHT: 67 IN | HEART RATE: 61 BPM | BODY MASS INDEX: 27.94 KG/M2 | DIASTOLIC BLOOD PRESSURE: 46 MMHG | SYSTOLIC BLOOD PRESSURE: 81 MMHG | WEIGHT: 178 LBS | RESPIRATION RATE: 16 BRPM

## 2023-05-09 DIAGNOSIS — E78.2 MIXED HYPERLIPIDEMIA: ICD-10-CM

## 2023-05-09 DIAGNOSIS — Z09 HOSPITAL DISCHARGE FOLLOW-UP: Primary | ICD-10-CM

## 2023-05-09 DIAGNOSIS — I50.42 CHRONIC COMBINED SYSTOLIC AND DIASTOLIC CONGESTIVE HEART FAILURE (HCC): ICD-10-CM

## 2023-05-09 DIAGNOSIS — I48.0 PAF (PAROXYSMAL ATRIAL FIBRILLATION) (HCC): ICD-10-CM

## 2023-05-09 DIAGNOSIS — I73.9 PAD (PERIPHERAL ARTERY DISEASE) (HCC): ICD-10-CM

## 2023-05-09 DIAGNOSIS — I95.9 HYPOTENSION, UNSPECIFIED HYPOTENSION TYPE: ICD-10-CM

## 2023-05-09 PROCEDURE — 99214 OFFICE O/P EST MOD 30 MIN: CPT | Performed by: FAMILY MEDICINE

## 2023-05-09 PROCEDURE — 3074F SYST BP LT 130 MM HG: CPT | Performed by: FAMILY MEDICINE

## 2023-05-09 PROCEDURE — G8417 CALC BMI ABV UP PARAM F/U: HCPCS | Performed by: FAMILY MEDICINE

## 2023-05-09 PROCEDURE — G8427 DOCREV CUR MEDS BY ELIG CLIN: HCPCS | Performed by: FAMILY MEDICINE

## 2023-05-09 PROCEDURE — 1111F DSCHRG MED/CURRENT MED MERGE: CPT | Performed by: FAMILY MEDICINE

## 2023-05-09 PROCEDURE — 1123F ACP DISCUSS/DSCN MKR DOCD: CPT | Performed by: FAMILY MEDICINE

## 2023-05-09 PROCEDURE — 3017F COLORECTAL CA SCREEN DOC REV: CPT | Performed by: FAMILY MEDICINE

## 2023-05-09 PROCEDURE — 1036F TOBACCO NON-USER: CPT | Performed by: FAMILY MEDICINE

## 2023-05-09 PROCEDURE — 3078F DIAST BP <80 MM HG: CPT | Performed by: FAMILY MEDICINE

## 2023-05-09 RX ORDER — BUMETANIDE 1 MG/1
0.5 TABLET ORAL DAILY
Qty: 45 TABLET | Refills: 3 | Status: SHIPPED | OUTPATIENT
Start: 2023-05-09

## 2023-05-09 RX ORDER — SPIRONOLACTONE 25 MG/1
12.5 TABLET ORAL EVERY MORNING
Qty: 45 TABLET | Refills: 3 | Status: SHIPPED | OUTPATIENT
Start: 2023-05-09

## 2023-05-09 NOTE — PROGRESS NOTES
Ortiz Sosa RN am scribing for and in the presence of Shireen Hung MD, MS, F.A.C.C..    Patient: Roseann Jaramillo  :   Primary Care Physician: Sarmad Flaherty  Today's Date: 2023    REASON FOR CONSULTATION: atrial fibrillation with RVR    HPI:  Mr. Yanique Read is a 67 y.o. male who was admitted to the hospital on 2022 for weakness and shortness of breath. In the ER he was found to be in atrial fibrillation with RVR leading to a consultation. He was then readmitted due to severe dehydration. In the past he reports seeing a Cardiologist in [place] and was planning on having a cardioversion done in  to put him back into normal sinus rhythm due to his history of new onset atrial fibrillation. He reports a history of a heart attack at age 46 and needed stents placed at that time but denies any cardiac cath since that time. He also has carotid artery stenosis and abdominal aortic aneurysm. He did report having surgery on one side of his neck, however he is not sure which side or how long ago it was. He has had hypertension and hyperlipidemia for years as well. He is a current every day smoker and he smoked for about 55 years and smokes about a pack a day. Family history consists of a lot of people in his family with significant cardiac history, however he wanted us to talk to his sister about the family history as she knows the details. Echo done on 2022 showed an EF of 15-20%. ECHO done on 22: Dilated all cardiac chambers. Severe biventricular systolic dysfunction. Estimated left ventricular ejection fraction is 20-25% Moderate to severe mitral and moderate tricuspid regurgitation. Moderate pulmonary hypertension with an estimated right ventricular systolic pressure of 61 mmHg. Evidence of moderate to severe diastolic dysfunction is seen. Bilateral pleural effusion noted. Compared to the previous study of 2022, no significant change in ejection fraction.  Functional mitral

## 2023-05-09 NOTE — PATIENT INSTRUCTIONS
SURVEY:    You may be receiving a survey from [employer] regarding your visit today. Please complete the survey to enable us to provide the highest quality of care to you and your family. If you cannot score us a very good on any question, please call the office to discuss how we could have made your experience a very good one. Thank you.

## 2023-05-10 ENCOUNTER — HOSPITAL ENCOUNTER (OUTPATIENT)
Dept: CARDIAC REHAB | Age: 73
Setting detail: THERAPIES SERIES
Discharge: HOME | End: 2023-05-10
Payer: MEDICARE

## 2023-05-10 PROCEDURE — 93668 PERIPHERAL VASCULAR REHAB: CPT

## 2023-05-11 ENCOUNTER — HOSPITAL ENCOUNTER (OUTPATIENT)
Dept: CARDIAC REHAB | Age: 73
Setting detail: THERAPIES SERIES
Discharge: HOME | End: 2023-05-11
Payer: MEDICARE

## 2023-05-11 PROCEDURE — 93668 PERIPHERAL VASCULAR REHAB: CPT

## 2023-05-15 ENCOUNTER — HOSPITAL ENCOUNTER (OUTPATIENT)
Age: 73
Discharge: HOME | End: 2023-05-15
Payer: MEDICARE

## 2023-05-15 ENCOUNTER — HOSPITAL ENCOUNTER (OUTPATIENT)
Dept: CARDIAC REHAB | Age: 73
Setting detail: THERAPIES SERIES
Discharge: HOME | End: 2023-05-15
Payer: MEDICARE

## 2023-05-15 DIAGNOSIS — E78.2 MIXED HYPERLIPIDEMIA: ICD-10-CM

## 2023-05-15 DIAGNOSIS — I73.9 PAD (PERIPHERAL ARTERY DISEASE) (HCC): ICD-10-CM

## 2023-05-15 DIAGNOSIS — I50.42 CHRONIC COMBINED SYSTOLIC AND DIASTOLIC CONGESTIVE HEART FAILURE (HCC): ICD-10-CM

## 2023-05-15 DIAGNOSIS — I95.9 HYPOTENSION, UNSPECIFIED HYPOTENSION TYPE: ICD-10-CM

## 2023-05-15 DIAGNOSIS — I48.0 PAF (PAROXYSMAL ATRIAL FIBRILLATION) (HCC): ICD-10-CM

## 2023-05-15 LAB
ANION GAP SERPL CALCULATED.3IONS-SCNC: 9 MMOL/L (ref 9–17)
BUN SERPL-MCNC: 29 MG/DL (ref 8–23)
BUN/CREAT BLD: 18 (ref 9–20)
CALCIUM SERPL-MCNC: 9.6 MG/DL (ref 8.6–10.4)
CHLORIDE SERPL-SCNC: 105 MMOL/L (ref 98–107)
CO2 SERPL-SCNC: 29 MMOL/L (ref 20–31)
CREAT SERPL-MCNC: 1.59 MG/DL (ref 0.7–1.2)
GFR SERPL CREATININE-BSD FRML MDRD: 46 ML/MIN/1.73M2
GLUCOSE SERPL-MCNC: 98 MG/DL (ref 70–99)
POTASSIUM SERPL-SCNC: 4.9 MMOL/L (ref 3.7–5.3)
SODIUM SERPL-SCNC: 143 MMOL/L (ref 135–144)

## 2023-05-15 PROCEDURE — 36415 COLL VENOUS BLD VENIPUNCTURE: CPT

## 2023-05-15 PROCEDURE — 93668 PERIPHERAL VASCULAR REHAB: CPT

## 2023-05-15 PROCEDURE — 80048 BASIC METABOLIC PNL TOTAL CA: CPT

## 2023-05-17 ENCOUNTER — TELEPHONE (OUTPATIENT)
Dept: CARDIOLOGY | Age: 73
End: 2023-05-17

## 2023-05-17 ENCOUNTER — HOSPITAL ENCOUNTER (OUTPATIENT)
Dept: CARDIAC REHAB | Age: 73
Setting detail: THERAPIES SERIES
Discharge: HOME | End: 2023-05-17
Payer: MEDICARE

## 2023-05-17 PROCEDURE — 93668 PERIPHERAL VASCULAR REHAB: CPT

## 2023-05-17 NOTE — PROGRESS NOTES
Patient's heart cath band was removed at this time as ordered. Radial and ulnar pulses are palpable and patient denies pain. No bleeding at the site. Patient knows to call out if he should start to bleed from the site. There is bruising around the site but patient has scattered bruising from being on eliquis. Patient denies any questions or concerns at this time. Will continue to monitor and assess. Call light remains within reach. Render Post-Care Instructions In Note?: no Number Of Freeze-Thaw Cycles: 2 freeze-thaw cycles Post-Care Instructions: I reviewed with the patient in detail post-care instructions. Patient is to wear sunprotection, and avoid picking at any of the treated lesions. Pt may apply Vaseline to crusted or scabbing areas. Detail Level: Zone Total Number Of Aks Treated: 13 Consent: The patient's consent was obtained including but not limited to risks of crusting, scabbing, blistering, scarring, darker or lighter pigmentary change, recurrence, incomplete removal and infection. Duration Of Freeze Thaw-Cycle (Seconds): 0

## 2023-05-17 NOTE — TELEPHONE ENCOUNTER
----- Message from Samule Nyhan, MD sent at 5/16/2023 10:48 PM EDT -----  Let Mr. Mervat Sim know their test result was ok. Will discuss at next visit. Thanks.

## 2023-05-18 ENCOUNTER — HOSPITAL ENCOUNTER (OUTPATIENT)
Dept: CARDIAC REHAB | Age: 73
Setting detail: THERAPIES SERIES
Discharge: HOME | End: 2023-05-18
Payer: MEDICARE

## 2023-05-19 NOTE — PATIENT INSTRUCTIONS
OPERATIVE REPORT    DATE OF SERVICE: 5/18/2023    PROCEDURE: Colonoscopy with biopsy    REFERRING PROVIDER:  Sarah Kraft NP    PREOPERATIVE DIAGNOSIS:  Family history of colon cancer (mother)    ANESTHESIA: MAC    INDICATIONS FOR PROCEDURE:  This is a 63 year old female who is undergoing a colonoscopy for screening, family history of colon cancer (mother, diagnosed with colon cancer at age of 49).  Some uncles and aunts also had history of colon cancer.  On a colonoscopy in 11/2004, a small hyperplastic polyp was removed from left colon.  She last underwent a colonoscopy in 11/2015 which did not show any colon polyps.    DESCRIPTION OF PROCEDURE:  The risks, benefits and alternatives to the procedure were discussed with  the patient.  Informed consent was obtained prior to the procedure.  The  patient was brought to the endoscopy room and positioned in the left  lateral decubitus position.  After adequate sedation, a colonoscope was  introduced transanally and under direct visualization advanced to the  cecum. Careful examination was done as the scope was withdrawn slowly.  The patient tolerated the procedure well.    DESCRIPTION OF FINDINGS:  Bowel preparation was good.  Sigmoid colon was somewhat tortuous with hypertrophy of colonic folds and deep folds making visualization challenging.  1-2 diverticula were noted in sigmoid colon.  Colon was overall redundant.  Abdominal pressure was needed to advance colonoscope freely into cecum.  Terminal ileum was not examined.  Colonic mucosa appeared normal throughout the colon. No angiectasia was seen.  In cecum, two 4 mm flat subtle polypoid areas of mucosa were noted, removed with cold biopsy. Retroflexion in the rectum did not show any internal hemorrhoids.  Perianal rash was noted related to psoriasis.      ENDOSCOPIC DIAGNOSIS:  1. Two 4 mm subtle polypoid areas of mucosa in cecum, hyperplastic polyps versus normal polypoid mucosa.  2. Somewhat tortuous sigmoid  SURVEY:    You may be receiving a survey from [employer] regarding your visit today. Please complete the survey to enable us to provide the highest quality of care to you and your family. If you cannot score us a very good on any question, please call the office to discuss how we could have made your experience a very good one. Thank you. colon with hypertrophy of colonic folds and presence of 1-2 diverticula.  3. Perianal rash related to psoriasis.    RECOMMENDATIONS:  1.  Follow up biopsy.  2.  High-fiber diet.   3.  Will recommend a colonoscopy for screening in 5 years in light of family history of colon cancer.    Regino Beltran MD  5/18/2023  9:53 PM

## 2023-05-19 NOTE — PROGRESS NOTES
Supervised Exercise Therapy for [facility] Treatment Plan - 90    Patient Name: Candace Severs #: [de-identified]  Date: 5/19/2023  Diagnosis: PAD, CHF   Start Date: 2/28/23  Referring Physician: Dr. Oren Garrison Stratification: High  Session Number: 31   EXERCISE    Stages of Change:   [] pre-contemplation  [x] Action   [] Contemplate   [] Maintenance   [] Prep   [] Relapse          Exercise Prescription:  Mode: [] TM [x] STP [x] R [x] TBE [x] UBE  Frequency: 3 days per week  Duration: 31-60 minutes  Intensity: 4-6 METs  THRR:    Goal: Achieve and progress prescribed exercise frequency, intensity, time, and type based upon initial evaluation and/or submaximal graded exercise//6MWT results as evidenced by the attaining and maintaining the prescribed target heart rate range, a claudication rating of <=4, Darin rating of perceived exertion between 11 and 16, duration of >30 - 60 minutes using multiple exercise modes for at least 3 days/week, progressing at least 40-50% of pain free exercise by program completion. Treadmill Progression:     An Increase in workload will occur using the following progression plan when over 8 minutes of exercise is sustained before experiencing moderate claudication (2-4 / 5 on Claudication Pain Scale)  [x] Resistance Training  introduce 8-12 bilateral UE and LE progressive resistance exercises at 1-3 sets per lift, on 2-3 non-consecutive days using weights/weight machine/therabands to 8-24 # for 8-15 reps to progressive overload by increasing resistance once reps progressed to at least 15 reps on at least 2 occasions     Intervention:  Home Exercise:  Current Exercise -    [x] Yes - walks and does resistance training daily at home for 60 min total   [] No   [x] Resistance Training  Progression:  8-12 bilateral upper extremity resistance exercise at 1-3 sets per lift, on 2 non-consecutive days using Band/Free Weights/Weight Machine to 8-15 reps on at lease 2 occasions.   Once

## 2023-05-22 ENCOUNTER — HOSPITAL ENCOUNTER (OUTPATIENT)
Dept: CARDIAC REHAB | Age: 73
Setting detail: THERAPIES SERIES
Discharge: HOME | End: 2023-05-22
Payer: MEDICARE

## 2023-05-22 PROCEDURE — 93668 PERIPHERAL VASCULAR REHAB: CPT

## 2023-05-24 ENCOUNTER — HOSPITAL ENCOUNTER (OUTPATIENT)
Dept: PHARMACY | Age: 73
Setting detail: THERAPIES SERIES
Discharge: HOME | End: 2023-05-24
Payer: MEDICARE

## 2023-05-24 ENCOUNTER — HOSPITAL ENCOUNTER (OUTPATIENT)
Dept: CARDIAC REHAB | Age: 73
Setting detail: THERAPIES SERIES
Discharge: HOME | End: 2023-05-24
Payer: MEDICARE

## 2023-05-24 VITALS
DIASTOLIC BLOOD PRESSURE: 50 MMHG | WEIGHT: 180 LBS | BODY MASS INDEX: 28.62 KG/M2 | HEART RATE: 60 BPM | SYSTOLIC BLOOD PRESSURE: 104 MMHG

## 2023-05-24 DIAGNOSIS — I50.42 CHRONIC COMBINED SYSTOLIC AND DIASTOLIC CONGESTIVE HEART FAILURE (HCC): Primary | Chronic | ICD-10-CM

## 2023-05-24 PROCEDURE — 99212 OFFICE O/P EST SF 10 MIN: CPT

## 2023-05-24 PROCEDURE — 93668 PERIPHERAL VASCULAR REHAB: CPT

## 2023-05-24 RX ORDER — LANOLIN ALCOHOL/MO/W.PET/CERES
100 CREAM (GRAM) TOPICAL DAILY
COMMUNITY
Start: 2023-04-28

## 2023-05-24 NOTE — DISCHARGE INSTRUCTIONS
HEART FAILURE:    With heart failure you must follow up with your Cardiologist, your PCP and other physicians as appropriate - especially 7 days after a hospitalization and then as directed. Please call right away with any signs or symptoms of heart failure or other medical conditions that need attention or with any questions at all. Please call your Cardiologist or your PCP or the [facility] at [phone]. We can help manage these symptoms and often prevent a visit to the Emergency Room or hospitalization. * Know your dry weight (your weight without any fluid on board)    * Call any time you have questions about anything. Do not wait. * It is very important to take your medications as prescribed, do not miss any doses. Call for refills before you run out. Typically when you have one week left. If you have trouble getting your medications for any reason, call us at [phone]. * Avoid NSAIDs (non steroidal anti inflammatory drugs) like Aleve (naproxen), Advil and Motrin (ibuprofen), Mobic (diclofenac), Celebrex (celecoxib) and aspirin. A daily aspirin is okay if recommended by your physician. It is okay to take Tylenol (acetaminophen) unless your physician has told you otherwise. * Limit sodium intake. Typically this is no more than 2,000-2,400 milligrams (mg) per day. You will need to add up the sodium content found on the nutrition label for the foods you eat each day. * Weigh yourself every day. Weigh yourself before breakfast and after you go to the restroom. Wear the same thing each time you weigh yourself. Keep a log and bring it to each visit. Call if you gain 3 or more pounds in 1-7 days - [phone]                                      * If you have hypertension (high blood pressure), you may want to check your blood pressure daily. Your blood pressure goal is less than 130/80 unless instructed differently by your physician.  Keep a log and

## 2023-05-24 NOTE — PROGRESS NOTES
5.77 (H)   06/06/2022 3.69   05/11/2022 4.52      Latest Reference Range & Units Most Recent   Pro-BNP <300 pg/mL 363 (H)  5/3/23 13:17   (H): Data is abnormally high      Plan:      Chronic Systolic and/or Diastolic Heart Failure (diagnosis): EF: Improved from 40% to 50-55% via echocardiogram on 3/21/23 left atrium is mildly dilated, mild mitral regurgitation, mild (grade I) diastolic dysfunction   Beta Blocker for EF </= 40%: metoprolol succinate (Toprol XL) 25 mg - 0.5 tablet (=12.5 mg) po daily  Diuretics: bumetanide (Bumex) HALF tablet for 0.5 mg daily  ACE/ARB/ARNI for EF </= 40%: Entresto 24/26 - 0.5 tablet po BID  Spironolactone for EF </= 35%: Spironolactone 25 mg - 0.5 tablet (=12.5 mg) po daily  SGLT2:  Farxiga 10 mg po once daily  Nonpharmacologic management of Heart Failure: I told patient to continue wearing lower extremity compression stockings and I advised patient to try and keep legs up whenever possible and to limit salt in diet. Laboratory testing:   Comprehensive metabolic panel (CMP) before next appt to assess potassium, liver function and renal function. TSH to assess thyroid     Paroxysmal Atrial Fibrillation: Rhythm Control Asymptomatic   Anti-Arrhythmic: Continue amiodarone (Pacerone) 100 mg once daily. Monitoring: Since they will being maintained on Amiodarone, I told them that we will need to closely monitor them for potential side effects. These include monitoring LFTs and TSH at least every 6 months as well as chest x-rays, pulmonary function tests, and eye exams at least yearly. TSH/Thyroxine, free 9/12/22   LFTs 9/12/22 wnl  Beta Blocker: Continue Metoprolol succinate (Toprol XL) 25 mg 1/2 tab daily.   [id number] Score for Atrial Fibrillation Stroke Risk    Risk   Factors   Component Value   C CHF Yes 1   H HTN Yes 1   A2 Age >= 75 No,  (73 y.o.) 0   D DM No 0   S2 Prior Stroke/TIA No 0   V Vascular Disease No 0   A Age 74-69 Yes,  (73 y.o.) 1   Sc Sex male 0

## 2023-05-25 ENCOUNTER — HOSPITAL ENCOUNTER (OUTPATIENT)
Dept: CARDIAC REHAB | Age: 73
Setting detail: THERAPIES SERIES
Discharge: HOME | End: 2023-05-25
Payer: MEDICARE

## 2023-05-25 PROCEDURE — 93668 PERIPHERAL VASCULAR REHAB: CPT

## 2023-05-26 NOTE — PROGRESS NOTES
Supervised Exercise Therapy for [facility] Treatment Plan - Final    Patient Name: Jerri Hurley #: [de-identified]  Date: 5/26/2023  Diagnosis: PAD  Start Date: 2/28/2023  Referring Physician: Dr. Elzbieta Gaspar Stratification: High  Session Number: 29  EXERCISE    Stages of Change:   [] pre-contemplation  [] Action   [] Contemplate   [x] Maintenance   [] Prep   [] Relapse          Exercise Prescription:  Mode: [x] TM [x] STP [x] R [x] TBE [x] UBE  Frequency: 3 days per week  Duration: 31-60 minutes  Intensity: 6.0-6.5 METs  THRR:  118-133 bpm  Goal: Achieve and progress prescribed exercise frequency, intensity, time, and type based upon initial evaluation and/or submaximal graded exercise//6MWT results as evidenced by the attaining and maintaining the prescribed target heart rate range, a claudication rating of <=4, Darin rating of perceived exertion between 11 and 16, duration of >30 - 60 minutes using multiple exercise modes for at least 3 days/week, progressing at least 40-50% of pain free exercise by program completion. Treadmill Progression:     An Increase in workload will occur using the following progression plan when over 8 minutes of exercise is sustained before experiencing moderate claudication (2-4 / 5 on Claudication Pain Scale)  [] Resistance Training  introduce 8-12 bilateral UE and LE progressive resistance exercises at 1-3 sets per lift, on 2-3 non-consecutive days using weights/weight machine/therabands to 8-24 # for 8-15 reps to progressive overload by increasing resistance once reps progressed to at least 15 reps on at least 2 occasions     Intervention:  Home Exercise:  Current Exercise -    [x] Yes - stretches and walking on off-rehab days   [] No   [] Resistance Training  Progression:  8-12 bilateral upper extremity resistance exercise at 1-3 sets per lift, on 2 non-consecutive days using Band/Free Weights/Weight Machine to 8-15 reps on at lease 2 occasions.   Once repetition maximum has

## 2023-05-28 DIAGNOSIS — E55.9 HYPOVITAMINOSIS D: ICD-10-CM

## 2023-05-30 RX ORDER — ERGOCALCIFEROL 1.25 MG/1
CAPSULE ORAL
Qty: 12 CAPSULE | Refills: 0 | Status: SHIPPED | OUTPATIENT
Start: 2023-05-30

## 2023-06-05 ENCOUNTER — TELEPHONE (OUTPATIENT)
Dept: CARDIOLOGY | Age: 73
End: 2023-06-05

## 2023-06-05 ENCOUNTER — OFFICE VISIT (OUTPATIENT)
Dept: CARDIOLOGY | Age: 73
End: 2023-06-05
Payer: MEDICARE

## 2023-06-05 VITALS
WEIGHT: 182.4 LBS | HEART RATE: 55 BPM | OXYGEN SATURATION: 93 % | SYSTOLIC BLOOD PRESSURE: 114 MMHG | RESPIRATION RATE: 18 BRPM | BODY MASS INDEX: 28.63 KG/M2 | DIASTOLIC BLOOD PRESSURE: 61 MMHG | HEIGHT: 67 IN

## 2023-06-05 DIAGNOSIS — I95.9 HYPOTENSION, UNSPECIFIED HYPOTENSION TYPE: Primary | ICD-10-CM

## 2023-06-05 DIAGNOSIS — I48.0 PAF (PAROXYSMAL ATRIAL FIBRILLATION) (HCC): ICD-10-CM

## 2023-06-05 DIAGNOSIS — E78.2 MIXED HYPERLIPIDEMIA: ICD-10-CM

## 2023-06-05 DIAGNOSIS — Z79.899 ENCOUNTER FOR MONITORING AMIODARONE THERAPY: ICD-10-CM

## 2023-06-05 DIAGNOSIS — I73.9 PAD (PERIPHERAL ARTERY DISEASE) (HCC): ICD-10-CM

## 2023-06-05 DIAGNOSIS — Z51.81 ENCOUNTER FOR MONITORING AMIODARONE THERAPY: ICD-10-CM

## 2023-06-05 DIAGNOSIS — Z79.01 ENCOUNTER FOR CURRENT LONG-TERM USE OF ANTICOAGULANTS: ICD-10-CM

## 2023-06-05 DIAGNOSIS — I50.42 CHRONIC COMBINED SYSTOLIC AND DIASTOLIC CONGESTIVE HEART FAILURE (HCC): ICD-10-CM

## 2023-06-05 PROCEDURE — G8417 CALC BMI ABV UP PARAM F/U: HCPCS | Performed by: PHYSICIAN ASSISTANT

## 2023-06-05 PROCEDURE — 1123F ACP DISCUSS/DSCN MKR DOCD: CPT | Performed by: PHYSICIAN ASSISTANT

## 2023-06-05 PROCEDURE — G8427 DOCREV CUR MEDS BY ELIG CLIN: HCPCS | Performed by: PHYSICIAN ASSISTANT

## 2023-06-05 PROCEDURE — 3074F SYST BP LT 130 MM HG: CPT | Performed by: PHYSICIAN ASSISTANT

## 2023-06-05 PROCEDURE — 99211 OFF/OP EST MAY X REQ PHY/QHP: CPT | Performed by: PHYSICIAN ASSISTANT

## 2023-06-05 PROCEDURE — 3078F DIAST BP <80 MM HG: CPT | Performed by: PHYSICIAN ASSISTANT

## 2023-06-05 PROCEDURE — 1036F TOBACCO NON-USER: CPT | Performed by: PHYSICIAN ASSISTANT

## 2023-06-05 PROCEDURE — 99213 OFFICE O/P EST LOW 20 MIN: CPT | Performed by: PHYSICIAN ASSISTANT

## 2023-06-05 PROCEDURE — 3017F COLORECTAL CA SCREEN DOC REV: CPT | Performed by: PHYSICIAN ASSISTANT

## 2023-06-05 NOTE — PROGRESS NOTES
should the need arise. Sincerely,  John Hartley PA-C  [facility] [place] [facility] SPECIALTY [facility] Cardiology Specialist    [address], [place], [address] [place] [address]  Phone: [phone], Fax: [phone]     I believe that the risk of significant morbidity and mortality related to the patient's current medical conditions are: Intermediate. 25 minutes    The documentation recorded by the scribe, accurately and completely reflects the services I personally performed and the decisions made by me.  John Hartley PA-C June 5, 2023

## 2023-06-05 NOTE — PATIENT INSTRUCTIONS
SURVEY:    You may be receiving a survey from [employer] regarding your visit today. Please complete the survey to enable us to provide the highest quality of care to you and your family. If you cannot score us a very good on any question, please call the office to discuss how we could have made your experience a very good one. Thank you.

## 2023-06-05 NOTE — TELEPHONE ENCOUNTER
Wife called and stated patient is on aspirin and plavix along with his eliquis. VO Per Benny Valdes pt is to stop the Plavix.

## 2023-06-09 DIAGNOSIS — I50.42 CHRONIC COMBINED SYSTOLIC AND DIASTOLIC CONGESTIVE HEART FAILURE (HCC): ICD-10-CM

## 2023-06-09 RX ORDER — BUMETANIDE 1 MG/1
TABLET ORAL
Qty: 90 TABLET | OUTPATIENT
Start: 2023-06-09

## 2023-06-17 DIAGNOSIS — M79.671 RIGHT FOOT PAIN: ICD-10-CM

## 2023-06-17 DIAGNOSIS — M79.674 PAIN IN RIGHT TOE(S): ICD-10-CM

## 2023-06-19 RX ORDER — MIDODRINE HYDROCHLORIDE 10 MG/1
TABLET ORAL
Qty: 270 TABLET | Refills: 1 | Status: SHIPPED | OUTPATIENT
Start: 2023-06-19

## 2023-06-19 RX ORDER — GABAPENTIN 300 MG/1
CAPSULE ORAL
Qty: 120 CAPSULE | Refills: 1 | Status: SHIPPED | OUTPATIENT
Start: 2023-06-19 | End: 2023-08-06 | Stop reason: SDUPTHER

## 2023-06-22 ENCOUNTER — HOSPITAL ENCOUNTER (OUTPATIENT)
Age: 73
Discharge: HOME | End: 2023-06-22
Payer: MEDICARE

## 2023-06-22 DIAGNOSIS — Z11.59 ENCOUNTER FOR SCREENING FOR OTHER VIRAL DISEASES: ICD-10-CM

## 2023-06-22 DIAGNOSIS — K70.30 ALCOHOLIC CIRRHOSIS, UNSPECIFIED WHETHER ASCITES PRESENT (HCC): ICD-10-CM

## 2023-06-22 DIAGNOSIS — Z12.11 COLON CANCER SCREENING: ICD-10-CM

## 2023-06-22 LAB
ALBUMIN SERPL-MCNC: 4.8 G/DL (ref 3.5–5.2)
ALBUMIN/GLOB SERPL: 1.5 {RATIO} (ref 1–2.5)
ALP SERPL-CCNC: 93 U/L (ref 40–129)
ALT SERPL-CCNC: 19 U/L (ref 5–41)
ANION GAP SERPL CALCULATED.3IONS-SCNC: 9 MMOL/L (ref 9–17)
AST SERPL-CCNC: 22 U/L
BASOPHILS # BLD: 0.04 K/UL (ref 0–0.2)
BASOPHILS NFR BLD: 1 % (ref 0–2)
BILIRUB DIRECT SERPL-MCNC: <0.1 MG/DL
BILIRUB INDIRECT SERPL-MCNC: ABNORMAL MG/DL (ref 0–1)
BILIRUB SERPL-MCNC: 0.4 MG/DL (ref 0.3–1.2)
BUN SERPL-MCNC: 20 MG/DL (ref 8–23)
CALCIUM SERPL-MCNC: 10.3 MG/DL (ref 8.6–10.4)
CHLORIDE SERPL-SCNC: 103 MMOL/L (ref 98–107)
CO2 SERPL-SCNC: 31 MMOL/L (ref 20–31)
CREAT SERPL-MCNC: 1.25 MG/DL (ref 0.7–1.2)
EOSINOPHIL # BLD: 0.13 K/UL (ref 0–0.44)
EOSINOPHILS RELATIVE PERCENT: 3 % (ref 1–4)
ERYTHROCYTE [DISTWIDTH] IN BLOOD BY AUTOMATED COUNT: 13.5 % (ref 11.8–14.4)
GFR SERPL CREATININE-BSD FRML MDRD: >60 ML/MIN/1.73M2
GLUCOSE SERPL-MCNC: 106 MG/DL (ref 70–99)
HCT VFR BLD AUTO: 48.3 % (ref 40.7–50.3)
HGB BLD-MCNC: 16.2 G/DL (ref 13–17)
IMM GRANULOCYTES # BLD AUTO: 0 K/UL (ref 0–0.3)
IMM GRANULOCYTES NFR BLD: 0 %
LYMPHOCYTES # BLD: 24 % (ref 24–43)
LYMPHOCYTES NFR BLD: 1.03 K/UL (ref 1.1–3.7)
MAGNESIUM SERPL-MCNC: 2.1 MG/DL (ref 1.6–2.6)
MCH RBC QN AUTO: 32.9 PG (ref 25.2–33.5)
MCHC RBC AUTO-ENTMCNC: 33.5 G/DL (ref 28.4–34.8)
MCV RBC AUTO: 98 FL (ref 82.6–102.9)
MONOCYTES NFR BLD: 0.3 K/UL (ref 0.1–1.2)
MONOCYTES NFR BLD: 7 % (ref 3–12)
MORPHOLOGY: NORMAL
NEUTROPHILS NFR BLD: 65 % (ref 36–65)
NEUTS SEG NFR BLD: 2.8 K/UL (ref 1.5–8.1)
NRBC AUTOMATED: 0 PER 100 WBC
PLATELET # BLD AUTO: ABNORMAL K/UL (ref 138–453)
PLATELET, FLUORESCENCE: 96 K/UL (ref 138–453)
PLATELETS.RETICULATED NFR BLD AUTO: 8.3 % (ref 1.1–10.3)
POTASSIUM SERPL-SCNC: 5.3 MMOL/L (ref 3.7–5.3)
PROT SERPL-MCNC: 7.9 G/DL (ref 6.4–8.3)
RBC # BLD AUTO: 4.93 M/UL (ref 4.21–5.77)
SODIUM SERPL-SCNC: 143 MMOL/L (ref 135–144)
WBC OTHER # BLD: 4.3 K/UL (ref 3.5–11.3)

## 2023-06-22 PROCEDURE — 82248 BILIRUBIN DIRECT: CPT

## 2023-06-22 PROCEDURE — 85055 RETICULATED PLATELET ASSAY: CPT

## 2023-06-22 PROCEDURE — 83735 ASSAY OF MAGNESIUM: CPT

## 2023-06-22 PROCEDURE — 86376 MICROSOMAL ANTIBODY EACH: CPT

## 2023-06-22 PROCEDURE — 83516 IMMUNOASSAY NONANTIBODY: CPT

## 2023-06-22 PROCEDURE — 86708 HEPATITIS A ANTIBODY: CPT

## 2023-06-22 PROCEDURE — 86704 HEP B CORE ANTIBODY TOTAL: CPT

## 2023-06-22 PROCEDURE — 86705 HEP B CORE ANTIBODY IGM: CPT

## 2023-06-22 PROCEDURE — 85027 COMPLETE CBC AUTOMATED: CPT

## 2023-06-22 PROCEDURE — 82105 ALPHA-FETOPROTEIN SERUM: CPT

## 2023-06-22 PROCEDURE — 36415 COLL VENOUS BLD VENIPUNCTURE: CPT

## 2023-06-22 PROCEDURE — 80053 COMPREHEN METABOLIC PANEL: CPT

## 2023-06-22 PROCEDURE — 86317 IMMUNOASSAY INFECTIOUS AGENT: CPT

## 2023-06-23 LAB
AFP SERPL-MCNC: 2.4 UG/L
HAV AB SERPL IA-ACNC: REACTIVE
HBV CORE AB SER QL: NONREACTIVE
HBV CORE IGM SERPL QL IA: NONREACTIVE
HBV SURFACE AB SERPL IA-ACNC: 17.09 MIU/ML
MITOCHONDRIA M2 IGG SER-ACNC: 0.5 U/ML (ref 0–4)

## 2023-06-24 LAB — SMOOTH MUSCLE ANTIBODY: 4 UNITS (ref 0–19)

## 2023-06-25 LAB — LKM AB TITR SER IF: NORMAL {TITER}

## 2023-06-26 RX ORDER — LANOLIN ALCOHOL/MO/W.PET/CERES
400 CREAM (GRAM) TOPICAL 3 TIMES DAILY
Qty: 270 TABLET | Refills: 1 | Status: SHIPPED | OUTPATIENT
Start: 2023-06-26

## 2023-07-03 RX ORDER — FOLIC ACID 1 MG/1
TABLET ORAL
Qty: 90 TABLET | Refills: 0 | Status: SHIPPED | OUTPATIENT
Start: 2023-07-03 | End: 2023-08-02 | Stop reason: SDUPTHER

## 2023-07-03 RX ORDER — ROPINIROLE 0.5 MG/1
0.5 TABLET, FILM COATED ORAL NIGHTLY
Qty: 90 TABLET | Refills: 0 | Status: SHIPPED | OUTPATIENT
Start: 2023-07-03 | End: 2023-11-28

## 2023-07-05 ENCOUNTER — TELEPHONE (OUTPATIENT)
Dept: GASTROENTEROLOGY | Age: 73
End: 2023-07-05

## 2023-07-05 NOTE — TELEPHONE ENCOUNTER
----- Message from Reyna Panchal MD sent at 7/4/2023  9:43 PM EDT -----  Please notify patient: Labs consistent with hepatitis A/B immunity and liver disease likely related to alcohol abuse history.

## 2023-07-07 ENCOUNTER — TELEPHONE (OUTPATIENT)
Dept: GASTROENTEROLOGY | Age: 73
End: 2023-07-07

## 2023-07-10 ENCOUNTER — TELEPHONE (OUTPATIENT)
Dept: GASTROENTEROLOGY | Age: 73
End: 2023-07-10

## 2023-07-10 ENCOUNTER — ANESTHESIA EVENT (OUTPATIENT)
Dept: OPERATING ROOM | Age: 73
End: 2023-07-10
Payer: MEDICARE

## 2023-07-10 NOTE — TELEPHONE ENCOUNTER
Spoke with the patient's spouse/ significant other and advised that he is to do clear liquids today. Also clarified the instructions with her.

## 2023-07-11 ENCOUNTER — HOSPITAL ENCOUNTER (OUTPATIENT)
Age: 73
Setting detail: OUTPATIENT SURGERY
Discharge: HOME | End: 2023-07-11
Attending: INTERNAL MEDICINE | Admitting: INTERNAL MEDICINE
Payer: MEDICARE

## 2023-07-11 ENCOUNTER — ANESTHESIA (OUTPATIENT)
Dept: OPERATING ROOM | Age: 73
End: 2023-07-11
Payer: MEDICARE

## 2023-07-11 VITALS
HEART RATE: 60 BPM | HEIGHT: 66 IN | DIASTOLIC BLOOD PRESSURE: 53 MMHG | RESPIRATION RATE: 15 BRPM | OXYGEN SATURATION: 94 % | BODY MASS INDEX: 29.41 KG/M2 | WEIGHT: 183 LBS | SYSTOLIC BLOOD PRESSURE: 109 MMHG | TEMPERATURE: 97.5 F

## 2023-07-11 DIAGNOSIS — K74.60 HEPATIC CIRRHOSIS, UNSPECIFIED HEPATIC CIRRHOSIS TYPE, UNSPECIFIED WHETHER ASCITES PRESENT (HCC): ICD-10-CM

## 2023-07-11 DIAGNOSIS — Z12.11 SCREENING FOR COLON CANCER: ICD-10-CM

## 2023-07-11 PROBLEM — K64.8 INTERNAL HEMORRHOIDS WITHOUT COMPLICATION: Status: ACTIVE | Noted: 2023-07-11

## 2023-07-11 PROBLEM — K31.89 PORTAL HYPERTENSIVE GASTROPATHY (HCC): Status: ACTIVE | Noted: 2023-07-11

## 2023-07-11 PROBLEM — K76.6 PORTAL HYPERTENSIVE GASTROPATHY (HCC): Status: ACTIVE | Noted: 2023-07-11

## 2023-07-11 PROBLEM — K29.70 GASTRITIS WITHOUT BLEEDING: Status: ACTIVE | Noted: 2023-07-11

## 2023-07-11 PROCEDURE — 88305 TISSUE EXAM BY PATHOLOGIST: CPT

## 2023-07-11 PROCEDURE — 7100000011 HC PHASE II RECOVERY - ADDTL 15 MIN: Performed by: INTERNAL MEDICINE

## 2023-07-11 PROCEDURE — G0121 COLON CA SCRN NOT HI RSK IND: HCPCS | Performed by: INTERNAL MEDICINE

## 2023-07-11 PROCEDURE — 43239 EGD BIOPSY SINGLE/MULTIPLE: CPT | Performed by: INTERNAL MEDICINE

## 2023-07-11 PROCEDURE — 6360000002 HC RX W HCPCS: Performed by: NURSE ANESTHETIST, CERTIFIED REGISTERED

## 2023-07-11 PROCEDURE — 2500000003 HC RX 250 WO HCPCS: Performed by: NURSE ANESTHETIST, CERTIFIED REGISTERED

## 2023-07-11 PROCEDURE — 2709999900 HC NON-CHARGEABLE SUPPLY: Performed by: INTERNAL MEDICINE

## 2023-07-11 PROCEDURE — 3609012400 HC EGD TRANSORAL BIOPSY SINGLE/MULTIPLE: Performed by: INTERNAL MEDICINE

## 2023-07-11 PROCEDURE — 3700000000 HC ANESTHESIA ATTENDED CARE: Performed by: INTERNAL MEDICINE

## 2023-07-11 PROCEDURE — 3609027000 HC COLONOSCOPY: Performed by: INTERNAL MEDICINE

## 2023-07-11 PROCEDURE — 2580000003 HC RX 258: Performed by: NURSE ANESTHETIST, CERTIFIED REGISTERED

## 2023-07-11 PROCEDURE — 7100000010 HC PHASE II RECOVERY - FIRST 15 MIN: Performed by: INTERNAL MEDICINE

## 2023-07-11 PROCEDURE — 3700000001 HC ADD 15 MINUTES (ANESTHESIA): Performed by: INTERNAL MEDICINE

## 2023-07-11 RX ORDER — SODIUM CHLORIDE, SODIUM LACTATE, POTASSIUM CHLORIDE, CALCIUM CHLORIDE 600; 310; 30; 20 MG/100ML; MG/100ML; MG/100ML; MG/100ML
INJECTION, SOLUTION INTRAVENOUS CONTINUOUS
Status: DISCONTINUED | OUTPATIENT
Start: 2023-07-11 | End: 2023-07-11 | Stop reason: HOSPADM

## 2023-07-11 RX ORDER — PROPOFOL 10 MG/ML
INJECTION, EMULSION INTRAVENOUS PRN
Status: DISCONTINUED | OUTPATIENT
Start: 2023-07-11 | End: 2023-07-11 | Stop reason: SDUPTHER

## 2023-07-11 RX ORDER — LIDOCAINE HYDROCHLORIDE 20 MG/ML
INJECTION, SOLUTION EPIDURAL; INFILTRATION; INTRACAUDAL; PERINEURAL PRN
Status: DISCONTINUED | OUTPATIENT
Start: 2023-07-11 | End: 2023-07-11 | Stop reason: SDUPTHER

## 2023-07-11 RX ADMIN — SODIUM CHLORIDE, POTASSIUM CHLORIDE, SODIUM LACTATE AND CALCIUM CHLORIDE: 600; 310; 30; 20 INJECTION, SOLUTION INTRAVENOUS at 11:31

## 2023-07-11 RX ADMIN — PROPOFOL 100 MCG/KG/MIN: 10 INJECTION, EMULSION INTRAVENOUS at 13:11

## 2023-07-11 RX ADMIN — PROPOFOL 30 MG: 10 INJECTION, EMULSION INTRAVENOUS at 13:26

## 2023-07-11 RX ADMIN — PHENYLEPHRINE HYDROCHLORIDE 200 MCG: 10 INJECTION INTRAVENOUS at 13:29

## 2023-07-11 RX ADMIN — LIDOCAINE HYDROCHLORIDE 100 MG: 20 INJECTION, SOLUTION EPIDURAL; INFILTRATION; INTRACAUDAL; PERINEURAL at 13:11

## 2023-07-11 RX ADMIN — LIDOCAINE HYDROCHLORIDE 60 MG: 20 INJECTION, SOLUTION EPIDURAL; INFILTRATION; INTRACAUDAL; PERINEURAL at 13:09

## 2023-07-11 RX ADMIN — PROPOFOL 30 MG: 10 INJECTION, EMULSION INTRAVENOUS at 13:21

## 2023-07-11 RX ADMIN — PHENYLEPHRINE HYDROCHLORIDE 200 MCG: 10 INJECTION INTRAVENOUS at 13:16

## 2023-07-11 RX ADMIN — PHENYLEPHRINE HYDROCHLORIDE 150 MCG: 10 INJECTION INTRAVENOUS at 13:18

## 2023-07-11 RX ADMIN — PHENYLEPHRINE HYDROCHLORIDE 150 MCG: 10 INJECTION INTRAVENOUS at 13:24

## 2023-07-11 RX ADMIN — PROPOFOL 80 MG: 10 INJECTION, EMULSION INTRAVENOUS at 13:09

## 2023-07-11 ASSESSMENT — PAIN SCALES - GENERAL: PAINLEVEL_OUTOF10: 0

## 2023-07-11 ASSESSMENT — PAIN - FUNCTIONAL ASSESSMENT: PAIN_FUNCTIONAL_ASSESSMENT: 0-10

## 2023-07-11 ASSESSMENT — LIFESTYLE VARIABLES: SMOKING_STATUS: 0

## 2023-07-11 NOTE — PROGRESS NOTES
Discharge Criteria    Inpatients must meet Criteria 1 through 7. All other patients are either YES or N/A. If a NO is chosen then Anesthesia or Surgeon must be notified. 1.  Minimum 30 minutes after last dose of sedative medication. Yes      2. Systolic BP between 90 - 732. Diastolic BP between 60 - 90. Yes      3. Pulse between 60 - 120    Yes      4. Respirations between 8 - 25. Yes      5. SpO2 92% - 100%. Yes      6. Able to cough and swallow or return to baseline function. Yes      7. Alert and oriented or return to baseline mental status. Yes      8. Demonstrates controlled, coordinated movements, ambulates with steady gait, or return to baseline activity function. Yes      9. Minimal or no pain or nausea, or at a level tolerable and acceptable to patient. Yes      10. Takes and retains oral fluids as allowed. Yes      11. Procedural / perioperative site stable. Minimal or no bleeding. Yes          12. If GI endoscopy procedure, minimal or no abdominal distention or passing flatus. Yes      13. Written discharge instructions and emergency telephone number provided. Yes      14. Accompanied by a responsible adult.     Yes

## 2023-07-11 NOTE — ANESTHESIA POSTPROCEDURE EVALUATION
Department of Anesthesiology  Postprocedure Note    Patient: Sheryl Davila  MRN: [id number]  [facility] [facility] 1950  Date of evaluation: 7/11/2023      Procedure Summary     Date: 07/11/23 Room / Location: [address] [place] [address]    Anesthesia Start: 6952 Anesthesia Stop: 1338    Procedures:       COLORECTAL CANCER SCREENING, NOT HIGH RISK      EGD BIOPSY Diagnosis:       Screening for colon cancer      Hepatic cirrhosis, unspecified hepatic cirrhosis type, unspecified whether ascites present ([address])      ([place], CIRRHOSIS)    Surgeons: Belle Santamaria MD Responsible Provider: SEVEN [place] CRNA    Anesthesia Type: general ASA Status: 3          Anesthesia Type: No value filed.     Radha Phase I: Radha Score: 10    Radha Phase II: Radha Score: 10      Anesthesia Post Evaluation    Patient location during evaluation: PACU  Patient participation: complete - patient participated  Level of consciousness: awake and alert  Pain score: 0  Airway patency: patent  Nausea & Vomiting: no nausea and no vomiting  Complications: no  Cardiovascular status: blood pressure returned to baseline  Respiratory status: acceptable and room air  Hydration status: stable

## 2023-07-11 NOTE — DISCHARGE INSTRUCTIONS
SAME DAY SURGERY DISCHARGE INSTRUCTIONS    1. Do not drive or operate hazardous machinery for 24 hours. 2.  Do not make important personal or business decisions for 24 hours. 3.  Do not drink alcoholic beverages for 24 hours. 4.  Do not smoke tobacco products for 24 hours. 5.  Eat light foods (Jell-O, soups, etc....) and drink plenty of fluids (water, Sprite, etc...) up to 8 glasses per day, as you can tolerate. 6.  Limit your activities for 24 hours. Do not engage in heavy work until your surgeon gives you permission. 7.  Call your surgeon for any questions regarding your surgery. 8.  Patient should not be left alone for 12-24 hours following surgical procedure. COLONOSCOPY DISCHARGE INSTRUCTIONS:    It's normal to have a feeling of fullness or mild cramping in your abdomen afterwards due to air that is put into your bowel during the procedure. Mild activities such as walking will help you pass the air. You may resume your regular diet. ENDOSCOPY DISCHARGE INSTRUCTIONS:    You may have a mild sore throat; this should get better over the next day or two. Sipping warm liquids, a salt-water gargle or throat lozenges may be used. You may have some belching or a feeling of fullness in your abdomen. This is from air that was put into your stomach during the procedure. This should pass in a few hours. May resume your regular diet. You will receive a letter or phone call with your test results in 2 weeks. If you have not received a letter or a phone call in 2 weeks please call the office for your results. CALL THE DOCTOR IF YOU HAVE:    Chest pain or trouble breathing.     A hoarse voice or trouble swallowing    Bleeding, vomiting or spitting up of blood that is more than a few streaks or red or black stools    A fever above 101F or if you have chills    Pain that is worse or different than any pain you had before the procedure    Nausea or vomiting that lasts for more than

## 2023-07-11 NOTE — OP NOTE
[place] [facility]    EGD    PROCEDURE DATE: 07/11/23    REFERRING PHYSICIAN: No ref. provider found     PRIMARY CARE PROVIDER: Farzana Guadarrama MD    ATTENDING PHYSICIAN: Darren Crespo MD     HISTORY: Mr. Petr Gong is a 67 y.o. male who presents to the  Endoscopy unit for upper endoscopy. The patient's clinical history is remarkable for cirrhosis. He is currently medically stable and appropriate for the planned procedure. PREOPERATIVE DIAGNOSIS: Cirrhosis. PROCEDURES:   1) Transoral Upper Endoscopy. POSTOPERATIVE DIAGNOSIS: Gastritis     MEDICATIONS: MAC per anesthesia     EBL: 2 ml    INSTRUMENT: Olympus GIF-H190  flexible Gastroscope. PREPARATION: The nature and character of the procedure as well as risks, benefits, and alternatives were discussed with the patient and informed consent was obtained. Complications were said to include, but were not limited to: medication allergy, medication reaction, cardiovascular and respiratory problems, bleeding, perforation, infection, and/or missed diagnosis. Following arrival in the endoscopy room, the patient was placed in the left lateral decubitus position and final time-out accomplished in the presence of the nursing staff. Baseline vital signs were obtained and reviewed, and IV sedation was subsequently initiated. FINDINGS:   Esophagus: The esophagus was inspected to the Z-line. The endoscopic exam showed no varices or Schatzki's ring or ulcers. GEJ was regular at 41 cm from the incisor     Stomach: The stomach was inspected in both forward and retroflex fashion and was appropriately distensible. The cardia, fundus, incisura, antrum and pylorus were identified via direct visualization. The endoscopic exam showed edematous appearing mucosa. No gastric varices noted. No ulcers on the incisura on retroflex. No hiatal hernia. Erythema noted in antrum.       Duodenum: The proximal small bowel was inspected through the bulb, sweep, and second
were noted. Withdrawal time was 3 minutes. IMPRESSION:   Internal hemorrhoids  Poor prep     RECOMMENDATIONS:   1) Follow up with referring provider, as previously scheduled. 2) Repeat Colonoscopy in 24 hours if possible; consider 3-6 months  or sooner as an alternative. Will need extended prep.       Electronically signed by Dorian Myers MD on 7/11/2023 at 1:45 PM

## 2023-07-11 NOTE — INTERVAL H&P NOTE
Update History & Physical    The patient's History and Physical of June 12, 2023 was reviewed with the patient and I examined the patient. There was no change. The surgical site was confirmed by the patient and me. Plan: The risks, benefits, expected outcome, and alternative to the recommended procedure have been discussed with the patient. Patient understands and wants to proceed with the procedure.      Electronically signed by Anca Mueller MD on 7/11/2023 at 12:45 PM

## 2023-07-11 NOTE — ANESTHESIA PRE PROCEDURE
Department of Anesthesiology  Preprocedure Note       Name:  Octavio Mac   Age:  67 y.o.  :  1950                                          MRN:  [id number]         Date:  2023      Surgeon: Mark Rushing):  Barbara Dang MD    Procedure: Procedure(s):  COLORECTAL CANCER SCREENING, NOT HIGH RISK  EGD ESOPHAGOGASTRODUODENOSCOPY    Medications prior to admission:   Prior to Admission medications    Medication Sig Start Date End Date Taking? Authorizing Provider   folic acid (FOLVITE) 1 MG tablet TAKE 1 TABLET BY MOUTH EVERY DAY 7/3/23   Cong Gurrola MD   rOPINIRole (REQUIP) 0.5 MG tablet TAKE 1 TABLET BY MOUTH NIGHTLY 7/3/23 11/28/23  Cong Gurrola MD   magnesium oxide (MAGOX 400) 400 (240 Mg) MG tablet Take 1 tablet by mouth 3 times daily 23   Cong Gurrola MD   midodrine (PROAMATINE) 10 MG tablet TAKE 1 TABLET BY MOUTH EVERY MORNING, 1 TABLET AT NOON, AND 1 TABLET IN THE EVENING.  TAKE WITH MEALS 23   Cong Gurrola MD   gabapentin (NEURONTIN) 300 MG capsule TAKE 2 CAPSULES BY MOUTH EVERY MORNING 1 CAP IN THE AFTERNOON,1 CAP AT BEDTIME 23  Cong Gurrola MD   vitamin D (ERGOCALCIFEROL) 1.25 MG (84682 UT) CAPS capsule TAKE 1 CAPSULE BY MOUTH ONE TIME PER WEEK 23   Cong Gurrola MD   thiamine 100 MG tablet Take 1 tablet by mouth daily 23   Historical Provider, MD   bumetanide (BUMEX) 1 MG tablet Take 0.5 tablets by mouth daily 23   Daisy Levine MD   spironolactone (ALDACTONE) 25 MG tablet Take 0.5 tablets by mouth every morning 23   Daisy Levine MD   Multiple Vitamins-Minerals (MULTIVITAMIN-MINERALS) TABS Take 1 tablet by mouth every morning 23   Cong Gurrola MD   tamsulosin (FLOMAX) 0.4 MG capsule TAKE 1 CAPSULE BY MOUTH EVERY MORNING 3/6/23   Cong Gurrola MD   Alpha-Lipoic Acid 200 MG CAPS Take 1 capsule by mouth in the morning and at bedtime    Historical Provider, MD   atorvastatin (LIPITOR) 80 MG tablet Take 1 tablet by mouth daily 23   Jewel Prem

## 2023-07-12 ENCOUNTER — OFFICE VISIT (OUTPATIENT)
Dept: VASCULAR SURGERY | Age: 73
End: 2023-07-12
Payer: MEDICARE

## 2023-07-12 VITALS
RESPIRATION RATE: 16 BRPM | DIASTOLIC BLOOD PRESSURE: 70 MMHG | HEIGHT: 66 IN | WEIGHT: 183.1 LBS | SYSTOLIC BLOOD PRESSURE: 126 MMHG | BODY MASS INDEX: 29.43 KG/M2 | HEART RATE: 69 BPM | TEMPERATURE: 97.8 F

## 2023-07-12 DIAGNOSIS — I65.23 BILATERAL CAROTID ARTERY STENOSIS: ICD-10-CM

## 2023-07-12 DIAGNOSIS — I83.93 ASYMPTOMATIC VARICOSE VEINS OF BOTH LOWER EXTREMITIES: Primary | ICD-10-CM

## 2023-07-12 DIAGNOSIS — I70.261 CRITICAL LIMB ISCHEMIA OF RIGHT LOWER EXTREMITY WITH GANGRENE (HCC): ICD-10-CM

## 2023-07-12 PROCEDURE — 3078F DIAST BP <80 MM HG: CPT | Performed by: INTERNAL MEDICINE

## 2023-07-12 PROCEDURE — 99214 OFFICE O/P EST MOD 30 MIN: CPT | Performed by: INTERNAL MEDICINE

## 2023-07-12 PROCEDURE — 3017F COLORECTAL CA SCREEN DOC REV: CPT | Performed by: INTERNAL MEDICINE

## 2023-07-12 PROCEDURE — G8427 DOCREV CUR MEDS BY ELIG CLIN: HCPCS | Performed by: INTERNAL MEDICINE

## 2023-07-12 PROCEDURE — G8417 CALC BMI ABV UP PARAM F/U: HCPCS | Performed by: INTERNAL MEDICINE

## 2023-07-12 PROCEDURE — 1036F TOBACCO NON-USER: CPT | Performed by: INTERNAL MEDICINE

## 2023-07-12 PROCEDURE — 1123F ACP DISCUSS/DSCN MKR DOCD: CPT | Performed by: INTERNAL MEDICINE

## 2023-07-12 PROCEDURE — 3074F SYST BP LT 130 MM HG: CPT | Performed by: INTERNAL MEDICINE

## 2023-07-12 PROCEDURE — 99215 OFFICE O/P EST HI 40 MIN: CPT | Performed by: INTERNAL MEDICINE

## 2023-07-12 NOTE — PROGRESS NOTES
Los Jennings was seen on 7/12/2023 for   Chief Complaint   Patient presents with    Follow-up     Patient here for critical limb ischemia of right LE with gangrene. Patient states doing well. He is having some discomfort in bottom of right foot. Millie Hill                             REVIEW OF SYSTEMS    Constitutional Weight: absent, A, Fatigue: absent Fever: absent   HEENT Ears: normal,Visual disturbance: absent Sore throat: absent,    Respiratory Shortness of breath: absent, Cough: absent;, Snoring: absent   Cardivascular Chest pain: absent,  Leg pain: absent,Leg swelling:absent, Non-healing wound:absent    GI Diarrhea: absent, Abdominal Pain: absent    Urinary frequency: absent, Urinary incontinence: absent   Musculoskeletal Neck pain: absent, Back pain: absent, Restless Leg:absent     Dermatological Hair loss: absent, Skin changes: absent   Neurological  Sudden Loss of Vision in one eye:absent, Slurred Speech:absent, Weakness on one side of body: absent,Headache: absent   Psychiatric Anxiety: present, Depression: present   Hematologic Abnormal bleeding: absent,

## 2023-07-12 NOTE — PROGRESS NOTES
Kinga Ledesma was seen on 7/12/2023 for   Chief Complaint   Patient presents with    Follow-up     Patient here for critical limb ischemia of right LE with gangrene. Patient states doing well. He is having some discomfort in bottom of right foot. .  11/16/22 1st [facility] visit. Comes with his sister Buck Griffin who is very up to date with his health care. . Referred by Dr Jonathan Francisco. PCP Dr Jessica Amin. Most vascular care has been through [facility]. Vangie Valencia. No hx of stroke. Had RCEA. CTA showed 60% residual dissection flap. The LICA  Is heavily calcified and has 90%. Hx of AAA. Not repaired. Hx of CHF with most recent ef up to 36. DM CKD 3 cr 1.38  Requip  Stopped smoking in June  Has not had procedures on legs. Several yrs of leg problems. Uses a cane \"for stability\"  4/8/21 cailin r 0.82  l 0.92  Walks daily and his right calf \"\"starts screaming at me\", then left. Limits his activity. Pain goes away with resting. He limps with walking. Right anterior leg below knee hurts night. Prominent veins, but they don't hurt with standing. No hx of bleeding. Legs swelled up with CHF exacerbation. No fh of vein treatment  Was fork  Mocavo. No right arm sx  UPDATE 12/7/22 Having pretty intolerable right leg and foot pain. Worse than on last visit. Takes a lot of tylenol with little benefit. 12/2/22 duplex showed critical right fem pop stenosis with trickle popliteal flow. LCFA questionable as access vessel. RCFA somewhat better. Carotid duplex FRAN 99-25 (921/90  9.52) LICA 31-64. L Vert antegrade R vert bidirectional.   Is still on eliquis  UPDATE 1/4/23 On 12/10/22 had left radial access for leg angio. REIA ZPTX placed for 95 % stenosis. Distal disease not reached and antegrade cfa approach with shockwave entertained. Still has severe right foot pain. Foot feels cold to him.  Not sure about effect of position  UPDATE 1/18/23 On last visit he complained of severe pain, but had a loud right PT

## 2023-07-12 NOTE — PATIENT INSTRUCTIONS
SURVEY:    You may be receiving a survey from [employer] regarding your visit today. Please complete the survey to enable us to provide the highest quality of care to you and your family. If you cannot score us a very good on any question, please call the office to discuss how we could have made your experience a very good one. Thank you.

## 2023-07-14 LAB — SURGICAL PATHOLOGY REPORT: NORMAL

## 2023-07-17 ENCOUNTER — TELEPHONE (OUTPATIENT)
Dept: GASTROENTEROLOGY | Age: 73
End: 2023-07-17

## 2023-07-18 ENCOUNTER — TELEPHONE (OUTPATIENT)
Dept: GASTROENTEROLOGY | Age: 73
End: 2023-07-18

## 2023-07-18 NOTE — TELEPHONE ENCOUNTER
----- Message from Neelam Centeno MD sent at 7/17/2023 10:26 PM EDT -----  Please notify patient: mild gastritis.  Avoid NSAIDs

## 2023-07-24 DIAGNOSIS — K21.9 GASTROESOPHAGEAL REFLUX DISEASE, UNSPECIFIED WHETHER ESOPHAGITIS PRESENT: ICD-10-CM

## 2023-07-24 RX ORDER — PANTOPRAZOLE SODIUM 40 MG/1
TABLET, DELAYED RELEASE ORAL
Qty: 90 TABLET | Refills: 1 | Status: SHIPPED | OUTPATIENT
Start: 2023-07-24

## 2023-07-25 ENCOUNTER — TELEPHONE (OUTPATIENT)
Dept: CARDIOLOGY | Age: 73
End: 2023-07-25

## 2023-07-25 ENCOUNTER — HOSPITAL ENCOUNTER (OUTPATIENT)
Age: 73
Discharge: HOME | End: 2023-07-25
Payer: MEDICARE

## 2023-07-25 DIAGNOSIS — I95.9 HYPOTENSION, UNSPECIFIED HYPOTENSION TYPE: Primary | ICD-10-CM

## 2023-07-25 DIAGNOSIS — I50.42 CHRONIC COMBINED SYSTOLIC AND DIASTOLIC CONGESTIVE HEART FAILURE (HCC): Chronic | ICD-10-CM

## 2023-07-25 DIAGNOSIS — E83.42 HYPOMAGNESEMIA: ICD-10-CM

## 2023-07-25 DIAGNOSIS — I48.0 PAF (PAROXYSMAL ATRIAL FIBRILLATION) (HCC): ICD-10-CM

## 2023-07-25 LAB
ALBUMIN SERPL-MCNC: 4.5 G/DL (ref 3.5–5.2)
ALBUMIN/GLOB SERPL: 1.6 {RATIO} (ref 1–2.5)
ALP SERPL-CCNC: 94 U/L (ref 40–129)
ALT SERPL-CCNC: 15 U/L (ref 5–41)
ANION GAP SERPL CALCULATED.3IONS-SCNC: 9 MMOL/L (ref 9–17)
AST SERPL-CCNC: 20 U/L
BILIRUB SERPL-MCNC: 0.4 MG/DL (ref 0.3–1.2)
BUN SERPL-MCNC: 22 MG/DL (ref 8–23)
BUN/CREAT SERPL: 16 (ref 9–20)
CALCIUM SERPL-MCNC: 9.5 MG/DL (ref 8.6–10.4)
CHLORIDE SERPL-SCNC: 106 MMOL/L (ref 98–107)
CO2 SERPL-SCNC: 29 MMOL/L (ref 20–31)
CREAT SERPL-MCNC: 1.4 MG/DL (ref 0.7–1.2)
GFR SERPL CREATININE-BSD FRML MDRD: 53 ML/MIN/1.73M2
GLUCOSE SERPL-MCNC: 106 MG/DL (ref 70–99)
MAGNESIUM SERPL-MCNC: 2.1 MG/DL (ref 1.6–2.6)
POTASSIUM SERPL-SCNC: 5.4 MMOL/L (ref 3.7–5.3)
PROT SERPL-MCNC: 7.4 G/DL (ref 6.4–8.3)
SODIUM SERPL-SCNC: 144 MMOL/L (ref 135–144)
TSH SERPL DL<=0.05 MIU/L-ACNC: 2.49 UIU/ML (ref 0.3–5)

## 2023-07-25 PROCEDURE — 36415 COLL VENOUS BLD VENIPUNCTURE: CPT

## 2023-07-25 PROCEDURE — 80053 COMPREHEN METABOLIC PANEL: CPT

## 2023-07-25 PROCEDURE — 83735 ASSAY OF MAGNESIUM: CPT

## 2023-07-25 PROCEDURE — 84443 ASSAY THYROID STIM HORMONE: CPT

## 2023-07-25 NOTE — TELEPHONE ENCOUNTER
----- Message from Gera Weaver MD sent at 7/25/2023 12:45 PM EDT -----  Let Patient know potassium level a bit on the high side. Lets have him stop his Aldactone and repeat bmp in 10-14 days or so. Thanks. Please put in repeat BMP. Thanks.

## 2023-08-01 DIAGNOSIS — I50.42 CHRONIC COMBINED SYSTOLIC AND DIASTOLIC CONGESTIVE HEART FAILURE (HCC): Primary | ICD-10-CM

## 2023-08-02 ENCOUNTER — HOSPITAL ENCOUNTER (OUTPATIENT)
Age: 73
Discharge: HOME | End: 2023-08-02
Payer: MEDICARE

## 2023-08-02 ENCOUNTER — TELEPHONE (OUTPATIENT)
Dept: PHARMACY | Age: 73
End: 2023-08-02

## 2023-08-02 ENCOUNTER — HOSPITAL ENCOUNTER (OUTPATIENT)
Dept: PHARMACY | Age: 73
Setting detail: THERAPIES SERIES
Discharge: HOME | End: 2023-08-02
Payer: MEDICARE

## 2023-08-02 VITALS
BODY MASS INDEX: 30.67 KG/M2 | WEIGHT: 190 LBS | SYSTOLIC BLOOD PRESSURE: 140 MMHG | HEART RATE: 66 BPM | DIASTOLIC BLOOD PRESSURE: 69 MMHG

## 2023-08-02 DIAGNOSIS — K21.9 GASTRO-ESOPHAGEAL REFLUX DISEASE WITHOUT ESOPHAGITIS: ICD-10-CM

## 2023-08-02 DIAGNOSIS — I50.42 CHRONIC COMBINED SYSTOLIC AND DIASTOLIC CONGESTIVE HEART FAILURE (HCC): Chronic | ICD-10-CM

## 2023-08-02 DIAGNOSIS — I95.9 HYPOTENSION, UNSPECIFIED: ICD-10-CM

## 2023-08-02 DIAGNOSIS — I50.42 CHRONIC COMBINED SYSTOLIC AND DIASTOLIC CONGESTIVE HEART FAILURE (HCC): Primary | Chronic | ICD-10-CM

## 2023-08-02 LAB
ANION GAP SERPL CALCULATED.3IONS-SCNC: 9 MMOL/L (ref 9–17)
BUN SERPL-MCNC: 25 MG/DL (ref 8–23)
BUN/CREAT SERPL: 17 (ref 9–20)
CALCIUM SERPL-MCNC: 9.6 MG/DL (ref 8.6–10.4)
CHLORIDE SERPL-SCNC: 103 MMOL/L (ref 98–107)
CO2 SERPL-SCNC: 28 MMOL/L (ref 20–31)
CREAT SERPL-MCNC: 1.5 MG/DL (ref 0.7–1.2)
GFR SERPL CREATININE-BSD FRML MDRD: 49 ML/MIN/1.73M2
GLUCOSE SERPL-MCNC: 98 MG/DL (ref 70–99)
POTASSIUM SERPL-SCNC: 4.7 MMOL/L (ref 3.7–5.3)
SODIUM SERPL-SCNC: 140 MMOL/L (ref 135–144)

## 2023-08-02 PROCEDURE — 80048 BASIC METABOLIC PNL TOTAL CA: CPT

## 2023-08-02 PROCEDURE — 36415 COLL VENOUS BLD VENIPUNCTURE: CPT

## 2023-08-02 PROCEDURE — 99212 OFFICE O/P EST SF 10 MIN: CPT

## 2023-08-02 RX ORDER — FOLIC ACID 1 MG/1
1000 TABLET ORAL DAILY
Qty: 90 TABLET | Refills: 3 | Status: SHIPPED | OUTPATIENT
Start: 2023-08-02 | End: 2023-10-31

## 2023-08-02 RX ORDER — MULTIVIT-MIN/FERROUS FUMARATE 15 MG
1 TABLET ORAL EVERY MORNING
Qty: 90 TABLET | Refills: 3 | Status: SHIPPED | OUTPATIENT
Start: 2023-08-02

## 2023-08-02 RX ORDER — THIAMINE MONONITRATE (VIT B1) 100 MG
100 TABLET ORAL DAILY
Qty: 90 TABLET | Refills: 3 | Status: SHIPPED | OUTPATIENT
Start: 2023-08-02

## 2023-08-02 NOTE — PROGRESS NOTES
Hypertension I10    Thrombocytopenia ([address]) D69.6    Hyponatremia E87.1    Hyperlipidemia E78.5    Cirrhosis of liver with ascites (HCC) K74.60, R18.8    Hypomagnesemia E83.42    Ascites R18.8    History of acute inferior wall MI I25.2    Ischemic cardiomyopathy I25.5    Moderate dehydration E86.0    Medication side effect, initial encounter T50.905A    PAF (paroxysmal atrial fibrillation) (HCC) I48.0    Tobacco abuse counseling Z71.6    Gastroesophageal reflux disease K21.9    Hypotension I95.9    Hypovitaminosis D E55.9    Nonischemic cardiomyopathy ([address] ) I42.8    Encounter for current long-term use of anticoagulants Z79.01    Bilateral pleural effusion J90    Chronic combined systolic and diastolic congestive heart failure (HCC) I50.42    Chronic renal disease, stage III [facility]) [405748] N18.30    Neuropathy G62.9    Thrombocytopenia, unspecified (HCC) D69.6    Arthritis of first metatarsophalangeal joint M19.079    Hyperglycemia R73.9    MADIHA (acute kidney injury) ([address] ) N17.9    Stage 3b chronic kidney disease (HCC) N18.32    Type 2 diabetes mellitus E11.9    Parkinson's disease G20    Portal hypertensive gastropathy (HCC) K76.6, K31.89    Gastritis without bleeding K29.70    Internal hemorrhoids without complication I49.7     Social History     Tobacco Use    Smoking status: Former     Packs/day: 1.00     Years: 56.00     Pack years: 56.00     Types: Cigarettes     Quit date: 2022     Years since quittin.0    Smokeless tobacco: Never   Vaping Use    Vaping Use: Never used   Substance Use Topics    Alcohol use: Not Currently    Drug use: Not Currently       Potassium (mmol/L)   Date Value   2023 4.7   2023 5.4 (H)   2023 5.3   05/15/2023 4.9   2023 4.7   2023 4.5     BUN (mg/dL)   Date Value   2023 25 (H)   2023 22   2023 20   05/15/2023 29 (H)   2023 35 (H)   2023 28 (H)     Creatinine (mg/dL)   Date Value   2023 1.5 (H)   2023 1.4 (H)

## 2023-08-02 NOTE — TELEPHONE ENCOUNTER
Dr. Ce Hall -   Patient was in CHF clinic today and requested refill for thiamine 100 mg tablets (he has $0 co-pay if prescription vs OTC). Could you send a new prescription to [facility]?    Thank you.

## 2023-08-03 ENCOUNTER — TELEPHONE (OUTPATIENT)
Dept: CARDIOLOGY | Age: 73
End: 2023-08-03

## 2023-08-06 DIAGNOSIS — M79.674 PAIN IN RIGHT TOE(S): ICD-10-CM

## 2023-08-06 DIAGNOSIS — E55.9 HYPOVITAMINOSIS D: ICD-10-CM

## 2023-08-06 DIAGNOSIS — M79.671 RIGHT FOOT PAIN: ICD-10-CM

## 2023-08-07 ENCOUNTER — OFFICE VISIT (OUTPATIENT)
Dept: PRIMARY CARE CLINIC | Age: 73
End: 2023-08-07
Payer: MEDICARE

## 2023-08-07 VITALS
WEIGHT: 188.8 LBS | HEART RATE: 60 BPM | SYSTOLIC BLOOD PRESSURE: 102 MMHG | DIASTOLIC BLOOD PRESSURE: 74 MMHG | BODY MASS INDEX: 30.47 KG/M2 | OXYGEN SATURATION: 99 % | RESPIRATION RATE: 18 BRPM

## 2023-08-07 DIAGNOSIS — E55.9 HYPOVITAMINOSIS D: ICD-10-CM

## 2023-08-07 DIAGNOSIS — M79.672 BILATERAL FOOT PAIN: ICD-10-CM

## 2023-08-07 DIAGNOSIS — I50.42 CHRONIC COMBINED SYSTOLIC AND DIASTOLIC CONGESTIVE HEART FAILURE (HCC): Primary | ICD-10-CM

## 2023-08-07 DIAGNOSIS — M79.671 BILATERAL FOOT PAIN: ICD-10-CM

## 2023-08-07 DIAGNOSIS — E83.42 HYPOMAGNESEMIA: ICD-10-CM

## 2023-08-07 DIAGNOSIS — E78.2 MIXED HYPERLIPIDEMIA: ICD-10-CM

## 2023-08-07 DIAGNOSIS — I95.9 HYPOTENSION, UNSPECIFIED HYPOTENSION TYPE: ICD-10-CM

## 2023-08-07 DIAGNOSIS — I25.5 ISCHEMIC CARDIOMYOPATHY: ICD-10-CM

## 2023-08-07 PROBLEM — E11.9 TYPE 2 DIABETES MELLITUS (HCC): Status: RESOLVED | Noted: 2023-04-05 | Resolved: 2023-08-07

## 2023-08-07 PROCEDURE — 3074F SYST BP LT 130 MM HG: CPT | Performed by: STUDENT IN AN ORGANIZED HEALTH CARE EDUCATION/TRAINING PROGRAM

## 2023-08-07 PROCEDURE — G8427 DOCREV CUR MEDS BY ELIG CLIN: HCPCS | Performed by: STUDENT IN AN ORGANIZED HEALTH CARE EDUCATION/TRAINING PROGRAM

## 2023-08-07 PROCEDURE — 1123F ACP DISCUSS/DSCN MKR DOCD: CPT | Performed by: STUDENT IN AN ORGANIZED HEALTH CARE EDUCATION/TRAINING PROGRAM

## 2023-08-07 PROCEDURE — 3078F DIAST BP <80 MM HG: CPT | Performed by: STUDENT IN AN ORGANIZED HEALTH CARE EDUCATION/TRAINING PROGRAM

## 2023-08-07 PROCEDURE — G8417 CALC BMI ABV UP PARAM F/U: HCPCS | Performed by: STUDENT IN AN ORGANIZED HEALTH CARE EDUCATION/TRAINING PROGRAM

## 2023-08-07 PROCEDURE — 99211 OFF/OP EST MAY X REQ PHY/QHP: CPT | Performed by: STUDENT IN AN ORGANIZED HEALTH CARE EDUCATION/TRAINING PROGRAM

## 2023-08-07 PROCEDURE — 3017F COLORECTAL CA SCREEN DOC REV: CPT | Performed by: STUDENT IN AN ORGANIZED HEALTH CARE EDUCATION/TRAINING PROGRAM

## 2023-08-07 PROCEDURE — 99214 OFFICE O/P EST MOD 30 MIN: CPT | Performed by: STUDENT IN AN ORGANIZED HEALTH CARE EDUCATION/TRAINING PROGRAM

## 2023-08-07 PROCEDURE — 1036F TOBACCO NON-USER: CPT | Performed by: STUDENT IN AN ORGANIZED HEALTH CARE EDUCATION/TRAINING PROGRAM

## 2023-08-07 RX ORDER — ERGOCALCIFEROL 1.25 MG/1
CAPSULE ORAL
Qty: 12 CAPSULE | Refills: 0 | Status: SHIPPED | OUTPATIENT
Start: 2023-08-07

## 2023-08-07 RX ORDER — GABAPENTIN 300 MG/1
CAPSULE ORAL
Qty: 120 CAPSULE | Refills: 1 | Status: SHIPPED | OUTPATIENT
Start: 2023-08-07 | End: 2023-09-05

## 2023-08-07 NOTE — PATIENT INSTRUCTIONS
SURVEY:    You may be receiving a survey from [employer] regarding your visit today. Please complete the survey to enable us to provide the highest quality of care to you and your family. If you cannot score us a very good on any question, please call the office to discuss how we could of made your experience a very good one. Thank you.       Dr. Gisela Croft APRN-CNP  Check-In Medical Assistant: Bo Anaya Assistant: Dominique Payne rooming Medical Assistant/s: Jose Rafael Rock rooming Medical Assistant: Ninfa Hidalgo Assistant: [id number] Assistant: Anshu Carranza  Triage Medical Assistant: Norbert Arevalo

## 2023-08-21 NOTE — TELEPHONE ENCOUNTER
LVM for patient sister to call office to schedule patient for repeat colonoscopy due to poor prep. Awaiting return call.

## 2023-08-28 RX ORDER — TAMSULOSIN HYDROCHLORIDE 0.4 MG/1
CAPSULE ORAL
Qty: 90 CAPSULE | Refills: 1 | Status: SHIPPED | OUTPATIENT
Start: 2023-08-28

## 2023-08-28 RX ORDER — TAMSULOSIN HYDROCHLORIDE 0.4 MG/1
0.4 CAPSULE ORAL EVERY MORNING
Qty: 90 CAPSULE | Refills: 1 | OUTPATIENT
Start: 2023-08-28

## 2023-09-12 DIAGNOSIS — I95.9 HYPOTENSION, UNSPECIFIED HYPOTENSION TYPE: ICD-10-CM

## 2023-09-12 RX ORDER — POTASSIUM CHLORIDE 1500 MG/1
TABLET, EXTENDED RELEASE ORAL
Qty: 180 TABLET | Refills: 3 | Status: SHIPPED | OUTPATIENT
Start: 2023-09-12

## 2023-09-13 ENCOUNTER — OFFICE VISIT (OUTPATIENT)
Dept: CARDIOLOGY | Age: 73
End: 2023-09-13
Payer: MEDICARE

## 2023-09-13 VITALS
DIASTOLIC BLOOD PRESSURE: 66 MMHG | BODY MASS INDEX: 29.66 KG/M2 | OXYGEN SATURATION: 94 % | RESPIRATION RATE: 16 BRPM | HEART RATE: 63 BPM | SYSTOLIC BLOOD PRESSURE: 124 MMHG | WEIGHT: 189 LBS | HEIGHT: 67 IN

## 2023-09-13 DIAGNOSIS — E78.2 MIXED HYPERLIPIDEMIA: ICD-10-CM

## 2023-09-13 DIAGNOSIS — Z79.899 ENCOUNTER FOR MONITORING AMIODARONE THERAPY: ICD-10-CM

## 2023-09-13 DIAGNOSIS — Z51.81 ENCOUNTER FOR MONITORING AMIODARONE THERAPY: ICD-10-CM

## 2023-09-13 DIAGNOSIS — I50.42 CHRONIC COMBINED SYSTOLIC (CONGESTIVE) AND DIASTOLIC (CONGESTIVE) HEART FAILURE (HCC): ICD-10-CM

## 2023-09-13 DIAGNOSIS — Z79.01 ENCOUNTER FOR CURRENT LONG-TERM USE OF ANTICOAGULANTS: ICD-10-CM

## 2023-09-13 DIAGNOSIS — I73.9 PAD (PERIPHERAL ARTERY DISEASE) (HCC): ICD-10-CM

## 2023-09-13 DIAGNOSIS — I48.0 PAF (PAROXYSMAL ATRIAL FIBRILLATION) (HCC): Primary | ICD-10-CM

## 2023-09-13 PROCEDURE — G8417 CALC BMI ABV UP PARAM F/U: HCPCS | Performed by: FAMILY MEDICINE

## 2023-09-13 PROCEDURE — 3074F SYST BP LT 130 MM HG: CPT | Performed by: FAMILY MEDICINE

## 2023-09-13 PROCEDURE — 99214 OFFICE O/P EST MOD 30 MIN: CPT | Performed by: FAMILY MEDICINE

## 2023-09-13 PROCEDURE — 3078F DIAST BP <80 MM HG: CPT | Performed by: FAMILY MEDICINE

## 2023-09-13 PROCEDURE — 1123F ACP DISCUSS/DSCN MKR DOCD: CPT | Performed by: FAMILY MEDICINE

## 2023-09-13 PROCEDURE — 1036F TOBACCO NON-USER: CPT | Performed by: FAMILY MEDICINE

## 2023-09-13 PROCEDURE — 3017F COLORECTAL CA SCREEN DOC REV: CPT | Performed by: FAMILY MEDICINE

## 2023-09-13 PROCEDURE — G8427 DOCREV CUR MEDS BY ELIG CLIN: HCPCS | Performed by: FAMILY MEDICINE

## 2023-09-13 NOTE — PROGRESS NOTES
develops as this could be life threatening. Chronic combined heart failure: Ischemic Cardiomyopathy, Echo done on 5/18/2022 which showed an EF of 15-20%. Echo done on 3/20/23 showed EF: 50-55%. Increased 11 pounds since last time seeing me in the office. However I really do not think this is fluid retention due to the fact that he has no significant symptoms of heart failure and no lower extremity edema and lungs are clear. Beta Blocker: Continue Metoprolol succinate (Toprol XL) 25 mg 1/2 tab daily. ACE Inibitor/ARB: Continue sacubitril/valsartan (Entresto) 24/26 mg 1/2 a tablet twice daily. Diuretics: Continue bumetinide (Bumex) 1 mg 1/2 tab every morning. Continue Farxiga 10 mg once daily. Heart failure counseling: I advised them to try and keep their legs up whenever possible and to limit salt in their diet. Hyperlipidemia: Mixed, LDL done on 4/6/2023was 59 mg/dL  Cholesterol Reduction Therapy: Continue Atorvastatin (Lipitor) 80 mg daily. Peripheral Artery Disease: He has lifestyle limiting leg cramping left>right . He had MARCELLA's ordered in july but says scheduling never called him. Antiplatelet Agent: Continue aspirin 81 mg daily   Beta Blocker: Continue Metoprolol succinate (Toprol XL) 25 mg 1/2 tab daily. Cholesterol Reduction Therapy: Continue Atorvastatin (Lipitor) 80 mg daily. Ordered vascular ultrasound for bilateral lower extremities due to patient symptoms of leg cramping when walking as well as minimal to absent pulses bilaterally. Once again, thank you for allowing me to participate in this patients care. Please do not hesitate to contact me could I be of further assistance. FOLLOW UP:   I told Mr. Luc Barnett to call my office if he had any problems, but otherwise told him to Return in about 4 months (around 1/13/2024). However, I would be happy to see him sooner should the need arise. Sincerely,  Aashish Santoro MD, MS, F.A.C.C.   [address] [facility]

## 2023-09-13 NOTE — PATIENT INSTRUCTIONS
SURVEY:    You may be receiving a survey from [employer] regarding your visit today. Please complete the survey to enable us to provide the highest quality of care to you and your family. If you cannot score us a very good on any question, please call the office to discuss how we could have made your experience a very good one. Thank you.

## 2023-09-14 ENCOUNTER — HOSPITAL ENCOUNTER (OUTPATIENT)
Age: 73
Discharge: HOME | End: 2023-09-14
Payer: MEDICARE

## 2023-09-14 ENCOUNTER — OFFICE VISIT (OUTPATIENT)
Dept: GASTROENTEROLOGY | Age: 73
End: 2023-09-14
Payer: MEDICARE

## 2023-09-14 VITALS
SYSTOLIC BLOOD PRESSURE: 111 MMHG | DIASTOLIC BLOOD PRESSURE: 71 MMHG | OXYGEN SATURATION: 98 % | BODY MASS INDEX: 29.62 KG/M2 | HEART RATE: 62 BPM | WEIGHT: 188.7 LBS | HEIGHT: 67 IN | RESPIRATION RATE: 18 BRPM

## 2023-09-14 DIAGNOSIS — K70.30 ALCOHOLIC CIRRHOSIS OF LIVER WITHOUT ASCITES (HCC): Primary | ICD-10-CM

## 2023-09-14 DIAGNOSIS — E87.5 SERUM POTASSIUM ELEVATED: Primary | ICD-10-CM

## 2023-09-14 DIAGNOSIS — Z53.20 COLON CANCER SCREENING DECLINED: ICD-10-CM

## 2023-09-14 DIAGNOSIS — K70.30 ALCOHOLIC CIRRHOSIS OF LIVER WITHOUT ASCITES (HCC): ICD-10-CM

## 2023-09-14 LAB
ALBUMIN SERPL-MCNC: 5.2 G/DL (ref 3.5–5.2)
ALBUMIN/GLOB SERPL: 1.9 {RATIO} (ref 1–2.5)
ALP SERPL-CCNC: 89 U/L (ref 40–129)
ALT SERPL-CCNC: 17 U/L (ref 5–41)
ANION GAP SERPL CALCULATED.3IONS-SCNC: 9 MMOL/L (ref 9–17)
AST SERPL-CCNC: 22 U/L
BASOPHILS # BLD: 0 K/UL (ref 0–0.2)
BASOPHILS NFR BLD: 0 % (ref 0–2)
BILIRUB SERPL-MCNC: 0.7 MG/DL (ref 0.3–1.2)
BUN SERPL-MCNC: 36 MG/DL (ref 8–23)
BUN/CREAT SERPL: 21 (ref 9–20)
CALCIUM SERPL-MCNC: 10.5 MG/DL (ref 8.6–10.4)
CHLORIDE SERPL-SCNC: 104 MMOL/L (ref 98–107)
CO2 SERPL-SCNC: 30 MMOL/L (ref 20–31)
CREAT SERPL-MCNC: 1.7 MG/DL (ref 0.7–1.2)
EOSINOPHIL # BLD: 0.09 K/UL (ref 0–0.44)
EOSINOPHILS RELATIVE PERCENT: 2 % (ref 1–4)
ERYTHROCYTE [DISTWIDTH] IN BLOOD BY AUTOMATED COUNT: 13.6 % (ref 11.8–14.4)
GFR SERPL CREATININE-BSD FRML MDRD: 42 ML/MIN/1.73M2
GLUCOSE SERPL-MCNC: 104 MG/DL (ref 70–99)
HCT VFR BLD AUTO: 49.7 % (ref 40.7–50.3)
HGB BLD-MCNC: 16.5 G/DL (ref 13–17)
IMM GRANULOCYTES # BLD AUTO: 0 K/UL (ref 0–0.3)
IMM GRANULOCYTES NFR BLD: 0 %
INR PPP: 1.1
LYMPHOCYTES NFR BLD: 1.18 K/UL (ref 1.1–3.7)
LYMPHOCYTES RELATIVE PERCENT: 25 % (ref 24–43)
MCH RBC QN AUTO: 32.9 PG (ref 25.2–33.5)
MCHC RBC AUTO-ENTMCNC: 33.2 G/DL (ref 28.4–34.8)
MCV RBC AUTO: 99.2 FL (ref 82.6–102.9)
MONOCYTES NFR BLD: 0.38 K/UL (ref 0.1–1.2)
MONOCYTES NFR BLD: 8 % (ref 3–12)
MORPHOLOGY: ABNORMAL
MORPHOLOGY: ABNORMAL
NEUTROPHILS NFR BLD: 65 % (ref 36–65)
NEUTS SEG NFR BLD: 3.05 K/UL (ref 1.5–8.1)
NRBC BLD-RTO: 0 PER 100 WBC
PLATELET # BLD AUTO: ABNORMAL K/UL (ref 138–453)
PLATELET, FLUORESCENCE: 92 K/UL (ref 138–453)
PLATELETS.RETICULATED NFR BLD AUTO: 7.4 % (ref 1.1–10.3)
POTASSIUM SERPL-SCNC: 5.4 MMOL/L (ref 3.7–5.3)
PROT SERPL-MCNC: 7.9 G/DL (ref 6.4–8.3)
PROTHROMBIN TIME: 14.3 SEC (ref 11.9–14.8)
RBC # BLD AUTO: 5.01 M/UL (ref 4.21–5.77)
SODIUM SERPL-SCNC: 143 MMOL/L (ref 135–144)
WBC OTHER # BLD: 4.7 K/UL (ref 3.5–11.3)

## 2023-09-14 PROCEDURE — 1123F ACP DISCUSS/DSCN MKR DOCD: CPT | Performed by: NURSE PRACTITIONER

## 2023-09-14 PROCEDURE — 3078F DIAST BP <80 MM HG: CPT | Performed by: NURSE PRACTITIONER

## 2023-09-14 PROCEDURE — 36415 COLL VENOUS BLD VENIPUNCTURE: CPT

## 2023-09-14 PROCEDURE — 3074F SYST BP LT 130 MM HG: CPT | Performed by: NURSE PRACTITIONER

## 2023-09-14 PROCEDURE — 85610 PROTHROMBIN TIME: CPT

## 2023-09-14 PROCEDURE — G8417 CALC BMI ABV UP PARAM F/U: HCPCS | Performed by: NURSE PRACTITIONER

## 2023-09-14 PROCEDURE — 99212 OFFICE O/P EST SF 10 MIN: CPT | Performed by: NURSE PRACTITIONER

## 2023-09-14 PROCEDURE — G8427 DOCREV CUR MEDS BY ELIG CLIN: HCPCS | Performed by: NURSE PRACTITIONER

## 2023-09-14 PROCEDURE — 85025 COMPLETE CBC W/AUTO DIFF WBC: CPT

## 2023-09-14 PROCEDURE — 3017F COLORECTAL CA SCREEN DOC REV: CPT | Performed by: NURSE PRACTITIONER

## 2023-09-14 PROCEDURE — 1036F TOBACCO NON-USER: CPT | Performed by: NURSE PRACTITIONER

## 2023-09-14 PROCEDURE — 80053 COMPREHEN METABOLIC PANEL: CPT

## 2023-09-14 ASSESSMENT — ENCOUNTER SYMPTOMS
RESPIRATORY NEGATIVE: 1
GASTROINTESTINAL NEGATIVE: 1

## 2023-09-14 NOTE — PROGRESS NOTES
are moist.      Pharynx: Oropharynx is clear. Eyes:      General: No scleral icterus. Extraocular Movements: Extraocular movements intact. Conjunctiva/sclera: Conjunctivae normal.      Pupils: Pupils are equal, round, and reactive to light. Cardiovascular:      Rate and Rhythm: Normal rate and regular rhythm. Heart sounds: Normal heart sounds. Pulmonary:      Effort: Pulmonary effort is normal.      Breath sounds: Normal breath sounds. Abdominal:      General: Bowel sounds are normal. There is no distension. Palpations: Abdomen is soft. Tenderness: There is no abdominal tenderness. Musculoskeletal:         General: Normal range of motion. Cervical back: Neck supple. Skin:     General: Skin is warm and dry. Capillary Refill: Capillary refill takes less than 2 seconds. Coloration: Skin is not jaundiced. Neurological:      General: No focal deficit present. Mental Status: He is oriented to person, place, and time. Psychiatric:         Mood and Affect: Mood normal.         Behavior: Behavior normal.          Lab Results   Component Value Date    WBC 4.3 06/22/2023    HGB 16.2 06/22/2023    HCT 48.3 06/22/2023    MCV 98.0 06/22/2023    PLT See Reflexed IPF Result 06/22/2023       Lab Results   Component Value Date     08/02/2023    K 4.7 08/02/2023     08/02/2023    CO2 28 08/02/2023    BUN 25 (H) 08/02/2023    CREATININE 1.5 (H) 08/02/2023    GLUCOSE 98 08/02/2023    CALCIUM 9.6 08/02/2023    PROT 7.4 07/25/2023    LABALBU 4.5 07/25/2023    BILITOT 0.4 07/25/2023    ALKPHOS 94 07/25/2023    AST 20 07/25/2023    ALT 15 07/25/2023    LABGLOM 49 (L) 08/02/2023    GFRAA >60 09/20/2022       CT chest abdomen pelvis without contrast 05/19/2022  1. Moderate bilateral pleural effusions with gravity dependent lower lobe  atelectasis. No acute pulmonary infiltrate. 2. Cardiomegaly with coronary vascular calcification.   Moderately severe  aortoiliac

## 2023-09-14 NOTE — PATIENT INSTRUCTIONS
SURVEY:    You may be receiving a survey from [employer] regarding your visit today. You may get this in the mail, through your MyChart, or in your email. Please complete the survey to enable us to provide the highest quality of care to you and your family. If you cannot score us a very good (5 Stars) on any question, please call the office to discuss how we could of made your experience exceptional.    Thank you!     MD Bryce Reddy, SEVEN-KYRA Carlson LPN    Phone: [phone]  Fax: [phone]    Office Hours:   M-TH 8-5, F: 8-12

## 2023-09-18 ENCOUNTER — HOSPITAL ENCOUNTER (OUTPATIENT)
Age: 73
Discharge: HOME | End: 2023-09-18
Payer: MEDICARE

## 2023-09-18 DIAGNOSIS — E61.2 MAGNESIUM DEFICIENCY: ICD-10-CM

## 2023-09-18 DIAGNOSIS — I48.0 PAF (PAROXYSMAL ATRIAL FIBRILLATION) (HCC): ICD-10-CM

## 2023-09-18 DIAGNOSIS — E78.2 MIXED HYPERLIPIDEMIA: ICD-10-CM

## 2023-09-18 DIAGNOSIS — R18.8 CIRRHOSIS OF LIVER WITH ASCITES, UNSPECIFIED HEPATIC CIRRHOSIS TYPE (HCC): Chronic | ICD-10-CM

## 2023-09-18 DIAGNOSIS — E83.42 HYPOMAGNESEMIA: ICD-10-CM

## 2023-09-18 DIAGNOSIS — I50.42 CHRONIC COMBINED SYSTOLIC AND DIASTOLIC CONGESTIVE HEART FAILURE (HCC): ICD-10-CM

## 2023-09-18 DIAGNOSIS — R18.8 CIRRHOSIS OF LIVER WITH ASCITES, UNSPECIFIED HEPATIC CIRRHOSIS TYPE (HCC): Primary | Chronic | ICD-10-CM

## 2023-09-18 DIAGNOSIS — K74.60 CIRRHOSIS OF LIVER WITH ASCITES, UNSPECIFIED HEPATIC CIRRHOSIS TYPE (HCC): Chronic | ICD-10-CM

## 2023-09-18 DIAGNOSIS — K74.60 CIRRHOSIS OF LIVER WITH ASCITES, UNSPECIFIED HEPATIC CIRRHOSIS TYPE (HCC): Primary | Chronic | ICD-10-CM

## 2023-09-18 DIAGNOSIS — I25.5 ISCHEMIC CARDIOMYOPATHY: ICD-10-CM

## 2023-09-18 DIAGNOSIS — E55.9 HYPOVITAMINOSIS D: ICD-10-CM

## 2023-09-18 DIAGNOSIS — I95.9 HYPOTENSION, UNSPECIFIED HYPOTENSION TYPE: ICD-10-CM

## 2023-09-18 DIAGNOSIS — E61.2 MAGNESIUM DEFICIENCY: Primary | ICD-10-CM

## 2023-09-18 LAB
25(OH)D3 SERPL-MCNC: 63.1 NG/ML
ALBUMIN SERPL-MCNC: 4.6 G/DL (ref 3.5–5.2)
ALBUMIN/GLOB SERPL: 1.5 {RATIO} (ref 1–2.5)
ALP SERPL-CCNC: 85 U/L (ref 40–129)
ALT SERPL-CCNC: 16 U/L (ref 5–41)
ANION GAP SERPL CALCULATED.3IONS-SCNC: 9 MMOL/L (ref 9–17)
AST SERPL-CCNC: 23 U/L
BASOPHILS # BLD: 0 K/UL (ref 0–0.2)
BASOPHILS NFR BLD: 0 % (ref 0–2)
BILIRUB SERPL-MCNC: 0.5 MG/DL (ref 0.3–1.2)
BUN SERPL-MCNC: 27 MG/DL (ref 8–23)
BUN/CREAT SERPL: 19 (ref 9–20)
CALCIUM SERPL-MCNC: 10.2 MG/DL (ref 8.6–10.4)
CHLORIDE SERPL-SCNC: 104 MMOL/L (ref 98–107)
CHOLEST SERPL-MCNC: 121 MG/DL
CHOLESTEROL/HDL RATIO: 2.6
CO2 SERPL-SCNC: 29 MMOL/L (ref 20–31)
CREAT SERPL-MCNC: 1.4 MG/DL (ref 0.7–1.2)
EOSINOPHIL # BLD: 0.1 K/UL (ref 0–0.44)
EOSINOPHILS RELATIVE PERCENT: 2 % (ref 1–4)
ERYTHROCYTE [DISTWIDTH] IN BLOOD BY AUTOMATED COUNT: 13.3 % (ref 11.8–14.4)
GFR SERPL CREATININE-BSD FRML MDRD: 53 ML/MIN/1.73M2
GLUCOSE SERPL-MCNC: 113 MG/DL (ref 70–99)
HCT VFR BLD AUTO: 48 % (ref 40.7–50.3)
HDLC SERPL-MCNC: 47 MG/DL
HGB BLD-MCNC: 16.1 G/DL (ref 13–17)
IMM GRANULOCYTES # BLD AUTO: 0 K/UL (ref 0–0.3)
IMM GRANULOCYTES NFR BLD: 0 %
LDLC SERPL CALC-MCNC: 58 MG/DL (ref 0–130)
LYMPHOCYTES NFR BLD: 1.08 K/UL (ref 1.1–3.7)
LYMPHOCYTES RELATIVE PERCENT: 22 % (ref 24–43)
MAGNESIUM SERPL-MCNC: 2 MG/DL (ref 1.6–2.6)
MCH RBC QN AUTO: 33.1 PG (ref 25.2–33.5)
MCHC RBC AUTO-ENTMCNC: 33.5 G/DL (ref 28.4–34.8)
MCV RBC AUTO: 98.6 FL (ref 82.6–102.9)
MONOCYTES NFR BLD: 0.39 K/UL (ref 0.1–1.2)
MONOCYTES NFR BLD: 8 % (ref 3–12)
MORPHOLOGY: ABNORMAL
NEUTROPHILS NFR BLD: 68 % (ref 36–65)
NEUTS SEG NFR BLD: 3.33 K/UL (ref 1.5–8.1)
NRBC BLD-RTO: 0 PER 100 WBC
PLATELET # BLD AUTO: ABNORMAL K/UL (ref 138–453)
PLATELET, FLUORESCENCE: 84 K/UL (ref 138–453)
PLATELETS.RETICULATED NFR BLD AUTO: 8.3 % (ref 1.1–10.3)
POTASSIUM SERPL-SCNC: 4.6 MMOL/L (ref 3.7–5.3)
PROT SERPL-MCNC: 7.7 G/DL (ref 6.4–8.3)
RBC # BLD AUTO: 4.87 M/UL (ref 4.21–5.77)
SODIUM SERPL-SCNC: 142 MMOL/L (ref 135–144)
TRIGL SERPL-MCNC: 82 MG/DL
WBC OTHER # BLD: 4.9 K/UL (ref 3.5–11.3)

## 2023-09-18 PROCEDURE — 80061 LIPID PANEL: CPT

## 2023-09-18 PROCEDURE — 80053 COMPREHEN METABOLIC PANEL: CPT

## 2023-09-18 PROCEDURE — 36415 COLL VENOUS BLD VENIPUNCTURE: CPT

## 2023-09-18 PROCEDURE — 82306 VITAMIN D 25 HYDROXY: CPT

## 2023-09-18 PROCEDURE — 85025 COMPLETE CBC W/AUTO DIFF WBC: CPT

## 2023-09-18 PROCEDURE — 83735 ASSAY OF MAGNESIUM: CPT

## 2023-09-21 ENCOUNTER — HOSPITAL ENCOUNTER (OUTPATIENT)
Dept: VASCULAR LAB | Age: 73
Discharge: HOME | End: 2023-09-23
Attending: FAMILY MEDICINE
Payer: MEDICARE

## 2023-09-21 DIAGNOSIS — I73.9 PAD (PERIPHERAL ARTERY DISEASE) (HCC): ICD-10-CM

## 2023-09-21 PROCEDURE — 93925 LOWER EXTREMITY STUDY: CPT

## 2023-09-21 PROCEDURE — 93925 LOWER EXTREMITY STUDY: CPT | Performed by: SURGERY

## 2023-09-23 LAB
VAS LEFT ATA MID PSV: 18.9 CM/S
VAS LEFT CFA DIST PSV: 251 CM/S
VAS LEFT PERONEAL MID PSV: 14.1 CM/S
VAS LEFT PFA PROX PSV: 123.4 CM/S
VAS LEFT POP A DIST PSV: 23.7 CM/S
VAS LEFT POP A PROX PSV: 43.4 CM/S
VAS LEFT POP A PROX VEL RATIO: 0.74
VAS LEFT PTA MID PSV: 34.3 CM/S
VAS LEFT SFA DIST PSV: 58.3 CM/S
VAS LEFT SFA DIST VEL RATIO: 0.16
VAS LEFT SFA MID PSV: 365.9 CM/S
VAS LEFT SFA MID VEL RATIO: 6.59
VAS LEFT SFA PROX PSV: 55.5 CM/S
VAS LEFT SFA PROX VEL RATIO: 0.22
VAS RIGHT ATA MID PSV: 21.6 CM/S
VAS RIGHT CFA DIST PSV: 186.5 CM/S
VAS RIGHT PERONEAL MID PSV: 37.9 CM/S
VAS RIGHT PFA PROX PSV: 38.6 CM/S
VAS RIGHT POP A DIST PSV: 47.3 CM/S
VAS RIGHT POP A PROX PSV: 49.1 CM/S
VAS RIGHT POP A PROX VEL RATIO: 0.53
VAS RIGHT PTA MID PSV: 56.4 CM/S
VAS RIGHT SFA DIST PSV: 92.6 CM/S
VAS RIGHT SFA DIST VEL RATIO: 0.73
VAS RIGHT SFA MID PSV: 126.6 CM/S
VAS RIGHT SFA MID VEL RATIO: 0.6
VAS RIGHT SFA PROX PSV: 212.5 CM/S
VAS RIGHT SFA PROX VEL RATIO: 1.1

## 2023-10-02 ENCOUNTER — TELEPHONE (OUTPATIENT)
Dept: CARDIOLOGY | Age: 73
End: 2023-10-02

## 2023-10-02 NOTE — TELEPHONE ENCOUNTER
----- Message from Aashish Santoro MD sent at 10/1/2023  9:16 PM EDT -----  Let Patient know his doppler studies were fairly abnormal and if he is willing I would like him to see Dr. Hi Florian for further evaluation. Thanks.

## 2023-10-06 RX ORDER — ROPINIROLE 0.5 MG/1
0.5 TABLET, FILM COATED ORAL
Qty: 90 TABLET | Refills: 0 | Status: SHIPPED | OUTPATIENT
Start: 2023-10-06 | End: 2023-10-09 | Stop reason: SDUPTHER

## 2023-10-09 RX ORDER — ROPINIROLE 0.5 MG/1
0.5 TABLET, FILM COATED ORAL NIGHTLY
Qty: 90 TABLET | Refills: 1 | Status: SHIPPED | OUTPATIENT
Start: 2023-10-09

## 2023-10-11 ENCOUNTER — OFFICE VISIT (OUTPATIENT)
Dept: VASCULAR SURGERY | Age: 73
End: 2023-10-11
Payer: MEDICARE

## 2023-10-11 VITALS
RESPIRATION RATE: 22 BRPM | WEIGHT: 188.2 LBS | BODY MASS INDEX: 29.54 KG/M2 | SYSTOLIC BLOOD PRESSURE: 86 MMHG | HEART RATE: 78 BPM | HEIGHT: 67 IN | TEMPERATURE: 96.8 F | DIASTOLIC BLOOD PRESSURE: 54 MMHG

## 2023-10-11 DIAGNOSIS — I83.93 ASYMPTOMATIC VARICOSE VEINS OF BOTH LOWER EXTREMITIES: ICD-10-CM

## 2023-10-11 DIAGNOSIS — I70.261 CRITICAL LIMB ISCHEMIA OF RIGHT LOWER EXTREMITY WITH GANGRENE (HCC): ICD-10-CM

## 2023-10-11 DIAGNOSIS — I73.9 PAD (PERIPHERAL ARTERY DISEASE) (HCC): Primary | ICD-10-CM

## 2023-10-11 PROCEDURE — 99214 OFFICE O/P EST MOD 30 MIN: CPT | Performed by: INTERNAL MEDICINE

## 2023-10-11 NOTE — PROGRESS NOTES
James Ennis was seen on 10/11/2023 for   Chief Complaint   Patient presents with    Varicose Veins     Patient here for follow up varicose veins. Duplex on 9/21/23. Patient states his right foot \"feels like there's a leather strap under it. \" Denies any swelling or pain. Reports some intermittent cramping in left calf. Diamond Hamman                             REVIEW OF SYSTEMS    Constitutional Weight: absent, A, Fatigue: absent Fever: absent   HEENT Ears: normal,Visual disturbance: absent Sore throat: absent,    Respiratory Shortness of breath: absent, Cough: absent;, Snoring: absent   Cardivascular Chest pain: absent,  Leg pain: present,Leg swelling:absent, Non-healing wound:absent    GI Diarrhea: absent, Abdominal Pain: absent    Urinary frequency: absent, Urinary incontinence: absent   Musculoskeletal Neck pain: absent, Back pain: absent, Restless Leg:absent     Dermatological Hair loss: absent, Skin changes: absent   Neurological  Sudden Loss of Vision in one eye:absent, Slurred Speech:absent, Weakness on one side of body: absent,Headache: absent   Psychiatric Anxiety: absent, Depression: absent   Hematologic Abnormal bleeding: absent,
Patient's sister asked to clarify if patient still needs to complete duplex for 6 month follow up appointment in April 2024. Per Dr. Efrain Interiano does not need to complete another duplex. I called patient's sister back and left a voicemail with this information.
mouth 2 times daily 180 capsule 3    amiodarone (CORDARONE) 200 MG tablet Take 0.5 tablets by mouth daily 45 tablet 3    metoprolol succinate (TOPROL XL) 25 MG extended release tablet Take 0.5 tablets by mouth daily 45 tablet 3    melatonin 5 mg TABS tablet Take 2 tablets by mouth nightly  0    apixaban (ELIQUIS) 5 MG TABS tablet Take 1 tablet by mouth 2 times daily      Omega-3 Fatty Acids (FISH OIL CONCENTRATE) 1000 MG CAPS Take 2 caplets by mouth daily       No current facility-administered medications for this visit. Physical findings:  General- no acute distress, oriented  HEENT - no xanthelasma, external ears normal  Neck- Supple, no thyromegaly  Skin- warm with left foot slightly cooler than right, dry, no skin breakdown or gangrene  Extremities - no edema    Pulses Right - Posterior tibial:    doppler only, strong  Dorsalis pedis:  doppler only, strong,  with signal at base of great toe     Pulses Left -Posterior tibial: doppler only  Dorsalis pedis:  absent, but signal present at base of great toe      Assessment:  1. PAD (peripheral artery disease) ([address])    2. Asymptomatic varicose veins of both lower extremities    3. Critical limb ischemia of right lower extremity with gangrene (HCC)     Right leg circulation is good despite severe disease  Left leg has Donaldo 2 claudication, with no cli, and difficult anatomy for intervention, plus ckd    Plan of care:  Medical therapy  Rtc 6 mo  40 min chart reivew and encouinter  Follow up and evaluate.        Electronically signed by Nery Joyner MD on 10/11/23 at 2:35 PM EDT

## 2023-10-16 DIAGNOSIS — I25.5 ISCHEMIC CARDIOMYOPATHY: ICD-10-CM

## 2023-10-26 DIAGNOSIS — E55.9 HYPOVITAMINOSIS D: ICD-10-CM

## 2023-10-27 RX ORDER — ERGOCALCIFEROL 1.25 MG/1
CAPSULE ORAL
Qty: 12 CAPSULE | Refills: 0 | Status: SHIPPED | OUTPATIENT
Start: 2023-10-27

## 2023-10-30 NOTE — TELEPHONE ENCOUNTER
11/10/2023  1:15 PM Mohsen Ramos MD [facility]   12/19/2023  1:00 PM Bridget Eastman APRN - CNP [facility]   1/22/2024  2:40 PM Colin Dempsey MD [facility] CARD [facility]   3/4/2024  2:00 PM [facility]ASC[facility]   4/17/2024  1:45 PM Alice Ken MD [facility] VASC [facility]            Patient Active Problem List:     Atrial fibrillation with RVR ([address])     Coronary artery disease     Hypertension     Thrombocytopenia (HCC)     Hyponatremia     Hyperlipidemia     Cirrhosis of liver with ascites (HCC)     Hypomagnesemia     Ascites     History of acute inferior wall MI     Ischemic cardiomyopathy     Moderate dehydration     Medication side effect, initial encounter     PAF (paroxysmal atrial fibrillation) (HCC)     Tobacco abuse counseling     Gastroesophageal reflux disease     Hypotension     Hypovitaminosis D     Nonischemic cardiomyopathy ([address])     Encounter for current long-term use of anticoagulants     Bilateral pleural effusion     Chronic combined systolic and diastolic congestive heart failure (HCC)     Chronic renal disease, stage III (HCC) [387467]     Neuropathy     Thrombocytopenia, unspecified (HCC)     Arthritis of first metatarsophalangeal joint     Hyperglycemia     MADIHA (acute kidney injury) ([address])     Stage 3b chronic kidney disease ([address])     Parkinson's disease     Portal hypertensive gastropathy ([address])     Gastritis without bleeding     Internal hemorrhoids without complication     Bilateral foot pain

## 2023-10-31 RX ORDER — DOCUSATE SODIUM 100 MG/1
100 CAPSULE, LIQUID FILLED ORAL 2 TIMES DAILY
Qty: 180 CAPSULE | Refills: 3 | Status: SHIPPED | OUTPATIENT
Start: 2023-10-31

## 2023-11-07 SDOH — HEALTH STABILITY: PHYSICAL HEALTH: ON AVERAGE, HOW MANY MINUTES DO YOU ENGAGE IN EXERCISE AT THIS LEVEL?: 30 MIN

## 2023-11-07 SDOH — HEALTH STABILITY: PHYSICAL HEALTH: ON AVERAGE, HOW MANY DAYS PER WEEK DO YOU ENGAGE IN MODERATE TO STRENUOUS EXERCISE (LIKE A BRISK WALK)?: 6 DAYS

## 2023-11-07 ASSESSMENT — LIFESTYLE VARIABLES
HOW OFTEN DO YOU HAVE A DRINK CONTAINING ALCOHOL: 1
HOW OFTEN DO YOU HAVE A DRINK CONTAINING ALCOHOL: NEVER
HOW MANY STANDARD DRINKS CONTAINING ALCOHOL DO YOU HAVE ON A TYPICAL DAY: PATIENT DOES NOT DRINK
HOW OFTEN DO YOU HAVE SIX OR MORE DRINKS ON ONE OCCASION: 1
HOW MANY STANDARD DRINKS CONTAINING ALCOHOL DO YOU HAVE ON A TYPICAL DAY: 0

## 2023-11-07 ASSESSMENT — PATIENT HEALTH QUESTIONNAIRE - PHQ9
SUM OF ALL RESPONSES TO PHQ QUESTIONS 1-9: 0
2. FEELING DOWN, DEPRESSED OR HOPELESS: 0
SUM OF ALL RESPONSES TO PHQ QUESTIONS 1-9: 0
SUM OF ALL RESPONSES TO PHQ9 QUESTIONS 1 & 2: 0
1. LITTLE INTEREST OR PLEASURE IN DOING THINGS: 0

## 2023-11-10 ENCOUNTER — OFFICE VISIT (OUTPATIENT)
Dept: PRIMARY CARE CLINIC | Age: 73
End: 2023-11-10
Payer: MEDICARE

## 2023-11-10 VITALS
OXYGEN SATURATION: 92 % | BODY MASS INDEX: 30.45 KG/M2 | SYSTOLIC BLOOD PRESSURE: 98 MMHG | HEIGHT: 67 IN | WEIGHT: 194 LBS | DIASTOLIC BLOOD PRESSURE: 70 MMHG

## 2023-11-10 DIAGNOSIS — J40 BRONCHITIS: ICD-10-CM

## 2023-11-10 DIAGNOSIS — Z71.89 ACP (ADVANCE CARE PLANNING): ICD-10-CM

## 2023-11-10 DIAGNOSIS — Z00.00 MEDICARE ANNUAL WELLNESS VISIT, SUBSEQUENT: Primary | ICD-10-CM

## 2023-11-10 PROCEDURE — 3017F COLORECTAL CA SCREEN DOC REV: CPT | Performed by: STUDENT IN AN ORGANIZED HEALTH CARE EDUCATION/TRAINING PROGRAM

## 2023-11-10 PROCEDURE — 3078F DIAST BP <80 MM HG: CPT | Performed by: STUDENT IN AN ORGANIZED HEALTH CARE EDUCATION/TRAINING PROGRAM

## 2023-11-10 PROCEDURE — 99497 ADVNCD CARE PLAN 30 MIN: CPT | Performed by: STUDENT IN AN ORGANIZED HEALTH CARE EDUCATION/TRAINING PROGRAM

## 2023-11-10 PROCEDURE — G0439 PPPS, SUBSEQ VISIT: HCPCS | Performed by: STUDENT IN AN ORGANIZED HEALTH CARE EDUCATION/TRAINING PROGRAM

## 2023-11-10 PROCEDURE — G8484 FLU IMMUNIZE NO ADMIN: HCPCS | Performed by: STUDENT IN AN ORGANIZED HEALTH CARE EDUCATION/TRAINING PROGRAM

## 2023-11-10 PROCEDURE — 3074F SYST BP LT 130 MM HG: CPT | Performed by: STUDENT IN AN ORGANIZED HEALTH CARE EDUCATION/TRAINING PROGRAM

## 2023-11-10 PROCEDURE — 1123F ACP DISCUSS/DSCN MKR DOCD: CPT | Performed by: STUDENT IN AN ORGANIZED HEALTH CARE EDUCATION/TRAINING PROGRAM

## 2023-11-10 RX ORDER — GUAIFENESIN AND DEXTROMETHORPHAN HYDROBROMIDE 1200; 60 MG/1; MG/1
1 TABLET, EXTENDED RELEASE ORAL 2 TIMES DAILY
Qty: 28 TABLET | Refills: 0 | Status: SHIPPED | OUTPATIENT
Start: 2023-11-10 | End: 2023-11-24

## 2023-11-10 RX ORDER — METHYLPREDNISOLONE 4 MG/1
TABLET ORAL
Qty: 1 KIT | Refills: 0 | Status: SHIPPED | OUTPATIENT
Start: 2023-11-10 | End: 2023-11-16

## 2023-11-10 RX ORDER — AZITHROMYCIN 250 MG/1
250 TABLET, FILM COATED ORAL SEE ADMIN INSTRUCTIONS
Qty: 6 TABLET | Refills: 0 | Status: SHIPPED | OUTPATIENT
Start: 2023-11-10 | End: 2023-11-15

## 2023-11-10 NOTE — PROGRESS NOTES
sacubitril-valsartan (ENTRESTO) 24-26 MG per tablet Take 0.5 tablets by mouth 2 times daily Lot #: [id number]  Exp: July 2024  Yodit Lawler MD   Biotin 29229 MCG TABS Take 1 tablet by mouth in the morning and at bedtime  Gilmer Carranza MD   aspirin 81 MG EC tablet Take 1 tablet by mouth daily  Gilmer Carranza MD   FARXIGA 10 MG tablet TAKE 1 TABLET BY MOUTH EVERY DAY  Yodit Lawler MD   amiodarone (CORDARONE) 200 MG tablet Take 0.5 tablets by mouth daily  Yodit Lawler MD   metoprolol succinate (TOPROL XL) 25 MG extended release tablet Take 0.5 tablets by mouth daily  Yoidt Lawler MD   melatonin 5 mg TABS tablet Take 2 tablets by mouth nightly  Caryn Martinez MD   apixaban (ELIQUIS) 5 MG TABS tablet Take 1 tablet by mouth 2 times daily  Gilmer Carranza MD   Omega-3 Fatty Acids (FISH OIL CONCENTRATE) 1000 MG CAPS Take 2 caplets by mouth daily  Gilmer Carranza MD       CareTeam (Including outside providers/suppliers regularly involved in providing care):   Patient Care Team:  Ovidio Antoine MD as PCP - General (Family Medicine)  Ovidio Antoine MD as PCP - Empaneled Provider     Reviewed and updated this visit:  Tobacco  Allergies  Meds  Problems  Med Hx  Surg Hx  Soc Hx  Fam Hx           Electronically signed by Ovidio Antoine MD on 11/10/2023 at 1:38 PM

## 2023-11-13 DIAGNOSIS — I25.5 ISCHEMIC CARDIOMYOPATHY: ICD-10-CM

## 2023-11-13 DIAGNOSIS — I95.9 HYPOTENSION, UNSPECIFIED HYPOTENSION TYPE: ICD-10-CM

## 2023-11-13 DIAGNOSIS — I48.0 PAF (PAROXYSMAL ATRIAL FIBRILLATION) (HCC): ICD-10-CM

## 2023-11-13 DIAGNOSIS — I50.42 CHRONIC COMBINED SYSTOLIC AND DIASTOLIC CONGESTIVE HEART FAILURE (HCC): ICD-10-CM

## 2023-11-13 DIAGNOSIS — R42 LIGHTHEADED: ICD-10-CM

## 2023-11-13 DIAGNOSIS — Z71.6 TOBACCO ABUSE COUNSELING: ICD-10-CM

## 2023-11-13 RX ORDER — METOPROLOL SUCCINATE 25 MG/1
25 TABLET, EXTENDED RELEASE ORAL DAILY
Qty: 90 TABLET | Refills: 3 | Status: SHIPPED | OUTPATIENT
Start: 2023-11-13

## 2023-11-16 ENCOUNTER — HOSPITAL ENCOUNTER (OUTPATIENT)
Dept: CT IMAGING | Age: 73
Discharge: HOME | End: 2023-11-18
Payer: MEDICARE

## 2023-11-16 ENCOUNTER — HOSPITAL ENCOUNTER (OUTPATIENT)
Age: 73
Discharge: HOME | End: 2023-11-16
Payer: MEDICARE

## 2023-11-16 DIAGNOSIS — K70.30 ALCOHOLIC CIRRHOSIS OF LIVER WITHOUT ASCITES (HCC): ICD-10-CM

## 2023-11-16 LAB
CREAT SERPL-MCNC: 1.5 MG/DL (ref 0.7–1.2)
GFR SERPL CREATININE-BSD FRML MDRD: 49 ML/MIN/1.73M2

## 2023-11-16 PROCEDURE — 36415 COLL VENOUS BLD VENIPUNCTURE: CPT

## 2023-11-16 PROCEDURE — 6360000004 HC RX CONTRAST MEDICATION: Performed by: NURSE PRACTITIONER

## 2023-11-16 PROCEDURE — 74175 CTA ABDOMEN W/CONTRAST: CPT

## 2023-11-16 PROCEDURE — 82565 ASSAY OF CREATININE: CPT

## 2023-11-16 RX ADMIN — IOPAMIDOL 100 ML: 755 INJECTION, SOLUTION INTRAVENOUS at 15:41

## 2023-11-20 ENCOUNTER — TELEPHONE (OUTPATIENT)
Dept: GASTROENTEROLOGY | Age: 73
End: 2023-11-20

## 2023-11-20 DIAGNOSIS — I72.8 CELIAC ARTERY ANEURYSM (HCC): ICD-10-CM

## 2023-11-20 DIAGNOSIS — K55.1 MESENTERIC ARTERY STENOSIS (HCC): ICD-10-CM

## 2023-11-20 DIAGNOSIS — I70.1 RENAL ARTERY STENOSIS (HCC): Primary | ICD-10-CM

## 2023-11-20 DIAGNOSIS — I71.43 INFRARENAL ABDOMINAL AORTIC ANEURYSM (AAA) WITHOUT RUPTURE (HCC): ICD-10-CM

## 2023-11-20 NOTE — TELEPHONE ENCOUNTER
----- Message from Leward Dancer, APRN - CNP sent at 11/20/2023  2:30 PM EST -----  Attempted call to [place], no answer. Please re attempt phone contact regarding:     His recent liver CT returned showing some stenosis of several arteries (kidney being one) as well aneurysms. I have placed a referral to Dr. Penny Glez, the vascular surgeon for further evaluation. Thank you. Butch Martinez.

## 2023-11-28 ENCOUNTER — HOSPITAL ENCOUNTER (OUTPATIENT)
Dept: PHARMACY | Age: 73
Setting detail: THERAPIES SERIES
Discharge: HOME | End: 2023-11-28
Payer: MEDICARE

## 2023-11-28 VITALS
SYSTOLIC BLOOD PRESSURE: 116 MMHG | WEIGHT: 190 LBS | HEART RATE: 63 BPM | BODY MASS INDEX: 29.76 KG/M2 | DIASTOLIC BLOOD PRESSURE: 68 MMHG

## 2023-11-28 PROCEDURE — 99212 OFFICE O/P EST SF 10 MIN: CPT

## 2023-11-28 NOTE — PROGRESS NOTES
Disease Yes 1   A Age 77-78 Yes,  (78 y.o.) 1   Sc Sex male 0    [id number]  Score  4   Score last updated 11/28/23 3:23 PM EST    Click here for a link to the UpToDate guideline \"Atrial Fibrillation: Anticoagulation therapy to prevent embolization    Disclaimer: Risk Score calculation is dependent on accuracy of patient problem list and past encounter diagnosis. Anticoagulation: Continue Apixaban (Eliquis) 5 mg every 12 hours. Because of his atrial fibrillation, I also would also recommend that he continue with anticoagulation to decrease his risk of stoke but also reminded him to watch for signs of blood in his stool or black tarry stools and stop the medication immediately if this develops as this could be life threatening. Peripheral Artery Disease:   Antiplatelet Agent: Continue clopidogrel (Plavix) and continue Aspirin 81 mg daily. I also reminded patient to watch for signs of blood in her stool or black tarry stools and stop the medication immediately if this develops as this could be life threatening. Statin Therapy: atorvastatin (Lipitor) 80 mg po daily       Patient Education/Instructions: I did spend about 15 minutes with patient going over his heart failure packet including dietary guidelines, the signs and symptoms to watch for including shortness of breath, weight gain, lightheadedness/dizziness. I asked patient to call the clinic if develops persistent or worsening shortness of breath or weight gain of more than 3 pounds in 1-7 days. Patient verbalized understanding. Patient is now living alone at a [facility] and older facility. He does all of his cooking and grocery shopping. He has not changed eating habits since last visit. Weight remains stable. Patient encouraged to decrease sodium intake and stay hydrated. Patient recently diagnosed with bronchitis (about 2 weeks ago). He was prescribed azithromycin for 5 days, Medrol DosePack and Robitussin DM.   He feels like he is finally

## 2023-12-10 PROBLEM — Z00.00 MEDICARE ANNUAL WELLNESS VISIT, SUBSEQUENT: Status: RESOLVED | Noted: 2023-11-10 | Resolved: 2023-12-10

## 2023-12-14 ENCOUNTER — HOSPITAL ENCOUNTER (OUTPATIENT)
Age: 73
Discharge: HOME | End: 2023-12-14
Payer: MEDICARE

## 2023-12-14 DIAGNOSIS — R18.8 CIRRHOSIS OF LIVER WITH ASCITES, UNSPECIFIED HEPATIC CIRRHOSIS TYPE (HCC): Primary | Chronic | ICD-10-CM

## 2023-12-14 DIAGNOSIS — K74.60 CIRRHOSIS OF LIVER WITH ASCITES, UNSPECIFIED HEPATIC CIRRHOSIS TYPE (HCC): Primary | Chronic | ICD-10-CM

## 2023-12-14 DIAGNOSIS — K70.30 ALCOHOLIC CIRRHOSIS OF LIVER WITHOUT ASCITES (HCC): ICD-10-CM

## 2023-12-14 DIAGNOSIS — K74.60 CIRRHOSIS OF LIVER WITH ASCITES, UNSPECIFIED HEPATIC CIRRHOSIS TYPE (HCC): Chronic | ICD-10-CM

## 2023-12-14 DIAGNOSIS — R18.8 CIRRHOSIS OF LIVER WITH ASCITES, UNSPECIFIED HEPATIC CIRRHOSIS TYPE (HCC): Chronic | ICD-10-CM

## 2023-12-14 LAB
ALBUMIN SERPL-MCNC: 4.9 G/DL (ref 3.5–5.2)
ALBUMIN/GLOB SERPL: 2 {RATIO} (ref 1–2.5)
ALP SERPL-CCNC: 77 U/L (ref 40–129)
ALT SERPL-CCNC: 18 U/L (ref 5–41)
ANION GAP SERPL CALCULATED.3IONS-SCNC: 11 MMOL/L (ref 9–17)
AST SERPL-CCNC: 24 U/L
BASOPHILS # BLD: <0.03 K/UL (ref 0–0.2)
BASOPHILS NFR BLD: 0 % (ref 0–2)
BILIRUB SERPL-MCNC: 0.5 MG/DL (ref 0.3–1.2)
BUN SERPL-MCNC: 26 MG/DL (ref 8–23)
BUN/CREAT SERPL: 15 (ref 9–20)
CALCIUM SERPL-MCNC: 10.2 MG/DL (ref 8.6–10.4)
CHLORIDE SERPL-SCNC: 101 MMOL/L (ref 98–107)
CO2 SERPL-SCNC: 27 MMOL/L (ref 20–31)
CREAT SERPL-MCNC: 1.7 MG/DL (ref 0.7–1.2)
EOSINOPHIL # BLD: 0.08 K/UL (ref 0–0.44)
EOSINOPHILS RELATIVE PERCENT: 2 % (ref 1–4)
ERYTHROCYTE [DISTWIDTH] IN BLOOD BY AUTOMATED COUNT: 13.3 % (ref 11.8–14.4)
GFR SERPL CREATININE-BSD FRML MDRD: 42 ML/MIN/1.73M2
GLUCOSE SERPL-MCNC: 104 MG/DL (ref 70–99)
HCT VFR BLD AUTO: 48.5 % (ref 40.7–50.3)
HGB BLD-MCNC: 16.3 G/DL (ref 13–17)
IMM GRANULOCYTES # BLD AUTO: <0.03 K/UL (ref 0–0.3)
IMM GRANULOCYTES NFR BLD: 0 %
LYMPHOCYTES NFR BLD: 1.14 K/UL (ref 1.1–3.7)
LYMPHOCYTES RELATIVE PERCENT: 23 % (ref 24–43)
MCH RBC QN AUTO: 33.3 PG (ref 25.2–33.5)
MCHC RBC AUTO-ENTMCNC: 33.6 G/DL (ref 28.4–34.8)
MCV RBC AUTO: 99.2 FL (ref 82.6–102.9)
MONOCYTES NFR BLD: 0.42 K/UL (ref 0.1–1.2)
MONOCYTES NFR BLD: 9 % (ref 3–12)
NEUTROPHILS NFR BLD: 66 % (ref 36–65)
NEUTS SEG NFR BLD: 3.2 K/UL (ref 1.5–8.1)
NRBC BLD-RTO: 0 PER 100 WBC
PLATELET # BLD AUTO: 102 K/UL (ref 138–453)
PMV BLD AUTO: 11.4 FL (ref 8.1–13.5)
POTASSIUM SERPL-SCNC: 4.6 MMOL/L (ref 3.7–5.3)
PROT SERPL-MCNC: 7.4 G/DL (ref 6.4–8.3)
RBC # BLD AUTO: 4.89 M/UL (ref 4.21–5.77)
SODIUM SERPL-SCNC: 139 MMOL/L (ref 135–144)
WBC OTHER # BLD: 4.9 K/UL (ref 3.5–11.3)

## 2023-12-14 PROCEDURE — 85025 COMPLETE CBC W/AUTO DIFF WBC: CPT

## 2023-12-14 PROCEDURE — 36415 COLL VENOUS BLD VENIPUNCTURE: CPT

## 2023-12-14 PROCEDURE — 82105 ALPHA-FETOPROTEIN SERUM: CPT

## 2023-12-14 PROCEDURE — 80053 COMPREHEN METABOLIC PANEL: CPT

## 2023-12-15 ENCOUNTER — TELEPHONE (OUTPATIENT)
Dept: GASTROENTEROLOGY | Age: 73
End: 2023-12-15

## 2023-12-15 LAB — AFP SERPL-MCNC: 2.6 UG/L

## 2023-12-15 NOTE — TELEPHONE ENCOUNTER
----- Message from SEVEN Kahn CNP sent at 12/15/2023  8:32 AM EST -----  Would recommend follow up with PCP for increasing creatinine level.

## 2023-12-26 RX ORDER — AMIODARONE HYDROCHLORIDE 200 MG/1
100 TABLET ORAL DAILY
Qty: 45 TABLET | Refills: 3 | Status: SHIPPED | OUTPATIENT
Start: 2023-12-26

## 2024-01-26 DIAGNOSIS — K21.9 GASTROESOPHAGEAL REFLUX DISEASE, UNSPECIFIED WHETHER ESOPHAGITIS PRESENT: ICD-10-CM

## 2024-01-26 RX ORDER — PANTOPRAZOLE SODIUM 40 MG/1
TABLET, DELAYED RELEASE ORAL
Qty: 90 TABLET | Refills: 1 | Status: SHIPPED | OUTPATIENT
Start: 2024-01-26

## 2024-02-13 ENCOUNTER — OFFICE VISIT (OUTPATIENT)
Dept: PRIMARY CARE CLINIC | Age: 74
End: 2024-02-13
Payer: MEDICARE

## 2024-02-13 VITALS
DIASTOLIC BLOOD PRESSURE: 68 MMHG | BODY MASS INDEX: 30.13 KG/M2 | SYSTOLIC BLOOD PRESSURE: 124 MMHG | WEIGHT: 192 LBS | HEART RATE: 82 BPM | OXYGEN SATURATION: 96 % | HEIGHT: 67 IN

## 2024-02-13 DIAGNOSIS — I48.0 PAF (PAROXYSMAL ATRIAL FIBRILLATION) (HCC): ICD-10-CM

## 2024-02-13 DIAGNOSIS — N18.32 STAGE 3B CHRONIC KIDNEY DISEASE (HCC): ICD-10-CM

## 2024-02-13 DIAGNOSIS — F32.A DEPRESSION, UNSPECIFIED DEPRESSION TYPE: ICD-10-CM

## 2024-02-13 DIAGNOSIS — G20.A1 PARKINSON'S DISEASE, UNSPECIFIED WHETHER DYSKINESIA PRESENT, UNSPECIFIED WHETHER MANIFESTATIONS FLUCTUATE: ICD-10-CM

## 2024-02-13 DIAGNOSIS — K76.6 PORTAL HYPERTENSIVE GASTROPATHY (HCC): ICD-10-CM

## 2024-02-13 DIAGNOSIS — I50.42 CHRONIC COMBINED SYSTOLIC (CONGESTIVE) AND DIASTOLIC (CONGESTIVE) HEART FAILURE (HCC): ICD-10-CM

## 2024-02-13 DIAGNOSIS — K31.89 PORTAL HYPERTENSIVE GASTROPATHY (HCC): ICD-10-CM

## 2024-02-13 DIAGNOSIS — F41.9 ANXIETY: ICD-10-CM

## 2024-02-13 DIAGNOSIS — D69.6 THROMBOCYTOPENIA, UNSPECIFIED (HCC): ICD-10-CM

## 2024-02-13 DIAGNOSIS — G47.00 INSOMNIA, UNSPECIFIED TYPE: ICD-10-CM

## 2024-02-13 DIAGNOSIS — I95.9 HYPOTENSION, UNSPECIFIED HYPOTENSION TYPE: Primary | ICD-10-CM

## 2024-02-13 DIAGNOSIS — I73.9 PAD (PERIPHERAL ARTERY DISEASE) (HCC): ICD-10-CM

## 2024-02-13 PROCEDURE — G8427 DOCREV CUR MEDS BY ELIG CLIN: HCPCS | Performed by: STUDENT IN AN ORGANIZED HEALTH CARE EDUCATION/TRAINING PROGRAM

## 2024-02-13 PROCEDURE — G8484 FLU IMMUNIZE NO ADMIN: HCPCS | Performed by: STUDENT IN AN ORGANIZED HEALTH CARE EDUCATION/TRAINING PROGRAM

## 2024-02-13 PROCEDURE — 3017F COLORECTAL CA SCREEN DOC REV: CPT | Performed by: STUDENT IN AN ORGANIZED HEALTH CARE EDUCATION/TRAINING PROGRAM

## 2024-02-13 PROCEDURE — 1123F ACP DISCUSS/DSCN MKR DOCD: CPT | Performed by: STUDENT IN AN ORGANIZED HEALTH CARE EDUCATION/TRAINING PROGRAM

## 2024-02-13 PROCEDURE — 1036F TOBACCO NON-USER: CPT | Performed by: STUDENT IN AN ORGANIZED HEALTH CARE EDUCATION/TRAINING PROGRAM

## 2024-02-13 PROCEDURE — 3078F DIAST BP <80 MM HG: CPT | Performed by: STUDENT IN AN ORGANIZED HEALTH CARE EDUCATION/TRAINING PROGRAM

## 2024-02-13 PROCEDURE — G2211 COMPLEX E/M VISIT ADD ON: HCPCS | Performed by: STUDENT IN AN ORGANIZED HEALTH CARE EDUCATION/TRAINING PROGRAM

## 2024-02-13 PROCEDURE — 99214 OFFICE O/P EST MOD 30 MIN: CPT | Performed by: STUDENT IN AN ORGANIZED HEALTH CARE EDUCATION/TRAINING PROGRAM

## 2024-02-13 PROCEDURE — G8417 CALC BMI ABV UP PARAM F/U: HCPCS | Performed by: STUDENT IN AN ORGANIZED HEALTH CARE EDUCATION/TRAINING PROGRAM

## 2024-02-13 PROCEDURE — 3074F SYST BP LT 130 MM HG: CPT | Performed by: STUDENT IN AN ORGANIZED HEALTH CARE EDUCATION/TRAINING PROGRAM

## 2024-02-13 RX ORDER — TRAZODONE HYDROCHLORIDE 50 MG/1
50 TABLET ORAL NIGHTLY PRN
Qty: 30 TABLET | Refills: 0 | Status: SHIPPED | OUTPATIENT
Start: 2024-02-13

## 2024-02-13 RX ORDER — ESCITALOPRAM OXALATE 10 MG/1
10 TABLET ORAL DAILY
Qty: 90 TABLET | Refills: 0 | Status: SHIPPED | OUTPATIENT
Start: 2024-02-13

## 2024-02-13 NOTE — PROGRESS NOTES
[facility] PRIMARY CARE  [address] [place] [address] , [address]  [place], [place], [address]    Michael Chan is a 73 y.o. male with  has a past medical history of Acute kidney injury (HCC), Ascites, Atrial fibrillation (HCC), CHF (congestive heart failure) (HCC), Cirrhosis of liver with ascites (HCC), CKD (chronic kidney disease), Coronary artery disease, Hyperlipidemia, Hypertension, Hypomagnesemia, Hyponatremia, and Thrombocytopenia (HCC).  Presented to the office today for:  Chief Complaint   Patient presents with    Congestive Heart Failure    Hyperlipidemia       Assessment/Plan   1. Hypotension, unspecified hypotension type  2. PAD (peripheral artery disease) (HCC)  3. Portal hypertensive gastropathy (HCC)  4. Chronic combined systolic (congestive) and diastolic (congestive) heart failure (HCC)  5. PAF (paroxysmal atrial fibrillation) (HCC)  6. Thrombocytopenia, unspecified (HCC)  7. Stage 3b chronic kidney disease (HCC)  8. Parkinson's disease, unspecified whether dyskinesia present, unspecified whether manifestations fluctuate  9. Depression, unspecified depression type  -     escitalopram (LEXAPRO) 10 MG tablet; Take 1 tablet by mouth daily, Disp-90 tablet, R-0Normal  10. Anxiety  -     escitalopram (LEXAPRO) 10 MG tablet; Take 1 tablet by mouth daily, Disp-90 tablet, R-0Normal  11. Insomnia, unspecified type  -     traZODone (DESYREL) 50 MG tablet; Take 1 tablet by mouth nightly as needed for Sleep, Disp-30 tablet, R-0Normal  Return in about 1 month (around 3/13/2024) for F/U Anx/Depression.    Continue w/ Entresto, toprol, Midodrine at the current dose, Farxiga, Bumex, amiodarone, Eliquis at 5mg BID  Continue w/ lipitor at 80mg PO daily  Medications: Lexapro started today  Trazodone 50mg hs.  Recommended counseling.  List of counselors provided.  Reviewed concept of anxiety as biochemical imbalance of neurotransmitters and rationale for treatment.  Instructed patient to contact office or on-call

## 2024-02-16 ENCOUNTER — TELEPHONE (OUTPATIENT)
Dept: PRIMARY CARE CLINIC | Age: 74
End: 2024-02-16

## 2024-02-16 NOTE — TELEPHONE ENCOUNTER
Left message for patient to return call to the office as we received paperwork from [facility] requesting transfer accommodations. We need to know what the accommodation will be and how it will provide opportunity for use and enjoyment of the community. Please document this and route it back to writer.

## 2024-02-16 NOTE — TELEPHONE ENCOUNTER
Patient wanting to move to an upstairs appointment due to having a lot of depression and anxiety being in a basement apartment. Patient being treated for depression and anxiety and would benefit from an upstairs apartment where there is more sunlight and socialization.

## 2024-02-26 RX ORDER — ATORVASTATIN CALCIUM 80 MG/1
80 TABLET, FILM COATED ORAL DAILY
Qty: 90 TABLET | Refills: 3 | Status: SHIPPED | OUTPATIENT
Start: 2024-02-26

## 2024-02-28 ENCOUNTER — HOSPITAL ENCOUNTER (OUTPATIENT)
Age: 74
Discharge: HOME | End: 2024-03-01
Attending: FAMILY MEDICINE
Payer: MEDICARE

## 2024-02-28 ENCOUNTER — OFFICE VISIT (OUTPATIENT)
Dept: CARDIOLOGY | Age: 74
End: 2024-02-28
Payer: MEDICARE

## 2024-02-28 VITALS
HEIGHT: 67 IN | OXYGEN SATURATION: 97 % | SYSTOLIC BLOOD PRESSURE: 102 MMHG | BODY MASS INDEX: 29.51 KG/M2 | DIASTOLIC BLOOD PRESSURE: 68 MMHG | WEIGHT: 188 LBS | HEART RATE: 81 BPM | RESPIRATION RATE: 18 BRPM

## 2024-02-28 DIAGNOSIS — I73.9 PAD (PERIPHERAL ARTERY DISEASE) (HCC): ICD-10-CM

## 2024-02-28 DIAGNOSIS — F41.0 ANXIETY ATTACK: ICD-10-CM

## 2024-02-28 DIAGNOSIS — I50.42 CHRONIC COMBINED SYSTOLIC AND DIASTOLIC CONGESTIVE HEART FAILURE (HCC): ICD-10-CM

## 2024-02-28 DIAGNOSIS — Z79.01 CHRONIC ANTICOAGULATION: ICD-10-CM

## 2024-02-28 DIAGNOSIS — E78.2 MIXED HYPERLIPIDEMIA: ICD-10-CM

## 2024-02-28 DIAGNOSIS — I48.0 PAF (PAROXYSMAL ATRIAL FIBRILLATION) (HCC): ICD-10-CM

## 2024-02-28 DIAGNOSIS — I25.5 ISCHEMIC CARDIOMYOPATHY: ICD-10-CM

## 2024-02-28 DIAGNOSIS — Z79.899 ON AMIODARONE THERAPY: ICD-10-CM

## 2024-02-28 DIAGNOSIS — I47.10 PSVT (PAROXYSMAL SUPRAVENTRICULAR TACHYCARDIA): Primary | ICD-10-CM

## 2024-02-28 PROBLEM — E11.9 TYPE 2 DIABETES MELLITUS (HCC): Status: ACTIVE | Noted: 2024-02-28

## 2024-02-28 LAB — ECHO BSA: 2.01 M2

## 2024-02-28 PROCEDURE — 1036F TOBACCO NON-USER: CPT | Performed by: FAMILY MEDICINE

## 2024-02-28 PROCEDURE — 93243 EXT ECG>48HR<7D SCAN A/R: CPT

## 2024-02-28 PROCEDURE — 3017F COLORECTAL CA SCREEN DOC REV: CPT | Performed by: FAMILY MEDICINE

## 2024-02-28 PROCEDURE — 1123F ACP DISCUSS/DSCN MKR DOCD: CPT | Performed by: FAMILY MEDICINE

## 2024-02-28 PROCEDURE — G8417 CALC BMI ABV UP PARAM F/U: HCPCS | Performed by: FAMILY MEDICINE

## 2024-02-28 PROCEDURE — 99211 OFF/OP EST MAY X REQ PHY/QHP: CPT | Performed by: FAMILY MEDICINE

## 2024-02-28 PROCEDURE — 3078F DIAST BP <80 MM HG: CPT | Performed by: FAMILY MEDICINE

## 2024-02-28 PROCEDURE — 3074F SYST BP LT 130 MM HG: CPT | Performed by: FAMILY MEDICINE

## 2024-02-28 PROCEDURE — 99214 OFFICE O/P EST MOD 30 MIN: CPT | Performed by: FAMILY MEDICINE

## 2024-02-28 PROCEDURE — G8427 DOCREV CUR MEDS BY ELIG CLIN: HCPCS | Performed by: FAMILY MEDICINE

## 2024-02-28 PROCEDURE — G8484 FLU IMMUNIZE NO ADMIN: HCPCS | Performed by: FAMILY MEDICINE

## 2024-02-28 NOTE — PROGRESS NOTES
I, Cami Baum am scribing for and in the presence of Tejas Mccoy MD, MS, F.A.C.C..    Patient: Michael Chan  : 1950  Primary Care Physician: Hany Hodges  Today's Date: 2024    REASON FOR CONSULTATION: atrial fibrillation    Dear Hany Hodges MD,    HPI: Mr. Chan is a 73 y.o. male who was admitted to the hospital on 2022 for weakness and shortness of breath. In the ER he was found to be in atrial fibrillation with RVR leading to a consultation. He was then readmitted due to severe dehydration. In the past he reports seeing a Cardiologist in [place] and was planning on having a cardioversion done in  to put him back into normal sinus rhythm due to his history of new onset atrial fibrillation. He reports a history of a heart attack at age 52 and needed stents placed at that time but denies any cardiac cath since that time. He also has carotid artery stenosis and abdominal aortic aneurysm. He did report having surgery on one side of his neck, however he is not sure which side or how long ago it was. He has had hypertension and hyperlipidemia for years as well. He is a current every day smoker and he smoked for about 55 years and smokes about a pack a day. Family history consists of a lot of people in his family with significant cardiac history, however he wanted us to talk to his sister about the family history as she knows the details. Echo done on 2022 showed an EF of 15-20%. ECHO done on 22: Dilated all cardiac chambers.Severe biventricular systolic dysfunction. Estimated left ventricular ejection fraction is 20-25% Moderate to severe mitral and moderate tricuspid regurgitation. Moderate pulmonary hypertension with an estimated right ventricular systolic pressure of 61 mmHg. Evidence of moderate to severe diastolic dysfunction is seen. Bilateral pleural effusion noted. Compared to the previous study of 2022, no significant change in ejection fraction.

## 2024-02-29 DIAGNOSIS — G47.00 INSOMNIA, UNSPECIFIED TYPE: ICD-10-CM

## 2024-02-29 RX ORDER — TRAZODONE HYDROCHLORIDE 50 MG/1
50 TABLET ORAL DAILY PRN
Qty: 30 TABLET | Refills: 0 | OUTPATIENT
Start: 2024-02-29

## 2024-02-29 RX ORDER — LANOLIN ALCOHOL/MO/W.PET/CERES
400 CREAM (GRAM) TOPICAL 3 TIMES DAILY
Qty: 270 TABLET | Refills: 1 | OUTPATIENT
Start: 2024-02-29

## 2024-03-04 ENCOUNTER — HOSPITAL ENCOUNTER (EMERGENCY)
Age: 74
Discharge: HOME | End: 2024-03-04
Payer: MEDICARE

## 2024-03-04 ENCOUNTER — HOSPITAL ENCOUNTER (OUTPATIENT)
Dept: VASCULAR LAB | Age: 74
Discharge: HOME | End: 2024-03-06
Attending: INTERNAL MEDICINE
Payer: MEDICARE

## 2024-03-04 ENCOUNTER — APPOINTMENT (OUTPATIENT)
Dept: GENERAL RADIOLOGY | Age: 74
End: 2024-03-04
Payer: MEDICARE

## 2024-03-04 VITALS
RESPIRATION RATE: 16 BRPM | SYSTOLIC BLOOD PRESSURE: 132 MMHG | OXYGEN SATURATION: 91 % | DIASTOLIC BLOOD PRESSURE: 62 MMHG | TEMPERATURE: 97.7 F | HEART RATE: 59 BPM

## 2024-03-04 DIAGNOSIS — F41.9 ANXIETY: ICD-10-CM

## 2024-03-04 DIAGNOSIS — R00.2 PALPITATIONS: Primary | ICD-10-CM

## 2024-03-04 DIAGNOSIS — I70.261 CRITICAL LIMB ISCHEMIA OF RIGHT LOWER EXTREMITY WITH GANGRENE (HCC): ICD-10-CM

## 2024-03-04 LAB
ANION GAP SERPL CALCULATED.3IONS-SCNC: 12 MMOL/L (ref 9–17)
BASOPHILS # BLD: <0.03 K/UL (ref 0–0.2)
BASOPHILS NFR BLD: 0 % (ref 0–2)
BNP SERPL-MCNC: 191 PG/ML
BUN SERPL-MCNC: 21 MG/DL (ref 8–23)
BUN/CREAT SERPL: 15 (ref 9–20)
CALCIUM SERPL-MCNC: 9.9 MG/DL (ref 8.6–10.4)
CHLORIDE SERPL-SCNC: 100 MMOL/L (ref 98–107)
CO2 SERPL-SCNC: 27 MMOL/L (ref 20–31)
CREAT SERPL-MCNC: 1.4 MG/DL (ref 0.7–1.2)
EKG ATRIAL RATE: 58 BPM
EKG P AXIS: 15 DEGREES
EKG P-R INTERVAL: 170 MS
EKG Q-T INTERVAL: 440 MS
EKG QRS DURATION: 76 MS
EKG QTC CALCULATION (BAZETT): 431 MS
EKG R AXIS: 61 DEGREES
EKG T AXIS: 70 DEGREES
EKG VENTRICULAR RATE: 58 BPM
EOSINOPHIL # BLD: 0.06 K/UL (ref 0–0.44)
EOSINOPHILS RELATIVE PERCENT: 1 % (ref 1–4)
ERYTHROCYTE [DISTWIDTH] IN BLOOD BY AUTOMATED COUNT: 12.9 % (ref 11.8–14.4)
GFR SERPL CREATININE-BSD FRML MDRD: 53 ML/MIN/1.73M2
GLUCOSE SERPL-MCNC: 100 MG/DL (ref 70–99)
HCT VFR BLD AUTO: 46 % (ref 40.7–50.3)
HGB BLD-MCNC: 16.3 G/DL (ref 13–17)
IMM GRANULOCYTES # BLD AUTO: <0.03 K/UL (ref 0–0.3)
IMM GRANULOCYTES NFR BLD: 0 %
LYMPHOCYTES NFR BLD: 1.11 K/UL (ref 1.1–3.7)
LYMPHOCYTES RELATIVE PERCENT: 20 % (ref 24–43)
MAGNESIUM SERPL-MCNC: 1.8 MG/DL (ref 1.6–2.6)
MCH RBC QN AUTO: 34 PG (ref 25.2–33.5)
MCHC RBC AUTO-ENTMCNC: 35.4 G/DL (ref 28.4–34.8)
MCV RBC AUTO: 96 FL (ref 82.6–102.9)
MONOCYTES NFR BLD: 0.44 K/UL (ref 0.1–1.2)
MONOCYTES NFR BLD: 8 % (ref 3–12)
NEUTROPHILS NFR BLD: 71 % (ref 36–65)
NEUTS SEG NFR BLD: 3.99 K/UL (ref 1.5–8.1)
NRBC BLD-RTO: 0 PER 100 WBC
PLATELET # BLD AUTO: ABNORMAL K/UL (ref 138–453)
PLATELET, FLUORESCENCE: 100 K/UL (ref 138–453)
PLATELETS.RETICULATED NFR BLD AUTO: 5.7 % (ref 1.1–10.3)
POTASSIUM SERPL-SCNC: 4.2 MMOL/L (ref 3.7–5.3)
RBC # BLD AUTO: 4.79 M/UL (ref 4.21–5.77)
SODIUM SERPL-SCNC: 139 MMOL/L (ref 135–144)
TROPONIN I SERPL HS-MCNC: 24 NG/L (ref 0–22)
TSH SERPL DL<=0.05 MIU/L-ACNC: 0.48 UIU/ML (ref 0.3–5)
WBC OTHER # BLD: 5.6 K/UL (ref 3.5–11.3)

## 2024-03-04 PROCEDURE — 93926 LOWER EXTREMITY STUDY: CPT

## 2024-03-04 PROCEDURE — 85025 COMPLETE CBC W/AUTO DIFF WBC: CPT

## 2024-03-04 PROCEDURE — 99285 EMERGENCY DEPT VISIT HI MDM: CPT

## 2024-03-04 PROCEDURE — 83735 ASSAY OF MAGNESIUM: CPT

## 2024-03-04 PROCEDURE — 83880 ASSAY OF NATRIURETIC PEPTIDE: CPT

## 2024-03-04 PROCEDURE — 84443 ASSAY THYROID STIM HORMONE: CPT

## 2024-03-04 PROCEDURE — 93005 ELECTROCARDIOGRAM TRACING: CPT | Performed by: PHYSICIAN ASSISTANT

## 2024-03-04 PROCEDURE — 84484 ASSAY OF TROPONIN QUANT: CPT

## 2024-03-04 PROCEDURE — 80048 BASIC METABOLIC PNL TOTAL CA: CPT

## 2024-03-04 PROCEDURE — 71045 X-RAY EXAM CHEST 1 VIEW: CPT

## 2024-03-04 PROCEDURE — 93010 ELECTROCARDIOGRAM REPORT: CPT | Performed by: INTERNAL MEDICINE

## 2024-03-04 ASSESSMENT — LIFESTYLE VARIABLES
HOW MANY STANDARD DRINKS CONTAINING ALCOHOL DO YOU HAVE ON A TYPICAL DAY: PATIENT DOES NOT DRINK
HOW OFTEN DO YOU HAVE A DRINK CONTAINING ALCOHOL: NEVER

## 2024-03-04 ASSESSMENT — PAIN - FUNCTIONAL ASSESSMENT: PAIN_FUNCTIONAL_ASSESSMENT: NONE - DENIES PAIN

## 2024-03-04 ASSESSMENT — ENCOUNTER SYMPTOMS
COUGH: 0
SHORTNESS OF BREATH: 0
ABDOMINAL DISTENTION: 0

## 2024-03-04 NOTE — ED PROVIDER NOTES
[facility]  EMERGENCY DEPARTMENT ENCOUNTER      Pt Name: Michael Chan  MRN: [id number]  Birthdate 1950  Date of evaluation: 3/4/2024  Provider: Lupe Soresnen PA-C    CHIEF COMPLAINT       Chief Complaint   Patient presents with    Palpitations     Palpitations with sob for the last month. Reports has episodes where he feel very anxious and has panic attacks. Wife concerned for heart issues rather than anxiety. Followed by Dr. Mccoy, currently wearing cam monitor. Denies any chest pain.     Shortness of Breath         HISTORY OF PRESENT ILLNESS      Michael Chan is a 73 y.o. male who presents to the emergency department due to anxiety attacks.  Patient has been experiencing anxiety attacks almost daily.  They are associated with palpitations, shortness of breath, and fast breathing.  Patient had 1 today well having a vascular study performed the outpatient lab.  Patient and family were encouraged to come down to the emergency department.  The patient currently has no symptoms.  No chest pain or shortness of breath.  He is currently wearing an event monitor.  He has a cardiac history and is followed by Dr. Hines.  He denies any chest pain or shortness of breath at this time.        REVIEW OF SYSTEMS       Review of Systems   Constitutional:  Negative for chills and fever.   Respiratory:  Negative for cough and shortness of breath.    Cardiovascular:  Positive for palpitations. Negative for chest pain.   Gastrointestinal:  Negative for abdominal distention.   Psychiatric/Behavioral:  The patient is nervous/anxious.          PAST MEDICAL HISTORY     Past Medical History:   Diagnosis Date    Acute kidney injury (HCC)     Ascites 5/20/2022    Atrial fibrillation (HCC)     CHF (congestive heart failure) (HCC)     Cirrhosis of liver with ascites (HCC) 5/19/2022    CKD (chronic kidney disease) 5/20/2022    Coronary artery disease     Hyperlipidemia     Hypertension     Hypomagnesemia 5/20/2022     MIDODRINE (PROAMATINE) 10 MG TABLET    TAKE 1 TABLET BY MOUTH EVERY MORNING, 1 TABLET AT NOON, AND 1 TABLET IN THE EVENING. TAKE WITH MEALS    MULTIPLE VITAMINS-MINERALS (MULTIVITAMIN-MINERALS) TABS    Take 1 tablet by mouth every morning    OMEGA-3 FATTY ACIDS (FISH OIL CONCENTRATE) 1000 MG CAPS    Take 2 caplets by mouth daily    PANTOPRAZOLE (PROTONIX) 40 MG TABLET    TAKE 1 TABLET BY MOUTH EVERY DAY BEFORE BREAKFAST    SACUBITRIL-VALSARTAN (ENTRESTO) 24-26 MG PER TABLET    Take 0.5 tablets by mouth 2 times daily Lot #: [id number]  Exp: 2024    TAMSULOSIN (FLOMAX) 0.4 MG CAPSULE    TAKE 1 CAPSULE BY MOUTH EVERY DAY IN THE MORNING    TRAZODONE (DESYREL) 50 MG TABLET    Take 1 tablet by mouth nightly as needed for Sleep    VITAMIN B-1 (THIAMINE) 100 MG TABLET    Take 1 tablet by mouth daily    VITAMIN D (ERGOCALCIFEROL) 1.25 MG (38308 UT) CAPS CAPSULE    TAKE 1 CAPSULE BY MOUTH ONE TIME PER WEEK       ALLERGIES       Patient has no known allergies.    FAMILY HISTORY       Family History   Problem Relation Age of Onset    Heart Disease Mother     Hypertension Mother     Hypertension Father     Heart Disease Father     Coronary Art Dis Sister     Cancer Sister         ovarian    Coronary Art Dis Brother           SOCIAL HISTORY       Social History     Tobacco Use    Smoking status: Former     Current packs/day: 0.00     Average packs/day: 1 pack/day for 56.0 years (56.0 ttl pk-yrs)     Types: Cigarettes     Start date: 1966     Quit date: 2022     Years since quittin.6    Smokeless tobacco: Never   Vaping Use    Vaping Use: Never used   Substance Use Topics    Alcohol use: Not Currently    Drug use: Not Currently         PHYSICAL EXAM       ED Triage Vitals [24 1515]   BP Temp Temp Source Pulse Respirations SpO2 Height Weight   (!) 105/36 97.7 °F (36.5 °C) Oral 62 10 93 % -- --       Physical Exam  Constitutional:       General: He is not in acute distress.     Appearance: He is normal

## 2024-03-05 ENCOUNTER — TELEPHONE (OUTPATIENT)
Dept: PRIMARY CARE CLINIC | Age: 74
End: 2024-03-05

## 2024-03-05 LAB
VAS RIGHT ATA MID PSV: 18.2 CM/S
VAS RIGHT CFA DIST PSV: 179.5 CM/S
VAS RIGHT PERONEAL MID PSV: 33 CM/S
VAS RIGHT PFA PROX PSV: 37.9 CM/S
VAS RIGHT POP A DIST PSV: 42.1 CM/S
VAS RIGHT POP A PROX PSV: 50.8 CM/S
VAS RIGHT POP A PROX VEL RATIO: 1.22
VAS RIGHT PTA MID PSV: 49.9 CM/S
VAS RIGHT SFA DIST PSV: 41.5 CM/S
VAS RIGHT SFA DIST VEL RATIO: 0.48
VAS RIGHT SFA MID PSV: 86 CM/S
VAS RIGHT SFA MID VEL RATIO: 0.6
VAS RIGHT SFA PROX PSV: 146.4 CM/S
VAS RIGHT SFA PROX VEL RATIO: 0.8

## 2024-03-05 PROCEDURE — 93926 LOWER EXTREMITY STUDY: CPT | Performed by: STUDENT IN AN ORGANIZED HEALTH CARE EDUCATION/TRAINING PROGRAM

## 2024-03-05 NOTE — TELEPHONE ENCOUNTER
[facility] ED Follow up Call    Reason for ED visit:  panik attack     3/5/2024     Alek Rodriguez , this is Margie from Hany Baum's office, just calling to see how you are doing after your recent ED visit.    Did you receive discharge instructions?  Yes  Do you understand the discharge instructions? Yes  Did the ED give you any new prescriptions? No:   Were you able to fill your prescriptions? No: NA      Do you have one of our red, yellow and green  Zone sheets that help you to determine when you should go to the ED?  Not Applicable      Do you need or want to make a follow up appt with your PCP?  No    Do you have any further needs in the home, e.g. equipment?  Not Applicable        FU appts/Provider:    Future Appointments   Date Time Provider Department Center   3/6/2024  1:30 PM Anil Pittman MD TIFF VASC [facility]   3/14/2024  1:15 PM Hany Hodges MD TIFF HOSP PC [facility]   3/19/2024  1:30 PM Rekha Roca, APRN - CNP TIFF GI [facility]   [facility]26[facility]20[facility]  [facility] [place]   4/17/2024  1:45 PM Anil Pittman MD TIFF VASC [facility]   9/11/2024  2:00 PM Tejas Mccoy MD TIFF CARD [facility]

## 2024-03-06 ENCOUNTER — OFFICE VISIT (OUTPATIENT)
Dept: VASCULAR SURGERY | Age: 74
End: 2024-03-06
Payer: MEDICARE

## 2024-03-06 VITALS
HEIGHT: 67 IN | SYSTOLIC BLOOD PRESSURE: 104 MMHG | HEART RATE: 61 BPM | TEMPERATURE: 96.9 F | WEIGHT: 187.6 LBS | DIASTOLIC BLOOD PRESSURE: 59 MMHG | RESPIRATION RATE: 18 BRPM | BODY MASS INDEX: 29.44 KG/M2

## 2024-03-06 DIAGNOSIS — I73.9 PAD (PERIPHERAL ARTERY DISEASE) (HCC): Primary | ICD-10-CM

## 2024-03-06 PROCEDURE — G8417 CALC BMI ABV UP PARAM F/U: HCPCS | Performed by: INTERNAL MEDICINE

## 2024-03-06 PROCEDURE — G8484 FLU IMMUNIZE NO ADMIN: HCPCS | Performed by: INTERNAL MEDICINE

## 2024-03-06 PROCEDURE — 3078F DIAST BP <80 MM HG: CPT | Performed by: INTERNAL MEDICINE

## 2024-03-06 PROCEDURE — 99214 OFFICE O/P EST MOD 30 MIN: CPT | Performed by: INTERNAL MEDICINE

## 2024-03-06 PROCEDURE — G8427 DOCREV CUR MEDS BY ELIG CLIN: HCPCS | Performed by: INTERNAL MEDICINE

## 2024-03-06 PROCEDURE — 3017F COLORECTAL CA SCREEN DOC REV: CPT | Performed by: INTERNAL MEDICINE

## 2024-03-06 PROCEDURE — 1036F TOBACCO NON-USER: CPT | Performed by: INTERNAL MEDICINE

## 2024-03-06 PROCEDURE — 3074F SYST BP LT 130 MM HG: CPT | Performed by: INTERNAL MEDICINE

## 2024-03-06 PROCEDURE — 1123F ACP DISCUSS/DSCN MKR DOCD: CPT | Performed by: INTERNAL MEDICINE

## 2024-03-06 NOTE — PATIENT INSTRUCTIONS
SURVEY:    Thank you for allowing us to care for you today.    You may be receiving a survey from [facility] regarding your visit today- electronically or via mail.      Please help us by completing the survey as this will provide the needed feedback to ensure we are providing the very best care for you and your family.    If you cannot score us a very good on any question, please call the office to discuss how we could have made your experience a very good one.    Thank you.       STAFF:    Kailey Henriquez, Sophie Ferrer, Yamilet Barajas      CLINICAL STAFF:    Yesica Justin LPN, Mable Sullivan LPN, Angelina Kunz LPN, Julisa Balderrama CMA

## 2024-03-06 NOTE — PROGRESS NOTES
Michael RAE Chan was seen on 3/6/2024 for   Chief Complaint   Patient presents with    Follow-up     Patient here for follow up PAD and varicose veins. Patient states he continues to have neuropathy in right foot-\"feels like a leather strap.\" Denies leg pain/swelling.   .                            REVIEW OF SYSTEMS    Constitutional Weight: absent, A, Fatigue: absent Fever: absent   HEENT Ears: normal,Visual disturbance: absent Sore throat: absent,    Respiratory Shortness of breath: absent, Cough: absent;, Snoring: absent   Cardivascular Chest pain: absent,  Leg pain: absent,Leg swelling:absent, Non-healing wound:absent    GI Diarrhea: absent, Abdominal Pain: absent    Urinary frequency: absent, Urinary incontinence: absent   Musculoskeletal Neck pain: absent, Back pain: present, Restless Leg:present     Dermatological Hair loss: absent, Skin changes: absent   Neurological  Sudden Loss of Vision in one eye:absent, Slurred Speech:absent, Weakness on one side of body: absent,Headache: absent   Psychiatric Anxiety: present, Depression: present   Hematologic Abnormal bleeding: absent,          
3    vitamin B-1 (THIAMINE) 100 MG tablet Take 1 tablet by mouth daily 90 tablet 3    Multiple Vitamins-Minerals (MULTIVITAMIN-MINERALS) TABS Take 1 tablet by mouth every morning 90 tablet 3    bumetanide (BUMEX) 1 MG tablet Take 0.5 tablets by mouth daily 45 tablet 3    Alpha-Lipoic Acid 200 MG CAPS Take 1 capsule by mouth in the morning and at bedtime      sacubitril-valsartan (ENTRESTO) 24-26 MG per tablet Take 0.5 tablets by mouth 2 times daily Lot #: [id number]  Exp: July 2024 30 tablet 0    Biotin 97101 MCG TABS Take 1 tablet by mouth in the morning and at bedtime      aspirin 81 MG EC tablet Take 1 tablet by mouth daily      melatonin 5 mg TABS tablet Take 2 tablets by mouth nightly  0    apixaban (ELIQUIS) 5 MG TABS tablet Take 1 tablet by mouth 2 times daily      Omega-3 Fatty Acids (FISH OIL CONCENTRATE) 1000 MG CAPS Take 2 caplets by mouth daily       No current facility-administered medications for this visit.         Physical findings:  General- no acute distress, oriented  HEENT - no xanthelasma, external ears normal  Neck- Supple, no thyromegaly  Skin- warm, dry, no skin breakdown or gangrene. All skin intact.   Extremities - no edema    Pulses Right - Posterior tibial:    absent  Dorsalis pedis:  absent     Pulses Left -Posterior tibial: absent  Dorsalis pedis:  absent      Assessment:  1. PAD (peripheral artery disease) ([facility])       30 min chart review and pt encountr  Rtc 6 mo  Plan of care:    Follow up and evaluate.       Electronically signed by Anil Pittman MD on 3/6/24 at 1:42 PM EST

## 2024-03-08 RX ORDER — TAMSULOSIN HYDROCHLORIDE 0.4 MG/1
CAPSULE ORAL
Qty: 90 CAPSULE | Refills: 1 | Status: SHIPPED | OUTPATIENT
Start: 2024-03-08

## 2024-03-11 DIAGNOSIS — G47.00 INSOMNIA, UNSPECIFIED TYPE: ICD-10-CM

## 2024-03-11 RX ORDER — TRAZODONE HYDROCHLORIDE 50 MG/1
50 TABLET ORAL DAILY PRN
Qty: 30 TABLET | Refills: 0 | Status: SHIPPED | OUTPATIENT
Start: 2024-03-11 | End: 2024-04-01

## 2024-03-12 ENCOUNTER — TELEPHONE (OUTPATIENT)
Dept: GASTROENTEROLOGY | Age: 74
End: 2024-03-12

## 2024-03-12 ENCOUNTER — TELEPHONE (OUTPATIENT)
Dept: PHARMACY | Age: 74
End: 2024-03-12

## 2024-03-12 DIAGNOSIS — K76.0 HEPATIC STEATOSIS: Primary | ICD-10-CM

## 2024-03-12 DIAGNOSIS — K70.30 ALCOHOLIC CIRRHOSIS OF LIVER WITHOUT ASCITES (HCC): ICD-10-CM

## 2024-03-12 NOTE — TELEPHONE ENCOUNTER
Sister, Mikaela called and requests discharge of Michael from HF clinic.  Closed [facility] Jenn  Closed referral  Linda Stovall, PharmD 3/12/2024 12:47 PM

## 2024-03-12 NOTE — TELEPHONE ENCOUNTER
Spoke with spouse, advised of new orders placed. Patient to have testing done on 3/14/24 after PCP visit.

## 2024-03-12 NOTE — TELEPHONE ENCOUNTER
Patients wife contacted the office questioning his labs, stating that he had some done when he was in the hospital last week and didn't know if he needed to go in and get the other labs or not. Writer advised patient that she would ask the provider and contact her with an answer.      Please advise.

## 2024-03-13 ENCOUNTER — TELEPHONE (OUTPATIENT)
Dept: CARDIOLOGY | Age: 74
End: 2024-03-13

## 2024-03-13 LAB — ECHO BSA: 2.01 M2

## 2024-03-13 NOTE — TELEPHONE ENCOUNTER
----- Message from Tejas Mccoy MD sent at 3/13/2024 12:35 PM EDT -----  Let Mr. Chan know their test result was ok. Will discuss at next visit. Thanks.

## 2024-03-14 ENCOUNTER — HOSPITAL ENCOUNTER (OUTPATIENT)
Age: 74
Discharge: HOME | End: 2024-03-14
Payer: MEDICARE

## 2024-03-14 ENCOUNTER — OFFICE VISIT (OUTPATIENT)
Dept: PRIMARY CARE CLINIC | Age: 74
End: 2024-03-14
Payer: MEDICARE

## 2024-03-14 VITALS
DIASTOLIC BLOOD PRESSURE: 42 MMHG | HEIGHT: 67 IN | OXYGEN SATURATION: 94 % | RESPIRATION RATE: 16 BRPM | HEART RATE: 71 BPM | WEIGHT: 186.7 LBS | SYSTOLIC BLOOD PRESSURE: 70 MMHG | BODY MASS INDEX: 29.3 KG/M2

## 2024-03-14 DIAGNOSIS — M47.022 VERTEBRAL ARTERY COMPRESSION SYNDROMES OF CERVICAL REGION: ICD-10-CM

## 2024-03-14 DIAGNOSIS — F41.9 ANXIETY: ICD-10-CM

## 2024-03-14 DIAGNOSIS — E55.9 HYPOVITAMINOSIS D: ICD-10-CM

## 2024-03-14 DIAGNOSIS — G20.A1 PARKINSON'S DISEASE, UNSPECIFIED WHETHER DYSKINESIA PRESENT, UNSPECIFIED WHETHER MANIFESTATIONS FLUCTUATE: Primary | ICD-10-CM

## 2024-03-14 DIAGNOSIS — K70.30 ALCOHOLIC CIRRHOSIS OF LIVER WITHOUT ASCITES (HCC): ICD-10-CM

## 2024-03-14 DIAGNOSIS — R73.9 HYPERGLYCEMIA: ICD-10-CM

## 2024-03-14 DIAGNOSIS — G20.A1 PARKINSON'S DISEASE, UNSPECIFIED WHETHER DYSKINESIA PRESENT, UNSPECIFIED WHETHER MANIFESTATIONS FLUCTUATE: ICD-10-CM

## 2024-03-14 DIAGNOSIS — F32.A DEPRESSION, UNSPECIFIED DEPRESSION TYPE: ICD-10-CM

## 2024-03-14 DIAGNOSIS — I95.9 HYPOTENSION, UNSPECIFIED HYPOTENSION TYPE: ICD-10-CM

## 2024-03-14 PROBLEM — E11.9 TYPE 2 DIABETES MELLITUS (HCC): Status: RESOLVED | Noted: 2024-02-28 | Resolved: 2024-03-14

## 2024-03-14 LAB
25(OH)D3 SERPL-MCNC: 67.6 NG/ML (ref 30–100)
ALBUMIN SERPL-MCNC: 4.3 G/DL (ref 3.5–5.2)
ALBUMIN/GLOB SERPL: 1.5 {RATIO} (ref 1–2.5)
ALP SERPL-CCNC: 75 U/L (ref 40–129)
ALT SERPL-CCNC: 24 U/L (ref 5–41)
ANION GAP SERPL CALCULATED.3IONS-SCNC: 11 MMOL/L (ref 9–17)
AST SERPL-CCNC: 23 U/L
BILIRUB DIRECT SERPL-MCNC: <0.1 MG/DL
BILIRUB INDIRECT SERPL-MCNC: ABNORMAL MG/DL (ref 0–1)
BILIRUB SERPL-MCNC: 0.6 MG/DL (ref 0.3–1.2)
BUN SERPL-MCNC: 21 MG/DL (ref 8–23)
CALCIUM SERPL-MCNC: 9.9 MG/DL (ref 8.6–10.4)
CHLORIDE SERPL-SCNC: 102 MMOL/L (ref 98–107)
CO2 SERPL-SCNC: 28 MMOL/L (ref 20–31)
CREAT SERPL-MCNC: 1.5 MG/DL (ref 0.7–1.2)
EST. AVERAGE GLUCOSE BLD GHB EST-MCNC: 105 MG/DL
FOLATE SERPL-MCNC: >20 NG/ML (ref 4.8–24.2)
GFR SERPL CREATININE-BSD FRML MDRD: 49 ML/MIN/1.73M2
GLUCOSE SERPL-MCNC: 95 MG/DL (ref 70–99)
HBA1C MFR BLD: 5.3 % (ref 4–6)
INR PPP: 1.1
POTASSIUM SERPL-SCNC: 4.9 MMOL/L (ref 3.7–5.3)
PROT SERPL-MCNC: 7.2 G/DL (ref 6.4–8.3)
PROTHROMBIN TIME: 14.6 SEC (ref 11.9–14.8)
SODIUM SERPL-SCNC: 141 MMOL/L (ref 135–144)
VIT B12 SERPL-MCNC: 465 PG/ML (ref 232–1245)

## 2024-03-14 PROCEDURE — G8484 FLU IMMUNIZE NO ADMIN: HCPCS | Performed by: STUDENT IN AN ORGANIZED HEALTH CARE EDUCATION/TRAINING PROGRAM

## 2024-03-14 PROCEDURE — 82248 BILIRUBIN DIRECT: CPT

## 2024-03-14 PROCEDURE — 82746 ASSAY OF FOLIC ACID SERUM: CPT

## 2024-03-14 PROCEDURE — G2211 COMPLEX E/M VISIT ADD ON: HCPCS | Performed by: STUDENT IN AN ORGANIZED HEALTH CARE EDUCATION/TRAINING PROGRAM

## 2024-03-14 PROCEDURE — 3078F DIAST BP <80 MM HG: CPT | Performed by: STUDENT IN AN ORGANIZED HEALTH CARE EDUCATION/TRAINING PROGRAM

## 2024-03-14 PROCEDURE — 1036F TOBACCO NON-USER: CPT | Performed by: STUDENT IN AN ORGANIZED HEALTH CARE EDUCATION/TRAINING PROGRAM

## 2024-03-14 PROCEDURE — 83036 HEMOGLOBIN GLYCOSYLATED A1C: CPT

## 2024-03-14 PROCEDURE — 3074F SYST BP LT 130 MM HG: CPT | Performed by: STUDENT IN AN ORGANIZED HEALTH CARE EDUCATION/TRAINING PROGRAM

## 2024-03-14 PROCEDURE — 99214 OFFICE O/P EST MOD 30 MIN: CPT | Performed by: STUDENT IN AN ORGANIZED HEALTH CARE EDUCATION/TRAINING PROGRAM

## 2024-03-14 PROCEDURE — 1123F ACP DISCUSS/DSCN MKR DOCD: CPT | Performed by: STUDENT IN AN ORGANIZED HEALTH CARE EDUCATION/TRAINING PROGRAM

## 2024-03-14 PROCEDURE — G8427 DOCREV CUR MEDS BY ELIG CLIN: HCPCS | Performed by: STUDENT IN AN ORGANIZED HEALTH CARE EDUCATION/TRAINING PROGRAM

## 2024-03-14 PROCEDURE — 82607 VITAMIN B-12: CPT

## 2024-03-14 PROCEDURE — 36415 COLL VENOUS BLD VENIPUNCTURE: CPT

## 2024-03-14 PROCEDURE — G8417 CALC BMI ABV UP PARAM F/U: HCPCS | Performed by: STUDENT IN AN ORGANIZED HEALTH CARE EDUCATION/TRAINING PROGRAM

## 2024-03-14 PROCEDURE — 3017F COLORECTAL CA SCREEN DOC REV: CPT | Performed by: STUDENT IN AN ORGANIZED HEALTH CARE EDUCATION/TRAINING PROGRAM

## 2024-03-14 PROCEDURE — 85610 PROTHROMBIN TIME: CPT

## 2024-03-14 PROCEDURE — 80053 COMPREHEN METABOLIC PANEL: CPT

## 2024-03-14 PROCEDURE — 82306 VITAMIN D 25 HYDROXY: CPT

## 2024-03-14 NOTE — PROGRESS NOTES
[facility] PRIMARY CARE  [address] [place] [address] , [address]  [place], [place], [address]    Michael Chan is a 73 y.o. male with  has a past medical history of Acute kidney injury (HCC), Ascites, Atrial fibrillation (HCC), CHF (congestive heart failure) (HCC), Cirrhosis of liver with ascites (HCC), CKD (chronic kidney disease), Coronary artery disease, Hyperlipidemia, Hypertension, Hypomagnesemia, Hyponatremia, and Thrombocytopenia (HCC).  Presented to the office today for:  Chief Complaint   Patient presents with    ED Follow-up    Hypotension       Assessment/Plan   1. Parkinson's disease, unspecified whether dyskinesia present, unspecified whether manifestations fluctuate  -     Vitamin B12 & Folate; Future  2. Hypotension, unspecified hypotension type  -     Vitamin B12 & Folate; Future  3. Vertebral artery compression syndromes of cervical region  -     Vitamin B12 & Folate; Future  -     Maco Hook MD, Neuro-Endovascular Surgery, [place]  4. Depression, unspecified depression type  -     Vitamin B12 & Folate; Future  5. Anxiety  -     Vitamin B12 & Folate; Future  6. Hyperglycemia  -     Hemoglobin A1C; Future  -     Vitamin B12 & Folate; Future  7. Hypovitaminosis D  -     Vitamin D 25 Hydroxy; Future  -     Vitamin B12 & Folate; Future  Return in about 1 month (around 4/14/2024) for F/U Med Management.    Plan to continue w/ Lexapro 10mg, and Trazodone 50mg hs.  Plan to start counseling tomorrow.  Encourage/monitor PO intake, salt intake.  Midodrine 10mg TID; plan to incr. Midodrine to 15mg, patient will check in 1 week's time with an update.  F/U with Specialists per prior plans     All patient questions answered.  Pt voiced understanding.   There are no discontinued medications.    Patient received counseling on the following healthy behaviors: nutrition, exercise and medication adherence. I encouraged and discussed lifestyle modifications including diet and exercise (150+ minutes of

## 2024-03-15 ENCOUNTER — TELEPHONE (OUTPATIENT)
Dept: GASTROENTEROLOGY | Age: 74
End: 2024-03-15

## 2024-03-15 DIAGNOSIS — E55.9 HYPOVITAMINOSIS D: ICD-10-CM

## 2024-03-15 RX ORDER — ERGOCALCIFEROL 1.25 MG/1
50000 CAPSULE ORAL WEEKLY
Qty: 12 CAPSULE | Refills: 0 | Status: SHIPPED | OUTPATIENT
Start: 2024-03-15

## 2024-03-15 NOTE — TELEPHONE ENCOUNTER
----- Message from SEVEN Burns CNP sent at 3/15/2024  8:15 AM EDT -----  Plan to review at follow up GI visit.

## 2024-03-19 ENCOUNTER — OFFICE VISIT (OUTPATIENT)
Dept: GASTROENTEROLOGY | Age: 74
End: 2024-03-19
Payer: MEDICARE

## 2024-03-19 VITALS
OXYGEN SATURATION: 98 % | DIASTOLIC BLOOD PRESSURE: 58 MMHG | SYSTOLIC BLOOD PRESSURE: 89 MMHG | HEART RATE: 90 BPM | HEIGHT: 67 IN | BODY MASS INDEX: 29.16 KG/M2 | WEIGHT: 185.8 LBS | RESPIRATION RATE: 18 BRPM

## 2024-03-19 DIAGNOSIS — Z53.20 COLON CANCER SCREENING DECLINED: ICD-10-CM

## 2024-03-19 DIAGNOSIS — K70.30 ALCOHOLIC CIRRHOSIS OF LIVER WITHOUT ASCITES (HCC): Primary | ICD-10-CM

## 2024-03-19 PROCEDURE — G8417 CALC BMI ABV UP PARAM F/U: HCPCS | Performed by: NURSE PRACTITIONER

## 2024-03-19 PROCEDURE — 99213 OFFICE O/P EST LOW 20 MIN: CPT | Performed by: NURSE PRACTITIONER

## 2024-03-19 PROCEDURE — G8484 FLU IMMUNIZE NO ADMIN: HCPCS | Performed by: NURSE PRACTITIONER

## 2024-03-19 PROCEDURE — 3074F SYST BP LT 130 MM HG: CPT | Performed by: NURSE PRACTITIONER

## 2024-03-19 PROCEDURE — 1123F ACP DISCUSS/DSCN MKR DOCD: CPT | Performed by: NURSE PRACTITIONER

## 2024-03-19 PROCEDURE — 3078F DIAST BP <80 MM HG: CPT | Performed by: NURSE PRACTITIONER

## 2024-03-19 PROCEDURE — 1036F TOBACCO NON-USER: CPT | Performed by: NURSE PRACTITIONER

## 2024-03-19 PROCEDURE — G8427 DOCREV CUR MEDS BY ELIG CLIN: HCPCS | Performed by: NURSE PRACTITIONER

## 2024-03-19 PROCEDURE — 3017F COLORECTAL CA SCREEN DOC REV: CPT | Performed by: NURSE PRACTITIONER

## 2024-03-19 ASSESSMENT — ENCOUNTER SYMPTOMS
RESPIRATORY NEGATIVE: 1
ABDOMINAL PAIN: 0
ABDOMINAL DISTENTION: 0

## 2024-03-19 NOTE — PROGRESS NOTES
03/04/2024        Lab Results   Component Value Date     03/14/2024    K 4.9 03/14/2024     03/14/2024    CO2 28 03/14/2024    BUN 21 03/14/2024    CREATININE 1.5 (H) 03/14/2024    GLUCOSE 95 03/14/2024    CALCIUM 9.9 03/14/2024    PROT 7.2 03/14/2024    LABALBU 4.3 03/14/2024    BILITOT 0.6 03/14/2024    ALKPHOS 75 03/14/2024    AST 23 03/14/2024    ALT 24 03/14/2024    LABGLOM 49 (L) 03/14/2024    GFRAA >60 09/20/2022    AGRATIO 1.5 03/14/2024          Lab Results   Component Value Date    INR 1.1 03/14/2024    INR 1.1 09/14/2023    INR 1.2 03/20/2023    PROTIME 14.6 03/14/2024    PROTIME 14.3 09/14/2023    PROTIME 15.7 (H) 03/20/2023       CT Result (most recent):  CTA ABDOMEN W WO CONTRAST 11/16/2023    Narrative  EXAMINATION:  CTA OF THE ABDOMEN WITHOUT AND WITH CONTRAST 11/16/2023 3:39 pm    TECHNIQUE:  CTA of the abdomen was performed without and with the administration of  intravenous contrast. Multiplanar reformatted images are provided for review.  MIP images are provided for review. Automated exposure control, iterative  reconstruction, and/or weight based adjustment of the mA/kV was utilized to  reduce the radiation dose to as low as reasonably achievable.    COMPARISON:  None.    HISTORY:  ORDERING SYSTEM PROVIDED HISTORY: Alcoholic cirrhosis of liver without  ascites (HCC)  TECHNOLOGIST PROVIDED HISTORY:  STAT Creatinine as needed:->Yes  Hepatic cirrhosis; HCC screening    FINDINGS:    Nonvascular    Lower Chest: The lung bases are clear.    Organs: The liver, spleen, gallbladder, pancreas and adrenal glands appear  unremarkable.  There is symmetric enhancement of the kidneys.  No  hydronephrosis is seen.    GI/Bowel: The bowel loops appear unremarkable.  No dilated loops of bowel are  seen.    Peritoneum/Retroperitoneum: There are shotty mesenteric and retroperitoneal  lymph nodes but no retroperitoneal or mesenteric lymphadenopathy is seen.    Bones/Soft Tissues: No acute bony

## 2024-04-01 DIAGNOSIS — G47.00 INSOMNIA, UNSPECIFIED TYPE: ICD-10-CM

## 2024-04-01 RX ORDER — TRAZODONE HYDROCHLORIDE 50 MG/1
50 TABLET ORAL NIGHTLY PRN
Qty: 90 TABLET | Refills: 0 | Status: SHIPPED | OUTPATIENT
Start: 2024-04-01

## 2024-04-16 ENCOUNTER — OFFICE VISIT (OUTPATIENT)
Dept: PRIMARY CARE CLINIC | Age: 74
End: 2024-04-16
Payer: MEDICARE

## 2024-04-16 VITALS — BODY MASS INDEX: 29.44 KG/M2 | DIASTOLIC BLOOD PRESSURE: 70 MMHG | SYSTOLIC BLOOD PRESSURE: 110 MMHG | WEIGHT: 188 LBS

## 2024-04-16 DIAGNOSIS — K59.00 CONSTIPATION, UNSPECIFIED CONSTIPATION TYPE: ICD-10-CM

## 2024-04-16 DIAGNOSIS — F32.A DEPRESSION, UNSPECIFIED DEPRESSION TYPE: Primary | ICD-10-CM

## 2024-04-16 DIAGNOSIS — I95.9 HYPOTENSION, UNSPECIFIED HYPOTENSION TYPE: ICD-10-CM

## 2024-04-16 DIAGNOSIS — K70.30 ALCOHOLIC CIRRHOSIS OF LIVER WITHOUT ASCITES (HCC): ICD-10-CM

## 2024-04-16 PROCEDURE — 1036F TOBACCO NON-USER: CPT | Performed by: STUDENT IN AN ORGANIZED HEALTH CARE EDUCATION/TRAINING PROGRAM

## 2024-04-16 PROCEDURE — G2211 COMPLEX E/M VISIT ADD ON: HCPCS | Performed by: STUDENT IN AN ORGANIZED HEALTH CARE EDUCATION/TRAINING PROGRAM

## 2024-04-16 PROCEDURE — 3017F COLORECTAL CA SCREEN DOC REV: CPT | Performed by: STUDENT IN AN ORGANIZED HEALTH CARE EDUCATION/TRAINING PROGRAM

## 2024-04-16 PROCEDURE — 3078F DIAST BP <80 MM HG: CPT | Performed by: STUDENT IN AN ORGANIZED HEALTH CARE EDUCATION/TRAINING PROGRAM

## 2024-04-16 PROCEDURE — 1123F ACP DISCUSS/DSCN MKR DOCD: CPT | Performed by: STUDENT IN AN ORGANIZED HEALTH CARE EDUCATION/TRAINING PROGRAM

## 2024-04-16 PROCEDURE — 99211 OFF/OP EST MAY X REQ PHY/QHP: CPT | Performed by: STUDENT IN AN ORGANIZED HEALTH CARE EDUCATION/TRAINING PROGRAM

## 2024-04-16 PROCEDURE — 99214 OFFICE O/P EST MOD 30 MIN: CPT | Performed by: STUDENT IN AN ORGANIZED HEALTH CARE EDUCATION/TRAINING PROGRAM

## 2024-04-16 PROCEDURE — G8427 DOCREV CUR MEDS BY ELIG CLIN: HCPCS | Performed by: STUDENT IN AN ORGANIZED HEALTH CARE EDUCATION/TRAINING PROGRAM

## 2024-04-16 PROCEDURE — G8417 CALC BMI ABV UP PARAM F/U: HCPCS | Performed by: STUDENT IN AN ORGANIZED HEALTH CARE EDUCATION/TRAINING PROGRAM

## 2024-04-16 PROCEDURE — 3074F SYST BP LT 130 MM HG: CPT | Performed by: STUDENT IN AN ORGANIZED HEALTH CARE EDUCATION/TRAINING PROGRAM

## 2024-04-16 RX ORDER — LACTULOSE 10 G/15ML
10 SOLUTION ORAL 3 TIMES DAILY PRN
Qty: 473 ML | Refills: 1 | Status: SHIPPED | OUTPATIENT
Start: 2024-04-16

## 2024-04-30 NOTE — PATIENT INSTRUCTIONS
day. It is possible to meet your calcium requirement with diet alone, but a vitamin D supplement is usually necessary to meet this goal.  When exposed to the sun, use a sunscreen that protects against both UVA and UVB radiation with an SPF of 30 or greater. Reapply every 2 to 3 hours or after sweating, drying off with a towel, or swimming. Always wear a seat belt when traveling in a car. Always wear a helmet when riding a bicycle or motorcycle. yes

## 2024-05-05 DIAGNOSIS — F32.A DEPRESSION, UNSPECIFIED DEPRESSION TYPE: ICD-10-CM

## 2024-05-05 DIAGNOSIS — F41.9 ANXIETY: ICD-10-CM

## 2024-05-06 RX ORDER — ESCITALOPRAM OXALATE 10 MG/1
10 TABLET ORAL DAILY
Qty: 90 TABLET | Refills: 0 | Status: SHIPPED | OUTPATIENT
Start: 2024-05-06

## 2024-05-16 ENCOUNTER — HOSPITAL ENCOUNTER (OUTPATIENT)
Age: 74
Setting detail: SPECIMEN
Discharge: HOME | End: 2024-05-16

## 2024-05-22 PROBLEM — E83.42 HYPOMAGNESEMIA: Status: RESOLVED | Noted: 2022-05-20 | Resolved: 2024-05-22

## 2024-05-22 PROBLEM — G20.A1 PARKINSON'S DISEASE, UNSPECIFIED WHETHER DYSKINESIA PRESENT, UNSPECIFIED WHETHER MANIFESTATIONS FLUCTUATE (HCC): Chronic | Status: ACTIVE | Noted: 2023-04-05

## 2024-05-22 PROBLEM — I73.9 PAD (PERIPHERAL ARTERY DISEASE) (HCC): Chronic | Status: ACTIVE | Noted: 2024-02-13

## 2024-05-22 PROBLEM — I50.42 CHRONIC COMBINED SYSTOLIC (CONGESTIVE) AND DIASTOLIC (CONGESTIVE) HEART FAILURE (HCC): Chronic | Status: ACTIVE | Noted: 2024-02-13

## 2024-05-22 PROBLEM — D69.6 THROMBOCYTOPENIA, UNSPECIFIED (HCC): Status: RESOLVED | Noted: 2023-02-02 | Resolved: 2024-05-22

## 2024-05-22 PROBLEM — K59.00 CONSTIPATION: Status: RESOLVED | Noted: 2024-04-16 | Resolved: 2024-05-22

## 2024-05-22 PROBLEM — Z71.6 TOBACCO ABUSE COUNSELING: Status: RESOLVED | Noted: 2022-07-19 | Resolved: 2024-05-22

## 2024-05-22 PROBLEM — I95.9 HYPOTENSION: Status: RESOLVED | Noted: 2022-07-19 | Resolved: 2024-05-22

## 2024-05-22 PROBLEM — K29.70 GASTRITIS WITHOUT BLEEDING: Status: RESOLVED | Noted: 2023-07-11 | Resolved: 2024-05-22

## 2024-05-22 PROBLEM — M19.079 ARTHRITIS OF FIRST METATARSOPHALANGEAL JOINT: Status: RESOLVED | Noted: 2023-02-02 | Resolved: 2024-05-22

## 2024-05-22 PROBLEM — R73.9 HYPERGLYCEMIA: Status: RESOLVED | Noted: 2023-02-02 | Resolved: 2024-05-22

## 2024-05-22 PROBLEM — N17.9 AKI (ACUTE KIDNEY INJURY) (HCC): Status: RESOLVED | Noted: 2023-03-20 | Resolved: 2024-05-22

## 2024-05-22 PROBLEM — J40 BRONCHITIS: Status: RESOLVED | Noted: 2023-11-10 | Resolved: 2024-05-22

## 2024-05-22 PROBLEM — N18.32 STAGE 3B CHRONIC KIDNEY DISEASE (HCC): Status: RESOLVED | Noted: 2023-03-28 | Resolved: 2024-05-22

## 2024-05-22 PROBLEM — Z71.89 ACP (ADVANCE CARE PLANNING): Status: RESOLVED | Noted: 2023-11-10 | Resolved: 2024-05-22

## 2024-05-22 PROBLEM — E55.9 HYPOVITAMINOSIS D: Status: RESOLVED | Noted: 2022-07-19 | Resolved: 2024-05-22

## 2024-05-22 PROBLEM — K64.8 INTERNAL HEMORRHOIDS WITHOUT COMPLICATION: Status: RESOLVED | Noted: 2023-07-11 | Resolved: 2024-05-22

## 2024-06-06 ENCOUNTER — TELEPHONE (OUTPATIENT)
Dept: NEUROLOGY | Age: 74
End: 2024-06-06

## 2024-06-06 NOTE — TELEPHONE ENCOUNTER
LMOM for patient to call back to schedule appt: Call to Schedule an appointment. 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 Dynamic Angio

## 2024-06-06 NOTE — TELEPHONE ENCOUNTER
----- Message from Maco Evans MD sent at 6/3/2024  3:53 PM EDT -----  Regarding: routine  Call to Schedule an appointment. 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 Dynamic Angio

## 2024-07-08 DIAGNOSIS — G47.00 INSOMNIA, UNSPECIFIED TYPE: ICD-10-CM

## 2024-07-08 RX ORDER — TRAZODONE HYDROCHLORIDE 50 MG/1
50 TABLET, FILM COATED ORAL DAILY PRN
Qty: 90 TABLET | Refills: 0 | Status: ON HOLD | OUTPATIENT
Start: 2024-07-08 | End: 2024-07-25 | Stop reason: HOSPADM

## 2024-07-15 DIAGNOSIS — I50.42 CHRONIC COMBINED SYSTOLIC AND DIASTOLIC CONGESTIVE HEART FAILURE (HCC): ICD-10-CM

## 2024-07-15 RX ORDER — MIDODRINE HYDROCHLORIDE 10 MG/1
TABLET ORAL
Qty: 270 TABLET | Refills: 1 | Status: ON HOLD | OUTPATIENT
Start: 2024-07-15 | End: 2024-07-25 | Stop reason: HOSPADM

## 2024-07-16 RX ORDER — BUMETANIDE 1 MG/1
0.5 TABLET ORAL DAILY
Qty: 45 TABLET | Refills: 3 | Status: SHIPPED | OUTPATIENT
Start: 2024-07-16

## 2024-07-19 ENCOUNTER — TELEPHONE (OUTPATIENT)
Dept: NEUROLOGY | Age: 74
End: 2024-07-19

## 2024-07-23 ENCOUNTER — HOSPITAL ENCOUNTER (INPATIENT)
Age: 74
LOS: 2 days | Discharge: NURSING, REHAB OR LONG-TERM CARE | DRG: 309 | End: 2024-07-25
Attending: EMERGENCY MEDICINE | Admitting: STUDENT IN AN ORGANIZED HEALTH CARE EDUCATION/TRAINING PROGRAM
Payer: MEDICARE

## 2024-07-23 ENCOUNTER — APPOINTMENT (OUTPATIENT)
Dept: CT IMAGING | Age: 74
DRG: 309 | End: 2024-07-23
Payer: MEDICARE

## 2024-07-23 ENCOUNTER — APPOINTMENT (OUTPATIENT)
Age: 74
DRG: 309 | End: 2024-07-23
Payer: MEDICARE

## 2024-07-23 DIAGNOSIS — M79.674 PAIN IN RIGHT TOE(S): ICD-10-CM

## 2024-07-23 DIAGNOSIS — I48.0 PAROXYSMAL ATRIAL FIBRILLATION (HCC): ICD-10-CM

## 2024-07-23 DIAGNOSIS — I48.19 PERSISTENT ATRIAL FIBRILLATION (HCC): ICD-10-CM

## 2024-07-23 DIAGNOSIS — R11.2 NAUSEA AND VOMITING, UNSPECIFIED VOMITING TYPE: ICD-10-CM

## 2024-07-23 DIAGNOSIS — I48.91 ATRIAL FIBRILLATION WITH RVR (HCC): Primary | ICD-10-CM

## 2024-07-23 DIAGNOSIS — M79.671 RIGHT FOOT PAIN: ICD-10-CM

## 2024-07-23 PROBLEM — I95.1 ORTHOSTATIC HYPOTENSION: Status: ACTIVE | Noted: 2022-07-19

## 2024-07-23 PROBLEM — D68.69 SECONDARY HYPERCOAGULABLE STATE (HCC): Status: ACTIVE | Noted: 2024-07-23

## 2024-07-23 LAB
ALBUMIN SERPL-MCNC: 4.4 G/DL (ref 3.5–5.2)
ALBUMIN/GLOB SERPL: 1.6 {RATIO} (ref 1–2.5)
ALP SERPL-CCNC: 85 U/L (ref 40–129)
ALT SERPL-CCNC: 15 U/L (ref 5–41)
ANION GAP SERPL CALCULATED.3IONS-SCNC: 10 MMOL/L (ref 9–17)
ANION GAP SERPL CALCULATED.3IONS-SCNC: 15 MMOL/L (ref 9–17)
AST SERPL-CCNC: 20 U/L
BASOPHILS # BLD: <0.03 K/UL (ref 0–0.2)
BASOPHILS # BLD: <0.03 K/UL (ref 0–0.2)
BASOPHILS NFR BLD: 0 % (ref 0–2)
BASOPHILS NFR BLD: 0 % (ref 0–2)
BILIRUB SERPL-MCNC: 0.5 MG/DL (ref 0.3–1.2)
BUN SERPL-MCNC: 15 MG/DL (ref 8–23)
BUN SERPL-MCNC: 19 MG/DL (ref 8–23)
BUN/CREAT SERPL: 13 (ref 9–20)
BUN/CREAT SERPL: 16 (ref 9–20)
CALCIUM SERPL-MCNC: 8.8 MG/DL (ref 8.6–10.4)
CALCIUM SERPL-MCNC: 9.9 MG/DL (ref 8.6–10.4)
CHLORIDE SERPL-SCNC: 105 MMOL/L (ref 98–107)
CHLORIDE SERPL-SCNC: 94 MMOL/L (ref 98–107)
CO2 SERPL-SCNC: 26 MMOL/L (ref 20–31)
CO2 SERPL-SCNC: 27 MMOL/L (ref 20–31)
CREAT SERPL-MCNC: 1.2 MG/DL (ref 0.7–1.2)
CREAT SERPL-MCNC: 1.2 MG/DL (ref 0.7–1.2)
ECHO AO ROOT DIAM: 2.1 CM
ECHO AO ROOT INDEX: 1.09 CM/M2
ECHO AO SINUS VALSALVA DIAM: 3.2 CM
ECHO AO SINUS VALSALVA INDEX: 1.66 CM/M2
ECHO AO ST JNCT DIAM: 2.5 CM
ECHO AV CUSP MM: 2.1 CM
ECHO AV MEAN GRADIENT: 4 MMHG
ECHO AV MEAN VELOCITY: 1 M/S
ECHO AV PEAK GRADIENT: 7 MMHG
ECHO AV PEAK VELOCITY: 1.3 M/S
ECHO AV VELOCITY RATIO: 0.69
ECHO AV VTI: 24.9 CM
ECHO BSA: 1.96 M2
ECHO EST RA PRESSURE: 3 MMHG
ECHO LA AREA 2C: 25 CM2
ECHO LA AREA 4C: 25.3 CM2
ECHO LA MAJOR AXIS: 6.3 CM
ECHO LA MINOR AXIS: 6.2 CM
ECHO LA VOL BP: 81 ML (ref 18–58)
ECHO LA VOL MOD A2C: 80 ML (ref 18–58)
ECHO LA VOL MOD A4C: 81 ML (ref 18–58)
ECHO LA VOL/BSA BIPLANE: 42 ML/M2 (ref 16–34)
ECHO LA VOLUME INDEX MOD A2C: 41 ML/M2 (ref 16–34)
ECHO LA VOLUME INDEX MOD A4C: 42 ML/M2 (ref 16–34)
ECHO LV E' LATERAL VELOCITY: 12 CM/S
ECHO LV EDV A2C: 91 ML
ECHO LV EDV A4C: 93 ML
ECHO LV EDV INDEX A4C: 48 ML/M2
ECHO LV EDV NDEX A2C: 47 ML/M2
ECHO LV EJECTION FRACTION A2C: 46 %
ECHO LV EJECTION FRACTION A4C: 50 %
ECHO LV EJECTION FRACTION BIPLANE: 48 % (ref 55–100)
ECHO LV ESV A2C: 49 ML
ECHO LV ESV A4C: 46 ML
ECHO LV ESV INDEX A2C: 25 ML/M2
ECHO LV ESV INDEX A4C: 24 ML/M2
ECHO LV FRACTIONAL SHORTENING: 22 % (ref 28–44)
ECHO LV INTERNAL DIMENSION DIASTOLE INDEX: 2.54 CM/M2
ECHO LV INTERNAL DIMENSION DIASTOLIC: 4.9 CM (ref 4.2–5.9)
ECHO LV INTERNAL DIMENSION SYSTOLIC INDEX: 1.97 CM/M2
ECHO LV INTERNAL DIMENSION SYSTOLIC: 3.8 CM
ECHO LV IVSD: 1.1 CM (ref 0.6–1)
ECHO LV MASS 2D: 188.1 G (ref 88–224)
ECHO LV MASS INDEX 2D: 97.5 G/M2 (ref 49–115)
ECHO LV POSTERIOR WALL DIASTOLIC: 1 CM (ref 0.6–1)
ECHO LV RELATIVE WALL THICKNESS RATIO: 0.41
ECHO LVOT AV VTI INDEX: 0.71
ECHO LVOT MEAN GRADIENT: 2 MMHG
ECHO LVOT PEAK GRADIENT: 3 MMHG
ECHO LVOT PEAK VELOCITY: 0.9 M/S
ECHO LVOT VTI: 17.6 CM
ECHO MV E DECELERATION TIME (DT): 169 MS
ECHO MV E VELOCITY: 1.21 M/S
ECHO MV E/E' LATERAL: 10.08
ECHO PULMONARY ARTERY END DIASTOLIC PRESSURE: 10 MMHG
ECHO PV MAX VELOCITY: 1 M/S
ECHO PV PEAK GRADIENT: 4 MMHG
ECHO PV REGURGITANT MAX VELOCITY: 1.6 M/S
ECHO RIGHT VENTRICULAR SYSTOLIC PRESSURE (RVSP): 26 MMHG
ECHO TV REGURGITANT MAX VELOCITY: 2.42 M/S
ECHO TV REGURGITANT PEAK GRADIENT: 23 MMHG
EOSINOPHIL # BLD: <0.03 K/UL (ref 0–0.44)
EOSINOPHIL # BLD: <0.03 K/UL (ref 0–0.44)
EOSINOPHILS RELATIVE PERCENT: 0 % (ref 1–4)
EOSINOPHILS RELATIVE PERCENT: 0 % (ref 1–4)
ERYTHROCYTE [DISTWIDTH] IN BLOOD BY AUTOMATED COUNT: 12.6 % (ref 11.8–14.4)
ERYTHROCYTE [DISTWIDTH] IN BLOOD BY AUTOMATED COUNT: 12.8 % (ref 11.8–14.4)
GFR, ESTIMATED: 64 ML/MIN/1.73M2
GFR, ESTIMATED: 64 ML/MIN/1.73M2
GLUCOSE SERPL-MCNC: 112 MG/DL (ref 70–99)
GLUCOSE SERPL-MCNC: 113 MG/DL (ref 70–99)
HCT VFR BLD AUTO: 43.9 % (ref 40.7–50.3)
HCT VFR BLD AUTO: 48.2 % (ref 40.7–50.3)
HGB BLD-MCNC: 15.4 G/DL (ref 13–17)
HGB BLD-MCNC: 17.1 G/DL (ref 13–17)
IMM GRANULOCYTES # BLD AUTO: 0.03 K/UL (ref 0–0.3)
IMM GRANULOCYTES # BLD AUTO: 0.05 K/UL (ref 0–0.3)
IMM GRANULOCYTES NFR BLD: 0 %
IMM GRANULOCYTES NFR BLD: 1 %
LIPASE SERPL-CCNC: 25 U/L (ref 13–60)
LYMPHOCYTES NFR BLD: 0.92 K/UL (ref 1.1–3.7)
LYMPHOCYTES NFR BLD: 1.37 K/UL (ref 1.1–3.7)
LYMPHOCYTES RELATIVE PERCENT: 11 % (ref 24–43)
LYMPHOCYTES RELATIVE PERCENT: 19 % (ref 24–43)
MAGNESIUM SERPL-MCNC: 1.8 MG/DL (ref 1.6–2.6)
MCH RBC QN AUTO: 32.8 PG (ref 25.2–33.5)
MCH RBC QN AUTO: 33 PG (ref 25.2–33.5)
MCHC RBC AUTO-ENTMCNC: 35.1 G/DL (ref 28.4–34.8)
MCHC RBC AUTO-ENTMCNC: 35.5 G/DL (ref 28.4–34.8)
MCV RBC AUTO: 93.1 FL (ref 82.6–102.9)
MCV RBC AUTO: 93.6 FL (ref 82.6–102.9)
MONOCYTES NFR BLD: 0.61 K/UL (ref 0.1–1.2)
MONOCYTES NFR BLD: 0.69 K/UL (ref 0.1–1.2)
MONOCYTES NFR BLD: 10 % (ref 3–12)
MONOCYTES NFR BLD: 7 % (ref 3–12)
NEUTROPHILS NFR BLD: 71 % (ref 36–65)
NEUTROPHILS NFR BLD: 82 % (ref 36–65)
NEUTS SEG NFR BLD: 5.16 K/UL (ref 1.5–8.1)
NEUTS SEG NFR BLD: 7.03 K/UL (ref 1.5–8.1)
NRBC BLD-RTO: 0 PER 100 WBC
NRBC BLD-RTO: 0 PER 100 WBC
PLATELET # BLD AUTO: ABNORMAL K/UL (ref 138–453)
PLATELET # BLD AUTO: ABNORMAL K/UL (ref 138–453)
PLATELET, FLUORESCENCE: 101 K/UL (ref 138–453)
PLATELET, FLUORESCENCE: 123 K/UL (ref 138–453)
PLATELETS.RETICULATED NFR BLD AUTO: 6.3 % (ref 1.1–10.3)
PLATELETS.RETICULATED NFR BLD AUTO: 6.7 % (ref 1.1–10.3)
POTASSIUM SERPL-SCNC: 3.7 MMOL/L (ref 3.7–5.3)
POTASSIUM SERPL-SCNC: 4.4 MMOL/L (ref 3.7–5.3)
PROT SERPL-MCNC: 7.1 G/DL (ref 6.4–8.3)
RBC # BLD AUTO: 4.69 M/UL (ref 4.21–5.77)
RBC # BLD AUTO: 5.18 M/UL (ref 4.21–5.77)
SODIUM SERPL-SCNC: 135 MMOL/L (ref 135–144)
SODIUM SERPL-SCNC: 142 MMOL/L (ref 135–144)
T4 FREE SERPL-MCNC: 1.6 NG/DL (ref 0.92–1.68)
TROPONIN I SERPL HS-MCNC: 22 NG/L (ref 0–22)
TROPONIN I SERPL HS-MCNC: 23 NG/L (ref 0–22)
TROPONIN I SERPL HS-MCNC: 26 NG/L (ref 0–22)
TSH SERPL DL<=0.05 MIU/L-ACNC: 1.51 UIU/ML (ref 0.3–5)
WBC OTHER # BLD: 7.3 K/UL (ref 3.5–11.3)
WBC OTHER # BLD: 8.6 K/UL (ref 3.5–11.3)

## 2024-07-23 PROCEDURE — 84484 ASSAY OF TROPONIN QUANT: CPT

## 2024-07-23 PROCEDURE — 85025 COMPLETE CBC W/AUTO DIFF WBC: CPT

## 2024-07-23 PROCEDURE — 96375 TX/PRO/DX INJ NEW DRUG ADDON: CPT

## 2024-07-23 PROCEDURE — 2580000003 HC RX 258: Performed by: EMERGENCY MEDICINE

## 2024-07-23 PROCEDURE — 99222 1ST HOSP IP/OBS MODERATE 55: CPT | Performed by: FAMILY MEDICINE

## 2024-07-23 PROCEDURE — 97166 OT EVAL MOD COMPLEX 45 MIN: CPT

## 2024-07-23 PROCEDURE — 70450 CT HEAD/BRAIN W/O DYE: CPT

## 2024-07-23 PROCEDURE — 2580000003 HC RX 258

## 2024-07-23 PROCEDURE — 36415 COLL VENOUS BLD VENIPUNCTURE: CPT

## 2024-07-23 PROCEDURE — 83690 ASSAY OF LIPASE: CPT

## 2024-07-23 PROCEDURE — 93306 TTE W/DOPPLER COMPLETE: CPT

## 2024-07-23 PROCEDURE — 6370000000 HC RX 637 (ALT 250 FOR IP)

## 2024-07-23 PROCEDURE — 93005 ELECTROCARDIOGRAM TRACING: CPT

## 2024-07-23 PROCEDURE — 2000000000 HC ICU R&B

## 2024-07-23 PROCEDURE — 96374 THER/PROPH/DIAG INJ IV PUSH: CPT

## 2024-07-23 PROCEDURE — 2580000003 HC RX 258: Performed by: NURSE PRACTITIONER

## 2024-07-23 PROCEDURE — 96361 HYDRATE IV INFUSION ADD-ON: CPT

## 2024-07-23 PROCEDURE — 99285 EMERGENCY DEPT VISIT HI MDM: CPT

## 2024-07-23 PROCEDURE — 84443 ASSAY THYROID STIM HORMONE: CPT

## 2024-07-23 PROCEDURE — 84439 ASSAY OF FREE THYROXINE: CPT

## 2024-07-23 PROCEDURE — 6360000004 HC RX CONTRAST MEDICATION: Performed by: EMERGENCY MEDICINE

## 2024-07-23 PROCEDURE — 74177 CT ABD & PELVIS W/CONTRAST: CPT

## 2024-07-23 PROCEDURE — 6360000002 HC RX W HCPCS: Performed by: EMERGENCY MEDICINE

## 2024-07-23 PROCEDURE — 93306 TTE W/DOPPLER COMPLETE: CPT | Performed by: INTERNAL MEDICINE

## 2024-07-23 PROCEDURE — 83735 ASSAY OF MAGNESIUM: CPT

## 2024-07-23 PROCEDURE — 80053 COMPREHEN METABOLIC PANEL: CPT

## 2024-07-23 PROCEDURE — 97162 PT EVAL MOD COMPLEX 30 MIN: CPT

## 2024-07-23 PROCEDURE — 80048 BASIC METABOLIC PNL TOTAL CA: CPT

## 2024-07-23 PROCEDURE — 2500000003 HC RX 250 WO HCPCS: Performed by: EMERGENCY MEDICINE

## 2024-07-23 PROCEDURE — 93005 ELECTROCARDIOGRAM TRACING: CPT | Performed by: EMERGENCY MEDICINE

## 2024-07-23 RX ORDER — ATORVASTATIN CALCIUM 40 MG/1
80 TABLET, FILM COATED ORAL DAILY
Status: DISCONTINUED | OUTPATIENT
Start: 2024-07-23 | End: 2024-07-25 | Stop reason: HOSPADM

## 2024-07-23 RX ORDER — SODIUM CHLORIDE 0.9 % (FLUSH) 0.9 %
5-40 SYRINGE (ML) INJECTION PRN
Status: DISCONTINUED | OUTPATIENT
Start: 2024-07-23 | End: 2024-07-25 | Stop reason: HOSPADM

## 2024-07-23 RX ORDER — SODIUM CHLORIDE 9 MG/ML
INJECTION, SOLUTION INTRAVENOUS PRN
Status: DISCONTINUED | OUTPATIENT
Start: 2024-07-23 | End: 2024-07-25 | Stop reason: HOSPADM

## 2024-07-23 RX ORDER — GAUZE BANDAGE 2" X 2"
100 BANDAGE TOPICAL DAILY
Status: DISCONTINUED | OUTPATIENT
Start: 2024-07-23 | End: 2024-07-25 | Stop reason: HOSPADM

## 2024-07-23 RX ORDER — GABAPENTIN 300 MG/1
300 CAPSULE ORAL 3 TIMES DAILY
Status: DISCONTINUED | OUTPATIENT
Start: 2024-07-23 | End: 2024-07-25 | Stop reason: HOSPADM

## 2024-07-23 RX ORDER — MIDODRINE HYDROCHLORIDE 2.5 MG/1
2.5 TABLET ORAL ONCE
Status: COMPLETED | OUTPATIENT
Start: 2024-07-23 | End: 2024-07-23

## 2024-07-23 RX ORDER — SODIUM CHLORIDE 0.9 % (FLUSH) 0.9 %
5-40 SYRINGE (ML) INJECTION EVERY 12 HOURS SCHEDULED
Status: DISCONTINUED | OUTPATIENT
Start: 2024-07-23 | End: 2024-07-25 | Stop reason: HOSPADM

## 2024-07-23 RX ORDER — 0.9 % SODIUM CHLORIDE 0.9 %
1000 INTRAVENOUS SOLUTION INTRAVENOUS ONCE
Status: COMPLETED | OUTPATIENT
Start: 2024-07-23 | End: 2024-07-23

## 2024-07-23 RX ORDER — ONDANSETRON 2 MG/ML
4 INJECTION INTRAMUSCULAR; INTRAVENOUS EVERY 6 HOURS PRN
Status: DISCONTINUED | OUTPATIENT
Start: 2024-07-23 | End: 2024-07-25 | Stop reason: HOSPADM

## 2024-07-23 RX ORDER — LORAZEPAM 2 MG/ML
0.5 INJECTION INTRAMUSCULAR ONCE
Status: COMPLETED | OUTPATIENT
Start: 2024-07-23 | End: 2024-07-23

## 2024-07-23 RX ORDER — ESCITALOPRAM OXALATE 5 MG/1
10 TABLET ORAL DAILY
Status: DISCONTINUED | OUTPATIENT
Start: 2024-07-23 | End: 2024-07-25

## 2024-07-23 RX ORDER — ONDANSETRON 4 MG/1
4 TABLET, ORALLY DISINTEGRATING ORAL EVERY 8 HOURS PRN
Status: DISCONTINUED | OUTPATIENT
Start: 2024-07-23 | End: 2024-07-25 | Stop reason: HOSPADM

## 2024-07-23 RX ORDER — TRAZODONE HYDROCHLORIDE 50 MG/1
75 TABLET ORAL DAILY PRN
Status: DISCONTINUED | OUTPATIENT
Start: 2024-07-23 | End: 2024-07-25 | Stop reason: HOSPADM

## 2024-07-23 RX ORDER — ACETAMINOPHEN 650 MG/1
650 SUPPOSITORY RECTAL EVERY 6 HOURS PRN
Status: DISCONTINUED | OUTPATIENT
Start: 2024-07-23 | End: 2024-07-25 | Stop reason: HOSPADM

## 2024-07-23 RX ORDER — PANTOPRAZOLE SODIUM 40 MG/1
40 TABLET, DELAYED RELEASE ORAL
Status: DISCONTINUED | OUTPATIENT
Start: 2024-07-23 | End: 2024-07-25 | Stop reason: HOSPADM

## 2024-07-23 RX ORDER — SWAB
2 SWAB, NON-MEDICATED MISCELLANEOUS DAILY
Status: DISCONTINUED | OUTPATIENT
Start: 2024-07-23 | End: 2024-07-23

## 2024-07-23 RX ORDER — METOPROLOL TARTRATE 1 MG/ML
5 INJECTION, SOLUTION INTRAVENOUS ONCE
Status: COMPLETED | OUTPATIENT
Start: 2024-07-23 | End: 2024-07-23

## 2024-07-23 RX ORDER — POTASSIUM CHLORIDE 20 MEQ/1
20 TABLET, EXTENDED RELEASE ORAL 2 TIMES DAILY WITH MEALS
Status: DISCONTINUED | OUTPATIENT
Start: 2024-07-23 | End: 2024-07-25 | Stop reason: HOSPADM

## 2024-07-23 RX ORDER — ERGOCALCIFEROL 1.25 MG/1
50000 CAPSULE ORAL WEEKLY
Status: DISCONTINUED | OUTPATIENT
Start: 2024-07-23 | End: 2024-07-25 | Stop reason: HOSPADM

## 2024-07-23 RX ORDER — SODIUM CHLORIDE 9 MG/ML
INJECTION, SOLUTION INTRAVENOUS CONTINUOUS
Status: DISCONTINUED | OUTPATIENT
Start: 2024-07-23 | End: 2024-07-25

## 2024-07-23 RX ORDER — ASPIRIN 81 MG/1
81 TABLET ORAL DAILY
Status: DISCONTINUED | OUTPATIENT
Start: 2024-07-23 | End: 2024-07-25 | Stop reason: HOSPADM

## 2024-07-23 RX ORDER — DOCUSATE SODIUM 100 MG/1
100 CAPSULE, LIQUID FILLED ORAL 2 TIMES DAILY
Status: DISCONTINUED | OUTPATIENT
Start: 2024-07-23 | End: 2024-07-25 | Stop reason: HOSPADM

## 2024-07-23 RX ORDER — MIDODRINE HYDROCHLORIDE 5 MG/1
10 TABLET ORAL
Status: DISCONTINUED | OUTPATIENT
Start: 2024-07-23 | End: 2024-07-25

## 2024-07-23 RX ORDER — LANOLIN ALCOHOL/MO/W.PET/CERES
400 CREAM (GRAM) TOPICAL DAILY
Status: DISCONTINUED | OUTPATIENT
Start: 2024-07-23 | End: 2024-07-25 | Stop reason: HOSPADM

## 2024-07-23 RX ORDER — M-VIT,TX,IRON,MINS/CALC/FOLIC 27MG-0.4MG
1 TABLET ORAL EVERY MORNING
Status: DISCONTINUED | OUTPATIENT
Start: 2024-07-23 | End: 2024-07-25 | Stop reason: HOSPADM

## 2024-07-23 RX ORDER — DILTIAZEM HYDROCHLORIDE 5 MG/ML
5 INJECTION INTRAVENOUS ONCE
Status: DISCONTINUED | OUTPATIENT
Start: 2024-07-23 | End: 2024-07-23

## 2024-07-23 RX ORDER — BUMETANIDE 0.5 MG/1
0.5 TABLET ORAL DAILY
Status: DISCONTINUED | OUTPATIENT
Start: 2024-07-23 | End: 2024-07-25

## 2024-07-23 RX ORDER — TAMSULOSIN HYDROCHLORIDE 0.4 MG/1
0.4 CAPSULE ORAL EVERY MORNING
Status: DISCONTINUED | OUTPATIENT
Start: 2024-07-23 | End: 2024-07-25 | Stop reason: HOSPADM

## 2024-07-23 RX ORDER — FOLIC ACID 1 MG/1
1000 TABLET ORAL DAILY
Status: DISCONTINUED | OUTPATIENT
Start: 2024-07-23 | End: 2024-07-25 | Stop reason: HOSPADM

## 2024-07-23 RX ORDER — LACTULOSE 10 G/15ML
10 SOLUTION ORAL 3 TIMES DAILY PRN
Status: DISCONTINUED | OUTPATIENT
Start: 2024-07-23 | End: 2024-07-25 | Stop reason: HOSPADM

## 2024-07-23 RX ORDER — AMIODARONE HYDROCHLORIDE 200 MG/1
100 TABLET ORAL DAILY
Status: DISCONTINUED | OUTPATIENT
Start: 2024-07-23 | End: 2024-07-25 | Stop reason: HOSPADM

## 2024-07-23 RX ORDER — GLUCOSAMINE/D3/BOSWELLIA SERRA 1500MG-400
1 TABLET ORAL 2 TIMES DAILY
Status: DISCONTINUED | OUTPATIENT
Start: 2024-07-23 | End: 2024-07-23

## 2024-07-23 RX ORDER — ONDANSETRON 2 MG/ML
4 INJECTION INTRAMUSCULAR; INTRAVENOUS ONCE
Status: COMPLETED | OUTPATIENT
Start: 2024-07-23 | End: 2024-07-23

## 2024-07-23 RX ORDER — ACETAMINOPHEN 325 MG/1
650 TABLET ORAL EVERY 6 HOURS PRN
Status: DISCONTINUED | OUTPATIENT
Start: 2024-07-23 | End: 2024-07-25 | Stop reason: HOSPADM

## 2024-07-23 RX ORDER — POLYETHYLENE GLYCOL 3350 17 G/17G
17 POWDER, FOR SOLUTION ORAL DAILY PRN
Status: DISCONTINUED | OUTPATIENT
Start: 2024-07-23 | End: 2024-07-25 | Stop reason: HOSPADM

## 2024-07-23 RX ORDER — METOPROLOL SUCCINATE 25 MG/1
25 TABLET, EXTENDED RELEASE ORAL DAILY
Status: DISCONTINUED | OUTPATIENT
Start: 2024-07-23 | End: 2024-07-25 | Stop reason: HOSPADM

## 2024-07-23 RX ADMIN — POTASSIUM CHLORIDE 20 MEQ: 1500 TABLET, EXTENDED RELEASE ORAL at 17:01

## 2024-07-23 RX ADMIN — DOCUSATE SODIUM 100 MG: 100 CAPSULE, LIQUID FILLED ORAL at 21:08

## 2024-07-23 RX ADMIN — ERGOCALCIFEROL 50000 UNITS: 1.25 CAPSULE ORAL at 17:01

## 2024-07-23 RX ADMIN — GABAPENTIN 300 MG: 300 CAPSULE ORAL at 21:08

## 2024-07-23 RX ADMIN — MIDODRINE HYDROCHLORIDE 10 MG: 5 TABLET ORAL at 09:06

## 2024-07-23 RX ADMIN — ESCITALOPRAM 10 MG: 5 TABLET, FILM COATED ORAL at 09:06

## 2024-07-23 RX ADMIN — SODIUM CHLORIDE: 9 INJECTION, SOLUTION INTRAVENOUS at 20:31

## 2024-07-23 RX ADMIN — GABAPENTIN 300 MG: 300 CAPSULE ORAL at 09:06

## 2024-07-23 RX ADMIN — ATORVASTATIN CALCIUM 80 MG: 40 TABLET, FILM COATED ORAL at 09:07

## 2024-07-23 RX ADMIN — METOPROLOL TARTRATE 5 MG: 5 INJECTION INTRAVENOUS at 03:23

## 2024-07-23 RX ADMIN — METOPROLOL SUCCINATE 25 MG: 25 TABLET, EXTENDED RELEASE ORAL at 09:07

## 2024-07-23 RX ADMIN — FOLIC ACID 1000 MCG: 1 TABLET ORAL at 09:06

## 2024-07-23 RX ADMIN — POTASSIUM CHLORIDE 20 MEQ: 1500 TABLET, EXTENDED RELEASE ORAL at 09:06

## 2024-07-23 RX ADMIN — TAMSULOSIN HYDROCHLORIDE 0.4 MG: 0.4 CAPSULE ORAL at 09:07

## 2024-07-23 RX ADMIN — ONDANSETRON 4 MG: 2 INJECTION INTRAMUSCULAR; INTRAVENOUS at 01:56

## 2024-07-23 RX ADMIN — SODIUM CHLORIDE 1000 ML: 9 INJECTION, SOLUTION INTRAVENOUS at 03:57

## 2024-07-23 RX ADMIN — SACUBITRIL AND VALSARTAN 0.5 TABLET: 24; 26 TABLET, FILM COATED ORAL at 09:07

## 2024-07-23 RX ADMIN — LORAZEPAM 0.5 MG: 2 INJECTION INTRAMUSCULAR; INTRAVENOUS at 02:35

## 2024-07-23 RX ADMIN — IOPAMIDOL 75 ML: 755 INJECTION, SOLUTION INTRAVENOUS at 02:48

## 2024-07-23 RX ADMIN — ASPIRIN 81 MG: 81 TABLET, COATED ORAL at 09:06

## 2024-07-23 RX ADMIN — Medication 400 MG: at 09:07

## 2024-07-23 RX ADMIN — MIDODRINE HYDROCHLORIDE 10 MG: 5 TABLET ORAL at 12:11

## 2024-07-23 RX ADMIN — PANTOPRAZOLE SODIUM 40 MG: 40 TABLET, DELAYED RELEASE ORAL at 09:07

## 2024-07-23 RX ADMIN — AMIODARONE HYDROCHLORIDE 100 MG: 200 TABLET ORAL at 09:07

## 2024-07-23 RX ADMIN — SODIUM CHLORIDE: 9 INJECTION, SOLUTION INTRAVENOUS at 10:28

## 2024-07-23 RX ADMIN — APIXABAN 5 MG: 5 TABLET, FILM COATED ORAL at 21:08

## 2024-07-23 RX ADMIN — Medication 1 TABLET: at 09:07

## 2024-07-23 RX ADMIN — DOCUSATE SODIUM 100 MG: 100 CAPSULE, LIQUID FILLED ORAL at 09:06

## 2024-07-23 RX ADMIN — SODIUM CHLORIDE, PRESERVATIVE FREE 10 ML: 5 INJECTION INTRAVENOUS at 09:10

## 2024-07-23 RX ADMIN — APIXABAN 5 MG: 5 TABLET, FILM COATED ORAL at 09:07

## 2024-07-23 RX ADMIN — Medication 100 MG: at 09:07

## 2024-07-23 RX ADMIN — GABAPENTIN 300 MG: 300 CAPSULE ORAL at 14:14

## 2024-07-23 RX ADMIN — BUMETANIDE 0.5 MG: 0.5 TABLET ORAL at 09:07

## 2024-07-23 RX ADMIN — Medication 10 MG: at 23:36

## 2024-07-23 RX ADMIN — EMPAGLIFLOZIN 10 MG: 10 TABLET, FILM COATED ORAL at 09:07

## 2024-07-23 RX ADMIN — MIDODRINE HYDROCHLORIDE 2.5 MG: 2.5 TABLET ORAL at 05:52

## 2024-07-23 RX ADMIN — SODIUM CHLORIDE 1000 ML: 9 INJECTION, SOLUTION INTRAVENOUS at 01:57

## 2024-07-23 RX ADMIN — MIDODRINE HYDROCHLORIDE 10 MG: 5 TABLET ORAL at 17:01

## 2024-07-23 ASSESSMENT — ENCOUNTER SYMPTOMS
COLOR CHANGE: 0
SHORTNESS OF BREATH: 0
SINUS PRESSURE: 0
DIARRHEA: 0
SORE THROAT: 0
ABDOMINAL PAIN: 0
WHEEZING: 0
EYE PAIN: 0
TROUBLE SWALLOWING: 0
BLOOD IN STOOL: 0
VOMITING: 1
EYE DISCHARGE: 0
EYE REDNESS: 0
NAUSEA: 1
CONSTIPATION: 0
FACIAL SWELLING: 0
BACK PAIN: 0
RHINORRHEA: 0
CHEST TIGHTNESS: 0
COUGH: 0

## 2024-07-23 ASSESSMENT — LIFESTYLE VARIABLES
HOW MANY STANDARD DRINKS CONTAINING ALCOHOL DO YOU HAVE ON A TYPICAL DAY: PATIENT DOES NOT DRINK
HOW MANY STANDARD DRINKS CONTAINING ALCOHOL DO YOU HAVE ON A TYPICAL DAY: PATIENT DOES NOT DRINK
HOW OFTEN DO YOU HAVE A DRINK CONTAINING ALCOHOL: NEVER
HOW OFTEN DO YOU HAVE A DRINK CONTAINING ALCOHOL: NEVER

## 2024-07-23 ASSESSMENT — PAIN - FUNCTIONAL ASSESSMENT: PAIN_FUNCTIONAL_ASSESSMENT: NONE - DENIES PAIN

## 2024-07-23 NOTE — ED NOTES
Report given to Megan ICU RN.   Report method via telephone and face-to-face.   The following was reviewed with receiving RN:    Current vital signs, administered medication, and safety alerts were reviewed. Diagnostics, abnormal labs, abnormal imaging, and abnormal assessment findings were also reviewed. Questions were answered.

## 2024-07-23 NOTE — PROGRESS NOTES
Medium Complexity  Requires PT Follow-Up: Yes  Activity Tolerance  Activity Tolerance: Patient tolerated evaluation without incident     Plan   Physical Therapy Plan  General Plan: 2 times a day 7 days a week (1x/day on weekends)  Current Treatment Recommendations: Strengthening, ROM, Balance training, Functional mobility training, Transfer training, ADL/Self-care training, IADL training, Cognitive/Perceptual training, Endurance training, Gait training, Stair training, Neuromuscular re-education, Manual, Pain management, Home exercise program, Safety education & training, Patient/Caregiver education & training, Positioning, Therapeutic activities  Safety Devices  Type of Devices: All fall risk precautions in place  Restraints  Restraints Initially in Place: No     Restrictions  Restrictions/Precautions  Restrictions/Precautions: Fall Risk, General Precautions     Subjective   General  Chart Reviewed: Yes  Patient assessed for rehabilitation services?: Yes  Family / Caregiver Present: No  Referring Practitioner: Marta Fitzpatrick APRN - CNP  Referral Date : 07/23/24  Diagnosis: Paroxysmal atrial fibrillation, I48.0  Follows Commands: Within Functional Limits  Subjective  Subjective: Patient denies pain at this time.  He reports he isn't sure why he's here and is ready to go home.         Social/Functional History     Vision/Hearing  Vision  Vision: Within Functional Limits  Hearing  Hearing: Within functional limits    Cognition   Orientation  Orientation Level: Oriented to person;Oriented to place;Disoriented to situation;Disoriented to place  Cognition  Overall Cognitive Status: Exceptions  Arousal/Alertness: Appears intact  Following Commands: Follows all commands without difficulty  Attention Span: Attends with cues to redirect;Difficulty attending to directions  Safety Judgement: Good awareness of safety precautions  Problem Solving: Appears intact  Insights: Impaired  Initiation: Appears intact  Sequencing:  CK    Goals  Short Term Goals  Time Frame for Short Term Goals: 10 days  Short Term Goal 1: Patient will ambulate 50' with CGA, without LOB  Short Term Goal 2: Patient will perform transfers and bed mobility with MI  Short Term Goal 3: Patient will tolerate 20-30 minutes of therex/act to improve endurance for ADLs.       Education  Patient Education  Education Given To: Patient  Education Provided: Role of Therapy;Plan of Care  Education Method: Verbal  Barriers to Learning: None  Education Outcome: Verbalized understanding      Therapy Time   Individual Concurrent Group Co-treatment   Time In 1100         Time Out 1118         Minutes 18         Timed Code Treatment Minutes: 18 Minutes       Alli Nguyen, PT, DPT, OCS, Cert. DN

## 2024-07-23 NOTE — PROGRESS NOTES
Patient resting in bed, assessment complete, see flow sheet for documentation, patient denies pain, denies chest pain, dizziness at this time, all needs met, call light within reach.

## 2024-07-23 NOTE — DISCHARGE INSTR - COC
Assisted  Bathing  Assisted  Dressing  Assisted  Toileting  Assisted  Feeding  Independent  Med Admin  Assisted  Med Delivery   whole    Wound Care Documentation and Therapy:        Elimination:  Continence:   Bowel: Yes  Bladder: Yes  Urinary Catheter: None   Colostomy/Ileostomy/Ileal Conduit: No       Date of Last BM: 7/25/2024    Intake/Output Summary (Last 24 hours) at 7/25/2024 1351  Last data filed at 7/25/2024 1313  Gross per 24 hour   Intake 2448 ml   Output 1125 ml   Net 1323 ml     I/O last 3 completed shifts:  In: 5465.8 [P.O.:1150; I.V.:4315.8]  Out: 3175 [Urine:3175]    Safety Concerns:     History of Falls (last 30 days)    Impairments/Disabilities:      None    Nutrition Therapy:  Current Nutrition Therapy:   - Oral Diet:  General    Routes of Feeding: Oral  Liquids: No Restrictions  Daily Fluid Restriction: no  Last Modified Barium Swallow with Video (Video Swallowing Test): not done    Treatments at the Time of Hospital Discharge:   Respiratory Treatments: N/A  Oxygen Therapy:  is not on home oxygen therapy.  Ventilator:    - No ventilator support    Rehab Therapies: Physical Therapy and Occupational Therapy  Weight Bearing Status/Restrictions: No weight bearing restrictions  Other Medical Equipment (for information only, NOT a DME order):  walker  Other Treatments: N/A    Patient's personal belongings (please select all that are sent with patient):  Glasses, Dentures upper and lower    RN SIGNATURE:  Electronically signed by MELISSA NERI RN on 7/25/24 at 1:46 PM EDT    CASE MANAGEMENT/SOCIAL WORK SECTION    Inpatient Status Date: 7/23/24    Readmission Risk Assessment Score:  Readmission Risk              Risk of Unplanned Readmission:  17           Discharging to Facility/ Agency   [facility]  Address:[address] [place] [address]  Phone:[phone]  Fax:[phone]    Dialysis Facility (if applicable)   Name:  Address:  Dialysis Schedule:  Phone:  Fax:    /  signature: Electronically signed by KELSEY Benz on 7/23/24 at 11:49 AM EDT    PHYSICIAN SECTION    Prognosis: Fair    Condition at Discharge: Stable    Rehab Potential (if transferring to Rehab): Fair    Recommended Labs or Other Treatments After Discharge: na    Physician Certification: I certify the above information and transfer of Michael Chan  is necessary for the continuing treatment of the diagnosis listed and that he requires Assisted Living for greater 30 days.     Update Admission H&P: No change in H&P    PHYSICIAN SIGNATURE:  Electronically signed by SEVEN Stovall CNP on 7/25/24 at 1:24 PM EDT

## 2024-07-23 NOTE — CARE COORDINATION
Case Management Assessment  Initial Evaluation    Date/Time of Evaluation: 7/23/2024 11:43 AM  Assessment Completed by: KELSEY Benz    If patient is discharged prior to next notation, then this note serves as note for discharge by case management.    Patient Name: Michael Chan                   YOB: 1950  Diagnosis: Paroxysmal atrial fibrillation (HCC) [I48.0]  Atrial fibrillation with RVR (HCC) [I48.91]  Nausea and vomiting, unspecified vomiting type [R11.2]                   Date / Time: 7/23/2024  1:47 AM    Patient Admission Status: Inpatient   Readmission Risk (Low < 19, Mod (19-27), High > 27): Readmission Risk Score: 9.1    Current PCP: Hany Hodges MD  PCP verified by CM? Yes    Chart Reviewed: Yes      History Provided by: Patient  Patient Orientation: Alert and Oriented, Person, Place, Situation, Self    Patient Cognition: Alert    Hospitalization in the last 30 days (Readmission):  No    If yes, Readmission Assessment in CM Navigator will be completed.    Advance Directives:      Code Status: DNR-CCA   Patient's Primary Decision Maker is: Named in Scanned ACP Document    Primary Decision Maker: Angelina Abrams - Pt Representative - [phone]    Secondary Decision Maker: Sarah Chan - Spouse - [phone]    Discharge Planning:    Patient lives with: Other (Comment) Type of Home: Assisted living  Primary Care Giver: Other (Comment) (assisted living)  Patient Support Systems include: Spouse/Significant Other, Family Members   Current Financial resources: Medicare  Current community resources: Assisted Living  Current services prior to admission: Extended Care Facility            Current DME:              Type of Home Care services:  Aide Services, Nursing Services    ADLS  Prior functional level: Assistance with the following:  Current functional level: Assistance with the following:    PT AM-PAC:   /24  OT AM-PAC:   /24    Family can provide assistance at DC:

## 2024-07-23 NOTE — PROGRESS NOTES
Occupational Therapy  Facility/Department: [facility] ICU  Occupational Therapy Initial Assessment    Name: Michael Chan  : 1950  MRN: [id number]  Date of Service: 2024    Discharge Recommendations:  Subacute/Skilled Nursing Facility, 24 hour supervision or assist, Long Term Care with OT, Long Term Care without OT          Patient Diagnosis(es): The primary encounter diagnosis was Atrial fibrillation with RVR (HCC). Diagnoses of Nausea and vomiting, unspecified vomiting type and Paroxysmal atrial fibrillation (HCC) were also pertinent to this visit.  Past Medical History:  has a past medical history of Acute kidney injury (HCC), Ascites, Atrial fibrillation (HCC), CHF (congestive heart failure) (HCC), Cirrhosis of liver with ascites (HCC), CKD (chronic kidney disease), Coronary artery disease, Hyperlipidemia, Hypertension, Hypomagnesemia, Hyponatremia, and Thrombocytopenia (HCC).  Past Surgical History:  has a past surgical history that includes Rotator cuff repair (Right); Carotid stent placement (Bilateral); hernia repair; Tonsillectomy and adenoidectomy; Colonoscopy; Esophagogastroduodenoscopy (2023); Colonoscopy (N/A, 2023); Upper gastrointestinal endoscopy (N/A, 2023); and Colonoscopy (2023).           Assessment   Performance deficits / Impairments: Decreased functional mobility ;Decreased ADL status;Decreased strength;Decreased safe awareness;Decreased balance;Decreased coordination;Decreased endurance;Decreased cognition  Assessment: Pt is 72 y/o male being evaluated by skilled OT services following hospitalization for syncope episode and fall. Pt presents with some cognitive and memory deficits this date, therefore, unsure on accuracy of social history and PLOF gathered during evaluation. Pt assessed for functional mobility and ADLs, and presents with deficits in balance, safety awareness, and mobility all of which are affecting his independence with self care tasks.

## 2024-07-23 NOTE — ED PROVIDER NOTES
eMERGENCY dEPARTMENT eNCOUnter      Pt Name: Michael Chan  MRN: [id number]  Birthdate 1950  Date of evaluation: 7/23/24      CHIEF COMPLAINT       Chief Complaint   Patient presents with    Fall     Pt to ED from the [facility] with c/o fall, nausea, and vomiting.  Fall occurred x1 day ago. Pt reports \"blacking out\" and hitting head.  Also, reports arrhythmias. Pt has a history of A. Fib.  Pt is on blood thinner.    Emesis    Nausea    Irregular Heart Beat         HISTORY OF PRESENT ILLNESS    Michael Chan is a 73 y.o. male who presents complaining of vomiting.  Patient states he fell and hit his head 2 nights ago.  Patient states that he does believe that he blacked out.  Patient is on Eliquis but did not come in to get evaluated.  Patient states he was doing fine did not have any headaches numbness tingling or weakness.  Nursing home was doing every 4 hours neurochecks when they came to check on him tonight he was extremely nauseous had vomited and they noticed that his heart rate was elevated.  Patient does have a history of atrial fibrillation which is why he is on Eliquis.  Patient states he has no chest pain or shortness of breath.      REVIEW OF SYSTEMS       Review of Systems   Constitutional:  Negative for activity change, appetite change, chills, diaphoresis and fever.   HENT:  Negative for congestion, ear pain, facial swelling, nosebleeds, rhinorrhea, sinus pressure, sore throat and trouble swallowing.    Eyes:  Negative for pain, discharge and redness.   Respiratory:  Negative for cough, chest tightness, shortness of breath and wheezing.    Cardiovascular:  Negative for chest pain, palpitations and leg swelling.   Gastrointestinal:  Positive for nausea and vomiting. Negative for abdominal pain, blood in stool, constipation and diarrhea.   Genitourinary:  Negative for difficulty urinating, dysuria, flank pain, frequency, genital sores and hematuria.   Musculoskeletal:  Negative for  Index  [JL] Toy Pacheco MD         PROCEDURES:  None      DATA FOR LAB AND RADIOLOGY TESTS ORDERED BELOW ARE REVIEWED BY THE ED CLINICIAN:    RADIOLOGY: All x-rays, CT, MRI, and formal ultrasound images (except ED bedside ultrasound) are read by the radiologist, see reports below, unless otherwise noted in MDM or here.  Reports below are reviewed by myself.  CT ABDOMEN PELVIS W IV CONTRAST Additional Contrast? None   Final Result   1. Unchanged 3.1 cm abdominal aortic aneurysm.  Recommend CTA of the abdomen   pelvis in 3 years.         CT HEAD WO CONTRAST   Final Result   1. No acute intracranial abnormality.   2. Chronic microvascular ischemic changes.   3. 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             LABS: Lab orders shown below, the results are reviewed by myself, and all abnormals are listed below.  Labs Reviewed   CBC WITH AUTO DIFFERENTIAL - Abnormal; Notable for the following components:       Result Value    Hemoglobin 17.1 (*)     MCHC 35.5 (*)     Platelet, Fluorescence 123 (*)     Neutrophils % 71 (*)     Lymphocytes % 19 (*)     Eosinophils % 0 (*)     Immature Granulocytes % 1 (*)     All other components within normal limits   COMPREHENSIVE METABOLIC PANEL - Abnormal; Notable for the following components:    Chloride 94 (*)     Glucose 113 (*)     All other components within normal limits   TROPONIN - Abnormal; Notable for the following components:    Troponin, High Sensitivity 26 (*)     All other components within normal limits   LIPASE   TSH   T4, FREE   TROPONIN       Vitals Reviewed:    Vitals:    07/23/24 0154 07/23/24 0200 07/23/24 0230 07/23/24 0231   BP: 102/65 (!) 108/59 111/70    Pulse: (!) 127 (!) 121 (!) 128    Resp: 23 24 17    Temp:       TempSrc:       SpO2: 94% 98%  94%   Weight:       Height:         MEDICATIONS GIVEN TO PATIENT THIS ENCOUNTER:  Orders Placed This Encounter   Medications    sodium chloride 0.9 % bolus 1,000 mL    ondansetron

## 2024-07-23 NOTE — H&P
EVERY DAY 12/19/23   Ciera Cortez PA-C   metoprolol succinate (TOPROL XL) 25 MG extended release tablet TAKE 1 TABLET BY MOUTH EVERY DAY 11/13/23   Ciera Cortez PA-C   docusate sodium (COLACE) 100 MG capsule TAKE 1 CAPSULE BY MOUTH TWICE A DAY 10/31/23   Ciera Cortez PA-C   KLOR-CON M20 20 MEQ extended release tablet TAKE 1 TABLET BY MOUTH TWICE A DAY *MORNING AND EVENING* 9/12/23   Ciera Cortez PA-C   gabapentin (NEURONTIN) 300 MG capsule TAKE 2 CAPSULES BY MOUTH EVERY MORNING 1 CAP IN THE AFTERNOON,1 CAP AT BEDTIME  Patient taking differently: TAKE 1 CAPSULES BY MOUTH EVERY MORNING and 1 CAP AT BEDTIME 8/7/23 3/6/24  Hany Hodges MD   Magnesium 400 MG CAPS Take by mouth daily    Gilmer Carranza MD   folic acid (FOLVITE) 1 MG tablet Take 1 tablet by mouth daily 8/2/23 3/6/24  Hany Hodges MD   vitamin B-1 (THIAMINE) 100 MG tablet Take 1 tablet by mouth daily 8/2/23   Hany Hodges MD   Multiple Vitamins-Minerals (MULTIVITAMIN-MINERALS) TABS Take 1 tablet by mouth every morning 8/2/23   Hany Hodges MD   Alpha-Lipoic Acid 200 MG CAPS Take 1 capsule by mouth in the morning and at bedtime    Gilmer Carranza MD   sacubitril-valsartan (ENTRESTO) 24-26 MG per tablet Take 0.5 tablets by mouth 2 times daily Lot #: [id number]  Exp: July 2024 2/28/23   Tejas Mccoy MD   Biotin 67340 MCG TABS Take 1 tablet by mouth in the morning and at bedtime    Gilmer Carranza MD   aspirin 81 MG EC tablet Take 1 tablet by mouth daily    Gilmer Carranza MD   melatonin 5 mg TABS tablet Take 2 tablets by mouth nightly 6/11/22   Valerie Mendez MD   apixaban (ELIQUIS) 5 MG TABS tablet Take 1 tablet by mouth 2 times daily 3/16/22   Gilmer Carranza MD   Omega-3 Fatty Acids (FISH OIL CONCENTRATE) 1000 MG CAPS Take 2 caplets by mouth daily    Gilmer Carranza MD       Allergies:  Patient has no known allergies.    Social History:   TOBACCO:   reports that he quit smoking about 2  with the patient today  Social determinants of health that may impact management: none  Code status: DNR-CCA   Disposition: Discharge plan is [facility]        Critical Care Time:  Total critical care time caring for this patient with life threatening, unstable organ failure, including direct patient contact, management of life support systems, review of data including imaging and labs, discussions with other team members and physicians at least 0 minutes so far today, excluding separately billable procedures.      [facility] Medication Reconciliation documentation:    [x] I have utilized all available immediate resources to obtain, update, or review the patient's current medications (including all prescriptions, over-the-counter products, herbals, cannabinoid products and bitamin/mineral/dietary/nutritional supplements.  [If 'yes\", STOP HERE]     []  The patient is not eligible for medication reconciliation; the patient is in an emergent medical situation where delaying treatment would jeopardize the patient's health    []  I did NOT confirm, update or review the patient's current list of medications today.  [DOES NOT SATISFY MIPS PERFORMANCE]        [facility] Advanced Care Planning documentation:  [x] I have confirmed that the patient's Advance Care Plan is present, Code Status is documented, or surrogate decision maker is listed in the patient's medical record  [If \"yes\", STOP HERE]     [] The patient's Advance Care Plan is NOT present because:    []  I confirmed today that the patient does not wish or was not able to name a   surrogate decision maker or provide and advance care plan.    [] Hospice care is currently being provided or has been provided within the   calendar year.    []  I did NOT confirm today the presence of an Advance Care Plan or surrogate   decision maker documented within the patient's medical record.   [DOES NOT SATISFY MIPS PERFORMANCE]    [facility]IPS Heart failure documentation  [] YES     [x] NO    The

## 2024-07-23 NOTE — PROGRESS NOTES
Pt admitted to  from ER for A-Fib with RVR. Pt ambulated from one bed to the other. Weakness noted to BLE. Assessment and vital signs as charted. Pt denies pain at this time. ICU wires hooked up. Cardiac monitor shows A-Fib. Continuous pulse ox WNL. Nasal canula on at 2 L. Pt doesn't wear oxygen at the [place]. Pt is A & O x 4. Call light in reach. Bed alarm on. Will continue to monitor.

## 2024-07-23 NOTE — PROGRESS NOTES
Comprehensive Nutrition Assessment    Type and Reason for Visit:  Initial    Nutrition Recommendations/Plan:   Encourage lean proteins, no added salt  Encourage oral fluids for hydration.     Malnutrition Assessment:  Malnutrition Status:  No malnutrition (07/23/24 0817)    Context:  Acute Illness     Findings of the 6 clinical characteristics of malnutrition:  Energy Intake:  No significant decrease in energy intake  Weight Loss:  No significant weight loss     Body Fat Loss:  No significant body fat loss     Muscle Mass Loss:  No significant muscle mass loss    Fluid Accumulation:  No significant fluid accumulation     Strength:  Not Performed    Nutrition Assessment:    No nutrition diagnosis at this time. Post fall at ECF and n/v (resolved) with afib.  Taking PO well without current ingestion issues. On many supplemental vits/mins: vit D, B1, Mg++, mvi, fish oil, ALA and FA. Reports daily exercise but admittedly questionable oral fluid intakes which may be placing him at risk for falls.  Known heart failure on diuretics and Farxiga. Encouraged lean proteins and oral fluids upon visit.    Nutrition Related Findings:    no edema, active b/s. Wound Type: None       Current Nutrition Intake & Therapies:    Average Meal Intake: % (observed)  Average Supplements Intake: None Ordered  ADULT DIET; Regular    Anthropometric Measures:  Height: 170.2 cm (5' 7\")  Ideal Body Weight (IBW): 148 lbs (67 kg)    Admission Body Weight: 81.6 kg (180 lb)  Current Body Weight: 81.5 kg (179 lb 10.8 oz), 121.4 % IBW. Weight Source: Bed Scale  Current BMI (kg/m2): 28.1  Usual Body Weight: 81.6 kg (180 lb)  % Weight Change (Calculated): -0.2  Weight Adjustment For: No Adjustment                 BMI Categories: Overweight (BMI 25.0-29.9)    Estimated Daily Nutrient Needs:  Energy Requirements Based On: Kcal/kg  Weight Used for Energy Requirements: Current  Energy (kcal/day): 3201-8260 (18-23)  Weight Used for Protein  Requirements: Ideal  Protein (g/day): 81-94 (1.2-1.4)  Method Used for Fluid Requirements: 1 ml/kcal  Fluid (ml/day): 1900    Nutrition Diagnosis:   No nutrition diagnosis at this time       Lab Results   Component Value Date     07/23/2024    K 4.4 07/23/2024     07/23/2024    CO2 27 07/23/2024    BUN 15 07/23/2024    CREATININE 1.2 07/23/2024    GLUCOSE 112 (H) 07/23/2024    CALCIUM 8.8 07/23/2024    BILITOT 0.5 07/23/2024    ALKPHOS 85 07/23/2024    AST 20 07/23/2024    ALT 15 07/23/2024    LABGLOM 64 07/23/2024    GFRAA >60 09/20/2022     Hemoglobin A1C   Date Value Ref Range Status   03/14/2024 5.3 4.0 - 6.0 % Final     Lab Results   Component Value Date    VITD25 67.6 03/14/2024     Nutrition Interventions:   Food and/or Nutrient Delivery: Continue Current Diet  Nutrition Education/Counseling: No recommendation at this time  Coordination of Nutrition Care: Continue to monitor while inpatient  Plan of Care discussed with: patient    Goals:     Goals: Meet at least 75% of estimated needs       Nutrition Monitoring and Evaluation:   Behavioral-Environmental Outcomes: None Identified  Food/Nutrient Intake Outcomes: Food and Nutrient Intake  Physical Signs/Symptoms Outcomes: Biochemical Data, Weight    Discharge Planning:    No discharge needs at this time     Garry Gomes RD, LD  Contact: [phone]

## 2024-07-23 NOTE — ACP (ADVANCE CARE PLANNING)
Advance Care Planning     Palliative Team Advance Care Planning (ACP) Conversation    Date of Conversation: 07/23/24    Individuals present for the conversation: Patient with decision making capacity     ACP documents on file prior to discussion:  -Power of  for Healthcare        Healthcare Decision Maker:    Primary Decision Maker: Angelina Abrams - Pt Representative - [phone]    Secondary Decision Maker: Sarah Chan - Spouse - [phone]     Conversation Summary:  Pt verifies her DNR-CCA and DPOAHC    Resuscitation Status:   Code Status: DNR-CCA     Documentation Completed:  -No new documents completed.    I spent 20 minutes with the patient and/or surrogate decision maker discussing the patient's wishes and goals.      Cata Beltre RN

## 2024-07-23 NOTE — PROGRESS NOTES
INTENSIVE CARE UNIT   APRN - Progress Note    Patient - Michael Chan  Date of Admission -  7/23/2024  1:47 AM  Date of Evaluation -  7/23/2024  Hospital Day - 0      SUBJECTIVE:     The Mcihael Chan is a 73 y.o. male who is seen for follow up in the ICU.  Per nursing report and notes, overnight events include:  orthostatic hypotension .  He sitting on bedside with nurse at side. Ortho's positive.  Reported he vomited yesterday for which he has not done since \"I was a teenager\" and reported \"I blacked out the day before that\".       ROS:   Constitutional: negative  for fevers, and negative for chills.  Respiratory: negative for shortness of breath, negative for cough, and negative for wheezing  Cardiovascular: negative for chest pain, and negative for palpitations  Gastrointestinal: negative for abdominal pain, negative for nausea,negative for vomiting, negative for diarrhea, and negative for constipation    All other systems were reviewed with the patient and are negative unless otherwise stated in HPI.      OBJECTIVE:     VITAL SIGNS:  Patient Vitals for the past 8 hrs:   BP Temp Temp src Pulse Resp SpO2 Height Weight   07/23/24 0624 (!) 78/48 -- -- (!) 118 24 95 % -- --   07/23/24 0623 (!) 83/50 -- -- (!) 101 13 98 % -- --   07/23/24 0621 (!) 109/59 -- -- 97 17 97 % -- --   07/23/24 0600 117/83 -- -- 99 23 96 % -- --   07/23/24 0530 118/82 -- -- 100 -- 97 % -- --   07/23/24 0515 119/77 97.2 °F (36.2 °C) Temporal (!) 110 16 94 % 1.702 m (5' 7\") 81.5 kg (179 lb 10.8 oz)   07/23/24 0415 (!) 95/39 -- -- (!) 113 -- 98 % -- --   07/23/24 0402 99/60 -- -- (!) 108 -- 97 % -- --   07/23/24 0352 -- -- -- (!) 132 17 96 % -- --   07/23/24 0342 (!) 74/53 -- -- (!) 126 17 95 % -- --   07/23/24 0332 (!) 97/58 -- -- (!) 130 17 96 % -- --   07/23/24 0322 -- -- -- (!) 134 13 94 % -- --   07/23/24 0312 (!) 94/56 -- -- (!) 128 (!) 8 94 % -- --   07/23/24 0302 (!) 85/73 -- -- (!) 133 28 -- -- --   07/23/24 0252 126/88 --  aneurysm.  Recommend CTA of the abdomen   pelvis in 3 years.         CT HEAD WO CONTRAST   Final Result   1. No acute intracranial abnormality.   2. Chronic microvascular ischemic changes.   3. 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               ASSESSMENT / PLAN:     Primary Problem(s): Atrial fibrillation with RVR (HCC)  Differential diagnoses: Atrial flutter  Condition is 1 or more chronic illnesses with severe exacerbation, progression, or side effects of treatment  Condition is stable  Treatment plan:   Admit to ICU  Consult cardiology  Telemetry monitoring  Monitor labs and replace electrolytes  Oxygen therapy protocol  Imaging:   Echo ordered  Last echo from 3/21/2023 showed EF of 50 to 55%, distal one third of the myocardial apex mildly hypokinetic, mildly dilated left atrium, mild mitral regurgitation, trivial pulmonic regurgitation, grade 1 diastolic dysfunction  Medications:   IVL  Given one-time dose of metoprolol IV in ER  Given one-time dose of Cardizem IV  Continue Eliquis  Continue amiodarone  Continue metoprolol  Continue midodrine  No Dig d/t interaction with Amiodarone  Medication Monitoring / High Risk Medications: none      Chronic diastolic congestive heart failure  Condition is a chronic stable condition  Treatment plan:   Cardiology consult  I&O, daily weights  Imaging: Echo ordered  Medications:   Continue Bumex  Continue Entresto  Continue metoprolol  Continue Jardiance     Orthostatic Hypotension  Condition is a chronic stable condition  Treatment plan:   Monitor vital signs  Orthostatic vitals  Telemetry monitor  Cardiology consult  Imaging: no further imaging studies ordered today  Medications:   Received 2 L of IV fluid in ER  Continue midodrine  Hold off on starting Florinef d/t tachycardia     Nutrition status:   mild malnutrition  Dietician consult initiated     Hospital Prophylaxis:   DVT: Eliquis   Stress Ulcer: PPI      Disposition:  Shared decision

## 2024-07-23 NOTE — PROGRESS NOTES
Writer present, vitals and assessment as charted. Patient resting when writer entered, easily aroused. Patient a/o, denies any pain. Blood pressure rechecked, patient non-symptomatic with low blood pressures. Patient vocalizes relief from feeling panic. States feels more alert and clear without panic feeling present.  Patient denies any needs at this time. Call light and bedside table within reach. Bed alarm active for safety.

## 2024-07-23 NOTE — CONSULTS
Patient: Michale Chan  : 1950  Primary Care Physician: Hany Hodges  Today's Date: 2024    REASON FOR CONSULTATION: atrial fibrillation    HPI: Mr. Chan is a 73 y.o. male well known to me who was 1st admitted to the hospital on 2022 for weakness and shortness of breath. In the ER he was found to be in atrial fibrillation with RVR leading to a consultation. He was then readmitted due to severe dehydration. In the past he reports seeing a Cardiologist in [place] and was planning on having a cardioversion done in  to put him back into normal sinus rhythm due to his history of new onset atrial fibrillation. He reports a history of a heart attack at age 52 and needed stents placed at that time but denies any cardiac cath since that time. He also has carotid artery stenosis and abdominal aortic aneurysm. He did report having surgery on one side of his neck, however he is not sure which side or how long ago it was. He has had hypertension and hyperlipidemia for years as well. He is a current every day smoker and he smoked for about 55 years and smokes about a pack a day. Family history consists of a lot of people in his family with significant cardiac history, however he wanted us to talk to his sister about the family history as she knows the details. Echo done on 2022 showed an EF of 15-20%. ECHO done on 22: Dilated all cardiac chambers.Severe biventricular systolic dysfunction. Estimated left ventricular ejection fraction is 20-25% Moderate to severe mitral and moderate tricuspid regurgitation. Moderate pulmonary hypertension with an estimated right ventricular systolic pressure of 61 mmHg. Evidence of moderate to severe diastolic dysfunction is seen. Bilateral pleural effusion noted. Compared to the previous study of 2022, no significant change in ejection fraction. Functional mitral and tricuspid regurgitation are worse. Bilateral pleural effusion and left  apex)  Pulmonary/Chest: Effort normal and breath sounds normal. No respiratory distress. He has no wheezes, rhonchi or rales. Abdominal: Soft, non-tender. Bowel sounds and aorta are normal. He exhibits no organomegaly, mass or bruit.   Extremities: None. No cyanosis or clubbing. 2+ radial and carotid pulses. Distal extremity pulses: 2+ bilaterally. +1 Posterior tibial pulse on right but absent in all other pedal pulses.  Neurological: He is alert and oriented to person, place, and time. No evidence of gross cranial nerve deficit. Coordination appeared normal.   Skin: Skin is warm and dry. There is no rash or diaphoresis.   Psychiatric: He has a normal mood and affect. His speech is normal and behavior is normal.      MOST RECENT LABS ON RECORD:   Lab Results   Component Value Date    WBC 8.6 07/23/2024    HGB 15.4 07/23/2024    HCT 43.9 07/23/2024    PLT See Reflexed IPF Result 07/23/2024    CHOL 121 09/18/2023    TRIG 82 09/18/2023    HDL 47 09/18/2023    ALT 15 07/23/2024    AST 20 07/23/2024     07/23/2024    K 4.4 07/23/2024     07/23/2024    CREATININE 1.2 07/23/2024    BUN 15 07/23/2024    CO2 27 07/23/2024    TSH 1.51 07/23/2024    INR 1.1 03/14/2024    LABA1C 5.3 03/14/2024     ASSESSMENT:  1. Atrial fibrillation with RVR (HCC)    2. Nausea and vomiting, unspecified vomiting type    3. Paroxysmal atrial fibrillation (HCC)      PLAN:  Reported history of sudden onset of Anxiety Attacks but suspect that this could actually be paroxysmal supraventricular tachycardia: He has been having an increase in anxiety attacks over the past month. He is not sure why this has started. He has them in the past however not for a really long time. He was recently started on Lexapro and Trazodone to see if this helps.  During these attacks he does feel like he is having a heart attack no pain however he does become shortness of breath and also can feel his heart start to race.   We did discuss that at this time it

## 2024-07-23 NOTE — CONSULTS
Palliative Care Inpatient Consult    NAME:  Michael Chan  MEDICAL RECORD NUMBER:  [id number]  AGE: 73 y.o.   GENDER: male  : 1950  TODAY'S DATE:  2024    Reason for Consult:  goals of care    History of Present Illness     The patient is a 73 y.o.  Non- / non  male who presents with Fall (Pt to ED from the [facility] with c/o fall, nausea, and vomiting./Fall occurred x1 day ago. Pt reports \"blacking out\" and hitting head./Also, reports arrhythmias. Pt has a history of A. Fib./Pt is on blood thinner.), Emesis, Nausea, and Irregular Heart Beat      Referred to Palliative Care by  [] Physician   [] Nursing  [] Family Request   [] Other:      He was admitted to the hospitalist service for Paroxysmal atrial fibrillation (HCC) [I48.0]  Atrial fibrillation with RVR (HCC) [I48.91]  Nausea and vomiting, unspecified vomiting type [R11.2]. The patient has a complicated medical history and has been hospitalized since 2024  1:47 AM.    Active Hospital Problems    Diagnosis Date Noted    Chronic renal disease, stage III (HCC) [746251] [N18.30] 2022     Priority: High    Nonischemic cardiomyopathy (HCC) [I42.8] 2022     Priority: Medium    PAF (paroxysmal atrial fibrillation) (HCC) [I48.0] 2022     Priority: Medium    Orthostatic hypotension [I95.1] 2022     Priority: Medium    Coronary artery disease [I25.10]      Priority: Medium    Atrial fibrillation with RVR (HCC) [I48.91] 2022     Priority: Medium    Secondary hypercoagulable state (HCC) [D68.69] 2024    Alcoholic cirrhosis of liver without ascites (HCC) [K70.30] 2024    Chronic combined systolic (congestive) and diastolic (congestive) heart failure (HCC) [I50.42] 2024    Parkinson's disease, unspecified whether dyskinesia present, unspecified whether manifestations fluctuate (HCC) [G20.A1] 2023       Data         BP (!) 91/41   Pulse (!) 109   Temp 97.9 °F (36.6 °C) (Temporal)

## 2024-07-24 ENCOUNTER — ANESTHESIA EVENT (OUTPATIENT)
Dept: ICU | Age: 74
End: 2024-07-24
Payer: MEDICARE

## 2024-07-24 ENCOUNTER — ANESTHESIA (OUTPATIENT)
Dept: ICU | Age: 74
End: 2024-07-24
Payer: MEDICARE

## 2024-07-24 LAB
ANION GAP SERPL CALCULATED.3IONS-SCNC: 6 MMOL/L (ref 9–17)
BASOPHILS # BLD: 0.06 K/UL (ref 0–0.2)
BASOPHILS NFR BLD: 1 % (ref 0–2)
BUN SERPL-MCNC: 14 MG/DL (ref 8–23)
BUN/CREAT SERPL: 12 (ref 9–20)
CALCIUM SERPL-MCNC: 8.7 MG/DL (ref 8.6–10.4)
CHLORIDE SERPL-SCNC: 104 MMOL/L (ref 98–107)
CO2 SERPL-SCNC: 30 MMOL/L (ref 20–31)
CREAT SERPL-MCNC: 1.2 MG/DL (ref 0.7–1.2)
EKG ATRIAL RATE: 61 BPM
EKG P AXIS: 51 DEGREES
EKG P-R INTERVAL: 150 MS
EKG Q-T INTERVAL: 334 MS
EKG Q-T INTERVAL: 386 MS
EKG Q-T INTERVAL: 436 MS
EKG QRS DURATION: 76 MS
EKG QRS DURATION: 86 MS
EKG QRS DURATION: 92 MS
EKG QTC CALCULATION (BAZETT): 438 MS
EKG QTC CALCULATION (BAZETT): 500 MS
EKG QTC CALCULATION (BAZETT): 509 MS
EKG R AXIS: 32 DEGREES
EKG R AXIS: 64 DEGREES
EKG R AXIS: 78 DEGREES
EKG T AXIS: 48 DEGREES
EKG T AXIS: 82 DEGREES
EKG T AXIS: 88 DEGREES
EKG VENTRICULAR RATE: 101 BPM
EKG VENTRICULAR RATE: 140 BPM
EKG VENTRICULAR RATE: 61 BPM
EOSINOPHIL # BLD: 0.11 K/UL (ref 0–0.44)
EOSINOPHILS RELATIVE PERCENT: 2 % (ref 1–4)
ERYTHROCYTE [DISTWIDTH] IN BLOOD BY AUTOMATED COUNT: 13.2 % (ref 11.8–14.4)
GFR, ESTIMATED: 64 ML/MIN/1.73M2
GLUCOSE SERPL-MCNC: 96 MG/DL (ref 70–99)
HCT VFR BLD AUTO: 43.2 % (ref 40.7–50.3)
HGB BLD-MCNC: 14.4 G/DL (ref 13–17)
IMM GRANULOCYTES # BLD AUTO: 0 K/UL (ref 0–0.3)
IMM GRANULOCYTES NFR BLD: 0 %
INR PPP: 1
LYMPHOCYTES NFR BLD: 1.31 K/UL (ref 1.1–3.7)
LYMPHOCYTES RELATIVE PERCENT: 23 % (ref 24–43)
MAGNESIUM SERPL-MCNC: 1.9 MG/DL (ref 1.6–2.6)
MCH RBC QN AUTO: 32.7 PG (ref 25.2–33.5)
MCHC RBC AUTO-ENTMCNC: 33.3 G/DL (ref 28.4–34.8)
MCV RBC AUTO: 98.2 FL (ref 82.6–102.9)
MONOCYTES NFR BLD: 0.51 K/UL (ref 0.1–1.2)
MONOCYTES NFR BLD: 9 % (ref 3–12)
MORPHOLOGY: ABNORMAL
NEUTROPHILS NFR BLD: 65 % (ref 36–65)
NEUTS SEG NFR BLD: 3.71 K/UL (ref 1.5–8.1)
NRBC BLD-RTO: 0 PER 100 WBC
PLATELET # BLD AUTO: ABNORMAL K/UL (ref 138–453)
PLATELET, FLUORESCENCE: 98 K/UL (ref 138–453)
PLATELETS.RETICULATED NFR BLD AUTO: 6.6 % (ref 1.1–10.3)
POTASSIUM SERPL-SCNC: 4.7 MMOL/L (ref 3.7–5.3)
PROTHROMBIN TIME: 13.3 SEC (ref 11.7–14.1)
RBC # BLD AUTO: 4.4 M/UL (ref 4.21–5.77)
SODIUM SERPL-SCNC: 140 MMOL/L (ref 135–144)
WBC OTHER # BLD: 5.7 K/UL (ref 3.5–11.3)

## 2024-07-24 PROCEDURE — 83735 ASSAY OF MAGNESIUM: CPT

## 2024-07-24 PROCEDURE — 36620 INSERTION CATHETER ARTERY: CPT

## 2024-07-24 PROCEDURE — 6370000000 HC RX 637 (ALT 250 FOR IP): Performed by: NURSE PRACTITIONER

## 2024-07-24 PROCEDURE — 2000000000 HC ICU R&B

## 2024-07-24 PROCEDURE — 97110 THERAPEUTIC EXERCISES: CPT

## 2024-07-24 PROCEDURE — 2580000003 HC RX 258: Performed by: NURSE PRACTITIONER

## 2024-07-24 PROCEDURE — 6370000000 HC RX 637 (ALT 250 FOR IP)

## 2024-07-24 PROCEDURE — 92960 CARDIOVERSION ELECTRIC EXT: CPT | Performed by: INTERNAL MEDICINE

## 2024-07-24 PROCEDURE — 97116 GAIT TRAINING THERAPY: CPT

## 2024-07-24 PROCEDURE — 36620 INSERTION CATHETER ARTERY: CPT | Performed by: NURSE ANESTHETIST, CERTIFIED REGISTERED

## 2024-07-24 PROCEDURE — 85610 PROTHROMBIN TIME: CPT

## 2024-07-24 PROCEDURE — 5A2204Z RESTORATION OF CARDIAC RHYTHM, SINGLE: ICD-10-PCS | Performed by: INTERNAL MEDICINE

## 2024-07-24 PROCEDURE — 93010 ELECTROCARDIOGRAM REPORT: CPT | Performed by: FAMILY MEDICINE

## 2024-07-24 PROCEDURE — 2580000003 HC RX 258

## 2024-07-24 PROCEDURE — 85025 COMPLETE CBC W/AUTO DIFF WBC: CPT

## 2024-07-24 PROCEDURE — 36415 COLL VENOUS BLD VENIPUNCTURE: CPT

## 2024-07-24 PROCEDURE — 2500000003 HC RX 250 WO HCPCS: Performed by: NURSE ANESTHETIST, CERTIFIED REGISTERED

## 2024-07-24 PROCEDURE — 6360000002 HC RX W HCPCS: Performed by: NURSE ANESTHETIST, CERTIFIED REGISTERED

## 2024-07-24 PROCEDURE — 93005 ELECTROCARDIOGRAM TRACING: CPT | Performed by: INTERNAL MEDICINE

## 2024-07-24 PROCEDURE — 92960 CARDIOVERSION ELECTRIC EXT: CPT

## 2024-07-24 PROCEDURE — 80048 BASIC METABOLIC PNL TOTAL CA: CPT

## 2024-07-24 RX ORDER — LIDOCAINE HYDROCHLORIDE 20 MG/ML
5 INJECTION, SOLUTION INFILTRATION; PERINEURAL ONCE
Status: COMPLETED | OUTPATIENT
Start: 2024-07-24 | End: 2024-07-24

## 2024-07-24 RX ORDER — SODIUM CHLORIDE 0.9 % (FLUSH) 0.9 %
5-40 SYRINGE (ML) INJECTION PRN
Status: DISCONTINUED | OUTPATIENT
Start: 2024-07-24 | End: 2024-07-25 | Stop reason: HOSPADM

## 2024-07-24 RX ORDER — SODIUM CHLORIDE 9 MG/ML
INJECTION, SOLUTION INTRAVENOUS PRN
Status: DISCONTINUED | OUTPATIENT
Start: 2024-07-24 | End: 2024-07-25 | Stop reason: HOSPADM

## 2024-07-24 RX ORDER — LIDOCAINE HYDROCHLORIDE 10 MG/ML
5 INJECTION, SOLUTION INFILTRATION; PERINEURAL ONCE
Status: DISCONTINUED | OUTPATIENT
Start: 2024-07-24 | End: 2024-07-24

## 2024-07-24 RX ORDER — PROPOFOL 10 MG/ML
INJECTION, EMULSION INTRAVENOUS PRN
Status: DISCONTINUED | OUTPATIENT
Start: 2024-07-24 | End: 2024-07-24 | Stop reason: SDUPTHER

## 2024-07-24 RX ORDER — SODIUM CHLORIDE 9 MG/ML
INJECTION, SOLUTION INTRAVENOUS
Status: DISPENSED
Start: 2024-07-24 | End: 2024-07-25

## 2024-07-24 RX ORDER — SODIUM CHLORIDE 0.9 % (FLUSH) 0.9 %
5-40 SYRINGE (ML) INJECTION EVERY 12 HOURS SCHEDULED
Status: DISCONTINUED | OUTPATIENT
Start: 2024-07-24 | End: 2024-07-25 | Stop reason: HOSPADM

## 2024-07-24 RX ORDER — PROPOFOL 10 MG/ML
INJECTION, EMULSION INTRAVENOUS
Status: COMPLETED
Start: 2024-07-24 | End: 2024-07-24

## 2024-07-24 RX ORDER — PHENYLEPHRINE HYDROCHLORIDE 10 MG/ML
INJECTION INTRAVENOUS
Status: DISPENSED
Start: 2024-07-24 | End: 2024-07-25

## 2024-07-24 RX ORDER — NALOXONE HYDROCHLORIDE 0.4 MG/ML
INJECTION, SOLUTION INTRAMUSCULAR; INTRAVENOUS; SUBCUTANEOUS PRN
Status: DISCONTINUED | OUTPATIENT
Start: 2024-07-24 | End: 2024-07-25 | Stop reason: HOSPADM

## 2024-07-24 RX ADMIN — ATORVASTATIN CALCIUM 80 MG: 40 TABLET, FILM COATED ORAL at 08:04

## 2024-07-24 RX ADMIN — ESCITALOPRAM 10 MG: 5 TABLET, FILM COATED ORAL at 08:04

## 2024-07-24 RX ADMIN — EMPAGLIFLOZIN 10 MG: 10 TABLET, FILM COATED ORAL at 08:04

## 2024-07-24 RX ADMIN — APIXABAN 5 MG: 5 TABLET, FILM COATED ORAL at 20:55

## 2024-07-24 RX ADMIN — METOPROLOL SUCCINATE 25 MG: 25 TABLET, EXTENDED RELEASE ORAL at 08:04

## 2024-07-24 RX ADMIN — DOCUSATE SODIUM 100 MG: 100 CAPSULE, LIQUID FILLED ORAL at 08:04

## 2024-07-24 RX ADMIN — GABAPENTIN 300 MG: 300 CAPSULE ORAL at 13:48

## 2024-07-24 RX ADMIN — SODIUM CHLORIDE: 9 INJECTION, SOLUTION INTRAVENOUS at 15:51

## 2024-07-24 RX ADMIN — TAMSULOSIN HYDROCHLORIDE 0.4 MG: 0.4 CAPSULE ORAL at 08:05

## 2024-07-24 RX ADMIN — AMIODARONE HYDROCHLORIDE 100 MG: 200 TABLET ORAL at 08:05

## 2024-07-24 RX ADMIN — Medication 100 MG: at 08:05

## 2024-07-24 RX ADMIN — POTASSIUM CHLORIDE 20 MEQ: 1500 TABLET, EXTENDED RELEASE ORAL at 17:35

## 2024-07-24 RX ADMIN — DOCUSATE SODIUM 100 MG: 100 CAPSULE, LIQUID FILLED ORAL at 20:54

## 2024-07-24 RX ADMIN — APIXABAN 5 MG: 5 TABLET, FILM COATED ORAL at 08:05

## 2024-07-24 RX ADMIN — Medication 10 MG: at 20:54

## 2024-07-24 RX ADMIN — LIDOCAINE HYDROCHLORIDE 5 ML: 20 INJECTION, SOLUTION INFILTRATION; PERINEURAL at 17:06

## 2024-07-24 RX ADMIN — GABAPENTIN 300 MG: 300 CAPSULE ORAL at 20:55

## 2024-07-24 RX ADMIN — PROPOFOL 10 MG: 10 INJECTION, EMULSION INTRAVENOUS at 16:35

## 2024-07-24 RX ADMIN — BUMETANIDE 0.5 MG: 0.5 TABLET ORAL at 08:03

## 2024-07-24 RX ADMIN — SODIUM CHLORIDE: 9 INJECTION, SOLUTION INTRAVENOUS at 16:29

## 2024-07-24 RX ADMIN — PROPOFOL 40 MG: 10 INJECTION, EMULSION INTRAVENOUS at 16:33

## 2024-07-24 RX ADMIN — POTASSIUM CHLORIDE 20 MEQ: 1500 TABLET, EXTENDED RELEASE ORAL at 08:04

## 2024-07-24 RX ADMIN — MIDODRINE HYDROCHLORIDE 10 MG: 5 TABLET ORAL at 08:03

## 2024-07-24 RX ADMIN — SODIUM CHLORIDE, PRESERVATIVE FREE 10 ML: 5 INJECTION INTRAVENOUS at 08:05

## 2024-07-24 RX ADMIN — SODIUM CHLORIDE: 9 INJECTION, SOLUTION INTRAVENOUS at 20:57

## 2024-07-24 RX ADMIN — PANTOPRAZOLE SODIUM 40 MG: 40 TABLET, DELAYED RELEASE ORAL at 06:58

## 2024-07-24 RX ADMIN — Medication 1 TABLET: at 08:04

## 2024-07-24 RX ADMIN — MIDODRINE HYDROCHLORIDE 10 MG: 5 TABLET ORAL at 17:35

## 2024-07-24 RX ADMIN — ASPIRIN 81 MG: 81 TABLET, COATED ORAL at 08:04

## 2024-07-24 RX ADMIN — GABAPENTIN 300 MG: 300 CAPSULE ORAL at 08:04

## 2024-07-24 RX ADMIN — MIDODRINE HYDROCHLORIDE 10 MG: 5 TABLET ORAL at 11:22

## 2024-07-24 RX ADMIN — FOLIC ACID 1000 MCG: 1 TABLET ORAL at 08:04

## 2024-07-24 RX ADMIN — SODIUM CHLORIDE: 9 INJECTION, SOLUTION INTRAVENOUS at 06:57

## 2024-07-24 RX ADMIN — Medication 400 MG: at 08:05

## 2024-07-24 ASSESSMENT — LIFESTYLE VARIABLES: SMOKING_STATUS: 0

## 2024-07-24 NOTE — ANESTHESIA PROCEDURE NOTES
Arterial Line:    An arterial line was placed using surface landmarks, in the ICU for the following indication(s): continuous blood pressure monitoring.    A 20 gauge (size), 1 and 3/4 inch (length), Arrow (type) catheter was placed, Seldinger technique used, into the right radial artery, secured by tape and Tegaderm.  Anesthesia type: Local  Local infiltration: Injection7/24/2024 5:00 PM7/24/2024 5:15 PM  Resident/CRNA: Alli Crocker APRN - CRNA  Preanesthetic Checklist  Completed: patient identified, IV checked, site marked, risks and benefits discussed, surgical/procedural consents, equipment checked, pre-op evaluation, timeout performed, anesthesia consent given, oxygen available, monitors applied/VS acknowledged, fire risk safety assessment completed and verbalized and blood product R/B/A discussed and consented

## 2024-07-24 NOTE — PLAN OF CARE
Problem: Discharge Planning  Goal: Discharge to home or other facility with appropriate resources  7/24/2024 0705 by Heladio Walsh RN  Outcome: Progressing  7/24/2024 0513 by Madeleine Amato RN  Outcome: Progressing  Flowsheets  Taken 7/23/2024 2300  Discharge to home or other facility with appropriate resources: Identify barriers to discharge with patient and caregiver  Taken 7/23/2024 1844  Discharge to home or other facility with appropriate resources: Identify barriers to discharge with patient and caregiver     Problem: Safety - Adult  Goal: Free from fall injury  7/24/2024 0705 by Heladio Walsh RN  Outcome: Progressing  7/24/2024 0513 by Madeleine Amato RN  Outcome: Progressing  Note: Call light and bedside table with in reach. Bed and chair wheels locked at all times, with bed in lowest position. Frequent checks and needs meet in appropriate timing.      Problem: ABCDS Injury Assessment  Goal: Absence of physical injury  7/24/2024 0705 by Heladio Walsh RN  Outcome: Progressing  7/24/2024 0513 by Madeleine Amato RN  Outcome: Progressing     Problem: Chronic Conditions and Co-morbidities  Goal: Patient's chronic conditions and co-morbidity symptoms are monitored and maintained or improved  7/24/2024 0705 by Heladio Walsh RN  Outcome: Progressing  7/24/2024 0513 by Madeleine Amato RN  Outcome: Progressing

## 2024-07-24 NOTE — PROGRESS NOTES
Physical Therapy  Facility/Department: [facility] ICU  Daily Treatment Note  NAME: Michael Chan  : 1950  MRN: [id number]    Date of Service: 2024    Discharge Recommendations:  Continue to assess pending progress, Long Term Care with PT, Long Term Care without PT      Patient Diagnosis(es): The primary encounter diagnosis was Atrial fibrillation with RVR (HCC). Diagnoses of Nausea and vomiting, unspecified vomiting type and Paroxysmal atrial fibrillation (HCC) were also pertinent to this visit.    Assessment   Assessment: Pt able to complete STS from bed with no use of UE support and SBA/SUP, cues given for scooting to EOB before transfering. Pt demoing no LOB with fwd/retro gait with no AD. Pt using FWW with standing exer for maintaining balance with SUP.  Activity Tolerance: Patient tolerated treatment well     Plan    Physical Therapy Plan  General Plan: 2 times a day 7 days a week  Specific Instructions for Next Treatment: 1x daily on weekends  Current Treatment Recommendations: Strengthening;ROM;Balance training;Functional mobility training;Transfer training;ADL/Self-care training;IADL training;Cognitive/Perceptual training;Endurance training;Gait training;Stair training;Neuromuscular re-education;Manual;Pain management;Home exercise program;Safety education & training;Patient/Caregiver education & training;Positioning;Therapeutic activities     Restrictions  Restrictions/Precautions  Restrictions/Precautions: Fall Risk, General Precautions     Subjective    Subjective  Subjective: Pt. in bed upon arrival agreeable to therapy at this time.  Pain: Denies  Orientation  Overall Orientation Status: Within Functional Limits  Orientation Level: Oriented to place;Disoriented to situation;Disoriented to place;Oriented to person  Cognition  Arousal/Alertness: Appears intact  Following Commands: Follows all commands without difficulty  Attention Span: Attends with cues to redirect;Difficulty attending to

## 2024-07-24 NOTE — PROGRESS NOTES
Pt resting in bed watching TV and talking on his cell phone. Assessment and vital signs as charted. Pt denies pain at this time. Pt is A & O x 4 with intermittent confusion noted. Cardiac monitor shows NSR. Continuous pulse ox WNL. Pt is on room air. Art line intact, no drainage, re-zeroed, leveled and flushed. Art line to right radial. Writer explained/educated pt on importance of keeping right arm straight and minimal movement to right wrist d/t art line being positional for accurate blood pressures. Pt verbalized understanding. Pt denies any other needs. Call light in reach. Bed alarm on. Will continue to monitor.

## 2024-07-24 NOTE — PROGRESS NOTES
Reassessment obtained. Patient requesting to get from chair to bed. Stating he is \"waiting for someone to pick him up to get him to [facility] to  some clothing.\" Patient reoriented and redirected. Explained plan of care for the day. Patient able to answer orientation questions properly. Patient back in bed with bed alarm on and call light within reach.

## 2024-07-24 NOTE — PLAN OF CARE
Problem: Discharge Planning  Goal: Discharge to home or other facility with appropriate resources  Outcome: Progressing  Flowsheets  Taken 7/23/2024 2300  Discharge to home or other facility with appropriate resources: Identify barriers to discharge with patient and caregiver  Taken 7/23/2024 1844  Discharge to home or other facility with appropriate resources: Identify barriers to discharge with patient and caregiver     Problem: Safety - Adult  Goal: Free from fall injury  Outcome: Progressing  Note: Call light and bedside table with in reach. Bed and chair wheels locked at all times, with bed in lowest position. Frequent checks and needs meet in appropriate timing.      Problem: ABCDS Injury Assessment  Goal: Absence of physical injury  Outcome: Progressing     Problem: Chronic Conditions and Co-morbidities  Goal: Patient's chronic conditions and co-morbidity symptoms are monitored and maintained or improved  Outcome: Progressing

## 2024-07-24 NOTE — PROGRESS NOTES
Assessment and vitals obtained at this time. Patient alert and oriented x4. Denying any chest pain, dizziness, and denying any issues at this time. Bed alarm set. Call light within reach.

## 2024-07-24 NOTE — PROGRESS NOTES
Cardiology office called at this time and would like patient to be NPO for possible cardioversion later.

## 2024-07-24 NOTE — PROGRESS NOTES
Occupational Therapy  Facility/Department: [facility] ICU  Daily Treatment Note  NAME: Michael Chan  : 1950  MRN: [id number]    Date of Service: 2024    Discharge Recommendations:  Subacute/Skilled Nursing Facility, 24 hour supervision or assist, Long Term Care with OT, Long Term Care without OT         Patient Diagnosis(es): The primary encounter diagnosis was Atrial fibrillation with RVR (HCC). Diagnoses of Nausea and vomiting, unspecified vomiting type and Paroxysmal atrial fibrillation (HCC) were also pertinent to this visit.     Assessment    Assessment: Pt requried frequent cues for use of pursed lip breathing technique throughout session.  Activity Tolerance: Patient tolerated treatment well  Discharge Recommendations: Subacute/Skilled Nursing Facility;24 hour supervision or assist;Long Term Care with OT;Long Term Care without OT      Plan   Occupational Therapy Plan  Times Per Day: Once a day  Days Per Week: 7 Days  Current Treatment Recommendations: Strengthening;Balance training;Functional mobility training;Endurance training;Equipment evaluation, education, & procurement;Patient/Caregiver education & training;Safety education & training;Self-Care / ADL;Coordination training     Restrictions       Subjective   Subjective  Subjective: pt lying supine in bed upon arrival. Pt agreed to participation in therapy session.  Pain: pt had no complaints of pain this date.  Orientation  Overall Orientation Status: Within Functional Limits  Orientation Level: Oriented to place;Disoriented to situation;Disoriented to place;Oriented to person  Pain: Denies  Cognition  Arousal/Alertness: Appears intact  Following Commands: Follows all commands without difficulty  Attention Span: Attends with cues to redirect;Difficulty attending to directions  Safety Judgement: Good awareness of safety precautions  Problem Solving: Appears intact  Insights: Impaired  Initiation: Appears intact  Sequencing: Appears intact     engage in 15 minutes of ther ex/ther act to improve strength and activity tolerance for ADLs and func mob upon d/c    AM-PAC - ADL       Therapy Time   Individual Concurrent Group Co-treatment   Time In 1310         Time Out 1340         Minutes 30                 MALIK Walton

## 2024-07-24 NOTE — PLAN OF CARE
Problem: Safety - Adult  Goal: Free from fall injury  7/24/2024 1925 by Megan Hardy RN  Outcome: Progressing  Flowsheets (Taken 7/24/2024 1925)  Free From Fall Injury: Instruct family/caregiver on patient safety  Note: Pt is at high risk for falls. Non skid socks on. Bed in low position and wheels locked. Fall sign posted and bracelet on. Siderails up x 2. Bed alarm on. Call light within reach. Will continue to assess.     Problem: Cardiovascular - Adult  Goal: Absence of cardiac dysrhythmias or at baseline  Flowsheets (Taken 7/24/2024 1925)  Absence of cardiac dysrhythmias or at baseline: Monitor cardiac rate and rhythm  Note: Cardiac monitor on. VS stable and WNL. Will continue to assess.

## 2024-07-24 NOTE — PROGRESS NOTES
INTENSIVE CARE UNIT   APRN - Progress Note    Patient - Michael Chan  Date of Admission -  2024  1:47 AM  Date of Evaluation -  2024  Hospital Day - 1      SUBJECTIVE:     The Michael Chan is a 73 y.o. male who is seen for follow up in the ICU.  Per nursing report and notes, overnight events include:  none .  He resting in bed no distress.  VS stable.  No other complaints      ROS:   Constitutional: negative  for fevers, and negative for chills.  Respiratory: negative for shortness of breath, negative for cough, and negative for wheezing  Cardiovascular: negative for chest pain, and negative for palpitations  Gastrointestinal: negative for abdominal pain, negative for nausea,negative for vomiting, negative for diarrhea, and negative for constipation    All other systems were reviewed with the patient and are negative unless otherwise stated in HPI.      OBJECTIVE:     VITAL SIGNS:  Patient Vitals for the past 8 hrs:   BP Temp Temp src Pulse Resp SpO2 Weight   24 0600 108/81 -- -- 84 13 95 % --   24 0500 (!) 70/30 -- -- 82 (!) 0 96 % --   24 0400 111/75 -- -- 85 18 93 % --   24 0349 -- 97.3 °F (36.3 °C) Temporal -- -- -- 83 kg (182 lb 15.7 oz)   24 0300 110/70 -- -- 87 15 95 % --   24 0200 93/63 -- -- 98 18 93 % --   24 0100 (!) 79/59 -- -- 79 12 94 % --   24 0000 110/65 -- -- 88 17 94 % --   24 2300 114/71 97.2 °F (36.2 °C) Temporal 82 17 95 % --         Temp: 97.3 °F (36.3 °C)  Temp range:    Temp  Av.4 °F (36.3 °C)  Min: 96.8 °F (36 °C)  Max: 97.9 °F (36.6 °C)    BP: 108/81  BP Range:      Systolic (24hrs), Av , Min:70 , Max:116      Diastolic (24hrs), Av, Min:30, Max:87    Pulse: 84  Pulse Range:    Pulse  Av.3  Min: 79  Max: 129    Respirations: 13  Resp Range:   Resp  Av.7  Min: 0  Max: 20    SpO2: 95 % on room air  SpO2 range:   SpO2  Av.5 %  Min: 77 %  Max: 97 %    Weight  Wt Readings from Last 3

## 2024-07-24 NOTE — ANESTHESIA PRE PROCEDURE
Department of Anesthesiology  Preprocedure Note       Name:  Michael Chan   Age:  73 y.o.  :  1950                                          MRN:  [id number]         Date:  2024      Surgeon: * No surgeons listed *    Procedure: * No procedures listed *    Medications prior to admission:   Prior to Admission medications    Medication Sig Start Date End Date Taking? Authorizing Provider   bumetanide (BUMEX) 1 MG tablet TAKE 1/2 TABLET BY MOUTH EVERY DAY 24   Tejas Mccoy MD   midodrine (PROAMATINE) 10 MG tablet TAKE 1 TABLET BY MOUTH EVERY MORNING, 1 TABLET AT NOON, AND 1 TABLET IN THE EVENING. TAKE WITH MEALS 7/15/24   Hany Hodges MD   traZODone (DESYREL) 50 MG tablet TAKE 1 TABLET BY MOUTH EVERY DAY AT BEDTIME AS NEEDED FOR SLEEP  Patient taking differently: Take 1.5 tablets by mouth daily as needed for Sleep 24   Hany Hodges MD   escitalopram (LEXAPRO) 10 MG tablet TAKE 1 TABLET BY MOUTH EVERY DAY 24   Hany Hodges MD   lactulose (CHRONULAC) 10 GM/15ML solution Take 15 mLs by mouth 3 times daily as needed (constipation) 24   Hany Hodges MD   vitamin D (ERGOCALCIFEROL) 1.25 MG (36385 UT) CAPS capsule Take 1 capsule by mouth Once a week at 5 PM 3/15/24   Hany Hodges MD   tamsulosin (FLOMAX) 0.4 MG capsule TAKE 1 CAPSULE BY MOUTH EVERY DAY IN THE MORNING 3/8/24   Hany Hodges MD   atorvastatin (LIPITOR) 80 MG tablet Take 1 tablet by mouth daily 24   Ciera Cortez PA-C   pantoprazole (PROTONIX) 40 MG tablet TAKE 1 TABLET BY MOUTH EVERY DAY BEFORE BREAKFAST 24   Hany Hodges MD   amiodarone (CORDARONE) 200 MG tablet Take 0.5 tablets by mouth daily 23   Ciera Cortez PA-C   FARXIGA 10 MG tablet TAKE 1 TABLET BY MOUTH EVERY DAY 23   Ciera Cortez PA-C   metoprolol succinate (TOPROL XL) 25 MG extended release tablet TAKE 1 TABLET BY MOUTH EVERY DAY 23   Ciera Cortez PA-C   docusate sodium (COLACE) 100 MG capsule TAKE 1

## 2024-07-24 NOTE — PROGRESS NOTES
Physical Therapy  Facility/Department: [facility] ICU  Daily Treatment Note  NAME: Michael Chan  : 1950  MRN: [id number]    Date of Service: 2024    Discharge Recommendations:  Continue to assess pending progress, Long Term Care with PT, Long Term Care without PT     Patient Diagnosis(es): The primary encounter diagnosis was Atrial fibrillation with RVR (HCC). Diagnoses of Nausea and vomiting, unspecified vomiting type and Paroxysmal atrial fibrillation (HCC) were also pertinent to this visit.    Assessment   Assessment: Pt. able to ambulate 10ftx1 with no AD, SBA. Bed mobility/transfers:SBa/SUP. Supine exercises B LE x20. Seated and standing balance while performing ADL tasks, required no assistance for balance.  Activity Tolerance: Patient tolerated treatment well     Plan    Physical Therapy Plan  General Plan: 2 times a day 7 days a week  Current Treatment Recommendations: Strengthening;ROM;Balance training;Functional mobility training;Transfer training;ADL/Self-care training;IADL training;Cognitive/Perceptual training;Endurance training;Gait training;Stair training;Neuromuscular re-education;Manual;Pain management;Home exercise program;Safety education & training;Patient/Caregiver education & training;Positioning;Therapeutic activities     Restrictions  Restrictions/Precautions  Restrictions/Precautions: Fall Risk, General Precautions     Subjective    Subjective  Subjective: Pt. in bed upon arrival agreeable to therapy at this time.  Pain: Denies  Orientation  Overall Orientation Status: Within Functional Limits     Objective   Bed Mobility Training  Bed Mobility Training: Yes  Overall Level of Assistance: Stand-by assistance;Supervision;Assist X1  Interventions: Verbal cues  Rolling: Stand-by assistance;Supervision;Assist X1  Supine to Sit: Stand-by assistance;Supervision;Assist X1  Transfer Training  Transfer Training: Yes  Overall Level of Assistance: Stand-by assistance;Supervision;Assist  X1  Interventions: Verbal cues  Sit to Stand: Stand-by assistance;Supervision;Assist X1  Stand to Sit: Stand-by assistance;Supervision;Assist X1  Gait  Gait Training: Yes  Overall Level of Assistance: Stand-by assistance;Assist X1  Distance (ft): 10 Feet  Interventions: Verbal cues;Safety awareness training  Speed/Andreina: Slow  PT Exercises  Exercise Treatment: Reclined exercises B LE x20  Other Specialty Interventions  Other Treatments/Modalities: Seated and standing balance while completing ADL tasks, no noted LOB and required no support to maintain balance.  Safety Devices  Type of Devices: All fall risk precautions in place;Chair alarm in place;Gait belt;Nurse notified;Left in chair       Goals  Short Term Goals  Time Frame for Short Term Goals: 10 days  Short Term Goal 1: Patient will ambulate 50' with CGA, without LOB  Short Term Goal 2: Patient will perform transfers and bed mobility with MI  Short Term Goal 3: Patient will tolerate 20-30 minutes of therex/act to improve endurance for ADLs.    Education  Patient Education  Education Given To: Patient  Education Provided: Role of Therapy;Plan of Care;Home Exercise Program  Education Method: Verbal  Barriers to Learning: None  Education Outcome: Verbalized understanding      Therapy Time   Individual Concurrent Group Co-treatment   Time In 0835         Time Out 0913         Minutes 38           Marta Lloyd, PTA

## 2024-07-24 NOTE — OP NOTE
Operative Note      Patient: Michael Chan  YOB: 1950  MRN: [id number]    Date of Procedure: 7/24/2024.    Procedure: Elective cardioversion    Post-Op Diagnosis:  Resumed sinus rhythm.       Indication for the procedure: Persistent atrial fibrillation, symptomatic.    Estimated Blood Loss (mL): None    Complications: None      Detailed Description of Procedure:   Procedure was done in the ICU.  Details of the cardioversion procedure including benefits, risks and alternatives explained to the patient and his sister and they agreed to proceed.  Informed consent obtained.  A separate informed consent obtained by the anesthesia team.    Timeout obtained by the ICU nurse.    Once adequate sedation obtained, the defibrillator pads were attached in the anteroposterior position.  200 J synchronized shock was delivered in the standard manner and patient converted to normal sinus rhythm.  Procedure completed without complication.    Because of hypotension even prior to the procedure, I ended up asking our anesthesia team to put an arterial line.  Entresto and Bumex on hold.  Gentle hydration is ongoing.  I will also hold his metoprolol dose for tomorrow morning.    Sandy Garcia MD, MS, F.A.C.C.  [facility], [address] [address]  Phone: [phone], Fax: [phone]

## 2024-07-24 NOTE — ANESTHESIA POSTPROCEDURE EVALUATION
Department of Anesthesiology  Postprocedure Note    Patient: Michael Chan  MRN: [id number]  YOB: 1950  Date of evaluation: 7/24/2024    Procedure Summary       Date: 07/24/24 Room / Location:     Anesthesia Start: 1629 Anesthesia Stop: 1643    Procedure: Cardioversion Diagnosis:     Scheduled Providers:  Responsible Provider: Alli Crocker APRN - CRNA    Anesthesia Type: general, TIVA ASA Status: 3            Anesthesia Type: No value filed.    Radha Phase I: Radha Score: 10    Radha Phase II:      Anesthesia Post Evaluation    Patient location during evaluation: bedside  Patient participation: complete - patient participated  Level of consciousness: awake and alert  Airway patency: patent  Nausea & Vomiting: no nausea and no vomiting  Cardiovascular status: hemodynamically stable  Respiratory status: acceptable  Hydration status: stable  Pain management: adequate    No notable events documented.

## 2024-07-25 VITALS
TEMPERATURE: 97.8 F | OXYGEN SATURATION: 90 % | BODY MASS INDEX: 28.96 KG/M2 | RESPIRATION RATE: 19 BRPM | WEIGHT: 184.5 LBS | DIASTOLIC BLOOD PRESSURE: 57 MMHG | HEART RATE: 69 BPM | HEIGHT: 67 IN | SYSTOLIC BLOOD PRESSURE: 93 MMHG

## 2024-07-25 LAB
ANION GAP SERPL CALCULATED.3IONS-SCNC: 9 MMOL/L (ref 9–17)
BASOPHILS # BLD: 0 K/UL (ref 0–0.2)
BASOPHILS NFR BLD: 0 % (ref 0–2)
BUN SERPL-MCNC: 16 MG/DL (ref 8–23)
BUN/CREAT SERPL: 16 (ref 9–20)
CALCIUM SERPL-MCNC: 8.6 MG/DL (ref 8.6–10.4)
CHLORIDE SERPL-SCNC: 106 MMOL/L (ref 98–107)
CO2 SERPL-SCNC: 26 MMOL/L (ref 20–31)
CREAT SERPL-MCNC: 1 MG/DL (ref 0.7–1.2)
EOSINOPHIL # BLD: 0.12 K/UL (ref 0–0.44)
EOSINOPHILS RELATIVE PERCENT: 2 % (ref 1–4)
ERYTHROCYTE [DISTWIDTH] IN BLOOD BY AUTOMATED COUNT: 13.1 % (ref 11.8–14.4)
GFR, ESTIMATED: 79 ML/MIN/1.73M2
GLUCOSE SERPL-MCNC: 83 MG/DL (ref 70–99)
HCT VFR BLD AUTO: 39.1 % (ref 40.7–50.3)
HGB BLD-MCNC: 13.4 G/DL (ref 13–17)
IMM GRANULOCYTES # BLD AUTO: 0 K/UL (ref 0–0.3)
IMM GRANULOCYTES NFR BLD: 0 %
LYMPHOCYTES NFR BLD: 1.36 K/UL (ref 1.1–3.7)
LYMPHOCYTES RELATIVE PERCENT: 22 % (ref 24–43)
MAGNESIUM SERPL-MCNC: 1.7 MG/DL (ref 1.6–2.6)
MCH RBC QN AUTO: 33.2 PG (ref 25.2–33.5)
MCHC RBC AUTO-ENTMCNC: 34.3 G/DL (ref 28.4–34.8)
MCV RBC AUTO: 96.8 FL (ref 82.6–102.9)
MONOCYTES NFR BLD: 0.43 K/UL (ref 0.1–1.2)
MONOCYTES NFR BLD: 7 % (ref 3–12)
MORPHOLOGY: ABNORMAL
NEUTROPHILS NFR BLD: 69 % (ref 36–65)
NEUTS SEG NFR BLD: 4.29 K/UL (ref 1.5–8.1)
NRBC BLD-RTO: 0 PER 100 WBC
PLATELET # BLD AUTO: ABNORMAL K/UL (ref 138–453)
PLATELET, FLUORESCENCE: 97 K/UL (ref 138–453)
PLATELETS.RETICULATED NFR BLD AUTO: 5.4 % (ref 1.1–10.3)
POTASSIUM SERPL-SCNC: 4 MMOL/L (ref 3.7–5.3)
RBC # BLD AUTO: 4.04 M/UL (ref 4.21–5.77)
SODIUM SERPL-SCNC: 141 MMOL/L (ref 135–144)
WBC OTHER # BLD: 6.2 K/UL (ref 3.5–11.3)

## 2024-07-25 PROCEDURE — 97110 THERAPEUTIC EXERCISES: CPT

## 2024-07-25 PROCEDURE — 6370000000 HC RX 637 (ALT 250 FOR IP): Performed by: NURSE PRACTITIONER

## 2024-07-25 PROCEDURE — 99232 SBSQ HOSP IP/OBS MODERATE 35: CPT | Performed by: INTERNAL MEDICINE

## 2024-07-25 PROCEDURE — 36415 COLL VENOUS BLD VENIPUNCTURE: CPT

## 2024-07-25 PROCEDURE — 83735 ASSAY OF MAGNESIUM: CPT

## 2024-07-25 PROCEDURE — 2580000003 HC RX 258: Performed by: NURSE PRACTITIONER

## 2024-07-25 PROCEDURE — 94761 N-INVAS EAR/PLS OXIMETRY MLT: CPT

## 2024-07-25 PROCEDURE — 85025 COMPLETE CBC W/AUTO DIFF WBC: CPT

## 2024-07-25 PROCEDURE — 97535 SELF CARE MNGMENT TRAINING: CPT

## 2024-07-25 PROCEDURE — 80048 BASIC METABOLIC PNL TOTAL CA: CPT

## 2024-07-25 RX ORDER — MIDODRINE HYDROCHLORIDE 10 MG/1
10 TABLET ORAL 2 TIMES DAILY WITH MEALS
Qty: 90 TABLET | Refills: 3 | DISCHARGE
Start: 2024-07-26 | End: 2024-07-25

## 2024-07-25 RX ORDER — MIDODRINE HYDROCHLORIDE 10 MG/1
10 TABLET ORAL 2 TIMES DAILY WITH MEALS
Qty: 90 TABLET | Refills: 3 | OUTPATIENT
Start: 2024-07-26

## 2024-07-25 RX ORDER — LOSARTAN POTASSIUM 25 MG/1
12.5 TABLET ORAL DAILY
Qty: 30 TABLET | Refills: 3 | Status: SHIPPED | OUTPATIENT
Start: 2024-07-26

## 2024-07-25 RX ORDER — BUMETANIDE 0.5 MG/1
0.5 TABLET ORAL EVERY OTHER DAY
Qty: 30 TABLET | Refills: 3 | DISCHARGE
Start: 2024-07-26 | End: 2024-07-25

## 2024-07-25 RX ORDER — ESCITALOPRAM OXALATE 5 MG/1
20 TABLET ORAL DAILY
Status: DISCONTINUED | OUTPATIENT
Start: 2024-07-25 | End: 2024-07-25 | Stop reason: HOSPADM

## 2024-07-25 RX ORDER — LOSARTAN POTASSIUM 25 MG/1
12.5 TABLET ORAL DAILY
Status: DISCONTINUED | OUTPATIENT
Start: 2024-07-25 | End: 2024-07-25 | Stop reason: HOSPADM

## 2024-07-25 RX ORDER — GABAPENTIN 300 MG/1
300 CAPSULE ORAL 3 TIMES DAILY
Qty: 90 CAPSULE | Refills: 3 | DISCHARGE
Start: 2024-07-25 | End: 2024-07-25

## 2024-07-25 RX ORDER — LOSARTAN POTASSIUM 25 MG/1
12.5 TABLET ORAL DAILY
Qty: 30 TABLET | Refills: 3 | DISCHARGE
Start: 2024-07-26 | End: 2024-07-25

## 2024-07-25 RX ORDER — BUMETANIDE 0.5 MG/1
0.5 TABLET ORAL EVERY OTHER DAY
Qty: 30 TABLET | Refills: 3 | Status: SHIPPED | OUTPATIENT
Start: 2024-07-26

## 2024-07-25 RX ORDER — BUMETANIDE 0.5 MG/1
0.5 TABLET ORAL EVERY OTHER DAY
Qty: 30 TABLET | Refills: 3 | OUTPATIENT
Start: 2024-07-26

## 2024-07-25 RX ORDER — ESCITALOPRAM OXALATE 20 MG/1
20 TABLET ORAL DAILY
Qty: 30 TABLET | Refills: 3 | DISCHARGE
Start: 2024-07-26 | End: 2024-07-25

## 2024-07-25 RX ORDER — ESCITALOPRAM OXALATE 20 MG/1
20 TABLET ORAL DAILY
Qty: 30 TABLET | Refills: 3 | Status: SHIPPED | OUTPATIENT
Start: 2024-07-26

## 2024-07-25 RX ORDER — MIDODRINE HYDROCHLORIDE 5 MG/1
10 TABLET ORAL 2 TIMES DAILY WITH MEALS
Status: DISCONTINUED | OUTPATIENT
Start: 2024-07-26 | End: 2024-07-25 | Stop reason: HOSPADM

## 2024-07-25 RX ORDER — BUMETANIDE 0.5 MG/1
0.5 TABLET ORAL EVERY OTHER DAY
Status: DISCONTINUED | OUTPATIENT
Start: 2024-07-26 | End: 2024-07-25 | Stop reason: HOSPADM

## 2024-07-25 RX ORDER — GABAPENTIN 300 MG/1
300 CAPSULE ORAL 3 TIMES DAILY
Qty: 90 CAPSULE | Refills: 3 | Status: SHIPPED | OUTPATIENT
Start: 2024-07-25 | End: 2024-11-22

## 2024-07-25 RX ORDER — MIDODRINE HYDROCHLORIDE 10 MG/1
10 TABLET ORAL 2 TIMES DAILY WITH MEALS
Qty: 90 TABLET | Refills: 3 | Status: SHIPPED | OUTPATIENT
Start: 2024-07-26

## 2024-07-25 RX ADMIN — METOPROLOL SUCCINATE 25 MG: 25 TABLET, EXTENDED RELEASE ORAL at 09:39

## 2024-07-25 RX ADMIN — FOLIC ACID 1000 MCG: 1 TABLET ORAL at 08:41

## 2024-07-25 RX ADMIN — GABAPENTIN 300 MG: 300 CAPSULE ORAL at 08:42

## 2024-07-25 RX ADMIN — ESCITALOPRAM 20 MG: 5 TABLET, FILM COATED ORAL at 08:41

## 2024-07-25 RX ADMIN — Medication 1 TABLET: at 08:42

## 2024-07-25 RX ADMIN — POTASSIUM CHLORIDE 20 MEQ: 1500 TABLET, EXTENDED RELEASE ORAL at 08:42

## 2024-07-25 RX ADMIN — PANTOPRAZOLE SODIUM 40 MG: 40 TABLET, DELAYED RELEASE ORAL at 06:50

## 2024-07-25 RX ADMIN — ATORVASTATIN CALCIUM 80 MG: 40 TABLET, FILM COATED ORAL at 08:42

## 2024-07-25 RX ADMIN — Medication 100 MG: at 08:42

## 2024-07-25 RX ADMIN — APIXABAN 5 MG: 5 TABLET, FILM COATED ORAL at 08:42

## 2024-07-25 RX ADMIN — Medication 400 MG: at 08:42

## 2024-07-25 RX ADMIN — EMPAGLIFLOZIN 10 MG: 10 TABLET, FILM COATED ORAL at 08:42

## 2024-07-25 RX ADMIN — AMIODARONE HYDROCHLORIDE 100 MG: 200 TABLET ORAL at 08:41

## 2024-07-25 RX ADMIN — MIDODRINE HYDROCHLORIDE 10 MG: 5 TABLET ORAL at 12:14

## 2024-07-25 RX ADMIN — ASPIRIN 81 MG: 81 TABLET, COATED ORAL at 08:41

## 2024-07-25 RX ADMIN — MIDODRINE HYDROCHLORIDE 10 MG: 5 TABLET ORAL at 08:41

## 2024-07-25 RX ADMIN — TAMSULOSIN HYDROCHLORIDE 0.4 MG: 0.4 CAPSULE ORAL at 08:42

## 2024-07-25 RX ADMIN — SODIUM CHLORIDE, PRESERVATIVE FREE 10 ML: 5 INJECTION INTRAVENOUS at 08:42

## 2024-07-25 NOTE — PROGRESS NOTES
Physician Progress Note      PATIENT:               NORMAN JUAN  CSN #:                  [id number]  :                       1950  ADMIT DATE:       2024 1:47 AM  DISCH DATE:  RESPONDING  PROVIDER #:        Hany Jones MD          QUERY TEXT:    Patient admitted with Afib.   Noted documentation of chronic diastolic CHF by   IM in [place] on 24 and chronic combined CHF by cardiology in consult note on   24. If possible, please document in progress notes and discharge summary   if you are evaluating and /or treating any of the following:    The medical record reflects the following:  Risk Factors: PMH of CHF  Clinical Indicators: Echo on 24 shows EF of 45-50% with mild global   hypokinesis, Per cardiology patient with combined chronic CHF, per IM patient   with chronic diastolic CHF  Treatment: Cardiology consult, Echo    Thank you,  Maritza TONEY RN  Options provided:  -- Chronic Diastolic CHF confirmed  -- Chronic combined CHF confirmed  -- Other - I will add my own diagnosis  -- Disagree - Not applicable / Not valid  -- Disagree - Clinically unable to determine / Unknown  -- Refer to Clinical Documentation Reviewer    PROVIDER RESPONSE TEXT:    After study, chronic diastolic CHF confirmed    Query created by: Aurora Chopra on 2024 6:49 AM      Electronically signed by:  Hany Jones MD 2024 9:44 AM

## 2024-07-25 NOTE — PROGRESS NOTES
Assessment complete. Patient alert and oriented x4. Unable to wean patient off of oxygen. Currently on 2.5L nasal cannula due to drop in oxygen saturations. Lung sounds diminished. Patient states he was a previous smoker. IV fluids discontinued per order. Call light within reach and bed alarm on.

## 2024-07-25 NOTE — PROGRESS NOTES
A-line removed at this time per order. Pressure held for 10 minutes to achieve hemostasis. Occlusive pressure dressing applied. Patient tolerated well.

## 2024-07-25 NOTE — DISCHARGE SUMMARY
Discharge Summary    Michael Chan  :  1950  MRN:  [id number]    Admit date:  2024      Discharge date: 2024     Admitting Physician:  Hany Hodges MD    Discharge Diagnoses:    Principal Problem:    Atrial fibrillation with RVR (HCC)  Active Problems:    Chronic renal disease, stage III (HCC) [854736]    Nausea and vomiting    Paroxysmal atrial fibrillation with RVR (HCC)    Coronary artery disease    PAF (paroxysmal atrial fibrillation) (HCC)    Orthostatic hypotension    Nonischemic cardiomyopathy (HCC)    Parkinson's disease, unspecified whether dyskinesia present, unspecified whether manifestations fluctuate (HCC)    Chronic combined systolic (congestive) and diastolic (congestive) heart failure (HCC)    Alcoholic cirrhosis of liver without ascites (HCC)    Secondary hypercoagulable state (HCC)  Resolved Problems:    * No resolved hospital problems. *      Hospital Course:   Michael Chan is a 73 y.o. male admitted with atrial fibrillation with RVR.  He presented with vomiting from the [place].  He also reported fall.  He complained of lightheadedness prior to falling he did fall against the wall and struck his head.  He stated he blacked out at that time.  He is chronically on Eliquis for history of atrial fibrillation.  He reports that he has been having ongoing issues with lightheadedness and hypotension and was placed on midodrine.  He denied headache, numbness or tingling, visual changes or speech difficulties.  Upon arrival EKG showed atrial fibrillation with RVR with a heart rate of 140 and was given IV metoprolol and became hypotensive with systolic blood pressures in the 70s.  He did recover with IV fluids.  He does have history of CHF, cirrhosis, CAD, Parkinson's disease and CKD.  CT of his head was negative for any acute findings but did show chronic microvascular ischemic changes and old infarction in the right frontal lobe.  CT abdomen and pelvis was unchanged and a 3.1  forms (if applicable)  Preparation of Discharge Summary  Preparation of Medication Reconciliation  Preparation of Discharge Prescriptions    Signed:  SEVEN Stovall - CNP, SEVEN, NP-C  7/25/2024, 2:03 PM    [facility] Heart failure documentation  [] The patient has a history of heart transplant or Left Ventricular Assist Device (LVAD).  [If yes, STOP HERE] - [facility] PERFORMANCE EXCLUSION    [] The patient has a current or prior documentation of left ventricular ejection fraction (LVEF) less than or equal to 40%, or moderate or severely depressed left ventricular systolic function [if \"no\", STOP HERE]    ACE / ARB:  [x] The patient was prescribed or is already taking and ACE or ARB  [] Reason why ACE or ARB was not prescirbed / taking:  na    BETA-BLOCKERS:  [x] The patient was prescribed or is already taking a Beta-blocker  [] Reason why Beta-blocker not prescribed / taking:  na

## 2024-07-25 NOTE — PROGRESS NOTES
Writer called Dr. Garcia due to low blood pressures both arterial and left upper arm automatic pressure cuff. Dr. Garcia states to flush and re-zero the art line and as long as art line blood pressure is systolic 80 or greater to just monitor pt. Writer will update DILLON Lozano.

## 2024-07-25 NOTE — PLAN OF CARE
Problem: Discharge Planning  Goal: Discharge to home or other facility with appropriate resources  7/25/2024 1331 by Heladio Walsh RN  Outcome: Completed  7/25/2024 0700 by Heladio Walsh RN  Outcome: Progressing  Flowsheets (Taken 7/25/2024 0400 by Madeleine Amato, RN)  Discharge to home or other facility with appropriate resources: Identify barriers to discharge with patient and caregiver     Problem: Safety - Adult  Goal: Free from fall injury  7/25/2024 1331 by Heladio Walsh RN  Outcome: Completed  7/25/2024 0700 by Heladio Walsh RN  Outcome: Progressing     Problem: ABCDS Injury Assessment  Goal: Absence of physical injury  7/25/2024 1331 by Heladio Walsh RN  Outcome: Completed  7/25/2024 0700 by Heladio Walsh RN  Outcome: Progressing     Problem: Chronic Conditions and Co-morbidities  Goal: Patient's chronic conditions and co-morbidity symptoms are monitored and maintained or improved  7/25/2024 1331 by Heladio Walsh RN  Outcome: Completed  7/25/2024 0700 by Heladio Walsh RN  Outcome: Progressing     Problem: Cardiovascular - Adult  Goal: Absence of cardiac dysrhythmias or at baseline  7/25/2024 1331 by Heladio Walsh RN  Outcome: Completed  7/25/2024 0700 by Heladio Walsh RN  Outcome: Progressing  Flowsheets (Taken 7/25/2024 0620)  Absence of cardiac dysrhythmias or at baseline: Monitor cardiac rate and rhythm

## 2024-07-25 NOTE — PROGRESS NOTES
Physical Therapy  Facility/Department: [facility] ICU  Daily Treatment Note  NAME: Michael Chan  : 1950  MRN: [id number]    Date of Service: 2024    Discharge Recommendations:  Continue to assess pending progress, Long Term Care with PT, Long Term Care without PT     Patient Diagnosis(es): The primary encounter diagnosis was Atrial fibrillation with RVR (HCC). Diagnoses of Nausea and vomiting, unspecified vomiting type, Paroxysmal atrial fibrillation (HCC), and Persistent atrial fibrillation (HCC) were also pertinent to this visit.    Assessment   Assessment: Pt. has increased fatigue this date , able to complete bed exercises with frequent RB B LE x15. Declined OOOB at this time and would like to sleep.  Activity Tolerance: Patient tolerated treatment well     Plan    Physical Therapy Plan  General Plan: 2 times a day 7 days a week  Specific Instructions for Next Treatment: 1x daily on weekends  Current Treatment Recommendations: Strengthening;ROM;Balance training;Functional mobility training;Transfer training;ADL/Self-care training;IADL training;Cognitive/Perceptual training;Endurance training;Gait training;Stair training;Neuromuscular re-education;Manual;Pain management;Home exercise program;Safety education & training;Patient/Caregiver education & training;Positioning;Therapeutic activities     Restrictions  Restrictions/Precautions  Restrictions/Precautions: Fall Risk, General Precautions     Subjective    Subjective  Subjective: Pt. in bed upon arrival, sleepy but agreeable to attempt therapy at this time.  Pain: Denies  Orientation  Overall Orientation Status: Within Functional Limits     Objective   Bed Mobility Training  Bed Mobility Training: No  Transfer Training  Transfer Training: No  Gait  Gait Training: No  PT Exercises  Exercise Treatment: Bed exercises B LE x15. Limited by fatigue this date  Safety Devices  Type of Devices: All fall risk precautions in place;Left in bed;Bed alarm in

## 2024-07-25 NOTE — PLAN OF CARE
Problem: Discharge Planning  Goal: Discharge to home or other facility with appropriate resources  Outcome: Progressing  Flowsheets (Taken 7/25/2024 0400 by Madeleine Amato, RN)  Discharge to home or other facility with appropriate resources: Identify barriers to discharge with patient and caregiver     Problem: Safety - Adult  Goal: Free from fall injury  7/25/2024 0700 by Heladio Walsh RN  Outcome: Progressing  7/24/2024 1925 by Megan Hardy RN  Outcome: Progressing  Flowsheets (Taken 7/24/2024 1925)  Free From Fall Injury: Instruct family/caregiver on patient safety  Note: Patient is at high risk for falls. Non skid socks on. Bed in low position and wheels locked. Fall sign posted and bracelet on. Siderails up x 2. Bed alarm on. Call light within reach.      Problem: ABCDS Injury Assessment  Goal: Absence of physical injury  Outcome: Progressing     Problem: Chronic Conditions and Co-morbidities  Goal: Patient's chronic conditions and co-morbidity symptoms are monitored and maintained or improved  Outcome: Progressing     Problem: Cardiovascular - Adult  Goal: Absence of cardiac dysrhythmias or at baseline  7/25/2024 0700 by Heladio Walsh RN  Outcome: Progressing  Flowsheets (Taken 7/25/2024 0620)  Absence of cardiac dysrhythmias or at baseline: Monitor cardiac rate and rhythm  7/24/2024 1925 by Megan Hardy RN  Flowsheets (Taken 7/24/2024 1925)  Absence of cardiac dysrhythmias or at baseline: Monitor cardiac rate and rhythm  Note: Cardiac monitor on. VS stable and WNL.

## 2024-07-25 NOTE — PROGRESS NOTES
Occupational Therapy  Facility/Department: [facility] ICU  Daily Treatment Note  NAME: Michael Chan  : 1950  MRN: [id number]    Date of Service: 2024    Discharge Recommendations:  Subacute/Skilled Nursing Facility, 24 hour supervision or assist, Long Term Care with OT, Long Term Care without OT         Patient Diagnosis(es): The primary encounter diagnosis was Atrial fibrillation with RVR (HCC). Diagnoses of Nausea and vomiting, unspecified vomiting type, Paroxysmal atrial fibrillation (HCC), Persistent atrial fibrillation (HCC), Pain in right toe(s), and Right foot pain were also pertinent to this visit.     Assessment    Activity Tolerance: Patient tolerated treatment well  Discharge Recommendations: Subacute/Skilled Nursing Facility;24 hour supervision or assist;Long Term Care with OT;Long Term Care without OT      Plan   Occupational Therapy Plan  Times Per Day: Once a day  Days Per Week: 7 Days  Current Treatment Recommendations: Strengthening;Balance training;Functional mobility training;Endurance training;Equipment evaluation, education, & procurement;Patient/Caregiver education & training;Safety education & training;Self-Care / ADL;Coordination training     Restrictions       Subjective   Subjective  Subjective: pt lying supine in bed upon arrival. Pt agreed to participation in therapy session. Nursing and patient agreeable to having patient complete dressing activity prior to leave for [place].  Pain: pt had no complaints of pain this date.  Orientation  Overall Orientation Status: Within Functional Limits  Orientation Level: Oriented X4  Pain: Denies           Objective    Vitals     Bed Mobility Training  Bed Mobility Training: Yes  Overall Level of Assistance: Stand-by assistance;Supervision;Assist X1  Interventions: Verbal cues  Rolling: Stand-by assistance;Supervision;Assist X1  Supine to Sit: Stand-by assistance;Supervision;Assist X1  Sit to Supine: Supervision  Scooting:

## 2024-07-25 NOTE — CARE COORDINATION
IMM letter provided to patient.  Patient offered four hours to make informed decision regarding appeal process; patient agreeable to discharge.       07/25/24 0939   IMM Letter   IMM Letter given to Patient/Family/Significant other/Guardian/POA/by: second-patient/ FLORA COLE   IMM Letter date given: 07/25/24   IMM Letter time given: 0924     Patient will be discharge back to the [facility] living later today.    KELSEY Benz

## 2024-07-25 NOTE — PROGRESS NOTES
Patient: Michael Chan  : 1950  Date of Admission: 2024  Primary Care Physician: Hany Hodges  Today's Date: 2024    REASON FOR CONSULTATION: Fall (Pt to ED from the [facility] with c/o fall, nausea, and vomiting./Fall occurred x1 day ago. Pt reports \"blacking out\" and hitting head./Also, reports arrhythmias. Pt has a history of A. Fib./Pt is on blood thinner.), Emesis, Nausea, and Irregular Heart Beat      HPI: Mr. Chan is a 73 y.o. male who was admitted to the hospital because of generalized weakness and orthostatic hypotension.  Patient found to be in atrial fibrillation with rapid ventricular response.  He was maintained on anticoagulation at the [place].    Status post cardioversion yesterday.  Because of his borderline hypotension we ended up inserting an arterial line.  We also held his Entresto and Bumex.    By the time I saw him this afternoon he was feeling fine.  Blood pressure was maintained.  He was maintaining sinus rhythm with occasional PACs.    Echo reviewed, ejection fraction is unchanged from prior.  Patient still complaining of chronic fatigue.  Discussed with our hospitalist team regarding his high-dose Neurontin, trazodone and melatonin in addition to his Lexapro.    I also discussed discharge medications.  We will discontinue Entresto and started him on low-dose losartan 12.5 mg daily.  Decrease midodrine to 10 mg twice a day.  Continue Toprol-XL 25 mg daily.  Continue amiodarone 100 mg daily and Eliquis 5 mg twice daily.  He is going to follow-up with Dr. Mccoy in 2 weeks.     Past Medical History:   Diagnosis Date    Acute kidney injury (HCC)     Ascites 2022    Atrial fibrillation (HCC)     CHF (congestive heart failure) (HCC)     Cirrhosis of liver with ascites (HCC) 2022    CKD (chronic kidney disease) 2022    Coronary artery disease     Hyperlipidemia     Hypertension     Hypomagnesemia 2022    Hyponatremia     Thrombocytopenia (HCC)

## 2024-07-25 NOTE — PROGRESS NOTES
07/23/24  0430 07/23/24  0545   TROPHS 26* 22 23*     Pro-BNP:  Lab Results   Component Value Date    PROBNP 191 03/04/2024     D-Dimer:  Lab Results   Component Value Date    DDIMER 1.93 (H) 08/13/2022     PT/INR:    Lab Results   Component Value Date/Time    PROTIME 13.3 07/24/2024 05:07 AM    INR 1.0 07/24/2024 05:07 AM     PTT:    Lab Results   Component Value Date/Time    APTT 32.4 03/20/2023 03:48 PM       CRP: No results for input(s): \"CRP\" in the last 72 hours.    ABGs:   Lab Results   Component Value Date/Time    PHART 7.501 08/10/2022 10:10 AM    UOE5WSK 51.7 08/10/2022 10:10 AM    PO2ART 93.9 08/10/2022 10:10 AM    BKQ5NBN 39.5 08/10/2022 10:10 AM    J1VOVYCU 97.6 08/10/2022 10:10 AM    FIO2 28 08/03/2022 12:00 PM         Radiology/Imaging:  CT ABDOMEN PELVIS W IV CONTRAST Additional Contrast? None   Final Result   1. Unchanged 3.1 cm abdominal aortic aneurysm.  Recommend CTA of the abdomen   pelvis in 3 years.         CT HEAD WO CONTRAST   Final Result   1. No acute intracranial abnormality.   2. Chronic microvascular ischemic changes.   3. 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               ASSESSMENT / PLAN:     Primary Problem(s): Atrial fibrillation with RVR (HCC)  Differential diagnoses: Atrial flutter  Condition is 1 or more chronic illnesses with severe exacerbation, progression, or side effects of treatment  Condition is stable  Treatment plan:   Consult cardiology  Cardioverted 7/24  Remove artline  Telemetry monitoring  Monitor labs and replace electrolytes  Oxygen therapy protocol  Imaging:   Echo-EF 45-50%.  Mild global hypokinesis.  Mild to moderate mitral regurg, mild tricuspid regurg, moderate pulmonic regurg, left atrium dilated and is moderately increased, no pericardial effusion  Last echo from 3/21/2023 showed EF of 50 to 55%, distal one third of the myocardial apex mildly hypokinetic, mildly dilated left atrium, mild mitral regurgitation, trivial  because:    []  I confirmed today that the patient does not wish or was not able to name a   surrogate decision maker or provide and advance care plan.    [] Hospice care is currently being provided or has been provided within the   calendar year.    []  I did NOT confirm today the presence of an Advance Care Plan or surrogate   decision maker documented within the patient's medical record.   [DOES NOT SATISFY [facility] PERFORMANCE]      Taylor Lazar, SEVEN - DILLON , SEVEN-NP-C  7/25/2024  6:18 AM

## 2024-07-25 NOTE — PROGRESS NOTES
Pt resting in bed quietly with eyes closed. Respirations even and unlabored. No distress noted. Pt wakes easily to verbal stimuli. Assessment and vital signs as charted. Pt denies pain and is A & O x 4. Cardiac monitor continues to show NSR. Continuous pulse ox remains WNL. Pt continues on room air. Art line remains intact with good waveform to right radial. IV fluids infusing as ordered. Pt denies any other needs. Call light in reach. Bed alarm on. Will continue to monitor.

## 2024-07-25 NOTE — DISCHARGE INSTR - DIET

## 2024-07-25 NOTE — PROGRESS NOTES
Patient reassessed. Stating he feels very sleepy at this time and denying any needs. Patient currently on 0.5L nasal cannula. Appears patient is a mouth breather when he is sleeping.

## 2024-07-25 NOTE — PROGRESS NOTES
Patient discharged via wheelchair. Sister picked patient up and is driving him to [facility]. Patient states has all belongings and is in no distress. Provided [place] envelope with patient's information in it for [facility] including lab scripts, an updated MAR, AVS/RAY, notes, etc. Instructed patient and patient's sister to give to the staff at [facility].

## 2024-07-25 NOTE — PROGRESS NOTES
Called Dr. Garcia's office and spoke to him regarding patient's metoprolol. Note from yesterday stating it will be held today. After discussing patient's heart rate and blood pressure, Dr. Garcia instructed this RN to give morning metoprolol.

## 2024-07-25 NOTE — PROGRESS NOTES
Report called to [facility] and given to Rosalind. All questions answered. Number given if questions should arise. Patient's sister will be coming to  patient.

## 2024-07-26 ENCOUNTER — HOSPITAL ENCOUNTER (EMERGENCY)
Age: 74
Discharge: HOME | End: 2024-07-26
Attending: EMERGENCY MEDICINE
Payer: MEDICARE

## 2024-07-26 VITALS
TEMPERATURE: 97.7 F | DIASTOLIC BLOOD PRESSURE: 63 MMHG | OXYGEN SATURATION: 100 % | RESPIRATION RATE: 20 BRPM | HEART RATE: 71 BPM | SYSTOLIC BLOOD PRESSURE: 134 MMHG

## 2024-07-26 DIAGNOSIS — K52.9 GASTROENTERITIS: ICD-10-CM

## 2024-07-26 DIAGNOSIS — K21.9 GASTROESOPHAGEAL REFLUX DISEASE, UNSPECIFIED WHETHER ESOPHAGITIS PRESENT: ICD-10-CM

## 2024-07-26 DIAGNOSIS — R11.2 NAUSEA AND VOMITING, UNSPECIFIED VOMITING TYPE: Primary | ICD-10-CM

## 2024-07-26 LAB
ALBUMIN SERPL-MCNC: 4.5 G/DL (ref 3.5–5.2)
ALBUMIN/GLOB SERPL: 1.6 {RATIO} (ref 1–2.5)
ALP SERPL-CCNC: 90 U/L (ref 40–129)
ALT SERPL-CCNC: 17 U/L (ref 5–41)
ANION GAP SERPL CALCULATED.3IONS-SCNC: 13 MMOL/L (ref 9–17)
AST SERPL-CCNC: 24 U/L
BASOPHILS # BLD: <0.03 K/UL (ref 0–0.2)
BASOPHILS NFR BLD: 0 % (ref 0–2)
BILIRUB SERPL-MCNC: 1.1 MG/DL (ref 0.3–1.2)
BILIRUB UR QL STRIP: NEGATIVE
BUN SERPL-MCNC: 10 MG/DL (ref 8–23)
BUN/CREAT SERPL: 10 (ref 9–20)
CALCIUM SERPL-MCNC: 9.6 MG/DL (ref 8.6–10.4)
CHLORIDE SERPL-SCNC: 95 MMOL/L (ref 98–107)
CLARITY UR: CLEAR
CO2 SERPL-SCNC: 29 MMOL/L (ref 20–31)
COLOR UR: YELLOW
CREAT SERPL-MCNC: 1 MG/DL (ref 0.7–1.2)
EKG ATRIAL RATE: 68 BPM
EKG P AXIS: 83 DEGREES
EKG P-R INTERVAL: 156 MS
EKG Q-T INTERVAL: 462 MS
EKG QRS DURATION: 78 MS
EKG QTC CALCULATION (BAZETT): 491 MS
EKG R AXIS: 68 DEGREES
EKG T AXIS: 69 DEGREES
EKG VENTRICULAR RATE: 68 BPM
EOSINOPHIL # BLD: <0.03 K/UL (ref 0–0.44)
EOSINOPHILS RELATIVE PERCENT: 0 % (ref 1–4)
EPI CELLS #/AREA URNS HPF: ABNORMAL /HPF (ref 0–5)
ERYTHROCYTE [DISTWIDTH] IN BLOOD BY AUTOMATED COUNT: 12.8 % (ref 11.8–14.4)
FLUAV AG SPEC QL: NEGATIVE
FLUBV AG SPEC QL: NEGATIVE
GFR, ESTIMATED: 79 ML/MIN/1.73M2
GLUCOSE BLD-MCNC: 104 MG/DL (ref 74–100)
GLUCOSE SERPL-MCNC: 112 MG/DL (ref 70–99)
GLUCOSE UR STRIP-MCNC: ABNORMAL MG/DL
HCT VFR BLD AUTO: 47.5 % (ref 40.7–50.3)
HGB BLD-MCNC: 16.5 G/DL (ref 13–17)
HGB UR QL STRIP.AUTO: ABNORMAL
IMM GRANULOCYTES # BLD AUTO: 0.05 K/UL (ref 0–0.3)
IMM GRANULOCYTES NFR BLD: 1 %
KETONES UR STRIP-MCNC: NEGATIVE MG/DL
LEUKOCYTE ESTERASE UR QL STRIP: NEGATIVE
LIPASE SERPL-CCNC: 24 U/L (ref 13–60)
LYMPHOCYTES NFR BLD: 0.75 K/UL (ref 1.1–3.7)
LYMPHOCYTES RELATIVE PERCENT: 11 % (ref 24–43)
MAGNESIUM SERPL-MCNC: 1.7 MG/DL (ref 1.6–2.6)
MCH RBC QN AUTO: 32.6 PG (ref 25.2–33.5)
MCHC RBC AUTO-ENTMCNC: 34.7 G/DL (ref 28.4–34.8)
MCV RBC AUTO: 93.9 FL (ref 82.6–102.9)
MONOCYTES NFR BLD: 0.44 K/UL (ref 0.1–1.2)
MONOCYTES NFR BLD: 7 % (ref 3–12)
NEUTROPHILS NFR BLD: 81 % (ref 36–65)
NEUTS SEG NFR BLD: 5.45 K/UL (ref 1.5–8.1)
NITRITE UR QL STRIP: NEGATIVE
NRBC BLD-RTO: 0 PER 100 WBC
PH UR STRIP: 6 [PH] (ref 5–9)
PLATELET # BLD AUTO: ABNORMAL K/UL (ref 138–453)
PLATELET, FLUORESCENCE: 107 K/UL (ref 138–453)
PLATELETS.RETICULATED NFR BLD AUTO: 6.4 % (ref 1.1–10.3)
POTASSIUM SERPL-SCNC: 3.7 MMOL/L (ref 3.7–5.3)
PROT SERPL-MCNC: 7.4 G/DL (ref 6.4–8.3)
PROT UR STRIP-MCNC: NEGATIVE MG/DL
RBC # BLD AUTO: 5.06 M/UL (ref 4.21–5.77)
RBC #/AREA URNS HPF: ABNORMAL /HPF (ref 0–2)
SARS-COV-2 RDRP RESP QL NAA+PROBE: NOT DETECTED
SODIUM SERPL-SCNC: 137 MMOL/L (ref 135–144)
SP GR UR STRIP: 1.01 (ref 1.01–1.02)
SPECIMEN DESCRIPTION: NORMAL
TROPONIN I SERPL HS-MCNC: 23 NG/L (ref 0–22)
TROPONIN I SERPL HS-MCNC: 24 NG/L (ref 0–22)
UROBILINOGEN UR STRIP-ACNC: NORMAL EU/DL (ref 0–1)
WBC #/AREA URNS HPF: ABNORMAL /HPF (ref 0–5)
WBC OTHER # BLD: 6.7 K/UL (ref 3.5–11.3)

## 2024-07-26 PROCEDURE — 87635 SARS-COV-2 COVID-19 AMP PRB: CPT

## 2024-07-26 PROCEDURE — 87086 URINE CULTURE/COLONY COUNT: CPT

## 2024-07-26 PROCEDURE — 87804 INFLUENZA ASSAY W/OPTIC: CPT

## 2024-07-26 PROCEDURE — 2580000003 HC RX 258: Performed by: EMERGENCY MEDICINE

## 2024-07-26 PROCEDURE — 83690 ASSAY OF LIPASE: CPT

## 2024-07-26 PROCEDURE — 80053 COMPREHEN METABOLIC PANEL: CPT

## 2024-07-26 PROCEDURE — 85025 COMPLETE CBC W/AUTO DIFF WBC: CPT

## 2024-07-26 PROCEDURE — 96374 THER/PROPH/DIAG INJ IV PUSH: CPT

## 2024-07-26 PROCEDURE — 82947 ASSAY GLUCOSE BLOOD QUANT: CPT

## 2024-07-26 PROCEDURE — 84484 ASSAY OF TROPONIN QUANT: CPT

## 2024-07-26 PROCEDURE — 81001 URINALYSIS AUTO W/SCOPE: CPT

## 2024-07-26 PROCEDURE — 83735 ASSAY OF MAGNESIUM: CPT

## 2024-07-26 PROCEDURE — 2500000003 HC RX 250 WO HCPCS: Performed by: EMERGENCY MEDICINE

## 2024-07-26 PROCEDURE — 99284 EMERGENCY DEPT VISIT MOD MDM: CPT

## 2024-07-26 RX ORDER — ONDANSETRON 4 MG/1
4 TABLET, ORALLY DISINTEGRATING ORAL 3 TIMES DAILY PRN
Qty: 21 TABLET | Refills: 0 | Status: SHIPPED | OUTPATIENT
Start: 2024-07-26

## 2024-07-26 RX ORDER — 0.9 % SODIUM CHLORIDE 0.9 %
1000 INTRAVENOUS SOLUTION INTRAVENOUS ONCE
Status: COMPLETED | OUTPATIENT
Start: 2024-07-26 | End: 2024-07-26

## 2024-07-26 RX ORDER — PANTOPRAZOLE SODIUM 40 MG/1
TABLET, DELAYED RELEASE ORAL
Qty: 90 TABLET | Refills: 1 | Status: SHIPPED | OUTPATIENT
Start: 2024-07-26

## 2024-07-26 RX ADMIN — SODIUM CHLORIDE 1000 ML: 9 INJECTION, SOLUTION INTRAVENOUS at 19:51

## 2024-07-26 RX ADMIN — FAMOTIDINE 20 MG: 10 INJECTION, SOLUTION INTRAVENOUS at 19:51

## 2024-07-26 NOTE — ED PROVIDER NOTES
HPI:  7/26/24,   Time: 7:43 PM EDT         Michael Chan is a 73 y.o. male presenting to the ED for gradual onset of nausea and vomiting along with diarrhea, beginning diarrhea for about a week and nausea.  Last few ago.  The complaint has been intermittent, moderate in severity, and worsened by nothing.  Just released from hospital yesterday after being cardioverted for A-fib.  Denies palpitations or chest pain or difficulty breathing or cough denies fever chills    ROS:   Pertinent positives and negatives are stated within HPI, all other systems reviewed and are negative.  --------------------------------------------- PAST HISTORY ---------------------------------------------  Past Medical History:  has a past medical history of Acute kidney injury (HCC), Ascites, Atrial fibrillation (HCC), CHF (congestive heart failure) (HCC), Cirrhosis of liver with ascites (HCC), CKD (chronic kidney disease), Coronary artery disease, Hyperlipidemia, Hypertension, Hypomagnesemia, Hyponatremia, and Thrombocytopenia (HCC).    Past Surgical History:  has a past surgical history that includes Rotator cuff repair (Right); Carotid stent placement (Bilateral); hernia repair; Tonsillectomy and adenoidectomy; Colonoscopy; Esophagogastroduodenoscopy (07/11/2023); Colonoscopy (N/A, 07/11/2023); Upper gastrointestinal endoscopy (N/A, 07/11/2023); and Colonoscopy (07/11/2023).    Social History:  reports that he quit smoking about 2 years ago. His smoking use included cigarettes. He started smoking about 58 years ago. He has a 56.0 pack-year smoking history. He has never used smokeless tobacco. He reports that he does not currently use alcohol. He reports that he does not currently use drugs.    Family History: family history includes Cancer in his sister; Coronary Art Dis in his brother and sister; Heart Disease in his father and mother; Hypertension in his father and mother.     The patient’s home medications have been

## 2024-07-28 LAB
MICROORGANISM SPEC CULT: NORMAL
SERVICE CMNT-IMP: NORMAL
SPECIMEN DESCRIPTION: NORMAL

## 2024-07-29 ENCOUNTER — TELEPHONE (OUTPATIENT)
Dept: PRIMARY CARE CLINIC | Age: 74
End: 2024-07-29

## 2024-07-29 LAB
EKG ATRIAL RATE: 68 BPM
EKG P AXIS: 83 DEGREES
EKG P-R INTERVAL: 156 MS
EKG Q-T INTERVAL: 462 MS
EKG QRS DURATION: 78 MS
EKG QTC CALCULATION (BAZETT): 491 MS
EKG R AXIS: 68 DEGREES
EKG T AXIS: 69 DEGREES
EKG VENTRICULAR RATE: 68 BPM

## 2024-07-29 NOTE — TELEPHONE ENCOUNTER
[facility] ED Follow up Call    Reason for ED visit:  nausea and vomiting     7/29/2024     Alek Rodriguez , this is Margie from Hany Baum's office, just calling to see how you are doing after your recent ED visit.    Did you receive discharge instructions?  Yes  Do you understand the discharge instructions? Yes  Did the ED give you any new prescriptions? Yes  Were you able to fill your prescriptions? yes       Do you have one of our red, yellow and green  Zone sheets that help you to determine when you should go to the ED?  Not Applicable      Do you need or want to make a follow up appt with your PCP?  No    Do you have any further needs in the home, e.g. equipment?  Not Applicable        FU appts/Provider:    Future Appointments   Date Time Provider Department Center   8/13/2024 10:40 AM Tejas Mccoy MD TIFF CARD [facility]   9/11/2024  1:00 PM [facility]   10/1/2024  2:40 PM Tejas Mccoy MD TIFF CARD [facility]

## 2024-08-13 ENCOUNTER — OFFICE VISIT (OUTPATIENT)
Dept: CARDIOLOGY | Age: 74
End: 2024-08-13
Payer: MEDICARE

## 2024-08-13 VITALS
BODY MASS INDEX: 28.09 KG/M2 | HEART RATE: 81 BPM | SYSTOLIC BLOOD PRESSURE: 98 MMHG | HEIGHT: 67 IN | DIASTOLIC BLOOD PRESSURE: 66 MMHG | WEIGHT: 179 LBS | RESPIRATION RATE: 16 BRPM | OXYGEN SATURATION: 98 %

## 2024-08-13 DIAGNOSIS — R55 SYNCOPE AND COLLAPSE: Primary | ICD-10-CM

## 2024-08-13 DIAGNOSIS — E78.2 MIXED HYPERLIPIDEMIA: ICD-10-CM

## 2024-08-13 DIAGNOSIS — I48.0 PAF (PAROXYSMAL ATRIAL FIBRILLATION) (HCC): ICD-10-CM

## 2024-08-13 DIAGNOSIS — I25.10 ASHD (ARTERIOSCLEROTIC HEART DISEASE): ICD-10-CM

## 2024-08-13 DIAGNOSIS — I73.9 PAD (PERIPHERAL ARTERY DISEASE) (HCC): ICD-10-CM

## 2024-08-13 DIAGNOSIS — I50.42 CHRONIC COMBINED SYSTOLIC (CONGESTIVE) AND DIASTOLIC (CONGESTIVE) HEART FAILURE (HCC): ICD-10-CM

## 2024-08-13 PROCEDURE — 1111F DSCHRG MED/CURRENT MED MERGE: CPT | Performed by: FAMILY MEDICINE

## 2024-08-13 PROCEDURE — 1123F ACP DISCUSS/DSCN MKR DOCD: CPT | Performed by: FAMILY MEDICINE

## 2024-08-13 PROCEDURE — 3017F COLORECTAL CA SCREEN DOC REV: CPT | Performed by: FAMILY MEDICINE

## 2024-08-13 PROCEDURE — 3074F SYST BP LT 130 MM HG: CPT | Performed by: FAMILY MEDICINE

## 2024-08-13 PROCEDURE — G8417 CALC BMI ABV UP PARAM F/U: HCPCS | Performed by: FAMILY MEDICINE

## 2024-08-13 PROCEDURE — 99214 OFFICE O/P EST MOD 30 MIN: CPT | Performed by: FAMILY MEDICINE

## 2024-08-13 PROCEDURE — G8427 DOCREV CUR MEDS BY ELIG CLIN: HCPCS | Performed by: FAMILY MEDICINE

## 2024-08-13 PROCEDURE — 93005 ELECTROCARDIOGRAM TRACING: CPT | Performed by: FAMILY MEDICINE

## 2024-08-13 PROCEDURE — 1036F TOBACCO NON-USER: CPT | Performed by: FAMILY MEDICINE

## 2024-08-13 PROCEDURE — 3078F DIAST BP <80 MM HG: CPT | Performed by: FAMILY MEDICINE

## 2024-08-13 PROCEDURE — 93010 ELECTROCARDIOGRAM REPORT: CPT | Performed by: FAMILY MEDICINE

## 2024-08-13 RX ORDER — METOPROLOL SUCCINATE 25 MG/1
12.5 TABLET, EXTENDED RELEASE ORAL DAILY
Qty: 45 TABLET | Refills: 3 | Status: SHIPPED | OUTPATIENT
Start: 2024-08-13

## 2024-08-13 NOTE — PROGRESS NOTES
showed an EF of 15-20%.  Echo done on 3/20/23 showed EF: 50-55%. He is down 9 pounds since last visit in the office-No signs of fluid overload.   Beta Blocker: RESTART Metoprolol succinate (Toprol XL) 25 mg 1/2 tab daily. I also discussed the potential side effects of this medication including lightheadedness and dizziness and instructed them to stop the medication of this occurs and call our office if this occurs.  ACE Inibitor/ARB: Continue Losartan 12.5 mg once daily  Diuretics: STOP bumetinide (Bumex)  Continue Farxiga 10 mg once daily.    Heart failure counseling: I advised them to try and keep their legs up whenever possible and to limit salt in their diet.     Hyperlipidemia: Mixed, LDL done on 9/18/2023 was 58 mg/dL  Cholesterol Reduction Therapy: Continue Atorvastatin (Lipitor) 80 mg daily.      Peripheral Artery Disease:   Antiplatelet Agent: Continue aspirin 81 mg daily   Cholesterol Reduction Therapy: Continue Atorvastatin (Lipitor) 80 mg daily.    Continue follow up with Dr. Pittman.     Once again, thank you for allowing me to participate in this patients care. Please do not hesitate to contact me could I be of further assistance.     FOLLOW UP:   I told Mr. Chan to call my office if he had any problems, but otherwise told him to Return in about 4 months (around 12/13/2024). However, I would be happy to see him sooner should the need arise.    Sincerely,  Tejas Mccoy MD, MS, F.A.C.C.  [facility] Cardiology Specialist    [address] [place] [address]  Phone: [phone], Fax: [phone]     I believe that the risk of significant morbidity and mortality related to the patient's current medical conditions are: Intermediate. Approximately 30 minutes were spent during prep work, discussion and exam of the patient, and follow up documentation and all of their questions were answered.    The documentation recorded by the scribe, accurately and completely reflects the services I

## 2024-08-20 ENCOUNTER — HOSPITAL ENCOUNTER (OUTPATIENT)
Dept: NUCLEAR MEDICINE | Age: 74
Discharge: HOME | End: 2024-08-22
Attending: FAMILY MEDICINE
Payer: MEDICARE

## 2024-08-20 ENCOUNTER — HOSPITAL ENCOUNTER (OUTPATIENT)
Age: 74
Discharge: HOME | End: 2024-08-22
Attending: FAMILY MEDICINE
Payer: MEDICARE

## 2024-08-20 DIAGNOSIS — I48.0 PAF (PAROXYSMAL ATRIAL FIBRILLATION) (HCC): ICD-10-CM

## 2024-08-20 DIAGNOSIS — R55 SYNCOPE AND COLLAPSE: ICD-10-CM

## 2024-08-20 DIAGNOSIS — I50.42 CHRONIC COMBINED SYSTOLIC (CONGESTIVE) AND DIASTOLIC (CONGESTIVE) HEART FAILURE (HCC): ICD-10-CM

## 2024-08-20 DIAGNOSIS — E78.2 MIXED HYPERLIPIDEMIA: ICD-10-CM

## 2024-08-20 DIAGNOSIS — I73.9 PAD (PERIPHERAL ARTERY DISEASE) (HCC): ICD-10-CM

## 2024-08-20 LAB
NUC STRESS EJECTION FRACTION: 39 %
STRESS BASELINE DIAS BP: 54 MMHG
STRESS BASELINE HR: 58 BPM
STRESS BASELINE SYS BP: 92 MMHG
STRESS PEAK DIAS BP: 42 MMHG
STRESS PEAK SYS BP: 78 MMHG
STRESS PERCENT HR ACHIEVED: 60 %
STRESS POST PEAK HR: 87 BPM
STRESS RATE PRESSURE PRODUCT: 6786 BPM*MMHG
STRESS STAGE RECOVERY 1 BP: NORMAL MMHG
STRESS STAGE RECOVERY 1 DURATION: 0 MIN:SEC
STRESS STAGE RECOVERY 1 HR: 81 BPM
STRESS STAGE RECOVERY 2 DURATION: 1 MIN:SEC
STRESS STAGE RECOVERY 2 HR: 84 BPM
STRESS STAGE RECOVERY 3 BP: NORMAL MMHG
STRESS STAGE RECOVERY 3 DURATION: 3 MIN:SEC
STRESS STAGE RECOVERY 3 HR: 78 BPM
STRESS STAGE RECOVERY 4 BP: NORMAL MMHG
STRESS STAGE RECOVERY 4 DURATION: 5 MIN:SEC
STRESS STAGE RECOVERY 4 HR: 79 BPM
STRESS TARGET HR: 146 BPM
TID: 1.12

## 2024-08-20 PROCEDURE — A9500 TC99M SESTAMIBI: HCPCS | Performed by: FAMILY MEDICINE

## 2024-08-20 PROCEDURE — 93018 CV STRESS TEST I&R ONLY: CPT | Performed by: INTERNAL MEDICINE

## 2024-08-20 PROCEDURE — 78452 HT MUSCLE IMAGE SPECT MULT: CPT

## 2024-08-20 PROCEDURE — 78452 HT MUSCLE IMAGE SPECT MULT: CPT | Performed by: INTERNAL MEDICINE

## 2024-08-20 PROCEDURE — 6360000002 HC RX W HCPCS: Performed by: FAMILY MEDICINE

## 2024-08-20 PROCEDURE — 93017 CV STRESS TEST TRACING ONLY: CPT

## 2024-08-20 PROCEDURE — 93016 CV STRESS TEST SUPVJ ONLY: CPT | Performed by: INTERNAL MEDICINE

## 2024-08-20 PROCEDURE — 3430000000 HC RX DIAGNOSTIC RADIOPHARMACEUTICAL: Performed by: FAMILY MEDICINE

## 2024-08-20 RX ORDER — TETRAKIS(2-METHOXYISOBUTYLISOCYANIDE)COPPER(I) TETRAFLUOROBORATE 1 MG/ML
30 INJECTION, POWDER, LYOPHILIZED, FOR SOLUTION INTRAVENOUS
Status: COMPLETED | OUTPATIENT
Start: 2024-08-20 | End: 2024-08-20

## 2024-08-20 RX ORDER — TETRAKIS(2-METHOXYISOBUTYLISOCYANIDE)COPPER(I) TETRAFLUOROBORATE 1 MG/ML
10 INJECTION, POWDER, LYOPHILIZED, FOR SOLUTION INTRAVENOUS
Status: COMPLETED | OUTPATIENT
Start: 2024-08-20 | End: 2024-08-20

## 2024-08-20 RX ORDER — REGADENOSON 0.08 MG/ML
0.4 INJECTION, SOLUTION INTRAVENOUS
Status: COMPLETED | OUTPATIENT
Start: 2024-08-20 | End: 2024-08-20

## 2024-08-20 RX ADMIN — Medication 10 MILLICURIE: at 09:05

## 2024-08-20 RX ADMIN — Medication 30 MILLICURIE: at 09:05

## 2024-08-20 RX ADMIN — REGADENOSON 0.4 MG: 0.08 INJECTION, SOLUTION INTRAVENOUS at 10:11

## 2024-08-21 ENCOUNTER — TELEPHONE (OUTPATIENT)
Dept: CARDIOLOGY | Age: 74
End: 2024-08-21

## 2024-08-21 NOTE — TELEPHONE ENCOUNTER
----- Message from Dr. Tejas Mccoy MD sent at 8/20/2024 10:50 PM EDT -----  Please let Mr. Chan know that his test was abnormal and please make them an appointment in 2-3 WEEKS if not already done. Quentin.    Nadine

## 2024-08-21 NOTE — RESULT ENCOUNTER NOTE
Please let Mr. Chan know that his test was abnormal and please make them an appointment in 2-3 WEEKS if not already done. Thanks.    Nadine

## 2024-09-06 ENCOUNTER — OFFICE VISIT (OUTPATIENT)
Dept: CARDIOLOGY | Age: 74
End: 2024-09-06
Payer: MEDICARE

## 2024-09-06 VITALS
WEIGHT: 185 LBS | HEART RATE: 62 BPM | OXYGEN SATURATION: 95 % | RESPIRATION RATE: 18 BRPM | SYSTOLIC BLOOD PRESSURE: 108 MMHG | BODY MASS INDEX: 29.03 KG/M2 | HEIGHT: 67 IN | DIASTOLIC BLOOD PRESSURE: 70 MMHG

## 2024-09-06 DIAGNOSIS — E78.2 MIXED HYPERLIPIDEMIA: ICD-10-CM

## 2024-09-06 DIAGNOSIS — I48.0 PAF (PAROXYSMAL ATRIAL FIBRILLATION) (HCC): ICD-10-CM

## 2024-09-06 DIAGNOSIS — I73.9 PAD (PERIPHERAL ARTERY DISEASE) (HCC): ICD-10-CM

## 2024-09-06 DIAGNOSIS — I50.42 CHRONIC COMBINED SYSTOLIC (CONGESTIVE) AND DIASTOLIC (CONGESTIVE) HEART FAILURE (HCC): ICD-10-CM

## 2024-09-06 DIAGNOSIS — I25.10 ASHD (ARTERIOSCLEROTIC HEART DISEASE): ICD-10-CM

## 2024-09-06 DIAGNOSIS — R94.39 ABNORMAL STRESS TEST: Primary | ICD-10-CM

## 2024-09-06 DIAGNOSIS — Z87.898 HISTORY OF SYNCOPE: ICD-10-CM

## 2024-09-06 PROCEDURE — 3078F DIAST BP <80 MM HG: CPT | Performed by: FAMILY MEDICINE

## 2024-09-06 PROCEDURE — 1036F TOBACCO NON-USER: CPT | Performed by: FAMILY MEDICINE

## 2024-09-06 PROCEDURE — 3074F SYST BP LT 130 MM HG: CPT | Performed by: FAMILY MEDICINE

## 2024-09-06 PROCEDURE — 99214 OFFICE O/P EST MOD 30 MIN: CPT | Performed by: FAMILY MEDICINE

## 2024-09-06 PROCEDURE — G8427 DOCREV CUR MEDS BY ELIG CLIN: HCPCS | Performed by: FAMILY MEDICINE

## 2024-09-06 PROCEDURE — G8417 CALC BMI ABV UP PARAM F/U: HCPCS | Performed by: FAMILY MEDICINE

## 2024-09-06 PROCEDURE — 3017F COLORECTAL CA SCREEN DOC REV: CPT | Performed by: FAMILY MEDICINE

## 2024-09-06 PROCEDURE — 99215 OFFICE O/P EST HI 40 MIN: CPT | Performed by: FAMILY MEDICINE

## 2024-09-06 PROCEDURE — 1123F ACP DISCUSS/DSCN MKR DOCD: CPT | Performed by: FAMILY MEDICINE

## 2024-09-06 RX ORDER — LOPERAMIDE HCL 2 MG
2 CAPSULE ORAL 4 TIMES DAILY PRN
COMMUNITY

## 2024-09-06 RX ORDER — HYDROXYZINE HYDROCHLORIDE 25 MG/1
25 TABLET, FILM COATED ORAL 3 TIMES DAILY PRN
COMMUNITY

## 2024-09-06 NOTE — PROGRESS NOTES
happy to see him sooner should the need arise.    Sincerely,  Tejas Mccoy MD, MS, F.A.CMeenuC.  [facility] Cardiology Specialist    [address] [place], [place] [address]  Phone: [phone], Fax: [phone]     I believe that the risk of significant morbidity and mortality related to the patient's current medical conditions are: intermediate-high. At least 45 minutes were spent during prep work, discussion and exam of the patient, and follow up documentation and all of their questions were answered.    The documentation recorded by the scribe, accurately and completely reflects the services I personally performed and the decisions made by me. Tejas Mccoy MD, MS, F.A.C.C. September 6, 2024

## 2024-09-11 ENCOUNTER — OFFICE VISIT (OUTPATIENT)
Dept: VASCULAR SURGERY | Age: 74
End: 2024-09-11
Payer: MEDICARE

## 2024-09-11 VITALS
SYSTOLIC BLOOD PRESSURE: 121 MMHG | WEIGHT: 184.9 LBS | DIASTOLIC BLOOD PRESSURE: 74 MMHG | BODY MASS INDEX: 29.02 KG/M2 | TEMPERATURE: 95.9 F | HEIGHT: 67 IN | RESPIRATION RATE: 22 BRPM | HEART RATE: 72 BPM

## 2024-09-11 DIAGNOSIS — I83.93 ASYMPTOMATIC VARICOSE VEINS OF BOTH LOWER EXTREMITIES: ICD-10-CM

## 2024-09-11 DIAGNOSIS — I73.9 PAD (PERIPHERAL ARTERY DISEASE) (HCC): Primary | ICD-10-CM

## 2024-09-11 DIAGNOSIS — I71.43 INFRARENAL ABDOMINAL AORTIC ANEURYSM (AAA) WITHOUT RUPTURE (HCC): ICD-10-CM

## 2024-09-11 PROCEDURE — 99214 OFFICE O/P EST MOD 30 MIN: CPT | Performed by: INTERNAL MEDICINE

## 2024-09-11 PROCEDURE — G8427 DOCREV CUR MEDS BY ELIG CLIN: HCPCS | Performed by: INTERNAL MEDICINE

## 2024-09-11 PROCEDURE — 1123F ACP DISCUSS/DSCN MKR DOCD: CPT | Performed by: INTERNAL MEDICINE

## 2024-09-11 PROCEDURE — 1036F TOBACCO NON-USER: CPT | Performed by: INTERNAL MEDICINE

## 2024-09-11 PROCEDURE — 3078F DIAST BP <80 MM HG: CPT | Performed by: INTERNAL MEDICINE

## 2024-09-11 PROCEDURE — 3074F SYST BP LT 130 MM HG: CPT | Performed by: INTERNAL MEDICINE

## 2024-09-11 PROCEDURE — G8417 CALC BMI ABV UP PARAM F/U: HCPCS | Performed by: INTERNAL MEDICINE

## 2024-09-11 PROCEDURE — 3017F COLORECTAL CA SCREEN DOC REV: CPT | Performed by: INTERNAL MEDICINE

## 2024-09-11 RX ORDER — BUMETANIDE 0.5 MG/1
0.5 TABLET ORAL DAILY
COMMUNITY

## 2024-09-11 RX ORDER — VENLAFAXINE HYDROCHLORIDE 37.5 MG/1
37.5 CAPSULE, EXTENDED RELEASE ORAL DAILY
COMMUNITY
Start: 2024-08-05

## 2024-09-11 RX ORDER — LANOLIN ALCOHOL/MO/W.PET/CERES
6 CREAM (GRAM) TOPICAL NIGHTLY PRN
COMMUNITY

## 2024-09-19 ENCOUNTER — HOSPITAL ENCOUNTER (OUTPATIENT)
Age: 74
Setting detail: OUTPATIENT SURGERY
Discharge: NURSING, REHAB OR LONG-TERM CARE | End: 2024-09-19
Attending: FAMILY MEDICINE | Admitting: FAMILY MEDICINE
Payer: MEDICARE

## 2024-09-19 VITALS
DIASTOLIC BLOOD PRESSURE: 62 MMHG | OXYGEN SATURATION: 96 % | TEMPERATURE: 97.7 F | RESPIRATION RATE: 14 BRPM | SYSTOLIC BLOOD PRESSURE: 147 MMHG | HEART RATE: 62 BPM

## 2024-09-19 DIAGNOSIS — R94.39 ABNORMAL CARDIOVASCULAR STRESS TEST: Primary | ICD-10-CM

## 2024-09-19 DIAGNOSIS — R07.9 CHEST PAIN, UNSPECIFIED: ICD-10-CM

## 2024-09-19 LAB
ANION GAP SERPL CALCULATED.3IONS-SCNC: 10 MMOL/L (ref 9–16)
BUN SERPL-MCNC: 22 MG/DL (ref 8–23)
BUN/CREAT SERPL: 17 (ref 9–20)
CALCIUM SERPL-MCNC: 9.8 MG/DL (ref 8.6–10.4)
CHLORIDE SERPL-SCNC: 103 MMOL/L (ref 98–107)
CO2 SERPL-SCNC: 26 MMOL/L (ref 20–31)
CREAT SERPL-MCNC: 1.3 MG/DL (ref 0.7–1.2)
ERYTHROCYTE [DISTWIDTH] IN BLOOD BY AUTOMATED COUNT: 13.5 % (ref 11.8–14.4)
GFR, ESTIMATED: 60 ML/MIN/1.73M2
GLUCOSE SERPL-MCNC: 99 MG/DL (ref 74–99)
HCT VFR BLD AUTO: 48.8 % (ref 40.7–50.3)
HGB BLD-MCNC: 16.8 G/DL (ref 13–17)
MCH RBC QN AUTO: 32.6 PG (ref 25.2–33.5)
MCHC RBC AUTO-ENTMCNC: 34.4 G/DL (ref 28.4–34.8)
MCV RBC AUTO: 94.8 FL (ref 82.6–102.9)
NRBC BLD-RTO: 0 PER 100 WBC
PLATELET # BLD AUTO: 116 K/UL (ref 138–453)
PMV BLD AUTO: 12.4 FL (ref 8.1–13.5)
POTASSIUM SERPL-SCNC: 4.3 MMOL/L (ref 3.7–5.3)
RBC # BLD AUTO: 5.15 M/UL (ref 4.21–5.77)
SODIUM SERPL-SCNC: 139 MMOL/L (ref 136–145)
WBC OTHER # BLD: 5.3 K/UL (ref 3.5–11.3)

## 2024-09-19 PROCEDURE — 6360000002 HC RX W HCPCS: Performed by: FAMILY MEDICINE

## 2024-09-19 PROCEDURE — 85027 COMPLETE CBC AUTOMATED: CPT

## 2024-09-19 PROCEDURE — 93458 L HRT ARTERY/VENTRICLE ANGIO: CPT | Performed by: FAMILY MEDICINE

## 2024-09-19 PROCEDURE — 2580000003 HC RX 258: Performed by: FAMILY MEDICINE

## 2024-09-19 PROCEDURE — 80048 BASIC METABOLIC PNL TOTAL CA: CPT

## 2024-09-19 PROCEDURE — 7100000011 HC PHASE II RECOVERY - ADDTL 15 MIN: Performed by: FAMILY MEDICINE

## 2024-09-19 PROCEDURE — 2500000003 HC RX 250 WO HCPCS: Performed by: FAMILY MEDICINE

## 2024-09-19 PROCEDURE — C1769 GUIDE WIRE: HCPCS | Performed by: FAMILY MEDICINE

## 2024-09-19 PROCEDURE — 2709999900 HC NON-CHARGEABLE SUPPLY: Performed by: FAMILY MEDICINE

## 2024-09-19 PROCEDURE — C1894 INTRO/SHEATH, NON-LASER: HCPCS | Performed by: FAMILY MEDICINE

## 2024-09-19 PROCEDURE — 7100000010 HC PHASE II RECOVERY - FIRST 15 MIN: Performed by: FAMILY MEDICINE

## 2024-09-19 PROCEDURE — 6360000004 HC RX CONTRAST MEDICATION: Performed by: FAMILY MEDICINE

## 2024-09-19 RX ORDER — NITROGLYCERIN 0.4 MG/1
0.4 TABLET SUBLINGUAL EVERY 5 MIN PRN
Status: DISCONTINUED | OUTPATIENT
Start: 2024-09-19 | End: 2024-09-19 | Stop reason: HOSPADM

## 2024-09-19 RX ORDER — NITROGLYCERIN 20 MG/100ML
INJECTION INTRAVENOUS PRN
Status: DISCONTINUED | OUTPATIENT
Start: 2024-09-19 | End: 2024-09-19 | Stop reason: HOSPADM

## 2024-09-19 RX ORDER — SODIUM CHLORIDE 0.9 % (FLUSH) 0.9 %
5-40 SYRINGE (ML) INJECTION PRN
Status: DISCONTINUED | OUTPATIENT
Start: 2024-09-19 | End: 2024-09-19 | Stop reason: HOSPADM

## 2024-09-19 RX ORDER — VERAPAMIL HYDROCHLORIDE 2.5 MG/ML
INJECTION, SOLUTION INTRAVENOUS PRN
Status: DISCONTINUED | OUTPATIENT
Start: 2024-09-19 | End: 2024-09-19 | Stop reason: HOSPADM

## 2024-09-19 RX ORDER — ACETAMINOPHEN 325 MG/1
650 TABLET ORAL EVERY 4 HOURS PRN
Status: DISCONTINUED | OUTPATIENT
Start: 2024-09-19 | End: 2024-09-19 | Stop reason: HOSPADM

## 2024-09-19 RX ORDER — SODIUM CHLORIDE 0.9 % (FLUSH) 0.9 %
5-40 SYRINGE (ML) INJECTION EVERY 12 HOURS SCHEDULED
Status: DISCONTINUED | OUTPATIENT
Start: 2024-09-19 | End: 2024-09-19 | Stop reason: HOSPADM

## 2024-09-19 RX ORDER — SODIUM CHLORIDE 9 MG/ML
INJECTION, SOLUTION INTRAVENOUS PRN
Status: DISCONTINUED | OUTPATIENT
Start: 2024-09-19 | End: 2024-09-19 | Stop reason: HOSPADM

## 2024-09-19 RX ORDER — SODIUM CHLORIDE 9 MG/ML
INJECTION, SOLUTION INTRAVENOUS CONTINUOUS
Status: DISCONTINUED | OUTPATIENT
Start: 2024-09-19 | End: 2024-09-19 | Stop reason: HOSPADM

## 2024-09-19 RX ORDER — IOPAMIDOL 755 MG/ML
INJECTION, SOLUTION INTRAVASCULAR PRN
Status: DISCONTINUED | OUTPATIENT
Start: 2024-09-19 | End: 2024-09-19 | Stop reason: HOSPADM

## 2024-09-19 RX ORDER — DIPHENHYDRAMINE HCL 25 MG
50 CAPSULE ORAL ONCE
Status: DISCONTINUED | OUTPATIENT
Start: 2024-09-19 | End: 2024-09-19 | Stop reason: HOSPADM

## 2024-09-19 RX ORDER — LIDOCAINE HYDROCHLORIDE 10 MG/ML
INJECTION, SOLUTION INFILTRATION; PERINEURAL PRN
Status: DISCONTINUED | OUTPATIENT
Start: 2024-09-19 | End: 2024-09-19 | Stop reason: HOSPADM

## 2024-09-19 RX ADMIN — SODIUM CHLORIDE: 9 INJECTION, SOLUTION INTRAVENOUS at 09:40

## 2024-10-01 ENCOUNTER — HOSPITAL ENCOUNTER (OUTPATIENT)
Age: 74
Setting detail: OUTPATIENT SURGERY
Discharge: HOME | End: 2024-10-01
Attending: INTERNAL MEDICINE | Admitting: INTERNAL MEDICINE
Payer: MEDICARE

## 2024-10-01 VITALS
TEMPERATURE: 98.3 F | BODY MASS INDEX: 29.03 KG/M2 | HEIGHT: 67 IN | OXYGEN SATURATION: 92 % | HEART RATE: 70 BPM | RESPIRATION RATE: 19 BRPM | WEIGHT: 185 LBS | SYSTOLIC BLOOD PRESSURE: 106 MMHG | DIASTOLIC BLOOD PRESSURE: 48 MMHG

## 2024-10-01 DIAGNOSIS — I25.119 CORONARY ARTERY DISEASE WITH ANGINA PECTORIS, UNSPECIFIED VESSEL OR LESION TYPE, UNSPECIFIED WHETHER NATIVE OR TRANSPLANTED HEART (HCC): ICD-10-CM

## 2024-10-01 LAB
BUN BLD-MCNC: 23 MG/DL (ref 8–26)
CHLORIDE BLD-SCNC: 108 MMOL/L (ref 98–107)
ECHO BSA: 1.99 M2
EGFR, POC: 79 ML/MIN/1.73M2
GLUCOSE BLD-MCNC: 108 MG/DL (ref 74–100)
HCT VFR BLD AUTO: 50 % (ref 41–53)
PLATELET # BLD AUTO: NORMAL K/UL (ref 138–453)
PLATELET, FLUORESCENCE: 135 K/UL (ref 138–453)
PLATELETS.RETICULATED NFR BLD AUTO: 4.1 % (ref 1.1–10.3)
POC CREATININE: 1 MG/DL (ref 0.51–1.19)
POC HEMOGLOBIN (CALC): 17 G/DL (ref 13.5–17.5)
POTASSIUM BLD-SCNC: 5.3 MMOL/L (ref 3.5–4.5)
SODIUM BLD-SCNC: 142 MMOL/L (ref 138–146)

## 2024-10-01 PROCEDURE — 82565 ASSAY OF CREATININE: CPT

## 2024-10-01 PROCEDURE — 7100000010 HC PHASE II RECOVERY - FIRST 15 MIN: Performed by: INTERNAL MEDICINE

## 2024-10-01 PROCEDURE — C9600 PERC DRUG-EL COR STENT SING: HCPCS | Performed by: INTERNAL MEDICINE

## 2024-10-01 PROCEDURE — 99152 MOD SED SAME PHYS/QHP 5/>YRS: CPT | Performed by: INTERNAL MEDICINE

## 2024-10-01 PROCEDURE — 85014 HEMATOCRIT: CPT

## 2024-10-01 PROCEDURE — 2709999900 HC NON-CHARGEABLE SUPPLY: Performed by: INTERNAL MEDICINE

## 2024-10-01 PROCEDURE — C1761 HC CATH TRANSLUM INTRAVASCULAR LITHOTRIPSY CORONARY: HCPCS | Performed by: INTERNAL MEDICINE

## 2024-10-01 PROCEDURE — 84295 ASSAY OF SERUM SODIUM: CPT

## 2024-10-01 PROCEDURE — 82435 ASSAY OF BLOOD CHLORIDE: CPT

## 2024-10-01 PROCEDURE — 2580000003 HC RX 258: Performed by: INTERNAL MEDICINE

## 2024-10-01 PROCEDURE — 84132 ASSAY OF SERUM POTASSIUM: CPT

## 2024-10-01 PROCEDURE — 7100000011 HC PHASE II RECOVERY - ADDTL 15 MIN: Performed by: INTERNAL MEDICINE

## 2024-10-01 PROCEDURE — 76937 US GUIDE VASCULAR ACCESS: CPT

## 2024-10-01 PROCEDURE — C1887 CATHETER, GUIDING: HCPCS | Performed by: INTERNAL MEDICINE

## 2024-10-01 PROCEDURE — 84520 ASSAY OF UREA NITROGEN: CPT

## 2024-10-01 PROCEDURE — 6360000002 HC RX W HCPCS: Performed by: INTERNAL MEDICINE

## 2024-10-01 PROCEDURE — C1769 GUIDE WIRE: HCPCS | Performed by: INTERNAL MEDICINE

## 2024-10-01 PROCEDURE — 85049 AUTOMATED PLATELET COUNT: CPT

## 2024-10-01 PROCEDURE — C1874 STENT, COATED/COV W/DEL SYS: HCPCS | Performed by: INTERNAL MEDICINE

## 2024-10-01 PROCEDURE — 6360000004 HC RX CONTRAST MEDICATION: Performed by: INTERNAL MEDICINE

## 2024-10-01 PROCEDURE — 85055 RETICULATED PLATELET ASSAY: CPT

## 2024-10-01 PROCEDURE — C1725 CATH, TRANSLUMIN NON-LASER: HCPCS | Performed by: INTERNAL MEDICINE

## 2024-10-01 PROCEDURE — 6370000000 HC RX 637 (ALT 250 FOR IP): Performed by: INTERNAL MEDICINE

## 2024-10-01 PROCEDURE — C1894 INTRO/SHEATH, NON-LASER: HCPCS | Performed by: INTERNAL MEDICINE

## 2024-10-01 PROCEDURE — 82947 ASSAY GLUCOSE BLOOD QUANT: CPT

## 2024-10-01 PROCEDURE — 2500000003 HC RX 250 WO HCPCS: Performed by: INTERNAL MEDICINE

## 2024-10-01 PROCEDURE — 99153 MOD SED SAME PHYS/QHP EA: CPT | Performed by: INTERNAL MEDICINE

## 2024-10-01 DEVICE — STENT ONYXNG25034UX ONYX 2.50X34RX
Type: IMPLANTABLE DEVICE | Status: FUNCTIONAL
Brand: ONYX FRONTIER™

## 2024-10-01 DEVICE — STENT ONYXNG30034UX ONYX 3.00X34RX
Type: IMPLANTABLE DEVICE | Status: FUNCTIONAL
Brand: ONYX FRONTIER™

## 2024-10-01 RX ORDER — 0.9 % SODIUM CHLORIDE 0.9 %
INTRAVENOUS SOLUTION INTRAVENOUS CONTINUOUS PRN
Status: COMPLETED | OUTPATIENT
Start: 2024-10-01 | End: 2024-10-01

## 2024-10-01 RX ORDER — IOPAMIDOL 755 MG/ML
INJECTION, SOLUTION INTRAVASCULAR PRN
Status: DISCONTINUED | OUTPATIENT
Start: 2024-10-01 | End: 2024-10-01 | Stop reason: HOSPADM

## 2024-10-01 RX ORDER — NITROGLYCERIN 20 MG/100ML
INJECTION INTRAVENOUS PRN
Status: DISCONTINUED | OUTPATIENT
Start: 2024-10-01 | End: 2024-10-01 | Stop reason: HOSPADM

## 2024-10-01 RX ORDER — SODIUM CHLORIDE 9 MG/ML
INJECTION, SOLUTION INTRAVENOUS CONTINUOUS
Status: DISCONTINUED | OUTPATIENT
Start: 2024-10-01 | End: 2024-10-01 | Stop reason: HOSPADM

## 2024-10-01 RX ORDER — LIDOCAINE HYDROCHLORIDE 10 MG/ML
INJECTION, SOLUTION INFILTRATION; PERINEURAL PRN
Status: DISCONTINUED | OUTPATIENT
Start: 2024-10-01 | End: 2024-10-01 | Stop reason: HOSPADM

## 2024-10-01 RX ORDER — CLOPIDOGREL 300 MG/1
TABLET, FILM COATED ORAL PRN
Status: DISCONTINUED | OUTPATIENT
Start: 2024-10-01 | End: 2024-10-01 | Stop reason: HOSPADM

## 2024-10-01 RX ORDER — BIVALIRUDIN 250 MG/5ML
INJECTION, POWDER, LYOPHILIZED, FOR SOLUTION INTRAVENOUS PRN
Status: DISCONTINUED | OUTPATIENT
Start: 2024-10-01 | End: 2024-10-01 | Stop reason: HOSPADM

## 2024-10-01 RX ORDER — SODIUM CHLORIDE 0.9 % (FLUSH) 0.9 %
5-40 SYRINGE (ML) INJECTION EVERY 12 HOURS SCHEDULED
OUTPATIENT
Start: 2024-10-01

## 2024-10-01 RX ORDER — MIDAZOLAM HYDROCHLORIDE 1 MG/ML
INJECTION INTRAMUSCULAR; INTRAVENOUS PRN
Status: DISCONTINUED | OUTPATIENT
Start: 2024-10-01 | End: 2024-10-01 | Stop reason: HOSPADM

## 2024-10-01 RX ORDER — ASPIRIN 325 MG
TABLET ORAL PRN
Status: DISCONTINUED | OUTPATIENT
Start: 2024-10-01 | End: 2024-10-01 | Stop reason: HOSPADM

## 2024-10-01 RX ORDER — SODIUM CHLORIDE 9 MG/ML
INJECTION, SOLUTION INTRAVENOUS PRN
OUTPATIENT
Start: 2024-10-01

## 2024-10-01 RX ORDER — SODIUM CHLORIDE 0.9 % (FLUSH) 0.9 %
5-40 SYRINGE (ML) INJECTION PRN
OUTPATIENT
Start: 2024-10-01

## 2024-10-01 RX ADMIN — SODIUM CHLORIDE: 9 INJECTION, SOLUTION INTRAVENOUS at 10:24

## 2024-10-01 NOTE — DISCHARGE INSTRUCTIONS
with liquids until the nausea is gone.  \" Notify your physician if you have not urinated within 8 hours after the procedure.  \" Resume your medications unless otherwise instructed

## 2024-10-01 NOTE — PROGRESS NOTES
Patient admitted, consent signed and questions answered. Patient ready for procedure. Call light to reach with side rails up 2 of 2. Bilateral groins clipped with writer and Sarah present.  family at bedside with patient.  History and physical updated.

## 2024-10-01 NOTE — PROGRESS NOTES
Arterial line discontinued per writer. Manual pressure held for 20 minutes. No hematoma or drainage noted after line pull. Band aid applied to area.

## 2024-10-01 NOTE — PROGRESS NOTES
Patient returned to room . Post PCI recovery initiated. Patient awake and alert, denies any complaints. Arterial line intact to right groin. No hematoma or drainage noted.    Good wave from noted to arterial line.   Side rails up times 2, call light with in reach. Family at bedside.    Patient closely monitored.

## 2024-10-01 NOTE — H&P
[facility]  Pre- Procedure History and Physical/Update          Patient Name:  Michael Chan  MRN:    [id number]  YOB: 1950  Date of evaluation:  10/1/2024      HPI:  74-year-old male who recently had an abnormal nuclear stress test which was followed up with cardiac cath.  Cardiac cath showed severe two-vessel disease involving LAD and RCA.  RCA was 100% occluded with left-to-right collaterals.  Patient was referred for consideration of PCI.  Patient presents today for the scheduled procedure       Past Medical History:   Diagnosis Date    Acute kidney injury (HCC)     Ascites 5/20/2022    Atrial fibrillation (HCC)     CHF (congestive heart failure) (HCC)     Cirrhosis of liver with ascites (HCC) 5/19/2022    CKD (chronic kidney disease) 5/20/2022    Coronary artery disease     Hyperlipidemia     Hypertension     Hypomagnesemia 5/20/2022    Hyponatremia     Thrombocytopenia (HCC)        Past Surgical History:   Procedure Laterality Date    CARDIAC CATHETERIZATION Left 09/19/2024    DR Mccoy/[facility] Chestnut/right radial    CARDIAC PROCEDURE N/A 9/19/2024    Left heart cath / coronary angiography performed by Tejas Mccoy MD at [facility] CARDIAC CATH/IR LAB    CAROTID STENT PLACEMENT Bilateral     COLONOSCOPY      COLONOSCOPY N/A 07/11/2023    COLORECTAL CANCER SCREENING, NOT HIGH RISK performed by Tena Wilson MD at [facility] OR    COLONOSCOPY  07/11/2023    -hemorrhoids,poor prep    ESOPHAGOGASTRODUODENOSCOPY  07/11/2023    Dr. Wilson-bx,portal hypertensive gastropathy    HERNIA REPAIR      ROTATOR CUFF REPAIR Right     TONSILLECTOMY AND ADENOIDECTOMY      UPPER GASTROINTESTINAL ENDOSCOPY N/A 07/11/2023    EGD BIOPSY performed by Tena Wilson MD at [facility] OR        reports that he quit smoking about 2 years ago. His smoking use included cigarettes. He started smoking about 58 years ago. He has a 56 pack-year smoking history. He has never used smokeless tobacco. He

## 2024-10-02 NOTE — PROGRESS NOTES
CLINICAL PHARMACY NOTE: MEDS TO BEDS    Total # of Prescriptions Filled: 1   The following medications were delivered to the patient:  brilinta    Additional Documentation:

## 2024-10-03 ENCOUNTER — TELEPHONE (OUTPATIENT)
Dept: CARDIOLOGY | Age: 74
End: 2024-10-03

## 2024-10-03 NOTE — TELEPHONE ENCOUNTER
Aurora from the [facility]\"" wanted to be sure that it is safe for Mr. Chan to be on both Eliquis and Brilinta at this time. I did tentatively reassure that due to his recent stenting (done on 10/1) that this should be ok however that I would double check with you.     Please advise.   Thanks!

## 2024-10-21 ENCOUNTER — OFFICE VISIT (OUTPATIENT)
Dept: CARDIOLOGY | Age: 74
End: 2024-10-21
Payer: MEDICARE

## 2024-10-21 VITALS
HEIGHT: 67 IN | WEIGHT: 186 LBS | SYSTOLIC BLOOD PRESSURE: 99 MMHG | RESPIRATION RATE: 18 BRPM | HEART RATE: 74 BPM | OXYGEN SATURATION: 95 % | DIASTOLIC BLOOD PRESSURE: 61 MMHG | BODY MASS INDEX: 29.19 KG/M2

## 2024-10-21 DIAGNOSIS — Z79.899 ON AMIODARONE THERAPY: ICD-10-CM

## 2024-10-21 DIAGNOSIS — E78.2 MIXED HYPERLIPIDEMIA: ICD-10-CM

## 2024-10-21 DIAGNOSIS — I73.9 PAD (PERIPHERAL ARTERY DISEASE) (HCC): ICD-10-CM

## 2024-10-21 DIAGNOSIS — R06.02 SHORTNESS OF BREATH: ICD-10-CM

## 2024-10-21 DIAGNOSIS — I42.9 CARDIOMYOPATHY, UNSPECIFIED TYPE (HCC): ICD-10-CM

## 2024-10-21 DIAGNOSIS — I48.0 PAF (PAROXYSMAL ATRIAL FIBRILLATION) (HCC): ICD-10-CM

## 2024-10-21 DIAGNOSIS — I50.42 CHRONIC COMBINED SYSTOLIC AND DIASTOLIC CONGESTIVE HEART FAILURE (HCC): ICD-10-CM

## 2024-10-21 DIAGNOSIS — I25.119 CORONARY ARTERY DISEASE WITH ANGINA PECTORIS, UNSPECIFIED VESSEL OR LESION TYPE, UNSPECIFIED WHETHER NATIVE OR TRANSPLANTED HEART (HCC): Primary | ICD-10-CM

## 2024-10-21 DIAGNOSIS — Z95.820 S/P ANGIOPLASTY WITH STENT: ICD-10-CM

## 2024-10-21 DIAGNOSIS — Z79.01 CHRONIC ANTICOAGULATION: ICD-10-CM

## 2024-10-21 DIAGNOSIS — Z87.898 HISTORY OF SYNCOPE: ICD-10-CM

## 2024-10-21 PROCEDURE — 3074F SYST BP LT 130 MM HG: CPT | Performed by: PHYSICIAN ASSISTANT

## 2024-10-21 PROCEDURE — G8484 FLU IMMUNIZE NO ADMIN: HCPCS | Performed by: PHYSICIAN ASSISTANT

## 2024-10-21 PROCEDURE — 99214 OFFICE O/P EST MOD 30 MIN: CPT | Performed by: PHYSICIAN ASSISTANT

## 2024-10-21 PROCEDURE — G8427 DOCREV CUR MEDS BY ELIG CLIN: HCPCS | Performed by: PHYSICIAN ASSISTANT

## 2024-10-21 PROCEDURE — G8417 CALC BMI ABV UP PARAM F/U: HCPCS | Performed by: PHYSICIAN ASSISTANT

## 2024-10-21 PROCEDURE — 1123F ACP DISCUSS/DSCN MKR DOCD: CPT | Performed by: PHYSICIAN ASSISTANT

## 2024-10-21 PROCEDURE — 1036F TOBACCO NON-USER: CPT | Performed by: PHYSICIAN ASSISTANT

## 2024-10-21 PROCEDURE — 3078F DIAST BP <80 MM HG: CPT | Performed by: PHYSICIAN ASSISTANT

## 2024-10-21 PROCEDURE — 3017F COLORECTAL CA SCREEN DOC REV: CPT | Performed by: PHYSICIAN ASSISTANT

## 2024-10-21 NOTE — PROGRESS NOTES
Bowel sounds and aorta are normal. He exhibits no organomegaly, mass or bruit.   Extremities: None. No cyanosis or clubbing. 2+ radial and carotid pulses. Distal extremity pulses: 2+ bilaterally. +1 Posterior tibial pulse on right but absent in all other pedal pulses.  Neurological: He is alert and oriented to person, place, and time. No evidence of gross cranial nerve deficit. Coordination appeared normal.   Skin: Skin is warm and dry. There is no rash or diaphoresis.   Psychiatric: He has a normal mood and affect. His speech is normal and behavior is normal.      MOST RECENT LABS ON RECORD:   Lab Results   Component Value Date    WBC 5.3 09/19/2024    HGB 16.8 09/19/2024    HCT 48.8 09/19/2024    PLT See Reflexed IPF Result 10/01/2024    CHOL 121 09/18/2023    TRIG 82 09/18/2023    HDL 47 09/18/2023    ALT 17 07/26/2024    AST 24 07/26/2024     09/19/2024    K 4.3 09/19/2024     09/19/2024    CREATININE 1.0 10/01/2024    BUN 22 09/19/2024    CO2 26 09/19/2024    TSH 1.51 07/23/2024    INR 1.0 07/24/2024    LABA1C 5.3 03/14/2024     ASSESSMENT:  1. Coronary artery disease with angina pectoris, unspecified vessel or lesion type, unspecified whether native or transplanted heart ([facility])    2. S/P angioplasty with stent    3. Cardiomyopathy, unspecified type ([facility])    4. History of syncope    5. Mixed hyperlipidemia    6. PAF (paroxysmal atrial fibrillation) ([facility])    7. Shortness of breath    8. On amiodarone therapy    9. Chronic anticoagulation    10. Chronic combined systolic and diastolic congestive heart failure ([facility])    11. PAD (peripheral artery disease) ([facility])        PLAN:    Atherosclerotic Heart Disease: Last Heart Cath done in 2022: Showed 50% Blockage in LAD with Severe single vessel disease-recent history of syncopal episodes on 7/23/2024  Heart cath done 10/1/2024: Successful angioplasty followed by drug-eluting stent of proximal LAD. Shockwave atherectomy of proximal LAD. Successful angioplasty

## 2024-10-29 ENCOUNTER — HOSPITAL ENCOUNTER (OUTPATIENT)
Dept: CARDIAC REHAB | Age: 74
Setting detail: THERAPIES SERIES
Discharge: HOME | End: 2024-10-29

## 2024-10-29 NOTE — PROGRESS NOTES
Cardiac Rehab Initial History and Assessment    Michael Chan   1950  [id number]  10/29/2024    Primary Diagnosis: PTCA  Date of event/procedure: 10/1/2024    Participated in cardiac rehab program before:  [x] Yes - 36 sessions     [] No    Living Will: [x] Yes   [] No  On File: [x] Yes   [] No   [] N/A  Durable Power of : [x] Yes   [] No    Medical History  Past Medical History:   Diagnosis Date    Acute kidney injury (HCC)     Ascites 5/20/2022    Atrial fibrillation (HCC)     CHF (congestive heart failure) (HCC)     Cirrhosis of liver with ascites (HCC) 5/19/2022    CKD (chronic kidney disease) 5/20/2022    Coronary artery disease     Hyperlipidemia     Hypertension     Hypomagnesemia 5/20/2022    Hyponatremia     Thrombocytopenia (HCC)        Family History  Family History   Problem Relation Age of Onset    Heart Disease Mother     Hypertension Mother     Hypertension Father     Heart Disease Father     Coronary Art Dis Sister     Cancer Sister         ovarian    Coronary Art Dis Brother        Symptoms:     Angina   [x] None   [] Tightness   [] Shortness of Breath   [] Pressure    [] Nausea   [] Sharp, Stabbing  [] Pallor   [] Indigestion, Heartburn [] Sweaty  Where was discomfort located?  Precipitating Factors?  Relieved by:       Arrhythmia   [x] Yes/describe:      [] No   [] Pacer    [] AICD  Arrhythmia Medications:  Amiodarone    Nutrition  Appetite:  [x]  Too Good    []  Good  []  Poor  Special Diet: Regular  Dietary Screening:  InferS Diet Survey score: Deferred   Daily Food Diary completed:         [x] Yes        [] No  Lab Results   Component Value Date/Time    TRIG 82 09/18/2023 01:50 PM    HDL 47 09/18/2023 01:50 PM   Lipid Meds: Atorvastatin    Alcohol:   Social History     Substance and Sexual Activity   Alcohol Use Not Currently     Caffeine: [x] Yes [] No  Type:  Diet Coke  Amount:  \"once in awhile\"  Water intake per day: \"enough\"    Psychological  [x] Depression    []

## 2024-10-29 NOTE — PROGRESS NOTES
Phase II Cardiac Rehabilitation   Physician Order Form    Michael MCBRIDE Rocio  1950  [id number]  10/29/2024    Procedure/Code:  Phase 2 Cardiac Rehabilitation/08207  Diagnosis/Code:  PTCA/95.5  Onset/Procedure Date:  10/1/2024    Prescribed Exercise Plan:  Submaximal exercise evaluation at program beginning and completion  Target HR: 60-80% HRR    Initial MET Level: 1.5-4 METs     Duration: 31 - 90 Minutes  Frequency: 3 Days per week  Modalities:  Treadmill   Bicycle ergometer/UBE  Seated Stepper  Rowing Machine  Weights/bands  Progress exercise per FITT protocol:    Achieve and progress prescribed exercise frequency, intensity, type, and time based upon initial evaluation/submaximal graded exercise/6MWT results, at least 3 X per week, maintaining the prescribed target heart rate range, a Darin rating of perceived exertion between 11 and 16, duration of 31 - 90 minutes using multiple exercise modes, (including, but not limited to, rower, bicycle, arm ergometer, recumbent stepper, treadmill) on at least 3 days/week, progressing at least 0.5-1.0 METs/week and/or 5-10 minutes/week as tolerated, while adhering to guidelines and patient centered goals.   Introduce 8-15 bilateral upper /lower extremity resistance exercise at 1-3 sets per lift, on 2-3 non-consecutive days using Band/weights to 8-15 reps on at lease 2 occasions, maintaining RPE 12-16.  Once repetition maximum has been achieved, additional weight sets may be added.      Standing Orders:  Initiate ACLS for cardiac events  Nitroglycerine 0.4mg SL every 5 minutes X 3 for angina pain  12 lead EKG for chest pain or dysrhythmia  O2 per nasal cannula as needed for SpO2 <90%   Random blood glucose per lab for hyper/hypoglycemia symptoms  Lipid panel pre/post program as needed.  May continue in Phase III/maintenance program at program completion

## 2024-11-06 ENCOUNTER — HOSPITAL ENCOUNTER (OUTPATIENT)
Dept: CARDIAC REHAB | Age: 74
Setting detail: THERAPIES SERIES
Discharge: HOME | End: 2024-11-06

## 2024-11-06 PROCEDURE — 93798 PHYS/QHP OP CAR RHAB W/ECG: CPT

## 2024-11-07 ENCOUNTER — HOSPITAL ENCOUNTER (OUTPATIENT)
Dept: CARDIAC REHAB | Age: 74
Setting detail: THERAPIES SERIES
Discharge: HOME | End: 2024-11-07

## 2024-11-07 PROCEDURE — 93798 PHYS/QHP OP CAR RHAB W/ECG: CPT

## 2024-11-11 ENCOUNTER — HOSPITAL ENCOUNTER (OUTPATIENT)
Dept: CARDIAC REHAB | Age: 74
Setting detail: THERAPIES SERIES
Discharge: HOME | End: 2024-11-11
Payer: MEDICARE

## 2024-11-11 ENCOUNTER — APPOINTMENT (OUTPATIENT)
Dept: CARDIAC REHAB | Age: 74
End: 2024-11-11
Payer: MEDICARE

## 2024-11-11 PROCEDURE — 93798 PHYS/QHP OP CAR RHAB W/ECG: CPT

## 2024-11-13 ENCOUNTER — HOSPITAL ENCOUNTER (OUTPATIENT)
Dept: CARDIAC REHAB | Age: 74
Setting detail: THERAPIES SERIES
Discharge: HOME | End: 2024-11-13
Payer: MEDICARE

## 2024-11-13 PROCEDURE — 93798 PHYS/QHP OP CAR RHAB W/ECG: CPT

## 2024-11-14 ENCOUNTER — HOSPITAL ENCOUNTER (OUTPATIENT)
Dept: CARDIAC REHAB | Age: 74
Setting detail: THERAPIES SERIES
Discharge: HOME | End: 2024-11-14
Payer: MEDICARE

## 2024-11-14 PROCEDURE — 93798 PHYS/QHP OP CAR RHAB W/ECG: CPT

## 2024-11-18 ENCOUNTER — APPOINTMENT (OUTPATIENT)
Dept: CARDIAC REHAB | Age: 74
End: 2024-11-18
Payer: MEDICARE

## 2024-11-20 ENCOUNTER — HOSPITAL ENCOUNTER (OUTPATIENT)
Dept: CARDIAC REHAB | Age: 74
Setting detail: THERAPIES SERIES
Discharge: HOME | End: 2024-11-20
Payer: MEDICARE

## 2024-11-20 PROCEDURE — 93798 PHYS/QHP OP CAR RHAB W/ECG: CPT

## 2024-11-21 ENCOUNTER — HOSPITAL ENCOUNTER (OUTPATIENT)
Dept: CARDIAC REHAB | Age: 74
Setting detail: THERAPIES SERIES
Discharge: HOME | End: 2024-11-21
Payer: MEDICARE

## 2024-11-21 ENCOUNTER — HOSPITAL ENCOUNTER (OUTPATIENT)
Age: 74
Discharge: HOME | End: 2024-11-23
Payer: MEDICARE

## 2024-11-21 VITALS
WEIGHT: 188.27 LBS | HEIGHT: 67 IN | DIASTOLIC BLOOD PRESSURE: 69 MMHG | BODY MASS INDEX: 29.55 KG/M2 | SYSTOLIC BLOOD PRESSURE: 106 MMHG

## 2024-11-21 DIAGNOSIS — I42.9 CARDIOMYOPATHY, UNSPECIFIED TYPE (HCC): ICD-10-CM

## 2024-11-21 DIAGNOSIS — R06.02 SHORTNESS OF BREATH: ICD-10-CM

## 2024-11-21 LAB
ECHO AO ROOT DIAM: 2.3 CM
ECHO AO ROOT INDEX: 1.17 CM/M2
ECHO AO SINUS VALSALVA DIAM: 3.4 CM
ECHO AO SINUS VALSALVA INDEX: 1.73 CM/M2
ECHO AO ST JNCT DIAM: 2.6 CM
ECHO AV CUSP MM: 2.2 CM
ECHO BSA: 2.01 M2
ECHO LA AREA 2C: 24.4 CM2
ECHO LA AREA 4C: 27.6 CM2
ECHO LA MAJOR AXIS: 6.8 CM
ECHO LA MINOR AXIS: 5.6 CM
ECHO LA VOL BP: 97 ML (ref 18–58)
ECHO LA VOL MOD A2C: 87 ML (ref 18–58)
ECHO LA VOL MOD A4C: 90 ML (ref 18–58)
ECHO LA VOL/BSA BIPLANE: 49 ML/M2 (ref 16–34)
ECHO LA VOLUME INDEX MOD A2C: 44 ML/M2 (ref 16–34)
ECHO LA VOLUME INDEX MOD A4C: 46 ML/M2 (ref 16–34)
ECHO LV EDV A2C: 120 ML
ECHO LV EDV A4C: 105 ML
ECHO LV EDV INDEX A4C: 53 ML/M2
ECHO LV EDV NDEX A2C: 61 ML/M2
ECHO LV EF PHYSICIAN: 40 %
ECHO LV EJECTION FRACTION A2C: 50 %
ECHO LV EJECTION FRACTION A4C: 46 %
ECHO LV ESV A2C: 60 ML
ECHO LV ESV A4C: 56 ML
ECHO LV ESV INDEX A2C: 30 ML/M2
ECHO LV ESV INDEX A4C: 28 ML/M2
ECHO LV FRACTIONAL SHORTENING: 23 % (ref 28–44)
ECHO LV INTERNAL DIMENSION DIASTOLE INDEX: 2.69 CM/M2
ECHO LV INTERNAL DIMENSION DIASTOLIC: 5.3 CM (ref 4.2–5.9)
ECHO LV INTERNAL DIMENSION SYSTOLIC INDEX: 2.08 CM/M2
ECHO LV INTERNAL DIMENSION SYSTOLIC: 4.1 CM
ECHO LV IVSD: 1.1 CM (ref 0.6–1)
ECHO LV MASS 2D: 227.7 G (ref 88–224)
ECHO LV MASS INDEX 2D: 115.6 G/M2 (ref 49–115)
ECHO LV POSTERIOR WALL DIASTOLIC: 1.1 CM (ref 0.6–1)
ECHO LV RELATIVE WALL THICKNESS RATIO: 0.42

## 2024-11-21 PROCEDURE — 93308 TTE F-UP OR LMTD: CPT

## 2024-11-21 PROCEDURE — 93798 PHYS/QHP OP CAR RHAB W/ECG: CPT

## 2024-11-22 ENCOUNTER — TELEPHONE (OUTPATIENT)
Dept: CARDIOLOGY | Age: 74
End: 2024-11-22

## 2024-11-22 NOTE — RESULT ENCOUNTER NOTE
Please let them know their echo looks good, no change. We will discuss at follow up appointment. Thanks.

## 2024-11-22 NOTE — TELEPHONE ENCOUNTER
----- Message from Ciera Cortez PA-C sent at 11/22/2024  8:02 AM EST -----  Please let them know their echo looks good, no change. We will discuss at follow up appointment. Thanks.

## 2024-11-25 ENCOUNTER — HOSPITAL ENCOUNTER (OUTPATIENT)
Dept: CARDIAC REHAB | Age: 74
Setting detail: THERAPIES SERIES
Discharge: HOME | End: 2024-11-25
Payer: MEDICARE

## 2024-11-25 PROCEDURE — 93798 PHYS/QHP OP CAR RHAB W/ECG: CPT

## 2024-11-27 ENCOUNTER — APPOINTMENT (OUTPATIENT)
Dept: CARDIAC REHAB | Age: 74
End: 2024-11-27
Payer: MEDICARE

## 2024-12-02 ENCOUNTER — TELEPHONE (OUTPATIENT)
Dept: CARDIOLOGY | Age: 74
End: 2024-12-02

## 2024-12-02 ENCOUNTER — APPOINTMENT (OUTPATIENT)
Dept: CARDIAC REHAB | Age: 74
End: 2024-12-02
Payer: MEDICARE

## 2024-12-02 ENCOUNTER — APPOINTMENT (OUTPATIENT)
Dept: GENERAL RADIOLOGY | Age: 74
DRG: 309 | End: 2024-12-02
Payer: MEDICARE

## 2024-12-02 ENCOUNTER — HOSPITAL ENCOUNTER (INPATIENT)
Age: 74
LOS: 2 days | Discharge: ANOTHER HOSPITAL | DRG: 309 | End: 2024-12-04
Attending: EMERGENCY MEDICINE | Admitting: INTERNAL MEDICINE
Payer: MEDICARE

## 2024-12-02 DIAGNOSIS — I48.91 ATRIAL FIBRILLATION WITH RAPID VENTRICULAR RESPONSE (HCC): ICD-10-CM

## 2024-12-02 DIAGNOSIS — I48.0 PAROXYSMAL ATRIAL FIBRILLATION WITH RVR (HCC): Primary | ICD-10-CM

## 2024-12-02 DIAGNOSIS — I50.43 ACUTE ON CHRONIC COMBINED SYSTOLIC AND DIASTOLIC HF (HEART FAILURE), NYHA CLASS 4 (HCC): ICD-10-CM

## 2024-12-02 LAB
ALBUMIN SERPL-MCNC: 4.7 G/DL (ref 3.5–5.2)
ALP SERPL-CCNC: 115 U/L (ref 40–129)
ALT SERPL-CCNC: 28 U/L (ref 10–50)
ANION GAP SERPL CALCULATED.3IONS-SCNC: 11 MMOL/L (ref 9–16)
AST SERPL-CCNC: 26 U/L (ref 10–50)
BASOPHILS # BLD: <0.03 K/UL (ref 0–0.2)
BASOPHILS NFR BLD: 0 % (ref 0–2)
BUN SERPL-MCNC: 28 MG/DL (ref 8–23)
BUN/CREAT SERPL: 19 (ref 9–20)
CALCIUM SERPL-MCNC: 10.4 MG/DL (ref 8.6–10.4)
CHLORIDE SERPL-SCNC: 104 MMOL/L (ref 98–107)
CO2 SERPL-SCNC: 27 MMOL/L (ref 20–31)
CREAT SERPL-MCNC: 1.5 MG/DL (ref 0.7–1.2)
EKG Q-T INTERVAL: 308 MS
EKG QRS DURATION: 82 MS
EKG QTC CALCULATION (BAZETT): 475 MS
EKG R AXIS: 34 DEGREES
EKG T AXIS: 8 DEGREES
EKG VENTRICULAR RATE: 143 BPM
EOSINOPHIL # BLD: 0.1 K/UL (ref 0–0.44)
EOSINOPHILS RELATIVE PERCENT: 2 % (ref 1–4)
ERYTHROCYTE [DISTWIDTH] IN BLOOD BY AUTOMATED COUNT: 13.4 % (ref 11.8–14.4)
GLUCOSE SERPL-MCNC: 109 MG/DL (ref 74–99)
HCT VFR BLD AUTO: 54.1 % (ref 40.7–50.3)
HGB BLD-MCNC: 18.5 G/DL (ref 13–17)
IMM GRANULOCYTES # BLD AUTO: <0.03 K/UL (ref 0–0.3)
IMM GRANULOCYTES NFR BLD: 0 %
INR PPP: 1.1
LYMPHOCYTES RELATIVE PERCENT: 28 % (ref 24–43)
MCH RBC QN AUTO: 32 PG (ref 25.2–33.5)
MCHC RBC AUTO-ENTMCNC: 34.2 G/DL (ref 28.4–34.8)
MCV RBC AUTO: 93.6 FL (ref 82.6–102.9)
MONOCYTES NFR BLD: 0.64 K/UL (ref 0.1–1.2)
MONOCYTES NFR BLD: 11 % (ref 3–12)
NEUTROPHILS NFR BLD: 58 % (ref 36–65)
NEUTS SEG NFR BLD: 3.24 K/UL (ref 1.5–8.1)
NRBC BLD-RTO: 0 PER 100 WBC
PLATELET # BLD AUTO: 140 K/UL (ref 138–453)
PROT SERPL-MCNC: 7.6 G/DL (ref 6.6–8.7)
PROTHROMBIN TIME: 13.9 SEC (ref 11.7–14.1)
RBC # BLD AUTO: 5.78 M/UL (ref 4.21–5.77)
SODIUM SERPL-SCNC: 142 MMOL/L (ref 136–145)
TROPONIN I SERPL HS-MCNC: 27 NG/L (ref 0–22)
WBC OTHER # BLD: 5.6 K/UL (ref 3.5–11.3)

## 2024-12-02 PROCEDURE — 2000000000 HC ICU R&B

## 2024-12-02 PROCEDURE — 2580000003 HC RX 258: Performed by: INTERNAL MEDICINE

## 2024-12-02 PROCEDURE — 99285 EMERGENCY DEPT VISIT HI MDM: CPT

## 2024-12-02 PROCEDURE — 6360000002 HC RX W HCPCS: Performed by: INTERNAL MEDICINE

## 2024-12-02 PROCEDURE — 71045 X-RAY EXAM CHEST 1 VIEW: CPT

## 2024-12-02 PROCEDURE — 93005 ELECTROCARDIOGRAM TRACING: CPT | Performed by: EMERGENCY MEDICINE

## 2024-12-02 PROCEDURE — 94761 N-INVAS EAR/PLS OXIMETRY MLT: CPT

## 2024-12-02 PROCEDURE — 84484 ASSAY OF TROPONIN QUANT: CPT

## 2024-12-02 PROCEDURE — 6370000000 HC RX 637 (ALT 250 FOR IP): Performed by: NURSE PRACTITIONER

## 2024-12-02 PROCEDURE — 2580000003 HC RX 258: Performed by: EMERGENCY MEDICINE

## 2024-12-02 PROCEDURE — 2500000003 HC RX 250 WO HCPCS: Performed by: EMERGENCY MEDICINE

## 2024-12-02 PROCEDURE — 80053 COMPREHEN METABOLIC PANEL: CPT

## 2024-12-02 PROCEDURE — 93010 ELECTROCARDIOGRAM REPORT: CPT | Performed by: INTERNAL MEDICINE

## 2024-12-02 PROCEDURE — 85730 THROMBOPLASTIN TIME PARTIAL: CPT

## 2024-12-02 PROCEDURE — 85025 COMPLETE CBC W/AUTO DIFF WBC: CPT

## 2024-12-02 PROCEDURE — 2580000003 HC RX 258: Performed by: NURSE PRACTITIONER

## 2024-12-02 PROCEDURE — 85610 PROTHROMBIN TIME: CPT

## 2024-12-02 RX ORDER — POLYETHYLENE GLYCOL 3350 17 G/17G
17 POWDER, FOR SOLUTION ORAL DAILY PRN
Status: DISCONTINUED | OUTPATIENT
Start: 2024-12-02 | End: 2024-12-04 | Stop reason: HOSPADM

## 2024-12-02 RX ORDER — LOSARTAN POTASSIUM 25 MG/1
12.5 TABLET ORAL DAILY
Status: DISCONTINUED | OUTPATIENT
Start: 2024-12-03 | End: 2024-12-04 | Stop reason: HOSPADM

## 2024-12-02 RX ORDER — BISACODYL 10 MG
10 SUPPOSITORY, RECTAL RECTAL DAILY PRN
COMMUNITY

## 2024-12-02 RX ORDER — ESCITALOPRAM OXALATE 5 MG/1
10 TABLET ORAL DAILY
Status: DISCONTINUED | OUTPATIENT
Start: 2024-12-02 | End: 2024-12-03

## 2024-12-02 RX ORDER — 0.9 % SODIUM CHLORIDE 0.9 %
1000 INTRAVENOUS SOLUTION INTRAVENOUS ONCE
Status: COMPLETED | OUTPATIENT
Start: 2024-12-02 | End: 2024-12-02

## 2024-12-02 RX ORDER — LORAZEPAM 0.5 MG/1
0.5 TABLET ORAL DAILY
COMMUNITY
Start: 2024-11-16

## 2024-12-02 RX ORDER — AMIODARONE HYDROCHLORIDE 200 MG/1
100 TABLET ORAL DAILY
Status: DISCONTINUED | OUTPATIENT
Start: 2024-12-02 | End: 2024-12-03

## 2024-12-02 RX ORDER — SODIUM CHLORIDE 0.9 % (FLUSH) 0.9 %
10 SYRINGE (ML) INJECTION EVERY 12 HOURS SCHEDULED
Status: DISCONTINUED | OUTPATIENT
Start: 2024-12-02 | End: 2024-12-04 | Stop reason: HOSPADM

## 2024-12-02 RX ORDER — VENLAFAXINE HYDROCHLORIDE 75 MG/1
75 CAPSULE, EXTENDED RELEASE ORAL DAILY
Status: DISCONTINUED | OUTPATIENT
Start: 2024-12-02 | End: 2024-12-04 | Stop reason: HOSPADM

## 2024-12-02 RX ORDER — TAMSULOSIN HYDROCHLORIDE 0.4 MG/1
0.4 CAPSULE ORAL EVERY MORNING
Status: DISCONTINUED | OUTPATIENT
Start: 2024-12-03 | End: 2024-12-04 | Stop reason: HOSPADM

## 2024-12-02 RX ORDER — DOCUSATE SODIUM 100 MG/1
100 CAPSULE, LIQUID FILLED ORAL 2 TIMES DAILY
Status: DISCONTINUED | OUTPATIENT
Start: 2024-12-02 | End: 2024-12-04 | Stop reason: HOSPADM

## 2024-12-02 RX ORDER — METOPROLOL SUCCINATE 25 MG/1
12.5 TABLET, EXTENDED RELEASE ORAL DAILY
Status: DISCONTINUED | OUTPATIENT
Start: 2024-12-02 | End: 2024-12-04 | Stop reason: HOSPADM

## 2024-12-02 RX ORDER — GABAPENTIN 300 MG/1
300 CAPSULE ORAL 3 TIMES DAILY
Status: DISCONTINUED | OUTPATIENT
Start: 2024-12-02 | End: 2024-12-04 | Stop reason: HOSPADM

## 2024-12-02 RX ORDER — ATORVASTATIN CALCIUM 40 MG/1
80 TABLET, FILM COATED ORAL DAILY
Status: DISCONTINUED | OUTPATIENT
Start: 2024-12-02 | End: 2024-12-04 | Stop reason: HOSPADM

## 2024-12-02 RX ORDER — POTASSIUM CHLORIDE 1500 MG/1
40 TABLET, EXTENDED RELEASE ORAL PRN
Status: DISCONTINUED | OUTPATIENT
Start: 2024-12-02 | End: 2024-12-04 | Stop reason: HOSPADM

## 2024-12-02 RX ORDER — ACETAMINOPHEN 325 MG/1
650 TABLET ORAL EVERY 6 HOURS PRN
Status: DISCONTINUED | OUTPATIENT
Start: 2024-12-02 | End: 2024-12-04 | Stop reason: HOSPADM

## 2024-12-02 RX ORDER — LANOLIN ALCOHOL/MO/W.PET/CERES
400 CREAM (GRAM) TOPICAL DAILY
COMMUNITY

## 2024-12-02 RX ORDER — CLOPIDOGREL BISULFATE 75 MG/1
75 TABLET ORAL DAILY
Status: DISCONTINUED | OUTPATIENT
Start: 2024-12-02 | End: 2024-12-04 | Stop reason: HOSPADM

## 2024-12-02 RX ORDER — CLOPIDOGREL BISULFATE 75 MG/1
75 TABLET ORAL DAILY
COMMUNITY

## 2024-12-02 RX ORDER — SODIUM CHLORIDE 9 MG/ML
INJECTION, SOLUTION INTRAVENOUS PRN
Status: DISCONTINUED | OUTPATIENT
Start: 2024-12-02 | End: 2024-12-04 | Stop reason: HOSPADM

## 2024-12-02 RX ORDER — ACETAMINOPHEN 650 MG/1
650 SUPPOSITORY RECTAL EVERY 6 HOURS PRN
Status: DISCONTINUED | OUTPATIENT
Start: 2024-12-02 | End: 2024-12-04 | Stop reason: HOSPADM

## 2024-12-02 RX ORDER — SODIUM CHLORIDE 9 MG/ML
INJECTION, SOLUTION INTRAVENOUS CONTINUOUS
Status: DISCONTINUED | OUTPATIENT
Start: 2024-12-02 | End: 2024-12-03

## 2024-12-02 RX ORDER — LACTULOSE 10 G/15ML
10 SOLUTION ORAL 3 TIMES DAILY PRN
Status: DISCONTINUED | OUTPATIENT
Start: 2024-12-02 | End: 2024-12-04 | Stop reason: HOSPADM

## 2024-12-02 RX ORDER — LORAZEPAM 0.5 MG/1
0.5 TABLET ORAL DAILY
Status: DISCONTINUED | OUTPATIENT
Start: 2024-12-02 | End: 2024-12-04 | Stop reason: HOSPADM

## 2024-12-02 RX ORDER — SODIUM CHLORIDE 0.9 % (FLUSH) 0.9 %
10 SYRINGE (ML) INJECTION PRN
Status: DISCONTINUED | OUTPATIENT
Start: 2024-12-02 | End: 2024-12-04 | Stop reason: HOSPADM

## 2024-12-02 RX ORDER — LANOLIN ALCOHOL/MO/W.PET/CERES
400 CREAM (GRAM) TOPICAL DAILY
Status: DISCONTINUED | OUTPATIENT
Start: 2024-12-03 | End: 2024-12-04 | Stop reason: HOSPADM

## 2024-12-02 RX ORDER — FOLIC ACID 1 MG/1
1000 TABLET ORAL DAILY
Status: DISCONTINUED | OUTPATIENT
Start: 2024-12-03 | End: 2024-12-04 | Stop reason: HOSPADM

## 2024-12-02 RX ORDER — LOPERAMIDE HYDROCHLORIDE 2 MG/1
2 CAPSULE ORAL 4 TIMES DAILY PRN
Status: DISCONTINUED | OUTPATIENT
Start: 2024-12-02 | End: 2024-12-04 | Stop reason: HOSPADM

## 2024-12-02 RX ORDER — ERGOCALCIFEROL 1.25 MG/1
50000 CAPSULE, LIQUID FILLED ORAL WEEKLY
Status: DISCONTINUED | OUTPATIENT
Start: 2024-12-02 | End: 2024-12-04 | Stop reason: HOSPADM

## 2024-12-02 RX ORDER — M-VIT,TX,IRON,MINS/CALC/FOLIC 27MG-0.4MG
1 TABLET ORAL EVERY MORNING
Status: DISCONTINUED | OUTPATIENT
Start: 2024-12-03 | End: 2024-12-04 | Stop reason: HOSPADM

## 2024-12-02 RX ORDER — GLUCOSAMINE/D3/BOSWELLIA SERRA 1500MG-400
1 TABLET ORAL 2 TIMES DAILY
Status: DISCONTINUED | OUTPATIENT
Start: 2024-12-02 | End: 2024-12-02

## 2024-12-02 RX ORDER — SWAB
2 SWAB, NON-MEDICATED MISCELLANEOUS DAILY
Status: DISCONTINUED | OUTPATIENT
Start: 2024-12-02 | End: 2024-12-02

## 2024-12-02 RX ORDER — ONDANSETRON 4 MG/1
4 TABLET, ORALLY DISINTEGRATING ORAL 3 TIMES DAILY PRN
Status: DISCONTINUED | OUTPATIENT
Start: 2024-12-02 | End: 2024-12-04 | Stop reason: HOSPADM

## 2024-12-02 RX ORDER — ASPIRIN 81 MG/1
81 TABLET ORAL DAILY
Status: DISCONTINUED | OUTPATIENT
Start: 2024-12-03 | End: 2024-12-03

## 2024-12-02 RX ORDER — HYDROXYZINE HYDROCHLORIDE 25 MG/1
25 TABLET, FILM COATED ORAL 3 TIMES DAILY PRN
Status: DISCONTINUED | OUTPATIENT
Start: 2024-12-02 | End: 2024-12-03

## 2024-12-02 RX ORDER — PANTOPRAZOLE SODIUM 40 MG/1
40 TABLET, DELAYED RELEASE ORAL
Status: DISCONTINUED | OUTPATIENT
Start: 2024-12-03 | End: 2024-12-04 | Stop reason: HOSPADM

## 2024-12-02 RX ORDER — POTASSIUM CHLORIDE 7.45 MG/ML
10 INJECTION INTRAVENOUS PRN
Status: DISCONTINUED | OUTPATIENT
Start: 2024-12-02 | End: 2024-12-04 | Stop reason: HOSPADM

## 2024-12-02 RX ORDER — GAUZE BANDAGE 2" X 2"
100 BANDAGE TOPICAL DAILY
Status: DISCONTINUED | OUTPATIENT
Start: 2024-12-03 | End: 2024-12-04 | Stop reason: HOSPADM

## 2024-12-02 RX ORDER — BUMETANIDE 1 MG/1
0.5 TABLET ORAL DAILY
Status: DISCONTINUED | OUTPATIENT
Start: 2024-12-02 | End: 2024-12-04 | Stop reason: HOSPADM

## 2024-12-02 RX ORDER — MIDODRINE HYDROCHLORIDE 5 MG/1
10 TABLET ORAL 2 TIMES DAILY WITH MEALS
Status: DISCONTINUED | OUTPATIENT
Start: 2024-12-02 | End: 2024-12-04 | Stop reason: HOSPADM

## 2024-12-02 RX ORDER — DILTIAZEM HYDROCHLORIDE 5 MG/ML
20 INJECTION INTRAVENOUS ONCE
Status: COMPLETED | OUTPATIENT
Start: 2024-12-02 | End: 2024-12-02

## 2024-12-02 RX ORDER — POTASSIUM CHLORIDE 1500 MG/1
20 TABLET, EXTENDED RELEASE ORAL DAILY
Status: DISCONTINUED | OUTPATIENT
Start: 2024-12-03 | End: 2024-12-04 | Stop reason: HOSPADM

## 2024-12-02 RX ADMIN — GABAPENTIN 300 MG: 300 CAPSULE ORAL at 21:06

## 2024-12-02 RX ADMIN — APIXABAN 5 MG: 5 TABLET, FILM COATED ORAL at 21:06

## 2024-12-02 RX ADMIN — DILTIAZEM HYDROCHLORIDE 20 MG: 5 INJECTION INTRAVENOUS at 16:26

## 2024-12-02 RX ADMIN — SODIUM CHLORIDE: 9 INJECTION, SOLUTION INTRAVENOUS at 17:55

## 2024-12-02 RX ADMIN — SODIUM CHLORIDE 1000 ML: 9 INJECTION, SOLUTION INTRAVENOUS at 16:23

## 2024-12-02 RX ADMIN — MIDODRINE HYDROCHLORIDE 10 MG: 5 TABLET ORAL at 21:06

## 2024-12-02 RX ADMIN — AMIODARONE HYDROCHLORIDE 0.5 MG/MIN: 50 INJECTION, SOLUTION INTRAVENOUS at 17:53

## 2024-12-02 ASSESSMENT — PAIN SCALES - GENERAL: PAINLEVEL_OUTOF10: 0

## 2024-12-02 ASSESSMENT — PAIN - FUNCTIONAL ASSESSMENT: PAIN_FUNCTIONAL_ASSESSMENT: NONE - DENIES PAIN

## 2024-12-02 NOTE — TELEPHONE ENCOUNTER
Nurse from [facility] called stating patient woke up not feeling well today and is feeling fatigue and having palpitations and low blood pressure. Advised them to have pt be evaluated in ER. Verbalized understanding

## 2024-12-02 NOTE — ED PROVIDER NOTES
[facility]  EMERGENCY DEPARTMENT ENCOUNTER      Pt Name: Michael Chan  MRN: [id number]  Birthdate 1950  Date of evaluation: 12/2/2024  Provider: Monica Knight MD    CHIEF COMPLAINT       Chief Complaint   Patient presents with    Atrial Fibrillation     Patient sent from the [facility] facility patient in in afib.  Patient does complain of periods of dizziness and shortness of breath.         HISTORY OF PRESENT ILLNESS   (Location/Symptom, Timing/Onset, Context/Setting, Quality, Duration, Modifying Factors, Severity)  Note limiting factors.   Michael Chan is a 74 y.o. male who presents to the emergency department      Very pleasant 74-year-old gentleman with history of atrial fibrillation presenting the emergency department for evaluation A-fib with a rapid heart rate.  Patient denies any chest pain.  No dizziness weakness or lightheadedness.  He is at assisted living at the [facility].  He is awake alert oriented and is in no acute distress at this time.  Heart rate is ranging from 130s to 170 bpm on monitor.  Blood pressure is stable.  No recent change medications.  He does take Eliquis daily.  Has required cardioversion in the past.        Nursing Notes were reviewed.    REVIEW OF SYSTEMS    (2-9 systems for level 4, 10 or more for level 5)     Review of Systems   All other systems reviewed and are negative.      Except as noted above the remainder of the review of systems was reviewed and negative.       PAST MEDICAL HISTORY     Past Medical History:   Diagnosis Date    Acute kidney injury (HCC)     Ascites 5/20/2022    Atrial fibrillation (HCC)     CHF (congestive heart failure) (HCC)     Cirrhosis of liver with ascites (HCC) 5/19/2022    CKD (chronic kidney disease) 5/20/2022    Coronary artery disease     Hyperlipidemia     Hypertension     Hypomagnesemia 5/20/2022    Hyponatremia     Thrombocytopenia (HCC)          SURGICAL HISTORY       Past Surgical History:   Procedure Laterality Date

## 2024-12-02 NOTE — H&P
ICU Admission - Hospital Medicine  History and Physical    Patient:  Michael Chan  MRN: [id number]    Chief Complaint:  Shortness of breath and fatigue    History Obtained From:  patient    PCP: Mane Harris MD    History of Present Illness:   The patient is a 74 y.o. male who arrived in the ER earlier from [facility]. The patient noted that yesterday 12/1/24 he started to feel dizziness and shortness of breath. The patient stated that he was monitored by the staff throughout the day but symptoms never got better. Today 12/2/24 he was suppose to do Cardiac Rehab but still didn't feel good. Patient denied chest pain, dizziness, nausea, vomiting, diarrhea, and constipation. He was feeling shortness of breath earlier but feels better since arriving to the ER and being treated. The patient stated he still has a headache along with some fatigue. He did have the same thing happen to him in July 2024 to which he underwent a cardioversion and was converted back to normal sinus rhythm. Patients labs showed an increased BUN 28, decreased GFR 50, troponin was elevated at 29. EKG in the ER showed Atrial fibrillation with rapid ventricular response.  Patient also has history of CKD, CAD, and HTN.  During his evaluation BUN was 28 with Creatinine of 1.5 slightly up from baseline.  Denied any n/v/d.  Troponins were 29nand 27.  LFT unremarkable.  CBC unremarkable. EKG showed Atrial fibrillation with RVR and was initiated with Cardizem bolus f/b Cardizem drip. Cardiology was contacted from ER.     Past Medical History:        Diagnosis Date    Acute kidney injury (HCC)     Ascites 5/20/2022    Atrial fibrillation (HCC)     CHF (congestive heart failure) (HCC)     Cirrhosis of liver with ascites (HCC) 5/19/2022    CKD (chronic kidney disease) 5/20/2022    Coronary artery disease     Hyperlipidemia     Hypertension     Hypomagnesemia 5/20/2022    Hyponatremia     Thrombocytopenia (HCC)        Past Surgical History:

## 2024-12-02 NOTE — PLAN OF CARE
Problem: Chronic Conditions and Co-morbidities  Goal: Patient's chronic conditions and co-morbidity symptoms are monitored and maintained or improved  Outcome: Progressing     Problem: Discharge Planning  Goal: Discharge to home or other facility with appropriate resources  Outcome: Progressing     Problem: Safety - Adult  Goal: Free from fall injury  Outcome: Progressing     Problem: Respiratory - Adult  Goal: Achieves optimal ventilation and oxygenation  Reactivated     Problem: Cardiovascular - Adult  Goal: Maintains optimal cardiac output and hemodynamic stability  Reactivated     Problem: Cardiovascular - Adult  Goal: Absence of cardiac dysrhythmias or at baseline  Reactivated     Problem: Skin/Tissue Integrity - Adult  Goal: Skin integrity remains intact  Reactivated     Problem: Skin/Tissue Integrity - Adult  Goal: Incisions, wounds, or drain sites healing without S/S of infection  Reactivated     Problem: Skin/Tissue Integrity - Adult  Goal: Oral mucous membranes remain intact  Reactivated     Problem: Musculoskeletal - Adult  Goal: Return mobility to safest level of function  Reactivated     Problem: Musculoskeletal - Adult  Goal: Maintain proper alignment of affected body part  Reactivated     Problem: Musculoskeletal - Adult  Goal: Return ADL status to a safe level of function  Reactivated     Problem: Metabolic/Fluid and Electrolytes - Adult  Goal: Electrolytes maintained within normal limits  Reactivated     Problem: Metabolic/Fluid and Electrolytes - Adult  Goal: Hemodynamic stability and optimal renal function maintained  Reactivated     Problem: Metabolic/Fluid and Electrolytes - Adult  Goal: Glucose maintained within prescribed range  Reactivated

## 2024-12-03 PROBLEM — D68.69 HYPERCOAGULABLE STATE DUE TO PAROXYSMAL ATRIAL FIBRILLATION (HCC): Status: ACTIVE | Noted: 2024-12-03

## 2024-12-03 PROBLEM — I50.43 ACUTE ON CHRONIC COMBINED SYSTOLIC AND DIASTOLIC HF (HEART FAILURE), NYHA CLASS 4 (HCC): Status: ACTIVE | Noted: 2024-12-03

## 2024-12-03 PROBLEM — I25.10 ASHD (ARTERIOSCLEROTIC HEART DISEASE): Status: ACTIVE | Noted: 2024-12-03

## 2024-12-03 PROBLEM — I48.0 HYPERCOAGULABLE STATE DUE TO PAROXYSMAL ATRIAL FIBRILLATION (HCC): Status: ACTIVE | Noted: 2024-12-03

## 2024-12-03 LAB
ANION GAP SERPL CALCULATED.3IONS-SCNC: 9 MMOL/L (ref 9–16)
BASOPHILS # BLD: <0.03 K/UL (ref 0–0.2)
BASOPHILS NFR BLD: 0 % (ref 0–2)
BUN SERPL-MCNC: 23 MG/DL (ref 8–23)
BUN/CREAT SERPL: 21 (ref 9–20)
CALCIUM SERPL-MCNC: 8.9 MG/DL (ref 8.6–10.4)
CHLORIDE SERPL-SCNC: 108 MMOL/L (ref 98–107)
CO2 SERPL-SCNC: 23 MMOL/L (ref 20–31)
CREAT SERPL-MCNC: 1.1 MG/DL (ref 0.7–1.2)
EKG ATRIAL RATE: 78 BPM
EKG P AXIS: 44 DEGREES
EKG P-R INTERVAL: 152 MS
EKG Q-T INTERVAL: 300 MS
EKG Q-T INTERVAL: 400 MS
EKG QRS DURATION: 72 MS
EKG QRS DURATION: 76 MS
EKG QTC CALCULATION (BAZETT): 418 MS
EKG QTC CALCULATION (BAZETT): 456 MS
EKG R AXIS: 35 DEGREES
EKG R AXIS: 47 DEGREES
EKG T AXIS: 39 DEGREES
EKG T AXIS: 42 DEGREES
EKG VENTRICULAR RATE: 117 BPM
EKG VENTRICULAR RATE: 78 BPM
EOSINOPHIL # BLD: 0.1 K/UL (ref 0–0.44)
EOSINOPHILS RELATIVE PERCENT: 2 % (ref 1–4)
ERYTHROCYTE [DISTWIDTH] IN BLOOD BY AUTOMATED COUNT: 13.4 % (ref 11.8–14.4)
GFR, ESTIMATED: 70 ML/MIN/1.73M2
GLUCOSE SERPL-MCNC: 88 MG/DL (ref 74–99)
HCT VFR BLD AUTO: 47.2 % (ref 40.7–50.3)
HGB BLD-MCNC: 16.3 G/DL (ref 13–17)
IMM GRANULOCYTES # BLD AUTO: <0.03 K/UL (ref 0–0.3)
IMM GRANULOCYTES NFR BLD: 0 %
LYMPHOCYTES NFR BLD: 1.54 K/UL (ref 1.1–3.7)
LYMPHOCYTES RELATIVE PERCENT: 28 % (ref 24–43)
MCH RBC QN AUTO: 32.2 PG (ref 25.2–33.5)
MCHC RBC AUTO-ENTMCNC: 34.5 G/DL (ref 28.4–34.8)
MCV RBC AUTO: 93.3 FL (ref 82.6–102.9)
MONOCYTES NFR BLD: 0.53 K/UL (ref 0.1–1.2)
MONOCYTES NFR BLD: 10 % (ref 3–12)
NEUTROPHILS NFR BLD: 60 % (ref 36–65)
NEUTS SEG NFR BLD: 3.24 K/UL (ref 1.5–8.1)
NRBC BLD-RTO: 0 PER 100 WBC
PLATELET # BLD AUTO: ABNORMAL K/UL (ref 138–453)
PLATELET, FLUORESCENCE: 105 K/UL (ref 138–453)
PLATELETS.RETICULATED NFR BLD AUTO: 5.1 % (ref 1.1–10.3)
POTASSIUM SERPL-SCNC: 4.3 MMOL/L (ref 3.7–5.3)
RBC # BLD AUTO: 5.06 M/UL (ref 4.21–5.77)
SODIUM SERPL-SCNC: 140 MMOL/L (ref 136–145)
WBC OTHER # BLD: 5.4 K/UL (ref 3.5–11.3)

## 2024-12-03 PROCEDURE — 92960 CARDIOVERSION ELECTRIC EXT: CPT

## 2024-12-03 PROCEDURE — 93005 ELECTROCARDIOGRAM TRACING: CPT | Performed by: FAMILY MEDICINE

## 2024-12-03 PROCEDURE — 5A2204Z RESTORATION OF CARDIAC RHYTHM, SINGLE: ICD-10-PCS | Performed by: FAMILY MEDICINE

## 2024-12-03 PROCEDURE — 2700000000 HC OXYGEN THERAPY PER DAY

## 2024-12-03 PROCEDURE — 97110 THERAPEUTIC EXERCISES: CPT

## 2024-12-03 PROCEDURE — 80048 BASIC METABOLIC PNL TOTAL CA: CPT

## 2024-12-03 PROCEDURE — 92960 CARDIOVERSION ELECTRIC EXT: CPT | Performed by: FAMILY MEDICINE

## 2024-12-03 PROCEDURE — 6370000000 HC RX 637 (ALT 250 FOR IP): Performed by: FAMILY MEDICINE

## 2024-12-03 PROCEDURE — 1200000000 HC SEMI PRIVATE

## 2024-12-03 PROCEDURE — 93010 ELECTROCARDIOGRAM REPORT: CPT | Performed by: INTERNAL MEDICINE

## 2024-12-03 PROCEDURE — 2580000003 HC RX 258: Performed by: INTERNAL MEDICINE

## 2024-12-03 PROCEDURE — 99233 SBSQ HOSP IP/OBS HIGH 50: CPT | Performed by: FAMILY MEDICINE

## 2024-12-03 PROCEDURE — 85025 COMPLETE CBC W/AUTO DIFF WBC: CPT

## 2024-12-03 PROCEDURE — 97166 OT EVAL MOD COMPLEX 45 MIN: CPT

## 2024-12-03 PROCEDURE — 6370000000 HC RX 637 (ALT 250 FOR IP): Performed by: NURSE PRACTITIONER

## 2024-12-03 PROCEDURE — 94761 N-INVAS EAR/PLS OXIMETRY MLT: CPT

## 2024-12-03 PROCEDURE — 97162 PT EVAL MOD COMPLEX 30 MIN: CPT

## 2024-12-03 PROCEDURE — 36415 COLL VENOUS BLD VENIPUNCTURE: CPT

## 2024-12-03 PROCEDURE — 6360000002 HC RX W HCPCS: Performed by: FAMILY MEDICINE

## 2024-12-03 PROCEDURE — 2709999900 HC NON-CHARGEABLE SUPPLY: Performed by: FAMILY MEDICINE

## 2024-12-03 PROCEDURE — 2580000003 HC RX 258: Performed by: NURSE PRACTITIONER

## 2024-12-03 PROCEDURE — 6360000002 HC RX W HCPCS: Performed by: INTERNAL MEDICINE

## 2024-12-03 RX ORDER — SODIUM CHLORIDE 9 MG/ML
INJECTION, SOLUTION INTRAVENOUS PRN
Status: DISCONTINUED | OUTPATIENT
Start: 2024-12-03 | End: 2024-12-04 | Stop reason: HOSPADM

## 2024-12-03 RX ORDER — MIDAZOLAM HYDROCHLORIDE 1 MG/ML
INJECTION, SOLUTION INTRAMUSCULAR; INTRAVENOUS PRN
Status: DISCONTINUED | OUTPATIENT
Start: 2024-12-03 | End: 2024-12-03 | Stop reason: HOSPADM

## 2024-12-03 RX ORDER — SODIUM CHLORIDE 0.9 % (FLUSH) 0.9 %
5-40 SYRINGE (ML) INJECTION EVERY 12 HOURS SCHEDULED
OUTPATIENT
Start: 2024-12-03

## 2024-12-03 RX ORDER — SODIUM CHLORIDE 0.9 % (FLUSH) 0.9 %
5-40 SYRINGE (ML) INJECTION PRN
Status: DISCONTINUED | OUTPATIENT
Start: 2024-12-03 | End: 2024-12-03 | Stop reason: HOSPADM

## 2024-12-03 RX ORDER — SODIUM CHLORIDE 0.9 % (FLUSH) 0.9 %
5-40 SYRINGE (ML) INJECTION PRN
Status: DISCONTINUED | OUTPATIENT
Start: 2024-12-03 | End: 2024-12-03

## 2024-12-03 RX ORDER — SODIUM CHLORIDE 0.9 % (FLUSH) 0.9 %
5-40 SYRINGE (ML) INJECTION EVERY 12 HOURS SCHEDULED
Status: DISCONTINUED | OUTPATIENT
Start: 2024-12-03 | End: 2024-12-04 | Stop reason: HOSPADM

## 2024-12-03 RX ORDER — AMIODARONE HYDROCHLORIDE 200 MG/1
200 TABLET ORAL DAILY
Status: DISCONTINUED | OUTPATIENT
Start: 2024-12-03 | End: 2024-12-04 | Stop reason: HOSPADM

## 2024-12-03 RX ORDER — SODIUM CHLORIDE 9 MG/ML
INJECTION, SOLUTION INTRAVENOUS PRN
Status: DISCONTINUED | OUTPATIENT
Start: 2024-12-03 | End: 2024-12-03 | Stop reason: HOSPADM

## 2024-12-03 RX ORDER — SODIUM CHLORIDE 0.9 % (FLUSH) 0.9 %
5-40 SYRINGE (ML) INJECTION EVERY 12 HOURS SCHEDULED
Status: DISCONTINUED | OUTPATIENT
Start: 2024-12-03 | End: 2024-12-03 | Stop reason: HOSPADM

## 2024-12-03 RX ORDER — SODIUM CHLORIDE 0.9 % (FLUSH) 0.9 %
5-40 SYRINGE (ML) INJECTION PRN
OUTPATIENT
Start: 2024-12-03

## 2024-12-03 RX ORDER — SODIUM CHLORIDE 9 MG/ML
INJECTION, SOLUTION INTRAVENOUS PRN
OUTPATIENT
Start: 2024-12-03

## 2024-12-03 RX ADMIN — GABAPENTIN 300 MG: 300 CAPSULE ORAL at 08:38

## 2024-12-03 RX ADMIN — PANTOPRAZOLE SODIUM 40 MG: 40 TABLET, DELAYED RELEASE ORAL at 08:37

## 2024-12-03 RX ADMIN — DOCUSATE SODIUM 100 MG: 100 CAPSULE, LIQUID FILLED ORAL at 08:37

## 2024-12-03 RX ADMIN — DOCUSATE SODIUM 100 MG: 100 CAPSULE, LIQUID FILLED ORAL at 21:36

## 2024-12-03 RX ADMIN — Medication 400 MG: at 09:52

## 2024-12-03 RX ADMIN — TAMSULOSIN HYDROCHLORIDE 0.4 MG: 0.4 CAPSULE ORAL at 08:38

## 2024-12-03 RX ADMIN — AMIODARONE HYDROCHLORIDE 200 MG: 200 TABLET ORAL at 13:26

## 2024-12-03 RX ADMIN — SODIUM CHLORIDE, PRESERVATIVE FREE 10 ML: 5 INJECTION INTRAVENOUS at 21:35

## 2024-12-03 RX ADMIN — MIDODRINE HYDROCHLORIDE 10 MG: 5 TABLET ORAL at 08:37

## 2024-12-03 RX ADMIN — GABAPENTIN 300 MG: 300 CAPSULE ORAL at 13:26

## 2024-12-03 RX ADMIN — VENLAFAXINE HYDROCHLORIDE 75 MG: 75 CAPSULE, EXTENDED RELEASE ORAL at 08:38

## 2024-12-03 RX ADMIN — GABAPENTIN 300 MG: 300 CAPSULE ORAL at 21:36

## 2024-12-03 RX ADMIN — ATORVASTATIN CALCIUM 80 MG: 40 TABLET, FILM COATED ORAL at 08:37

## 2024-12-03 RX ADMIN — CLOPIDOGREL BISULFATE 75 MG: 75 TABLET ORAL at 09:52

## 2024-12-03 RX ADMIN — AMIODARONE HYDROCHLORIDE 0.5 MG/MIN: 50 INJECTION, SOLUTION INTRAVENOUS at 08:46

## 2024-12-03 RX ADMIN — EMPAGLIFLOZIN 10 MG: 10 TABLET, FILM COATED ORAL at 08:37

## 2024-12-03 RX ADMIN — Medication 1 TABLET: at 08:37

## 2024-12-03 RX ADMIN — APIXABAN 5 MG: 5 TABLET, FILM COATED ORAL at 21:36

## 2024-12-03 RX ADMIN — METOPROLOL SUCCINATE 12.5 MG: 25 TABLET, EXTENDED RELEASE ORAL at 08:38

## 2024-12-03 RX ADMIN — LORAZEPAM 0.5 MG: 0.5 TABLET ORAL at 08:38

## 2024-12-03 RX ADMIN — FOLIC ACID 1000 MCG: 1 TABLET ORAL at 08:37

## 2024-12-03 RX ADMIN — MIDODRINE HYDROCHLORIDE 10 MG: 5 TABLET ORAL at 17:07

## 2024-12-03 RX ADMIN — Medication 100 MG: at 08:38

## 2024-12-03 RX ADMIN — APIXABAN 5 MG: 5 TABLET, FILM COATED ORAL at 08:38

## 2024-12-03 RX ADMIN — POTASSIUM CHLORIDE 20 MEQ: 1500 TABLET, EXTENDED RELEASE ORAL at 08:38

## 2024-12-03 NOTE — PLAN OF CARE
Problem: Chronic Conditions and Co-morbidities  Goal: Patient's chronic conditions and co-morbidity symptoms are monitored and maintained or improved  12/2/2024 2357 by Aditi Osuna RN  Outcome: Progressing  Flowsheets (Taken 12/2/2024 1828)  Care Plan - Patient's Chronic Conditions and Co-Morbidity Symptoms are Monitored and Maintained or Improved: Collaborate with multidisciplinary team to address chronic and comorbid conditions and prevent exacerbation or deterioration  12/2/2024 1744 by Oksana Phillip RN  Outcome: Progressing     Problem: Discharge Planning  Goal: Discharge to home or other facility with appropriate resources  12/2/2024 2357 by Aditi Osuna RN  Outcome: Progressing  12/2/2024 1744 by Oksana Phillip RN  Outcome: Progressing     Problem: Safety - Adult  Goal: Free from fall injury  12/2/2024 2357 by Aditi Osuna RN  Outcome: Progressing  12/2/2024 1744 by Oksana Phillip RN  Outcome: Progressing     Problem: Respiratory - Adult  Goal: Achieves optimal ventilation and oxygenation  12/2/2024 2357 by Aditi Osuna RN  Outcome: Progressing  12/2/2024 1744 by Oksana Phillip RN  Reactivated     Problem: Cardiovascular - Adult  Goal: Maintains optimal cardiac output and hemodynamic stability  12/2/2024 2357 by Aditi Osuna RN  Outcome: Progressing  12/2/2024 1744 by Oksana Phillip RN  Reactivated  Goal: Absence of cardiac dysrhythmias or at baseline  12/2/2024 2357 by Aditi Osuna RN  Outcome: Progressing  12/2/2024 1744 by Oksana Phillip RN  Reactivated     Problem: Skin/Tissue Integrity - Adult  Goal: Skin integrity remains intact  12/2/2024 2357 by Aditi Osuna RN  Outcome: Progressing  Flowsheets (Taken 12/2/2024 1828)  Skin Integrity Remains Intact: Monitor for areas of redness and/or skin breakdown  12/2/2024 1744 by Oksana Phillip RN  Reactivated  Goal: Incisions, wounds, or drain sites healing without S/S of infection  12/2/2024 2357 by Aditi Osuna

## 2024-12-03 NOTE — PROCEDURES
PROCEDURE NOTE  Date: 12/3/2024   Name: Michael Chan  YOB: 1950    Procedures         Brief Postoperative Note:    Patient name: Michael Chan   YOB: 1950   Date of Procedure: 12/3/2024   Medical Record Number: [id number]    Procedure: Cardioversion    Pre-operative Diagnosis: Persistent atrial fibrillation, symptomatic    Post-operative Diagnosis: Successful cardioversion to NSR with  200J biphasic    Anesthesia: Conscious sedation    Surgeons/Assistants: Nadine    Estimated Blood Loss: None    Complications: None    Tejas Mccoy MD, MS, F.A.C.C.  [facility]  [address] [place] [address]  Phone: [phone], Fax: [phone]

## 2024-12-03 NOTE — CONSULTS
I, Unique Ross am scribing for and in the presence of Tejas Mccoy MD, MS, F.A.C.C..    Patient: Michael Chan  : 1950  Primary Care Physician: Mane Harris  Today's Date: 12/3/2024    REASON FOR CONSULTATION: atrial fibrillation    HPI: Mr. Chan is a 74 y.o. male who was admitted to the hospital on 2022 for weakness and shortness of breath. In the ER he was found to be in atrial fibrillation with RVR leading to a consultation. He was then readmitted due to severe dehydration. In the past he reports seeing a Cardiologist in [place] and was planning on having a cardioversion done in  to put him back into normal sinus rhythm due to his history of new onset atrial fibrillation. He reports a history of a heart attack at age 52 and needed stents placed at that time but denies any cardiac cath since that time. He also has carotid artery stenosis and abdominal aortic aneurysm. He did report having surgery on one side of his neck, however he is not sure which side or how long ago it was. He has had hypertension and hyperlipidemia for years as well. He is a current every day smoker and he smoked for about 55 years and smokes about a pack a day. Family history consists of a lot of people in his family with significant cardiac history, however he wanted us to talk to his sister about the family history as she knows the details. Echo done on 2022 showed an EF of 15-20%. ECHO done on 22: Dilated all cardiac chambers.Severe biventricular systolic dysfunction. Estimated left ventricular ejection fraction is 20-25% Moderate to severe mitral and moderate tricuspid regurgitation. Moderate pulmonary hypertension with an estimated right ventricular systolic pressure of 61 mmHg. Evidence of moderate to severe diastolic dysfunction is seen. Bilateral pleural effusion noted. Compared to the previous study of 2022, no significant change in ejection fraction. Functional mitral and

## 2024-12-04 ENCOUNTER — APPOINTMENT (OUTPATIENT)
Dept: CARDIAC REHAB | Age: 74
End: 2024-12-04
Payer: MEDICARE

## 2024-12-04 VITALS
DIASTOLIC BLOOD PRESSURE: 64 MMHG | HEART RATE: 75 BPM | SYSTOLIC BLOOD PRESSURE: 95 MMHG | OXYGEN SATURATION: 92 % | RESPIRATION RATE: 16 BRPM | BODY MASS INDEX: 29.05 KG/M2 | HEIGHT: 67 IN | WEIGHT: 185.1 LBS | TEMPERATURE: 97.9 F

## 2024-12-04 LAB
ANION GAP SERPL CALCULATED.3IONS-SCNC: 9 MMOL/L (ref 9–16)
BASOPHILS # BLD: 0 K/UL (ref 0–0.2)
BASOPHILS NFR BLD: 0 % (ref 0–2)
BUN SERPL-MCNC: 23 MG/DL (ref 8–23)
BUN/CREAT SERPL: 21 (ref 9–20)
CALCIUM SERPL-MCNC: 9.2 MG/DL (ref 8.6–10.4)
CHLORIDE SERPL-SCNC: 107 MMOL/L (ref 98–107)
CO2 SERPL-SCNC: 25 MMOL/L (ref 20–31)
CREAT SERPL-MCNC: 1.1 MG/DL (ref 0.7–1.2)
EOSINOPHIL # BLD: 0.15 K/UL (ref 0–0.44)
EOSINOPHILS RELATIVE PERCENT: 3 % (ref 1–4)
ERYTHROCYTE [DISTWIDTH] IN BLOOD BY AUTOMATED COUNT: 13.3 % (ref 11.8–14.4)
GFR, ESTIMATED: 70 ML/MIN/1.73M2
GLUCOSE SERPL-MCNC: 82 MG/DL (ref 74–99)
HCT VFR BLD AUTO: 45.5 % (ref 40.7–50.3)
HGB BLD-MCNC: 15.4 G/DL (ref 13–17)
IMM GRANULOCYTES # BLD AUTO: 0 K/UL (ref 0–0.3)
IMM GRANULOCYTES NFR BLD: 0 %
LYMPHOCYTES NFR BLD: 1.27 K/UL (ref 1.1–3.7)
LYMPHOCYTES RELATIVE PERCENT: 26 % (ref 24–43)
MCH RBC QN AUTO: 32 PG (ref 25.2–33.5)
MCHC RBC AUTO-ENTMCNC: 33.8 G/DL (ref 28.4–34.8)
MCV RBC AUTO: 94.6 FL (ref 82.6–102.9)
MONOCYTES NFR BLD: 0.44 K/UL (ref 0.1–1.2)
MONOCYTES NFR BLD: 9 % (ref 3–12)
MORPHOLOGY: ABNORMAL
NEUTROPHILS NFR BLD: 62 % (ref 36–65)
NEUTS SEG NFR BLD: 3.04 K/UL (ref 1.5–8.1)
NRBC BLD-RTO: 0 PER 100 WBC
PLATELET # BLD AUTO: ABNORMAL K/UL (ref 138–453)
PLATELET, FLUORESCENCE: 98 K/UL (ref 138–453)
PLATELETS.RETICULATED NFR BLD AUTO: 4.7 % (ref 1.1–10.3)
POTASSIUM SERPL-SCNC: 4 MMOL/L (ref 3.7–5.3)
RBC # BLD AUTO: 4.81 M/UL (ref 4.21–5.77)
SODIUM SERPL-SCNC: 141 MMOL/L (ref 136–145)
WBC OTHER # BLD: 4.9 K/UL (ref 3.5–11.3)

## 2024-12-04 PROCEDURE — 80048 BASIC METABOLIC PNL TOTAL CA: CPT

## 2024-12-04 PROCEDURE — 97110 THERAPEUTIC EXERCISES: CPT

## 2024-12-04 PROCEDURE — 6370000000 HC RX 637 (ALT 250 FOR IP): Performed by: NURSE PRACTITIONER

## 2024-12-04 PROCEDURE — 99232 SBSQ HOSP IP/OBS MODERATE 35: CPT | Performed by: FAMILY MEDICINE

## 2024-12-04 PROCEDURE — 36415 COLL VENOUS BLD VENIPUNCTURE: CPT

## 2024-12-04 PROCEDURE — 85025 COMPLETE CBC W/AUTO DIFF WBC: CPT

## 2024-12-04 PROCEDURE — 2580000003 HC RX 258: Performed by: NURSE PRACTITIONER

## 2024-12-04 PROCEDURE — 97116 GAIT TRAINING THERAPY: CPT

## 2024-12-04 PROCEDURE — 94761 N-INVAS EAR/PLS OXIMETRY MLT: CPT

## 2024-12-04 PROCEDURE — 2700000000 HC OXYGEN THERAPY PER DAY

## 2024-12-04 RX ORDER — AMIODARONE HYDROCHLORIDE 200 MG/1
200 TABLET ORAL DAILY
Qty: 7 TABLET | Refills: 0 | Status: SHIPPED | OUTPATIENT
Start: 2024-12-05

## 2024-12-04 RX ORDER — POTASSIUM CHLORIDE 1500 MG/1
20 TABLET, EXTENDED RELEASE ORAL DAILY
Qty: 60 TABLET | Refills: 3 | Status: SHIPPED | OUTPATIENT
Start: 2024-12-05

## 2024-12-04 RX ADMIN — ATORVASTATIN CALCIUM 80 MG: 40 TABLET, FILM COATED ORAL at 09:10

## 2024-12-04 RX ADMIN — PANTOPRAZOLE SODIUM 40 MG: 40 TABLET, DELAYED RELEASE ORAL at 09:11

## 2024-12-04 RX ADMIN — APIXABAN 5 MG: 5 TABLET, FILM COATED ORAL at 09:11

## 2024-12-04 RX ADMIN — DOCUSATE SODIUM 100 MG: 100 CAPSULE, LIQUID FILLED ORAL at 09:12

## 2024-12-04 RX ADMIN — EMPAGLIFLOZIN 10 MG: 10 TABLET, FILM COATED ORAL at 09:10

## 2024-12-04 RX ADMIN — AMIODARONE HYDROCHLORIDE 200 MG: 200 TABLET ORAL at 09:11

## 2024-12-04 RX ADMIN — BUMETANIDE 0.5 MG: 1 TABLET ORAL at 09:10

## 2024-12-04 RX ADMIN — LORAZEPAM 0.5 MG: 0.5 TABLET ORAL at 09:11

## 2024-12-04 RX ADMIN — Medication 400 MG: at 09:10

## 2024-12-04 RX ADMIN — VENLAFAXINE HYDROCHLORIDE 75 MG: 75 CAPSULE, EXTENDED RELEASE ORAL at 09:11

## 2024-12-04 RX ADMIN — CLOPIDOGREL BISULFATE 75 MG: 75 TABLET ORAL at 09:11

## 2024-12-04 RX ADMIN — SODIUM CHLORIDE, PRESERVATIVE FREE 10 ML: 5 INJECTION INTRAVENOUS at 09:12

## 2024-12-04 RX ADMIN — Medication 1 TABLET: at 09:10

## 2024-12-04 RX ADMIN — MIDODRINE HYDROCHLORIDE 10 MG: 5 TABLET ORAL at 09:10

## 2024-12-04 RX ADMIN — FOLIC ACID 1000 MCG: 1 TABLET ORAL at 09:10

## 2024-12-04 RX ADMIN — METOPROLOL SUCCINATE 12.5 MG: 25 TABLET, EXTENDED RELEASE ORAL at 09:10

## 2024-12-04 RX ADMIN — POTASSIUM CHLORIDE 20 MEQ: 1500 TABLET, EXTENDED RELEASE ORAL at 09:11

## 2024-12-04 RX ADMIN — Medication 100 MG: at 09:10

## 2024-12-04 RX ADMIN — LOSARTAN POTASSIUM 12.5 MG: 25 TABLET, FILM COATED ORAL at 09:12

## 2024-12-04 RX ADMIN — TAMSULOSIN HYDROCHLORIDE 0.4 MG: 0.4 CAPSULE ORAL at 09:11

## 2024-12-04 RX ADMIN — GABAPENTIN 300 MG: 300 CAPSULE ORAL at 09:11

## 2024-12-04 RX ADMIN — GABAPENTIN 300 MG: 300 CAPSULE ORAL at 14:06

## 2024-12-04 NOTE — CARE COORDINATION
12/04/24 1419   IMM Letter   IMM Letter given to Patient/Family/Significant other/Guardian/POA/by: 2nd notice to patient/DenisaRN case manager.   IMM Letter date given: 12/04/24   IMM Letter time given: 1419     IMM letter provided to patient.  Patient offered four hours to make informed decision regarding appeal process; patient agreeable to discharge.

## 2024-12-04 NOTE — PLAN OF CARE
Problem: Chronic Conditions and Co-morbidities  Goal: Patient's chronic conditions and co-morbidity symptoms are monitored and maintained or improved  12/4/2024 1115 by Precious Israel RN  Outcome: Progressing     Problem: Discharge Planning  Goal: Discharge to home or other facility with appropriate resources  12/4/2024 1115 by Precious Israel RN  Outcome: Progressing     Problem: Safety - Adult  Goal: Free from fall injury  12/4/2024 1115 by Precious Israel RN  Outcome: Progressing     Problem: Respiratory - Adult  Goal: Achieves optimal ventilation and oxygenation  12/4/2024 1115 by Precious Israel RN  Outcome: Progressing     Problem: Cardiovascular - Adult  Goal: Maintains optimal cardiac output and hemodynamic stability  12/4/2024 1115 by Precious Israel RN  Outcome: Progressing     Problem: Cardiovascular - Adult  Goal: Absence of cardiac dysrhythmias or at baseline  12/4/2024 1115 by Precious Israel RN  Outcome: Progressing     Problem: Skin/Tissue Integrity - Adult  Goal: Skin integrity remains intact  12/4/2024 1115 by Precious Israel RN  Outcome: Progressing     Problem: Skin/Tissue Integrity - Adult  Goal: Incisions, wounds, or drain sites healing without S/S of infection  12/4/2024 1115 by Precious Israel RN  Outcome: Progressing     Problem: Skin/Tissue Integrity - Adult  Goal: Oral mucous membranes remain intact  12/4/2024 1115 by Precious Israel RN  Outcome: Progressing     Problem: Musculoskeletal - Adult  Goal: Return mobility to safest level of function  12/4/2024 1115 by Precious Israel RN  Outcome: Progressing     Problem: Metabolic/Fluid and Electrolytes - Adult  Goal: Electrolytes maintained within normal limits  12/4/2024 1115 by Precious Israel RN  Outcome: Progressing

## 2024-12-04 NOTE — RT PROTOCOL NOTE
[place] [address]        Patient Name: Michael Chan 1950     [facility]   Cardiopulmonary Services  [address] [place] [address]           A home oxygen evaluation has been completed.       Patient was placed on room air for 10 minutes. SpO2 was 87 % on room air at rest. Patients SpO2 was below 88% and qualified for home oxygen. Oxygen was applied at 2 lpm via nasal cannula to maintain a SpO2 between 90-92% while at rest. Actual SpO2 was 92 %.   Patient qualifies for home oxygen at this time.

## 2024-12-04 NOTE — PROGRESS NOTES
Patient is from the [facility] and will return to the [facility] at discharge.  Telephone voicemail left for his sister.    Isabel Stephens, KELSEY  12/3/2024    
    Patient will return to the [facility] [place] today.  [facility] to provide the transportation as Patient will need to be discharged on oxygen.  Patient, facility and family (sister Mikaela) aware and agreeable with this plan.  Next scheduled Cardiac Rehab appointment is next Monday, 12/9/2024 at 2 p.m. per discharge paperwork.  FAXED discharge paperwork to facility at this time and hard copy sent with Patient as well.    KELSEY Wilkes  12/4/2024    
  Herbal and Nutritional Product Restrictions      The following herbal, alternative, and/or nutritional/dietary supplement product(s) has been discontinued per [employer] approved policy:    Fish oil  biotin    Please reorder upon discharge if appropriate.    Thank you,  Zhen Garcia RP  12/2/2024 5:26 PM    
Attempt to wean pt off of O2 again while he is sitting in chair, he was 88% on room air so 2L O2 reapplied.   
Attempted to call sister Pat to provide update. No answer at this time, left VM.   
Comprehensive Nutrition Assessment    Type and Reason for Visit:  Initial    Nutrition Recommendations/Plan:   Heart healthy diet  Encourage oral fluids for hydration.     Malnutrition Assessment:  Malnutrition Status:  No malnutrition (12/03/24 0712)    Context:  Acute Illness     Findings of the 6 clinical characteristics of malnutrition:  Energy Intake:  No decrease in energy intake  Weight Loss:  No weight loss     Body Fat Loss:  No body fat loss     Muscle Mass Loss:  No muscle mass loss    Fluid Accumulation:  No fluid accumulation     Strength:  Not Performed    Nutrition Assessment:    No nutrition diagnosis at this time. Transfered from [facility] r/t [facility]. Reports good appetite and taking PO well without ingestion issues. On supplemental vits/mins: vit D, B, mvi w/minerals, Mg++and FA. Elevated h/h on admission suggesting volume deficit despite patint reporting good use of fluid intakes. Known heart failure on diuretics and Farxiga. Encouraged lean proteins and oral fluids upon visit. Currently on \"regular diet\", will recommend heart healthy diet, more c/w medical history and for continuity of care.    Nutrition Related Findings:    active b/s w/mucous bm. no edema. Wound Type: None       Current Nutrition Intake & Therapies:    Average Meal Intake: Unable to assess (no PO records)  Average Supplements Intake: None Ordered  ADULT DIET; Regular; Low Fat/Low Chol/High Fiber/MIKA    Anthropometric Measures:  Height: 170.2 cm (5' 7\")  Ideal Body Weight (IBW): 148 lbs (67 kg)    Admission Body Weight: 85.5 kg (188 lb 8 oz)  Current Body Weight: 85.5 kg (188 lb 8 oz), 127.4 % IBW. Weight Source: Bed scale  Current BMI (kg/m2): 29.5  Usual Body Weight: 85.4 kg (188 lb 3.2 oz) (at [facility] < week ago)     % Weight Change (Calculated): 0.2  Weight Adjustment For: No Adjustment                 BMI Categories: Overweight (BMI 25.0-29.9)    Estimated Daily Nutrient Needs:  Energy Requirements Based On: Kcal/kg  Weight 
Dr Mccoy at bedside and states no new changes to medications besides the amiodarone 200 mg daily that he has been taking here.   
Dr. Garcia called and updated on patient's heart rate and blood pressure, patient not started on cardizem in ER, Dr. Garcia wants patient started on Amiodarone 0.5 mg with no bolus over night.   
INTENSIVE CARE UNIT   APRN - Progress Note    Patient - Michael Chan  Date of Admission -  12/2/2024  2:11 PM  Date of Evaluation -  12/3/2024  Hospital Day - 1      SUBJECTIVE:     The Michael Chan is a 74 y.o. male who is seen for follow up in the ICU.  Per nursing report and notes, overnight events include: no significant events.  He sitting on bedside no distress.  Working with PT.        ROS:   Constitutional: negative  for fevers, and negative for chills.  Respiratory: negative for shortness of breath, negative for cough, and negative for wheezing  Cardiovascular: negative for chest pain, and negative for palpitations  Gastrointestinal: negative for abdominal pain, negative for nausea,negative for vomiting, negative for diarrhea, and negative for constipation    All other systems were reviewed with the patient and are negative unless otherwise stated in HPI.      OBJECTIVE:     VITAL SIGNS:  Patient Vitals for the past 8 hrs:   BP Temp Temp src Pulse Resp SpO2 Height   12/03/24 0730 113/79 -- -- 98 17 92 % --   12/03/24 0700 113/75 -- -- (!) 103 16 95 % --   12/03/24 0653 121/82 -- -- (!) 109 17 93 % 1.702 m (5' 7\")   12/03/24 0630 122/72 -- -- (!) 110 22 93 % --   12/03/24 0604 (!) 115/54 -- -- (!) 106 17 93 % --   12/03/24 0500 102/72 -- -- 100 18 94 % --   12/03/24 0445 101/74 -- -- 97 19 94 % --   12/03/24 0430 100/72 -- -- (!) 102 20 95 % --   12/03/24 0415 108/65 -- -- 93 19 92 % --   12/03/24 0400 (!) 94/50 -- -- 96 18 94 % --   12/03/24 0345 106/70 -- -- (!) 113 19 94 % --   12/03/24 0315 104/82 -- -- 99 20 94 % --   12/03/24 0300 109/73 97.9 °F (36.6 °C) Temporal (!) 101 16 93 % --   12/03/24 0245 104/70 -- -- 97 17 92 % --   12/03/24 0230 101/71 -- -- 95 17 92 % --   12/03/24 0215 (!) 99/47 -- -- 98 17 94 % --   12/03/24 0200 (!) 95/47 -- -- 93 18 92 % --   12/03/24 0145 (!) 101/49 -- -- (!) 111 18 93 % --   12/03/24 0130 (!) 76/43 -- -- 80 21 92 % --   12/03/24 0115 (!) 90/51 -- -- (!) 
Mane Harris M.D.  Internal Medicine Progress Note    Patient: Michael Chan  Date of Admission: 12/2/2024  2:11 PM  Date of Evaluation: 12/4/2024      SUBJECTIVE:    Michael Chan is a 74 y.o. male who was seen today along with Taylor Cruz CNP for follow up of Atrial fibrillation with RVR ([facility]).  He  is tired this AM from just waking up but otherwise feels fine .  He has maintained NSR since cardioversion yesterday.  He denies chest pain, palpitations or SOB.  He is tolerating diet without any nausea, vomiting or diarrhea.    ROS:   Constitutional: negative  for fevers, and negative for chills.  Respiratory: negative for shortness of breath, negative for cough, and negative for wheezing  Cardiovascular: negative for chest pain, and negative for palpitations  Gastrointestinal: negative for abdominal pain, negative for nausea,negative for vomiting, negative for diarrhea, and negative for constipation     All other systems were reviewed with the patient and are negative unless otherwise stated in HPI    -----------------------------------------------------------------  OBJECTIVE:  Vitals:   Temp: 97.7 °F (36.5 °C)  BP: 129/72  Respirations: 16  Pulse: 73  SpO2: 91 % (attempt to wean on room air, pt 88% at rest on RA after a few minutes)     Weight  Wt Readings from Last 3 Encounters:   12/04/24 84 kg (185 lb 1.6 oz)   11/21/24 85.4 kg (188 lb 4.4 oz)   11/19/24 85.4 kg (188 lb 3.2 oz)     Body mass index is 28.99 kg/m².    24HR INTAKE/OUTPUT:      Intake/Output Summary (Last 24 hours) at 12/4/2024 1146  Last data filed at 12/4/2024 0907  Gross per 24 hour   Intake 1200 ml   Output 250 ml   Net 950 ml       Exam:  GEN:    Awake, alert and oriented x 3.   EYES:  EOMI, pupils equal   NECK: Supple. No lymphadenopathy.  No carotid bruit  CVS:    regular rate and rhythm, no audible murmur  PULM:  CTA, no wheezes, rales or rhonchi, no acute respiratory distress  ABD:    Bowels sounds normal.  Abdomen is 
[facility]  Inpatient/Observation/Outpatient Rehabilitation    Date: 2024  Patient Name: Michael Chan       [x] Inpatient Acute/Observation       []  Outpatient  : 1950         [] Pt no showed for scheduled appointment    [] As a reminder, pt was contacted/attempted contact via phone of upcoming appointments.    [x] Pt refused/declined therapy at this time due to:      1st attempt pt. Declined d/t being tired and 2nd attempt pt. Declined d/t just finishing with OT.      [] Pt cancelled due to:  [] No Reason Given   [] Sick/ill   [] Other:      [] Evaluation held by RN/Provider due to:    [] High Heart Rate   [] High Blood Pressure   [] Orthopedic Consult   [] Hgb < 7   [] Other:    [] Pt ordered brace per physician request:  [] Proper fit will be completed and education for wearing/skin checks    [] Pt does not require skilled services due to:      Therapist/Assistant will attempt to see this patient, at our earliest opportunity.       Marta Lloyd, PTA Date: 2024    
Occupational Therapy  Facility/Department: [facility] ICU  Daily Treatment Note  NAME: Michael Chan  : 1950  MRN: [id number]    Date of Service: 12/3/2024    Discharge Recommendations:  Continue to assess pending progress         Patient Diagnosis(es): The primary encounter diagnosis was Paroxysmal atrial fibrillation with RVR (HCC). Diagnoses of Acute on chronic combined systolic and diastolic HF (heart failure), NYHA class 4 (HCC) and Atrial fibrillation with rapid ventricular response (HCC) were also pertinent to this visit.     Assessment   Activity Tolerance: Patient tolerated treatment well  Discharge Recommendations: Continue to assess pending progress     Plan  Occupational Therapy Plan  Times Per Day: Once a day  Days Per Week: 7 Days  Current Treatment Recommendations: Strengthening;Balance training;Functional mobility training;Endurance training;Safety education & training;Patient/Caregiver education & training;Equipment evaluation, education, & procurement;Self-Care / ADL    Restrictions  Restrictions/Precautions  Restrictions/Precautions: Fall Risk;General Precautions  Required Braces or Orthoses?: No    Subjective  Subjective  Subjective: Pt sitting up in bed upon arrival. Pt agreed to participate in therapy session.  Pain: Pt no complaints of pain at start of therapy session however did report pain in L shoulder with ther ex.         Objective  Vitals             OT Exercises  Exercise Treatment: Pt completed BUE ther ex x 7 planes x 15 reps x 1 set to increase UE strength and endurance in order to ease completion of ADL tasks. Pt required RBs as needed secondary to fatigue.     Safety Devices  Type of Devices: All fall risk precautions in place;Bed alarm in place;Left in bed;Chair alarm in place;Nurse notified;Call light within reach    Patient Education  Education Given To: Patient  Education Provided: Role of Therapy;Plan of Care;Transfer Training  Education Method: Verbal  Barriers to 
Occupational Therapy  Facility/Department: [facility] MED SURG  Daily Treatment Note  NAME: Michael Chan  : 1950  MRN: [id number]    Date of Service: 2024    Discharge Recommendations:  Continue to assess pending progress         Patient Diagnosis(es): The primary encounter diagnosis was Paroxysmal atrial fibrillation with RVR (HCC). Diagnoses of Acute on chronic combined systolic and diastolic HF (heart failure), NYHA class 4 (HCC) and Atrial fibrillation with rapid ventricular response (HCC) were also pertinent to this visit.     Assessment   Activity Tolerance: Patient tolerated treatment well  Discharge Recommendations: Continue to assess pending progress     Plan  Occupational Therapy Plan  Times Per Day: Once a day  Days Per Week: 7 Days  Current Treatment Recommendations: Strengthening;Balance training;Functional mobility training;Endurance training;Safety education & training;Patient/Caregiver education & training;Equipment evaluation, education, & procurement;Self-Care / ADL    Restrictions   General, fall risk    Subjective  Subjective  Subjective: Pt laying in bed, agreed to BUE therex. \"As long as I dont have to get up and walk.\"  Pain: denied, \"Im just tired.\"          Objective  Vitals  Pt utilized urinal during session. Pt forgot to open lid and urinated all over self and floor. Min A to change gown and MOD A to change underwear and socks.     OT Exercises  Exercise Treatment: BUE therex to increase strength/endurance for ease of fxl tasks. Red digiflex x 20 yellow flex bar beds x 20 and 1# free weight x 20 reps x all planes while supine HOB raised. Pt required MIN RBs for a total of ~ 20 min therex.     Safety Devices  Type of Devices: All fall risk precautions in place;Call light within reach;Bed alarm in place;Left in bed    Patient Education  Education Given To: Patient  Education Provided: Role of Therapy;Plan of Care;Home Exercise Program  Education Method: 
Patient alert and speaking with staff appropriately post cardioversion. Remains in RSR. Bed alarm on and call light within reach. Patient denies needs at this time.  
Patient transferred to [facility] room 316 via chair at this time.   
Physical Therapy  Facility/Department: [facility] MED SURG  Daily Treatment Note  NAME: Michael Chan  : 1950  MRN: [id number]    Date of Service: 2024    Discharge Recommendations:  Continue to assess pending progress     Patient Diagnosis(es): The primary encounter diagnosis was Paroxysmal atrial fibrillation with RVR (HCC). Diagnoses of Acute on chronic combined systolic and diastolic HF (heart failure), NYHA class 4 (HCC) and Atrial fibrillation with rapid ventricular response (HCC) were also pertinent to this visit.    Assessment  Assessment: Pt. ambulated [id number] with no AD, SBA/CGA for safety. Pt. noted to have bouts of imbalance but no LOB. Transfers:SBA. Reclined and seated exercises B LE x20  Activity Tolerance: Patient tolerated treatment well    Plan  Physical Therapy Plan  General Plan: 2 times a day 7 days a week  Current Treatment Recommendations: Strengthening;ROM;Balance training;Functional mobility training;Transfer training;Endurance training;IADL training;ADL/Self-care training;Gait training;Stair training;Neuromuscular re-education;Manual;Home exercise program;Safety education & training;Patient/Caregiver education & training;Positioning;Therapeutic activities    Restrictions  Restrictions/Precautions  Restrictions/Precautions: Fall Risk, General Precautions  Required Braces or Orthoses?: No     Subjective   Orientation  Overall Orientation Status: Within Functional Limits    Objective  Bed Mobility Training  Bed Mobility Training: No  Transfer Training  Transfer Training: Yes  Overall Level of Assistance: Stand-by assistance  Interventions: Verbal cues  Sit to Stand: Stand-by assistance;Assist X1  Stand to Sit: Stand-by assistance;Assist X1  Gait  Gait Training: Yes  Overall Level of Assistance: Stand-by assistance;Assist X1;Contact-guard assistance  Distance (ft): 100 Feet  Assistive Device: None  Interventions: Safety awareness training;Verbal cues  Speed/Andreina: Slow  Gait 
Physical Therapy  Facility/Department: [facility] ICU  Physical Therapy Initial Assessment    Name: Michael Chan  : 1950  MRN: [id number]  Date of Service: 12/3/2024    Discharge Recommendations:  Continue to assess pending progress   PT Equipment Recommendations  Equipment Needed: No      Patient Diagnosis(es): Atrial fibrillation with RVR  Past Medical History:  has a past medical history of Acute kidney injury (HCC), Ascites, Atrial fibrillation (HCC), CHF (congestive heart failure) (HCC), Cirrhosis of liver with ascites (HCC), CKD (chronic kidney disease), Coronary artery disease, Hyperlipidemia, Hypertension, Hypomagnesemia, Hyponatremia, and Thrombocytopenia (HCC).  Past Surgical History:  has a past surgical history that includes Rotator cuff repair (Right); Carotid stent placement (Bilateral); hernia repair; Tonsillectomy and adenoidectomy; Colonoscopy; Esophagogastroduodenoscopy (2023); Colonoscopy (N/A, 2023); Upper gastrointestinal endoscopy (N/A, 2023); Colonoscopy (2023); Cardiac catheterization (Left, 2024); Cardiac procedure (N/A, 2024); Cardiac procedure (N/A, 10/1/2024); invasive vascular (N/A, 10/1/2024); and Cardiac procedure (N/A, 10/1/2024).    Assessment  Body Structures, Functions, Activity Limitations Requiring Skilled Therapeutic Intervention: Decreased functional mobility ;Decreased ADL status;Decreased strength;Decreased endurance;Decreased balance;Decreased posture;Decreased high-level IADLs  Assessment: The patient is a 74 y.o. male who was admitted due to atrial fibrillation with RVR.  On evaluation he demonstrates LE weakness, decreased activity endurance and impaired balance.  He would benefit from skilled PT to address his deficits to improve functional mobility.  Treatment Diagnosis: Generalized weakness, decreased activity endurance  Therapy Prognosis: Good  Decision Making: Medium Complexity  Requires PT Follow-Up: Yes  Activity 
Pt arrived to unit from ER to room I305 for A-fib with RVR. Pt moved to bed per self from wheelchair. Pt is on room air, respirations even and unlabored. Vitals obtained. Admission assessment completed. Admission navigator completed. Pt oriented to room. Reviewed orders. Pt denies further needs at this time. Call light in reach. Care ongoing.   
Pt left at this time with [facility] transportation on 1L O2, [place] to return call to this RN for report.   
Pt refused to get up in chair at this time, states he wants to sleep more.   
Report called to Rosalie at the [facility] at this time, she is aware patient will need follow up appointments made with cardiologist and HF clinic.   
Reviewed Med Rec from [facility] and spoke with patient regarding current medications. Updated MedRec in patient chart and reviewed changes with HOLLY Vazquez.   
Spark noted during defibrillation under sternal defib pad. Pad immediately removed and area assessed. Small superficial skin burn noted at site. Dr. Mccoy present and aware. Zinc skin protectant paste applied over site. Patient denies complaints.  
[facility]  Inpatient/Observation/Outpatient Rehabilitation    Date: 12/3/2024  Patient Name: Michael Chan       [x] Inpatient Acute/Observation       []  Outpatient  : 1950         [] Pt no showed for scheduled appointment    [] As a reminder, pt was contacted/attempted contact via phone of upcoming appointments.    [x] Pt refused/declined therapy at this time due to:      Attempted, pt. declined at this time. Stated he was tired an tomorrow was a new day.      [] Pt cancelled due to:  [] No Reason Given   [] Sick/ill   [] Other:      [] Evaluation held by RN/Provider due to:    [] High Heart Rate   [] High Blood Pressure   [] Orthopedic Consult   [] Hgb < 7   [] Other:    [] Pt ordered brace per physician request:  [] Proper fit will be completed and education for wearing/skin checks    [] Pt does not require skilled services due to:      Therapist/Assistant will attempt to see this patient, at our earliest opportunity.       Marta Lloyd, PTA Date: 12/3/2024    
Verbal order through [employer] from Dr Harris to discharge patient and continue all medications unless otherwise changed by Dr Mccoy. RN called cardiology at this time and spoke with Carissa regarding patient's medications. She states Dr Mccoy will be up to see patient.   
Vitals and assessment completed, pt resting in bed, denies pain, chest pain, palpitations, attempt to wean off O2 unsuccessful with pt dropping to 88% RA, back on 2L. Telemetry reading NSR to arrhythmia with rate controlled. Fall precautions in place, care ongoing.   
assistance  Toileting: Contact guard assistance  Functional Mobility: Contact guard assistance  Functional Mobility Skilled Clinical Factors: s AD  Additional Comments: CGA ADL transfers s AD     Activity Tolerance  Activity Tolerance: Patient tolerated evaluation without incident        Vision  Vision: Within Functional Limits  Hearing  Hearing: Within functional limits  Cognition  Overall Cognitive Status: WFL  Orientation  Overall Orientation Status: Within Functional Limits                  Education Given To: Patient  Education Provided: Role of Therapy;Plan of Care;Transfer Training  Education Method: Verbal  Barriers to Learning: None  Education Outcome: Demonstrated understanding;Verbalized understanding                       AM-PAC - ADL  AM-PAC Daily Activity - Inpatient   How much help is needed for putting on and taking off regular lower body clothing?: A Little  How much help is needed for bathing (which includes washing, rinsing, drying)?: A Little  How much help is needed for toileting (which includes using toilet, bedpan, or urinal)?: A Little  How much help is needed for putting on and taking off regular upper body clothing?: A Little  How much help is needed for taking care of personal grooming?: A Little  How much help for eating meals?: None  AM-[facility] Inpatient Daily Activity Raw Score: 19  AM-PAC Inpatient ADL T-Scale Score : 40.22  ADL Inpatient CMS 0-100% Score: 42.8  ADL Inpatient CMS G-Code Modifier : CK      Goals  Short Term Goals  Time Frame for Short Term Goals: 21 visits  Short Term Goal 1: Patient to complete ADL routine c mod I to ensure safe and indep return to [facility].  Short Term Goal 2: Patient to engage in 15 minutes of ther ex/ther act  with no more than 2 RB to improve strength and activity tolerance for I/ADL upon return home.  Short Term Goal 3: Patient to engage in 8 minutes dynamic standing during functional task of choice without loss of balance for improved  ADL independence 
distal LAD. Patient has known RCA   Antiplatelet Agent: Continue clopidogrel (Plavix): Plavix 75 mg daily. There was some confusion about this vs Brilinta but it sounds like he has been on Plavix only which is fine.  Beta Blocker:Continue Metoprolol succinate (Toprol XL) 25 mg 1/2 tab daily.   Anti-anginal medications: Not indicated at this time.  Cholesterol Reduction Therapy: Continue Atorvastatin (Lipitor) 80 mg daily.    Additional counseling: None    Non ischemic Cardiomyopathy: EF 7/23/2024 45-50%  Beta Blocker: Continue Metoprolol succinate (Toprol XL) 25 mg 1/2 tab daily.   ACE Inibitor/ARB: Continue losartan (Cozaar) 25 mg, 1/2 tab daily.  Diuretics: Continue Bumex 0.5 mg daily.     History of Syncope/near syncope of unknown etiology: Lightheadedness-Recent syncopal episode on 7/23/2024-Cardioversion done on 724/2024-Successful- EKG today showed NSR. No other passing out episodes   Pharmacologic Therapy: Not indicated at this time.    Hyperlipidemia: Mixed, LDL done on 9/18/2023 was 58 mg/dL  Cholesterol Reduction Therapy: Continue Atorvastatin (Lipitor) 80 mg daily.      Paroxysmal Atrial Fibrillation: Rhythm Control Asymptomatic: Cardioversion done on 7/24/2024-Successful- EKG today showed  atrial fibrillation with RVR   Anti-Arrhythmic: Continue amiodarone (Pacerone) 200 mg once daily. Monitoring: Since they will being maintained on Amiodarone, I told them that we will need to closely monitor them for potential side effects. These include monitoring LFTs and TSH at least every 6 months as well as chest x-rays, pulmonary function tests, and eye exams at least yearly. Up to date on his amiodarone monitoring.   Beta Blocker: Continue Metoprolol succinate (Toprol XL) 25 mg 1/2 tab daily.  [id number] Score for Atrial Fibrillation Stroke Risk   Risk   Factors  Component Value   C CHF Yes 1   H HTN Yes 1   A2 Age >= 75 No,  (74 y.o.) 0   D DM No 0   S2 Prior Stroke/TIA No 0   V Vascular Disease Yes 1   A

## 2024-12-04 NOTE — PLAN OF CARE
Problem: Chronic Conditions and Co-morbidities  Goal: Patient's chronic conditions and co-morbidity symptoms are monitored and maintained or improved  Outcome: Progressing  Flowsheets (Taken 12/3/2024 1815 by Leydi Campos, RN)  Care Plan - Patient's Chronic Conditions and Co-Morbidity Symptoms are Monitored and Maintained or Improved: Monitor and assess patient's chronic conditions and comorbid symptoms for stability, deterioration, or improvement     Problem: Discharge Planning  Goal: Discharge to home or other facility with appropriate resources  Outcome: Progressing  Flowsheets (Taken 12/3/2024 1815 by Leydi Campos, RN)  Discharge to home or other facility with appropriate resources: Identify barriers to discharge with patient and caregiver     Problem: Safety - Adult  Goal: Free from fall injury  Outcome: Progressing     Problem: Respiratory - Adult  Goal: Achieves optimal ventilation and oxygenation  Outcome: Progressing     Problem: Cardiovascular - Adult  Goal: Maintains optimal cardiac output and hemodynamic stability  Outcome: Progressing  Flowsheets (Taken 12/3/2024 1815 by Leydi Campos, RN)  Maintains optimal cardiac output and hemodynamic stability: Monitor blood pressure and heart rate  Goal: Absence of cardiac dysrhythmias or at baseline  Outcome: Progressing     Problem: Skin/Tissue Integrity - Adult  Goal: Skin integrity remains intact  Outcome: Progressing  Flowsheets (Taken 12/3/2024 1815 by Leydi Campos, RN)  Skin Integrity Remains Intact: Monitor for areas of redness and/or skin breakdown  Goal: Incisions, wounds, or drain sites healing without S/S of infection  Outcome: Progressing  Goal: Oral mucous membranes remain intact  Outcome: Progressing     Problem: Musculoskeletal - Adult  Goal: Return mobility to safest level of function  Outcome: Progressing  Flowsheets (Taken 12/3/2024 1815 by Leydi Campos, RN)  Return Mobility to Safest Level of Function: Assess patient

## 2024-12-04 NOTE — DISCHARGE INSTR - COC
Continuity of Care Form    Patient Name: Michael Chan   :  1950  MRN:  [id number]    Admit date:  2024  Discharge date:  2024    Code Status Order: DNR-CCA   Advance Directives:   Advance Care Flowsheet Documentation             Admitting Physician:  Mane Harris MD  PCP: Mane Harris MD    Discharging Nurse: MONTEZ  Discharging Hospital Unit/Room#: 0316/0316-01  Discharging Unit Phone Number: [phone]    Emergency Contact:   Extended Emergency Contact Information  Primary Emergency Contact: Angelina Abrams  Address: [address] [place] [address] [facility]  Home Phone: [phone]  Relation: Pt Representative   needed? No  Secondary Emergency Contact: Leon Chan  Home Phone: [phone]  Relation: Brother/Sister    Past Surgical History:  Past Surgical History:   Procedure Laterality Date    CARDIAC CATHETERIZATION Left 2024    DR Mccoy/[facility]/right radial    CARDIAC PROCEDURE N/A 2024    Left heart cath / coronary angiography performed by Tejas Mccoy MD at [facility] CARDIAC CATH/IR LAB    CARDIAC PROCEDURE N/A 10/1/2024    dariel / Percutaneous coronary intervention performed by Heidy Hearn MD at [facility] CARDIAC CATH LAB    CARDIAC PROCEDURE N/A 10/1/2024    Insert stent pierre coronary performed by Heidy Hearn MD at [facility] CARDIAC CATH LAB    CAROTID STENT PLACEMENT Bilateral     COLONOSCOPY      COLONOSCOPY N/A 2023    COLORECTAL CANCER SCREENING, NOT HIGH RISK performed by Tena Wilson MD at [facility] OR    COLONOSCOPY  2023    -hemorrhoids,poor prep    ESOPHAGOGASTRODUODENOSCOPY  2023    Dr. Wilson-bx,portal hypertensive gastropathy    HERNIA REPAIR      INVASIVE VASCULAR N/A 10/1/2024    Intravascular lithotripsy performed by Heidy Hearn MD at [facility] CARDIAC CATH LAB    ROTATOR CUFF REPAIR Right     TONSILLECTOMY AND ADENOIDECTOMY      UPPER GASTROINTESTINAL ENDOSCOPY N/A

## 2024-12-05 ENCOUNTER — APPOINTMENT (OUTPATIENT)
Dept: CARDIAC REHAB | Age: 74
End: 2024-12-05
Payer: MEDICARE

## 2024-12-05 NOTE — DISCHARGE SUMMARY
are normal.  Lungs are well-expanded and clear. No skeletal abnormalities are present in the chest.     No significant findings in the chest.       Discharge Condition:   stable    Disposition:   Discharge home    Discharge Medications:     Medication List        CHANGE how you take these medications      amiodarone 200 MG tablet  Commonly known as: CORDARONE  Take 1 tablet by mouth daily  What changed: how much to take     lactulose 10 GM/15ML solution  Commonly known as: CHRONULAC  Take 15 mLs by mouth 3 times daily as needed (constipation)  What changed:   how much to take  when to take this  additional instructions     ondansetron 4 MG disintegrating tablet  Commonly known as: ZOFRAN-ODT  Take 1 tablet by mouth 3 times daily as needed for Nausea or Vomiting  What changed: when to take this     potassium chloride 20 MEQ extended release tablet  Commonly known as: Klor-Con M20  Take 1 tablet by mouth daily  What changed: See the new instructions.            CONTINUE taking these medications      apixaban 5 MG Tabs tablet  Commonly known as: ELIQUIS     atorvastatin 80 MG tablet  Commonly known as: LIPITOR  Take 1 tablet by mouth daily     bisacodyl 10 MG suppository  Commonly known as: DULCOLAX     clopidogrel 75 MG tablet  Commonly known as: PLAVIX     docusate sodium 100 MG capsule  Commonly known as: COLACE  TAKE 1 CAPSULE BY MOUTH TWICE A DAY     Farxiga 10 MG tablet  Generic drug: dapagliflozin  TAKE 1 TABLET BY MOUTH EVERY DAY     Fish Oil Concentrate 1000 MG Caps     folic acid 1 MG tablet  Commonly known as: FOLVITE  Take 1 tablet by mouth daily     gabapentin 300 MG capsule  Commonly known as: NEURONTIN  Take 1 capsule by mouth 3 times daily for 120 days.     loperamide 2 MG capsule  Commonly known as: IMODIUM     LORazepam 0.5 MG tablet  Commonly known as: ATIVAN     losartan 25 MG tablet  Commonly known as: COZAAR  Take 0.5 tablets by mouth daily     magnesium oxide 400 (240 Mg) MG tablet  Commonly

## 2024-12-09 ENCOUNTER — HOSPITAL ENCOUNTER (OUTPATIENT)
Dept: CARDIAC REHAB | Age: 74
Setting detail: THERAPIES SERIES
Discharge: HOME | End: 2024-12-09
Payer: MEDICARE

## 2024-12-09 PROCEDURE — 93798 PHYS/QHP OP CAR RHAB W/ECG: CPT

## 2024-12-10 ENCOUNTER — OFFICE VISIT (OUTPATIENT)
Dept: CARDIOLOGY | Age: 74
End: 2024-12-10
Payer: MEDICARE

## 2024-12-10 VITALS
RESPIRATION RATE: 18 BRPM | HEART RATE: 120 BPM | OXYGEN SATURATION: 94 % | DIASTOLIC BLOOD PRESSURE: 60 MMHG | SYSTOLIC BLOOD PRESSURE: 97 MMHG | HEIGHT: 66 IN | WEIGHT: 186.8 LBS | BODY MASS INDEX: 30.02 KG/M2

## 2024-12-10 DIAGNOSIS — Z79.01 CHRONIC ANTICOAGULATION: ICD-10-CM

## 2024-12-10 DIAGNOSIS — Z79.899 ON AMIODARONE THERAPY: ICD-10-CM

## 2024-12-10 DIAGNOSIS — E78.2 MIXED HYPERLIPIDEMIA: ICD-10-CM

## 2024-12-10 DIAGNOSIS — I73.9 PAD (PERIPHERAL ARTERY DISEASE) (HCC): Chronic | ICD-10-CM

## 2024-12-10 DIAGNOSIS — I25.10 ASHD (ARTERIOSCLEROTIC HEART DISEASE): Primary | ICD-10-CM

## 2024-12-10 DIAGNOSIS — I50.42 CHRONIC COMBINED SYSTOLIC AND DIASTOLIC CONGESTIVE HEART FAILURE (HCC): Chronic | ICD-10-CM

## 2024-12-10 DIAGNOSIS — Z87.898 HISTORY OF SYNCOPE: ICD-10-CM

## 2024-12-10 DIAGNOSIS — I25.5 ISCHEMIC CARDIOMYOPATHY: Chronic | ICD-10-CM

## 2024-12-10 DIAGNOSIS — I48.0 PAF (PAROXYSMAL ATRIAL FIBRILLATION) (HCC): Chronic | ICD-10-CM

## 2024-12-10 PROCEDURE — 1111F DSCHRG MED/CURRENT MED MERGE: CPT | Performed by: FAMILY MEDICINE

## 2024-12-10 PROCEDURE — 99211 OFF/OP EST MAY X REQ PHY/QHP: CPT | Performed by: FAMILY MEDICINE

## 2024-12-10 PROCEDURE — 3078F DIAST BP <80 MM HG: CPT | Performed by: FAMILY MEDICINE

## 2024-12-10 PROCEDURE — 3074F SYST BP LT 130 MM HG: CPT | Performed by: FAMILY MEDICINE

## 2024-12-10 PROCEDURE — 3017F COLORECTAL CA SCREEN DOC REV: CPT | Performed by: FAMILY MEDICINE

## 2024-12-10 PROCEDURE — 99214 OFFICE O/P EST MOD 30 MIN: CPT | Performed by: FAMILY MEDICINE

## 2024-12-10 PROCEDURE — G8417 CALC BMI ABV UP PARAM F/U: HCPCS | Performed by: FAMILY MEDICINE

## 2024-12-10 PROCEDURE — G8484 FLU IMMUNIZE NO ADMIN: HCPCS | Performed by: FAMILY MEDICINE

## 2024-12-10 PROCEDURE — 93010 ELECTROCARDIOGRAM REPORT: CPT | Performed by: FAMILY MEDICINE

## 2024-12-10 PROCEDURE — 1123F ACP DISCUSS/DSCN MKR DOCD: CPT | Performed by: FAMILY MEDICINE

## 2024-12-10 PROCEDURE — 93005 ELECTROCARDIOGRAM TRACING: CPT | Performed by: FAMILY MEDICINE

## 2024-12-10 PROCEDURE — 1036F TOBACCO NON-USER: CPT | Performed by: FAMILY MEDICINE

## 2024-12-10 PROCEDURE — 1159F MED LIST DOCD IN RCRD: CPT | Performed by: FAMILY MEDICINE

## 2024-12-10 PROCEDURE — G8427 DOCREV CUR MEDS BY ELIG CLIN: HCPCS | Performed by: FAMILY MEDICINE

## 2024-12-10 RX ORDER — DIGOXIN 125 MCG
125 TABLET ORAL DAILY
Qty: 30 TABLET | Refills: 3 | Status: SHIPPED | OUTPATIENT
Start: 2024-12-10

## 2024-12-10 NOTE — PROGRESS NOTES
UpToDate guideline \"Atrial Fibrillation: Anticoagulation therapy to prevent embolization  Disclaimer: Risk Score calculation is dependent on accuracy of patient problem list and past encounter diagnosis.   Stroke Risk: CHADS2-VASc Score: 4/9 (4% stroke risk). Because of his atrial fibrillation, I also would also recommend that he continue with anticoagulation to decrease his risk of stoke but also reminded him to watch for signs of blood in his stool or black tarry stools and stop the medication immediately if this develops as this could be life threatening.    Anticoagulation: Continue Apixaban (Eliquis) 5 mg every 12 hours.  Ordered digoxin level to be done in 5 days from now.    Chronic combined heart failure: Ischemic Cardiomyopathy, Echo done on 5/18/2022 which showed an EF of 15-20%.  Echo done on 3/20/23 showed EF: 50-55%. He is down 9 pounds since last visit in the office-No signs of fluid overload.   Beta Blocker: Continue Metoprolol succinate (Toprol XL) 25 mg 1/2 tab daily.  ACE Inibitor/ARB: Continue losartan (Cozaar) 25 mg, 1/2 tab daily.  Continue Farxiga 10 mg once daily.    Heart failure counseling: I advised them to try and keep their legs up whenever possible and to limit salt in their diet.     Peripheral Artery Disease:   Antiplatelet Agent: Continue aspirin 81 mg daily   Cholesterol Reduction Therapy: Continue Atorvastatin (Lipitor) 80 mg daily.    Continue follow up with Dr. Pittman.     Once again, thank you for allowing me to participate in this patients care. Please do not hesitate to contact me could I be of further assistance.     FOLLOW UP:   I told Mr. Chan to call my office if he had any problems, but otherwise told him to Return in about 3 months (around 3/10/2025). However, I would be happy to see him sooner should the need arise.    Sincerely,  Tejas Mccoy MD, MS, F.A.C.C.  [facility] Cardiology Specialist    [address] [place], [place] [address]  Phone: [phone],

## 2024-12-11 ENCOUNTER — TELEPHONE (OUTPATIENT)
Dept: CARDIOLOGY | Age: 74
End: 2024-12-11

## 2024-12-11 ENCOUNTER — APPOINTMENT (OUTPATIENT)
Dept: CARDIAC REHAB | Age: 74
End: 2024-12-11
Payer: MEDICARE

## 2024-12-11 ENCOUNTER — HOSPITAL ENCOUNTER (OUTPATIENT)
Dept: CARDIAC REHAB | Age: 74
Setting detail: THERAPIES SERIES
Discharge: HOME | End: 2024-12-11
Payer: MEDICARE

## 2024-12-11 PROCEDURE — 93798 PHYS/QHP OP CAR RHAB W/ECG: CPT

## 2024-12-12 ENCOUNTER — APPOINTMENT (OUTPATIENT)
Dept: CARDIAC REHAB | Age: 74
End: 2024-12-12
Payer: MEDICARE

## 2024-12-12 RX ORDER — AMIODARONE HYDROCHLORIDE 200 MG/1
200 TABLET ORAL DAILY
Qty: 7 TABLET | Refills: 0 | Status: CANCELLED | OUTPATIENT
Start: 2024-12-12

## 2024-12-12 NOTE — TELEPHONE ENCOUNTER
[facility] called and asked for an update - I let her know I have a message out to Dr. Mccoy about what he dose he wants him on. The 125 mcg will be there today they plan on giving him that dose until they hear otherwise.    respiratory distress

## 2024-12-12 NOTE — TELEPHONE ENCOUNTER
Mr. Chan's wife called in to report that [facility] still hasn't gotten the Digoxin 125 mcg (because it comes through mail order) so he hasn't even been taking that yet. Do you want him to start the 125 mcg when they get it before jumping to the 250 mcg. The wife reports he has not been doing well. Please advise.

## 2024-12-16 ENCOUNTER — HOSPITAL ENCOUNTER (OUTPATIENT)
Dept: CARDIAC REHAB | Age: 74
Setting detail: THERAPIES SERIES
Discharge: HOME | End: 2024-12-16
Payer: MEDICARE

## 2024-12-16 PROCEDURE — 93798 PHYS/QHP OP CAR RHAB W/ECG: CPT

## 2024-12-16 NOTE — TELEPHONE ENCOUNTER
Spoke with patient nurse at [facility] and she stated that his HR has been good over the weekend on 125 mcg daily. HR has been  running in the 70's and 80's over the weekend.   Did you still want to increase Digoxin to 250 mcg daily?  Digoxin level will be drawn today.

## 2024-12-18 ENCOUNTER — APPOINTMENT (OUTPATIENT)
Dept: CARDIAC REHAB | Age: 74
End: 2024-12-18
Payer: MEDICARE

## 2024-12-19 ENCOUNTER — HOSPITAL ENCOUNTER (OUTPATIENT)
Dept: CARDIAC REHAB | Age: 74
Setting detail: THERAPIES SERIES
Discharge: HOME | End: 2024-12-19
Payer: MEDICARE

## 2024-12-19 PROCEDURE — 93798 PHYS/QHP OP CAR RHAB W/ECG: CPT

## 2024-12-23 ENCOUNTER — HOSPITAL ENCOUNTER (OUTPATIENT)
Dept: CARDIAC REHAB | Age: 74
Setting detail: THERAPIES SERIES
Discharge: HOME | End: 2024-12-23
Payer: MEDICARE

## 2024-12-23 PROCEDURE — 93798 PHYS/QHP OP CAR RHAB W/ECG: CPT

## 2024-12-26 ENCOUNTER — HOSPITAL ENCOUNTER (OUTPATIENT)
Dept: CARDIAC REHAB | Age: 74
Setting detail: THERAPIES SERIES
Discharge: HOME | End: 2024-12-26
Payer: MEDICARE

## 2024-12-26 PROCEDURE — 93798 PHYS/QHP OP CAR RHAB W/ECG: CPT

## 2024-12-30 ENCOUNTER — HOSPITAL ENCOUNTER (OUTPATIENT)
Dept: CARDIAC REHAB | Age: 74
Setting detail: THERAPIES SERIES
Discharge: HOME | End: 2024-12-30
Payer: MEDICARE

## 2024-12-30 PROCEDURE — 93798 PHYS/QHP OP CAR RHAB W/ECG: CPT

## 2025-01-02 ENCOUNTER — HOSPITAL ENCOUNTER (OUTPATIENT)
Dept: CARDIAC REHAB | Age: 75
Setting detail: THERAPIES SERIES
Discharge: HOME | End: 2025-01-02

## 2025-01-02 PROCEDURE — 93798 PHYS/QHP OP CAR RHAB W/ECG: CPT

## 2025-01-06 ENCOUNTER — APPOINTMENT (OUTPATIENT)
Dept: CARDIAC REHAB | Age: 75
End: 2025-01-06
Payer: MEDICARE

## 2025-01-07 PROBLEM — I50.43 ACUTE ON CHRONIC COMBINED SYSTOLIC AND DIASTOLIC HF (HEART FAILURE), NYHA CLASS 4 (HCC): Status: RESOLVED | Noted: 2024-12-03 | Resolved: 2025-01-07

## 2025-01-07 PROBLEM — R18.8 ASCITES: Chronic | Status: ACTIVE | Noted: 2022-05-20

## 2025-01-07 PROBLEM — F41.9 ANXIETY: Chronic | Status: ACTIVE | Noted: 2024-02-13

## 2025-01-07 PROBLEM — R94.39 ABNORMAL CARDIOVASCULAR STRESS TEST: Status: RESOLVED | Noted: 2024-09-19 | Resolved: 2025-01-07

## 2025-01-07 PROBLEM — K70.30 ALCOHOLIC CIRRHOSIS OF LIVER WITHOUT ASCITES (HCC): Chronic | Status: ACTIVE | Noted: 2024-04-16

## 2025-01-07 PROBLEM — I25.10 ASHD (ARTERIOSCLEROTIC HEART DISEASE): Chronic | Status: ACTIVE | Noted: 2024-12-03

## 2025-01-07 PROBLEM — M79.672 BILATERAL FOOT PAIN: Status: RESOLVED | Noted: 2023-08-07 | Resolved: 2025-01-07

## 2025-01-07 PROBLEM — M79.671 BILATERAL FOOT PAIN: Status: RESOLVED | Noted: 2023-08-07 | Resolved: 2025-01-07

## 2025-01-07 PROBLEM — I50.42 CHRONIC COMBINED SYSTOLIC (CONGESTIVE) AND DIASTOLIC (CONGESTIVE) HEART FAILURE (HCC): Chronic | Status: RESOLVED | Noted: 2024-02-13 | Resolved: 2025-01-07

## 2025-01-07 PROBLEM — K70.30 ALCOHOLIC CIRRHOSIS OF LIVER WITHOUT ASCITES (HCC): Chronic | Status: RESOLVED | Noted: 2024-04-16 | Resolved: 2025-01-07

## 2025-01-08 ENCOUNTER — HOSPITAL ENCOUNTER (OUTPATIENT)
Dept: CARDIAC REHAB | Age: 75
Setting detail: THERAPIES SERIES
Discharge: HOME | End: 2025-01-08
Payer: MEDICARE

## 2025-01-08 PROCEDURE — 93798 PHYS/QHP OP CAR RHAB W/ECG: CPT

## 2025-01-08 NOTE — TELEPHONE ENCOUNTER
Patient's sister, Navarro Moreno, called clinic stating that patient saw his PCP yesterday. BP 86/42, Pulse 64. Patient complains of lightheadedness and dizziness. Sister also reports patient was taking bumetanide 1 mg po daily (half of 1 mg tablet) upon discharge from hospital on 8/13/22 until present. There was some uncertainty at CHF clinic visit on 8/17/22 as to Bumex dose. However, patient's sister reports she followed discharge instructions that stated 1 mg po daily. Weight is stable at 150 lb. Per discussion with Dr. Marquis Bearden, patient will continue Bumex 1 mg po daily. Patient will NOT increase Bumex as discussed previously due to hypotension, lightheadedness and dizziness - as weight remains stable. Patient will continue with daily weights and notify CHF clinic or cardiology of any weight increase or edema. Patient will follow up in CHF clinic 8/31/22.
None

## 2025-01-09 ENCOUNTER — HOSPITAL ENCOUNTER (OUTPATIENT)
Dept: CARDIAC REHAB | Age: 75
Setting detail: THERAPIES SERIES
Discharge: HOME | End: 2025-01-09
Payer: MEDICARE

## 2025-01-09 PROCEDURE — 93798 PHYS/QHP OP CAR RHAB W/ECG: CPT

## 2025-01-13 ENCOUNTER — OFFICE VISIT (OUTPATIENT)
Dept: VASCULAR SURGERY | Age: 75
End: 2025-01-13
Payer: MEDICARE

## 2025-01-13 ENCOUNTER — HOSPITAL ENCOUNTER (OUTPATIENT)
Dept: CARDIAC REHAB | Age: 75
Setting detail: THERAPIES SERIES
Discharge: HOME | End: 2025-01-13

## 2025-01-13 VITALS
TEMPERATURE: 96.8 F | DIASTOLIC BLOOD PRESSURE: 53 MMHG | RESPIRATION RATE: 20 BRPM | HEART RATE: 84 BPM | WEIGHT: 182.6 LBS | BODY MASS INDEX: 28.66 KG/M2 | HEIGHT: 67 IN | SYSTOLIC BLOOD PRESSURE: 77 MMHG

## 2025-01-13 DIAGNOSIS — I73.9 PAD (PERIPHERAL ARTERY DISEASE) (HCC): Primary | ICD-10-CM

## 2025-01-13 DIAGNOSIS — I70.222 CRITICAL LIMB ISCHEMIA OF LEFT LOWER EXTREMITY (HCC): ICD-10-CM

## 2025-01-13 PROCEDURE — 99214 OFFICE O/P EST MOD 30 MIN: CPT | Performed by: INTERNAL MEDICINE

## 2025-01-13 PROCEDURE — 1159F MED LIST DOCD IN RCRD: CPT | Performed by: INTERNAL MEDICINE

## 2025-01-13 PROCEDURE — 99215 OFFICE O/P EST HI 40 MIN: CPT | Performed by: INTERNAL MEDICINE

## 2025-01-13 PROCEDURE — 3074F SYST BP LT 130 MM HG: CPT | Performed by: INTERNAL MEDICINE

## 2025-01-13 PROCEDURE — 1036F TOBACCO NON-USER: CPT | Performed by: INTERNAL MEDICINE

## 2025-01-13 PROCEDURE — 3017F COLORECTAL CA SCREEN DOC REV: CPT | Performed by: INTERNAL MEDICINE

## 2025-01-13 PROCEDURE — G8417 CALC BMI ABV UP PARAM F/U: HCPCS | Performed by: INTERNAL MEDICINE

## 2025-01-13 PROCEDURE — G8427 DOCREV CUR MEDS BY ELIG CLIN: HCPCS | Performed by: INTERNAL MEDICINE

## 2025-01-13 PROCEDURE — 93798 PHYS/QHP OP CAR RHAB W/ECG: CPT

## 2025-01-13 PROCEDURE — 3078F DIAST BP <80 MM HG: CPT | Performed by: INTERNAL MEDICINE

## 2025-01-13 PROCEDURE — 1123F ACP DISCUSS/DSCN MKR DOCD: CPT | Performed by: INTERNAL MEDICINE

## 2025-01-13 PROCEDURE — 1126F AMNT PAIN NOTED NONE PRSNT: CPT | Performed by: INTERNAL MEDICINE

## 2025-01-13 NOTE — PROGRESS NOTES
Michael Chan was seen on 1/13/2025 for   Chief Complaint   Patient presents with    Leg Pain     Patient reports constant left lower leg pain with some swelling for approx. 2 months. Wound on left great toe.                               REVIEW OF SYSTEMS    Constitutional Weight: absent, A, Fatigue: present Fever: absent   HEENT Ears: normal,Visual disturbance: absent Sore throat: absent,    Respiratory Shortness of breath: present, Cough: absent;, Snoring: absent   Cardivascular Chest pain: absent,  Leg pain: present,Leg swelling:present, Non-healing wound:present    GI Diarrhea: absent, Abdominal Pain: absent    Urinary frequency: absent, Urinary incontinence: absent   Musculoskeletal Neck pain: absent, Back pain: absent, Restless Leg:absent     Dermatological Hair loss: absent, Skin changes: absent   Neurological  Sudden Loss of Vision in one eye:absent, Slurred Speech:absent, Weakness on one side of body: absent,Headache: absent   Psychiatric Anxiety: absent, Depression: absent   Hematologic Abnormal bleeding: absent,          
3    amiodarone (CORDARONE) 200 MG tablet Take 1 tablet by mouth daily (Patient taking differently: Take 0.5 tablets by mouth daily) 7 tablet 0    potassium chloride (KLOR-CON M20) 20 MEQ extended release tablet Take 1 tablet by mouth daily (Patient taking differently: Take 1 tablet by mouth 2 times daily) 60 tablet 3    LORazepam (ATIVAN) 0.5 MG tablet Take 1 tablet by mouth nightly.      clopidogrel (PLAVIX) 75 MG tablet Take 1 tablet by mouth daily      magnesium oxide (MAG-OX) 400 (240 Mg) MG tablet Take 1 tablet by mouth daily      vitamin D (CHOLECALCIFEROL) 25 MCG (1000 UT) TABS tablet Take 2 tablets by mouth daily      bisacodyl (DULCOLAX) 10 MG suppository Place 1 suppository rectally daily as needed for Constipation      venlafaxine (EFFEXOR XR) 37.5 MG extended release capsule Take 2 capsules by mouth daily      loperamide (IMODIUM) 2 MG capsule Take 1 capsule by mouth 4 times daily as needed for Diarrhea      metoprolol succinate (TOPROL XL) 25 MG extended release tablet Take 0.5 tablets by mouth daily 45 tablet 3    pantoprazole (PROTONIX) 40 MG tablet TAKE 1 TABLET BY MOUTH EVERY DAY BEFORE BREAKFAST 90 tablet 1    ondansetron (ZOFRAN-ODT) 4 MG disintegrating tablet Take 1 tablet by mouth 3 times daily as needed for Nausea or Vomiting 21 tablet 0    gabapentin (NEURONTIN) 300 MG capsule Take 1 capsule by mouth 3 times daily for 120 days. 90 capsule 3    losartan (COZAAR) 25 MG tablet Take 0.5 tablets by mouth daily 30 tablet 3    midodrine (PROAMATINE) 10 MG tablet Take 1 tablet by mouth 2 times daily (with meals) 90 tablet 3    lactulose (CHRONULAC) 10 GM/15ML solution Take 15 mLs by mouth 3 times daily as needed (constipation) 473 mL 1    tamsulosin (FLOMAX) 0.4 MG capsule TAKE 1 CAPSULE BY MOUTH EVERY DAY IN THE MORNING 90 capsule 1    atorvastatin (LIPITOR) 80 MG tablet Take 1 tablet by mouth daily 90 tablet 3    FARXIGA 10 MG tablet TAKE 1 TABLET BY MOUTH EVERY DAY 90 tablet 3    docusate sodium

## 2025-01-13 NOTE — PATIENT INSTRUCTIONS
SURVEY:    Thank you for allowing us to care for you today.    You may be receiving a survey from [facility] regarding your visit today- electronically or via mail.      Please help us by completing the survey as this will provide the needed feedback to ensure we are providing the very best care for you and your family.    If you cannot score us a very good on any question, please call the office to discuss how we could have made your experience a very good one.    Thank you.       STAFF:    Farida Barrios, Yamilet BOO      CLINICAL STAFF:    Yesica RAE, Kailey BOO, Julisa BOO, Mable RAE

## 2025-01-14 ENCOUNTER — TELEPHONE (OUTPATIENT)
Dept: INTERVENTIONAL RADIOLOGY/VASCULAR | Age: 75
End: 2025-01-14

## 2025-01-14 NOTE — TELEPHONE ENCOUNTER
Spoke with patient's sister, Mikaela, about Bill's upcoming angiogram. She is aware of schedule change (patient is now arriving on Monday, January 20th at 9am). She reports understanding that the patient must hold his Eliquis for 48 hours prior to procedure. She also states that she will take care of all arrangements with [facility].

## 2025-01-15 ENCOUNTER — HOSPITAL ENCOUNTER (OUTPATIENT)
Dept: CARDIAC REHAB | Age: 75
Setting detail: THERAPIES SERIES
Discharge: HOME | End: 2025-01-15

## 2025-01-15 PROCEDURE — 93798 PHYS/QHP OP CAR RHAB W/ECG: CPT

## 2025-01-16 ENCOUNTER — OFFICE VISIT (OUTPATIENT)
Dept: CARDIOLOGY | Age: 75
End: 2025-01-16
Payer: MEDICARE

## 2025-01-16 ENCOUNTER — HOSPITAL ENCOUNTER (OUTPATIENT)
Dept: CARDIAC REHAB | Age: 75
Setting detail: THERAPIES SERIES
Discharge: HOME | End: 2025-01-16

## 2025-01-16 VITALS
BODY MASS INDEX: 28.41 KG/M2 | SYSTOLIC BLOOD PRESSURE: 92 MMHG | DIASTOLIC BLOOD PRESSURE: 58 MMHG | OXYGEN SATURATION: 91 % | RESPIRATION RATE: 18 BRPM | HEIGHT: 67 IN | HEART RATE: 103 BPM | WEIGHT: 181 LBS

## 2025-01-16 DIAGNOSIS — I48.0 PAROXYSMAL ATRIAL FIBRILLATION (HCC): Primary | ICD-10-CM

## 2025-01-16 DIAGNOSIS — I25.10 CORONARY ARTERY DISEASE INVOLVING NATIVE CORONARY ARTERY OF NATIVE HEART WITHOUT ANGINA PECTORIS: Chronic | ICD-10-CM

## 2025-01-16 DIAGNOSIS — I48.92 PAROXYSMAL ATRIAL FLUTTER (HCC): ICD-10-CM

## 2025-01-16 DIAGNOSIS — I25.5 ISCHEMIC CARDIOMYOPATHY: Chronic | ICD-10-CM

## 2025-01-16 DIAGNOSIS — I25.10 ASHD (ARTERIOSCLEROTIC HEART DISEASE): ICD-10-CM

## 2025-01-16 DIAGNOSIS — I10 HYPERTENSION, UNSPECIFIED TYPE: Chronic | ICD-10-CM

## 2025-01-16 DIAGNOSIS — I73.9 PAD (PERIPHERAL ARTERY DISEASE) (HCC): ICD-10-CM

## 2025-01-16 PROCEDURE — 93798 PHYS/QHP OP CAR RHAB W/ECG: CPT

## 2025-01-16 PROCEDURE — 3078F DIAST BP <80 MM HG: CPT | Performed by: SPECIALIST

## 2025-01-16 PROCEDURE — G8427 DOCREV CUR MEDS BY ELIG CLIN: HCPCS | Performed by: SPECIALIST

## 2025-01-16 PROCEDURE — 1160F RVW MEDS BY RX/DR IN RCRD: CPT | Performed by: SPECIALIST

## 2025-01-16 PROCEDURE — 3074F SYST BP LT 130 MM HG: CPT | Performed by: SPECIALIST

## 2025-01-16 PROCEDURE — 93005 ELECTROCARDIOGRAM TRACING: CPT | Performed by: SPECIALIST

## 2025-01-16 PROCEDURE — G8417 CALC BMI ABV UP PARAM F/U: HCPCS | Performed by: SPECIALIST

## 2025-01-16 PROCEDURE — 1159F MED LIST DOCD IN RCRD: CPT | Performed by: SPECIALIST

## 2025-01-16 PROCEDURE — 1036F TOBACCO NON-USER: CPT | Performed by: SPECIALIST

## 2025-01-16 PROCEDURE — 93010 ELECTROCARDIOGRAM REPORT: CPT | Performed by: SPECIALIST

## 2025-01-16 PROCEDURE — 99205 OFFICE O/P NEW HI 60 MIN: CPT | Performed by: SPECIALIST

## 2025-01-16 PROCEDURE — 1123F ACP DISCUSS/DSCN MKR DOCD: CPT | Performed by: SPECIALIST

## 2025-01-16 PROCEDURE — 3017F COLORECTAL CA SCREEN DOC REV: CPT | Performed by: SPECIALIST

## 2025-01-16 RX ORDER — CEPHALEXIN 500 MG/1
500 CAPSULE ORAL 4 TIMES DAILY
COMMUNITY

## 2025-01-16 ASSESSMENT — ENCOUNTER SYMPTOMS
GASTROINTESTINAL NEGATIVE: 1
EYES NEGATIVE: 1
ALLERGIC/IMMUNOLOGIC NEGATIVE: 1
SHORTNESS OF BREATH: 1

## 2025-01-16 NOTE — PROGRESS NOTES
(HCC)    Chronic renal disease, stage III (HCC) [583284]    Neuropathy    Parkinson's disease, unspecified whether dyskinesia present, unspecified whether manifestations fluctuate (HCC)    Portal hypertensive gastropathy (HCC)    PAD (peripheral artery disease) (HCC)    Depression    Anxiety    Vertebral artery compression syndromes of cervical region    Secondary hypercoagulable state (HCC)    Nausea and vomiting    Paroxysmal atrial fibrillation with RVR (HCC)    Hypercoagulable state due to paroxysmal atrial fibrillation (HCC)    ASHD (arteriosclerotic heart disease)       Past Medical History:   Diagnosis Date    Acute kidney injury (HCC)     Ascites 5/20/2022    Atrial fibrillation (HCC)     CHF (congestive heart failure) (HCC)     Cirrhosis of liver with ascites (HCC) 5/19/2022    CKD (chronic kidney disease) 5/20/2022    Coronary artery disease     Hyperlipidemia     Hypertension     Hypomagnesemia 5/20/2022    Hyponatremia     Thrombocytopenia (HCC)        Past Surgical History:   Procedure Laterality Date    CARDIAC CATHETERIZATION Left 09/19/2024    DR Mccoy/[facility]/right radial    CARDIAC PROCEDURE N/A 9/19/2024    Left heart cath / coronary angiography performed by Tejas Mccoy MD at [facility] CARDIAC CATH/IR LAB    CARDIAC PROCEDURE N/A 10/1/2024    dariel / Percutaneous coronary intervention performed by Heidy Hearn MD at [facility] CARDIAC CATH LAB    CARDIAC PROCEDURE N/A 10/1/2024    Insert stent pierre coronary performed by Heidy Hearn MD at [facility] CARDIAC CATH LAB    CAROTID STENT PLACEMENT Bilateral     COLONOSCOPY      COLONOSCOPY N/A 07/11/2023    COLORECTAL CANCER SCREENING, NOT HIGH RISK performed by Tena Wilson MD at [facility] OR    COLONOSCOPY  07/11/2023    -hemorrhoids,poor prep    ESOPHAGOGASTRODUODENOSCOPY  07/11/2023    Dr. Wilson-bx,portal hypertensive gastropathy    HERNIA REPAIR      INVASIVE VASCULAR N/A 10/1/2024    Intravascular lithotripsy performed by Dariel

## 2025-01-20 ENCOUNTER — SURGERY (OUTPATIENT)
Age: 75
End: 2025-01-20
Payer: MEDICARE

## 2025-01-20 ENCOUNTER — APPOINTMENT (OUTPATIENT)
Dept: CARDIAC REHAB | Age: 75
End: 2025-01-20
Payer: MEDICARE

## 2025-01-20 ENCOUNTER — HOSPITAL ENCOUNTER (OUTPATIENT)
Age: 75
Setting detail: OUTPATIENT SURGERY
Discharge: NURSING, REHAB OR LONG-TERM CARE | End: 2025-01-20
Attending: INTERNAL MEDICINE | Admitting: INTERNAL MEDICINE
Payer: MEDICARE

## 2025-01-20 VITALS
TEMPERATURE: 97.5 F | HEART RATE: 73 BPM | DIASTOLIC BLOOD PRESSURE: 66 MMHG | SYSTOLIC BLOOD PRESSURE: 96 MMHG | RESPIRATION RATE: 13 BRPM | OXYGEN SATURATION: 90 %

## 2025-01-20 DIAGNOSIS — I70.222 CRITICAL LIMB ISCHEMIA OF LEFT LOWER EXTREMITY (HCC): ICD-10-CM

## 2025-01-20 LAB
ACT BLD: 231 SEC (ref 79–149)
ACT BLD: 235 SEC (ref 79–149)
ACT BLD: 239 SEC (ref 79–149)
ACT BLD: 255 SEC (ref 79–149)
ACT BLD: 259 SEC (ref 79–149)
ANION GAP SERPL CALCULATED.3IONS-SCNC: 12 MMOL/L (ref 9–16)
BUN SERPL-MCNC: 25 MG/DL (ref 8–23)
BUN/CREAT SERPL: 21 (ref 9–20)
CALCIUM SERPL-MCNC: 10.4 MG/DL (ref 8.6–10.4)
CHLORIDE SERPL-SCNC: 106 MMOL/L (ref 98–107)
CO2 SERPL-SCNC: 24 MMOL/L (ref 20–31)
CREAT SERPL-MCNC: 1.2 MG/DL (ref 0.7–1.2)
ERYTHROCYTE [DISTWIDTH] IN BLOOD BY AUTOMATED COUNT: 13 % (ref 11.8–14.4)
GFR, ESTIMATED: 61 ML/MIN/1.73M2
GLUCOSE SERPL-MCNC: 88 MG/DL (ref 74–99)
HCT VFR BLD AUTO: 46.9 % (ref 40.7–50.3)
HGB BLD-MCNC: 16 G/DL (ref 13–17)
MCH RBC QN AUTO: 31.9 PG (ref 25.2–33.5)
MCHC RBC AUTO-ENTMCNC: 34.1 G/DL (ref 28.4–34.8)
MCV RBC AUTO: 93.6 FL (ref 82.6–102.9)
NRBC BLD-RTO: 0 PER 100 WBC
PLATELET # BLD AUTO: 115 K/UL (ref 138–453)
PMV BLD AUTO: 11.7 FL (ref 8.1–13.5)
POTASSIUM SERPL-SCNC: 4.7 MMOL/L (ref 3.7–5.3)
RBC # BLD AUTO: 5.01 M/UL (ref 4.21–5.77)
SODIUM SERPL-SCNC: 142 MMOL/L (ref 136–145)
WBC OTHER # BLD: 5.4 K/UL (ref 3.5–11.3)

## 2025-01-20 PROCEDURE — 76937 US GUIDE VASCULAR ACCESS: CPT | Performed by: INTERNAL MEDICINE

## 2025-01-20 PROCEDURE — 99152 MOD SED SAME PHYS/QHP 5/>YRS: CPT | Performed by: INTERNAL MEDICINE

## 2025-01-20 PROCEDURE — 2580000003 HC RX 258: Performed by: INTERNAL MEDICINE

## 2025-01-20 PROCEDURE — 6360000002 HC RX W HCPCS: Performed by: INTERNAL MEDICINE

## 2025-01-20 PROCEDURE — C1894 INTRO/SHEATH, NON-LASER: HCPCS | Performed by: INTERNAL MEDICINE

## 2025-01-20 PROCEDURE — C1887 CATHETER, GUIDING: HCPCS | Performed by: INTERNAL MEDICINE

## 2025-01-20 PROCEDURE — 2500000003 HC RX 250 WO HCPCS: Performed by: INTERNAL MEDICINE

## 2025-01-20 PROCEDURE — 2500000003 HC RX 250 WO HCPCS

## 2025-01-20 PROCEDURE — 75710 ARTERY X-RAYS ARM/LEG: CPT | Performed by: INTERNAL MEDICINE

## 2025-01-20 PROCEDURE — C9764 REVASC INTRAVASC LITHOTRIPSY: HCPCS | Performed by: INTERNAL MEDICINE

## 2025-01-20 PROCEDURE — C1769 GUIDE WIRE: HCPCS | Performed by: INTERNAL MEDICINE

## 2025-01-20 PROCEDURE — 2709999900 HC NON-CHARGEABLE SUPPLY: Performed by: INTERNAL MEDICINE

## 2025-01-20 PROCEDURE — 7100000010 HC PHASE II RECOVERY - FIRST 15 MIN: Performed by: INTERNAL MEDICINE

## 2025-01-20 PROCEDURE — 37224 HC FEM POP TERRITORY PLASTY: CPT | Performed by: INTERNAL MEDICINE

## 2025-01-20 PROCEDURE — 6360000004 HC RX CONTRAST MEDICATION: Performed by: INTERNAL MEDICINE

## 2025-01-20 PROCEDURE — 85347 COAGULATION TIME ACTIVATED: CPT

## 2025-01-20 PROCEDURE — 80048 BASIC METABOLIC PNL TOTAL CA: CPT

## 2025-01-20 PROCEDURE — 7100000011 HC PHASE II RECOVERY - ADDTL 15 MIN: Performed by: INTERNAL MEDICINE

## 2025-01-20 PROCEDURE — C1725 CATH, TRANSLUMIN NON-LASER: HCPCS | Performed by: INTERNAL MEDICINE

## 2025-01-20 PROCEDURE — 6360000002 HC RX W HCPCS

## 2025-01-20 PROCEDURE — 85027 COMPLETE CBC AUTOMATED: CPT

## 2025-01-20 PROCEDURE — 99153 MOD SED SAME PHYS/QHP EA: CPT | Performed by: INTERNAL MEDICINE

## 2025-01-20 RX ORDER — IOPAMIDOL 755 MG/ML
INJECTION, SOLUTION INTRAVASCULAR PRN
Status: DISCONTINUED | OUTPATIENT
Start: 2025-01-20 | End: 2025-01-20 | Stop reason: HOSPADM

## 2025-01-20 RX ORDER — HEPARIN SODIUM 10000 [USP'U]/ML
INJECTION, SOLUTION INTRAVENOUS; SUBCUTANEOUS PRN
Status: DISCONTINUED | OUTPATIENT
Start: 2025-01-20 | End: 2025-01-20 | Stop reason: HOSPADM

## 2025-01-20 RX ORDER — VERAPAMIL HYDROCHLORIDE 2.5 MG/ML
INJECTION, SOLUTION INTRAVENOUS PRN
Status: DISCONTINUED | OUTPATIENT
Start: 2025-01-20 | End: 2025-01-20 | Stop reason: HOSPADM

## 2025-01-20 RX ORDER — LIDOCAINE HYDROCHLORIDE 10 MG/ML
INJECTION, SOLUTION INFILTRATION; PERINEURAL PRN
Status: DISCONTINUED | OUTPATIENT
Start: 2025-01-20 | End: 2025-01-20 | Stop reason: HOSPADM

## 2025-01-20 RX ORDER — MIDAZOLAM HYDROCHLORIDE 1 MG/ML
INJECTION, SOLUTION INTRAMUSCULAR; INTRAVENOUS PRN
Status: DISCONTINUED | OUTPATIENT
Start: 2025-01-20 | End: 2025-01-20 | Stop reason: HOSPADM

## 2025-01-20 RX ORDER — SODIUM CHLORIDE 9 MG/ML
INJECTION, SOLUTION INTRAVENOUS CONTINUOUS PRN
Status: COMPLETED | OUTPATIENT
Start: 2025-01-20 | End: 2025-01-20

## 2025-01-20 RX ORDER — HEPARIN SODIUM 1000 [USP'U]/ML
INJECTION, SOLUTION INTRAVENOUS; SUBCUTANEOUS PRN
Status: DISCONTINUED | OUTPATIENT
Start: 2025-01-20 | End: 2025-01-20 | Stop reason: HOSPADM

## 2025-01-20 RX ADMIN — MIDAZOLAM 1 MG: 1 INJECTION INTRAMUSCULAR; INTRAVENOUS at 10:22

## 2025-01-20 RX ADMIN — HEPARIN SODIUM 5800 UNITS: 1000 INJECTION, SOLUTION INTRAVENOUS; SUBCUTANEOUS at 10:37

## 2025-01-20 RX ADMIN — VERAPAMIL HYDROCHLORIDE 1.25 MG: 2.5 INJECTION INTRAVENOUS at 10:34

## 2025-01-20 RX ADMIN — HEPARIN SODIUM 2000 UNITS: 1000 INJECTION, SOLUTION INTRAVENOUS; SUBCUTANEOUS at 10:56

## 2025-01-20 RX ADMIN — HEPARIN SODIUM 2000 UNITS: 1000 INJECTION, SOLUTION INTRAVENOUS; SUBCUTANEOUS at 12:41

## 2025-01-20 RX ADMIN — MIDAZOLAM 1 MG: 1 INJECTION INTRAMUSCULAR; INTRAVENOUS at 12:25

## 2025-01-20 RX ADMIN — HEPARIN SODIUM 2000 UNITS: 1000 INJECTION, SOLUTION INTRAVENOUS; SUBCUTANEOUS at 11:23

## 2025-01-20 RX ADMIN — MIDAZOLAM 1 MG: 1 INJECTION INTRAMUSCULAR; INTRAVENOUS at 11:06

## 2025-01-20 RX ADMIN — MIDAZOLAM 1 MG: 1 INJECTION INTRAMUSCULAR; INTRAVENOUS at 12:10

## 2025-01-20 RX ADMIN — HEPARIN SODIUM 2000 UNITS: 10000 INJECTION, SOLUTION INTRAVENOUS; SUBCUTANEOUS at 11:10

## 2025-01-20 RX ADMIN — FENTANYL CITRATE 25 MCG: 50 INJECTION INTRAMUSCULAR; INTRAVENOUS at 11:38

## 2025-01-20 RX ADMIN — IOPAMIDOL 150 ML: 755 INJECTION, SOLUTION INTRAVENOUS at 13:02

## 2025-01-20 RX ADMIN — FENTANYL CITRATE 25 MCG: 50 INJECTION INTRAMUSCULAR; INTRAVENOUS at 12:10

## 2025-01-20 RX ADMIN — FENTANYL CITRATE 25 MCG: 50 INJECTION INTRAMUSCULAR; INTRAVENOUS at 10:22

## 2025-01-20 RX ADMIN — LIDOCAINE HYDROCHLORIDE 1 ML: 10 INJECTION, SOLUTION INFILTRATION; PERINEURAL at 10:27

## 2025-01-20 RX ADMIN — MIDAZOLAM 1 MG: 1 INJECTION INTRAMUSCULAR; INTRAVENOUS at 11:38

## 2025-01-20 RX ADMIN — FENTANYL CITRATE 25 MCG: 50 INJECTION INTRAMUSCULAR; INTRAVENOUS at 12:25

## 2025-01-20 RX ADMIN — SODIUM CHLORIDE 250 ML: 9 INJECTION, SOLUTION INTRAVENOUS at 10:29

## 2025-01-20 ASSESSMENT — PAIN - FUNCTIONAL ASSESSMENT: PAIN_FUNCTIONAL_ASSESSMENT: NONE - DENIES PAIN

## 2025-01-20 NOTE — H&P
11/16/22 1st [facility] Vascular visit. Comes with his sister Mikaela who is very up to date with his health care.. Referred by Dr Mccoy. PCP Dr Hodges. Most vascular care has been through [facility]. Eugene Bhatia.   No hx of stroke. Had RCEA. CTA showed 60% residual dissection flap. The LICA  Is heavily calcified and has 90%.  Hx of AAA. Not repaired.  Hx of CHF with most recent ef up to 40.   DM CKD 3 cr 1.38  Requip  Stopped smoking in June  Has not had procedures on legs.  Several yrs of leg problems. Uses a cane \"for stability\"  4/8/21 cailin r 0.82  l 0.92  Walks daily and his right calf \"starts screaming at me\", then left. Limits his activity. Pain goes away with resting. He limps with walking. Right anterior leg below knee hurts night.   Prominent veins, but they don't hurt with standing. No hx of bleeding. Legs swelled up with CHF exacerbation.   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  No right arm sx  UPDATE 12/7/22 Having pretty intolerable right leg and foot pain. Worse than on last visit. Takes a lot of tylenol with little benefit. 12/2/22 duplex showed critical right fem pop stenosis with trickle popliteal flow. LCFA questionable as access vessel. RCFA somewhat better. Carotid duplex FRAN 70-99 (260/50  2.83) LICA 50-69. L Vert antegrade R vert bidirectional.   Is still on eliquis  UPDATE 1/4/23 On 12/10/22 had left radial access for leg angio. REIA ZPTX placed for 95 % stenosis. Distal disease not reached and antegrade cfa approach with shockwave entertained. Still has severe right foot pain. Foot feels cold to him. Not sure about effect of position  UPDATE 1/18/23 On last visit he complained of severe pain, but had a loud right PT doppler signal. Cailin .49 pt, .24 dp. Pain he says \"comes and goes\". Can't clearly relate to elevation  Insurance kicks in Feb.  UPDATE 2/22/23 On 2/14/23 he had right cfa antegrade puncture to treat full length severe right fem pop calcific stenosis. Severe as 99%.

## 2025-01-20 NOTE — DISCHARGE INSTRUCTIONS
Discharge Instructions for Angiogram    A cardiac catheterization is a diagnostic test used to evaluate the health of the heart and its blood vessels. The test is done with a thin catheter carefully threaded into your heart from a leg or arm artery. Most likely, you will be allowed to go home the same day as the procedure.   Steps to Take at Home:   Pain- apply ice to site 15-20 minutes every hour for the first 2 days.   Showering is okay 24 hours after procedure.    No soaking in a pool, hot tub, bath tub, or standing water for one week.   Bleeding (outward or under the skin-hematoma)- apply firm pressure for 10-15 minutes or until the bleeding stops, then call your doctor. If unable to get bleeding stopped, call 911.    Kidney damage- Call if you urinate less than normal, have swelling or feel puffy, and/or gain 2 or more pounds over night in the first week.   If procedure was in ARM:  You were instructed to keep wrist straight and still for two hours after the procedure. The arm and hand may now be used for normal daily activities except, avoid using the heal of hand while getting up and down from furniture for the first few days.  Keep affected arm elevated, hand higher than elbow, while pressure dressing in place to decrease swelling.     1.  TR Band- After first 30 minutes, pressure will be released from    device every 15 until completely decompressed.  Inflation tube    may be cut prior to discharge but band left in place.   **Band can be removed after 4 hours:    TIME __________7:00pm_____________  *** If hand of procedure site becomes numb, tingly, and/or cold, seek medical help.  Wash area with soap and water daily while leaving it open to air, no bandaids or ointment.  Diet   Drink plenty of fluids after the test to flush the x-ray dye from your system.   Return to your normal diet.   No alcoholic beverages for 24 hours after the procedure.  Physical Activity   The sedative will make you sleepy. Rest

## 2025-01-20 NOTE — PROGRESS NOTES
Report called to [facility], spoke to Praveena. Aware pt will be discharged at approx 1500 today. Pt's sister here and will transport pt back to facility.

## 2025-01-20 NOTE — PROCEDURES
[address] [address]                         CARDIAC CATHETERIZATION      PATIENT NAME: NORMAN JUAN          : 1950  MED REC NO: [id number]                          ROOM: [facility]  ACCOUNT NO: [id number]                       ADMIT DATE: 2025  PROVIDER: Anil Pittman MD      DATE OF PROCEDURE: 2025    SURGEON:  Anil Pittman MD    FINAL IMPRESSION:  Successful recanalization of the left lower extremity in the setting of critical limb ischemia.    ESTIMATED BLOOD LOSS:  15 mL.    PROCEDURES:  Radial access under ultrasound, placement of a catheter into the abdominal aorta, bilateral lower extremity angiography, left common iliac artery shockwave angioplasty, left external iliac artery shockwave angioplasty, left internal iliac artery shockwave angioplasty, left superficial femoral artery angioplasty, left popliteal artery angioplasty.    METHOD:  Written informed consent was obtained.  The left radial artery was identified under ultrasound, and using ultrasound guidance and micropuncture technique, the vessel was entered and a vascular sheath was placed.  A 4-Guatemalan catheter was then introduced over a Schuster wire and this combination was advanced into the left common iliac artery.  Pressure was then measured at the common iliac artery and external iliac artery, a 20 mm pressure gradient was detected.  A therapeutic ACT was achieved with heparin.  At this point, decision was made to take additional detailed imaging of the left lower extremity.  Digital subtraction imaging was performed as far as the foot.  An 18 Advantage wire was then advanced into the lower extremity circulation and eventually was advanced into the tibial vessels.  This was followed by placement of a 3.0 mm balloon, which was advanced as far as the popliteal artery.  Angioplasty was performed throughout the popliteal artery and

## 2025-01-21 NOTE — FLOWSHEET NOTE
[facility] called [facility] pt [facility] a [facility].  RN spoke with Shakira SIMMS and relates no problems or questions about discharge instructions. \"Pt doing well.\"

## 2025-01-22 ENCOUNTER — HOSPITAL ENCOUNTER (OUTPATIENT)
Dept: CARDIAC REHAB | Age: 75
Setting detail: THERAPIES SERIES
Discharge: HOME | End: 2025-01-22
Payer: MEDICARE

## 2025-01-23 ENCOUNTER — HOSPITAL ENCOUNTER (OUTPATIENT)
Dept: CARDIAC REHAB | Age: 75
Setting detail: THERAPIES SERIES
Discharge: HOME | End: 2025-01-23
Payer: MEDICARE

## 2025-01-23 PROCEDURE — 93798 PHYS/QHP OP CAR RHAB W/ECG: CPT

## 2025-01-27 ENCOUNTER — HOSPITAL ENCOUNTER (OUTPATIENT)
Dept: CARDIAC REHAB | Age: 75
Setting detail: THERAPIES SERIES
Discharge: HOME | End: 2025-01-27
Payer: MEDICARE

## 2025-01-27 PROCEDURE — 93798 PHYS/QHP OP CAR RHAB W/ECG: CPT

## 2025-01-29 ENCOUNTER — HOSPITAL ENCOUNTER (OUTPATIENT)
Dept: CARDIAC REHAB | Age: 75
Setting detail: THERAPIES SERIES
Discharge: HOME | End: 2025-01-29
Payer: MEDICARE

## 2025-01-29 PROCEDURE — 93798 PHYS/QHP OP CAR RHAB W/ECG: CPT

## 2025-01-30 ENCOUNTER — HOSPITAL ENCOUNTER (OUTPATIENT)
Dept: CARDIAC REHAB | Age: 75
Setting detail: THERAPIES SERIES
Discharge: HOME | End: 2025-01-30
Payer: MEDICARE

## 2025-01-30 PROCEDURE — 93798 PHYS/QHP OP CAR RHAB W/ECG: CPT

## 2025-02-03 ENCOUNTER — APPOINTMENT (OUTPATIENT)
Dept: CARDIAC REHAB | Age: 75
End: 2025-02-03
Payer: MEDICARE

## 2025-02-05 ENCOUNTER — HOSPITAL ENCOUNTER (OUTPATIENT)
Dept: CARDIAC REHAB | Age: 75
Setting detail: THERAPIES SERIES
Discharge: HOME | End: 2025-02-05
Payer: MEDICARE

## 2025-02-05 ENCOUNTER — OFFICE VISIT (OUTPATIENT)
Dept: VASCULAR SURGERY | Age: 75
End: 2025-02-05
Payer: MEDICARE

## 2025-02-05 VITALS
TEMPERATURE: 97 F | HEART RATE: 86 BPM | RESPIRATION RATE: 24 BRPM | OXYGEN SATURATION: 95 % | WEIGHT: 177.4 LBS | BODY MASS INDEX: 27.84 KG/M2 | SYSTOLIC BLOOD PRESSURE: 66 MMHG | HEIGHT: 67 IN | DIASTOLIC BLOOD PRESSURE: 41 MMHG

## 2025-02-05 DIAGNOSIS — I73.9 PAD (PERIPHERAL ARTERY DISEASE) (HCC): Primary | ICD-10-CM

## 2025-02-05 DIAGNOSIS — I70.222 CRITICAL LIMB ISCHEMIA OF LEFT LOWER EXTREMITY (HCC): ICD-10-CM

## 2025-02-05 PROCEDURE — G8417 CALC BMI ABV UP PARAM F/U: HCPCS | Performed by: INTERNAL MEDICINE

## 2025-02-05 PROCEDURE — 93798 PHYS/QHP OP CAR RHAB W/ECG: CPT

## 2025-02-05 PROCEDURE — 1159F MED LIST DOCD IN RCRD: CPT | Performed by: INTERNAL MEDICINE

## 2025-02-05 PROCEDURE — 3017F COLORECTAL CA SCREEN DOC REV: CPT | Performed by: INTERNAL MEDICINE

## 2025-02-05 PROCEDURE — 99214 OFFICE O/P EST MOD 30 MIN: CPT | Performed by: INTERNAL MEDICINE

## 2025-02-05 PROCEDURE — 1125F AMNT PAIN NOTED PAIN PRSNT: CPT | Performed by: INTERNAL MEDICINE

## 2025-02-05 PROCEDURE — 1123F ACP DISCUSS/DSCN MKR DOCD: CPT | Performed by: INTERNAL MEDICINE

## 2025-02-05 PROCEDURE — G8427 DOCREV CUR MEDS BY ELIG CLIN: HCPCS | Performed by: INTERNAL MEDICINE

## 2025-02-05 PROCEDURE — 3078F DIAST BP <80 MM HG: CPT | Performed by: INTERNAL MEDICINE

## 2025-02-05 PROCEDURE — 1036F TOBACCO NON-USER: CPT | Performed by: INTERNAL MEDICINE

## 2025-02-05 PROCEDURE — 3074F SYST BP LT 130 MM HG: CPT | Performed by: INTERNAL MEDICINE

## 2025-02-05 NOTE — PATIENT INSTRUCTIONS
SURVEY:    Thank you for allowing us to care for you today.    You may be receiving a survey from [facility] regarding your visit today- electronically or via mail.      Please help us by completing the survey as this will provide the needed feedback to ensure we are providing the very best care for you and your family.    If you cannot score us a very good on any question, please call the office to discuss how we could have made your experience a very good one.    Thank you.       STAFF:    Farida Barrios, Yamilet BOO      CLINICAL STAFF:    Yesica RAE, Kailey BOO, Julisa BOO, Mable RAE

## 2025-02-05 NOTE — PROGRESS NOTES
Michael Chan was seen on 2/5/2025 for   Chief Complaint   Patient presents with    Post-Op Check     Patient reports anterior lower leg pain, states \"it went away for a few days but came back.\"                               REVIEW OF SYSTEMS    Constitutional Weight: absent, A, Fatigue: absent Fever: absent   HEENT Ears: normal,Visual disturbance: absent Sore throat: absent,    Respiratory Shortness of breath: present, Cough: absent;, Snoring: absent   Cardivascular Chest pain: absent,  Leg pain: present,Leg swelling:absent, Non-healing wound:absent    GI Diarrhea: absent, Abdominal Pain: absent    Urinary frequency: absent, Urinary incontinence: absent   Musculoskeletal Neck pain: absent, Back pain: absent, Restless Leg:absent     Dermatological Hair loss: absent, Skin changes: absent   Neurological  Sudden Loss of Vision in one eye:absent, Slurred Speech:absent, Weakness on one side of body: absent,Headache: absent   Psychiatric Anxiety: absent, Depression: absent   Hematologic Abnormal bleeding: absent,

## 2025-02-05 NOTE — PROGRESS NOTES
Michael Chan was seen on 2/5/2025 for   Chief Complaint   Patient presents with    Post-Op Check     Patient reports anterior lower leg pain, states \"it went away for a few days but came back.\"   .  11/16/22 1st [facility] visit. Comes with his sister Mikaela who is very up to date with his health care.. Referred by Dr Mccoy. PCP Dr Hodges. Most vascular care has been through [facility]. Eugene Bhatia.   No hx of stroke. Had RCEA. CTA showed 60% residual dissection flap. The LICA  Is heavily calcified and has 90%.  Hx of AAA. Not repaired.  Hx of CHF with most recent ef up to 40.   DM CKD 3 cr 1.38  Requip  Stopped smoking in June  Has not had procedures on legs.  Several yrs of leg problems. Uses a cane \"for stability\"  4/8/21 cailin r 0.82  l 0.92  Walks daily and his right calf \"starts screaming at me\", then left. Limits his activity. Pain goes away with resting. He limps with walking. Right anterior leg below knee hurts night.   Prominent veins, but they don't hurt with standing. No hx of bleeding. Legs swelled up with CHF exacerbation.   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  No right arm sx  UPDATE 12/7/22 Having pretty intolerable right leg and foot pain. Worse than on last visit. Takes a lot of tylenol with little benefit. 12/2/22 duplex showed critical right fem pop stenosis with trickle popliteal flow. LCFA questionable as access vessel. RCFA somewhat better. Carotid duplex FRAN 70-99 (260/50  2.83) LICA 50-69. L Vert antegrade R vert bidirectional.   Is still on eliquis  UPDATE 1/4/23 On 12/10/22 had left radial access for leg angio. REIA ZPTX placed for 95 % stenosis. Distal disease not reached and antegrade cfa approach with shockwave entertained. Still has severe right foot pain. Foot feels cold to him. Not sure about effect of position  UPDATE 1/18/23 On last visit he complained of severe pain, but had a loud right PT doppler signal. Cailin .49 pt, .24 dp. Pain he says

## 2025-02-06 ENCOUNTER — HOSPITAL ENCOUNTER (OUTPATIENT)
Dept: CARDIAC REHAB | Age: 75
Setting detail: THERAPIES SERIES
Discharge: HOME | End: 2025-02-06
Payer: MEDICARE

## 2025-02-06 PROCEDURE — 93798 PHYS/QHP OP CAR RHAB W/ECG: CPT

## 2025-02-10 ENCOUNTER — HOSPITAL ENCOUNTER (OUTPATIENT)
Dept: CARDIAC REHAB | Age: 75
Setting detail: THERAPIES SERIES
Discharge: HOME | End: 2025-02-10
Payer: MEDICARE

## 2025-02-10 PROCEDURE — 93798 PHYS/QHP OP CAR RHAB W/ECG: CPT

## 2025-02-11 ENCOUNTER — OFFICE VISIT (OUTPATIENT)
Dept: CARDIOLOGY | Age: 75
End: 2025-02-11
Payer: MEDICARE

## 2025-02-11 VITALS
RESPIRATION RATE: 18 BRPM | DIASTOLIC BLOOD PRESSURE: 53 MMHG | SYSTOLIC BLOOD PRESSURE: 84 MMHG | HEIGHT: 67 IN | HEART RATE: 108 BPM | BODY MASS INDEX: 27.81 KG/M2 | WEIGHT: 177.2 LBS | OXYGEN SATURATION: 96 %

## 2025-02-11 DIAGNOSIS — I48.0 PAROXYSMAL ATRIAL FIBRILLATION (HCC): Primary | ICD-10-CM

## 2025-02-11 DIAGNOSIS — R06.02 SHORTNESS OF BREATH: ICD-10-CM

## 2025-02-11 DIAGNOSIS — I73.9 PAD (PERIPHERAL ARTERY DISEASE) (HCC): ICD-10-CM

## 2025-02-11 DIAGNOSIS — Z79.899 ON AMIODARONE THERAPY: ICD-10-CM

## 2025-02-11 DIAGNOSIS — I25.5 ISCHEMIC CARDIOMYOPATHY: ICD-10-CM

## 2025-02-11 DIAGNOSIS — E78.2 MIXED HYPERLIPIDEMIA: ICD-10-CM

## 2025-02-11 DIAGNOSIS — I42.9 CARDIOMYOPATHY, UNSPECIFIED TYPE (HCC): ICD-10-CM

## 2025-02-11 DIAGNOSIS — Z79.01 ENCOUNTER FOR CURRENT LONG-TERM USE OF ANTICOAGULANTS: ICD-10-CM

## 2025-02-11 DIAGNOSIS — I25.10 ASHD (ARTERIOSCLEROTIC HEART DISEASE): ICD-10-CM

## 2025-02-11 PROCEDURE — 3078F DIAST BP <80 MM HG: CPT | Performed by: PHYSICIAN ASSISTANT

## 2025-02-11 PROCEDURE — G8417 CALC BMI ABV UP PARAM F/U: HCPCS | Performed by: PHYSICIAN ASSISTANT

## 2025-02-11 PROCEDURE — 93005 ELECTROCARDIOGRAM TRACING: CPT | Performed by: FAMILY MEDICINE

## 2025-02-11 PROCEDURE — 1123F ACP DISCUSS/DSCN MKR DOCD: CPT | Performed by: PHYSICIAN ASSISTANT

## 2025-02-11 PROCEDURE — 1036F TOBACCO NON-USER: CPT | Performed by: PHYSICIAN ASSISTANT

## 2025-02-11 PROCEDURE — 1159F MED LIST DOCD IN RCRD: CPT | Performed by: PHYSICIAN ASSISTANT

## 2025-02-11 PROCEDURE — 3074F SYST BP LT 130 MM HG: CPT | Performed by: PHYSICIAN ASSISTANT

## 2025-02-11 PROCEDURE — G8427 DOCREV CUR MEDS BY ELIG CLIN: HCPCS | Performed by: PHYSICIAN ASSISTANT

## 2025-02-11 PROCEDURE — 99211 OFF/OP EST MAY X REQ PHY/QHP: CPT | Performed by: PHYSICIAN ASSISTANT

## 2025-02-11 PROCEDURE — 93005 ELECTROCARDIOGRAM TRACING: CPT | Performed by: PHYSICIAN ASSISTANT

## 2025-02-11 PROCEDURE — 99214 OFFICE O/P EST MOD 30 MIN: CPT | Performed by: PHYSICIAN ASSISTANT

## 2025-02-11 PROCEDURE — 3017F COLORECTAL CA SCREEN DOC REV: CPT | Performed by: PHYSICIAN ASSISTANT

## 2025-02-11 NOTE — PROGRESS NOTES
present in his right hand. He has no known diagnosis of Parkinson's disease. The tremors appear to improve when he reaches for or holds onto objects.    He had surgery on his leg on 01/20/2025. He is recovering well from it. He has been having problem with the big toe. He did some clipping and it turned black because of the lack of blood in his leg until Dr. Powell opened that up. They want to get the outer blister and go inside and see what the blood is. He feels like his legs feel better since having that surgery with Dr. Powell. He just saw him the other day. He is going to get another Doppler.    MEDICATIONS  Current: Losartan, Toprol-XL, midodrine, amiodarone, Eliquis, Lipitor, Plavix, digoxin, Farxiga.      Wt Readings from Last 3 Encounters:   02/11/25 80.4 kg (177 lb 3.2 oz)   02/05/25 80.5 kg (177 lb 6.4 oz)   01/16/25 82.1 kg (181 lb)     Past Medical History:   Diagnosis Date    Acute kidney injury (HCC)     Ascites 5/20/2022    Atrial fibrillation (HCC)     CHF (congestive heart failure) (HCC)     Cirrhosis of liver with ascites (HCC) 5/19/2022    CKD (chronic kidney disease) 5/20/2022    Coronary artery disease     Hyperlipidemia     Hypertension     Hypomagnesemia 5/20/2022    Hyponatremia     Thrombocytopenia (HCC)      CURRENT ALLERGIES: Patient has no known allergies. REVIEW OF SYSTEMS: 14 systems were reviewed. Pertinent positives and negatives as above, all else negative.     Past Surgical History:   Procedure Laterality Date    CARDIAC CATHETERIZATION Left 09/19/2024    DR Mccoy/[facility]/right radial    CARDIAC PROCEDURE N/A 9/19/2024    Left heart cath / coronary angiography performed by Tejas Mccoy MD at [facility] CARDIAC CATH/IR LAB    CARDIAC PROCEDURE N/A 10/1/2024    dariel / Percutaneous coronary intervention performed by Heidy Hearn MD at [facility] CARDIAC CATH LAB    CARDIAC PROCEDURE N/A 10/1/2024    Insert stent pierre coronary performed by Heidy Hearn MD at [facility] CARDIAC

## 2025-02-13 ENCOUNTER — APPOINTMENT (OUTPATIENT)
Dept: CARDIAC REHAB | Age: 75
End: 2025-02-13
Payer: MEDICARE

## 2025-02-17 ENCOUNTER — HOSPITAL ENCOUNTER (OUTPATIENT)
Dept: CARDIAC REHAB | Age: 75
Setting detail: THERAPIES SERIES
Discharge: HOME | End: 2025-02-17
Payer: MEDICARE

## 2025-02-17 PROCEDURE — 93798 PHYS/QHP OP CAR RHAB W/ECG: CPT

## 2025-02-19 ENCOUNTER — HOSPITAL ENCOUNTER (OUTPATIENT)
Dept: CARDIAC REHAB | Age: 75
Setting detail: THERAPIES SERIES
Discharge: HOME | End: 2025-02-19
Payer: MEDICARE

## 2025-02-19 PROCEDURE — 93798 PHYS/QHP OP CAR RHAB W/ECG: CPT

## 2025-02-20 ENCOUNTER — HOSPITAL ENCOUNTER (OUTPATIENT)
Dept: VASCULAR LAB | Age: 75
Discharge: HOME | End: 2025-02-22
Attending: INTERNAL MEDICINE
Payer: MEDICARE

## 2025-02-20 ENCOUNTER — HOSPITAL ENCOUNTER (OUTPATIENT)
Dept: CARDIAC REHAB | Age: 75
Setting detail: THERAPIES SERIES
Discharge: HOME | End: 2025-02-20
Payer: MEDICARE

## 2025-02-20 DIAGNOSIS — I70.222 CRITICAL LIMB ISCHEMIA OF LEFT LOWER EXTREMITY (HCC): ICD-10-CM

## 2025-02-20 PROCEDURE — 93926 LOWER EXTREMITY STUDY: CPT

## 2025-02-20 PROCEDURE — 93798 PHYS/QHP OP CAR RHAB W/ECG: CPT

## 2025-02-21 LAB
VAS LEFT ATA MID PSV: 21.3 CM/S
VAS LEFT CFA DIST PSV: 226.4 CM/S
VAS LEFT PERONEAL MID PSV: 21.9 CM/S
VAS LEFT PFA PROX PSV: 176.2 CM/S
VAS LEFT POP A DIST PSV: 41.1 CM/S
VAS LEFT POP A PROX PSV: 88.7 CM/S
VAS LEFT POP A PROX VEL RATIO: 1.05
VAS LEFT PTA MID PSV: 29.5 CM/S
VAS LEFT SFA DIST PSV: 84.5 CM/S
VAS LEFT SFA DIST VEL RATIO: 0.28
VAS LEFT SFA MID PSV: 304 CM/S
VAS LEFT SFA MID VEL RATIO: 2.79
VAS LEFT SFA PROX PSV: 108.9 CM/S
VAS LEFT SFA PROX VEL RATIO: 0.48

## 2025-02-24 ENCOUNTER — HOSPITAL ENCOUNTER (OUTPATIENT)
Age: 75
Discharge: HOME | End: 2025-02-26
Payer: MEDICARE

## 2025-02-24 ENCOUNTER — HOSPITAL ENCOUNTER (OUTPATIENT)
Dept: CARDIAC REHAB | Age: 75
Setting detail: THERAPIES SERIES
Discharge: HOME | End: 2025-02-24
Payer: MEDICARE

## 2025-02-24 VITALS
WEIGHT: 177.25 LBS | BODY MASS INDEX: 27.82 KG/M2 | HEIGHT: 67 IN | SYSTOLIC BLOOD PRESSURE: 84 MMHG | DIASTOLIC BLOOD PRESSURE: 53 MMHG

## 2025-02-24 DIAGNOSIS — I42.9 CARDIOMYOPATHY, UNSPECIFIED TYPE (HCC): ICD-10-CM

## 2025-02-24 DIAGNOSIS — R06.02 SHORTNESS OF BREATH: ICD-10-CM

## 2025-02-24 DIAGNOSIS — I48.0 PAROXYSMAL ATRIAL FIBRILLATION (HCC): ICD-10-CM

## 2025-02-24 DIAGNOSIS — I25.10 ASHD (ARTERIOSCLEROTIC HEART DISEASE): ICD-10-CM

## 2025-02-24 DIAGNOSIS — I25.5 ISCHEMIC CARDIOMYOPATHY: ICD-10-CM

## 2025-02-24 LAB
ECHO AO ASC DIAM: 3 CM
ECHO AO ASCENDING AORTA INDEX: 1.56 CM/M2
ECHO AO ROOT DIAM: 2.2 CM
ECHO AO ROOT INDEX: 1.15 CM/M2
ECHO AO SINUS VALSALVA DIAM: 3 CM
ECHO AO SINUS VALSALVA INDEX: 1.56 CM/M2
ECHO AO ST JNCT DIAM: 2.1 CM
ECHO AV CUSP MM: 1.8 CM
ECHO BSA: 1.95 M2
ECHO LA AREA 2C: 26.4 CM2
ECHO LA AREA 4C: 25.4 CM2
ECHO LA MAJOR AXIS: 6.6 CM
ECHO LA MINOR AXIS: 5.9 CM
ECHO LA VOL BP: 90 ML (ref 18–58)
ECHO LA VOL MOD A2C: 95 ML (ref 18–58)
ECHO LA VOL MOD A4C: 77 ML (ref 18–58)
ECHO LA VOL/BSA BIPLANE: 47 ML/M2 (ref 16–34)
ECHO LA VOLUME INDEX MOD A2C: 49 ML/M2 (ref 16–34)
ECHO LA VOLUME INDEX MOD A4C: 40 ML/M2 (ref 16–34)
ECHO LV EDV A2C: 103 ML
ECHO LV EDV A4C: 84 ML
ECHO LV EDV INDEX A4C: 44 ML/M2
ECHO LV EDV NDEX A2C: 54 ML/M2
ECHO LV EF PHYSICIAN: 50 %
ECHO LV EJECTION FRACTION A2C: 50 %
ECHO LV EJECTION FRACTION A4C: 52 %
ECHO LV EJECTION FRACTION BIPLANE: 51 % (ref 55–100)
ECHO LV ESV A2C: 52 ML
ECHO LV ESV A4C: 40 ML
ECHO LV ESV INDEX A2C: 27 ML/M2
ECHO LV ESV INDEX A4C: 21 ML/M2
ECHO LV FRACTIONAL SHORTENING: 26 % (ref 28–44)
ECHO LV INTERNAL DIMENSION DIASTOLE INDEX: 2.6 CM/M2
ECHO LV INTERNAL DIMENSION DIASTOLIC: 5 CM (ref 4.2–5.9)
ECHO LV INTERNAL DIMENSION SYSTOLIC INDEX: 1.93 CM/M2
ECHO LV INTERNAL DIMENSION SYSTOLIC: 3.7 CM
ECHO LV IVSD: 1.1 CM (ref 0.6–1)
ECHO LV MASS 2D: 207.1 G (ref 88–224)
ECHO LV MASS INDEX 2D: 107.9 G/M2 (ref 49–115)
ECHO LV POSTERIOR WALL DIASTOLIC: 1.1 CM (ref 0.6–1)
ECHO LV RELATIVE WALL THICKNESS RATIO: 0.44

## 2025-02-24 PROCEDURE — 93798 PHYS/QHP OP CAR RHAB W/ECG: CPT

## 2025-02-24 PROCEDURE — 93243 EXT ECG>48HR<7D SCAN A/R: CPT

## 2025-02-24 PROCEDURE — 93308 TTE F-UP OR LMTD: CPT

## 2025-02-25 ENCOUNTER — TELEPHONE (OUTPATIENT)
Dept: CARDIOLOGY | Age: 75
End: 2025-02-25

## 2025-02-25 NOTE — RESULT ENCOUNTER NOTE
Please let patient know. Echo results were ok. Heart strength improved to 50%. Will discuss at next visit. Continue current treatment and plan.

## 2025-02-25 NOTE — TELEPHONE ENCOUNTER
----- Message from SEVEN Gtz CNP sent at 2/25/2025 10:55 AM EST -----  Please let patient know. Echo results were ok. Heart strength improved to 50%. Will discuss at next visit. Continue current treatment and plan.

## 2025-02-26 ENCOUNTER — HOSPITAL ENCOUNTER (OUTPATIENT)
Dept: CARDIAC REHAB | Age: 75
Setting detail: THERAPIES SERIES
Discharge: HOME | End: 2025-02-26
Payer: MEDICARE

## 2025-02-26 PROCEDURE — 93798 PHYS/QHP OP CAR RHAB W/ECG: CPT

## 2025-02-27 ENCOUNTER — HOSPITAL ENCOUNTER (OUTPATIENT)
Dept: CARDIAC REHAB | Age: 75
Setting detail: THERAPIES SERIES
Discharge: HOME | End: 2025-02-27
Payer: MEDICARE

## 2025-02-27 PROCEDURE — 93798 PHYS/QHP OP CAR RHAB W/ECG: CPT

## 2025-03-03 ENCOUNTER — HOSPITAL ENCOUNTER (OUTPATIENT)
Dept: CARDIAC REHAB | Age: 75
Setting detail: THERAPIES SERIES
Discharge: HOME | End: 2025-03-03
Payer: MEDICARE

## 2025-03-03 PROCEDURE — 93798 PHYS/QHP OP CAR RHAB W/ECG: CPT

## 2025-03-05 ENCOUNTER — HOSPITAL ENCOUNTER (OUTPATIENT)
Dept: CARDIAC REHAB | Age: 75
Setting detail: THERAPIES SERIES
Discharge: HOME | End: 2025-03-05
Payer: MEDICARE

## 2025-03-05 PROCEDURE — 93798 PHYS/QHP OP CAR RHAB W/ECG: CPT

## 2025-03-06 ENCOUNTER — APPOINTMENT (OUTPATIENT)
Dept: CARDIAC REHAB | Age: 75
End: 2025-03-06
Payer: MEDICARE

## 2025-03-10 ENCOUNTER — APPOINTMENT (OUTPATIENT)
Dept: CARDIAC REHAB | Age: 75
End: 2025-03-10
Payer: MEDICARE

## 2025-03-10 ENCOUNTER — RESULTS FOLLOW-UP (OUTPATIENT)
Age: 75
End: 2025-03-10

## 2025-03-12 ENCOUNTER — APPOINTMENT (OUTPATIENT)
Dept: CARDIAC REHAB | Age: 75
End: 2025-03-12
Payer: MEDICARE

## 2025-03-19 ENCOUNTER — OFFICE VISIT (OUTPATIENT)
Dept: VASCULAR SURGERY | Age: 75
End: 2025-03-19
Payer: MEDICARE

## 2025-03-19 VITALS
SYSTOLIC BLOOD PRESSURE: 108 MMHG | HEART RATE: 64 BPM | RESPIRATION RATE: 18 BRPM | TEMPERATURE: 96.4 F | DIASTOLIC BLOOD PRESSURE: 53 MMHG | HEIGHT: 67 IN | BODY MASS INDEX: 27.31 KG/M2 | WEIGHT: 174 LBS

## 2025-03-19 DIAGNOSIS — I73.9 PAD (PERIPHERAL ARTERY DISEASE): Primary | ICD-10-CM

## 2025-03-19 DIAGNOSIS — I65.23 BILATERAL CAROTID ARTERY STENOSIS: ICD-10-CM

## 2025-03-19 PROCEDURE — 1036F TOBACCO NON-USER: CPT | Performed by: INTERNAL MEDICINE

## 2025-03-19 PROCEDURE — G8427 DOCREV CUR MEDS BY ELIG CLIN: HCPCS | Performed by: INTERNAL MEDICINE

## 2025-03-19 PROCEDURE — 1126F AMNT PAIN NOTED NONE PRSNT: CPT | Performed by: INTERNAL MEDICINE

## 2025-03-19 PROCEDURE — 1123F ACP DISCUSS/DSCN MKR DOCD: CPT | Performed by: INTERNAL MEDICINE

## 2025-03-19 PROCEDURE — 3017F COLORECTAL CA SCREEN DOC REV: CPT | Performed by: INTERNAL MEDICINE

## 2025-03-19 PROCEDURE — 99214 OFFICE O/P EST MOD 30 MIN: CPT | Performed by: INTERNAL MEDICINE

## 2025-03-19 PROCEDURE — G8417 CALC BMI ABV UP PARAM F/U: HCPCS | Performed by: INTERNAL MEDICINE

## 2025-03-19 PROCEDURE — 3078F DIAST BP <80 MM HG: CPT | Performed by: INTERNAL MEDICINE

## 2025-03-19 PROCEDURE — 3074F SYST BP LT 130 MM HG: CPT | Performed by: INTERNAL MEDICINE

## 2025-03-19 PROCEDURE — 1159F MED LIST DOCD IN RCRD: CPT | Performed by: INTERNAL MEDICINE

## 2025-03-19 NOTE — PROGRESS NOTES
Michael Chan was seen on 3/19/2025 for   Chief Complaint   Patient presents with    Peripheral artery disease     6 week follow up. Completed arterial duplex 2/20/25. Pt denies any leg pain or swelling.                               REVIEW OF SYSTEMS    Constitutional Weight: absent, A, Fatigue: absent Fever: absent   HEENT Ears: normal,Visual disturbance: absent Sore throat: absent,    Respiratory Shortness of breath: present, Cough: absent;, Snoring: absent   Cardivascular Chest pain: absent,  Leg pain: absent,Leg swelling:absent, Non-healing wound:absent    GI Diarrhea: absent, Abdominal Pain: absent    Urinary frequency: absent, Urinary incontinence: absent   Musculoskeletal Neck pain: absent, Back pain: absent, Restless Leg:absent     Dermatological Hair loss: absent, Skin changes: absent   Neurological  Sudden Loss of Vision in one eye:absent, Slurred Speech:absent, Weakness on one side of body: absent,Headache: absent   Psychiatric Anxiety: absent, Depression: absent   Hematologic Abnormal bleeding: absent,

## 2025-03-19 NOTE — PROGRESS NOTES
Michael Chan was seen on 3/19/2025 for   Chief Complaint   Patient presents with    Peripheral artery disease     6 week follow up. Completed arterial duplex 2/20/25. Pt denies any leg pain or swelling.   .  11/16/22 1st [facility] visit. Comes with his sister Mikaela who is very up to date with his health care.. Referred by Dr Mccoy. PCP Dr Hodges. Most vascular care has been through [facility]. Eugene Bhatia.   No hx of stroke. Had RCEA. CTA showed 60% residual dissection flap. The LICA  Is heavily calcified and has 90%.  Hx of AAA. Not repaired.  Hx of CHF with most recent ef up to 40.   DM CKD 3 cr 1.38  Requip  Stopped smoking in June  Has not had procedures on legs.  Several yrs of leg problems. Uses a cane \"for stability\"  4/8/21 cailin r 0.82  l 0.92  Walks daily and his right calf \"starts screaming at me\", then left. Limits his activity. Pain goes away with resting. He limps with walking. Right anterior leg below knee hurts night.   Prominent veins, but they don't hurt with standing. No hx of bleeding. Legs swelled up with CHF exacerbation.   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  No right arm sx  UPDATE 12/7/22 Having pretty intolerable right leg and foot pain. Worse than on last visit. Takes a lot of tylenol with little benefit. 12/2/22 duplex showed critical right fem pop stenosis with trickle popliteal flow. LCFA questionable as access vessel. RCFA somewhat better. Carotid duplex FRAN 70-99 (260/50  2.83) LICA 50-69. L Vert antegrade R vert bidirectional.   Is still on eliquis  UPDATE 1/4/23 On 12/10/22 had left radial access for leg angio. REIA ZPTX placed for 95 % stenosis. Distal disease not reached and antegrade cfa approach with shockwave entertained. Still has severe right foot pain. Foot feels cold to him. Not sure about effect of position  UPDATE 1/18/23 On last visit he complained of severe pain, but had a loud right PT doppler signal. Cailin .49 pt, .24 dp. Pain he

## 2025-03-19 NOTE — PATIENT INSTRUCTIONS
SURVEY:    Thank you for allowing us to care for you today.    You may be receiving a survey from [facility] regarding your visit today- electronically or via mail.      Please help us by completing the survey as this will provide the needed feedback to ensure we are providing the very best care for you and your family.    If you cannot score us a very good on any question, please call the office to discuss how we could have made your experience a very good one.    Thank you.       STAFF:    Farida Barrios, Yamilet BOO      CLINICAL STAFF:    Yesica RAE, Kailey BOO, Julisa BOO, Mable RAE

## 2025-03-20 ENCOUNTER — TELEPHONE (OUTPATIENT)
Dept: VASCULAR SURGERY | Age: 75
End: 2025-03-20

## 2025-03-20 ENCOUNTER — TELEPHONE (OUTPATIENT)
Dept: CARDIOLOGY | Age: 75
End: 2025-03-20

## 2025-03-20 ENCOUNTER — OFFICE VISIT (OUTPATIENT)
Dept: CARDIOLOGY | Age: 75
End: 2025-03-20
Payer: MEDICARE

## 2025-03-20 VITALS
DIASTOLIC BLOOD PRESSURE: 64 MMHG | HEART RATE: 80 BPM | RESPIRATION RATE: 18 BRPM | OXYGEN SATURATION: 96 % | BODY MASS INDEX: 27.69 KG/M2 | HEIGHT: 67 IN | WEIGHT: 176.4 LBS | SYSTOLIC BLOOD PRESSURE: 121 MMHG

## 2025-03-20 DIAGNOSIS — Z79.01 ENCOUNTER FOR CURRENT LONG-TERM USE OF ANTICOAGULANTS: ICD-10-CM

## 2025-03-20 DIAGNOSIS — I73.9 PAD (PERIPHERAL ARTERY DISEASE): ICD-10-CM

## 2025-03-20 DIAGNOSIS — Z79.899 ON AMIODARONE THERAPY: ICD-10-CM

## 2025-03-20 DIAGNOSIS — I25.5 ISCHEMIC CARDIOMYOPATHY: ICD-10-CM

## 2025-03-20 DIAGNOSIS — I48.0 PAROXYSMAL ATRIAL FIBRILLATION (HCC): Primary | ICD-10-CM

## 2025-03-20 PROCEDURE — 1036F TOBACCO NON-USER: CPT | Performed by: SPECIALIST

## 2025-03-20 PROCEDURE — 99211 OFF/OP EST MAY X REQ PHY/QHP: CPT | Performed by: SPECIALIST

## 2025-03-20 PROCEDURE — 1160F RVW MEDS BY RX/DR IN RCRD: CPT | Performed by: SPECIALIST

## 2025-03-20 PROCEDURE — 1159F MED LIST DOCD IN RCRD: CPT | Performed by: SPECIALIST

## 2025-03-20 PROCEDURE — G8417 CALC BMI ABV UP PARAM F/U: HCPCS | Performed by: SPECIALIST

## 2025-03-20 PROCEDURE — 3078F DIAST BP <80 MM HG: CPT | Performed by: SPECIALIST

## 2025-03-20 PROCEDURE — 3017F COLORECTAL CA SCREEN DOC REV: CPT | Performed by: SPECIALIST

## 2025-03-20 PROCEDURE — 99215 OFFICE O/P EST HI 40 MIN: CPT | Performed by: SPECIALIST

## 2025-03-20 PROCEDURE — 1123F ACP DISCUSS/DSCN MKR DOCD: CPT | Performed by: SPECIALIST

## 2025-03-20 PROCEDURE — 3074F SYST BP LT 130 MM HG: CPT | Performed by: SPECIALIST

## 2025-03-20 PROCEDURE — G8427 DOCREV CUR MEDS BY ELIG CLIN: HCPCS | Performed by: SPECIALIST

## 2025-03-20 NOTE — PROGRESS NOTES
Physical Exam  Within acceptable range of normality.  ASSESSMENT/PLAN     Michael was seen today for atrial fibrillation.    Diagnoses and all orders for this visit:    Paroxysmal atrial fibrillation (HCC)    Ischemic cardiomyopathy    On amiodarone therapy    Encounter for current long-term use of anticoagulants    PAD (peripheral artery disease)    Plans for pulmonary vein isolation procedure.  Questions were answered and we will proceed from there.  Will arrange for the appointment after the procedure is done    Return if they want to proceed with ablation for a fib.    COMMUNICATION:       Electronically signed by Parvez Sims MD on 3/20/2025 at 10:50 AM

## 2025-03-24 DIAGNOSIS — I48.0 PAF (PAROXYSMAL ATRIAL FIBRILLATION) (HCC): Primary | Chronic | ICD-10-CM

## 2025-03-24 DIAGNOSIS — Z01.812 ENCOUNTER FOR PRE-OPERATIVE LABORATORY TESTING: ICD-10-CM

## 2025-05-09 ENCOUNTER — HOSPITAL ENCOUNTER (INPATIENT)
Age: 75
LOS: 1 days | Discharge: HOME | DRG: 274 | End: 2025-05-10
Attending: SPECIALIST | Admitting: SPECIALIST
Payer: MEDICARE

## 2025-05-09 ENCOUNTER — ANESTHESIA (OUTPATIENT)
Age: 75
End: 2025-05-09
Payer: MEDICARE

## 2025-05-09 ENCOUNTER — ANESTHESIA EVENT (OUTPATIENT)
Age: 75
End: 2025-05-09
Payer: MEDICARE

## 2025-05-09 DIAGNOSIS — I48.91 ATRIAL FIBRILLATION, UNSPECIFIED TYPE (HCC): ICD-10-CM

## 2025-05-09 PROBLEM — Z98.890 STATUS POST ABLATION OPERATION FOR ARRHYTHMIA: Status: ACTIVE | Noted: 2025-05-09

## 2025-05-09 PROBLEM — Z86.79 STATUS POST ABLATION OPERATION FOR ARRHYTHMIA: Status: ACTIVE | Noted: 2025-05-09

## 2025-05-09 LAB
ACT BLD: 166 SEC (ref 79–149)
ACT BLD: 337 SEC (ref 79–149)
ACT BLD: 377 SEC (ref 79–149)
BUN BLD-MCNC: 26 MG/DL (ref 8–26)
CHLORIDE BLD-SCNC: 105 MMOL/L (ref 98–107)
ECHO BSA: 1.96 M2
EGFR, POC: 58 ML/MIN/1.73M2
ERYTHROCYTE [DISTWIDTH] IN BLOOD BY AUTOMATED COUNT: 14.7 % (ref 11.8–14.4)
GLUCOSE BLD-MCNC: 132 MG/DL (ref 75–110)
GLUCOSE BLD-MCNC: 88 MG/DL (ref 74–100)
HCT VFR BLD AUTO: 45.5 % (ref 40.7–50.3)
HCT VFR BLD AUTO: 49 % (ref 41–53)
HGB BLD-MCNC: 15.5 G/DL (ref 13–17)
MCH RBC QN AUTO: 32.2 PG (ref 25.2–33.5)
MCHC RBC AUTO-ENTMCNC: 34.1 G/DL (ref 28.4–34.8)
MCV RBC AUTO: 94.4 FL (ref 82.6–102.9)
NRBC BLD-RTO: 0 PER 100 WBC
PLATELET # BLD AUTO: ABNORMAL K/UL (ref 138–453)
PLATELET, FLUORESCENCE: 112 K/UL (ref 138–453)
PLATELETS.RETICULATED NFR BLD AUTO: 5.7 % (ref 1.1–10.3)
POC CREATININE: 1.3 MG/DL (ref 0.51–1.19)
POC HEMOGLOBIN (CALC): 16.7 G/DL (ref 13.5–17.5)
POTASSIUM BLD-SCNC: 4.5 MMOL/L (ref 3.5–4.5)
RBC # BLD AUTO: 4.82 M/UL (ref 4.21–5.77)
SODIUM BLD-SCNC: 144 MMOL/L (ref 138–146)
WBC OTHER # BLD: 5.5 K/UL (ref 3.5–11.3)

## 2025-05-09 PROCEDURE — 85055 RETICULATED PLATELET ASSAY: CPT

## 2025-05-09 PROCEDURE — 85014 HEMATOCRIT: CPT

## 2025-05-09 PROCEDURE — 02K83ZZ MAP CONDUCTION MECHANISM, PERCUTANEOUS APPROACH: ICD-10-PCS | Performed by: SPECIALIST

## 2025-05-09 PROCEDURE — 99221 1ST HOSP IP/OBS SF/LOW 40: CPT | Performed by: SPECIALIST

## 2025-05-09 PROCEDURE — C1760 CLOSURE DEV, VASC: HCPCS | Performed by: SPECIALIST

## 2025-05-09 PROCEDURE — 82435 ASSAY OF BLOOD CHLORIDE: CPT

## 2025-05-09 PROCEDURE — 85027 COMPLETE CBC AUTOMATED: CPT

## 2025-05-09 PROCEDURE — C1730 CATH, EP, 19 OR FEW ELECT: HCPCS | Performed by: SPECIALIST

## 2025-05-09 PROCEDURE — C1766 INTRO/SHEATH,STRBLE,NON-PEEL: HCPCS | Performed by: SPECIALIST

## 2025-05-09 PROCEDURE — 4A0234Z MEASUREMENT OF CARDIAC ELECTRICAL ACTIVITY, PERCUTANEOUS APPROACH: ICD-10-PCS | Performed by: SPECIALIST

## 2025-05-09 PROCEDURE — 93662 INTRACARDIAC ECG (ICE): CPT | Performed by: SPECIALIST

## 2025-05-09 PROCEDURE — 93656 COMPRE EP EVAL ABLTJ ATR FIB: CPT | Performed by: SPECIALIST

## 2025-05-09 PROCEDURE — 6360000002 HC RX W HCPCS: Performed by: NURSE ANESTHETIST, CERTIFIED REGISTERED

## 2025-05-09 PROCEDURE — 92960 CARDIOVERSION ELECTRIC EXT: CPT | Performed by: SPECIALIST

## 2025-05-09 PROCEDURE — 02583ZZ DESTRUCTION OF CONDUCTION MECHANISM, PERCUTANEOUS APPROACH: ICD-10-PCS | Performed by: SPECIALIST

## 2025-05-09 PROCEDURE — 76937 US GUIDE VASCULAR ACCESS: CPT | Performed by: SPECIALIST

## 2025-05-09 PROCEDURE — 4A023FZ MEASUREMENT OF CARDIAC RHYTHM, PERCUTANEOUS APPROACH: ICD-10-PCS | Performed by: SPECIALIST

## 2025-05-09 PROCEDURE — 84295 ASSAY OF SERUM SODIUM: CPT

## 2025-05-09 PROCEDURE — 2580000003 HC RX 258: Performed by: NURSE ANESTHETIST, CERTIFIED REGISTERED

## 2025-05-09 PROCEDURE — C1894 INTRO/SHEATH, NON-LASER: HCPCS | Performed by: SPECIALIST

## 2025-05-09 PROCEDURE — 82947 ASSAY GLUCOSE BLOOD QUANT: CPT

## 2025-05-09 PROCEDURE — 7100000000 HC PACU RECOVERY - FIRST 15 MIN: Performed by: SPECIALIST

## 2025-05-09 PROCEDURE — 2580000003 HC RX 258: Performed by: SPECIALIST

## 2025-05-09 PROCEDURE — 5A2204Z RESTORATION OF CARDIAC RHYTHM, SINGLE: ICD-10-PCS | Performed by: SPECIALIST

## 2025-05-09 PROCEDURE — 7100000001 HC PACU RECOVERY - ADDTL 15 MIN: Performed by: SPECIALIST

## 2025-05-09 PROCEDURE — 84520 ASSAY OF UREA NITROGEN: CPT

## 2025-05-09 PROCEDURE — 2060000000 HC ICU INTERMEDIATE R&B

## 2025-05-09 PROCEDURE — 6360000002 HC RX W HCPCS

## 2025-05-09 PROCEDURE — C1733 CATH, EP, OTHR THAN COOL-TIP: HCPCS | Performed by: SPECIALIST

## 2025-05-09 PROCEDURE — 2709999900 HC NON-CHARGEABLE SUPPLY: Performed by: SPECIALIST

## 2025-05-09 PROCEDURE — 3700000000 HC ANESTHESIA ATTENDED CARE: Performed by: SPECIALIST

## 2025-05-09 PROCEDURE — 6370000000 HC RX 637 (ALT 250 FOR IP): Performed by: SPECIALIST

## 2025-05-09 PROCEDURE — 2500000003 HC RX 250 WO HCPCS

## 2025-05-09 PROCEDURE — 3700000001 HC ADD 15 MINUTES (ANESTHESIA): Performed by: SPECIALIST

## 2025-05-09 PROCEDURE — 2500000003 HC RX 250 WO HCPCS: Performed by: NURSE ANESTHETIST, CERTIFIED REGISTERED

## 2025-05-09 PROCEDURE — 85347 COAGULATION TIME ACTIVATED: CPT

## 2025-05-09 PROCEDURE — 82565 ASSAY OF CREATININE: CPT

## 2025-05-09 PROCEDURE — C1759 CATH, INTRA ECHOCARDIOGRAPHY: HCPCS | Performed by: SPECIALIST

## 2025-05-09 PROCEDURE — 84132 ASSAY OF SERUM POTASSIUM: CPT

## 2025-05-09 RX ORDER — LANOLIN ALCOHOL/MO/W.PET/CERES
400 CREAM (GRAM) TOPICAL DAILY
Status: DISCONTINUED | OUTPATIENT
Start: 2025-05-09 | End: 2025-05-10 | Stop reason: HOSPADM

## 2025-05-09 RX ORDER — SWAB
2 SWAB, NON-MEDICATED MISCELLANEOUS DAILY
Status: DISCONTINUED | OUTPATIENT
Start: 2025-05-09 | End: 2025-05-09

## 2025-05-09 RX ORDER — HEPARIN SODIUM 1000 [USP'U]/ML
INJECTION, SOLUTION INTRAVENOUS; SUBCUTANEOUS
Status: DISCONTINUED | OUTPATIENT
Start: 2025-05-09 | End: 2025-05-09 | Stop reason: SDUPTHER

## 2025-05-09 RX ORDER — FENTANYL CITRATE 50 UG/ML
25 INJECTION, SOLUTION INTRAMUSCULAR; INTRAVENOUS EVERY 5 MIN PRN
Refills: 0 | Status: CANCELLED | OUTPATIENT
Start: 2025-05-09

## 2025-05-09 RX ORDER — DIGOXIN 125 MCG
125 TABLET ORAL DAILY
Status: DISCONTINUED | OUTPATIENT
Start: 2025-05-09 | End: 2025-05-10 | Stop reason: HOSPADM

## 2025-05-09 RX ORDER — LIDOCAINE HYDROCHLORIDE 10 MG/ML
INJECTION, SOLUTION EPIDURAL; INFILTRATION; INTRACAUDAL; PERINEURAL
Status: DISCONTINUED | OUTPATIENT
Start: 2025-05-09 | End: 2025-05-09 | Stop reason: SDUPTHER

## 2025-05-09 RX ORDER — METOPROLOL SUCCINATE 25 MG/1
12.5 TABLET, EXTENDED RELEASE ORAL DAILY
Status: DISCONTINUED | OUTPATIENT
Start: 2025-05-09 | End: 2025-05-10 | Stop reason: HOSPADM

## 2025-05-09 RX ORDER — NALOXONE HYDROCHLORIDE 0.4 MG/ML
INJECTION, SOLUTION INTRAMUSCULAR; INTRAVENOUS; SUBCUTANEOUS PRN
Status: CANCELLED | OUTPATIENT
Start: 2025-05-09

## 2025-05-09 RX ORDER — POTASSIUM CHLORIDE 1500 MG/1
20 TABLET, EXTENDED RELEASE ORAL 2 TIMES DAILY
Qty: 180 TABLET | Refills: 5 | Status: SHIPPED | OUTPATIENT
Start: 2025-05-09

## 2025-05-09 RX ORDER — ROCURONIUM BROMIDE 10 MG/ML
INJECTION, SOLUTION INTRAVENOUS
Status: DISCONTINUED | OUTPATIENT
Start: 2025-05-09 | End: 2025-05-09 | Stop reason: SDUPTHER

## 2025-05-09 RX ORDER — M-VIT,TX,IRON,MINS/CALC/FOLIC 27MG-0.4MG
1 TABLET ORAL EVERY MORNING
Status: DISCONTINUED | OUTPATIENT
Start: 2025-05-10 | End: 2025-05-10 | Stop reason: HOSPADM

## 2025-05-09 RX ORDER — SODIUM CHLORIDE 9 MG/ML
INJECTION, SOLUTION INTRAVENOUS CONTINUOUS
Status: DISCONTINUED | OUTPATIENT
Start: 2025-05-09 | End: 2025-05-09 | Stop reason: HOSPADM

## 2025-05-09 RX ORDER — SODIUM CHLORIDE 9 MG/ML
INJECTION, SOLUTION INTRAVENOUS
Status: DISCONTINUED | OUTPATIENT
Start: 2025-05-09 | End: 2025-05-09 | Stop reason: SDUPTHER

## 2025-05-09 RX ORDER — ONDANSETRON 2 MG/ML
4 INJECTION INTRAMUSCULAR; INTRAVENOUS
Status: CANCELLED | OUTPATIENT
Start: 2025-05-09

## 2025-05-09 RX ORDER — BISACODYL 10 MG
10 SUPPOSITORY, RECTAL RECTAL DAILY PRN
Status: DISCONTINUED | OUTPATIENT
Start: 2025-05-09 | End: 2025-05-10 | Stop reason: HOSPADM

## 2025-05-09 RX ORDER — LORAZEPAM 0.5 MG/1
0.5 TABLET ORAL NIGHTLY
Status: DISCONTINUED | OUTPATIENT
Start: 2025-05-09 | End: 2025-05-10 | Stop reason: HOSPADM

## 2025-05-09 RX ORDER — GAUZE BANDAGE 2" X 2"
100 BANDAGE TOPICAL DAILY
Status: DISCONTINUED | OUTPATIENT
Start: 2025-05-10 | End: 2025-05-10

## 2025-05-09 RX ORDER — DIPHENHYDRAMINE HYDROCHLORIDE 50 MG/ML
12.5 INJECTION, SOLUTION INTRAMUSCULAR; INTRAVENOUS
Status: CANCELLED | OUTPATIENT
Start: 2025-05-09

## 2025-05-09 RX ORDER — SODIUM CHLORIDE 0.9 % (FLUSH) 0.9 %
5-40 SYRINGE (ML) INJECTION EVERY 12 HOURS SCHEDULED
Status: CANCELLED | OUTPATIENT
Start: 2025-05-09

## 2025-05-09 RX ORDER — LACTULOSE 10 G/15ML
10 SOLUTION ORAL 3 TIMES DAILY PRN
Status: DISCONTINUED | OUTPATIENT
Start: 2025-05-09 | End: 2025-05-10 | Stop reason: HOSPADM

## 2025-05-09 RX ORDER — GABAPENTIN 300 MG/1
300 CAPSULE ORAL 3 TIMES DAILY
Status: DISCONTINUED | OUTPATIENT
Start: 2025-05-09 | End: 2025-05-10 | Stop reason: HOSPADM

## 2025-05-09 RX ORDER — LOPERAMIDE HYDROCHLORIDE 2 MG/1
2 CAPSULE ORAL 4 TIMES DAILY PRN
Status: DISCONTINUED | OUTPATIENT
Start: 2025-05-09 | End: 2025-05-10 | Stop reason: HOSPADM

## 2025-05-09 RX ORDER — MIDODRINE HYDROCHLORIDE 10 MG/1
15 TABLET ORAL 2 TIMES DAILY WITH MEALS
Qty: 270 TABLET | Refills: 0 | Status: SHIPPED | OUTPATIENT
Start: 2025-05-09

## 2025-05-09 RX ORDER — MIDODRINE HYDROCHLORIDE 5 MG/1
15 TABLET ORAL 2 TIMES DAILY WITH MEALS
Status: DISCONTINUED | OUTPATIENT
Start: 2025-05-10 | End: 2025-05-10 | Stop reason: HOSPADM

## 2025-05-09 RX ORDER — PHENYLEPHRINE HCL IN 0.9% NACL 1 MG/10 ML
SYRINGE (ML) INTRAVENOUS
Status: DISCONTINUED | OUTPATIENT
Start: 2025-05-09 | End: 2025-05-09 | Stop reason: SDUPTHER

## 2025-05-09 RX ORDER — VITAMIN B COMPLEX
2000 TABLET ORAL DAILY
Status: DISCONTINUED | OUTPATIENT
Start: 2025-05-10 | End: 2025-05-10 | Stop reason: HOSPADM

## 2025-05-09 RX ORDER — ONDANSETRON 2 MG/ML
INJECTION INTRAMUSCULAR; INTRAVENOUS
Status: DISCONTINUED | OUTPATIENT
Start: 2025-05-09 | End: 2025-05-09 | Stop reason: SDUPTHER

## 2025-05-09 RX ORDER — DOCUSATE SODIUM 100 MG/1
100 CAPSULE, LIQUID FILLED ORAL 2 TIMES DAILY
Status: DISCONTINUED | OUTPATIENT
Start: 2025-05-09 | End: 2025-05-10 | Stop reason: HOSPADM

## 2025-05-09 RX ORDER — AMIODARONE HYDROCHLORIDE 200 MG/1
100 TABLET ORAL DAILY
Qty: 45 TABLET | Refills: 5 | Status: SHIPPED | OUTPATIENT
Start: 2025-05-09

## 2025-05-09 RX ORDER — LOSARTAN POTASSIUM 25 MG/1
12.5 TABLET ORAL DAILY
Status: DISCONTINUED | OUTPATIENT
Start: 2025-05-09 | End: 2025-05-10 | Stop reason: HOSPADM

## 2025-05-09 RX ORDER — PANTOPRAZOLE SODIUM 40 MG/1
40 TABLET, DELAYED RELEASE ORAL
Status: DISCONTINUED | OUTPATIENT
Start: 2025-05-10 | End: 2025-05-10 | Stop reason: HOSPADM

## 2025-05-09 RX ORDER — FOLIC ACID 1 MG/1
1000 TABLET ORAL DAILY
Status: DISCONTINUED | OUTPATIENT
Start: 2025-05-10 | End: 2025-05-10 | Stop reason: HOSPADM

## 2025-05-09 RX ORDER — ATORVASTATIN CALCIUM 80 MG/1
80 TABLET, FILM COATED ORAL DAILY
Status: DISCONTINUED | OUTPATIENT
Start: 2025-05-09 | End: 2025-05-10 | Stop reason: HOSPADM

## 2025-05-09 RX ORDER — ONDANSETRON 4 MG/1
4 TABLET, ORALLY DISINTEGRATING ORAL 3 TIMES DAILY PRN
Status: DISCONTINUED | OUTPATIENT
Start: 2025-05-09 | End: 2025-05-10 | Stop reason: HOSPADM

## 2025-05-09 RX ORDER — SODIUM CHLORIDE 0.9 % (FLUSH) 0.9 %
5-40 SYRINGE (ML) INJECTION PRN
Status: CANCELLED | OUTPATIENT
Start: 2025-05-09

## 2025-05-09 RX ORDER — POTASSIUM CHLORIDE 1500 MG/1
20 TABLET, EXTENDED RELEASE ORAL 2 TIMES DAILY
Status: DISCONTINUED | OUTPATIENT
Start: 2025-05-09 | End: 2025-05-10 | Stop reason: HOSPADM

## 2025-05-09 RX ORDER — PROPOFOL 10 MG/ML
INJECTION, EMULSION INTRAVENOUS
Status: DISCONTINUED | OUTPATIENT
Start: 2025-05-09 | End: 2025-05-09 | Stop reason: SDUPTHER

## 2025-05-09 RX ORDER — SODIUM CHLORIDE 9 MG/ML
INJECTION, SOLUTION INTRAVENOUS PRN
Status: CANCELLED | OUTPATIENT
Start: 2025-05-09

## 2025-05-09 RX ORDER — FENTANYL CITRATE 50 UG/ML
INJECTION, SOLUTION INTRAMUSCULAR; INTRAVENOUS
Status: DISCONTINUED | OUTPATIENT
Start: 2025-05-09 | End: 2025-05-09 | Stop reason: SDUPTHER

## 2025-05-09 RX ORDER — CLOPIDOGREL BISULFATE 75 MG/1
75 TABLET ORAL DAILY
Status: DISCONTINUED | OUTPATIENT
Start: 2025-05-09 | End: 2025-05-10 | Stop reason: HOSPADM

## 2025-05-09 RX ORDER — HEPARIN SODIUM 10000 [USP'U]/100ML
INJECTION, SOLUTION INTRAVENOUS
Status: DISCONTINUED | OUTPATIENT
Start: 2025-05-09 | End: 2025-05-09 | Stop reason: SDUPTHER

## 2025-05-09 RX ORDER — FENTANYL CITRATE 50 UG/ML
50 INJECTION, SOLUTION INTRAMUSCULAR; INTRAVENOUS EVERY 5 MIN PRN
Refills: 0 | Status: CANCELLED | OUTPATIENT
Start: 2025-05-09

## 2025-05-09 RX ORDER — DEXAMETHASONE SODIUM PHOSPHATE 10 MG/ML
INJECTION, SOLUTION INTRA-ARTICULAR; INTRALESIONAL; INTRAMUSCULAR; INTRAVENOUS; SOFT TISSUE
Status: DISCONTINUED | OUTPATIENT
Start: 2025-05-09 | End: 2025-05-09 | Stop reason: SDUPTHER

## 2025-05-09 RX ORDER — VENLAFAXINE HYDROCHLORIDE 75 MG/1
75 CAPSULE, EXTENDED RELEASE ORAL DAILY
Status: DISCONTINUED | OUTPATIENT
Start: 2025-05-09 | End: 2025-05-10 | Stop reason: HOSPADM

## 2025-05-09 RX ORDER — PROTAMINE SULFATE 10 MG/ML
INJECTION, SOLUTION INTRAVENOUS
Status: DISCONTINUED | OUTPATIENT
Start: 2025-05-09 | End: 2025-05-09 | Stop reason: SDUPTHER

## 2025-05-09 RX ORDER — AMIODARONE HYDROCHLORIDE 200 MG/1
200 TABLET ORAL DAILY
Status: DISCONTINUED | OUTPATIENT
Start: 2025-05-09 | End: 2025-05-10 | Stop reason: HOSPADM

## 2025-05-09 RX ADMIN — ROCURONIUM BROMIDE 50 MG: 50 INJECTION INTRAVENOUS at 14:05

## 2025-05-09 RX ADMIN — VENLAFAXINE HYDROCHLORIDE 75 MG: 75 CAPSULE, EXTENDED RELEASE ORAL at 23:26

## 2025-05-09 RX ADMIN — PROTAMINE SULFATE 80 MG: 10 INJECTION, SOLUTION INTRAVENOUS at 16:57

## 2025-05-09 RX ADMIN — POTASSIUM CHLORIDE 20 MEQ: 1500 TABLET, EXTENDED RELEASE ORAL at 21:31

## 2025-05-09 RX ADMIN — CLOPIDOGREL BISULFATE 75 MG: 75 TABLET, FILM COATED ORAL at 21:33

## 2025-05-09 RX ADMIN — FENTANYL CITRATE 100 MCG: 50 INJECTION, SOLUTION INTRAMUSCULAR; INTRAVENOUS at 14:05

## 2025-05-09 RX ADMIN — Medication 200 MCG: at 14:16

## 2025-05-09 RX ADMIN — PROPOFOL 30 MG: 10 INJECTION, EMULSION INTRAVENOUS at 16:01

## 2025-05-09 RX ADMIN — GABAPENTIN 300 MG: 300 CAPSULE ORAL at 21:31

## 2025-05-09 RX ADMIN — EMPAGLIFLOZIN 10 MG: 10 TABLET, FILM COATED ORAL at 22:21

## 2025-05-09 RX ADMIN — DOCUSATE SODIUM 100 MG: 100 CAPSULE, LIQUID FILLED ORAL at 22:21

## 2025-05-09 RX ADMIN — SODIUM CHLORIDE: 9 INJECTION, SOLUTION INTRAVENOUS at 14:13

## 2025-05-09 RX ADMIN — LIDOCAINE HYDROCHLORIDE 50 MG: 10 INJECTION, SOLUTION EPIDURAL; INFILTRATION; INTRACAUDAL; PERINEURAL at 14:05

## 2025-05-09 RX ADMIN — PROPOFOL 150 MG: 10 INJECTION, EMULSION INTRAVENOUS at 14:05

## 2025-05-09 RX ADMIN — Medication 100 MCG: at 17:32

## 2025-05-09 RX ADMIN — HEPARIN SODIUM 10000 UNITS: 1000 INJECTION, SOLUTION INTRAVENOUS; SUBCUTANEOUS at 14:44

## 2025-05-09 RX ADMIN — Medication 400 MG: at 21:31

## 2025-05-09 RX ADMIN — APIXABAN 5 MG: 5 TABLET, FILM COATED ORAL at 21:31

## 2025-05-09 RX ADMIN — AMIODARONE HYDROCHLORIDE 200 MG: 200 TABLET ORAL at 21:33

## 2025-05-09 RX ADMIN — Medication 100 MCG: at 14:12

## 2025-05-09 RX ADMIN — HEPARIN SODIUM 3000 UNITS/HR: 10000 INJECTION, SOLUTION INTRAVENOUS at 14:45

## 2025-05-09 RX ADMIN — DEXAMETHASONE SODIUM PHOSPHATE 10 MG: 10 INJECTION INTRAMUSCULAR; INTRAVENOUS at 14:32

## 2025-05-09 RX ADMIN — LORAZEPAM 0.5 MG: 0.5 TABLET ORAL at 23:26

## 2025-05-09 RX ADMIN — PHENYLEPHRINE HYDROCHLORIDE 50 MCG/MIN: 10 INJECTION INTRAVENOUS at 14:10

## 2025-05-09 RX ADMIN — Medication 100 MCG: at 14:05

## 2025-05-09 RX ADMIN — DIGOXIN 125 MCG: 125 TABLET ORAL at 22:21

## 2025-05-09 RX ADMIN — PROPOFOL 40 MG: 10 INJECTION, EMULSION INTRAVENOUS at 16:44

## 2025-05-09 RX ADMIN — ATORVASTATIN CALCIUM 80 MG: 80 TABLET, FILM COATED ORAL at 21:31

## 2025-05-09 RX ADMIN — METOPROLOL SUCCINATE 12.5 MG: 25 TABLET, EXTENDED RELEASE ORAL at 22:21

## 2025-05-09 RX ADMIN — HEPARIN SODIUM 3000 UNITS: 1000 INJECTION, SOLUTION INTRAVENOUS; SUBCUTANEOUS at 15:36

## 2025-05-09 RX ADMIN — LOSARTAN POTASSIUM 12.5 MG: 25 TABLET, FILM COATED ORAL at 21:31

## 2025-05-09 RX ADMIN — PROPOFOL 20 MG: 10 INJECTION, EMULSION INTRAVENOUS at 16:13

## 2025-05-09 RX ADMIN — ONDANSETRON 4 MG: 2 INJECTION, SOLUTION INTRAMUSCULAR; INTRAVENOUS at 16:52

## 2025-05-09 RX ADMIN — SUGAMMADEX 200 MG: 100 INJECTION, SOLUTION INTRAVENOUS at 17:34

## 2025-05-09 RX ADMIN — SODIUM CHLORIDE: 0.9 INJECTION, SOLUTION INTRAVENOUS at 11:34

## 2025-05-09 RX ADMIN — Medication 100 MCG: at 14:10

## 2025-05-09 ASSESSMENT — LIFESTYLE VARIABLES: SMOKING_STATUS: 0

## 2025-05-09 NOTE — H&P
Michael Chan is a 74 y.o. male who presents today for an general visit to be evaluated for the following condition(s):       Chief Complaint   Patient presents with    Atrial Fibrillation       HX:PAF,CAD PT is here for 2 month follow up he states he is doing fine, still has sob, rare lightheaded/dizziness denies any falls Denies;CP, palp    Patient got his vascular intervention recently no further workup is needed at this point.  From the vascular point of view.  He has been back on his Eliquis for a while.     Lengthy discussion once again was done with the patient and his sister the power of , options of medications to continue on amiodarone versus PVI were discussed.  Provided him with reading material.     They both understand that 2 options are possible, ablation usually has better outcomes.  Amiodarone is not perfectly innocent medicine.     The outcome of the ablation was discussed with the patient, the weights done all possible risks and complications were discussed with both of them.  Their questions were answered.     They understand success is probably around 70 to 75%, they understand that ablation is basically not to cure but to control atrial fibrillation, patient could need a second ablation procedure long-term.     REVIEW OF SYSTEM                                                                       Review of Systems  Noncontributory  REVIEWED INFORMATION                                                                       Allergies   No Known Allergies        Current Facility-Administered Medications          Current Outpatient Medications   Medication Sig Dispense Refill    digoxin (LANOXIN) 125 MCG tablet Take 1 tablet by mouth daily 30 tablet 3    amiodarone (CORDARONE) 200 MG tablet Take 1 tablet by mouth daily (Patient taking differently: Take 0.5 tablets by mouth daily) 7 tablet 0    potassium chloride (KLOR-CON M20) 20 MEQ extended release tablet Take 1 tablet by mouth daily  Cigarettes       Start date: 1966       Quit date: 2022       Years since quittin.7    Smokeless tobacco: Never   Vaping Use    Vaping status: Never Used   Substance and Sexual Activity    Alcohol use: Not Currently    Drug use: Not Currently      Social Drivers of Health           Financial Resource Strain: Low Risk  (2024)     Overall Financial Resource Strain (CARDIA)      Difficulty of Paying Living Expenses: Not hard at all   Food Insecurity: No Food Insecurity (2024)     Hunger Vital Sign      Worried About Running Out of Food in the Last Year: Never true      Ran Out of Food in the Last Year: Never true   Transportation Needs: No Transportation Needs (2024)     PRAPARE - Transportation      Lack of Transportation (Medical): No      Lack of Transportation (Non-Medical): No   Physical Activity: Insufficiently Active (3/11/2025)     Exercise Vital Sign      Days of Exercise per Week: 3 days      Minutes of Exercise per Session: 10 min   Stress: Stress Concern Present (3/26/2022)     Received from [employer] [employer]     [employer] [employer] [place] [employer] - Occupational Stress Questionnaire      Feeling of Stress : To some extent   Social Connections: Socially Isolated (3/26/2022)     Received from [employer], [employer]     Social Connection and Isolation Panel [NHANES]      Frequency of Communication with Friends and Family: More than three times a week      Frequency of Social Gatherings with Friends and Family: Once a week      Attends Religion Services: Never      Active Member of Clubs or Organizations: No      Attends Club or Organization Meetings: Never      Marital Status:    Housing Stability: Low Risk  (2024)     Housing Stability Vital Sign      Unable to Pay for Housing in the Last Year: No      Number of Times Moved in the Last Year: 0      Homeless in the Last Year: No            PHYSICAL EXAM

## 2025-05-09 NOTE — PROGRESS NOTES
Pt received post ablation in [facility]. Report received from CRNA. Pt resting with out distress. Right groin site soft, no bleeding or hematoma present. Dressing intact

## 2025-05-09 NOTE — ANESTHESIA PRE PROCEDURE
Department of Anesthesiology  Preprocedure Note       Name:  Michael Chan   Age:  74 y.o.  :  1950                                          MRN:  [id number]         Date:  2025      Surgeon: Surgeon(s):  Parvez Sims MD    Procedure: Procedure(s):  el lannyssi / Ablation A-fib w/anes / aimee, ice, cryo    Medications prior to admission:   Prior to Admission medications    Medication Sig Start Date End Date Taking? Authorizing Provider   digoxin (LANOXIN) 125 MCG tablet Take 1 tablet by mouth daily 12/10/24   Tejas Mccoy MD   amiodarone (CORDARONE) 200 MG tablet Take 1 tablet by mouth daily  Patient taking differently: Take 0.5 tablets by mouth daily 24   Mane Harris MD   potassium chloride (KLOR-CON M20) 20 MEQ extended release tablet Take 1 tablet by mouth daily  Patient taking differently: Take 1 tablet by mouth 2 times daily 24   Mane Harris MD   LORazepam (ATIVAN) 0.5 MG tablet Take 1 tablet by mouth nightly. 24   Gilmer Carranza MD   clopidogrel (PLAVIX) 75 MG tablet Take 1 tablet by mouth daily    Gilmer Carranza MD   magnesium oxide (MAG-OX) 400 (240 Mg) MG tablet Take 1 tablet by mouth daily    Gilmer Carranza MD   vitamin D (CHOLECALCIFEROL) 25 MCG (1000 UT) TABS tablet Take 2 tablets by mouth daily    Gilmer Carranza MD   bisacodyl (DULCOLAX) 10 MG suppository Place 1 suppository rectally daily as needed for Constipation    Gilmer Carranza MD   venlafaxine (EFFEXOR XR) 37.5 MG extended release capsule Take 2 capsules by mouth daily 24   Gilmer Carranza MD   loperamide (IMODIUM) 2 MG capsule Take 1 capsule by mouth 4 times daily as needed for Diarrhea    Gilmer Carranza MD   metoprolol succinate (TOPROL XL) 25 MG extended release tablet Take 0.5 tablets by mouth daily 24   Tejas Mccoy MD   pantoprazole (PROTONIX) 40 MG tablet TAKE 1 TABLET BY MOUTH EVERY DAY BEFORE BREAKFAST 24   Hany Hodges,

## 2025-05-09 NOTE — PROGRESS NOTES
Patient admitted, consent signed and questions answered. Patient ready for procedure. Call light to reach with side rails up 2 of 2. Bilateral groin areas clipped with writer and Kimberly present.  Sister at bedside with patient.  History and physical complete.

## 2025-05-09 NOTE — PROCEDURES
PROCEDURE NOTE  Date: 5/9/2025   Name: Michael Chan  YOB: 1950    Procedures        Procedure is pulmonary vein isolation by cryo, 3D mapping using EnSite, DC cardioversion.    Preoperative diagnosis is persistent atrial fibrillation that is symptomatic despite treatment with amiodarone.    Postoperative diagnosis is the same added to that successful isolation of all pulmonary vein, atrial flutter cycle length of 280 ms were induced by moving the catheter inside the atrium.    Patient was brought to the EP lab in the postabsorptive state.  Vitals were stable.  Informed consent was obtained.  Procedure was done under general anesthesia.    Safety timeout was called.  The right groin was prepared and draped in the usual manner.  Guided by ultrasonography the right femoral vein was captured 3 separate times.  3 separate wires were advanced.  3 separate sheaths of different sizes were advanced over one of the guidewires at the time.  Dilators and guidewires were removed.    Once venous access was obtained, heparin bolus and drip started.  ACT guided the dosing of the heparin.  At 1 point ACT was higher than 450, we stopped heparin and repeated the ACT using a different machine stayed high so a heparin was DC'd.  As ACT kept being high    Ice catheter advanced to the right atrium.  From the right atrium to the right ventricle.  We established the left-sided pericardial space.  Catheter was withdrawn to the right atrium it was used to help guide the transseptal approach and help in best positioning of the cryoballoon.    In the second sheath a quadripolar catheter was advanced to the right ventricle for pacing if needed in the RV and later to pace the right phrenic nerve.    Over the third sheath we advanced a long wire.  A versa cross by Tangela sheath and introducer and pigtail wire were used to cross to the left atrium guided by ultrasonography and fluoroscopy.  Once in the left atrium the pigtail

## 2025-05-10 VITALS
BODY MASS INDEX: 29.25 KG/M2 | HEIGHT: 66 IN | WEIGHT: 182 LBS | RESPIRATION RATE: 18 BRPM | SYSTOLIC BLOOD PRESSURE: 163 MMHG | HEART RATE: 107 BPM | DIASTOLIC BLOOD PRESSURE: 80 MMHG | TEMPERATURE: 98.8 F | OXYGEN SATURATION: 94 %

## 2025-05-10 LAB
ANION GAP SERPL CALCULATED.3IONS-SCNC: 10 MMOL/L (ref 9–16)
BASOPHILS # BLD: 0 K/UL (ref 0–0.2)
BASOPHILS NFR BLD: 0 % (ref 0–2)
BUN SERPL-MCNC: 27 MG/DL (ref 8–23)
CALCIUM SERPL-MCNC: 8.8 MG/DL (ref 8.6–10.4)
CHLORIDE SERPL-SCNC: 107 MMOL/L (ref 98–107)
CO2 SERPL-SCNC: 25 MMOL/L (ref 20–31)
CREAT SERPL-MCNC: 1.3 MG/DL (ref 0.7–1.2)
EKG ATRIAL RATE: 70 BPM
EKG P AXIS: 90 DEGREES
EKG P-R INTERVAL: 170 MS
EKG Q-T INTERVAL: 390 MS
EKG QRS DURATION: 76 MS
EKG QTC CALCULATION (BAZETT): 421 MS
EKG R AXIS: 57 DEGREES
EKG T AXIS: 69 DEGREES
EKG VENTRICULAR RATE: 70 BPM
EOSINOPHIL # BLD: 0 K/UL (ref 0–0.44)
EOSINOPHILS RELATIVE PERCENT: 0 % (ref 1–4)
ERYTHROCYTE [DISTWIDTH] IN BLOOD BY AUTOMATED COUNT: 14.7 % (ref 11.8–14.4)
GFR, ESTIMATED: 58 ML/MIN/1.73M2
GLUCOSE SERPL-MCNC: 106 MG/DL (ref 74–99)
HCT VFR BLD AUTO: 46.6 % (ref 40.7–50.3)
HGB BLD-MCNC: 15.2 G/DL (ref 13–17)
IMM GRANULOCYTES # BLD AUTO: 0 K/UL (ref 0–0.3)
IMM GRANULOCYTES NFR BLD: 0 %
LYMPHOCYTES NFR BLD: 0.57 K/UL (ref 1.1–3.7)
LYMPHOCYTES RELATIVE PERCENT: 8 % (ref 24–43)
MAGNESIUM SERPL-MCNC: 1.9 MG/DL (ref 1.6–2.4)
MCH RBC QN AUTO: 31.7 PG (ref 25.2–33.5)
MCHC RBC AUTO-ENTMCNC: 32.6 G/DL (ref 28.4–34.8)
MCV RBC AUTO: 97.1 FL (ref 82.6–102.9)
MONOCYTES NFR BLD: 0.5 K/UL (ref 0.1–1.2)
MONOCYTES NFR BLD: 7 % (ref 3–12)
MORPHOLOGY: ABNORMAL
NEUTROPHILS NFR BLD: 85 % (ref 36–65)
NEUTS SEG NFR BLD: 6.03 K/UL (ref 1.5–8.1)
NRBC BLD-RTO: 0 PER 100 WBC
PLATELET # BLD AUTO: 98 K/UL (ref 138–453)
PMV BLD AUTO: 12.1 FL (ref 8.1–13.5)
POTASSIUM SERPL-SCNC: 4.7 MMOL/L (ref 3.7–5.3)
RBC # BLD AUTO: 4.8 M/UL (ref 4.21–5.77)
SODIUM SERPL-SCNC: 142 MMOL/L (ref 136–145)
WBC OTHER # BLD: 7.1 K/UL (ref 3.5–11.3)

## 2025-05-10 PROCEDURE — 93005 ELECTROCARDIOGRAM TRACING: CPT | Performed by: SPECIALIST

## 2025-05-10 PROCEDURE — 93010 ELECTROCARDIOGRAM REPORT: CPT | Performed by: INTERNAL MEDICINE

## 2025-05-10 PROCEDURE — 85025 COMPLETE CBC W/AUTO DIFF WBC: CPT

## 2025-05-10 PROCEDURE — 6370000000 HC RX 637 (ALT 250 FOR IP): Performed by: SPECIALIST

## 2025-05-10 PROCEDURE — 36415 COLL VENOUS BLD VENIPUNCTURE: CPT

## 2025-05-10 PROCEDURE — 80048 BASIC METABOLIC PNL TOTAL CA: CPT

## 2025-05-10 PROCEDURE — 83735 ASSAY OF MAGNESIUM: CPT

## 2025-05-10 RX ORDER — LANOLIN ALCOHOL/MO/W.PET/CERES
100 CREAM (GRAM) TOPICAL DAILY
Status: DISCONTINUED | OUTPATIENT
Start: 2025-05-10 | End: 2025-05-10 | Stop reason: HOSPADM

## 2025-05-10 RX ADMIN — APIXABAN 5 MG: 5 TABLET, FILM COATED ORAL at 09:19

## 2025-05-10 RX ADMIN — Medication 1 TABLET: at 09:28

## 2025-05-10 RX ADMIN — ATORVASTATIN CALCIUM 80 MG: 80 TABLET, FILM COATED ORAL at 09:19

## 2025-05-10 RX ADMIN — FOLIC ACID 1000 MCG: 1 TABLET ORAL at 09:19

## 2025-05-10 RX ADMIN — CLOPIDOGREL BISULFATE 75 MG: 75 TABLET, FILM COATED ORAL at 09:18

## 2025-05-10 RX ADMIN — Medication 2000 UNITS: at 09:19

## 2025-05-10 RX ADMIN — AMIODARONE HYDROCHLORIDE 200 MG: 200 TABLET ORAL at 09:19

## 2025-05-10 RX ADMIN — GABAPENTIN 300 MG: 300 CAPSULE ORAL at 12:20

## 2025-05-10 RX ADMIN — LOSARTAN POTASSIUM 12.5 MG: 25 TABLET, FILM COATED ORAL at 09:19

## 2025-05-10 RX ADMIN — Medication 100 MG: at 12:20

## 2025-05-10 RX ADMIN — POTASSIUM CHLORIDE 20 MEQ: 1500 TABLET, EXTENDED RELEASE ORAL at 09:19

## 2025-05-10 RX ADMIN — EMPAGLIFLOZIN 10 MG: 10 TABLET, FILM COATED ORAL at 09:19

## 2025-05-10 RX ADMIN — DIGOXIN 125 MCG: 125 TABLET ORAL at 09:28

## 2025-05-10 RX ADMIN — DOCUSATE SODIUM 100 MG: 100 CAPSULE, LIQUID FILLED ORAL at 09:19

## 2025-05-10 RX ADMIN — Medication 400 MG: at 09:19

## 2025-05-10 RX ADMIN — METOPROLOL SUCCINATE 12.5 MG: 25 TABLET, EXTENDED RELEASE ORAL at 09:19

## 2025-05-10 RX ADMIN — VENLAFAXINE HYDROCHLORIDE 75 MG: 75 CAPSULE, EXTENDED RELEASE ORAL at 09:20

## 2025-05-10 RX ADMIN — GABAPENTIN 300 MG: 300 CAPSULE ORAL at 09:18

## 2025-05-10 RX ADMIN — PANTOPRAZOLE SODIUM 40 MG: 40 TABLET, DELAYED RELEASE ORAL at 06:05

## 2025-05-10 NOTE — PROGRESS NOTES
Writer removed patients IV, went over discharge paper work. Comments, questions, concerns addressed. Patient sister Mar to bedside for ride home. Patient with all belongings. Patient wheeled down by aide.

## 2025-05-10 NOTE — ANESTHESIA POSTPROCEDURE EVALUATION
Department of Anesthesiology  Postprocedure Note    Patient: Michael Chan  MRN: [id number]  YOB: 1950  Date of evaluation: 5/10/2025    Procedure Summary       Date: 05/09/25 Room / Location: [facility] EP LAB RM 2 / [facility] CARDIAC CATH LAB    Anesthesia Start: 1357 Anesthesia Stop: 1758    Procedure: el atassi / Ablation A-fib w/anes / aimee, ice, cryo Diagnosis:       Atrial fibrillation, unspecified type (HCC)      (Same)    Providers: Parvez Sims MD Responsible Provider: Miah Mcgrath MD    Anesthesia Type: general ASA Status: 4            Anesthesia Type: No value filed.    Radha Phase I: Radha Score: 9    Radha Phase II:      Anesthesia Post Evaluation    Patient location during evaluation: PACU  Patient participation: complete - patient participated  Level of consciousness: awake and alert  Airway patency: patent  Nausea & Vomiting: no nausea and no vomiting  Cardiovascular status: blood pressure returned to baseline  Respiratory status: acceptable  Hydration status: euvolemic  Pain management: adequate    There were no known notable events for this encounter.

## 2025-05-10 NOTE — PLAN OF CARE
Problem: Safety - Adult  Goal: Free from fall injury  Outcome: Progressing     Problem: Skin/Tissue Integrity  Goal: Skin integrity remains intact  Description: 1.  Monitor for areas of redness and/or skin breakdown2.  Assess vascular access sites hourly3.  Every 4-6 hours minimum:  Change oxygen saturation probe site4.  Every 4-6 hours:  If on nasal continuous positive airway pressure, respiratory therapy assess nares and determine need for appliance change or resting period  Outcome: Progressing     Problem: Chronic Conditions and Co-morbidities  Goal: Patient's chronic conditions and co-morbidity symptoms are monitored and maintained or improved  Outcome: Progressing     Problem: Discharge Planning  Goal: Discharge to home or other facility with appropriate resources  Outcome: Progressing

## 2025-05-10 NOTE — PLAN OF CARE
Problem: Safety - Adult  Goal: Free from fall injury  Outcome: Progressing     Problem: Skin/Tissue Integrity  Goal: Skin integrity remains intact  Description: 1.  Monitor for areas of redness and/or skin breakdown2.  Assess vascular access sites hourly3.  Every 4-6 hours minimum:  Change oxygen saturation probe site4.  Every 4-6 hours:  If on nasal continuous positive airway pressure, respiratory therapy assess nares and determine need for appliance change or resting period  Outcome: Progressing     Problem: Chronic Conditions and Co-morbidities  Goal: Patient's chronic conditions and co-morbidity symptoms are monitored and maintained or improved  Outcome: Progressing     Problem: Discharge Planning  Goal: Discharge to home or other facility with appropriate resources  Outcome: Progressing     Problem: ABCDS Injury Assessment  Goal: Absence of physical injury  Outcome: Progressing     Problem: Confusion  Goal: Confusion, delirium, dementia, or psychosis is improved or at baseline  Description: INTERVENTIONS:1. Assess for possible contributors to thought disturbance, including medications, impaired vision or hearing, underlying metabolic abnormalities, dehydration, psychiatric diagnoses, and notify attending LIP2. [place] high risk fall precautions, as indicated3. Provide frequent short contacts to provide reality reorientation, refocusing and direction4. Decrease environmental stimuli, including noise as appropriate5. Monitor and intervene to maintain adequate nutrition, hydration, elimination, sleep and activity6. If unable to ensure safety without constant attention obtain sitter and review sitter guidelines with assigned personnel7. Initiate Psychosocial CNS and Spiritual Care consult, as indicated  INTERVENTIONS:1. Assess for possible contributors to thought disturbance, including medications, impaired vision or hearing, underlying metabolic abnormalities, dehydration, psychiatric diagnoses, and notify

## 2025-05-10 NOTE — CARE COORDINATION
Case Management Assessment  Initial Evaluation    Date/Time of Evaluation: 5/10/2025 9:34 AM  Assessment Completed by: Diamond Sepulveda RN    If patient is discharged prior to next notation, then this note serves as note for discharge by case management.    Patient Name: Michael Chan                   YOB: 1950  Diagnosis: Atrial fibrillation, unspecified type (HCC) [I48.91]  Status post ablation operation for arrhythmia [Z98.890, Z86.79]                   Date / Time: 5/9/2025 10:24 AM    Patient Admission Status: Inpatient   Readmission Risk (Low < 19, Mod (19-27), High > 27): Readmission Risk Score: 17.7    Current PCP: Mane Harris MD  PCP verified by CM? (P) Yes ( at [place])    Chart Reviewed: Yes      History Provided by: (P) Patient  Patient Orientation: (P) Alert and Oriented    Patient Cognition: (P) Alert    Hospitalization in the last 30 days (Readmission):  No    If yes, Readmission Assessment in CM Navigator will be completed.    Advance Directives:      Code Status: Full Code   Patient's Primary Decision Maker is: (P) Named in Scanned ACP Document    Primary Decision Maker: Angelina Abrams - Pt Representative - [phone]    Secondary Decision Maker: Leon Chan - Brother/Sister - [phone]    Discharge Planning:    Patient lives with: (P) Other (Comment) ([facility]) Type of Home: (P) Long-Term Care  Primary Care Giver: (P) Self  Patient Support Systems include: (P) Family Members   Current Financial resources: (P) Medicare, Medicaid  Current community resources: (P) None  Current services prior to admission: (P) Extended Care Facility, Durable Medical Equipment            Current DME: (P) Walker            Type of Home Care services:  (P) Skilled Therapy, PT, OT    ADLS  Prior functional level: (P) Assistance with the following:, Bathing, Dressing, Cooking, Housework, Shopping, Mobility  Current functional level: (P) Assistance with the following:, Bathing,

## 2025-05-12 LAB
ACT BLD: 471 SEC (ref 79–149)
ACT BLD: 479 SEC (ref 79–149)
ACT BLD: 483 SEC (ref 79–149)
ACT BLD: 540 SEC (ref 79–149)

## 2025-05-28 ENCOUNTER — HOSPITAL ENCOUNTER (OUTPATIENT)
Age: 75
Discharge: HOME | End: 2025-05-28
Payer: MEDICARE

## 2025-05-28 ENCOUNTER — OFFICE VISIT (OUTPATIENT)
Dept: CARDIOLOGY | Age: 75
End: 2025-05-28
Payer: MEDICARE

## 2025-05-28 VITALS
HEART RATE: 81 BPM | DIASTOLIC BLOOD PRESSURE: 52 MMHG | SYSTOLIC BLOOD PRESSURE: 89 MMHG | WEIGHT: 186.2 LBS | OXYGEN SATURATION: 94 % | RESPIRATION RATE: 16 BRPM | BODY MASS INDEX: 29.22 KG/M2 | HEIGHT: 67 IN

## 2025-05-28 DIAGNOSIS — I50.42 CHRONIC COMBINED SYSTOLIC AND DIASTOLIC CONGESTIVE HEART FAILURE (HCC): Chronic | ICD-10-CM

## 2025-05-28 DIAGNOSIS — I25.10 ASHD (ARTERIOSCLEROTIC HEART DISEASE): ICD-10-CM

## 2025-05-28 DIAGNOSIS — Z87.898 HISTORY OF SYNCOPE: ICD-10-CM

## 2025-05-28 DIAGNOSIS — I48.0 PAF (PAROXYSMAL ATRIAL FIBRILLATION) (HCC): Chronic | ICD-10-CM

## 2025-05-28 DIAGNOSIS — R06.02 SOB (SHORTNESS OF BREATH): ICD-10-CM

## 2025-05-28 DIAGNOSIS — I95.0 IDIOPATHIC HYPOTENSION: ICD-10-CM

## 2025-05-28 DIAGNOSIS — Z98.890 S/P ABLATION OF ATRIAL FIBRILLATION: ICD-10-CM

## 2025-05-28 DIAGNOSIS — R06.02 SHORTNESS OF BREATH: ICD-10-CM

## 2025-05-28 DIAGNOSIS — I42.9 CARDIOMYOPATHY, UNSPECIFIED TYPE (HCC): ICD-10-CM

## 2025-05-28 DIAGNOSIS — I73.9 PAD (PERIPHERAL ARTERY DISEASE): Chronic | ICD-10-CM

## 2025-05-28 DIAGNOSIS — R94.39 ABNORMAL STRESS TEST: ICD-10-CM

## 2025-05-28 DIAGNOSIS — I48.0 PAF (PAROXYSMAL ATRIAL FIBRILLATION) (HCC): Primary | Chronic | ICD-10-CM

## 2025-05-28 DIAGNOSIS — Z86.79 S/P ABLATION OF ATRIAL FIBRILLATION: ICD-10-CM

## 2025-05-28 DIAGNOSIS — I25.119 CORONARY ARTERY DISEASE WITH ANGINA PECTORIS, UNSPECIFIED VESSEL OR LESION TYPE, UNSPECIFIED WHETHER NATIVE OR TRANSPLANTED HEART: ICD-10-CM

## 2025-05-28 DIAGNOSIS — I50.42 CHRONIC COMBINED SYSTOLIC (CONGESTIVE) AND DIASTOLIC (CONGESTIVE) HEART FAILURE (HCC): ICD-10-CM

## 2025-05-28 DIAGNOSIS — E78.2 MIXED HYPERLIPIDEMIA: ICD-10-CM

## 2025-05-28 DIAGNOSIS — I48.91 ATRIAL FIBRILLATION WITH RVR (HCC): ICD-10-CM

## 2025-05-28 DIAGNOSIS — Z95.820 S/P ANGIOPLASTY WITH STENT: ICD-10-CM

## 2025-05-28 LAB
ANION GAP SERPL CALCULATED.3IONS-SCNC: 9 MMOL/L (ref 9–16)
BUN SERPL-MCNC: 26 MG/DL (ref 8–23)
BUN/CREAT SERPL: 17 (ref 9–20)
CALCIUM SERPL-MCNC: 9.8 MG/DL (ref 8.6–10.4)
CHLORIDE SERPL-SCNC: 104 MMOL/L (ref 98–107)
CO2 SERPL-SCNC: 29 MMOL/L (ref 20–31)
CREAT SERPL-MCNC: 1.5 MG/DL (ref 0.7–1.2)
ERYTHROCYTE [DISTWIDTH] IN BLOOD BY AUTOMATED COUNT: 14.6 % (ref 11.8–14.4)
GFR, ESTIMATED: 48 ML/MIN/1.73M2
GLUCOSE SERPL-MCNC: 89 MG/DL (ref 74–99)
HCT VFR BLD AUTO: 49.7 % (ref 40.7–50.3)
HGB BLD-MCNC: 16.6 G/DL (ref 13–17)
MCH RBC QN AUTO: 32.4 PG (ref 25.2–33.5)
MCHC RBC AUTO-ENTMCNC: 33.4 G/DL (ref 28.4–34.8)
MCV RBC AUTO: 97.1 FL (ref 82.6–102.9)
NRBC BLD-RTO: 0 PER 100 WBC
PLATELET # BLD AUTO: ABNORMAL K/UL (ref 138–453)
PLATELET, FLUORESCENCE: 118 K/UL (ref 138–453)
PLATELETS.RETICULATED NFR BLD AUTO: 5.5 % (ref 1.1–10.3)
POTASSIUM SERPL-SCNC: 5.5 MMOL/L (ref 3.7–5.3)
RBC # BLD AUTO: 5.12 M/UL (ref 4.21–5.77)
SODIUM SERPL-SCNC: 142 MMOL/L (ref 136–145)
WBC OTHER # BLD: 5.9 K/UL (ref 3.5–11.3)

## 2025-05-28 PROCEDURE — 3017F COLORECTAL CA SCREEN DOC REV: CPT | Performed by: FAMILY MEDICINE

## 2025-05-28 PROCEDURE — 1036F TOBACCO NON-USER: CPT | Performed by: FAMILY MEDICINE

## 2025-05-28 PROCEDURE — 1159F MED LIST DOCD IN RCRD: CPT | Performed by: FAMILY MEDICINE

## 2025-05-28 PROCEDURE — 36415 COLL VENOUS BLD VENIPUNCTURE: CPT

## 2025-05-28 PROCEDURE — 1111F DSCHRG MED/CURRENT MED MERGE: CPT | Performed by: FAMILY MEDICINE

## 2025-05-28 PROCEDURE — 93005 ELECTROCARDIOGRAM TRACING: CPT | Performed by: FAMILY MEDICINE

## 2025-05-28 PROCEDURE — G8427 DOCREV CUR MEDS BY ELIG CLIN: HCPCS | Performed by: FAMILY MEDICINE

## 2025-05-28 PROCEDURE — 3074F SYST BP LT 130 MM HG: CPT | Performed by: FAMILY MEDICINE

## 2025-05-28 PROCEDURE — 1123F ACP DISCUSS/DSCN MKR DOCD: CPT | Performed by: FAMILY MEDICINE

## 2025-05-28 PROCEDURE — 85027 COMPLETE CBC AUTOMATED: CPT

## 2025-05-28 PROCEDURE — G8417 CALC BMI ABV UP PARAM F/U: HCPCS | Performed by: FAMILY MEDICINE

## 2025-05-28 PROCEDURE — 93010 ELECTROCARDIOGRAM REPORT: CPT | Performed by: FAMILY MEDICINE

## 2025-05-28 PROCEDURE — 80048 BASIC METABOLIC PNL TOTAL CA: CPT

## 2025-05-28 PROCEDURE — 99214 OFFICE O/P EST MOD 30 MIN: CPT | Performed by: FAMILY MEDICINE

## 2025-05-28 PROCEDURE — 3078F DIAST BP <80 MM HG: CPT | Performed by: FAMILY MEDICINE

## 2025-05-28 RX ORDER — CELECOXIB 200 MG/1
200 CAPSULE ORAL DAILY
COMMUNITY

## 2025-05-28 NOTE — PROGRESS NOTES
I, Kell Queen am scribing for and in the presence of Tejas Mccoy MD, MS, F.A.C.C..    Patient: Michael Chan  : 1950  Primary Care Physician: Mane Harris  Today's Date: 2025    REASON FOR CONSULTATION: atrial fibrillation    Dear Mane Harris MD,    HPI: Mr. Chan is a 74 y.o. male who was admitted to the hospital on 2022 for weakness and shortness of breath. In the ER he was found to be in atrial fibrillation with RVR leading to a consultation. He was then readmitted due to severe dehydration. In the past he reports seeing a Cardiologist in [place] and was planning on having a cardioversion done in  to put him back into normal sinus rhythm due to his history of new onset atrial fibrillation. He reports a history of a heart attack at age 52 and needed stents placed at that time but denies any cardiac cath since that time. He also has carotid artery stenosis and abdominal aortic aneurysm. He did report having surgery on one side of his neck, however he is not sure which side or how long ago it was. He has had hypertension and hyperlipidemia for years as well. He is a current every day smoker and he smoked for about 55 years and smokes about a pack a day. Family history consists of a lot of people in his family with significant cardiac history, however he wanted us to talk to his sister about the family history as she knows the details. Echo done on 2022 showed an EF of 15-20%. ECHO done on 22: Dilated all cardiac chambers.Severe biventricular systolic dysfunction. Estimated left ventricular ejection fraction is 20-25% Moderate to severe mitral and moderate tricuspid regurgitation. Moderate pulmonary hypertension with an estimated right ventricular systolic pressure of 61 mmHg. Evidence of moderate to severe diastolic dysfunction is seen. Bilateral pleural effusion noted. Compared to the previous study of 2022, no significant change in ejection

## 2025-05-29 ENCOUNTER — HOSPITAL ENCOUNTER (OUTPATIENT)
Dept: INTERVENTIONAL RADIOLOGY/VASCULAR | Age: 75
Discharge: HOME | End: 2025-05-31
Attending: FAMILY MEDICINE
Payer: MEDICARE

## 2025-05-29 ENCOUNTER — HOSPITAL ENCOUNTER (OUTPATIENT)
Age: 75
Discharge: HOME | End: 2025-05-31
Attending: FAMILY MEDICINE
Payer: MEDICARE

## 2025-05-29 ENCOUNTER — HOSPITAL ENCOUNTER (OUTPATIENT)
Age: 75
Discharge: HOME | End: 2025-05-31
Payer: MEDICARE

## 2025-05-29 VITALS
HEART RATE: 63 BPM | OXYGEN SATURATION: 93 % | RESPIRATION RATE: 15 BRPM | SYSTOLIC BLOOD PRESSURE: 117 MMHG | DIASTOLIC BLOOD PRESSURE: 66 MMHG | TEMPERATURE: 98.2 F

## 2025-05-29 VITALS
SYSTOLIC BLOOD PRESSURE: 89 MMHG | BODY MASS INDEX: 29.24 KG/M2 | DIASTOLIC BLOOD PRESSURE: 52 MMHG | WEIGHT: 186.29 LBS | HEIGHT: 67 IN

## 2025-05-29 DIAGNOSIS — I48.0 PAF (PAROXYSMAL ATRIAL FIBRILLATION) (HCC): Primary | ICD-10-CM

## 2025-05-29 DIAGNOSIS — I50.43 ACUTE ON CHRONIC COMBINED SYSTOLIC AND DIASTOLIC HF (HEART FAILURE), NYHA CLASS 4 (HCC): ICD-10-CM

## 2025-05-29 DIAGNOSIS — I50.42 CHRONIC COMBINED SYSTOLIC AND DIASTOLIC CONGESTIVE HEART FAILURE (HCC): Chronic | ICD-10-CM

## 2025-05-29 DIAGNOSIS — Z86.79 S/P ABLATION OF ATRIAL FIBRILLATION: ICD-10-CM

## 2025-05-29 DIAGNOSIS — Z98.890 S/P ABLATION OF ATRIAL FIBRILLATION: ICD-10-CM

## 2025-05-29 DIAGNOSIS — Z87.898 HISTORY OF SYNCOPE: ICD-10-CM

## 2025-05-29 DIAGNOSIS — I25.10 ASHD (ARTERIOSCLEROTIC HEART DISEASE): ICD-10-CM

## 2025-05-29 DIAGNOSIS — I48.0 PAF (PAROXYSMAL ATRIAL FIBRILLATION) (HCC): ICD-10-CM

## 2025-05-29 DIAGNOSIS — R06.02 SOB (SHORTNESS OF BREATH): ICD-10-CM

## 2025-05-29 DIAGNOSIS — I48.0 PAROXYSMAL ATRIAL FIBRILLATION WITH RVR (HCC): ICD-10-CM

## 2025-05-29 DIAGNOSIS — I73.9 PAD (PERIPHERAL ARTERY DISEASE): Chronic | ICD-10-CM

## 2025-05-29 DIAGNOSIS — E78.2 MIXED HYPERLIPIDEMIA: ICD-10-CM

## 2025-05-29 DIAGNOSIS — I48.0 PAF (PAROXYSMAL ATRIAL FIBRILLATION) (HCC): Chronic | ICD-10-CM

## 2025-05-29 LAB
ECHO AO SINUS VALSALVA DIAM: 3.2 CM
ECHO AO SINUS VALSALVA INDEX: 1.63 CM/M2
ECHO AO ST JNCT DIAM: 2.6 CM
ECHO AV CUSP MM: 1.6 CM
ECHO AV MEAN GRADIENT: 3 MMHG
ECHO AV MEAN VELOCITY: 0.8 M/S
ECHO AV PEAK GRADIENT: 5 MMHG
ECHO AV PEAK VELOCITY: 1.1 M/S
ECHO AV VELOCITY RATIO: 0.73
ECHO AV VTI: 17.9 CM
ECHO BSA: 2 M2
ECHO EST RA PRESSURE: 3 MMHG
ECHO LA AREA 2C: 25 CM2
ECHO LA AREA 4C: 26.5 CM2
ECHO LA MAJOR AXIS: 6.1 CM
ECHO LA MINOR AXIS: 6.5 CM
ECHO LA VOL BP: 83 ML (ref 18–58)
ECHO LA VOL MOD A2C: 78 ML (ref 18–58)
ECHO LA VOL MOD A4C: 84 ML (ref 18–58)
ECHO LA VOL/BSA BIPLANE: 42 ML/M2 (ref 16–34)
ECHO LA VOLUME INDEX MOD A2C: 40 ML/M2 (ref 16–34)
ECHO LA VOLUME INDEX MOD A4C: 43 ML/M2 (ref 16–34)
ECHO LV EDV A2C: 63 ML
ECHO LV EDV A4C: 85 ML
ECHO LV EDV INDEX A4C: 43 ML/M2
ECHO LV EDV NDEX A2C: 32 ML/M2
ECHO LV EF PHYSICIAN: 35 %
ECHO LV EJECTION FRACTION A2C: 39 %
ECHO LV EJECTION FRACTION A4C: 37 %
ECHO LV EJECTION FRACTION BIPLANE: 41 % (ref 55–100)
ECHO LV ESV A2C: 39 ML
ECHO LV ESV A4C: 53 ML
ECHO LV ESV INDEX A2C: 20 ML/M2
ECHO LV ESV INDEX A4C: 27 ML/M2
ECHO LV FRACTIONAL SHORTENING: 14 % (ref 28–44)
ECHO LV INTERNAL DIMENSION DIASTOLE INDEX: 2.6 CM/M2
ECHO LV INTERNAL DIMENSION DIASTOLIC: 5.1 CM (ref 4.2–5.9)
ECHO LV INTERNAL DIMENSION SYSTOLIC INDEX: 2.24 CM/M2
ECHO LV INTERNAL DIMENSION SYSTOLIC: 4.4 CM
ECHO LV IVSD: 0.9 CM (ref 0.6–1)
ECHO LV MASS 2D: 175.6 G (ref 88–224)
ECHO LV MASS INDEX 2D: 89.6 G/M2 (ref 49–115)
ECHO LV POSTERIOR WALL DIASTOLIC: 1 CM (ref 0.6–1)
ECHO LV RELATIVE WALL THICKNESS RATIO: 0.39
ECHO LVOT AV VTI INDEX: 0.71
ECHO LVOT MEAN GRADIENT: 1 MMHG
ECHO LVOT PEAK GRADIENT: 3 MMHG
ECHO LVOT PEAK VELOCITY: 0.8 M/S
ECHO LVOT VTI: 12.7 CM
ECHO MV E DECELERATION TIME (DT): 161 MS
ECHO PV MAX VELOCITY: 0.9 M/S
ECHO PV PEAK GRADIENT: 3 MMHG
ECHO RA AREA 4C: 19.5 CM2
ECHO RA END SYSTOLIC VOLUME APICAL 4 CHAMBER INDEX BSA: 29 ML/M2
ECHO RA VOLUME: 57 ML
ECHO RIGHT VENTRICULAR SYSTOLIC PRESSURE (RVSP): 14 MMHG
ECHO RV TAPSE: 1.9 CM (ref 1.7–?)
ECHO TV REGURGITANT MAX VELOCITY: 1.67 M/S
ECHO TV REGURGITANT PEAK GRADIENT: 11 MMHG

## 2025-05-29 PROCEDURE — 93306 TTE W/DOPPLER COMPLETE: CPT

## 2025-05-29 PROCEDURE — 93246 EXT ECG>7D<15D RECORDING: CPT

## 2025-05-29 PROCEDURE — 7100000010 HC PHASE II RECOVERY - FIRST 15 MIN: Performed by: FAMILY MEDICINE

## 2025-05-29 PROCEDURE — 92960 CARDIOVERSION ELECTRIC EXT: CPT | Performed by: FAMILY MEDICINE

## 2025-05-29 PROCEDURE — 7100000011 HC PHASE II RECOVERY - ADDTL 15 MIN: Performed by: FAMILY MEDICINE

## 2025-05-29 PROCEDURE — 6360000002 HC RX W HCPCS: Performed by: FAMILY MEDICINE

## 2025-05-29 RX ORDER — SODIUM CHLORIDE 0.9 % (FLUSH) 0.9 %
5-40 SYRINGE (ML) INJECTION EVERY 12 HOURS SCHEDULED
Status: DISCONTINUED | OUTPATIENT
Start: 2025-05-29 | End: 2025-06-01 | Stop reason: HOSPADM

## 2025-05-29 RX ORDER — MIDAZOLAM HYDROCHLORIDE 1 MG/ML
INJECTION, SOLUTION INTRAMUSCULAR; INTRAVENOUS PRN
Status: COMPLETED | OUTPATIENT
Start: 2025-05-29 | End: 2025-05-29

## 2025-05-29 RX ORDER — SODIUM CHLORIDE 9 MG/ML
INJECTION, SOLUTION INTRAVENOUS PRN
Status: DISCONTINUED | OUTPATIENT
Start: 2025-05-29 | End: 2025-06-01 | Stop reason: HOSPADM

## 2025-05-29 RX ORDER — SODIUM CHLORIDE 0.9 % (FLUSH) 0.9 %
5-40 SYRINGE (ML) INJECTION PRN
Status: DISCONTINUED | OUTPATIENT
Start: 2025-05-29 | End: 2025-06-01 | Stop reason: HOSPADM

## 2025-05-29 RX ORDER — FENTANYL CITRATE 50 UG/ML
INJECTION, SOLUTION INTRAMUSCULAR; INTRAVENOUS PRN
Status: COMPLETED | OUTPATIENT
Start: 2025-05-29 | End: 2025-05-29

## 2025-05-29 RX ORDER — LORAZEPAM 2 MG/ML
INJECTION INTRAMUSCULAR PRN
Status: COMPLETED | OUTPATIENT
Start: 2025-05-29 | End: 2025-05-29

## 2025-05-29 RX ADMIN — MIDAZOLAM 1 MG: 1 INJECTION INTRAMUSCULAR; INTRAVENOUS at 15:05

## 2025-05-29 RX ADMIN — FENTANYL CITRATE 25 MCG: 50 INJECTION INTRAMUSCULAR; INTRAVENOUS at 15:00

## 2025-05-29 RX ADMIN — MIDAZOLAM 2 MG: 1 INJECTION INTRAMUSCULAR; INTRAVENOUS at 15:01

## 2025-05-29 NOTE — PROGRESS NOTES
Received to recovery per cart.  Arouses easily but falls asleep readily. Supplemental oxygen for low saturations.

## 2025-05-29 NOTE — PROGRESS NOTES
Pt's brother's wife, Fidelia, will transport pt back to [facility] at approx 1545. Sister, Shira, updated.

## 2025-05-29 NOTE — PROGRESS NOTES
Pt taken to Cardio-Pulmonary department, via wheelchair, for placement of Cam Monitor.     Report called to  at [facility], spoke with Alli. All questions answered. Alli aware pt if being transported back to facility by sister-in-law, Fidelia, and is en route.

## 2025-05-29 NOTE — PROGRESS NOTES
Patient taken to cardio pulmonary for a cam monitor placement and then being taken back to [facility] by his sister-in-law

## 2025-05-30 PROBLEM — I50.43 ACUTE ON CHRONIC COMBINED SYSTOLIC AND DIASTOLIC HF (HEART FAILURE), NYHA CLASS 4 (HCC): Status: ACTIVE | Noted: 2025-05-30

## 2025-05-30 NOTE — PROCEDURES
PROCEDURE NOTE  Date: 5/30/2025   Name: Michael Chan  YOB: 1950    Procedures             Brief Postoperative Note:    Patient name: Michael Chan   YOB: 1950   Date of Procedure: 5/30/2025   Medical Record Number: [id number]    Procedure: Cardioversion    Pre-operative Diagnosis: Persistent atrial fibrillation, symptomatic    Post-operative Diagnosis: Successful cardioversion to NSR with  200J biphasic    Anesthesia: Conscious sedation    Surgeons/Assistants: Nadine    Estimated Blood Loss: None    Complications: None    I asked Mr. Chan to call my office if he had any problems, but otherwise to follow up with me in about a month as well as follow up with his primary care physician as previously scheduled.      Tejas Mccoy MD, MS, F.A.C.C.  [facility]  [address] [place], [place] [address]  Phone: [phone], Fax: [phone]

## 2025-06-01 ENCOUNTER — RESULTS FOLLOW-UP (OUTPATIENT)
Dept: CARDIOLOGY | Age: 75
End: 2025-06-01

## 2025-06-01 LAB
EKG ATRIAL RATE: 58 BPM
EKG P AXIS: 63 DEGREES
EKG P-R INTERVAL: 174 MS
EKG Q-T INTERVAL: 336 MS
EKG Q-T INTERVAL: 410 MS
EKG QRS DURATION: 72 MS
EKG QRS DURATION: 76 MS
EKG QTC CALCULATION (BAZETT): 402 MS
EKG QTC CALCULATION (BAZETT): 444 MS
EKG R AXIS: 23 DEGREES
EKG R AXIS: 46 DEGREES
EKG T AXIS: 4 DEGREES
EKG T AXIS: 83 DEGREES
EKG VENTRICULAR RATE: 105 BPM
EKG VENTRICULAR RATE: 58 BPM

## 2025-06-09 ENCOUNTER — TELEPHONE (OUTPATIENT)
Dept: CARDIOLOGY | Age: 75
End: 2025-06-09

## 2025-06-09 ENCOUNTER — HOSPITAL ENCOUNTER (OUTPATIENT)
Age: 75
Discharge: HOME | End: 2025-06-09
Payer: MEDICARE

## 2025-06-09 DIAGNOSIS — I50.43 ACUTE ON CHRONIC COMBINED SYSTOLIC AND DIASTOLIC HF (HEART FAILURE), NYHA CLASS 4 (HCC): Primary | ICD-10-CM

## 2025-06-09 DIAGNOSIS — I50.43 ACUTE ON CHRONIC COMBINED SYSTOLIC AND DIASTOLIC HF (HEART FAILURE), NYHA CLASS 4 (HCC): ICD-10-CM

## 2025-06-09 LAB
ANION GAP SERPL CALCULATED.3IONS-SCNC: 11 MMOL/L (ref 9–16)
BUN SERPL-MCNC: 33 MG/DL (ref 8–23)
BUN/CREAT SERPL: 18 (ref 9–20)
CALCIUM SERPL-MCNC: 9.7 MG/DL (ref 8.6–10.4)
CHLORIDE SERPL-SCNC: 103 MMOL/L (ref 98–107)
CO2 SERPL-SCNC: 27 MMOL/L (ref 20–31)
CREAT SERPL-MCNC: 1.8 MG/DL (ref 0.7–1.2)
GFR, ESTIMATED: 40 ML/MIN/1.73M2
GLUCOSE SERPL-MCNC: 87 MG/DL (ref 74–99)
POTASSIUM SERPL-SCNC: 5.2 MMOL/L (ref 3.7–5.3)
SODIUM SERPL-SCNC: 141 MMOL/L (ref 136–145)

## 2025-06-09 PROCEDURE — 80048 BASIC METABOLIC PNL TOTAL CA: CPT

## 2025-06-09 PROCEDURE — 36415 COLL VENOUS BLD VENIPUNCTURE: CPT

## 2025-06-11 ENCOUNTER — RESULTS FOLLOW-UP (OUTPATIENT)
Dept: CARDIOLOGY | Age: 75
End: 2025-06-11

## 2025-06-13 ENCOUNTER — TELEPHONE (OUTPATIENT)
Dept: CARDIOLOGY | Age: 75
End: 2025-06-13

## 2025-06-13 ENCOUNTER — APPOINTMENT (OUTPATIENT)
Dept: GENERAL RADIOLOGY | Age: 75
DRG: 309 | End: 2025-06-13
Payer: MEDICARE

## 2025-06-13 ENCOUNTER — HOSPITAL ENCOUNTER (INPATIENT)
Age: 75
LOS: 2 days | Discharge: NURSING, REHAB OR LONG-TERM CARE | DRG: 309 | End: 2025-06-15
Attending: EMERGENCY MEDICINE | Admitting: INTERNAL MEDICINE
Payer: MEDICARE

## 2025-06-13 DIAGNOSIS — F41.9 ANXIETY: Chronic | ICD-10-CM

## 2025-06-13 DIAGNOSIS — I48.91 ATRIAL FIBRILLATION WITH RVR (HCC): Primary | ICD-10-CM

## 2025-06-13 PROBLEM — I47.10 PSVT (PAROXYSMAL SUPRAVENTRICULAR TACHYCARDIA): Status: ACTIVE | Noted: 2025-06-13

## 2025-06-13 LAB
ANION GAP SERPL CALCULATED.3IONS-SCNC: 13 MMOL/L (ref 9–16)
BASOPHILS # BLD: 0.03 K/UL (ref 0–0.2)
BASOPHILS NFR BLD: 1 % (ref 0–2)
BUN SERPL-MCNC: 28 MG/DL (ref 8–23)
BUN/CREAT SERPL: 19 (ref 9–20)
CALCIUM SERPL-MCNC: 9.5 MG/DL (ref 8.6–10.4)
CHLORIDE SERPL-SCNC: 104 MMOL/L (ref 98–107)
CO2 SERPL-SCNC: 24 MMOL/L (ref 20–31)
CREAT SERPL-MCNC: 1.5 MG/DL (ref 0.7–1.2)
DIGOXIN SERPL-MCNC: 0.7 NG/ML (ref 0.8–2)
EOSINOPHIL # BLD: 0.12 K/UL (ref 0–0.44)
EOSINOPHILS RELATIVE PERCENT: 2 % (ref 1–4)
ERYTHROCYTE [DISTWIDTH] IN BLOOD BY AUTOMATED COUNT: 14.6 % (ref 11.8–14.4)
GFR, ESTIMATED: 47 ML/MIN/1.73M2
GLUCOSE SERPL-MCNC: 117 MG/DL (ref 74–99)
HCT VFR BLD AUTO: 52.1 % (ref 40.7–50.3)
HGB BLD-MCNC: 17.2 G/DL (ref 13–17)
IMM GRANULOCYTES # BLD AUTO: 0.03 K/UL (ref 0–0.3)
IMM GRANULOCYTES NFR BLD: 1 %
LYMPHOCYTES NFR BLD: 1.43 K/UL (ref 1.1–3.7)
LYMPHOCYTES RELATIVE PERCENT: 25 % (ref 24–43)
MCH RBC QN AUTO: 31.9 PG (ref 25.2–33.5)
MCHC RBC AUTO-ENTMCNC: 33 G/DL (ref 28.4–34.8)
MCV RBC AUTO: 96.7 FL (ref 82.6–102.9)
MONOCYTES NFR BLD: 0.44 K/UL (ref 0.1–1.2)
MONOCYTES NFR BLD: 8 % (ref 3–12)
NEUTROPHILS NFR BLD: 64 % (ref 36–65)
NEUTS SEG NFR BLD: 3.61 K/UL (ref 1.5–8.1)
NRBC BLD-RTO: 0 PER 100 WBC
PLATELET # BLD AUTO: ABNORMAL K/UL (ref 138–453)
PLATELET, FLUORESCENCE: 112 K/UL (ref 138–453)
PLATELETS.RETICULATED NFR BLD AUTO: 5.9 % (ref 1.1–10.3)
POTASSIUM SERPL-SCNC: 5 MMOL/L (ref 3.7–5.3)
RBC # BLD AUTO: 5.39 M/UL (ref 4.21–5.77)
SODIUM SERPL-SCNC: 141 MMOL/L (ref 136–145)
TROPONIN I SERPL HS-MCNC: 22 NG/L (ref 0–22)
TROPONIN I SERPL HS-MCNC: 24 NG/L (ref 0–22)
WBC OTHER # BLD: 5.7 K/UL (ref 3.5–11.3)

## 2025-06-13 PROCEDURE — 2000000000 HC ICU R&B

## 2025-06-13 PROCEDURE — 6360000002 HC RX W HCPCS: Performed by: FAMILY MEDICINE

## 2025-06-13 PROCEDURE — 80162 ASSAY OF DIGOXIN TOTAL: CPT

## 2025-06-13 PROCEDURE — 80048 BASIC METABOLIC PNL TOTAL CA: CPT

## 2025-06-13 PROCEDURE — 84484 ASSAY OF TROPONIN QUANT: CPT

## 2025-06-13 PROCEDURE — 99285 EMERGENCY DEPT VISIT HI MDM: CPT

## 2025-06-13 PROCEDURE — 85025 COMPLETE CBC W/AUTO DIFF WBC: CPT

## 2025-06-13 PROCEDURE — 2580000003 HC RX 258: Performed by: FAMILY MEDICINE

## 2025-06-13 PROCEDURE — 2500000003 HC RX 250 WO HCPCS: Performed by: NURSE PRACTITIONER

## 2025-06-13 PROCEDURE — 99232 SBSQ HOSP IP/OBS MODERATE 35: CPT | Performed by: FAMILY MEDICINE

## 2025-06-13 PROCEDURE — 6370000000 HC RX 637 (ALT 250 FOR IP): Performed by: NURSE PRACTITIONER

## 2025-06-13 PROCEDURE — 94761 N-INVAS EAR/PLS OXIMETRY MLT: CPT

## 2025-06-13 PROCEDURE — 71045 X-RAY EXAM CHEST 1 VIEW: CPT

## 2025-06-13 PROCEDURE — 93005 ELECTROCARDIOGRAM TRACING: CPT | Performed by: EMERGENCY MEDICINE

## 2025-06-13 PROCEDURE — 2580000003 HC RX 258: Performed by: NURSE PRACTITIONER

## 2025-06-13 RX ORDER — DOCUSATE SODIUM 100 MG/1
100 CAPSULE, LIQUID FILLED ORAL 2 TIMES DAILY
Status: DISCONTINUED | OUTPATIENT
Start: 2025-06-13 | End: 2025-06-15 | Stop reason: HOSPADM

## 2025-06-13 RX ORDER — LORAZEPAM 0.5 MG/1
0.5 TABLET ORAL NIGHTLY
Status: DISCONTINUED | OUTPATIENT
Start: 2025-06-13 | End: 2025-06-15 | Stop reason: HOSPADM

## 2025-06-13 RX ORDER — SODIUM CHLORIDE 0.9 % (FLUSH) 0.9 %
5-40 SYRINGE (ML) INJECTION EVERY 12 HOURS SCHEDULED
Status: DISCONTINUED | OUTPATIENT
Start: 2025-06-13 | End: 2025-06-15 | Stop reason: HOSPADM

## 2025-06-13 RX ORDER — CLOPIDOGREL BISULFATE 75 MG/1
75 TABLET ORAL DAILY
Status: DISCONTINUED | OUTPATIENT
Start: 2025-06-14 | End: 2025-06-15 | Stop reason: HOSPADM

## 2025-06-13 RX ORDER — GABAPENTIN 300 MG/1
600 CAPSULE ORAL NIGHTLY
Status: DISCONTINUED | OUTPATIENT
Start: 2025-06-13 | End: 2025-06-13

## 2025-06-13 RX ORDER — SODIUM CHLORIDE 0.9 % (FLUSH) 0.9 %
5-40 SYRINGE (ML) INJECTION PRN
Status: DISCONTINUED | OUTPATIENT
Start: 2025-06-13 | End: 2025-06-15 | Stop reason: HOSPADM

## 2025-06-13 RX ORDER — GABAPENTIN 300 MG/1
300 CAPSULE ORAL 2 TIMES DAILY
Status: DISCONTINUED | OUTPATIENT
Start: 2025-06-13 | End: 2025-06-15 | Stop reason: HOSPADM

## 2025-06-13 RX ORDER — LOSARTAN POTASSIUM 25 MG/1
12.5 TABLET ORAL DAILY
Status: DISCONTINUED | OUTPATIENT
Start: 2025-06-14 | End: 2025-06-15 | Stop reason: HOSPADM

## 2025-06-13 RX ORDER — VENLAFAXINE HYDROCHLORIDE 75 MG/1
75 TABLET, EXTENDED RELEASE ORAL DAILY
Status: DISCONTINUED | OUTPATIENT
Start: 2025-06-14 | End: 2025-06-13 | Stop reason: SDUPTHER

## 2025-06-13 RX ORDER — SODIUM CHLORIDE 9 MG/ML
INJECTION, SOLUTION INTRAVENOUS PRN
Status: DISCONTINUED | OUTPATIENT
Start: 2025-06-13 | End: 2025-06-15 | Stop reason: HOSPADM

## 2025-06-13 RX ORDER — POLYETHYLENE GLYCOL 3350 17 G/17G
17 POWDER, FOR SOLUTION ORAL DAILY PRN
Status: DISCONTINUED | OUTPATIENT
Start: 2025-06-13 | End: 2025-06-15 | Stop reason: HOSPADM

## 2025-06-13 RX ORDER — M-VIT,TX,IRON,MINS/CALC/FOLIC 27MG-0.4MG
1 TABLET ORAL EVERY MORNING
Status: DISCONTINUED | OUTPATIENT
Start: 2025-06-14 | End: 2025-06-15 | Stop reason: HOSPADM

## 2025-06-13 RX ORDER — BISACODYL 10 MG
10 SUPPOSITORY, RECTAL RECTAL DAILY PRN
Status: DISCONTINUED | OUTPATIENT
Start: 2025-06-13 | End: 2025-06-15 | Stop reason: HOSPADM

## 2025-06-13 RX ORDER — SODIUM CHLORIDE 9 MG/ML
INJECTION, SOLUTION INTRAVENOUS CONTINUOUS
Status: ACTIVE | OUTPATIENT
Start: 2025-06-13 | End: 2025-06-14

## 2025-06-13 RX ORDER — POTASSIUM CHLORIDE 29.8 MG/ML
20 INJECTION INTRAVENOUS PRN
Status: DISCONTINUED | OUTPATIENT
Start: 2025-06-13 | End: 2025-06-15 | Stop reason: HOSPADM

## 2025-06-13 RX ORDER — VENLAFAXINE HYDROCHLORIDE 75 MG/1
75 CAPSULE, EXTENDED RELEASE ORAL
Status: DISCONTINUED | OUTPATIENT
Start: 2025-06-14 | End: 2025-06-15 | Stop reason: HOSPADM

## 2025-06-13 RX ORDER — GABAPENTIN 300 MG/1
300 CAPSULE ORAL NIGHTLY
Status: DISCONTINUED | OUTPATIENT
Start: 2025-06-13 | End: 2025-06-15 | Stop reason: HOSPADM

## 2025-06-13 RX ORDER — FOLIC ACID 1 MG/1
1000 TABLET ORAL DAILY
Status: DISCONTINUED | OUTPATIENT
Start: 2025-06-14 | End: 2025-06-15 | Stop reason: HOSPADM

## 2025-06-13 RX ORDER — METOPROLOL SUCCINATE 25 MG/1
12.5 TABLET, EXTENDED RELEASE ORAL DAILY
Status: DISCONTINUED | OUTPATIENT
Start: 2025-06-14 | End: 2025-06-15 | Stop reason: HOSPADM

## 2025-06-13 RX ORDER — PANTOPRAZOLE SODIUM 40 MG/1
40 TABLET, DELAYED RELEASE ORAL
Status: DISCONTINUED | OUTPATIENT
Start: 2025-06-14 | End: 2025-06-15 | Stop reason: HOSPADM

## 2025-06-13 RX ORDER — TAMSULOSIN HYDROCHLORIDE 0.4 MG/1
0.4 CAPSULE ORAL EVERY MORNING
Status: DISCONTINUED | OUTPATIENT
Start: 2025-06-14 | End: 2025-06-15 | Stop reason: HOSPADM

## 2025-06-13 RX ORDER — GABAPENTIN 600 MG/1
600 TABLET ORAL NIGHTLY
COMMUNITY

## 2025-06-13 RX ORDER — LANOLIN ALCOHOL/MO/W.PET/CERES
400 CREAM (GRAM) TOPICAL DAILY
Status: DISCONTINUED | OUTPATIENT
Start: 2025-06-14 | End: 2025-06-15 | Stop reason: HOSPADM

## 2025-06-13 RX ORDER — AMIODARONE HYDROCHLORIDE 200 MG/1
100 TABLET ORAL DAILY
Status: DISCONTINUED | OUTPATIENT
Start: 2025-06-14 | End: 2025-06-14

## 2025-06-13 RX ORDER — ACETAMINOPHEN 650 MG/1
650 SUPPOSITORY RECTAL EVERY 6 HOURS PRN
Status: DISCONTINUED | OUTPATIENT
Start: 2025-06-13 | End: 2025-06-15 | Stop reason: HOSPADM

## 2025-06-13 RX ORDER — CELECOXIB 200 MG/1
200 CAPSULE ORAL DAILY
Status: DISCONTINUED | OUTPATIENT
Start: 2025-06-14 | End: 2025-06-15 | Stop reason: HOSPADM

## 2025-06-13 RX ORDER — DIGOXIN 125 MCG
125 TABLET ORAL DAILY
Status: DISCONTINUED | OUTPATIENT
Start: 2025-06-14 | End: 2025-06-15 | Stop reason: HOSPADM

## 2025-06-13 RX ORDER — LACTULOSE 10 G/15ML
10 SOLUTION ORAL 3 TIMES DAILY PRN
Status: DISCONTINUED | OUTPATIENT
Start: 2025-06-13 | End: 2025-06-15 | Stop reason: HOSPADM

## 2025-06-13 RX ORDER — MIDODRINE HYDROCHLORIDE 5 MG/1
15 TABLET ORAL 2 TIMES DAILY WITH MEALS
Status: DISCONTINUED | OUTPATIENT
Start: 2025-06-13 | End: 2025-06-15 | Stop reason: HOSPADM

## 2025-06-13 RX ORDER — ACETAMINOPHEN 325 MG/1
650 TABLET ORAL EVERY 6 HOURS PRN
Status: DISCONTINUED | OUTPATIENT
Start: 2025-06-13 | End: 2025-06-15 | Stop reason: HOSPADM

## 2025-06-13 RX ORDER — LOPERAMIDE HYDROCHLORIDE 2 MG/1
2 CAPSULE ORAL 4 TIMES DAILY PRN
Status: DISCONTINUED | OUTPATIENT
Start: 2025-06-13 | End: 2025-06-15 | Stop reason: HOSPADM

## 2025-06-13 RX ORDER — ATORVASTATIN CALCIUM 40 MG/1
80 TABLET, FILM COATED ORAL NIGHTLY
Status: DISCONTINUED | OUTPATIENT
Start: 2025-06-13 | End: 2025-06-15 | Stop reason: HOSPADM

## 2025-06-13 RX ORDER — POTASSIUM CHLORIDE 7.45 MG/ML
10 INJECTION INTRAVENOUS PRN
Status: DISCONTINUED | OUTPATIENT
Start: 2025-06-13 | End: 2025-06-15 | Stop reason: HOSPADM

## 2025-06-13 RX ORDER — VITAMIN B COMPLEX
2000 TABLET ORAL DAILY
Status: DISCONTINUED | OUTPATIENT
Start: 2025-06-14 | End: 2025-06-15 | Stop reason: HOSPADM

## 2025-06-13 RX ORDER — POTASSIUM CHLORIDE 1500 MG/1
20 TABLET, EXTENDED RELEASE ORAL 2 TIMES DAILY
Status: DISCONTINUED | OUTPATIENT
Start: 2025-06-13 | End: 2025-06-15 | Stop reason: HOSPADM

## 2025-06-13 RX ORDER — MAGNESIUM SULFATE IN WATER 40 MG/ML
2000 INJECTION, SOLUTION INTRAVENOUS PRN
Status: DISCONTINUED | OUTPATIENT
Start: 2025-06-13 | End: 2025-06-15 | Stop reason: HOSPADM

## 2025-06-13 RX ORDER — SWAB
2 SWAB, NON-MEDICATED MISCELLANEOUS DAILY
Status: DISCONTINUED | OUTPATIENT
Start: 2025-06-14 | End: 2025-06-13 | Stop reason: RX

## 2025-06-13 RX ORDER — ONDANSETRON 4 MG/1
4 TABLET, ORALLY DISINTEGRATING ORAL 3 TIMES DAILY PRN
Status: DISCONTINUED | OUTPATIENT
Start: 2025-06-13 | End: 2025-06-15 | Stop reason: HOSPADM

## 2025-06-13 RX ORDER — GAUZE BANDAGE 2" X 2"
100 BANDAGE TOPICAL DAILY
Status: DISCONTINUED | OUTPATIENT
Start: 2025-06-14 | End: 2025-06-15 | Stop reason: HOSPADM

## 2025-06-13 RX ADMIN — GABAPENTIN 300 MG: 300 CAPSULE ORAL at 21:22

## 2025-06-13 RX ADMIN — Medication 1 MG/MIN: at 15:33

## 2025-06-13 RX ADMIN — DOCUSATE SODIUM 100 MG: 100 CAPSULE, LIQUID FILLED ORAL at 21:22

## 2025-06-13 RX ADMIN — MIDODRINE HYDROCHLORIDE 15 MG: 5 TABLET ORAL at 17:43

## 2025-06-13 RX ADMIN — LORAZEPAM 0.5 MG: 0.5 TABLET ORAL at 21:22

## 2025-06-13 RX ADMIN — AMIODARONE HYDROCHLORIDE 0.5 MG/MIN: 50 INJECTION, SOLUTION INTRAVENOUS at 23:37

## 2025-06-13 RX ADMIN — ATORVASTATIN CALCIUM 80 MG: 40 TABLET, FILM COATED ORAL at 21:22

## 2025-06-13 RX ADMIN — APIXABAN 5 MG: 5 TABLET, FILM COATED ORAL at 21:22

## 2025-06-13 RX ADMIN — POTASSIUM CHLORIDE 20 MEQ: 1500 TABLET, EXTENDED RELEASE ORAL at 21:22

## 2025-06-13 RX ADMIN — SODIUM CHLORIDE: 0.9 INJECTION, SOLUTION INTRAVENOUS at 15:34

## 2025-06-13 ASSESSMENT — PAIN - FUNCTIONAL ASSESSMENT: PAIN_FUNCTIONAL_ASSESSMENT: NONE - DENIES PAIN

## 2025-06-13 NOTE — TELEPHONE ENCOUNTER
Per  tell pt to come to ER, should be admitted for AF. Spoke with pt representative, Mikaela who voiced understanding.

## 2025-06-13 NOTE — CONSULTS
Patient: Michael Chan  : 1950  Primary Care Physician: Mane Harris  Today's Date: 2025    REASON FOR CONSULTATION: atrial fibrillation    HPI: Mr. Chan is a 74 y.o. male well known to me who I saw in consultation today. Mr. Chan has a longstanding history of atrial fibrillation with RVR leading to this consultation. He was then readmitted due to severe dehydration. In the past he reports seeing a Cardiologist in [place] and was planning on having a cardioversion done in  to put him back into normal sinus rhythm due to his history of new onset atrial fibrillation. He reports a history of a heart attack at age 52 and needed stents placed at that time but denies any cardiac cath since that time. He also has carotid artery stenosis and abdominal aortic aneurysm. He did report having surgery on one side of his neck, however he is not sure which side or how long ago it was. He has had hypertension and hyperlipidemia for years as well. He is a current every day smoker and he smoked for about 55 years and smokes about a pack a day. Family history consists of a lot of people in his family with significant cardiac history, however he wanted us to talk to his sister about the family history as she knows the details. Echo done on 2022 showed an EF of 15-20%. ECHO done on 22: Dilated all cardiac chambers.Severe biventricular systolic dysfunction. Estimated left ventricular ejection fraction is 20-25% Moderate to severe mitral and moderate tricuspid regurgitation. Moderate pulmonary hypertension with an estimated right ventricular systolic pressure of 61 mmHg. Evidence of moderate to severe diastolic dysfunction is seen. Bilateral pleural effusion noted. Compared to the previous study of 2022, no significant change in ejection fraction. Functional mitral and tricuspid regurgitation are worse. Bilateral pleural effusion and left elevated left ventricular filling        Sincerely,  Tejas Mccoy MD, MS, [facility]    [address] [place] [id number]  Phone: [phone], Fax: [phone]     I believe that the risk of significant morbidity and mortality related to the patient's current medical conditions are: intermediate-high. >60 minutes were spent during prep work, discussion and exam of the patient, and follow up documentation and all of their questions were answered.      June 13, 2025

## 2025-06-13 NOTE — PROGRESS NOTES
[facility]          Department of Pharmacy   Pharmacy Renal Adjustment Note    Michael Chan is a 74 y.o. male. Pharmacist assessment of renally cleared medications.    Recent Labs     06/13/25  0948   CREATININE 1.5*     Estimated Creatinine Clearance: 45 mL/min (A) (based on SCr of 1.5 mg/dL (H)).    Height:   Ht Readings from Last 1 Encounters:   06/13/25 1.702 m (5' 7\")     Weight:  Wt Readings from Last 1 Encounters:   06/13/25 86.5 kg (190 lb 11.2 oz)       The following medication(s) have been adjusted based upon renal function:             Gabapentin 600 mg nightly to gabapentin 300 mg nightly (for total daily gabapentin dose of 900 mg for CrCl of 30-49 mL/min)        Joy Montilla [facility],6/13/2025,3:34 PM

## 2025-06-13 NOTE — H&P
ICU Admission - Hospital Medicine  History and Physical    Patient:  Michael Chan  MRN: [id number]    Chief Complaint: Atrial fibrillation    History Obtained From:  patient, spouse, electronic medical record    PCP: Mane Harris MD    History of Present Illness:   The patient is a 74 y.o. male who presented to the emergency room from the [facility] per the direction of Dr. Mccoy for atrial fibrillation with RVR.  Patient has history of atrial fibrillation with RVR and has underwent several cardioversions as well as ablations.  Patient had vital connect monitor on and has had several hours of atrial fibrillation with RVR with heart rates exceeding 170.  Patient is asymptomatic.  He does report some shortness of breath but no more than his baseline.  He denied any recent illness including fever or chills.  He denied dizziness syncope or falls.  During patient's workup BUN was 28 and creatinine 1.5 which is baseline.  Troponin was 24 and 22.  CBC unremarkable.  Digoxin level is pending.  Chest x-ray showed no acute findings.  EKG showed atrial fibrillation with heart rate of 89.  Vital signs are stable.  I did speak with Dr. Hines regarding admission plan will be amiodarone drip.  He did speak with Dr. Dailey at [facility] and will consider pacemaker with ablation if rate not controlled.    Past Medical History:        Diagnosis Date    Acute kidney injury     Ascites 05/20/2022    Atrial fibrillation (HCC)     CHF (congestive heart failure) (HCC)     Cirrhosis of liver with ascites (HCC) 05/19/2022    CKD (chronic kidney disease) 05/20/2022    Coronary artery disease     Encounter for cardioversion procedure 05/29/2025    Dr Mccoy/[facility]    Hyperlipidemia     Hypertension     Hypomagnesemia 05/20/2022    Hyponatremia     Thrombocytopenia        Past Surgical History:        Procedure Laterality Date    CARDIAC CATHETERIZATION Left 09/19/2024    DR Mccoy/[facility]  [facility]/right radial    CARDIAC PROCEDURE N/A 9/19/2024    Left heart cath / coronary angiography performed by Tejas Mccoy MD at [facility] CARDIAC CATH/IR LAB    CARDIAC PROCEDURE N/A 10/1/2024    dariel / Percutaneous coronary intervention performed by Heidy Hearn MD at [facility] CARDIAC CATH LAB    CARDIAC PROCEDURE N/A 10/1/2024    Insert stent pierre coronary performed by Heidy Hearn MD at [facility] CARDIAC CATH LAB    CAROTID STENT PLACEMENT Bilateral     COLONOSCOPY      COLONOSCOPY N/A 07/11/2023    COLORECTAL CANCER SCREENING, NOT HIGH RISK performed by Tena Wilson MD at [facility] OR    COLONOSCOPY  07/11/2023    -hemorrhoids,poor prep    EP DEVICE PROCEDURE N/A 5/9/2025    el tano / Ablation A-fib w/anes / aimee, ice, cryo performed by Parvez Sims MD at [facility] CARDIAC CATH LAB    ESOPHAGOGASTRODUODENOSCOPY  07/11/2023    Dr. Wilson-bx,portal hypertensive gastropathy    HERNIA REPAIR      INVASIVE VASCULAR N/A 10/1/2024    Intravascular lithotripsy performed by Heidy Hearn MD at [facility] CARDIAC CATH LAB    INVASIVE VASCULAR N/A 1/20/2025    Angiography lower ext bilat performed by Anil Pittman MD at [facility] CARDIAC CATH/IR LAB    INVASIVE VASCULAR N/A 1/20/2025    Angioplasty peripheral artery performed by Anil Pittman MD at [facility] CARDIAC CATH/IR LAB    ROTATOR CUFF REPAIR Right     TONSILLECTOMY AND ADENOIDECTOMY      UPPER GASTROINTESTINAL ENDOSCOPY N/A 07/11/2023    EGD BIOPSY performed by Tena Wilson MD at [facility] OR       Medications Prior to Admission:    Prior to Admission medications    Medication Sig Start Date End Date Taking? Authorizing Provider   gabapentin (NEURONTIN) 600 MG tablet Take 1 tablet by mouth at bedtime.   Yes ProviderGilemr MD   celecoxib (CELEBREX) 200 MG capsule Take 1 capsule by mouth daily   Yes Glimer Carranza MD   midodrine (PROAMATINE) 10 MG tablet Take 1.5 tablets by mouth 2 times daily (with meals) 5/9/25  Yes Parvez Sims MD   amiodarone

## 2025-06-13 NOTE — ED PROVIDER NOTES
[facility] EMERGENCY DEPARTMENT  EMERGENCY DEPARTMENT ENCOUNTER      Pt Name: Michael Chan  MRN: [id number]  Birthdate 1950  Date of evaluation: 6/13/2025  Provider: Monica Knight MD    CHIEF COMPLAINT       Chief Complaint   Patient presents with    Atrial Fibrillation     Patient sent out per cardiology. Patient currently in afib and told to come to the ER per cardiology. Patient also complains of some periods of shortness of breath. Patient recently had cardioversion and ablation in the last month.         HISTORY OF PRESENT ILLNESS   (Location/Symptom, Timing/Onset, Context/Setting, Quality, Duration, Modifying Factors, Severity)  Note limiting factors.   Michael Chan is a 74 y.o. male who presents to the emergency department ***     HPI    Nursing Notes were reviewed.    REVIEW OF SYSTEMS    (2-9 systems for level 4, 10 or more for level 5)     Review of Systems    Except as noted above the remainder of the review of systems was reviewed and negative.       PAST MEDICAL HISTORY     Past Medical History:   Diagnosis Date    Acute kidney injury     Ascites 05/20/2022    Atrial fibrillation (HCC)     CHF (congestive heart failure) (HCC)     Cirrhosis of liver with ascites (HCC) 05/19/2022    CKD (chronic kidney disease) 05/20/2022    Coronary artery disease     Encounter for cardioversion procedure 05/29/2025    Dr Mccoy/[facility]    Hyperlipidemia     Hypertension     Hypomagnesemia 05/20/2022    Hyponatremia     Thrombocytopenia          SURGICAL HISTORY       Past Surgical History:   Procedure Laterality Date    CARDIAC CATHETERIZATION Left 09/19/2024    DR Mccoy/[facility]/right radial    CARDIAC PROCEDURE N/A 9/19/2024    Left heart cath / coronary angiography performed by Tejas Mccoy MD at [facility] CARDIAC CATH/IR LAB    CARDIAC PROCEDURE N/A 10/1/2024    dariel / Percutaneous coronary intervention performed by Heidy Hearn MD at [facility] CARDIAC CATH LAB    CARDIAC

## 2025-06-13 NOTE — PROGRESS NOTES
[facility]                                               Herbal Suspension    To avoid potential drug interactions with herbal and nutritional supplements, it is the policy of [facility] to suspend all orders for these products at the time of admission.    Per hospital policy the following herbal/nutritional supplements were suspended during the hospital stay:    Fish oil capsules      NonFormulary Medication Formulary Interchange   All fish oil OTC No substitution. Do not continue during hospital stay     Thank you,    Joy Montilla [facility], 6/13/2025, 3:27 PM

## 2025-06-14 LAB
ANION GAP SERPL CALCULATED.3IONS-SCNC: 11 MMOL/L (ref 9–16)
BASOPHILS # BLD: <0.03 K/UL (ref 0–0.2)
BASOPHILS NFR BLD: 0 % (ref 0–2)
BUN SERPL-MCNC: 19 MG/DL (ref 8–23)
BUN/CREAT SERPL: 14 (ref 9–20)
CALCIUM SERPL-MCNC: 9.2 MG/DL (ref 8.6–10.4)
CHLORIDE SERPL-SCNC: 104 MMOL/L (ref 98–107)
CO2 SERPL-SCNC: 25 MMOL/L (ref 20–31)
CREAT SERPL-MCNC: 1.4 MG/DL (ref 0.7–1.2)
EKG Q-T INTERVAL: 346 MS
EKG QRS DURATION: 72 MS
EKG QTC CALCULATION (BAZETT): 420 MS
EKG R AXIS: 66 DEGREES
EKG T AXIS: 65 DEGREES
EKG VENTRICULAR RATE: 89 BPM
EOSINOPHIL # BLD: 0.13 K/UL (ref 0–0.44)
EOSINOPHILS RELATIVE PERCENT: 2 % (ref 1–4)
ERYTHROCYTE [DISTWIDTH] IN BLOOD BY AUTOMATED COUNT: 14.5 % (ref 11.8–14.4)
GFR, ESTIMATED: 52 ML/MIN/1.73M2
GLUCOSE SERPL-MCNC: 92 MG/DL (ref 74–99)
HCT VFR BLD AUTO: 50.7 % (ref 40.7–50.3)
HGB BLD-MCNC: 17.1 G/DL (ref 13–17)
IMM GRANULOCYTES # BLD AUTO: 0.03 K/UL (ref 0–0.3)
IMM GRANULOCYTES NFR BLD: 1 %
LYMPHOCYTES NFR BLD: 1.92 K/UL (ref 1.1–3.7)
LYMPHOCYTES RELATIVE PERCENT: 29 % (ref 24–43)
MAGNESIUM SERPL-MCNC: 1.8 MG/DL (ref 1.6–2.4)
MCH RBC QN AUTO: 32.4 PG (ref 25.2–33.5)
MCHC RBC AUTO-ENTMCNC: 33.7 G/DL (ref 28.4–34.8)
MCV RBC AUTO: 96 FL (ref 82.6–102.9)
MONOCYTES NFR BLD: 0.49 K/UL (ref 0.1–1.2)
MONOCYTES NFR BLD: 7 % (ref 3–12)
NEUTROPHILS NFR BLD: 61 % (ref 36–65)
NEUTS SEG NFR BLD: 4.06 K/UL (ref 1.5–8.1)
NRBC BLD-RTO: 0 PER 100 WBC
PLATELET # BLD AUTO: ABNORMAL K/UL (ref 138–453)
PLATELET, FLUORESCENCE: 103 K/UL (ref 138–453)
PLATELETS.RETICULATED NFR BLD AUTO: 6.2 % (ref 1.1–10.3)
POTASSIUM SERPL-SCNC: 4.9 MMOL/L (ref 3.7–5.3)
RBC # BLD AUTO: 5.28 M/UL (ref 4.21–5.77)
SODIUM SERPL-SCNC: 140 MMOL/L (ref 136–145)
WBC OTHER # BLD: 6.7 K/UL (ref 3.5–11.3)

## 2025-06-14 PROCEDURE — 93010 ELECTROCARDIOGRAM REPORT: CPT | Performed by: INTERNAL MEDICINE

## 2025-06-14 PROCEDURE — 80048 BASIC METABOLIC PNL TOTAL CA: CPT

## 2025-06-14 PROCEDURE — 83735 ASSAY OF MAGNESIUM: CPT

## 2025-06-14 PROCEDURE — 2500000003 HC RX 250 WO HCPCS: Performed by: NURSE PRACTITIONER

## 2025-06-14 PROCEDURE — 6370000000 HC RX 637 (ALT 250 FOR IP): Performed by: NURSE PRACTITIONER

## 2025-06-14 PROCEDURE — 36415 COLL VENOUS BLD VENIPUNCTURE: CPT

## 2025-06-14 PROCEDURE — 85025 COMPLETE CBC W/AUTO DIFF WBC: CPT

## 2025-06-14 PROCEDURE — 99232 SBSQ HOSP IP/OBS MODERATE 35: CPT | Performed by: FAMILY MEDICINE

## 2025-06-14 PROCEDURE — 94761 N-INVAS EAR/PLS OXIMETRY MLT: CPT

## 2025-06-14 PROCEDURE — 2000000000 HC ICU R&B

## 2025-06-14 RX ORDER — AMIODARONE HYDROCHLORIDE 200 MG/1
200 TABLET ORAL DAILY
Status: DISCONTINUED | OUTPATIENT
Start: 2025-06-15 | End: 2025-06-15 | Stop reason: HOSPADM

## 2025-06-14 RX ADMIN — LOSARTAN POTASSIUM 12.5 MG: 25 TABLET, FILM COATED ORAL at 08:14

## 2025-06-14 RX ADMIN — MIDODRINE HYDROCHLORIDE 15 MG: 5 TABLET ORAL at 08:14

## 2025-06-14 RX ADMIN — GABAPENTIN 300 MG: 300 CAPSULE ORAL at 21:11

## 2025-06-14 RX ADMIN — GABAPENTIN 300 MG: 300 CAPSULE ORAL at 08:13

## 2025-06-14 RX ADMIN — DOCUSATE SODIUM 100 MG: 100 CAPSULE, LIQUID FILLED ORAL at 21:11

## 2025-06-14 RX ADMIN — PANTOPRAZOLE SODIUM 40 MG: 40 TABLET, DELAYED RELEASE ORAL at 08:13

## 2025-06-14 RX ADMIN — POTASSIUM CHLORIDE 20 MEQ: 1500 TABLET, EXTENDED RELEASE ORAL at 08:13

## 2025-06-14 RX ADMIN — ATORVASTATIN CALCIUM 80 MG: 40 TABLET, FILM COATED ORAL at 21:11

## 2025-06-14 RX ADMIN — CELECOXIB 200 MG: 200 CAPSULE ORAL at 08:13

## 2025-06-14 RX ADMIN — SODIUM CHLORIDE, PRESERVATIVE FREE 10 ML: 5 INJECTION INTRAVENOUS at 21:13

## 2025-06-14 RX ADMIN — DOCUSATE SODIUM 100 MG: 100 CAPSULE, LIQUID FILLED ORAL at 08:13

## 2025-06-14 RX ADMIN — APIXABAN 5 MG: 5 TABLET, FILM COATED ORAL at 21:11

## 2025-06-14 RX ADMIN — LORAZEPAM 0.5 MG: 0.5 TABLET ORAL at 21:11

## 2025-06-14 RX ADMIN — TAMSULOSIN HYDROCHLORIDE 0.4 MG: 0.4 CAPSULE ORAL at 08:13

## 2025-06-14 RX ADMIN — METOPROLOL SUCCINATE 12.5 MG: 25 TABLET, EXTENDED RELEASE ORAL at 08:14

## 2025-06-14 RX ADMIN — POTASSIUM CHLORIDE 20 MEQ: 1500 TABLET, EXTENDED RELEASE ORAL at 21:11

## 2025-06-14 RX ADMIN — EMPAGLIFLOZIN 10 MG: 10 TABLET, FILM COATED ORAL at 08:14

## 2025-06-14 RX ADMIN — MIDODRINE HYDROCHLORIDE 15 MG: 5 TABLET ORAL at 16:59

## 2025-06-14 RX ADMIN — CLOPIDOGREL BISULFATE 75 MG: 75 TABLET, FILM COATED ORAL at 08:13

## 2025-06-14 RX ADMIN — Medication 1 TABLET: at 08:14

## 2025-06-14 RX ADMIN — APIXABAN 5 MG: 5 TABLET, FILM COATED ORAL at 08:14

## 2025-06-14 RX ADMIN — VENLAFAXINE HYDROCHLORIDE 75 MG: 75 CAPSULE, EXTENDED RELEASE ORAL at 08:14

## 2025-06-14 RX ADMIN — DIGOXIN 125 MCG: 125 TABLET ORAL at 08:13

## 2025-06-14 RX ADMIN — Medication 100 MG: at 08:13

## 2025-06-14 RX ADMIN — Medication 2000 UNITS: at 08:13

## 2025-06-14 RX ADMIN — FOLIC ACID 1000 MCG: 1 TABLET ORAL at 08:13

## 2025-06-14 RX ADMIN — Medication 400 MG: at 08:13

## 2025-06-14 NOTE — PROGRESS NOTES
Patient has a DNRCCA form in chart from 7/2024 but is listed currently as as full code. Discussed with patient the difference between the different code statuses. Patient verbalizes he wishes everything to be done if his heart were to stop or if he quit breathing.

## 2025-06-14 NOTE — PROGRESS NOTES
Patient resting in bed, assessment completed, see flow sheet for documentation, patient denies pain or needs at this time, call light within reach.

## 2025-06-14 NOTE — VIRTUAL HEALTH
Status post ablation operation for arrhythmia 05/09/2025    Hypercoagulable state due to paroxysmal atrial fibrillation (HCC) 12/03/2024    ASHD (arteriosclerotic heart disease) 12/03/2024    Nausea and vomiting 07/23/2024    Paroxysmal atrial fibrillation with RVR ([facility]) 07/23/2024    Chronic renal disease, stage III ([facility]) [057875] 09/12/2022    Encounter for current long-term use of anticoagulants 08/04/2022    History of acute inferior wall MI     Ischemic cardiomyopathy     Neuropathy 01/20/2023    Chronic combined systolic and diastolic congestive heart failure (HCC) 08/22/2022    Bilateral pleural effusion 08/09/2022    Nonischemic cardiomyopathy ([facility]) 08/04/2022    PAF (paroxysmal atrial fibrillation) ([facility]) 07/19/2022    Gastroesophageal reflux disease 07/19/2022    Orthostatic hypotension 07/19/2022    Ascites 05/20/2022    Cirrhosis of liver with ascites ([facility]) 05/19/2022    Coronary artery disease     Hypertension     Thrombocytopenia     Hyperlipidemia     Atrial fibrillation with RVR ([facility]) 05/18/2022    Secondary hypercoagulable state 07/23/2024    Vertebral artery compression syndromes of cervical region 03/14/2024    PAD (peripheral artery disease) 02/13/2024    Depression 02/13/2024    Anxiety 02/13/2024    Portal hypertensive gastropathy ([facility]) 07/11/2023    Parkinson's disease, unspecified whether dyskinesia present, unspecified whether manifestations fluctuate ([facility]) 04/05/2023    PSVT (paroxysmal supraventricular tachycardia) 06/13/2025    Acute on chronic combined systolic and diastolic HF (heart failure), NYHA class 4 ([facility]) 05/30/2025      PLAN:    Atherosclerotic Heart Disease: Last Heart Cath done in 2022: Showed 50% Blockage in LAD with Severe single vessel disease-recent history of syncopal episodes on 7/23/2024  Heart cath done 10/1/2024: Successful angioplasty followed by drug-eluting stent of proximal LAD. Shockwave atherectomy of proximal LAD. Successful angioplasty followed by  1   H HTN Yes 1   A2 Age >= 75 No,  (74 y.o.) 0   D DM No 0   S2 Prior Stroke/TIA No 0   V Vascular Disease Yes 1   A Age 65-74 Yes,  (74 y.o.) 1   Sc Sex male 0    [id number]  Score  4   Score last updated 6/6/22 4:56 PM EDT  Click here for a link to the UpToDate guideline \"Atrial Fibrillation: Anticoagulation therapy to prevent embolization  Disclaimer: Risk Score calculation is dependent on accuracy of patient problem list and past encounter diagnosis.   Stroke Risk: CHADS2-VASc Score: 4/9 (4% stroke risk). Because of his atrial fibrillation, I also would also recommend that he continue with anticoagulation to decrease his risk of stoke but also reminded him to watch for signs of blood in his stool or black tarry stools and stop the medication immediately if this develops as this could be life threatening.    Anticoagulation: Continue Apixaban (Eliquis) 5 mg every 12 hours.  Additional Testing List: None    Chronic combined heart failure: Ischemic Cardiomyopathy, Echo done on 5/18/2022 which showed an EF of 15-20%.  Echo done on 3/20/23 showed EF: 50-55%.   Beta Blocker: Continue Metoprolol succinate (Toprol XL) 25 mg 1/2 tab daily.  ACE Inibitor/ARB: Continue losartan (Cozaar) 25 mg, 1/2 tab daily.  Continue Farxiga 10 mg once daily.    Heart failure counseling: I advised them to try and keep their legs up whenever possible and to limit salt in their diet.     Peripheral Artery Disease:   Antiplatelet Agent: Continue aspirin 81 mg daily   Cholesterol Reduction Therapy: Continue Atorvastatin (Lipitor) 80 mg daily.    Continue follow up with Dr. Pittman.     I discussed this personally with DR. Harris who was also in agreement with the plan.  Once again, thank you for allowing me to participate in this patients care. Please do not hesitate to contact me could I be of further assistance.       Sincerely,  Tejas Mccoy MD, MS, F.A.C.C.  [facility]    [address] [place] [address]  [id number]  Phone: [phone], Fax: [phone]     I believe that the risk of significant morbidity and mortality related to the patient's current medical conditions are: Intermediate. At least 40 minutes were spent during prep work, discussion and exam of the patient, and follow up documentation and all of their questions were answered.      June 14, 2025    Michael Chan, was evaluated through a synchronous (real-time) audio-video encounter. The patient (and/or guardian if applicable) is aware that this is a billable service, which includes applicable co-pays. This virtual visit was conducted with patient's (and/or legal guardian's) consent. Patient identification was verified, and a caregiver was present when appropriate.  The patient was located at Facility (Appt Department): [place] [address]  Loc: [phone]  The provider was located at Home (City/State): [place], [place]   Total time spent on this encounter: Not billed by time    --ANALISA HOLLIS MD on 6/14/2025 at 11:36 PM    An electronic signature was used to authenticate this note.

## 2025-06-14 NOTE — PLAN OF CARE
Problem: Chronic Conditions and Co-morbidities  Goal: Patient's chronic conditions and co-morbidity symptoms are monitored and maintained or improved  6/14/2025 0732 by Oksana Phillip, RN  Outcome: Progressing     Problem: Discharge Planning  Goal: Discharge to home or other facility with appropriate resources  6/14/2025 0732 by Oksana Phillip, RN  Outcome: Progressing  Flowsheets (Taken 6/14/2025 0500 by Madeleine Amato, RN)  Discharge to home or other facility with appropriate resources: Identify barriers to discharge with patient and caregiver     Problem: Safety - Adult  Goal: Free from fall injury  6/14/2025 0732 by Oksana Phillip, RN  Outcome: Progressing

## 2025-06-14 NOTE — PROGRESS NOTES
Writer present, vitals and assessment as charted. Patient awake sitting up in bed. Patient confused to date, unable to give correct year and states is is fall not spring. Patient reoriented and voices concern to being confused to date. Trace BLE edema present. Respirations even and unlabored. Patient denies pain or needs at this time. Call light and bedside table within reach. Bed alarm active for safety.

## 2025-06-14 NOTE — PROGRESS NOTES
Mane Harris M.D.  Internal Medicine Progress Note    Patient: Michael Chan  Date of Admission: 6/13/2025  9:34 AM  Date of Evaluation: 6/14/2025      SUBJECTIVE:    Michael Chan is a 74 y.o. male who was seen today in ICU for follow up of Atrial fibrillation with RVR (HCC).  He is feeling good today.  He denies any chest pain, palpitations or SOB.  He denies fever or chills and has been afebrile.  He is tolerating diet without any nausea, vomiting or diarrhea.    ROS:   Constitutional: negative  for fevers, and negative for chills.  Respiratory: negative for shortness of breath, negative for cough, and negative for wheezing  Cardiovascular: negative for chest pain, and negative for palpitations  Gastrointestinal: negative for abdominal pain, negative for nausea,negative for vomiting, negative for diarrhea, and negative for constipation     All other systems were reviewed with the patient and are negative unless otherwise stated in HPI    -----------------------------------------------------------------  OBJECTIVE:  Vitals:   Temp: 98 °F (36.7 °C)  BP: (!) 152/93  Respirations: 20  Pulse: 83  SpO2: 91 % on room air    Weight  Wt Readings from Last 3 Encounters:   06/14/25 83.5 kg (184 lb)   05/29/25 84.5 kg (186 lb 4.6 oz)   05/28/25 84.5 kg (186 lb 3.2 oz)     Body mass index is 28.82 kg/m².    24HR INTAKE/OUTPUT:      Intake/Output Summary (Last 24 hours) at 6/14/2025 0882  Last data filed at 6/14/2025 0733  Gross per 24 hour   Intake 2393.78 ml   Output 1780 ml   Net 613.78 ml       Exam:  GEN:    Awake, alert and oriented x 3.   EYES:  EOMI, pupils equal   NECK: Supple. No lymphadenopathy.  No carotid bruit  CVS:    irregularly irregular, 2/6 systolic murmur  PULM:  CTA, no wheezes, rales or rhonchi, no acute respiratory distress  ABD:    Bowels sounds normal.  Abdomen is soft.  No distention.  no tenderness to palpation.   EXT:   no edema bilaterally .  No calf tenderness.   NEURO: Moves all  hours  Possible transfer to [facility] for pacemaker with ablation on Monday per Dr. Mccoy  Monitor labs replace electrolytes  Telemetry  Digoxin level - 0.7  Imaging: telemetry, serial ECGs  Medications:   IV fluids at 50 mL an hour while amiodarone infusion  IV amiodarone 1 mg/kg for 24 hours  Switch back to oral amiodarone after drip DCd  Continue Eliquis, Toprol XL, digoxin  Medication Monitoring / High Risk Medications: amiodarone drip    Chronic combined CHF/NICM  Condition is a chronic stable condition  Treatment plan:   Appreciate Dr. Mccoy  I/O  Daily weight  Monitor labs replace electrolytes  Imaging: no further imaging studies ordered today  Medications:   Continue Klor-Con, Toprol XL, losartan, Farxiga    Parkinson's disease  Condition is a chronic stable condition  Treatment plan:   Up with assistance  Imaging: no further imaging studies ordered today  Medications:   Continue Neurontin, Celebrex, Effexor, Ativan, Neurontin    Nutrition status:   No malnutrition  Dietician consult ordered   Monitor daily weights  Monitor daily I/O's  Dietary supplement not indicated    Hospital Prophylaxis:   DVT: Eliquis   Stress Ulcer: H2 Blocker     MDM Data:   Test interpretation:  My independent EKG interpretation: Atrial fibrillation with controlled rate  My independent X-ray interpretation:  CXR shows no acute process  Management and/or test interpretation discussed with Taylor Cruz, CNP  Consults and Nursing notes were personally reviewed and all current labs and imaging were personally reviewed       Disposition:  Shared decision making: All test results, treatment options and disposition options were discussed with the patient today  Social determinants of health that may impact management: none  Code status: DNR-CCA   Disposition: Discharge plan is back to [place] when medically stable      Mane Harris MD , M.D.  6/14/2025  8:27 AM

## 2025-06-14 NOTE — PROGRESS NOTES
Patient sitting on side of bed, assessment complete, see flow sheet for documentation, patient request to sit in chair, patient moved to chair x stand by assist, lung sound clear throughout, slight non-pitting edema noted to bilat lower extremities, denies pain at this time, all needs met, call light within reach.

## 2025-06-14 NOTE — PLAN OF CARE
Problem: Chronic Conditions and Co-morbidities  Goal: Patient's chronic conditions and co-morbidity symptoms are monitored and maintained or improved  6/14/2025 0250 by Madeleine Amato RN  Outcome: Progressing  6/13/2025 1727 by Oksana Phillip RN  Outcome: Progressing     Problem: Discharge Planning  Goal: Discharge to home or other facility with appropriate resources  6/14/2025 0250 by Madeleine Amato RN  Outcome: Progressing  Flowsheets  Taken 6/13/2025 2345  Discharge to home or other facility with appropriate resources: Identify barriers to discharge with patient and caregiver  Taken 6/13/2025 1845  Discharge to home or other facility with appropriate resources: Identify barriers to discharge with patient and caregiver  6/13/2025 1727 by Oksana Phillip, RN  Outcome: Progressing     Problem: Safety - Adult  Goal: Free from fall injury  6/14/2025 0250 by Madeleine Amato RN  Outcome: Progressing  Note: Call light and bedside table with in reach. Bed and chair wheels locked at all times, with bed in lowest position. Frequent checks and needs meet in appropriate timing.   6/13/2025 1727 by Oksana Phillip, RN  Outcome: Progressing

## 2025-06-14 NOTE — PROGRESS NOTES
Writer present, vitals and assessment as charted. Patient lying in bed a/o, but does have intermittent confusion. Assisted patient to stand at bedside to utilize urinal. Patient denies any pain or needs at this time. Call light and bedside table within reach. Bed alarm active for safety.

## 2025-06-14 NOTE — PROGRESS NOTES
Patient sitting in chair, assessment completed, see flow sheet for documentation, patient continues A-fib on monitor, controlled rhythm, denies pain, SOB, all needs met, call light within reach.

## 2025-06-15 ENCOUNTER — CARE COORDINATION (OUTPATIENT)
Dept: CARE COORDINATION | Age: 75
End: 2025-06-15

## 2025-06-15 VITALS
TEMPERATURE: 97.9 F | DIASTOLIC BLOOD PRESSURE: 74 MMHG | BODY MASS INDEX: 28.49 KG/M2 | HEART RATE: 89 BPM | OXYGEN SATURATION: 95 % | HEIGHT: 67 IN | WEIGHT: 181.56 LBS | SYSTOLIC BLOOD PRESSURE: 118 MMHG | RESPIRATION RATE: 15 BRPM

## 2025-06-15 LAB
ANION GAP SERPL CALCULATED.3IONS-SCNC: 13 MMOL/L (ref 9–16)
BASOPHILS # BLD: <0.03 K/UL (ref 0–0.2)
BASOPHILS NFR BLD: 0 % (ref 0–2)
BUN SERPL-MCNC: 20 MG/DL (ref 8–23)
BUN/CREAT SERPL: 14 (ref 9–20)
CALCIUM SERPL-MCNC: 9.4 MG/DL (ref 8.6–10.4)
CHLORIDE SERPL-SCNC: 101 MMOL/L (ref 98–107)
CO2 SERPL-SCNC: 26 MMOL/L (ref 20–31)
CREAT SERPL-MCNC: 1.4 MG/DL (ref 0.7–1.2)
EOSINOPHIL # BLD: 0.1 K/UL (ref 0–0.44)
EOSINOPHILS RELATIVE PERCENT: 2 % (ref 1–4)
ERYTHROCYTE [DISTWIDTH] IN BLOOD BY AUTOMATED COUNT: 14.3 % (ref 11.8–14.4)
GFR, ESTIMATED: 52 ML/MIN/1.73M2
GLUCOSE SERPL-MCNC: 84 MG/DL (ref 74–99)
HCT VFR BLD AUTO: 52.3 % (ref 40.7–50.3)
HGB BLD-MCNC: 17.7 G/DL (ref 13–17)
IMM GRANULOCYTES # BLD AUTO: <0.03 K/UL (ref 0–0.3)
IMM GRANULOCYTES NFR BLD: 0 %
LYMPHOCYTES NFR BLD: 1.55 K/UL (ref 1.1–3.7)
LYMPHOCYTES RELATIVE PERCENT: 27 % (ref 24–43)
MCH RBC QN AUTO: 32.3 PG (ref 25.2–33.5)
MCHC RBC AUTO-ENTMCNC: 33.8 G/DL (ref 28.4–34.8)
MCV RBC AUTO: 95.4 FL (ref 82.6–102.9)
MONOCYTES NFR BLD: 0.5 K/UL (ref 0.1–1.2)
MONOCYTES NFR BLD: 9 % (ref 3–12)
NEUTROPHILS NFR BLD: 62 % (ref 36–65)
NEUTS SEG NFR BLD: 3.52 K/UL (ref 1.5–8.1)
NRBC BLD-RTO: 0 PER 100 WBC
PLATELET # BLD AUTO: ABNORMAL K/UL (ref 138–453)
PLATELET, FLUORESCENCE: 103 K/UL (ref 138–453)
PLATELETS.RETICULATED NFR BLD AUTO: 6.6 % (ref 1.1–10.3)
POTASSIUM SERPL-SCNC: 4.6 MMOL/L (ref 3.7–5.3)
RBC # BLD AUTO: 5.48 M/UL (ref 4.21–5.77)
SODIUM SERPL-SCNC: 140 MMOL/L (ref 136–145)
WBC OTHER # BLD: 5.7 K/UL (ref 3.5–11.3)

## 2025-06-15 PROCEDURE — 6370000000 HC RX 637 (ALT 250 FOR IP): Performed by: FAMILY MEDICINE

## 2025-06-15 PROCEDURE — 36415 COLL VENOUS BLD VENIPUNCTURE: CPT

## 2025-06-15 PROCEDURE — 6370000000 HC RX 637 (ALT 250 FOR IP): Performed by: NURSE PRACTITIONER

## 2025-06-15 PROCEDURE — 80048 BASIC METABOLIC PNL TOTAL CA: CPT

## 2025-06-15 PROCEDURE — 2500000003 HC RX 250 WO HCPCS: Performed by: NURSE PRACTITIONER

## 2025-06-15 PROCEDURE — 85025 COMPLETE CBC W/AUTO DIFF WBC: CPT

## 2025-06-15 PROCEDURE — 94761 N-INVAS EAR/PLS OXIMETRY MLT: CPT

## 2025-06-15 RX ORDER — LORAZEPAM 0.5 MG/1
0.5 TABLET ORAL NIGHTLY
Qty: 30 TABLET | Refills: 0 | Status: SHIPPED | OUTPATIENT
Start: 2025-06-15 | End: 2025-07-15

## 2025-06-15 RX ORDER — AMIODARONE HYDROCHLORIDE 200 MG/1
200 TABLET ORAL DAILY
DISCHARGE
Start: 2025-06-16

## 2025-06-15 RX ORDER — GABAPENTIN 300 MG/1
300 CAPSULE ORAL 2 TIMES DAILY
Qty: 60 CAPSULE | Refills: 3 | Status: SHIPPED | OUTPATIENT
Start: 2025-06-15 | End: 2025-10-13

## 2025-06-15 RX ADMIN — LOSARTAN POTASSIUM 12.5 MG: 25 TABLET, FILM COATED ORAL at 08:03

## 2025-06-15 RX ADMIN — CELECOXIB 200 MG: 200 CAPSULE ORAL at 08:03

## 2025-06-15 RX ADMIN — Medication 2000 UNITS: at 08:03

## 2025-06-15 RX ADMIN — APIXABAN 5 MG: 5 TABLET, FILM COATED ORAL at 08:02

## 2025-06-15 RX ADMIN — CLOPIDOGREL BISULFATE 75 MG: 75 TABLET, FILM COATED ORAL at 08:03

## 2025-06-15 RX ADMIN — Medication 400 MG: at 08:03

## 2025-06-15 RX ADMIN — VENLAFAXINE HYDROCHLORIDE 75 MG: 75 CAPSULE, EXTENDED RELEASE ORAL at 08:03

## 2025-06-15 RX ADMIN — GABAPENTIN 300 MG: 300 CAPSULE ORAL at 08:04

## 2025-06-15 RX ADMIN — FOLIC ACID 1000 MCG: 1 TABLET ORAL at 08:03

## 2025-06-15 RX ADMIN — MIDODRINE HYDROCHLORIDE 15 MG: 5 TABLET ORAL at 08:03

## 2025-06-15 RX ADMIN — Medication 1 TABLET: at 08:03

## 2025-06-15 RX ADMIN — AMIODARONE HYDROCHLORIDE 200 MG: 200 TABLET ORAL at 08:03

## 2025-06-15 RX ADMIN — TAMSULOSIN HYDROCHLORIDE 0.4 MG: 0.4 CAPSULE ORAL at 08:04

## 2025-06-15 RX ADMIN — METOPROLOL SUCCINATE 12.5 MG: 25 TABLET, EXTENDED RELEASE ORAL at 08:03

## 2025-06-15 RX ADMIN — PANTOPRAZOLE SODIUM 40 MG: 40 TABLET, DELAYED RELEASE ORAL at 08:03

## 2025-06-15 RX ADMIN — SODIUM CHLORIDE, PRESERVATIVE FREE 10 ML: 5 INJECTION INTRAVENOUS at 08:07

## 2025-06-15 RX ADMIN — EMPAGLIFLOZIN 10 MG: 10 TABLET, FILM COATED ORAL at 08:03

## 2025-06-15 RX ADMIN — Medication 100 MG: at 08:02

## 2025-06-15 RX ADMIN — POTASSIUM CHLORIDE 20 MEQ: 1500 TABLET, EXTENDED RELEASE ORAL at 08:04

## 2025-06-15 RX ADMIN — DIGOXIN 125 MCG: 125 TABLET ORAL at 08:04

## 2025-06-15 RX ADMIN — DOCUSATE SODIUM 100 MG: 100 CAPSULE, LIQUID FILLED ORAL at 08:03

## 2025-06-15 NOTE — PLAN OF CARE
Problem: Chronic Conditions and Co-morbidities  Goal: Patient's chronic conditions and co-morbidity symptoms are monitored and maintained or improved  Outcome: Progressing     Problem: Discharge Planning  Goal: Discharge to home or other facility with appropriate resources  Outcome: Progressing  Flowsheets  Taken 6/14/2025 2300  Discharge to home or other facility with appropriate resources: Identify barriers to discharge with patient and caregiver  Taken 6/14/2025 1830  Discharge to home or other facility with appropriate resources: Identify barriers to discharge with patient and caregiver     Problem: Safety - Adult  Goal: Free from fall injury  Outcome: Progressing  Note: Call light and bedside table with in reach. Bed and chair wheels locked at all times, with bed in lowest position. Frequent checks and needs meet in appropriate timing.

## 2025-06-15 NOTE — DISCHARGE SUMMARY
Mane Harris M.D.  Internal Medicine Discharge Summary    Patient ID:  Michael Chan  [id number]  1950    Admission date: 6/13/2025    Discharge date: 6/15/2025     Admitting Physician: Mane Harris MD     Primary Care Physician: Mane Harris MD     Primary Discharge Diagnoses:   Principal Problem:    Atrial fibrillation with RVR (HCC)  Active Problems:    Paroxysmal atrial fibrillation with RVR (HCC)    Hypertension    Nonischemic cardiomyopathy (HCC)    Chronic combined systolic and diastolic congestive heart failure (HCC)    Parkinson's disease, unspecified whether dyskinesia present, unspecified whether manifestations fluctuate (HCC)    PSVT (paroxysmal supraventricular tachycardia)  Resolved Problems:    * No resolved hospital problems. *       Additional Diagnoses:       Diagnosis Date    Acute kidney injury     Ascites 05/20/2022    Atrial fibrillation (HCC)     CHF (congestive heart failure) (HCC)     Cirrhosis of liver with ascites (HCC) 05/19/2022    CKD (chronic kidney disease) 05/20/2022    Coronary artery disease     Encounter for cardioversion procedure 05/29/2025    Dr Mccoy/[facility]    Hyperlipidemia     Hypertension     Hypomagnesemia 05/20/2022    Hyponatremia     Thrombocytopenia         Hospital Course:   Michael Chan is a 74 y.o. male who was admitted for Atrial fibrillation with RVR (HCC).    Mr. Chan was treated with IV Amiodarone drip did not have any tachycardia during his hospital stay.  His amiodarone was increased to 200 mg po QD.  Dr. Mccoy will f/u as outpatient and possibly decrease or DC Digoxin at a later date.  Pt likely will need pacemaker per Dr. Mccoy but will set up as outpatient.   He was deemed medically stable for discharge and was amenable to the discharge plan.      Discharge Exam:  GEN:    Awake, alert and oriented x3.   EYES:  EOMI, pupils equal   NECK: Supple. No lymphadenopathy.  No carotid bruit  CVS:    irregularly

## 2025-06-15 NOTE — PLAN OF CARE
Problem: Chronic Conditions and Co-morbidities  Goal: Patient's chronic conditions and co-morbidity symptoms are monitored and maintained or improved  6/15/2025 0714 by Valencia Amador, RN  Outcome: Progressing     Problem: Discharge Planning  Goal: Discharge to home or other facility with appropriate resources  6/15/2025 0714 by Valencia Amador, RN  Outcome: Progressing  Flowsheets (Taken 6/15/2025 0358 by Madeleine Amato, RN)  Discharge to home or other facility with appropriate resources: Identify barriers to discharge with patient and caregiver     Problem: Safety - Adult  Goal: Free from fall injury  6/15/2025 0714 by Valencia Amador, RN  Outcome: Progressing     Problem: ABCDS Injury Assessment  Goal: Absence of physical injury  Outcome: Progressing

## 2025-06-15 NOTE — PROGRESS NOTES
Writer called sister and let her know that patient was being discharged today, she stated she would be in shortly, writer contacted the [facility] and gave report to primary nurse. IV removed at this time.

## 2025-06-15 NOTE — PROGRESS NOTES
Shift assessment obtained at this time as charted. Patient denies pain. Patient is oriented to person, place and time, disoriented to situation. Lung sounds clear throughout. Assessment otherwise as charted, see flowsheets. Patient also assisted to sit up in the chair at this time. Patient is resting in the chair with chair alarm on and call light in reach, denies other needs at this time. Care ongoing.

## 2025-06-15 NOTE — DISCHARGE INSTR - COC
Continuity of Care Form    Patient Name: Michael Chan   :  1950  MRN:  [id number]    Admit date:  2025  Discharge date:  06/15/2025    Code Status Order: DNR-CCA   Advance Directives:     Admitting Physician:  Mane Harris MD  PCP: Mane Harris MD    Discharging Nurse: MACARENA Phillip RN  Discharging Hospital Unit/Room#: I306/I306-01  Discharging Unit Phone Number: [phone]    Emergency Contact:   Extended Emergency Contact Information  Primary Emergency Contact: Angelina Abrams  Address: [address] [place], [place] [address] [facility]  Home Phone: [phone]  Relation: Pt Representative   needed? No  Secondary Emergency Contact: Leon Chan  Home Phone: [phone]  Relation: Brother/Sister    Past Surgical History:  Past Surgical History:   Procedure Laterality Date    CARDIAC CATHETERIZATION Left 2024    DR Mccoy/[facility]/right radial    CARDIAC PROCEDURE N/A 2024    Left heart cath / coronary angiography performed by Tejas Mccoy MD at [facility] [place] CARDIAC CATH/IR LAB    CARDIAC PROCEDURE N/A 10/1/2024    dariel / Percutaneous coronary intervention performed by Heidy Hearn MD at [facility] CARDIAC CATH LAB    CARDIAC PROCEDURE N/A 10/1/2024    Insert stent pierre coronary performed by Heidy Hearn MD at [facility] CARDIAC CATH LAB    CAROTID STENT PLACEMENT Bilateral     COLONOSCOPY      COLONOSCOPY N/A 2023    COLORECTAL CANCER SCREENING, NOT HIGH RISK performed by Tena Wilson MD at [facility] [place] OR    COLONOSCOPY  2023    -hemorrhoids,poor prep    EP DEVICE PROCEDURE N/A 2025    marla freedman / Ablation A-fib w/anes / aimee, ice, cryo performed by Parvez Sims MD at [facility] CARDIAC CATH LAB    ESOPHAGOGASTRODUODENOSCOPY  2023    Dr. Wilson-bx,portal hypertensive gastropathy    HERNIA REPAIR      INVASIVE VASCULAR N/A 10/1/2024    Intravascular lithotripsy performed by Heidy Hearn MD at [facility] CARDIAC CATH

## 2025-06-15 NOTE — PROGRESS NOTES
Mane Harris M.D.  Internal Medicine Progress Note    Patient: Michael Chan  Date of Admission: 6/13/2025  9:34 AM  Date of Evaluation: 6/15/2025      SUBJECTIVE:    Michael Chan is a 74 y.o. male who was seen today in ICU for follow up of Atrial fibrillation with RVR (HCC).  He is feeling good today.  He denies any chest pain, palpitations or SOB.  He denies fever or chills and has been afebrile.  He is tolerating diet without any nausea, vomiting or diarrhea.    ROS:   Constitutional: negative  for fevers, and negative for chills.  Respiratory: negative for shortness of breath, negative for cough, and negative for wheezing  Cardiovascular: negative for chest pain, and negative for palpitations  Gastrointestinal: negative for abdominal pain, negative for nausea,negative for vomiting, negative for diarrhea, and negative for constipation     All other systems were reviewed with the patient and are negative unless otherwise stated in HPI    -----------------------------------------------------------------  OBJECTIVE:  Vitals:   Temp: 97.9 °F (36.6 °C)  BP: (!) 100/51  Respirations: 21  Pulse: 94  SpO2: 95 % on room air    Weight  Wt Readings from Last 3 Encounters:   06/15/25 82.4 kg (181 lb 9 oz)   05/29/25 84.5 kg (186 lb 4.6 oz)   05/28/25 84.5 kg (186 lb 3.2 oz)     Body mass index is 28.44 kg/m².    24HR INTAKE/OUTPUT:      Intake/Output Summary (Last 24 hours) at 6/15/2025 0923  Last data filed at 6/15/2025 0900  Gross per 24 hour   Intake 780 ml   Output 2475 ml   Net -1695 ml       Exam:  GEN:    Awake, alert and oriented x 3.   EYES:  EOMI, pupils equal   NECK: Supple. No lymphadenopathy.  No carotid bruit  CVS:    irregularly irregular, 2/6 systolic murmur  PULM:  CTA, no wheezes, rales or rhonchi, no acute respiratory distress  ABD:    Bowels sounds normal.  Abdomen is soft.  No distention.  no tenderness to palpation.   EXT:   no edema bilaterally .  No calf tenderness.   NEURO: Moves  state  Condition is stable - rate controlled  Treatment plan:   Cardiology consult appreciated - discussed with Dr. Mccoy  Amiodarone drip for 24 hours  Monitor labs replace electrolytes  Telemetry  Digoxin level - 0.7  Imaging: telemetry, serial ECGs  Medications:   Finished IV amiodarone   Switched back to oral amiodarone   Continue Eliquis, Toprol XL, digoxin  Medication Monitoring / High Risk Medications: amiodarone drip    Chronic combined CHF/NICM  Condition is a chronic stable condition  Treatment plan:   Appreciate Dr. Mccoy  I/O  Daily weight  Monitor labs replace electrolytes  Imaging: no further imaging studies ordered today  Medications:   Continue Klor-Con, Toprol XL, losartan, Farxiga    Parkinson's disease  Condition is a chronic stable condition  Treatment plan:   Up with assistance  Imaging: no further imaging studies ordered today  Medications:   Continue Neurontin, Celebrex, Effexor, Ativan, Neurontin    Nutrition status:   No malnutrition  Dietician consult ordered   Monitor daily weights  Monitor daily I/O's  Dietary supplement not indicated    Hospital Prophylaxis:   DVT: Eliquis   Stress Ulcer: H2 Blocker     MDM Data:   Test interpretation:  My independent EKG interpretation: Atrial fibrillation with controlled rate  My independent X-ray interpretation:  CXR shows no acute process  Management and/or test interpretation discussed with Taylor Cruz, CNP  Consults and Nursing notes were personally reviewed and all current labs and imaging were personally reviewed       Disposition:  Shared decision making: All test results, treatment options and disposition options were discussed with the patient today  Social determinants of health that may impact management: none  Code status: DNR-CCA   Disposition: Discharge plan is back to [facility] MD , MMESSI  6/15/2025  9:23 AM

## 2025-06-15 NOTE — PLAN OF CARE
Problem: Chronic Conditions and Co-morbidities  Goal: Patient's chronic conditions and co-morbidity symptoms are monitored and maintained or improved  6/15/2025 1019 by Oksana Phillip RN  Outcome: Completed  6/15/2025 0714 by Valencia Amador RN  Outcome: Progressing  6/15/2025 0225 by Madeleine Amato RN  Outcome: Progressing     Problem: Discharge Planning  Goal: Discharge to home or other facility with appropriate resources  6/15/2025 1019 by Oksana Phillip RN  Outcome: Completed  6/15/2025 0714 by Valencia Amador RN  Outcome: Progressing  Flowsheets (Taken 6/15/2025 0358 by Madeleine Amato RN)  Discharge to home or other facility with appropriate resources: Identify barriers to discharge with patient and caregiver  6/15/2025 0225 by Madeleine Amato RN  Outcome: Progressing  Flowsheets  Taken 6/14/2025 2300  Discharge to home or other facility with appropriate resources: Identify barriers to discharge with patient and caregiver  Taken 6/14/2025 1830  Discharge to home or other facility with appropriate resources: Identify barriers to discharge with patient and caregiver     Problem: Safety - Adult  Goal: Free from fall injury  6/15/2025 1019 by Oksana Phillip RN  Outcome: Completed  6/15/2025 0714 by Valencia Amador RN  Outcome: Progressing  6/15/2025 0225 by Madeleine Amato RN  Outcome: Progressing  Note: Call light and bedside table with in reach. Bed and chair wheels locked at all times, with bed in lowest position. Frequent checks and needs meet in appropriate timing.      Problem: ABCDS Injury Assessment  Goal: Absence of physical injury  6/15/2025 1019 by Oksana Phillip RN  Outcome: Completed  6/15/2025 0714 by Valencia Amador RN  Outcome: Progressing

## 2025-06-16 ENCOUNTER — APPOINTMENT (OUTPATIENT)
Dept: GENERAL RADIOLOGY | Age: 75
End: 2025-06-16
Payer: MEDICARE

## 2025-06-16 ENCOUNTER — APPOINTMENT (OUTPATIENT)
Dept: CT IMAGING | Age: 75
End: 2025-06-16
Payer: MEDICARE

## 2025-06-16 ENCOUNTER — HOSPITAL ENCOUNTER (EMERGENCY)
Age: 75
Discharge: HOME | End: 2025-06-16
Attending: STUDENT IN AN ORGANIZED HEALTH CARE EDUCATION/TRAINING PROGRAM
Payer: MEDICARE

## 2025-06-16 VITALS
WEIGHT: 181 LBS | BODY MASS INDEX: 28.41 KG/M2 | SYSTOLIC BLOOD PRESSURE: 117 MMHG | HEIGHT: 67 IN | OXYGEN SATURATION: 93 % | HEART RATE: 100 BPM | TEMPERATURE: 97.9 F | DIASTOLIC BLOOD PRESSURE: 96 MMHG | RESPIRATION RATE: 15 BRPM

## 2025-06-16 DIAGNOSIS — N17.9 AKI (ACUTE KIDNEY INJURY): Primary | ICD-10-CM

## 2025-06-16 DIAGNOSIS — R40.4 TRANSIENT ALTERATION OF AWARENESS: ICD-10-CM

## 2025-06-16 LAB
AMMONIA PLAS-SCNC: 17 UMOL/L (ref 11–51)
ANION GAP SERPL CALCULATED.3IONS-SCNC: 13 MMOL/L (ref 9–16)
ANION GAP SERPL CALCULATED.3IONS-SCNC: 13 MMOL/L (ref 9–16)
BASOPHILS # BLD: 0.04 K/UL (ref 0–0.2)
BASOPHILS NFR BLD: 1 % (ref 0–2)
BILIRUB UR QL STRIP: NEGATIVE
BNP SERPL-MCNC: 1008 PG/ML (ref 0–125)
BUN SERPL-MCNC: 32 MG/DL (ref 8–23)
BUN SERPL-MCNC: 33 MG/DL (ref 8–23)
BUN/CREAT SERPL: 16 (ref 9–20)
BUN/CREAT SERPL: 17 (ref 9–20)
CALCIUM SERPL-MCNC: 10.2 MG/DL (ref 8.6–10.4)
CALCIUM SERPL-MCNC: 9.6 MG/DL (ref 8.6–10.4)
CASTS #/AREA URNS LPF: ABNORMAL /LPF
CASTS #/AREA URNS LPF: ABNORMAL /LPF
CHLORIDE SERPL-SCNC: 102 MMOL/L (ref 98–107)
CHLORIDE SERPL-SCNC: 103 MMOL/L (ref 98–107)
CLARITY UR: CLEAR
CO2 SERPL-SCNC: 24 MMOL/L (ref 20–31)
CO2 SERPL-SCNC: 25 MMOL/L (ref 20–31)
COLOR UR: YELLOW
CREAT SERPL-MCNC: 1.9 MG/DL (ref 0.7–1.2)
CREAT SERPL-MCNC: 2.1 MG/DL (ref 0.7–1.2)
DIGOXIN SERPL-MCNC: 1.1 NG/ML (ref 0.8–2)
EKG Q-T INTERVAL: 344 MS
EKG QRS DURATION: 78 MS
EKG QTC CALCULATION (BAZETT): 423 MS
EKG R AXIS: 55 DEGREES
EKG T AXIS: 83 DEGREES
EKG VENTRICULAR RATE: 91 BPM
EOSINOPHIL # BLD: 0.09 K/UL (ref 0–0.44)
EOSINOPHILS RELATIVE PERCENT: 1 % (ref 1–4)
EPI CELLS #/AREA URNS HPF: ABNORMAL /HPF (ref 0–5)
ERYTHROCYTE [DISTWIDTH] IN BLOOD BY AUTOMATED COUNT: 14.4 % (ref 11.8–14.4)
GFR, ESTIMATED: 32 ML/MIN/1.73M2
GFR, ESTIMATED: 36 ML/MIN/1.73M2
GLUCOSE SERPL-MCNC: 76 MG/DL (ref 74–99)
GLUCOSE SERPL-MCNC: 79 MG/DL (ref 74–99)
GLUCOSE UR STRIP-MCNC: ABNORMAL MG/DL
HCT VFR BLD AUTO: 54.1 % (ref 40.7–50.3)
HGB BLD-MCNC: 18.4 G/DL (ref 13–17)
HGB UR QL STRIP.AUTO: NEGATIVE
IMM GRANULOCYTES # BLD AUTO: <0.03 K/UL (ref 0–0.3)
IMM GRANULOCYTES NFR BLD: 0 %
KETONES UR STRIP-MCNC: NEGATIVE MG/DL
LEUKOCYTE ESTERASE UR QL STRIP: NEGATIVE
LYMPHOCYTES NFR BLD: 1.55 K/UL (ref 1.1–3.7)
LYMPHOCYTES RELATIVE PERCENT: 25 % (ref 24–43)
MCH RBC QN AUTO: 32.7 PG (ref 25.2–33.5)
MCHC RBC AUTO-ENTMCNC: 34 G/DL (ref 28.4–34.8)
MCV RBC AUTO: 96.1 FL (ref 82.6–102.9)
MONOCYTES NFR BLD: 0.67 K/UL (ref 0.1–1.2)
MONOCYTES NFR BLD: 11 % (ref 3–12)
MUCOUS THREADS URNS QL MICRO: ABNORMAL
NEUTROPHILS NFR BLD: 62 % (ref 36–65)
NEUTS SEG NFR BLD: 3.84 K/UL (ref 1.5–8.1)
NITRITE UR QL STRIP: NEGATIVE
NRBC BLD-RTO: 0 PER 100 WBC
PH UR STRIP: 6 [PH] (ref 5–9)
PLATELET # BLD AUTO: ABNORMAL K/UL (ref 138–453)
PLATELET, FLUORESCENCE: 111 K/UL (ref 138–453)
PLATELETS.RETICULATED NFR BLD AUTO: 6.5 % (ref 1.1–10.3)
POTASSIUM SERPL-SCNC: 4.9 MMOL/L (ref 3.7–5.3)
POTASSIUM SERPL-SCNC: ABNORMAL MMOL/L (ref 3.7–5.3)
PROT UR STRIP-MCNC: NEGATIVE MG/DL
RBC # BLD AUTO: 5.63 M/UL (ref 4.21–5.77)
RBC #/AREA URNS HPF: ABNORMAL /HPF (ref 0–2)
SODIUM SERPL-SCNC: 140 MMOL/L (ref 136–145)
SODIUM SERPL-SCNC: 140 MMOL/L (ref 136–145)
SP GR UR STRIP: 1.02 (ref 1.01–1.02)
TROPONIN I SERPL HS-MCNC: 30 NG/L (ref 0–22)
TROPONIN I SERPL HS-MCNC: 33 NG/L (ref 0–22)
UROBILINOGEN UR STRIP-ACNC: NORMAL EU/DL (ref 0–1)
WBC #/AREA URNS HPF: ABNORMAL /HPF (ref 0–5)
WBC OTHER # BLD: 6.2 K/UL (ref 3.5–11.3)

## 2025-06-16 PROCEDURE — 93005 ELECTROCARDIOGRAM TRACING: CPT | Performed by: STUDENT IN AN ORGANIZED HEALTH CARE EDUCATION/TRAINING PROGRAM

## 2025-06-16 PROCEDURE — 70450 CT HEAD/BRAIN W/O DYE: CPT

## 2025-06-16 PROCEDURE — 99285 EMERGENCY DEPT VISIT HI MDM: CPT

## 2025-06-16 PROCEDURE — 83880 ASSAY OF NATRIURETIC PEPTIDE: CPT

## 2025-06-16 PROCEDURE — 84484 ASSAY OF TROPONIN QUANT: CPT

## 2025-06-16 PROCEDURE — 82140 ASSAY OF AMMONIA: CPT

## 2025-06-16 PROCEDURE — 80048 BASIC METABOLIC PNL TOTAL CA: CPT

## 2025-06-16 PROCEDURE — 81001 URINALYSIS AUTO W/SCOPE: CPT

## 2025-06-16 PROCEDURE — 80162 ASSAY OF DIGOXIN TOTAL: CPT

## 2025-06-16 PROCEDURE — 85025 COMPLETE CBC W/AUTO DIFF WBC: CPT

## 2025-06-16 PROCEDURE — 71045 X-RAY EXAM CHEST 1 VIEW: CPT

## 2025-06-16 PROCEDURE — 93010 ELECTROCARDIOGRAM REPORT: CPT | Performed by: INTERNAL MEDICINE

## 2025-06-16 PROCEDURE — 2580000003 HC RX 258: Performed by: STUDENT IN AN ORGANIZED HEALTH CARE EDUCATION/TRAINING PROGRAM

## 2025-06-16 RX ORDER — 0.9 % SODIUM CHLORIDE 0.9 %
500 INTRAVENOUS SOLUTION INTRAVENOUS ONCE
Status: COMPLETED | OUTPATIENT
Start: 2025-06-16 | End: 2025-06-16

## 2025-06-16 RX ADMIN — SODIUM CHLORIDE 500 ML: 0.9 INJECTION, SOLUTION INTRAVENOUS at 04:30

## 2025-06-16 ASSESSMENT — PAIN - FUNCTIONAL ASSESSMENT: PAIN_FUNCTIONAL_ASSESSMENT: NONE - DENIES PAIN

## 2025-06-16 ASSESSMENT — ENCOUNTER SYMPTOMS
NAUSEA: 0
BACK PAIN: 0
ABDOMINAL PAIN: 0
SHORTNESS OF BREATH: 1
VOMITING: 0

## 2025-06-16 NOTE — ED PROVIDER NOTES
[facility]  EMERGENCY DEPARTMENT ENCOUNTER      Pt Name: Michael Chan  MRN: [id number]  Birthdate 1950  Date of evaluation: 6/16/2025  Provider: John Villaseñor MD    CHIEF COMPLAINT       Chief Complaint   Patient presents with    Shortness of Breath    Altered Mental Status     Per staff at [facility], states since being discharged from hospital, pt has been more confused. Pt d/c'd 6/15.     HISTORY OF PRESENT ILLNESS      Michael Chan is a 74 y.o. male who presents to the emergency department for AMS. They found him at the back door, exit seeking and confused about where he was. At present patient is alert and oriented. He recalls being confused and stated that he had been watching a western TV show, and thought that he needed to go \"drive the cattle.\" He is unsure if he was sleeping at the time, but he recalls being fully dressed and outside of his room at the [facility]. No fall or trauma per nursing staff at [facility].    His only complaint is of shortness of breath which appears to be chronic issue for him. No headache, chest pain, back pain or abdominal pain.     He was recently admitted 6/13-15/25 for afib RVR treated with IV amiodarone. Amio increased to 200mg. Discussed possible decrease or DC digoxin. Possible pacer outpatient per Dr. Mccoy.     REVIEW OF SYSTEMS       Review of Systems   Constitutional:  Negative for fever.   Respiratory:  Positive for shortness of breath.    Cardiovascular:  Negative for chest pain.   Gastrointestinal:  Negative for abdominal pain, nausea and vomiting.   Musculoskeletal:  Negative for back pain.   Neurological:  Negative for headaches.   Psychiatric/Behavioral:  Positive for confusion.      PAST MEDICAL HISTORY     Past Medical History:   Diagnosis Date    Acute kidney injury     Ascites 05/20/2022    Atrial fibrillation (HCC)     CHF (congestive heart failure) (HCC)     Cirrhosis of liver with ascites (HCC) 05/19/2022    CKD (chronic kidney

## 2025-06-16 NOTE — ED NOTES
Call received from [facility], aware patient still needs to provide urine sample prior to dispo. Day shift RN to call back with update once urine sample obtained and resulted.

## 2025-06-16 NOTE — DISCHARGE INSTRUCTIONS
Continue current medications as prescribed and follow-up with cardiology as soon as possible.  Seek medical attention for any acute concerns

## 2025-06-16 NOTE — ED PROVIDER NOTES
[facility]  EMERGENCY DEPARTMENT ENCOUNTER      Pt Name: Michael Chan  MRN: [id number]  Birthdate 1950  Date of evaluation: 6/16/2025  Provider: Monica Knight MD    CHIEF COMPLAINT       Chief Complaint   Patient presents with    Shortness of Breath    Altered Mental Status     Per staff at [facility] since being discharged from hospital, pt has been more confused. Pt d/c'd 6/15.         HISTORY OF PRESENT ILLNESS   (Location/Symptom, Timing/Onset, Context/Setting, Quality, Duration, Modifying Factors, Severity)  Note limiting factors.   Michael Chan is a 74 y.o. male who presents to the emergency department      Signout received from Dr. Garcia.  Please refer her to her dictation for complete history of present illness review of systems physical and findings and plan of care.  Patient was sent for episodes of confusion this evening.  Known history of A-fib with RVR heart rates been in the 80s while in the ED.  Laboratory studies chronically elevated troponin and some worsening of his renal functioning creatinine is 1.9 at time of signout was 1.4 at time of recent discharge.  No evidence of fluid overload on chest x-ray and CT head was without any acute findings.  The patient is on digoxin.  His digoxin level is 1.1.  At time of signout consultation with cardiology was pending.  I did discuss with Dr. Hines who is familiar with the patient.  Per discussion he had plan to wean him off of the digoxin since patient was recently started on amiodarone.  The patient has no signs of digoxin toxicity at this time.  Urine was reviewed and is unremarkable as well.  Patient will be discharged home and he will follow-up with cardiology.  Home care ED return and follow-up discussed with patient.  He is stable for discharge to his skilled nursing facility.  Patient does understand plan at this time        FINAL IMPRESSION      1. MADIHA (acute kidney injury)    2. Transient alteration of

## 2025-06-16 NOTE — ED NOTES
Mode of arrival (squad #, walk in, police, etc) : [facility]        Chief complaint(s): SOB         Arrival Note (brief scenario, treatment PTA, etc).: Report from [facility]. EMS reported pt A+Ox4, on arrival neuros intact. Patient denies any physical complaints. Does admit to SOB, h/o afib, states SOB chronic. VSS. Pt moving all extremities equally. Pt apologetic for ED visit. Answers appropriate with triage questions and following all staff commands.         C= \"Have you ever felt that you should Cut down on your drinking?\"  No  A= \"Have people Annoyed you by criticizing your drinking?\"  No  G= \"Have you ever felt bad or Guilty about your drinking?\"  No  E= \"Have you ever had a drink as an Eye-opener first thing in the morning to steady your nerves or to help a hangover?\"  No      Deferred []      Reason for deferring: N/A    *If yes to two or more: probable alcohol abuse.*

## 2025-06-18 ENCOUNTER — RESULTS FOLLOW-UP (OUTPATIENT)
Dept: CARDIOLOGY | Age: 75
End: 2025-06-18

## 2025-06-18 LAB — ECHO BSA: 2 M2

## (undated) DEVICE — TUBING SUCT NON-STRL 9/32X100 W/CNNT

## (undated) DEVICE — SYSTEM CLOSURE 6-12 FR VEN VASC VASCADE MVP

## (undated) DEVICE — DRESSING TRNSPAR W5XL4.5IN FLM SHT SEMIPERMEABLE WIND

## (undated) DEVICE — INTRODUCER SHTH AD L11CM DIA 9FR STD BLK S STL PERIPH BRACH

## (undated) DEVICE — MERCY TIFFIN CATH LAB PACK: Brand: MEDLINE INDUSTRIES, INC.

## (undated) DEVICE — SOLUTION IV 0.9% NACL IRRIGATION 3000ML BAG

## (undated) DEVICE — DRAPE, RADIAL, STERILE: Brand: MEDLINE

## (undated) DEVICE — CANNULA ORAL NSL AD CO2 N INTUB O2 DEL DISP TRU LNK

## (undated) DEVICE — INTRODUCER SHTH 6FR L11CM 0.038IN STD SIDEPRT EXTN 3 W

## (undated) DEVICE — GLOVE ORANGE PI 7 1/2   MSG9075

## (undated) DEVICE — Device

## (undated) DEVICE — DRESSING TRNSP TEGADERM 1ST AID STYL 4X4.75IN

## (undated) DEVICE — INFLATION KIT CUST REV

## (undated) DEVICE — MANIFOLD KIT WASTE MERCY TIFFIN

## (undated) DEVICE — TOWEL,OR,DSP,ST,BLUE,STD,4/PK,20PK/CS: Brand: MEDLINE

## (undated) DEVICE — INTRODUCER SHTH 7FR CANN L11CM 0.038IN STD ORNG W/ MINI

## (undated) DEVICE — ANGIOGRAPHIC CATHETER: Brand: EXPO™

## (undated) DEVICE — TRAY SURG CUST CRD CATH SVMMC SAH PH

## (undated) DEVICE — PROVE COVER: Brand: UNBRANDED

## (undated) DEVICE — ROSEN CURVED WIRE GUIDE: Brand: ROSEN

## (undated) DEVICE — TR BAND RADIAL ARTERY COMPRESSION DEVICE: Brand: TR BAND

## (undated) DEVICE — CATHETER US 8FR L90CM 4 W STEERING PRECIS FOR SIEMENS

## (undated) DEVICE — RADIFOCUS GLIDECATH: Brand: GLIDECATH

## (undated) DEVICE — CATHETER MAP 20 MM ACHVE

## (undated) DEVICE — CATH BLLN ANGIO 2.50X15MM SC EUPHORA RX

## (undated) DEVICE — RADIFOCUS OPTITORQUE ANGIOGRAPHIC CATHETER: Brand: OPTITORQUE

## (undated) DEVICE — SOLUTION IV IRRIG POUR BRL 0.9% SODIUM CHL 2F7124

## (undated) DEVICE — SHEATH INTRO 10FR CANN L11CM 0.035IN FLX AND KINK RESIST

## (undated) DEVICE — GLOVE SURG SZ 65 L12IN FNGR THK79MIL GRN LTX FREE

## (undated) DEVICE — NAVICROSS SUPPORT CATHETER: Brand: NAVICROSS

## (undated) DEVICE — SET INTRO L11CM OD11FR CANN W/ MINI-WIRE HEMSTAT VLV AD

## (undated) DEVICE — FORCEP SPEC RETRV BX AD 2 MMX155 CM 5 MM GI OVL CUP W/ NDL

## (undated) DEVICE — CATHETER EP 6FR L92CM 2-5-2MM SPC TIP 1MM 4 ELECTRD D CRV

## (undated) DEVICE — 1 X VERSACROSS STEERABLE SHEATH (INCLUDING  1 X DILATOR AND 1 X J-TIP GUIDEWIRE); 1 X VERSACROSS RF WIRE (INCLUDING 1 X CONNECTOR CABLE (SINGLE USE)); 1 X DISPERSIVE ELECTRODE: Brand: VERSACROSS STEERABLE ACCESS SOLUTION

## (undated) DEVICE — CATHETER DIAG 5FR L110CM PIG CRV SZ 6 SIDE H DBL BRAID WIRE

## (undated) DEVICE — STRAP ARMBRD W1.5XL32IN FOAM STR YET SFT W/ HK AND LOOP

## (undated) DEVICE — CATH BLLN ANGIO 3.50X8MM NC EUPHORIA RX

## (undated) DEVICE — GLIDESHEATH NITINOL HYDROPHILIC COATED INTRODUCER SHEATH: Brand: GLIDESHEATH

## (undated) DEVICE — CATHETER ANGIO 6FR L110CM ID0.056IN VENT PIG CRV ROBUST

## (undated) DEVICE — HI-TORQUE SUPRA CORE .035 PERIPHERAL GUIDE WIRE .035 X 300 CM: Brand: HI-TORQUE SUPRA CORE

## (undated) DEVICE — RADIFOCUS GLIDEWIRE ADVANTAGE TRACK GUIDE: Brand: GLIDEWIRE ADVANTAGE TRACK

## (undated) DEVICE — STERILE (15.2 TAPERED TO 7.6 X 183CM) POLYETHYLENE ACCORDION-FOLDED COVER FOR USE WITH SIEMENS ACUNAV ULTRASOUND CATHETER FAMILY CONNECTOR: Brand: SWIFTLINK TRANSDUCER COVER

## (undated) DEVICE — GUIDEWIRE VASC ANGLED 0.035 INX260 CM STD STEER SPLASHWIRE

## (undated) DEVICE — CATHETER CRYOABLATION 28 MM ARCTIC FRONT ADVANCED

## (undated) DEVICE — RUNTHROUGH NS EXTRA FLOPPY PTCA GUIDEWIRE: Brand: RUNTHROUGH

## (undated) DEVICE — COPILOT BLEEDBACK CONTROL VALVE: Brand: COPILOT

## (undated) DEVICE — BENTSON WIRE GUIDE 20CM DISTAL FLEXIBILITY WITH SOFTENED TIP: Brand: BENTSON

## (undated) DEVICE — INTRODUCER SHTH 0.018 IN 4 FRX70 CM 21 GAX7 CM KT MIC VSI

## (undated) DEVICE — CATHETER IVL SHOCKWAVE  6.0MM 135CM

## (undated) DEVICE — R2P DESTINATION SLENDER GUIDING SHEATH: Brand: R2P DESTINATION SLENDER

## (undated) DEVICE — TUBING PRSS MON L12IN PVC RIG NONEXPANDING M TO FEM CONN

## (undated) DEVICE — SURGICAL PROCEDURE SET TBNG EP

## (undated) DEVICE — CATHETER GUID EXTRA BACKUP 3.5 0.070IN 6FR 100CM VISTA BRITE TIP

## (undated) DEVICE — KIT ELECTRD SURF FOR DISPLAYING THE 3D POS OF EP CATH

## (undated) DEVICE — CATHETER IVL C2PLUS SHOCKWAVE 3.0MM X 12MM

## (undated) DEVICE — INTRODUCER SHTH STD 8 FRX11 CM FLX KINK RESIST CANN AVNT +